# Patient Record
Sex: MALE | Race: WHITE | NOT HISPANIC OR LATINO | Employment: OTHER | URBAN - METROPOLITAN AREA
[De-identification: names, ages, dates, MRNs, and addresses within clinical notes are randomized per-mention and may not be internally consistent; named-entity substitution may affect disease eponyms.]

---

## 2017-01-03 DIAGNOSIS — N40.0 BPH (BENIGN PROSTATIC HYPERPLASIA): Primary | ICD-10-CM

## 2017-01-10 DIAGNOSIS — N40.0 BPH (BENIGN PROSTATIC HYPERPLASIA): ICD-10-CM

## 2017-01-10 LAB — PSA SERPL-MCNC: 2.09 UG/L (ref 0–4)

## 2017-01-12 ENCOUNTER — PRE VISIT (OUTPATIENT)
Dept: UROLOGY | Facility: CLINIC | Age: 72
End: 2017-01-12

## 2017-01-26 ENCOUNTER — OFFICE VISIT (OUTPATIENT)
Dept: DERMATOLOGY | Facility: CLINIC | Age: 72
End: 2017-01-26

## 2017-01-26 DIAGNOSIS — Z79.899 ENCOUNTER FOR LONG-TERM (CURRENT) USE OF HIGH-RISK MEDICATION: ICD-10-CM

## 2017-01-26 DIAGNOSIS — L10.2 PEMPHIGUS FOLIACEOUS (H): ICD-10-CM

## 2017-01-26 DIAGNOSIS — L10.2 PEMPHIGUS FOLIACEUS (H): Primary | ICD-10-CM

## 2017-01-26 DIAGNOSIS — L10.2 PEMPHIGUS FOLIACEUS (H): ICD-10-CM

## 2017-01-26 LAB
ALBUMIN SERPL-MCNC: 3.9 G/DL (ref 3.4–5)
ALP SERPL-CCNC: 40 U/L (ref 40–150)
ALT SERPL W P-5'-P-CCNC: 25 U/L (ref 0–70)
AST SERPL W P-5'-P-CCNC: 19 U/L (ref 0–45)
BASOPHILS # BLD AUTO: 0 10E9/L (ref 0–0.2)
BASOPHILS NFR BLD AUTO: 0.3 %
BILIRUB DIRECT SERPL-MCNC: 0.1 MG/DL (ref 0–0.2)
BILIRUB SERPL-MCNC: 0.3 MG/DL (ref 0.2–1.3)
DIFFERENTIAL METHOD BLD: NORMAL
EOSINOPHIL # BLD AUTO: 0.1 10E9/L (ref 0–0.7)
EOSINOPHIL NFR BLD AUTO: 1.6 %
ERYTHROCYTE [DISTWIDTH] IN BLOOD BY AUTOMATED COUNT: 12.7 % (ref 10–15)
HCT VFR BLD AUTO: 42.5 % (ref 40–53)
HGB BLD-MCNC: 14.6 G/DL (ref 13.3–17.7)
IMM GRANULOCYTES # BLD: 0 10E9/L (ref 0–0.4)
IMM GRANULOCYTES NFR BLD: 0.6 %
LYMPHOCYTES # BLD AUTO: 1.4 10E9/L (ref 0.8–5.3)
LYMPHOCYTES NFR BLD AUTO: 20.4 %
MCH RBC QN AUTO: 30.9 PG (ref 26.5–33)
MCHC RBC AUTO-ENTMCNC: 34.4 G/DL (ref 31.5–36.5)
MCV RBC AUTO: 90 FL (ref 78–100)
MONOCYTES # BLD AUTO: 0.6 10E9/L (ref 0–1.3)
MONOCYTES NFR BLD AUTO: 8.6 %
NEUTROPHILS # BLD AUTO: 4.8 10E9/L (ref 1.6–8.3)
NEUTROPHILS NFR BLD AUTO: 68.5 %
NRBC # BLD AUTO: 0 10*3/UL
NRBC BLD AUTO-RTO: 0 /100
PLATELET # BLD AUTO: 212 10E9/L (ref 150–450)
PROT SERPL-MCNC: 7.2 G/DL (ref 6.8–8.8)
RBC # BLD AUTO: 4.73 10E12/L (ref 4.4–5.9)
WBC # BLD AUTO: 7 10E9/L (ref 4–11)

## 2017-01-26 RX ORDER — MYCOPHENOLATE MOFETIL 500 MG/1
TABLET, FILM COATED ORAL
Qty: 372 TABLET | Refills: 2 | Status: SHIPPED | OUTPATIENT
Start: 2017-01-26 | End: 2017-04-27

## 2017-01-26 ASSESSMENT — PAIN SCALES - GENERAL: PAINLEVEL: NO PAIN (0)

## 2017-01-26 NOTE — MR AVS SNAPSHOT
After Visit Summary   1/26/2017    Luan Mitchell    MRN: [id number]           Patient Information     Date Of Birth          1945        Visit Information        Provider Department      1/26/2017 10:30 AM Dewey Mart MD [facility] Dermatology        Today's Diagnoses     Pemphigus foliaceus    -  1     Pemphigus foliaceous         Encounter for long-term (current) use of high-risk medication            Follow-ups after your visit        Your next 10 appointments already scheduled     Jan 31, 2017  4:00 PM   (Arrive by 3:45 PM)   Return Visit with MONA Case   [facility] and [facility] ([facility] [place]    [address]  4th Floor  [facility] [address] [phone]            Apr 20, 2017 10:30 AM   (Arrive by 10:15 AM)   Return Visit with Dewey Mart MD   [facility]ermatology ([facility] [place]    [address]  3rd Floor  [facility] [address] [phone]              Who to contact     Please call your clinic at [phone] to:    Ask questions about your health    Make or cancel appointments    Discuss your medicines    Learn about your test results    Speak to your doctor   If you have compliments or concerns about an experience at your clinic, or if you wish to file a complaint, please contact [facility] Patient Relations at [phone] or email us at [email or web address]         Additional Information About Your Visit        [employer] Information     [employer] gives you secure access to your electronic health record. If you see a primary care provider, you can also send messages to your care team and make appointments. If you have questions, please call your primary care clinic.  If you do not have a primary care provider, please call [phone] and they will assist you.      [employer] is an electronic gateway that provides easy, online  access to your medical records. With [employer], you can request a clinic appointment, read your test results, renew a prescription or communicate with your care team.     To access your existing account, please contact your [facility] or call [phone] for assistance.        Care EveryWhere ID     This is your Care EveryWhere ID. This could be used by other organizations to access your [place] medical records  [id number]         Blood Pressure from Last 3 Encounters:   08/01/16 132/79   02/11/16 115/62   07/27/15 127/78    Weight from Last 3 Encounters:   08/01/16 79.198 kg (174 lb 9.6 oz)   02/11/16 81.784 kg (180 lb 4.8 oz)   07/27/15 80.151 kg (176 lb 11.2 oz)              Today, you had the following     No orders found for display         Where to get your medicines      These medications were sent to [place], [place] - [address]  [address] [place], [place] [id number]    Hours:  TRANSPLANT PHONE NUMBER [phone] Phone:  [phone]    - mycophenolate tablet       Primary Care Provider Office Phone # Fax #    Loretta Villeda [phone]        [facility]IC[facility]ANS [address] [place] [address]  [facility] [address]        Thank you!     Thank you for choosing [facility] DERMATOLOGY  for your care. Our goal is always to provide you with excellent care. Hearing back from our patients is one way we can continue to improve our services. Please take a few minutes to complete the written survey that you may receive in the mail after your visit with us. Thank you!             Your Updated Medication List - Protect others around you: Learn how to safely use, store and throw away your medicines at www.disposemymeds.org.          This list is accurate as of: 1/26/17 11:00 AM.  Always use your most recent med list.                   Brand Name Dispense Instructions for use    ARTIFICIAL TEAR SOLUTION OP      Apply   to eye.       aspirin 81 MG tablet      Take 1 tablet by mouth daily.       * clobetasol propionate 0.05 % Sham     1 Bottle    Apply sparingly to dry scalp 3 x weekly as needed.  Leave in place for 15 m then add water, lather and rinse       * clobetasol 0.05 % external solution    TEMOVATE    180 mL    Apply  topically 2 times daily as needed to the scalp.       * clobetasol 0.05 % ointment    TEMOVATE    180 g    APPLY TO AFFECTED AREA(S) TWO TIMES A DAY       * desonide 0.05 % lotion    DESOWEN    118 mL    Apply topically twice daily as needed to the beard/face.       * desonide 0.05 % ointment    DESOWEN    60 g    Apply topically twice daily as needed to the groin       * desonide 0.05 % ointment    DESOWEN    180 g    Apply topically to affected areas twice daily as instructed.       diflorasone 0.05 % ointment    PSORCON    60 g    Apply topically to affected areas as instructed.       * finasteride 5 MG tablet    PROSCAR    90 tablet    Take 1 tablet (5 mg) by mouth daily       * finasteride 5 MG tablet    PROSCAR    90 tablet    Take 1 tablet (5 mg) by mouth daily       * finasteride 5 MG tablet    PROSCAR    90 tablet    Take 1 tablet (5 mg) by mouth daily       hydrocortisone butyrate 0.1 % Soln solution    LOCOID    180 mL    Apply sparingly to affected area(s) of beard twice daily       ketoconazole 2 % shampoo    NIZORAL    240 mL    Three times per week in the shower: Lather into scalp, leave in place for 3-5 minutes, then rinse.       lovastatin 20 MG tablet    MEVACOR    90 tablet    Take 1 tablet (20 mg) by mouth At Bedtime       multivitamin per tablet      Take 1 tablet by mouth daily. 1 tab daily       mupirocin 2 % ointment    BACTROBAN    30 g    Use 2 times a day to affected area on the lower back for 2 weeks.       mycophenolate tablet     372 tablet    Take 2 tablet (1000 mg) by mouth 2 times daily.       tacrolimus 0.1 % ointment    PROTOPIC    100 g    Apply topically to affected areas  as instructed.       triamcinolone 0.1 % cream    KENALOG    454 g    Apply topically to affected areas as instructed.       VITAMIN D PO      Take 1 tablet by mouth daily 1000 mg       * Notice:  This list has 9 medication(s) that are the same as other medications prescribed for you. Read the directions carefully, and ask your doctor or other care provider to review them with you.

## 2017-01-26 NOTE — Clinical Note
2017       RE: Luan Mitchell  [address] [facility] [address]     Dear Colleague,    Thank you for referring your patient, Luan Mitchell, to the [facility] DERMATOLOGY at [facility]. Please see a copy of my visit note below.    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 On mycophenolate mofetil. Goals of treatment per Mr. Mitchell: prevent itching and secondary infections. He is not interested in complete clearance at this time.  - Titers (4/23/15)  Cell surface Ig:5,120, monkey esophagus; 1,280 intact human skin  Dsg-1 Ig units (POS > 20, neg <14)  Dsg-3 Ig units (POS > than 20, neg <9)  2. Seborrheic dermatitis  3. 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    He returns today reporting a flare 2 weeks ago with an upper respiratory infection. Otherwise, he has no new concerns, and no desire to alter treatment. His current level of itching continue to be minimal, and primarily isolated to his scalp and back.      ROS:  CONST: Otherwise feeling in his usual state of health, no f/c/ns   SKIN: No new/changing moles  LYMPH: No subcutaneous lumps/bumps  No painful or draining areas, and no mucus membrane involvement No other rapidly-growing, bleeding, tender, non-healing, painful, or tender lesions elsewhere.  No recent illness, fevers, chills, drenching night sweats, loss of appetite, or unexpected weight changes.    Social History: Retired . His wife is currently undergoing surveillance for a soft-tissue sarcoma of her right buttock, and they are due for follow-up in [place] next week.    Physical exam:  Gen: Well-developed, well-nourished  male in no acute distress, pleasant, cooperative  "with exam  Skin: Full body examination of the scalp, face, ears, chest, back, abdomen, bilateral upper and lower extremities, buttocks, groin, mouth and nails significant for skin phototype type II and:  - left lateral canthus: 4mm xerotic hyperkeratotic papule with a pink base, significantly flatter than previous but still verrucous, xerotic.  - Multiple ovoid, brown-pink plaques with overlying yellow, \"corn-flake\" like scale, mostly scattered on the back, and extremities but with a resolving confluence on the lower back. No bullae were noted. Estimated 8-9% TBSA. No giraldo honey crusting  - Scalp with minimal diffuse erythema and 3-4 focal scaled plaques, all <4mm  - remainder unremarkable.    A/P:  1. Pemphigus foliaceus, very stable and pleased with symptom control, without evidence of secondary impetiginization on exam. Still a candidate for systemic anti-CD20 therapy (which may significant reduce his long-term immunosuppressive requirements); still has has room to move up MMF, which he is tolerating well at 1000 mg BID. He nevertheless still prefers to maintain his regimen unchanged, however.    - repeat screening labs today: CBC, LFTs  - continue mycophenolate mofetil 1000 mg BID (Rx renewal provided). Benefits and risks of this were reviewed.  - continue RX: clobetasol 0.05% ointment twice daily PRN to recalcitrant areas on the trunk extremities  - continue clobetasol 0.05% shampoo 2-3x/week to the scalp  - contintue: clobetasol 0.05% solution on clobetasol shampoo days  - refilled: hydrocortisone butyrate solution    - contine : desonide 0.05% lotion BID PRN to the beard 0.1% solution to the beard twice daily PRN  - continue triamcinolone 0.1% cream twice daily to the trunk/extremities  - continue ketoconazole 2% shampoo TIW  - continue tacrolimus 0.1% ointment daily to the face    2. Inflamed seb keratosis, improved, but not resolved.   - After obtaining verbal consent, a single lesion no the L lateral " canthus were treated with liquid nitrogen, total 2 cycles 10 seconds freeze-to-thaw time. She tolerated this well.    RTC 3 months.  Discussed and examined with [facility] staff dermatologist Dr. Serina Odell.    Dewey Mart MD, ISSA  PGY-4, Dermatology  I was present for the entire procedure. KB  .I was present for key portions of the history and exam.  See resident note for pertinent history, exam, and treatment plan.  Serina Odell MD        Again, thank you for allowing me to participate in the care of your patient.      Sincerely,    Dewey Mart MD

## 2017-01-26 NOTE — PROGRESS NOTES
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 On mycophenolate mofetil. Goals of treatment per Mr. Mitchell: prevent itching and secondary infections. He is not interested in complete clearance at this time.  - Titers (4/23/15)  Cell surface Ig:5,120, monkey esophagus; 1,280 intact human skin  Dsg-1 Ig units (POS > 20, neg <14)  Dsg-3 Ig units (POS > than 20, neg <9)  2. Seborrheic dermatitis  3. 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    He returns today reporting a flare 2 weeks ago with an upper respiratory infection. Otherwise, he has no new concerns, and no desire to alter treatment. His current level of itching continue to be minimal, and primarily isolated to his scalp and back.      ROS:  CONST: Otherwise feeling in his usual state of health, no f/c/ns   SKIN: No new/changing moles  LYMPH: No subcutaneous lumps/bumps  No painful or draining areas, and no mucus membrane involvement No other rapidly-growing, bleeding, tender, non-healing, painful, or tender lesions elsewhere.  No recent illness, fevers, chills, drenching night sweats, loss of appetite, or unexpected weight changes.    Social History: Retired . His wife is currently undergoing surveillance for a soft-tissue sarcoma of her right buttock, and they are due for follow-up in [place] next week.    Physical exam:  Gen: Well-developed, well-nourished  male in no acute distress, pleasant, cooperative with exam  Skin: Full body examination of the scalp, face, ears, chest, back, abdomen, bilateral upper and lower extremities, buttocks, groin, mouth and nails significant for skin phototype type II and:  - left lateral canthus: 4mm xerotic hyperkeratotic papule with a pink base,  "significantly flatter than previous but still verrucous, xerotic.  - Multiple ovoid, brown-pink plaques with overlying yellow, \"corn-flake\" like scale, mostly scattered on the back, and extremities but with a resolving confluence on the lower back. No bullae were noted. Estimated 8-9% TBSA. No giraldo honey crusting  - Scalp with minimal diffuse erythema and 3-4 focal scaled plaques, all <4mm  - remainder unremarkable.    A/P:  1. Pemphigus foliaceus, very stable and pleased with symptom control, without evidence of secondary impetiginization on exam. Still a candidate for systemic anti-CD20 therapy (which may significant reduce his long-term immunosuppressive requirements); still has has room to move up MMF, which he is tolerating well at 1000 mg BID. He nevertheless still prefers to maintain his regimen unchanged, however.    - repeat screening labs today: CBC, LFTs  - continue mycophenolate mofetil 1000 mg BID (Rx renewal provided). Benefits and risks of this were reviewed.  - continue RX: clobetasol 0.05% ointment twice daily PRN to recalcitrant areas on the trunk extremities  - continue clobetasol 0.05% shampoo 2-3x/week to the scalp  - contintue: clobetasol 0.05% solution on clobetasol shampoo days  - refilled: hydrocortisone butyrate solution    - contine : desonide 0.05% lotion BID PRN to the beard 0.1% solution to the beard twice daily PRN  - continue triamcinolone 0.1% cream twice daily to the trunk/extremities  - continue ketoconazole 2% shampoo TIW  - continue tacrolimus 0.1% ointment daily to the face    2. Inflamed seb keratosis, improved, but not resolved.   - After obtaining verbal consent, a single lesion no the L lateral canthus were treated with liquid nitrogen, total 2 cycles 10 seconds freeze-to-thaw time. She tolerated this well.    RTC 3 months.  Discussed and examined with [facility] staff dermatologist Dr. Serina Odell.    Dewey Mart MD, ISSA  PGY-4, Dermatology  I was present for the " entire procedure. KB  .I was present for key portions of the history and exam.  See resident note for pertinent history, exam, and treatment plan.  Serina Odell MD

## 2017-01-26 NOTE — NURSING NOTE
"Dermatology Rooming Note    Luan Mitchell's goals for this visit include:   Chief Complaint   Patient presents with     Derm Problem     Patient comes to clinic today for pemphigus. States \"it's the same.\"     Tawny Sandoval, [facility]    "

## 2017-01-30 ENCOUNTER — TELEPHONE (OUTPATIENT)
Dept: DERMATOLOGY | Facility: CLINIC | Age: 72
End: 2017-01-30

## 2017-01-30 NOTE — TELEPHONE ENCOUNTER
Prior Authorization Retail Medication Request  Medication/Dose: CELLCEPT 500 MG PO TABLET  Diagnosis and ICD code: Pemphigus foliaceus [L10.2]   New/Renewal/Insurance Change PA: insurance change  Previously Tried and Failed Therapies: See chart. Luan has been on this medication since 2011    Insurance ID (if provided): [id number]   Insurance Phone (if provided):   [phone]

## 2017-01-31 ENCOUNTER — OFFICE VISIT (OUTPATIENT)
Dept: UROLOGY | Facility: CLINIC | Age: 72
End: 2017-01-31

## 2017-01-31 ENCOUNTER — OFFICE VISIT (OUTPATIENT)
Dept: INTERNAL MEDICINE | Facility: CLINIC | Age: 72
End: 2017-01-31

## 2017-01-31 VITALS
TEMPERATURE: 97.8 F | SYSTOLIC BLOOD PRESSURE: 126 MMHG | RESPIRATION RATE: 16 BRPM | WEIGHT: 174 LBS | OXYGEN SATURATION: 96 % | DIASTOLIC BLOOD PRESSURE: 71 MMHG | BODY MASS INDEX: 25.34 KG/M2 | HEART RATE: 84 BPM

## 2017-01-31 VITALS
DIASTOLIC BLOOD PRESSURE: 66 MMHG | HEIGHT: 70 IN | HEART RATE: 84 BPM | WEIGHT: 172 LBS | SYSTOLIC BLOOD PRESSURE: 117 MMHG | BODY MASS INDEX: 24.62 KG/M2

## 2017-01-31 DIAGNOSIS — R06.09 EXERTIONAL DYSPNEA: ICD-10-CM

## 2017-01-31 DIAGNOSIS — N40.0 BENIGN NON-NODULAR PROSTATIC HYPERPLASIA WITHOUT LOWER URINARY TRACT SYMPTOMS: Primary | ICD-10-CM

## 2017-01-31 DIAGNOSIS — N50.89 EPIDIDYMAL MASS: ICD-10-CM

## 2017-01-31 DIAGNOSIS — R42 DIZZINESS: Primary | ICD-10-CM

## 2017-01-31 DIAGNOSIS — E78.5 HYPERLIPIDEMIA LDL GOAL <100: ICD-10-CM

## 2017-01-31 LAB
ALBUMIN UR-MCNC: NEGATIVE MG/DL
APPEARANCE UR: CLEAR
BILIRUB UR QL STRIP: NEGATIVE
COLOR UR AUTO: YELLOW
GLUCOSE UR STRIP-MCNC: NEGATIVE MG/DL
HGB UR QL STRIP: NEGATIVE
KETONES UR STRIP-MCNC: 5 MG/DL
LEUKOCYTE ESTERASE UR QL STRIP: NEGATIVE
MUCOUS THREADS #/AREA URNS LPF: PRESENT /LPF
NITRATE UR QL: NEGATIVE
PH UR STRIP: 5 PH (ref 5–7)
RBC #/AREA URNS AUTO: 1 /HPF (ref 0–2)
SP GR UR STRIP: 1.03 (ref 1–1.03)
SQUAMOUS #/AREA URNS AUTO: <1 /HPF (ref 0–1)
URN SPEC COLLECT METH UR: ABNORMAL
UROBILINOGEN UR STRIP-MCNC: 0 MG/DL (ref 0–2)
WBC #/AREA URNS AUTO: 1 /HPF (ref 0–2)

## 2017-01-31 RX ORDER — FINASTERIDE 5 MG/1
5 TABLET, FILM COATED ORAL DAILY
Qty: 90 TABLET | Refills: 4 | Status: SHIPPED | OUTPATIENT
Start: 2017-01-31 | End: 2017-02-20

## 2017-01-31 RX ORDER — LOVASTATIN 20 MG
20 TABLET ORAL AT BEDTIME
Qty: 90 TABLET | Refills: 4 | Status: SHIPPED | OUTPATIENT
Start: 2017-01-31 | End: 2018-02-05

## 2017-01-31 RX ORDER — FINASTERIDE 5 MG/1
5 TABLET, FILM COATED ORAL DAILY
Qty: 90 TABLET | Refills: 4 | Status: SHIPPED | OUTPATIENT
Start: 2017-01-31 | End: 2017-01-31

## 2017-01-31 ASSESSMENT — PAIN SCALES - GENERAL
PAINLEVEL: NO PAIN (0)
PAINLEVEL: NO PAIN (0)

## 2017-01-31 NOTE — Clinical Note
uLan Mitchell   [address] [facility] [address]        Dear Mr. Mitchell:     I am writing you concerning your recent test results:    Results for orders placed or performed in visit on 01/31/17   Routine UA with micro reflex to culture   Result Value Ref Range    Color Urine Yellow     Appearance Urine Clear     Glucose Urine Negative NEG mg/dL    Bilirubin Urine Negative NEG    Ketones Urine 5 (A) NEG mg/dL    Specific Gravity Urine 1.030 1.003 - 1.035    Blood Urine Negative NEG    pH Urine 5.0 5.0 - 7.0 pH    Protein Albumin Urine Negative NEG mg/dL    Urobilinogen mg/dL 0.0 0.0 - 2.0 mg/dL    Nitrite Urine Negative NEG    Leukocyte Esterase Urine Negative NEG    Source Midstream Urine     WBC Urine 1 0 - 2 /HPF    RBC Urine 1 0 - 2 /HPF    Squamous Epithelial /HPF Urine <1 0 - 1 /HPF    Mucous Urine Present (A) NEG /LPF     Resulted Orders   Routine UA with micro reflex to culture   Result Value Ref Range    Color Urine Yellow     Appearance Urine Clear     Glucose Urine Negative NEG mg/dL    Bilirubin Urine Negative NEG    Ketones Urine 5 (A) NEG mg/dL    Specific Gravity Urine 1.030 1.003 - 1.035    Blood Urine Negative NEG    pH Urine 5.0 5.0 - 7.0 pH    Protein Albumin Urine Negative NEG mg/dL    Urobilinogen mg/dL 0.0 0.0 - 2.0 mg/dL    Nitrite Urine Negative NEG    Leukocyte Esterase Urine Negative NEG    Source Midstream Urine     WBC Urine 1 0 - 2 /HPF    RBC Urine 1 0 - 2 /HPF    Squamous Epithelial /HPF Urine <1 0 - 1 /HPF    Mucous Urine Present (A) NEG /LPF       Your urine looks fine.  There is no blood or signs of infection.     Please let me know if you have any questions.      Sincerely,      Amparo Carroll PA-C  Physician Assistant Urology        [facility] UROLOGY AND [facility]  [address]  4th Floor  [facility] [address]  Phone: [phone]  Fax: [phone]

## 2017-01-31 NOTE — PROGRESS NOTES
"HPI: Mr. Luan Mitchell is a 71 year old year old male presenting today for evaluation of chief complaint(s): RECHECK  - Annual Exam      Mr. Mitchell is a 71 year old gentleman with PMH significant for HLD, pemphigus (immunocompromised on cellcept) and BPH.  From a Urologic perspective he has history of elevated PSA in the 1990's with an initial biopsy negative then a repeat 24 core biopsy in 1998, also negative.  Since then he has had two more negative biopsies with Dr. Roldan. He has been on finasteride now for several years with stable PSA of ~2.     Today he continues to be very pleased with his voiding symptoms.  No frequency, urgency, incontinence or obstructive symptoms. No hematuria, urinary tract infections, stones, prostatitis.  His IPSS today is 4. Sexual function is also robust with FLORENCE today 25. He tells me, however, that he examined himself in preparation for this appointment and did note a \"pea-sized\" mass in his R scrotal compartment.  He is unsure whether it is new or not.  No pain.     PMH: No changes  PSxHx: No changes  Meds: No changes  Allergies: Penicillin    PSA History:  01/10/17 - PSA 2.09  02/04/16 - Total 2.0 and free/total = 30%  03.18.15 - Total 2.1ng/mL and free/total = 33%  02.06.13 - Total 2.1ng/mL and free/total =29%  03.07.12 - Total 2.6ng/dL and free/total = 23%    Current Outpatient Prescriptions   Medication Sig Dispense Refill     CELLCEPT 500 MG PO TABLET Take 2 tablet (1000 mg) by mouth 2 times daily. 372 tablet 2     finasteride (PROSCAR) 5 MG tablet Take 1 tablet (5 mg) by mouth daily 90 tablet 8     desonide (DESOWEN) 0.05 % ointment Apply topically to affected areas twice daily as instructed. 180 g 3     hydrocortisone butyrate (LOCOID) 0.1 % SOLN Apply sparingly to affected area(s) of beard twice daily 180 mL 3     lovastatin (MEVACOR) 20 MG tablet Take 1 tablet (20 mg) by mouth At Bedtime 90 tablet 4     clobetasol (TEMOVATE) 0.05 % ointment APPLY TO AFFECTED AREA(S) TWO " "TIMES A  g 2     desonide (DESOWEN) 0.05 % lotion Apply topically twice daily as needed to the beard/face. 118 mL 11     clobetasol propionate 0.05 % SHAM Apply sparingly to dry scalp 3 x weekly as needed.  Leave in place for 15 m then add water, lather and rinse 1 Bottle 5     clobetasol (TEMOVATE) 0.05 % external solution Apply  topically 2 times daily as needed to the scalp. 180 mL 11     ketoconazole (NIZORAL) 2 % shampoo Three times per week in the shower: Lather into scalp, leave in place for 3-5 minutes, then rinse. 240 mL 12     triamcinolone (KENALOG) 0.1 % cream Apply topically to affected areas as instructed. 454 g 11     tacrolimus (PROTOPIC) 0.1 % ointment Apply topically to affected areas as instructed. 100 g 11     Cholecalciferol (VITAMIN D PO) Take 1 tablet by mouth daily 1000 mg       ARTIFICIAL TEAR SOLUTION OP Apply  to eye.       aspirin 81 MG tablet Take 1 tablet by mouth daily.       Multiple Vitamin (MULTIVITAMIN) per tablet Take 1 tablet by mouth daily. 1 tab daily       [DISCONTINUED] desonide (DESOWEN) 0.05 % ointment Apply topically twice daily as needed to the groin 60 g 11       ALLERGIES: Penicillins      REVIEW OF SYSTEMS:  As above in HPI    GENERAL PHYSICAL EXAM:   Vitals: /66 mmHg  Pulse 84  Ht 1.765 m (5' 9.5\")  Wt 78.019 kg (172 lb)  BMI 25.04 kg/m2  Body mass index is 25.04 kg/(m^2).    GENERAL: Well groomed, well developed, well nourished male in NAD.  NEURO: Alert and oriented x 3.  PSYCH: Normal mood and affect, pleasant and agreeable during interview and exam.     :      Inguinal: no hernias or palpable lymph nodes      Circumcised penis, no penile plaques or lesions. Orthotopic location of the urethral meatus.       Scrotum normal.      Testicles of normal firmness and consistency, no masses.      Epididymes + right pea-sized mass, likely small cyst.  Left normal.        LEAH:      Normal rectal tone, small soft amount of stool in the rectal vault.      " Medium-large sized prostate, without tenderness, nodules or asymmetry.    LABS: The last test results for Mr. Luan Mitchell were reviewed:  PSA - PSA     2.09   1/10/2017  PSA     1.96   2/14/2011  PSA     2.47   2/25/2010  PSA     2.11   2/27/2009  PSA     1.77   3/28/2008  PSA     2.43   1/23/2007  PSA     1.86   3/2/2006  PSA     2.34   3/11/2005  BMP -   Recent Labs   Lab Test  10/27/16   1120  08/01/16   1151  10/22/15   1134  07/23/15   1133   12/26/08   0527   NA  140   --   146*  140   < >  142   POTASSIUM  4.3   --   4.3  4.0   < >  3.8   CHLORIDE  106   --   113*  109   < >  106   CO2  30   --   29  25   < >  27   BUN  14   --   16  14   < >  19   CR  1.04  1.13  1.05  0.92   < >  0.73   GLC  88   --   84  93   < >  64   JOSHUA  9.2   --   8.7  8.9   < >  9.2   MAG   --    --    --    --    --   2.1   PHOS   --    --    --    --    --   3.9    < > = values in this interval not displayed.       CBC -   Recent Labs   Lab Test  01/26/17   1120  07/28/16   0931  04/21/16   1102   WBC  7.0  7.0  7.1   HGB  14.6  14.2  14.8   PLT  212  127*  174       ASSESSMENT:   1) BPH on chronic finasteride  2) R epididymal mass - likely small cyst -new and worrisome to patient     PLAN:   Scrotal US  Refill finasteride  Return to Clinic 1 year    Carolynn Carroll PA-C  Department of Urologic Surgery

## 2017-01-31 NOTE — NURSING NOTE
"Chief Complaint   Patient presents with     RECHECK     BPH/LUTS follow up with PSA       Initial Ht 1.765 m (5' 9.5\")  Wt 78.019 kg (172 lb)  BMI 25.04 kg/m2 Estimated body mass index is 25.04 kg/(m^2) as calculated from the following:    Height as of this encounter: 1.765 m (5' 9.5\").    Weight as of this encounter: 78.019 kg (172 lb).  BP completed using cuff size: LUTHER Encarnacion    "

## 2017-01-31 NOTE — PROGRESS NOTES
"Chief complaint:  Dizziness  History of present illness:  Luan Mitchell is a 71-year-old man with a past medical history of BPH and pemphigus foliaceous presents with worsening of his chronic dizziness.  He describes the dizziness as feeling lightheaded and as if he had a \"paper bag over his head\".  He has never fainted.  This tends to occur when he's been standing for long period of time and is relieved by lying down.  He also experiences mild exertional dyspnea without chest pain or palpitations.   During January he states that the dizziness has been constant, but that it gets worse at times.  He checks his blood pressure at home and it has been in SBP 120s.  He had a negative dobutamine stress test in 2008.     There is no vertiginous component to this or motion sickness. He does not feel nauseated or have hearing changes. It is not brought on by moving his head quickly.  I had referred him to otolaryngology couple of years ago, Dr. Nav Rust, ho did not think that this was vertigo.    Past Medical History   Diagnosis Date     Pemphigus      pemphigus foliaceus     BPH (benign prostatic hyperplasia)      Retinal detachment 2009     left eye- corrected with laser     Hyperlipidemia LDL goal <130      Vitreous detachment 2009     LE     Past Surgical History   Procedure Laterality Date     Retinopexy laser prophylaxis break(s) os (left eye)       2009 - Left eye     No history of surgery  1/28/14     derm      ROS: 4 point ROS neg other than the symptoms noted above in the HPI.    /72 mmHg  Pulse 81  Temp(Src) 97.8  F (36.6  C) (Oral)  Resp 16  Wt 78.926 kg (174 lb)  SpO2 96%     Orthostatic Vitals   BP Pulse Position Site Cuff Size Time Date   126/71 84 Standing Right Arm Regular 06:23 PM 1/31/2017   124/78 76 Sitting Right Arm Regular 06:21 PM 1/31/2017   131/77 71 supine Right Arm Regular 06:19 PM 1/31/2017     No repeat blood pressure data filed.  No peak flow data filed.  No pain information " filed.  Physical exam  Healthy-appearing 71-year-old man  Chest:  Clear to PNA  Cardiac:  JVP is 7 cm. Regular rate and rhythm, S1 and S2 without murmur click or gallop. There is no pretibial edema    EKG August 1, 2016 reviewed: Other than a left axis deviation and it is entirely normal.  Last Basic Metabolic Panel:  NA      140   10/27/2016   POTASSIUM      4.3   10/27/2016  CHLORIDE      106   10/27/2016  JOSHUA      9.2   10/27/2016  CO2       30   10/27/2016  BUN       14   10/27/2016  CR     1.04   10/27/2016  GLC       88   10/27/2016    CBC RESULTS:   Recent Labs   Lab Test  01/26/17   1120   WBC  7.0   RBC  4.73   HGB  14.6   HCT  42.5   MCV  90   MCH  30.9   MCHC  34.4   RDW  12.7   PLT  212     AST       19   1/26/2017  ALT       25   1/26/2017  BILICONJ      0.0   7/25/2014   BILITOTAL      0.3   1/26/2017  ALBUMIN      3.9   1/26/2017  PROTTOTAL      7.2   1/26/2017   ALKPHOS       40   1/26/2017    ASSESSMENT  Dizziness, progressive present for many years n an otherwise reasonably healthy 71-year-old man.  It is not vertiginous, he has not fainted, and he is not orthostatic.  Differential diagnosis includes decreased cardiac output due to arrhythmia, congestive heart failure for which there is a strong family history, and with the exertional dyspnea, consideration for coronary artery disease.    Luan was seen today for dizziness.    Diagnoses and all orders for this visit:    Dizziness  -     Zio Patch Holter; Future  -     Dobutamine Stress Echocardiogram; Future  -     Echocardiogram Complete; Future    Exertional dyspnea  -     Dobutamine Stress Echocardiogram; Future  -     Echocardiogram Complete; Future    Hyperlipidemia LDL goal <100  -     lovastatin (MEVACOR) 20 MG tablet; Take 1 tablet (20 mg) by mouth At Bedtime

## 2017-01-31 NOTE — NURSING NOTE
"Chief Complaint   Patient presents with     Dizziness     Here for light headedness that comes and goes since January and has become \"intensely\"      Peter Buckner CMA at 5:34 PM on 1/31/2017     "

## 2017-01-31 NOTE — PATIENT INSTRUCTIONS
[facility] Medication Refill Request Information:  * Please contact your pharmacy regarding ANY request for medication refills.  ** [facility] Prescription Fax = [phone]  * Please allow 3 business days for routine medication refills.  * Please allow 5 business days for controlled substance medication refills.     [facility] Test Result notification information:  *You will be notified with in 7-10 days of your appointment day regarding the results of your test.  If you are on MyChart you will be notified as soon as the provider has reviewed the results and signed off on them.    Primary Care Center [phone]   Cardiovascular for Zio Patch Holter [phone]  ECHOcardiogram [phone]  ECHO DEPARTMENT  (EXERCISE)    Name: Luan Mitchell  YOB: 1945  MRN: [id number]  DATE:     TIME:     DOES PATIENT HAVE A DEFIBRILLATOR?    DOES PATIENT HAVE A PACEMAKER?   IS PATIENT TAKING ANY BETA-BLOCKERS?     PREP INFO:        NOTHING TO EAT OR DRINK 3 HOURS PRIOR.    DO NOT TAKE NITRATES ON THE DAY OF YOUR TEST. DO NOT WEAR YOUR NITRO-PATCH.    NO ALCOHOL, SMOKING, OR OTHER TOBACCO FOR 12 HOURS BEFORE THE TEST    WEAR COMFORTABLE CLOTHES AND COMFORTABLE SHOES    STOP BETA-BLOCKERS THE DAY BEFORE AND THE MORNING OF PROCEDURE.    LOCATION:       REPORT TO THE GOLD WAITING ROOM ON THE 2ND FLOOR, (STREET LEVEL) OF THE [facility], AT THE [facility].    QUESTIONS OR NEED TO RESCHEDULE: CALL [phone]

## 2017-01-31 NOTE — MR AVS SNAPSHOT
After Visit Summary   1/31/2017    Luan Mitchell    MRN: [id number]           Patient Information     Date Of Birth          1945        Visit Information        Provider Department      1/31/2017 4:00 PM Amparo Carroll PA [facility] and [facility] [[email or web address]] for Prostate and Urologic Cancers        Today's Diagnoses     Benign non-nodular prostatic hyperplasia without lower urinary tract symptoms    -  1     Epididymal mass            Follow-ups after your visit        Your next 10 appointments already scheduled     Jan 31, 2017  6:00 PM   (Arrive by 5:45 PM)   Return Visit with Loretta Villeda MD   [facility] ([facility])    [address] [place] [address]  [address] [facility] [id number] [phone]            Feb 01, 2017 10:30 AM   US TESTICULAR AND SCROTUM with UCUS2   [facility] ([facility])    [address] [place] [id number] [phone]           Please bring a list of your medicines (including vitamins, minerals and over-the-counter drugs). Also, tell your doctor about any allergies you may have. Wear comfortable clothes and leave your valuables at home.  You do not need to do anything special to prepare for your exam.  Please call the Imaging Department at your exam site with any questions.            Apr 20, 2017 10:30 AM   (Arrive by 10:15 AM)   Return Visit with Dewey Mart MD   [facility] ([facility])    [address] [place] [address]  [facility] [id number] [phone]              Future tests that were ordered for you today     Open Future Orders        Priority Expected Expires Ordered    Routine UA with micro reflex to culture Routine 1/31/2017 1/31/2018 1/31/2017    US Testicular and Scrotum Routine 1/31/2017 5/1/2017 1/31/2017            Who to contact     Please call your clinic at [phone] to:    Ask questions about your  "health    Make or cancel appointments    Discuss your medicines    Learn about your test results    Speak to your doctor   If you have compliments or concerns about an experience at your clinic, or if you wish to file a complaint, please contact [facility] Patient Relations at [phone] or email us at [email or web address]         Additional Information About Your Visit        MyChart Information     [employer] gives you secure access to your electronic health record. If you see a primary care provider, you can also send messages to your care team and make appointments. If you have questions, please call your primary care clinic.  If you do not have a primary care provider, please call [phone] and they will assist you.      kinkon is an electronic gateway that provides easy, online access to your medical records. With kinkon, you can request a clinic appointment, read your test results, renew a prescription or communicate with your care team.     To access your existing account, please contact your [facility] or call [phone] for assistance.        Care EveryWhere ID     This is your Care EveryWhere ID. This could be used by other organizations to access your [place] medical records  [id number]        Your Vitals Were     Pulse Height BMI (Body Mass Index)             84 1.765 m (5' 9.5\") 25.04 kg/m2          Blood Pressure from Last 3 Encounters:   01/31/17 117/66   08/01/16 132/79   02/11/16 115/62    Weight from Last 3 Encounters:   01/31/17 78.019 kg (172 lb)   08/01/16 79.198 kg (174 lb 9.6 oz)   02/11/16 81.784 kg (180 lb 4.8 oz)                 Today's Medication Changes          These changes are accurate as of: 1/31/17  4:36 PM.  If you have any questions, ask your nurse or doctor.               These medicines have changed or have updated prescriptions.        Dose/Directions    * desonide 0.05 % lotion   Commonly known as:  " DESOWEN   This may have changed:  Another medication with the same name was removed. Continue taking this medication, and follow the directions you see here.   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   Quantity:  118 mL   Refills:  11       * desonide 0.05 % ointment   Commonly known as:  DESOWEN   This may have changed:  Another medication with the same name was removed. Continue taking this medication, and follow the directions you see here.   Used for:  Pemphigus foliaceus, Pemphigus foliaceous, Encounter for long-term (current) use of high-risk medication   Changed by:  Dewey Mart MD        Apply topically to affected areas twice daily as instructed.   Quantity:  180 g   Refills:  3       * finasteride 5 MG tablet   Commonly known as:  PROSCAR   This may have changed:  Another medication with the same name was added. Make sure you understand how and when to take each.   Used for:  Benign prostatic hyperplasia with lower urinary tract symptoms, unspecified morphology   Changed by:  Loretta Villeda MD        Dose:  5 mg   Take 1 tablet (5 mg) by mouth daily   Quantity:  90 tablet   Refills:  8       * finasteride 5 MG tablet   Commonly known as:  PROSCAR   This may have changed:  Another medication with the same name was added. Make sure you understand how and when to take each.   Used for:  Benign non-nodular prostatic hyperplasia without lower urinary tract symptoms   Changed by:  Amparo Carroll PA        Dose:  5 mg   Take 1 tablet (5 mg) by mouth daily   Quantity:  90 tablet   Refills:  4       * finasteride 5 MG tablet   Commonly known as:  PROSCAR   This may have changed:  You were already taking a medication with the same name, and this prescription was added. Make sure you understand how and when to take each.   Used for:  Benign  non-nodular prostatic hyperplasia without lower urinary tract symptoms   Changed by:  Amparo Carroll PA        Dose:  5 mg   Take 1 tablet (5 mg) by mouth daily   Quantity:  90 tablet   Refills:  4       * Notice:  This list has 5 medication(s) that are the same as other medications prescribed for you. Read the directions carefully, and ask your doctor or other care provider to review them with you.      Stop taking these medicines if you haven't already. Please contact your care team if you have questions.     diflorasone 0.05 % ointment   Commonly known as:  PSORCON   Stopped by:  Amparo Carroll PA           mupirocin 2 % ointment   Commonly known as:  BACTROBAN   Stopped by:  Amparo Carroll PA                Where to get your medicines      These medications were sent to [facility] - [place], [place] - [address] [place] [address]  [address] [facility] [address]     Phone:  [phone]    - finasteride 5 MG tablet      Some of these will need a paper prescription and others can be bought over the counter.  Ask your nurse if you have questions.     Bring a paper prescription for each of these medications    - finasteride 5 MG tablet             Primary Care Provider Office Phone # Fax #    Loretta Villeda [phone]        [facility] [address] [facility] [address]  [facility] [address]        Thank you!     Thank you for choosing [facility] AND [facility] FOR PROSTATE AND UROLOGIC CANCERS  for your care. Our goal is always to provide you with excellent care. Hearing back from our patients is one way we can continue to improve our services. Please take a few minutes to complete the written survey that you may receive in the mail after your visit with us. Thank you!             Your Updated Medication List - Protect others around you: Learn how to safely use, store and throw away your medicines at www.disposemymeds.org.          This list is accurate as of: 1/31/17   4:36 PM.  Always use your most recent med list.                   Brand Name Dispense Instructions for use    ARTIFICIAL TEAR SOLUTION OP      Apply  to eye.       aspirin 81 MG tablet      Take 1 tablet by mouth daily.       * clobetasol propionate 0.05 % Sham     1 Bottle    Apply sparingly to dry scalp 3 x weekly as needed.  Leave in place for 15 m then add water, lather and rinse       * clobetasol 0.05 % external solution    TEMOVATE    180 mL    Apply  topically 2 times daily as needed to the scalp.       * clobetasol 0.05 % ointment    TEMOVATE    180 g    APPLY TO AFFECTED AREA(S) TWO TIMES A DAY       * desonide 0.05 % lotion    DESOWEN    118 mL    Apply topically twice daily as needed to the beard/face.       * desonide 0.05 % ointment    DESOWEN    180 g    Apply topically to affected areas twice daily as instructed.       * finasteride 5 MG tablet    PROSCAR    90 tablet    Take 1 tablet (5 mg) by mouth daily       * finasteride 5 MG tablet    PROSCAR    90 tablet    Take 1 tablet (5 mg) by mouth daily       * finasteride 5 MG tablet    PROSCAR    90 tablet    Take 1 tablet (5 mg) by mouth daily       hydrocortisone butyrate 0.1 % Soln solution    LOCOID    180 mL    Apply sparingly to affected area(s) of beard twice daily       ketoconazole 2 % shampoo    NIZORAL    240 mL    Three times per week in the shower: Lather into scalp, leave in place for 3-5 minutes, then rinse.       lovastatin 20 MG tablet    MEVACOR    90 tablet    Take 1 tablet (20 mg) by mouth At Bedtime       multivitamin per tablet      Take 1 tablet by mouth daily. 1 tab daily       mycophenolate tablet     372 tablet    Take 2 tablet (1000 mg) by mouth 2 times daily.       tacrolimus 0.1 % ointment    PROTOPIC    100 g    Apply topically to affected areas as instructed.       triamcinolone 0.1 % cream    KENALOG    454 g    Apply topically to affected areas as instructed.       VITAMIN D PO      Take 1 tablet by mouth daily 1000 mg        * Notice:  This list has 8 medication(s) that are the same as other medications prescribed for you. Read the directions carefully, and ask your doctor or other care provider to review them with you.

## 2017-01-31 NOTE — MR AVS SNAPSHOT
After Visit Summary   1/31/2017    Luan Mitchell    MRN: [id number]           Patient Information     Date Of Birth          1945        Visit Information        Provider Department      1/31/2017 6:00 PM Loretta Villeda MD [facility]        Today's Diagnoses     Dizziness    -  1     Exertional dyspnea           Care Instructions    Primary Care Center Medication Refill Request Information:  * Please contact your pharmacy regarding ANY request for medication refills.  ** [facility] Prescription Fax = [phone]  * Please allow 3 business days for routine medication refills.  * Please allow 5 business days for controlled substance medication refills.     Primary Care Center Test Result notification information:  *You will be notified with in 7-10 days of your appointment day regarding the results of your test.  If you are on MyChart you will be notified as soon as the provider has reviewed the results and signed off on them.    Primary Care Center [phone]   Cardiovascular for Zio Patch Holter [phone]  ECHOcardiogram [phone]  ECHO DEPARTMENT  (EXERCISE)    Name: Luan Mitchell  YOB: 1945  MRN: [id number]  DATE:     TIME:     DOES PATIENT HAVE A DEFIBRILLATOR?    DOES PATIENT HAVE A PACEMAKER?   IS PATIENT TAKING ANY BETA-BLOCKERS?     PREP INFO:        NOTHING TO EAT OR DRINK 3 HOURS PRIOR.    DO NOT TAKE NITRATES ON THE DAY OF YOUR TEST. DO NOT WEAR YOUR NITRO-PATCH.    NO ALCOHOL, SMOKING, OR OTHER TOBACCO FOR 12 HOURS BEFORE THE TEST    WEAR COMFORTABLE CLOTHES AND COMFORTABLE SHOES    STOP BETA-BLOCKERS THE DAY BEFORE AND THE MORNING OF PROCEDURE.    LOCATION:       REPORT TO THE [facility] WAITING ROOM ON THE 2ND FLOOR, (STREET LEVEL) OF THE [facility], AT THE [facility].    QUESTIONS OR NEED TO RESCHEDULE: CALL [phone]          Follow-ups after your visit        Your next 10 appointments already scheduled     Feb 01, 2017  10:30 AM   US TESTICULAR AND SCROTUM with UCUS2   [facility] US ([facility])    [address] [address] [phone]           Please bring a list of your medicines (including vitamins, minerals and over-the-counter drugs). Also, tell your doctor about any allergies you may have. Wear comfortable clothes and leave your valuables at home.  You do not need to do anything special to prepare for your exam.  Please call the Imaging Department at your exam site with any questions.            Apr 20, 2017 10:30 AM   (Arrive by 10:15 AM)   Return Visit with Dewey Mart MD   [facility] ([facility])    [address] [facility]  3rd [facility] [address] [phone]              Future tests that were ordered for you today     Open Future Orders        Priority Expected Expires Ordered    Echocardiogram Complete Routine  1/31/2018 1/31/2017    Zio Patch Holter Routine  3/17/2017 1/31/2017    Dobutamine Stress Echocardiogram Routine  1/31/2018 1/31/2017    US Testicular and Scrotum Routine 1/31/2017 5/1/2017 1/31/2017            Who to contact     Please call your clinic at [phone] to:    Ask questions about your health    Make or cancel appointments    Discuss your medicines    Learn about your test results    Speak to your doctor   If you have compliments or concerns about an experience at your clinic, or if you wish to file a complaint, please contact [facility] Patient Relations at [phone] or email us at [email or web address]         Additional Information About Your Visit        MyChart Information     [employer] gives you secure access to your electronic health record. If you see a primary care provider, you can also send messages to your care team and make appointments. If you have questions, please call your primary care clinic.  If you do not have a  primary care provider, please call [phone] and they will assist you.      [employer] is an electronic gateway that provides easy, online access to your medical records. With [employer], you can request a clinic appointment, read your test results, renew a prescription or communicate with your care team.     To access your existing account, please contact your [facility] or call [phone] for assistance.        Care EveryWhere ID     This is your Care EveryWhere ID. This could be used by other organizations to access your [place] medical records  [id number]        Your Vitals Were     Pulse Temperature Respirations Pulse Oximetry          81 97.8  F (36.6  C) (Oral) 16 96%         Blood Pressure from Last 3 Encounters:   01/31/17 115/72   01/31/17 117/66   08/01/16 132/79    Weight from Last 3 Encounters:   01/31/17 78.926 kg (174 lb)   01/31/17 78.019 kg (172 lb)   08/01/16 79.198 kg (174 lb 9.6 oz)                 Today's Medication Changes          These changes are accurate as of: 1/31/17  6:34 PM.  If you have any questions, ask your nurse or doctor.               These medicines have changed or have updated prescriptions.        Dose/Directions    * desonide 0.05 % lotion   Commonly known as:  DESOWEN   This may have changed:  Another medication with the same name was removed. Continue taking this medication, and follow the directions you see here.   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   Quantity:  118 mL   Refills:  11       * desonide 0.05 % ointment   Commonly known as:  DESOWEN   This may have changed:  Another medication with the same name was removed. Continue taking this medication, and follow the directions you see here.   Used for:  Pemphigus foliaceus, Pemphigus foliaceous, Encounter for long-term (current) use  of high-risk medication   Changed by:  Dewey Mart MD        Apply topically to affected areas twice daily as instructed.   Quantity:  180 g   Refills:  3       * finasteride 5 MG tablet   Commonly known as:  PROSCAR   This may have changed:  Another medication with the same name was added. Make sure you understand how and when to take each.   Used for:  Benign prostatic hyperplasia with lower urinary tract symptoms, unspecified morphology   Changed by:  Loretta Villeda MD        Dose:  5 mg   Take 1 tablet (5 mg) by mouth daily   Quantity:  90 tablet   Refills:  8       * finasteride 5 MG tablet   Commonly known as:  PROSCAR   This may have changed:  Another medication with the same name was added. Make sure you understand how and when to take each.   Used for:  Benign non-nodular prostatic hyperplasia without lower urinary tract symptoms   Changed by:  Amparo Carroll PA        Dose:  5 mg   Take 1 tablet (5 mg) by mouth daily   Quantity:  90 tablet   Refills:  4       * finasteride 5 MG tablet   Commonly known as:  PROSCAR   This may have changed:  You were already taking a medication with the same name, and this prescription was added. Make sure you understand how and when to take each.   Used for:  Benign non-nodular prostatic hyperplasia without lower urinary tract symptoms   Changed by:  Amparo Carroll PA        Dose:  5 mg   Take 1 tablet (5 mg) by mouth daily   Quantity:  90 tablet   Refills:  4       * Notice:  This list has 5 medication(s) that are the same as other medications prescribed for you. Read the directions carefully, and ask your doctor or other care provider to review them with you.      Stop taking these medicines if you haven't already. Please contact your care team if you have questions.     diflorasone 0.05 % ointment   Commonly known as:  PSORCON   Stopped by:  Amparo Carroll PA           mupirocin 2 % ointment   Commonly known as:  BACTROBAN   Stopped by:  Glen  MONA Taylor                Where to get your medicines      These medications were sent to [facility] - [place], [place] - [address]  [address] [address]     Phone:  [phone]    - finasteride 5 MG tablet      Some of these will need a paper prescription and others can be bought over the counter.  Ask your nurse if you have questions.     Bring a paper prescription for each of these medications    - finasteride 5 MG tablet             Primary Care Provider Office Phone # Fax #    Loretta Villeda [phone]        PH[facility]IC[facility] [address] [facility] [address]  [facility] [address]        Thank you!     Thank you for choosing [facility]  for your care. Our goal is always to provide you with excellent care. Hearing back from our patients is one way we can continue to improve our services. Please take a few minutes to complete the written survey that you may receive in the mail after your visit with us. Thank you!             Your Updated Medication List - Protect others around you: Learn how to safely use, store and throw away your medicines at www.disposemymeds.org.          This list is accurate as of: 1/31/17  6:34 PM.  Always use your most recent med list.                   Brand Name Dispense Instructions for use    ARTIFICIAL TEAR SOLUTION OP      Apply  to eye.       aspirin 81 MG tablet      Take 1 tablet by mouth daily.       * clobetasol propionate 0.05 % Sham     1 Bottle    Apply sparingly to dry scalp 3 x weekly as needed.  Leave in place for 15 m then add water, lather and rinse       * clobetasol 0.05 % external solution    TEMOVATE    180 mL    Apply  topically 2 times daily as needed to the scalp.       * clobetasol 0.05 % ointment    TEMOVATE    180 g    APPLY TO AFFECTED AREA(S) TWO TIMES A DAY       * desonide 0.05 % lotion    DESOWEN    118 mL    Apply topically twice daily as needed to the beard/face.       *  desonide 0.05 % ointment    DESOWEN    180 g    Apply topically to affected areas twice daily as instructed.       * finasteride 5 MG tablet    PROSCAR    90 tablet    Take 1 tablet (5 mg) by mouth daily       * finasteride 5 MG tablet    PROSCAR    90 tablet    Take 1 tablet (5 mg) by mouth daily       * finasteride 5 MG tablet    PROSCAR    90 tablet    Take 1 tablet (5 mg) by mouth daily       hydrocortisone butyrate 0.1 % Soln solution    LOCOID    180 mL    Apply sparingly to affected area(s) of beard twice daily       ketoconazole 2 % shampoo    NIZORAL    240 mL    Three times per week in the shower: Lather into scalp, leave in place for 3-5 minutes, then rinse.       lovastatin 20 MG tablet    MEVACOR    90 tablet    Take 1 tablet (20 mg) by mouth At Bedtime       multivitamin per tablet      Take 1 tablet by mouth daily. 1 tab daily       mycophenolate tablet     372 tablet    Take 2 tablet (1000 mg) by mouth 2 times daily.       tacrolimus 0.1 % ointment    PROTOPIC    100 g    Apply topically to affected areas as instructed.       triamcinolone 0.1 % cream    KENALOG    454 g    Apply topically to affected areas as instructed.       VITAMIN D PO      Take 1 tablet by mouth daily 1000 mg       * Notice:  This list has 8 medication(s) that are the same as other medications prescribed for you. Read the directions carefully, and ask your doctor or other care provider to review them with you.

## 2017-02-03 ENCOUNTER — HOSPITAL ENCOUNTER (OUTPATIENT)
Dept: CARDIOLOGY | Facility: CLINIC | Age: 72
Discharge: HOME | End: 2017-02-03
Attending: INTERNAL MEDICINE | Admitting: INTERNAL MEDICINE
Payer: COMMERCIAL

## 2017-02-03 ENCOUNTER — ALLIED HEALTH/NURSE VISIT (OUTPATIENT)
Dept: CARDIOLOGY | Facility: CLINIC | Age: 72
End: 2017-02-03
Payer: COMMERCIAL

## 2017-02-03 DIAGNOSIS — R42 DIZZINESS: ICD-10-CM

## 2017-02-03 DIAGNOSIS — R06.09 EXERTIONAL DYSPNEA: ICD-10-CM

## 2017-02-03 PROCEDURE — 25000125 ZZHC RX 250: Performed by: INTERNAL MEDICINE

## 2017-02-03 PROCEDURE — 0298T ZZC EXT ECG > 48HR TO 21 DAY REVIEW AND INTERPRETATN: CPT | Performed by: INTERNAL MEDICINE

## 2017-02-03 PROCEDURE — 0296T ZIO PATCH HOLTER: CPT | Mod: ZF

## 2017-02-03 PROCEDURE — 93321 DOPPLER ECHO F-UP/LMTD STD: CPT | Mod: 26 | Performed by: INTERNAL MEDICINE

## 2017-02-03 PROCEDURE — 93018 CV STRESS TEST I&R ONLY: CPT | Performed by: INTERNAL MEDICINE

## 2017-02-03 PROCEDURE — 93350 STRESS TTE ONLY: CPT | Mod: 26 | Performed by: INTERNAL MEDICINE

## 2017-02-03 PROCEDURE — 93325 DOPPLER ECHO COLOR FLOW MAPG: CPT | Mod: 26 | Performed by: INTERNAL MEDICINE

## 2017-02-03 PROCEDURE — 93016 CV STRESS TEST SUPVJ ONLY: CPT | Performed by: INTERNAL MEDICINE

## 2017-02-03 PROCEDURE — 40000264 ECHO DOBUTAMINE STRESS TEST WITH DEFINITY

## 2017-02-03 PROCEDURE — 25000128 H RX IP 250 OP 636: Performed by: INTERNAL MEDICINE

## 2017-02-03 PROCEDURE — 25500064 ZZH RX 255 OP 636: Performed by: INTERNAL MEDICINE

## 2017-02-03 RX ORDER — DOBUTAMINE HYDROCHLORIDE 200 MG/100ML
5-40 INJECTION INTRAVENOUS CONTINUOUS
Status: DISCONTINUED | OUTPATIENT
Start: 2017-02-03 | End: 2017-02-04 | Stop reason: HOSPADM

## 2017-02-03 RX ADMIN — METOPROLOL TARTRATE 1 MG: 5 INJECTION INTRAVENOUS at 11:35

## 2017-02-03 RX ADMIN — PERFLUTREN 9 ML: 6.52 INJECTION, SUSPENSION INTRAVENOUS at 11:36

## 2017-02-03 RX ADMIN — DOBUTAMINE HYDROCHLORIDE 30 MCG/KG/MIN: 200 INJECTION INTRAVENOUS at 11:26

## 2017-02-03 NOTE — NURSING NOTE
Per Dr. Loretta Villeda, patient to have 3 day ziopatch monitor placed.  Diagnosis: dizziness  Monitor placed: Yes  Patient Instructed: Yes  Patient verbalized understanding: Yes  Holter # [id number]      Placed by Katie Leigh CMA

## 2017-02-03 NOTE — PROGRESS NOTES
Pt here for dobutamine stress test. Test, meds and side effects reviewed with patient. Achieved target HR at 40 mcg/kg/min Dobutamine. Gave a total of 1mg IV Metoprolol to bring HR back to baseline. Post monitoring complete and VSS. Pt escorted out to the gold waiting room.

## 2017-02-08 NOTE — TELEPHONE ENCOUNTER
Patient called to check the status of PA. Per 2/2/17 note the PA is in processing. Will document when response is received.

## 2017-02-08 NOTE — TELEPHONE ENCOUNTER
Prior Authorization Approval    Authorization Effective Date: 1/9/2017  Authorization Expiration Date: 2/8/2020  Medication: CELLCEPT 500 MG PO TABLET  Approved Dose/Quantity:    Reference #:     Insurance Company: [employer] - Phone [phone] Fax [phone]  Expected CoPay:       CoPay Card Available:      Foundation Assistance Needed:    Which Pharmacy is filling the prescription (Not needed for infusion/clinic administered): [facility] [address] 9-749  Pharmacy Notified: Yes  Patient Notified: Yes

## 2017-02-08 NOTE — TELEPHONE ENCOUNTER
[facility] Prior Authorization Team   Phone: [phone]  Fax: [phone]      PA Initiation    Medication: CELLCEPT 500 MG PO TABLET  Insurance Company: [employer] - Phone [phone] Fax [phone]  Pharmacy Filling the Rx: [facility] [place], [place] - [address] [place] [address] [phone]  Filling Pharmacy Phone: [phone]  Filling Pharmacy Fax: [phone]  Start Date: 2/2/2017      INITIATED VIA PHONE

## 2017-02-14 DIAGNOSIS — R00.2 PALPITATION: Primary | ICD-10-CM

## 2017-02-15 DIAGNOSIS — Z00.00 HEALTHCARE MAINTENANCE: Primary | ICD-10-CM

## 2017-02-16 DIAGNOSIS — R00.2 PALPITATION: ICD-10-CM

## 2017-02-16 DIAGNOSIS — Z00.00 HEALTHCARE MAINTENANCE: ICD-10-CM

## 2017-02-16 LAB
HBA1C MFR BLD: 5.4 % (ref 4.3–6)
POTASSIUM SERPL-SCNC: 4.6 MMOL/L (ref 3.4–5.3)
TSH SERPL DL<=0.05 MIU/L-ACNC: 4.83 MU/L (ref 0.4–4)

## 2017-02-20 ENCOUNTER — OFFICE VISIT (OUTPATIENT)
Dept: INTERNAL MEDICINE | Facility: CLINIC | Age: 72
End: 2017-02-20

## 2017-02-20 VITALS
DIASTOLIC BLOOD PRESSURE: 75 MMHG | SYSTOLIC BLOOD PRESSURE: 125 MMHG | WEIGHT: 176.3 LBS | RESPIRATION RATE: 18 BRPM | BODY MASS INDEX: 25.66 KG/M2 | HEART RATE: 80 BPM | OXYGEN SATURATION: 96 %

## 2017-02-20 DIAGNOSIS — R42 LIGHT HEADEDNESS: Primary | ICD-10-CM

## 2017-02-20 ASSESSMENT — PAIN SCALES - GENERAL: PAINLEVEL: NO PAIN (0)

## 2017-02-20 NOTE — NURSING NOTE
Chief Complaint   Patient presents with     Dizziness     Patient is here to follow up on dizziness.     Ramona Castaneda LPN at 4:22 PM on 2/20/2017.

## 2017-02-20 NOTE — MR AVS SNAPSHOT
After Visit Summary   2/20/2017    Luan Mitchell    MRN: [id number]           Patient Information     Date Of Birth          1945        Visit Information        Provider Department      2/20/2017 4:10 PM Loretta Villeda MD [facility]        Today's Diagnoses     Light headedness    -  1      Care Instructions    Primary Care Center Medication Refill Request Information:  * Please contact your pharmacy regarding ANY request for medication refills.  ** [facility] Prescription Fax = [phone]  * Please allow 3 business days for routine medication refills.  * Please allow 5 business days for controlled substance medication refills.     Primary Care Center Test Result notification information:  *You will be notified with in 7-10 days of your appointment day regarding the results of your test.  If you are on MyChart you will be notified as soon as the provider has reviewed the results and signed off on them.    Primary Care Center Phone Number [phone]          Follow-ups after your visit        Additional Services     CARDIOLOGY EVAL ADULT REFERRAL       Your provider has referred you to:  [facility]: [facility] [phone]   [email or web address]    Please be aware that coverage of these services is subject to the terms and limitations of your health insurance plan.  Call member services at your health plan with any benefit or coverage questions.      Type of Referral:  New Cardiology Consult    Timeframe requested:  Within 1 month    Please bring the following to your appointment:  >>   Any x-rays, CTs or MRIs which have been performed.  Contact the facility where they were done to arrange for  prior to your scheduled appointment.    >>   List of current medications  >>   This referral request   >>   Any documents/labs given to you for this referral                  Your  next 10 appointments already scheduled     Apr 20, 2017 10:30 AM CDT   (Arrive by 10:15 AM)   Return Visit with Dewey Mart MD   [facility] ([facility])    [address] [address] [phone]              Who to contact     Please call your clinic at [phone] to:    Ask questions about your health    Make or cancel appointments    Discuss your medicines    Learn about your test results    Speak to your doctor   If you have compliments or concerns about an experience at your clinic, or if you wish to file a complaint, please contact [facility] Patient Relations at [phone] or email us at [email or web address]         Additional Information About Your Visit        [employer] Information     [employer] gives you secure access to your electronic health record. If you see a primary care provider, you can also send messages to your care team and make appointments. If you have questions, please call your primary care clinic.  If you do not have a primary care provider, please call [phone] and they will assist you.      [employer] is an electronic gateway that provides easy, online access to your medical records. With [employer], you can request a clinic appointment, read your test results, renew a prescription or communicate with your care team.     To access your existing account, please contact your [facility] or call [phone] for assistance.        Care EveryWhere ID     This is your Care EveryWhere ID. This could be used by other organizations to access your [place] medical records  [id number]        Your Vitals Were     Pulse Respirations Pulse Oximetry BMI (Body Mass Index)          80 18 96% 25.66 kg/m2         Blood Pressure from Last 3 Encounters:   02/20/17 125/75   01/31/17 115/72   01/31/17 117/66    Weight from Last 3 Encounters:   02/20/17 80 kg (176 lb 4.8  oz)   01/31/17 78.9 kg (174 lb)   01/31/17 78 kg (172 lb)              We Performed the Following     CARDIOLOGY EVAL ADULT REFERRAL          Today's Medication Changes          These changes are accurate as of: 2/20/17  4:50 PM.  If you have any questions, ask your nurse or doctor.               These medicines have changed or have updated prescriptions.        Dose/Directions    finasteride 5 MG tablet   Commonly known as:  PROSCAR   This may have changed:  Another medication with the same name was removed. Continue taking this medication, and follow the directions you see here.   Used for:  Benign prostatic hyperplasia with lower urinary tract symptoms, unspecified morphology   Changed by:  Loretta Villeda MD        Dose:  5 mg   Take 1 tablet (5 mg) by mouth daily   Quantity:  90 tablet   Refills:  8                Primary Care Provider Office Phone # Fax #    Loretta Villeda [phone]        PHYSIC[facility] [address] [address]  [facility] [address]        Thank you!     Thank you for choosing [facility]  for your care. Our goal is always to provide you with excellent care. Hearing back from our patients is one way we can continue to improve our services. Please take a few minutes to complete the written survey that you may receive in the mail after your visit with us. Thank you!             Your Updated Medication List - Protect others around you: Learn how to safely use, store and throw away your medicines at www.disposemymeds.org.          This list is accurate as of: 2/20/17  4:50 PM.  Always use your most recent med list.                   Brand Name Dispense Instructions for use    ARTIFICIAL TEAR SOLUTION OP      Apply  to eye.       aspirin 81 MG tablet      Take 1 tablet by mouth daily.       * clobetasol propionate 0.05 % Sham     1 Bottle    Apply sparingly to dry scalp 3 x weekly as needed.  Leave in place for 15 m then add water, lather and rinse       *  clobetasol 0.05 % external solution    TEMOVATE    180 mL    Apply  topically 2 times daily as needed to the scalp.       * clobetasol 0.05 % ointment    TEMOVATE    180 g    APPLY TO AFFECTED AREA(S) TWO TIMES A DAY       * desonide 0.05 % lotion    DESOWEN    118 mL    Apply topically twice daily as needed to the beard/face.       * desonide 0.05 % ointment    DESOWEN    180 g    Apply topically to affected areas twice daily as instructed.       finasteride 5 MG tablet    PROSCAR    90 tablet    Take 1 tablet (5 mg) by mouth daily       hydrocortisone butyrate 0.1 % Soln solution    LOCOID    180 mL    Apply sparingly to affected area(s) of beard twice daily       ketoconazole 2 % shampoo    NIZORAL    240 mL    Three times per week in the shower: Lather into scalp, leave in place for 3-5 minutes, then rinse.       lovastatin 20 MG tablet    MEVACOR    90 tablet    Take 1 tablet (20 mg) by mouth At Bedtime       multivitamin per tablet      Take 1 tablet by mouth daily. 1 tab daily       mycophenolate tablet     372 tablet    Take 2 tablet (1000 mg) by mouth 2 times daily.       tacrolimus 0.1 % ointment    PROTOPIC    100 g    Apply topically to affected areas as instructed.       triamcinolone 0.1 % cream    KENALOG    454 g    Apply topically to affected areas as instructed.       VITAMIN D PO      Take 1 tablet by mouth daily 1000 mg       * Notice:  This list has 5 medication(s) that are the same as other medications prescribed for you. Read the directions carefully, and ask your doctor or other care provider to review them with you.

## 2017-02-20 NOTE — PATIENT INSTRUCTIONS
Primary Care Center Medication Refill Request Information:  * Please contact your pharmacy regarding ANY request for medication refills.  ** [facility] Prescription Fax = [phone]  * Please allow 3 business days for routine medication refills.  * Please allow 5 business days for controlled substance medication refills.     [facility] Test Result notification information:  *You will be notified with in 7-10 days of your appointment day regarding the results of your test.  If you are on MyChart you will be notified as soon as the provider has reviewed the results and signed off on them.    Primary Care Center Phone Number [phone]

## 2017-02-20 NOTE — PROGRESS NOTES
Cc:  Follow-up  History of present illness:  Luan Mitchell presents for follow-up of his holter monitoring and dobutamine stress test.  We reviewed these results together and they're both entirely negative. There were a few premature atrial beats and a short run of SVT, 6 beats, not corresponding to his symptoms of lightheadedness.    We reviewed the laboratory results which included a normal A1c, hemoglobin but a slightly increased TSH:  TSH   Date Value Ref Range Status   02/16/2017 4.83 (H) 0.40 - 4.00 mU/L Final   ]        Past Medical History   Diagnosis Date     BPH (benign prostatic hyperplasia)      Dizziness      Hyperlipidemia LDL goal <130      Pemphigus      pemphigus foliaceus     Retinal detachment 2009     left eye- corrected with laser     Vitreous detachment 2009     LE     Past Surgical History   Procedure Laterality Date     Retinopexy laser prophylaxis break(s) os (left eye)       2009 - Left eye     No history of surgery  1/28/14     derm      ROS: 4 point ROS neg other than the symptoms noted above in the HPI.    /75  Pulse 80  Resp 18  Wt 80 kg (176 lb 4.8 oz)  SpO2 96%  BMI 25.66 kg/m2     ASSESSMENT   1.  Lightheadedness and occasional palpitations in a 71-year-old man with a normal physical exam, no evidence of orthostasis, no vertigo, normal dobutamine stress test, and a basically normal Holter monitor.   The plan is to refer him to cardiology to consider tilt table test.     2.  Subclinical hypothyroidism.   Plan check TSH T3 and T4 in 2 months.     I spent a total of 15 minutes face-to-face with Luan Mitchell during today's office visit.  Over 50% of this time was spent counseling the patient and/or coordinating care regarding test results.  See note for details.

## 2017-02-22 ENCOUNTER — DOCUMENTATION ONLY (OUTPATIENT)
Dept: VASCULAR SURGERY | Facility: CLINIC | Age: 72
End: 2017-02-22

## 2017-03-12 ASSESSMENT — ENCOUNTER SYMPTOMS
HYPOTENSION: 0
SNORES LOUDLY: 0
SPUTUM PRODUCTION: 0
DYSPNEA ON EXERTION: 0
EXERCISE INTOLERANCE: 0
ORTHOPNEA: 0
PALPITATIONS: 1
POSTURAL DYSPNEA: 0
SLEEP DISTURBANCES DUE TO BREATHING: 0
TACHYCARDIA: 1
COUGH: 0
LEG SWELLING: 0
WHEEZING: 0
HEMOPTYSIS: 0
SHORTNESS OF BREATH: 0
LIGHT-HEADEDNESS: 1
LEG PAIN: 0
HYPERTENSION: 0
COUGH DISTURBING SLEEP: 0
RESPIRATORY PAIN: 0
SYNCOPE: 0
CLAUDICATION: 0

## 2017-03-13 ENCOUNTER — OFFICE VISIT (OUTPATIENT)
Dept: CARDIOLOGY | Facility: CLINIC | Age: 72
End: 2017-03-13
Attending: INTERNAL MEDICINE
Payer: COMMERCIAL

## 2017-03-13 ENCOUNTER — PRE VISIT (OUTPATIENT)
Dept: CARDIOLOGY | Facility: CLINIC | Age: 72
End: 2017-03-13

## 2017-03-13 VITALS
DIASTOLIC BLOOD PRESSURE: 74 MMHG | BODY MASS INDEX: 25.27 KG/M2 | HEIGHT: 70 IN | WEIGHT: 176.5 LBS | HEART RATE: 81 BPM | OXYGEN SATURATION: 97 % | SYSTOLIC BLOOD PRESSURE: 132 MMHG

## 2017-03-13 DIAGNOSIS — R42 LIGHTHEADED: ICD-10-CM

## 2017-03-13 DIAGNOSIS — R42 LIGHTHEADEDNESS: Primary | ICD-10-CM

## 2017-03-13 PROCEDURE — 99203 OFFICE O/P NEW LOW 30 MIN: CPT | Mod: GC | Performed by: INTERNAL MEDICINE

## 2017-03-13 ASSESSMENT — PAIN SCALES - GENERAL: PAINLEVEL: NO PAIN (0)

## 2017-03-13 NOTE — MR AVS SNAPSHOT
After Visit Summary   3/13/2017    Luan Mitchell    MRN: [id number]           Patient Information     Date Of Birth          1945        Visit Information        Provider Department      3/13/2017 6:00 PM Mukesh Lewis MD [facility]         Follow-ups after your visit        Your next 10 appointments already scheduled     Mar 13, 2017  6:00 PM CDT   (Arrive by 5:45 PM)   NEW ARRHYTHMIA with Mukesh Lewis MD   [facility] ([facility])    [address] [place] [id number] [phone]            Apr 20, 2017 10:30 AM CDT   (Arrive by 10:15 AM)   Return Visit with Dewey Mart MD   [facility] ([facility])    [address] [place] [id number] [phone]              Who to contact     If you have questions or need follow up information about today's clinic visit or your schedule please contact [facility] directly at [phone].  Normal or non-critical lab and imaging results will be communicated to you by Blizuuhart, letter or phone within 4 business days after the clinic has received the results. If you do not hear from us within 7 days, please contact the clinic through [employer] or phone. If you have a critical or abnormal lab result, we will notify you by phone as soon as possible.  Submit refill requests through 25eight or call your pharmacy and they will forward the refill request to us. Please allow 3 business days for your refill to be completed.          Additional Information About Your Visit        Blizuuhart Information     25eight gives you secure access to your electronic health record. If you see a primary care provider, you can also send messages to your care team and make appointments. If you have questions, please call your primary care clinic.  If you do not have a primary care provider, please call [phone] and they will assist  "you.        Care EveryWhere ID     This is your Care EveryWhere ID. This could be used by other organizations to access your [facility] medical records  [id number]        Your Vitals Were     Pulse Height Pulse Oximetry BMI (Body Mass Index)          81 1.778 m (5' 10\") 97% 25.33 kg/m2         Blood Pressure from Last 3 Encounters:   03/13/17 132/74   02/20/17 125/75   01/31/17 115/72    Weight from Last 3 Encounters:   03/13/17 80.1 kg (176 lb 8 oz)   02/20/17 80 kg (176 lb 4.8 oz)   01/31/17 78.9 kg (174 lb)              Today, you had the following     No orders found for display       Primary Care Provider Office Phone # Fax #    Loretta Villeda [phone]        [facility] [address] [facility] [address]  [facility] [address]        Thank you!     Thank you for choosing [facility]  for your care. Our goal is always to provide you with excellent care. Hearing back from our patients is one way we can continue to improve our services. Please take a few minutes to complete the written survey that you may receive in the mail after your visit with us. Thank you!             Your Updated Medication List - Protect others around you: Learn how to safely use, store and throw away your medicines at www.disposemymeds.org.          This list is accurate as of: 3/13/17  5:54 PM.  Always use your most recent med list.                   Brand Name Dispense Instructions for use    ARTIFICIAL TEAR SOLUTION OP      Apply  to eye.       aspirin 81 MG tablet      Take 1 tablet by mouth daily.       * clobetasol propionate 0.05 % Sham     1 Bottle    Apply sparingly to dry scalp 3 x weekly as needed.  Leave in place for 15 m then add water, lather and rinse       * clobetasol 0.05 % external solution    TEMOVATE    180 mL    Apply  topically 2 times daily as needed to the scalp.       * clobetasol 0.05 % ointment    TEMOVATE    180 g    APPLY TO AFFECTED AREA(S) TWO TIMES A DAY       * desonide 0.05 % lotion "    DESOWEN    118 mL    Apply topically twice daily as needed to the beard/face.       * desonide 0.05 % ointment    DESOWEN    180 g    Apply topically to affected areas twice daily as instructed.       finasteride 5 MG tablet    PROSCAR    90 tablet    Take 1 tablet (5 mg) by mouth daily       hydrocortisone butyrate 0.1 % Soln solution    LOCOID    180 mL    Apply sparingly to affected area(s) of beard twice daily       ketoconazole 2 % shampoo    NIZORAL    240 mL    Three times per week in the shower: Lather into scalp, leave in place for 3-5 minutes, then rinse.       lovastatin 20 MG tablet    MEVACOR    90 tablet    Take 1 tablet (20 mg) by mouth At Bedtime       multivitamin per tablet      Take 1 tablet by mouth daily. 1 tab daily       mycophenolate tablet     372 tablet    Take 2 tablet (1000 mg) by mouth 2 times daily.       tacrolimus 0.1 % ointment    PROTOPIC    100 g    Apply topically to affected areas as instructed.       triamcinolone 0.1 % cream    KENALOG    454 g    Apply topically to affected areas as instructed.       VITAMIN D PO      Take 1 tablet by mouth daily 1000 mg       * Notice:  This list has 5 medication(s) that are the same as other medications prescribed for you. Read the directions carefully, and ask your doctor or other care provider to review them with you.

## 2017-03-13 NOTE — NURSING NOTE
Chief Complaint   Patient presents with     New Patient     Initial consult for lightheadedness; recent Holter showed PACs and PVCs

## 2017-03-13 NOTE — PROGRESS NOTES
"     Clinical Cardiac Electrophysiology           HPI:   Reason for Consultation: lightheadedness and dizziness    History is obtained from the patient and per chart review.    Mr. Mitchell is a 70 y/o M with no prior cardiac history who presents for evaluation of persistent lightheadedness. He reports that in December 2016 he was in his usual state of health, and is very active at baseline. In early January 2017 he began to experience constant lightheadedness; constant throughout the day; some days are better than others but never resolved. He denies any vertigo. He feels like he is \"operating in a fog\" and feels like he may pass out (but has not ever had any true syncopal event). The lightheadedness gets worse when he stands for a few minutes, but settles back to baseline. It does not interfere with his ability to read or perform cognitive activities, nor his ability to take his dog for a daily 45 minute walk. He denies any exertional chest pain/pressure nor any exertional dyspnea. Weight is stable, no PND or orthopnea. He denies any palpitations or irregular HR. He feels he is generally in good cardiovascular health, and has not required any prior cardiac evaluation. No new medications or OTC/supplements. No lifestyle change or recent illness corresponding to the onset of lightheadedness in early January.            Past Medical History:     Past Medical History   Diagnosis Date     BPH (benign prostatic hyperplasia)      Dizziness      Hyperlipidemia LDL goal <130      Pemphigus      pemphigus foliaceus     Retinal detachment 2009     left eye- corrected with laser     Vitreous detachment 2009     LE             Past Surgical History:      Past Surgical History   Procedure Laterality Date     Retinopexy laser prophylaxis break(s) os (left eye)       2009 - Left eye     No history of surgery  1/28/14     derm             Social History:     Social History   Substance Use Topics     Smoking status: Never Smoker     " Smokeless tobacco: Never Used     Alcohol use Not on file    Retired college  (calculus).          Family History:     Family History   Problem Relation Age of Onset     HEART DISEASE Mother      CHF d 80     DIABETES Mother      DM2,  age 80     Hypertension Mother      HEART DISEASE Father      CHF d 78     HEART DISEASE Paternal Uncle      CHF d 50s     CANCER No family hx of      no skin cancer     Skin Cancer No family hx of      no skin cancer             Allergies:     Allergies   Allergen Reactions     Penicillins Swelling             Medications:     Current Outpatient Prescriptions   Medication     lovastatin (MEVACOR) 20 MG tablet     CELLCEPT 500 MG PO TABLET     finasteride (PROSCAR) 5 MG tablet     desonide (DESOWEN) 0.05 % ointment     hydrocortisone butyrate (LOCOID) 0.1 % SOLN     clobetasol (TEMOVATE) 0.05 % ointment     desonide (DESOWEN) 0.05 % lotion     clobetasol propionate 0.05 % SHAM     clobetasol (TEMOVATE) 0.05 % external solution     ketoconazole (NIZORAL) 2 % shampoo     triamcinolone (KENALOG) 0.1 % cream     tacrolimus (PROTOPIC) 0.1 % ointment     Cholecalciferol (VITAMIN D PO)     ARTIFICIAL TEAR SOLUTION OP     aspirin 81 MG tablet     Multiple Vitamin (MULTIVITAMIN) per tablet            Review of Systems:   GEN: Denies fevers, shaking chills, night sweats, decreased appetite, weight loss/gain  EYES/EARS/NOSE/THROAT: Denies sore throat, dysphagia  RESP: Denies exertional shortness of breath, productive cough   CARDIO: See HPI  ABD: Denies nausea, vomiting, heart burn, abdominal pain   : Denies dysuria or hematuria  MSK: Denies significant fatigue or muscle aches  NEURO: Denies headache, numbness/tingling, weakness  HEME: Denies bruising, bleeding petechiae   ENDO: Denies heat/cold intolerance, polyuria, polydipsia  **Remainder of the ROS is negative except that commented on in HPI.         Exam:   /74 (BP Location: Right arm, Patient Position: Chair, Cuff  "Size: Adult Regular)  Pulse 81  Ht 1.778 m (5' 10\")  Wt 80.1 kg (176 lb 8 oz)  SpO2 97%  BMI 25.33 kg/m2  GEN: A & O, healthy appearing male, sitting comfortably in exam room, NAD, cooperative and conversational   HEENT: PERRL, no scleral icterus, mucus membranes moist, no nystagmus  NECK: Supple, JVP not elevated, no carotid bruit  RESP: CTA bilaterally, no wheezing, no crackles, no increased work of breathing   CV: Regular, normal rate, S1 and S2 without m/r/g   ABD: Soft, nontender to palpation   EXT: No peripheral edema, warm/well perfused   NEURO: CN II-XII intact, normal 5/5 strength in all extremities, negative Rhomberg  SKIN: Normal skin turgor, no rash on limited exam        Data / Imaging:   Ambulatory ECG (Ziopatch) 2/3/17:  Sinus rhythm, 3 brief episodes of SVT (long R-P tachycardia likely Atrial Tach), rate 143 bpm, longest episode 6 beats.     Echocardiogram 2/3/17:    Normal dobutamine stress echocardiohram at target heartrate.  No symptoms during stress.  Normal stress EKG.  Normal BP response to stress.            Assessment and Recomendations:   Assessment:  70 y/o M with no prior cardiac history who presents with 2 consecutive months of constant lightheadedness and clouded \"foggy\" sensation of unknown etiology. Stress echocardiogram was negative for ischemia, although his reported symptoms are not consistent with an ischemic mechanism. Furthermore, while his Ziopatch does reveal short episodes of paroxysmal SVT (likely ATach), these short-lived episodes are inconsistent with his description of constant lightheadness without palpitations. His resting BP and HR are normal today in clinic while he is reporting the feelings of lightheadedness and dizziness. There is no clear physiologic explanation for his symptoms. We provided reassurance with regard to his low cardiovascular risks given recent risk stratification with negative DSE. We discussed the option of pursuing a tilt table test for further " evalution, however given that his symptoms are present constantly, it is very unlikely a tilt table would shed light on the true nature of his symptoms nor would it illustrate a condition for which a treatment exists. With that in mind, the patient was reassured and does not feel inclined to pursue tilt table testing at this time. He will continue to exercise regularly as able and will focus on staying hydrated with >1.5 L fluid/day. He is encouraged to contact us if there is significant worsening or change in the nature of his symptoms.      Recs:    ---No further evaluation at this time  ---RTC - prn    Patient seen and discussed with Dr. Lewis in clinic.    Shelley Lay MD  Cardiology Fellow  [phone]    Attending: Patient seen and examined with Dr. Lay. The H&P is accurate as recorded. Additional findings, if any, have been incorporated into the body of the note. All labs and imaging studies reviewed personally. The assessment and plans reflect our joint decisions, arrived at after careful discussion and review.     Mukesh Lewis MD

## 2017-03-13 NOTE — LETTER
"3/13/2017      RE: Luan Mitchell  [address] [address]       Dear Colleague,    Thank you for the opportunity to participate in the care of your patient, Luan Mitchell, at the [facility] Please see a copy of my visit note below.         Clinical Cardiac Electrophysiology           HPI:   Reason for Consultation: lightheadedness and dizziness    History is obtained from the patient and per chart review.    Mr. Mitchell is a 72 y/o M with no prior cardiac history who presents for evaluation of persistent lightheadedness. He reports that in December 2016 he was in his usual state of health, and is very active at baseline. In early January 2017 he began to experience constant lightheadedness; constant throughout the day; some days are better than others but never resolved. He denies any vertigo. He feels like he is \"operating in a fog\" and feels like he may pass out (but has not ever had any true syncopal event). The lightheadedness gets worse when he stands for a few minutes, but settles back to baseline. It does not interfere with his ability to read or perform cognitive activities, nor his ability to take his dog for a daily 45 minute walk. He denies any exertional chest pain/pressure nor any exertional dyspnea. Weight is stable, no PND or orthopnea. He denies any palpitations or irregular HR. He feels he is generally in good cardiovascular health, and has not required any prior cardiac evaluation. No new medications or OTC/supplements. No lifestyle change or recent illness corresponding to the onset of lightheadedness in early January.            Past Medical History:     Past Medical History   Diagnosis Date     BPH (benign prostatic hyperplasia)      Dizziness      Hyperlipidemia LDL goal <130      Pemphigus      pemphigus foliaceus     Retinal detachment 2009     left eye- corrected with laser     Vitreous detachment 2009     LE    "          Past Surgical History:      Past Surgical History   Procedure Laterality Date     Retinopexy laser prophylaxis break(s) os (left eye)       2009 - Left eye     No history of surgery  12     derm             Social History:     Social History   Substance Use Topics     Smoking status: Never Smoker     Smokeless tobacco: Never Used     Alcohol use Not on file    Retired college  (calculus).          Family History:     Family History   Problem Relation Age of Onset     HEART DISEASE Mother      CHF d 80     DIABETES Mother      DM2,  age 80     Hypertension Mother      HEART DISEASE Father      CHF d 78     HEART DISEASE Paternal Uncle      CHF d 50s     CANCER No family hx of      no skin cancer     Skin Cancer No family hx of      no skin cancer             Allergies:     Allergies   Allergen Reactions     Penicillins Swelling             Medications:     Current Outpatient Prescriptions   Medication     lovastatin (MEVACOR) 20 MG tablet     CELLCEPT 500 MG PO TABLET     finasteride (PROSCAR) 5 MG tablet     desonide (DESOWEN) 0.05 % ointment     hydrocortisone butyrate (LOCOID) 0.1 % SOLN     clobetasol (TEMOVATE) 0.05 % ointment     desonide (DESOWEN) 0.05 % lotion     clobetasol propionate 0.05 % SHAM     clobetasol (TEMOVATE) 0.05 % external solution     ketoconazole (NIZORAL) 2 % shampoo     triamcinolone (KENALOG) 0.1 % cream     tacrolimus (PROTOPIC) 0.1 % ointment     Cholecalciferol (VITAMIN D PO)     ARTIFICIAL TEAR SOLUTION OP     aspirin 81 MG tablet     Multiple Vitamin (MULTIVITAMIN) per tablet            Review of Systems:   GEN: Denies fevers, shaking chills, night sweats, decreased appetite, weight loss/gain  EYES/EARS/NOSE/THROAT: Denies sore throat, dysphagia  RESP: Denies exertional shortness of breath, productive cough   CARDIO: See HPI  ABD: Denies nausea, vomiting, heart burn, abdominal pain   : Denies dysuria or hematuria  MSK: Denies significant fatigue or  "muscle aches  NEURO: Denies headache, numbness/tingling, weakness  HEME: Denies bruising, bleeding petechiae   ENDO: Denies heat/cold intolerance, polyuria, polydipsia  **Remainder of the ROS is negative except that commented on in HPI.         Exam:   /74 (BP Location: Right arm, Patient Position: Chair, Cuff Size: Adult Regular)  Pulse 81  Ht 1.778 m (5' 10\")  Wt 80.1 kg (176 lb 8 oz)  SpO2 97%  BMI 25.33 kg/m2  GEN: A & O, healthy appearing male, sitting comfortably in exam room, NAD, cooperative and conversational   HEENT: PERRL, no scleral icterus, mucus membranes moist, no nystagmus  NECK: Supple, JVP not elevated, no carotid bruit  RESP: CTA bilaterally, no wheezing, no crackles, no increased work of breathing   CV: Regular, normal rate, S1 and S2 without m/r/g   ABD: Soft, nontender to palpation   EXT: No peripheral edema, warm/well perfused   NEURO: CN II-XII intact, normal 5/5 strength in all extremities, negative Rhomberg  SKIN: Normal skin turgor, no rash on limited exam        Data / Imaging:   Ambulatory ECG (Ziopatch) 2/3/17:  Sinus rhythm, 3 brief episodes of SVT (long R-P tachycardia likely Atrial Tach), rate 143 bpm, longest episode 6 beats.     Echocardiogram 2/3/17:    Normal dobutamine stress echocardiohram at target heartrate.  No symptoms during stress.  Normal stress EKG.  Normal BP response to stress.            Assessment and Recomendations:   Assessment:  72 y/o M with no prior cardiac history who presents with 2 consecutive months of constant lightheadedness and clouded \"foggy\" sensation of unknown etiology. Stress echocardiogram was negative for ischemia, although his reported symptoms are not consistent with an ischemic mechanism. Furthermore, while his Ziopatch does reveal short episodes of paroxysmal SVT (likely ATach), these short-lived episodes are inconsistent with his description of constant lightheadness without palpitations. His resting BP and HR are normal today in clinic " while he is reporting the feelings of lightheadedness and dizziness. There is no clear physiologic explanation for his symptoms. We provided reassurance with regard to his low cardiovascular risks given recent risk stratification with negative DSE. We discussed the option of pursuing a tilt table test for further evalution, however given that his symptoms are present constantly, it is very unlikely a tilt table would shed light on the true nature of his symptoms nor would it illustrate a condition for which a treatment exists. With that in mind, the patient was reassured and does not feel inclined to pursue tilt table testing at this time. He will continue to exercise regularly as able and will focus on staying hydrated with >1.5 L fluid/day. He is encouraged to contact us if there is significant worsening or change in the nature of his symptoms.      Recs:    ---No further evaluation at this time  ---RTC - prn    Patient seen and discussed with Dr. Lewis in clinic.    Shelley Lay MD  Cardiology Fellow  [phone]    Attending: Patient seen and examined with Dr. Lay. The H&P is accurate as recorded. Additional findings, if any, have been incorporated into the body of the note. All labs and imaging studies reviewed personally. The assessment and plans reflect our joint decisions, arrived at after careful discussion and review.     Mukesh Lewis MD

## 2017-04-20 ENCOUNTER — OFFICE VISIT (OUTPATIENT)
Dept: DERMATOLOGY | Facility: CLINIC | Age: 72
End: 2017-04-20

## 2017-04-20 DIAGNOSIS — Z00.00 HEALTHCARE MAINTENANCE: ICD-10-CM

## 2017-04-20 DIAGNOSIS — L10.2 PEMPHIGUS FOLIACEOUS (H): ICD-10-CM

## 2017-04-20 DIAGNOSIS — L82.0 INFLAMED SEBORRHEIC KERATOSIS: ICD-10-CM

## 2017-04-20 DIAGNOSIS — L10.2 PEMPHIGUS FOLIACEUS (H): Primary | ICD-10-CM

## 2017-04-20 DIAGNOSIS — Z79.899 ENCOUNTER FOR LONG-TERM (CURRENT) USE OF HIGH-RISK MEDICATION: ICD-10-CM

## 2017-04-20 DIAGNOSIS — L10.2 PEMPHIGUS FOLIACEUS (H): ICD-10-CM

## 2017-04-20 LAB
ALBUMIN SERPL-MCNC: 3.8 G/DL (ref 3.4–5)
ALP SERPL-CCNC: 46 U/L (ref 40–150)
ALT SERPL W P-5'-P-CCNC: 34 U/L (ref 0–70)
AST SERPL W P-5'-P-CCNC: 23 U/L (ref 0–45)
BASOPHILS # BLD AUTO: 0 10E9/L (ref 0–0.2)
BASOPHILS NFR BLD AUTO: 0.2 %
BILIRUB DIRECT SERPL-MCNC: 0.1 MG/DL (ref 0–0.2)
BILIRUB SERPL-MCNC: 0.6 MG/DL (ref 0.2–1.3)
DIFFERENTIAL METHOD BLD: NORMAL
EOSINOPHIL # BLD AUTO: 0.2 10E9/L (ref 0–0.7)
EOSINOPHIL NFR BLD AUTO: 1.8 %
ERYTHROCYTE [DISTWIDTH] IN BLOOD BY AUTOMATED COUNT: 13.2 % (ref 10–15)
HCT VFR BLD AUTO: 44.9 % (ref 40–53)
HGB BLD-MCNC: 15.6 G/DL (ref 13.3–17.7)
IMM GRANULOCYTES # BLD: 0 10E9/L (ref 0–0.4)
IMM GRANULOCYTES NFR BLD: 0.4 %
LYMPHOCYTES # BLD AUTO: 1.3 10E9/L (ref 0.8–5.3)
LYMPHOCYTES NFR BLD AUTO: 15.3 %
MCH RBC QN AUTO: 31.1 PG (ref 26.5–33)
MCHC RBC AUTO-ENTMCNC: 34.7 G/DL (ref 31.5–36.5)
MCV RBC AUTO: 90 FL (ref 78–100)
MONOCYTES # BLD AUTO: 0.7 10E9/L (ref 0–1.3)
MONOCYTES NFR BLD AUTO: 7.9 %
NEUTROPHILS # BLD AUTO: 6.2 10E9/L (ref 1.6–8.3)
NEUTROPHILS NFR BLD AUTO: 74.4 %
NRBC # BLD AUTO: 0 10*3/UL
NRBC BLD AUTO-RTO: 0 /100
PLATELET # BLD AUTO: 156 10E9/L (ref 150–450)
PROT SERPL-MCNC: 7.2 G/DL (ref 6.8–8.8)
RBC # BLD AUTO: 5.01 10E12/L (ref 4.4–5.9)
T3 SERPL-MCNC: 97 NG/DL (ref 60–181)
T4 FREE SERPL-MCNC: 0.8 NG/DL (ref 0.76–1.46)
TSH SERPL DL<=0.05 MIU/L-ACNC: 4.7 MU/L (ref 0.4–4)
WBC # BLD AUTO: 8.3 10E9/L (ref 4–11)

## 2017-04-20 ASSESSMENT — PAIN SCALES - GENERAL: PAINLEVEL: NO PAIN (0)

## 2017-04-20 NOTE — NURSING NOTE
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 He states \" my skin is doing good. I am concerned about a few spots on my face but my torso is great\"           Halley Huntley, LUTHER    "

## 2017-04-20 NOTE — MR AVS SNAPSHOT
After Visit Summary   4/20/2017    Luan Mitchell    MRN: [id number]           Patient Information     Date Of Birth          1945        Visit Information        Provider Department      4/20/2017 10:30 AM Dewey Mart MD [facility]        Today's Diagnoses     Pemphigus foliaceus    -  1       Follow-ups after your visit        Your next 10 appointments already scheduled     Apr 20, 2017 11:15 AM CDT   LAB with  LAB   [facility] ([facility])    [address] [place] [id number] [phone]           Patient must bring picture ID.  Patient should be prepared to give a urine specimen  Please do not eat 10-12 hours before your appointment if you are coming in fasting for labs on lipids, cholesterol, or glucose (sugar).  Pregnant women should follow their Care Team instructions. Water with medications is okay. Do not drink coffee or other fluids.   If you have concerns about taking  your medications, please ask at office or if scheduling via [employer], send a message by clicking on Secure Messaging, Message Your Care Team.            Jul 20, 2017 10:30 AM CDT   (Arrive by 10:15 AM)   Return Visit with Adal Marin MD   [facility] ([facility])    [address] [place] [address] [phone]              Who to contact     Please call your clinic at [phone] to:    Ask questions about your health    Make or cancel appointments    Discuss your medicines    Learn about your test results    Speak to your doctor   If you have compliments or concerns about an experience at your clinic, or if you wish to file a complaint, please contact [facility] Patient Relations at [phone] or email us at [email or web address]         Additional Information About Your Visit        [employer] Information     [employer] gives you secure access to  your electronic health record. If you see a primary care provider, you can also send messages to your care team and make appointments. If you have questions, please call your primary care clinic.  If you do not have a primary care provider, please call [phone] and they will assist you.      [employer] is an electronic gateway that provides easy, online access to your medical records. With [employer], you can request a clinic appointment, read your test results, renew a prescription or communicate with your care team.     To access your existing account, please contact your [facility] or call [phone] for assistance.        Care EveryWhere ID     This is your Care EveryWhere ID. This could be used by other organizations to access your [place] medical records  [id number]         Blood Pressure from Last 3 Encounters:   03/13/17 132/74   02/20/17 125/75   01/31/17 115/72    Weight from Last 3 Encounters:   03/13/17 80.1 kg (176 lb 8 oz)   02/20/17 80 kg (176 lb 4.8 oz)   01/31/17 78.9 kg (174 lb)              Today, you had the following     No orders found for display         Today's Medication Changes          These changes are accurate as of: 4/20/17 11:05 AM.  If you have any questions, ask your nurse or doctor.               These medicines have changed or have updated prescriptions.        Dose/Directions    desonide 0.05 % ointment   Commonly known as:  DESOWEN   This may have changed:  Another medication with the same name was removed. Continue taking this medication, and follow the directions you see here.   Used for:  Pemphigus foliaceus, Pemphigus foliaceous, Encounter for long-term (current) use of high-risk medication   Changed by:  Dewey Mart MD        Apply topically to affected areas twice daily as instructed.   Quantity:  180 g   Refills:  3                Primary Care Provider Office Phone # Fax #    Loretta Villeda [phone]         [facility] [address] [address]  [facility] [address]        Thank you!     Thank you for choosing [facility]  for your care. Our goal is always to provide you with excellent care. Hearing back from our patients is one way we can continue to improve our services. Please take a few minutes to complete the written survey that you may receive in the mail after your visit with us. Thank you!             Your Updated Medication List - Protect others around you: Learn how to safely use, store and throw away your medicines at www.disposemymeds.org.          This list is accurate as of: 4/20/17 11:05 AM.  Always use your most recent med list.                   Brand Name Dispense Instructions for use    ARTIFICIAL TEAR SOLUTION OP      Apply  to eye.       aspirin 81 MG tablet      Take 1 tablet by mouth daily.       * clobetasol propionate 0.05 % Sham     1 Bottle    Apply sparingly to dry scalp 3 x weekly as needed.  Leave in place for 15 m then add water, lather and rinse       * clobetasol 0.05 % external solution    TEMOVATE    180 mL    Apply  topically 2 times daily as needed to the scalp.       * clobetasol 0.05 % ointment    TEMOVATE    180 g    APPLY TO AFFECTED AREA(S) TWO TIMES A DAY       desonide 0.05 % ointment    DESOWEN    180 g    Apply topically to affected areas twice daily as instructed.       finasteride 5 MG tablet    PROSCAR    90 tablet    Take 1 tablet (5 mg) by mouth daily       hydrocortisone butyrate 0.1 % Soln solution    LOCOID    180 mL    Apply sparingly to affected area(s) of beard twice daily       ketoconazole 2 % shampoo    NIZORAL    240 mL    Three times per week in the shower: Lather into scalp, leave in place for 3-5 minutes, then rinse.       lovastatin 20 MG tablet    MEVACOR    90 tablet    Take 1 tablet (20 mg) by mouth At Bedtime       multivitamin per tablet      Take 1 tablet by mouth daily. 1 tab daily       mycophenolate tablet     372 tablet    Take 2 tablet  (1000 mg) by mouth 2 times daily.       tacrolimus 0.1 % ointment    PROTOPIC    100 g    Apply topically to affected areas as instructed.       triamcinolone 0.1 % cream    KENALOG    454 g    Apply topically to affected areas as instructed.       VITAMIN D PO      Take 1 tablet by mouth daily 1000 mg       * Notice:  This list has 3 medication(s) that are the same as other medications prescribed for you. Read the directions carefully, and ask your doctor or other care provider to review them with you.

## 2017-04-20 NOTE — LETTER
2017       RE: Luan Mitchell  [address] [facility] [address]     Dear Colleague,    Thank you for referring your patient, Luan Mitchell, to the [facility]ERMAT[facility] at [facility]. Please see a copy of my visit note below.    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 On mycophenolate mofetil. Goals of treatment per Mr. Mitchell: prevent itching and secondary infections. He is not interested in complete clearance at this time.  - Titers (4/23/15)  Cell surface Ig:5,120, monkey esophagus; 1,280 intact human skin  Dsg-1 Ig units (POS > 20, neg <14)  Dsg-3 Ig units (POS > than 20, neg <9)  2. Seborrheic dermatitis  3. Seborrheic keratosis, left lateral canthus    CC: Follow-up, pemphigus foliaceus    HPI: Mary Mitchell returns for 3 month follow-up. He reports an intact blister on his R shin, asymptomatic, present for less than 24h, and no itching except on his vertex scalp.  Otherwise, he is pleased with his disease control and current management, and has no new concerns.    ROS:    CONST: Otherwise feeling in his usual state of health, no f/c/ns   SKIN: No new/changing moles, pain/drainage or mouth sores    LYMPH: No subcutaneous lumps/bumps      Social History: Retired . His wife is currently undergoing surveillance for a soft-tissue sarcoma of her right buttock, and they are due for follow-up in [place] next week.    Physical exam:  Gen: Well-developed, well-nourished  male in no acute distress, pleasant, cooperative with exam  Skin: Full body examination of the scalp, face, ears, chest, back, abdomen, bilateral upper and lower extremities, buttocks, groin, mouth and nails significant for skin phototype type II and:  - left lateral canthus: 4mm xerotic hyperkeratotic papule  "with a pink base, significantly flatter than previous but still verrucous, xerotic.  - Multiple ovoid, brown-pink plaques with overlying yellow, \"corn-flake\" like scale, thinner than previous, mostly scattered on the back, and extremities but with a resolving confluence on the lower back. No bullae were noted. Estimated 8-9% TBSA. No giraldo honey crusting  - intact red-based blister on the R distal anterior LE  - Scalp without erythema, focal 4mm thin crust on the vertex plaques  - remainder unremarkable.    A/P:  1. Pemphigus foliaceus, very stable and pleased with symptom control, without evidence of secondary impetiginization on exam. Again, he is a candidate for systemic anti-CD20 therapy (which may significant reduce his long-term immunosuppressive requirements). Also still has has room to move up MMF, but he prefers to maintain his regimen unchanged.    - repeat screening labs today: CBC, LFTs  - continue mycophenolate mofetil 1000 mg BID (Rx renewal provided). Benefits and risks of this were reviewed briefly.  - continue  clobetasol 0.05% ointment twice daily PRN to recalcitrant areas on the trunk extremities  - continue clobetasol 0.05% shampoo 2-3x/week to the scalp  - continue clobetasol 0.05% solution on clobetasol shampoo days  - continue hydrocortisone butyrate solution    - continue desonide 0.05% lotion BID PRN to the beard 0.1% solution to the beard twice daily PRN  - continue triamcinolone 0.1% cream twice daily to the trunk/extremities  - continue ketoconazole 2% shampoo TIW  - continue tacrolimus 0.1% ointment daily to the face  - continue 3x weekly dilute bleach baths    2. Inflamed seb keratosis, R forehead, but not resolved.   - After obtaining verbal consent, a single lesion no the R forehead lateral canthus were treated with liquid nitrogen, total 2 cycles 10 seconds freeze-to-thaw time. She tolerated this well.    RTC 3 months ([facility]).  Discussed and examined with [facility] staff dermatologist " Dr. Serina Odell.    Dewey Mart MD, ISSA  PGY-4, Dermatology    .I, Serina Odell MD, saw this patient with the resident and agree with the resident s findings and plan of care as documented in the resident s note.      Again, thank you for allowing me to participate in the care of your patient.      Sincerely,    Dewey Mart MD

## 2017-04-20 NOTE — PROGRESS NOTES
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 On mycophenolate mofetil. Goals of treatment per Mr. Mitchell: prevent itching and secondary infections. He is not interested in complete clearance at this time.  - Titers (4/23/15)  Cell surface Ig:5,120, monkey esophagus; 1,280 intact human skin  Dsg-1 Ig units (POS > 20, neg <14)  Dsg-3 Ig units (POS > than 20, neg <9)  2. Seborrheic dermatitis  3. 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 He reports an intact blister on his R shin, asymptomatic, present for less than 24h, and no itching except on his vertex scalp.  Otherwise, he is pleased with his disease control and current management, and has no new concerns.    ROS:    CONST: Otherwise feeling in his usual state of health, no f/c/ns   SKIN: No new/changing moles, pain/drainage or mouth sores    LYMPH: No subcutaneous lumps/bumps      Social History: Retired . His wife is currently undergoing surveillance for a soft-tissue sarcoma of her right buttock, and they are due for follow-up in [place] next week.    Physical exam:  Gen: Well-developed, well-nourished  male in no acute distress, pleasant, cooperative with exam  Skin: Full body examination of the scalp, face, ears, chest, back, abdomen, bilateral upper and lower extremities, buttocks, groin, mouth and nails significant for skin phototype type II and:  - left lateral canthus: 4mm xerotic hyperkeratotic papule with a pink base, significantly flatter than previous but still verrucous, xerotic.  - Multiple ovoid, brown-pink plaques with overlying yellow, \"corn-flake\" like scale, thinner than previous, mostly scattered on the back, and extremities but with a resolving confluence on the lower " back. No bullae were noted. Estimated 8-9% TBSA. No giraldo honey crusting  - intact red-based blister on the R distal anterior LE  - Scalp without erythema, focal 4mm thin crust on the vertex plaques  - remainder unremarkable.    A/P:  1. Pemphigus foliaceus, very stable and pleased with symptom control, without evidence of secondary impetiginization on exam. Again, he is a candidate for systemic anti-CD20 therapy (which may significant reduce his long-term immunosuppressive requirements). Also still has has room to move up MMF, but he prefers to maintain his regimen unchanged.    - repeat screening labs today: CBC, LFTs  - continue mycophenolate mofetil 1000 mg BID (Rx renewal provided). Benefits and risks of this were reviewed briefly.  - continue  clobetasol 0.05% ointment twice daily PRN to recalcitrant areas on the trunk extremities  - continue clobetasol 0.05% shampoo 2-3x/week to the scalp  - continue clobetasol 0.05% solution on clobetasol shampoo days  - continue hydrocortisone butyrate solution    - continue desonide 0.05% lotion BID PRN to the beard 0.1% solution to the beard twice daily PRN  - continue triamcinolone 0.1% cream twice daily to the trunk/extremities  - continue ketoconazole 2% shampoo TIW  - continue tacrolimus 0.1% ointment daily to the face  - continue 3x weekly dilute bleach baths    2. Inflamed seb keratosis, R forehead, but not resolved.   - After obtaining verbal consent, a single lesion no the R forehead lateral canthus were treated with liquid nitrogen, total 2 cycles 10 seconds freeze-to-thaw time. She tolerated this well.    RTC 3 months ([facility]).  Discussed and examined with [facility] staff dermatologist Dr. Serina Odell.    Dewey Mart MD, ISSA  PGY-4, Dermatology    .I, Serina Odell MD, saw this patient with the resident and agree with the resident s findings and plan of care as documented in the resident s note.  I was present for the entire procedure. KB

## 2017-04-26 ENCOUNTER — MYC MEDICAL ADVICE (OUTPATIENT)
Dept: DERMATOLOGY | Facility: CLINIC | Age: 72
End: 2017-04-26

## 2017-04-26 DIAGNOSIS — Z79.899 ENCOUNTER FOR LONG-TERM (CURRENT) USE OF HIGH-RISK MEDICATION: ICD-10-CM

## 2017-04-26 DIAGNOSIS — L10.2 PEMPHIGUS FOLIACEOUS (H): ICD-10-CM

## 2017-04-26 DIAGNOSIS — L10.2 PEMPHIGUS FOLIACEUS (H): ICD-10-CM

## 2017-04-28 RX ORDER — MYCOPHENOLATE MOFETIL 500 MG/1
TABLET, FILM COATED ORAL
Qty: 372 TABLET | Refills: 2 | Status: SHIPPED | OUTPATIENT
Start: 2017-04-28 | End: 2017-09-21

## 2017-04-28 NOTE — TELEPHONE ENCOUNTER
Re-sent mycophenolate Rx (electronically) to [facility] mail order pharmacy at Mr. Kate' request.    Dewey Mart MD, ISSA  PGY-4, Dermatology

## 2017-05-19 ENCOUNTER — OFFICE VISIT (OUTPATIENT)
Dept: DERMATOLOGY | Facility: CLINIC | Age: 72
End: 2017-05-19

## 2017-05-19 DIAGNOSIS — L10.2 PEMPHIGUS FOLIACEUS (H): Primary | ICD-10-CM

## 2017-05-19 DIAGNOSIS — D69.2 SOLAR PURPURA (H): ICD-10-CM

## 2017-05-19 ASSESSMENT — PAIN SCALES - GENERAL: PAINLEVEL: NO PAIN (0)

## 2017-05-19 NOTE — MR AVS SNAPSHOT
After Visit Summary   5/19/2017    Luan Mitchell    MRN: [id number]           Patient Information     Date Of Birth          1945        Visit Information        Provider Department      5/19/2017 2:00 PM Manohar Franklin MD [facility] D[facility]at[facility]        Today's Diagnoses     Pemphigus foliaceus    -  1    Solar purpura (H)           Follow-ups after your visit        Who to contact     Please call your clinic at [phone] to:    Ask questions about your health    Make or cancel appointments    Discuss your medicines    Learn about your test results    Speak to your doctor   If you have compliments or concerns about an experience at your clinic, or if you wish to file a complaint, please contact [facility] Patient Relations at [phone] or email us at [email or web address]         Additional Information About Your Visit        MyChart Information     [employer] gives you secure access to your electronic health record. If you see a primary care provider, you can also send messages to your care team and make appointments. If you have questions, please call your primary care clinic.  If you do not have a primary care provider, please call [phone] and they will assist you.      [employer] is an electronic gateway that provides easy, online access to your medical records. With [employer], you can request a clinic appointment, read your test results, renew a prescription or communicate with your care team.     To access your existing account, please contact your [facility] or call [phone] for assistance.        Care EveryWhere ID     This is your Care EveryWhere ID. This could be used by other organizations to access your [place] medical records  [id number]         Blood Pressure from Last 3 Encounters:   03/13/17 132/74   02/20/17 125/75   01/31/17 115/72    Weight from Last 3 Encounters:   03/13/17 80.1 kg (176 lb 8 oz)    02/20/17 80 kg (176 lb 4.8 oz)   01/31/17 78.9 kg (174 lb)              Today, you had the following     No orders found for display       Primary Care Provider Office Phone # Fax #    Loretta Villeda [phone]       [facility]IMARY [facility]ARE CL[facility]IC [address] [facility] [address]  [facility] [address]        Thank you!     Thank you for choosing [facility] DERMATOLOG[facility]  for your care. Our goal is always to provide you with excellent care. Hearing back from our patients is one way we can continue to improve our services. Please take a few minutes to complete the written survey that you may receive in the mail after your visit with us. Thank you!             Your Updated Medication List - Protect others around you: Learn how to safely use, store and throw away your medicines at www.disposemymeds.org.          This list is accurate as of: 5/19/17 11:59 PM.  Always use your most recent med list.                   Brand Name Dispense Instructions for use    ARTIFICIAL TEAR SOLUTION OP      Apply  to eye.       aspirin 81 MG tablet      Take 1 tablet by mouth daily.       * clobetasol propionate 0.05 % Sham     1 Bottle    Apply sparingly to dry scalp 3 x weekly as needed.  Leave in place for 15 m then add water, lather and rinse       * clobetasol 0.05 % external solution    TEMOVATE    180 mL    Apply  topically 2 times daily as needed to the scalp.       * clobetasol 0.05 % ointment    TEMOVATE    180 g    APPLY TO AFFECTED AREA(S) TWO TIMES A DAY       desonide 0.05 % ointment    DESOWEN    180 g    Apply topically to affected areas twice daily as instructed.       finasteride 5 MG tablet    PROSCAR    90 tablet    Take 1 tablet (5 mg) by mouth daily       hydrocortisone butyrate 0.1 % Soln solution    LOCOID    180 mL    Apply sparingly to affected area(s) of beard twice daily       ketoconazole 2 % shampoo    NIZORAL    240 mL    Three times per week in the shower: Lather into scalp, leave in place for 3-5 minutes,  then rinse.       lovastatin 20 MG tablet    MEVACOR    90 tablet    Take 1 tablet (20 mg) by mouth At Bedtime       multivitamin per tablet      Take 1 tablet by mouth daily. 1 tab daily       mycophenolate tablet     372 tablet    Take 2 tablet (1000 mg) by mouth 2 times daily.       tacrolimus 0.1 % ointment    PROTOPIC    100 g    Apply topically to affected areas as instructed.       triamcinolone 0.1 % cream    KENALOG    454 g    Apply topically to affected areas as instructed.       VITAMIN D PO      Take 1 tablet by mouth daily 1000 mg       * Notice:  This list has 3 medication(s) that are the same as other medications prescribed for you. Read the directions carefully, and ask your doctor or other care provider to review them with you.

## 2017-05-19 NOTE — PROGRESS NOTES
[facility] Dermatology Note    Dermatology Problem List:  1. Pemphigus foliaceous  - Goals of treatment per Mr. Mitchell: prevent itching and secondary infections.   - He is not interested in complete clearance at this time.   - Titers (4/23/15)  Cell surface Ig:5,120, monkey esophagus; 1,280 intact human skin  Dsg-1 Ig units (POS > 20, neg <14)  Dsg-3 Ig units (POS > than 20, neg <9)  2. Seborrheic dermatitis  3. Seborrheic keratosis, left lateral canthus    CC:   Chief Complaint   Patient presents with     Derm Problem     Bereket is here today for a concerning area on his right hand.  States he noticed it in March.        Date of Service: May 19, 2017    History of Present Illness:  Mr. Luan Mitchell is a 72 year old male who presents for an evaluation of a spot on his right hand. Today the patient reports that he noticed it in March. He states that it had irregularities and was very nervous due to have the ABCDE's of melanoma. He notes that he bruises easily so he was unsure if this spot is a bruise or a concern lesion. He also reports there is a new active spot on his back since his last visit. He states that he is tolerating the mycophenolate well. He is using clobetasol ointment, but it does not always get the back due to not being troublesome. The patient reports no other lesions of concern at this time.    Otherwise, the patient reports no painful, bleeding, nonhealing, or pruritic lesions, and denies new or changing moles.    Past Medical History:   Patient Active Problem List   Diagnosis     Pemphigus foliaceus     Hyperlipidemia LDL goal <130     BPH (benign prostatic hyperplasia)     Encounter for long-term (current) use of high-risk medication     Dermatitis, seborrheic     Lightheadedness     Past Medical History:   Diagnosis Date     BPH (benign prostatic hyperplasia)      Dizziness      Hyperlipidemia LDL goal <130      Pemphigus     pemphigus foliaceus     Retinal  detachment 2009    left eye- corrected with laser     Vitreous detachment 2009    LE     Past Surgical History:   Procedure Laterality Date     NO HISTORY OF SURGERY  1/28/14    derm     RETINOPEXY LASER PROPHYLAXIS BREAK(S) OS (LEFT EYE)      2009 - Left eye     Social History:  He is a retired . His wife is currently undergoing surveillance for a soft-tissue sarcome of her right buttock, and they are due for follow-up in [place] next week.     Family History:  Not addressed at this visit.     Medications:  Current Outpatient Prescriptions   Medication Sig Dispense Refill     CELLCEPT 500 MG PO TABLET Take 2 tablet (1000 mg) by mouth 2 times daily. 372 tablet 2     lovastatin (MEVACOR) 20 MG tablet Take 1 tablet (20 mg) by mouth At Bedtime 90 tablet 4     finasteride (PROSCAR) 5 MG tablet Take 1 tablet (5 mg) by mouth daily 90 tablet 8     desonide (DESOWEN) 0.05 % ointment Apply topically to affected areas twice daily as instructed. 180 g 3     hydrocortisone butyrate (LOCOID) 0.1 % SOLN Apply sparingly to affected area(s) of beard twice daily 180 mL 3     clobetasol (TEMOVATE) 0.05 % ointment APPLY TO AFFECTED AREA(S) TWO TIMES A  g 2     clobetasol propionate 0.05 % SHAM Apply sparingly to dry scalp 3 x weekly as needed.  Leave in place for 15 m then add water, lather and rinse 1 Bottle 5     clobetasol (TEMOVATE) 0.05 % external solution Apply  topically 2 times daily as needed to the scalp. 180 mL 11     ketoconazole (NIZORAL) 2 % shampoo Three times per week in the shower: Lather into scalp, leave in place for 3-5 minutes, then rinse. 240 mL 12     triamcinolone (KENALOG) 0.1 % cream Apply topically to affected areas as instructed. 454 g 11     tacrolimus (PROTOPIC) 0.1 % ointment Apply topically to affected areas as instructed. 100 g 11     Cholecalciferol (VITAMIN D PO) Take 1 tablet by mouth daily 1000 mg       ARTIFICIAL TEAR SOLUTION OP Apply  to eye.       aspirin 81 MG  tablet Take 1 tablet by mouth daily.       Multiple Vitamin (MULTIVITAMIN) per tablet Take 1 tablet by mouth daily. 1 tab daily       Allergies:  Allergies   Allergen Reactions     Penicillins Swelling     Review of Systems:  - Skin: As above in HPI. No additional skin concerns.    Physical exam:  Vitals: There were no vitals taken for this visit.  GEN: This is a well developed, well-nourished male in no acute distress, in a pleasant mood.   SKIN: Focused examination of the right hand and back was performed.  - Lumbar back with bright pink thin plaque intermittently stippled with vesicles which are non tense  - Macular purpura on the right dorsal hand  - No other lesions of concern on areas examined.     Impression/Plan:  1. Pemphigus foliaceus  - Continue mycophenolate mofetil 1000 mg BID (Rx renewal provided). Benefits and risks of this were reviewed briefly. Discussion that this dose can increase by 50% if needed in the future.   - Continue clobetasol 0.05% ointment - apply BID PRN to recalcitrant areas on the trunk extremities  - Continue clobetasol 0.05% shampoo 2-3x/week to the scalp  - Continue clobetasol 0.05% solution on clobetasol shampoo days  - Continue hydrocortisone butyrate solution    - Continue desonide 0.05% lotion BID PRN to the beard 0.1% solution to the beard twice daily PRN  - Continue triamcinolone 0.1% cream twice daily to the trunk/extremities  - Continue ketoconazole 2% shampoo TIW  - Continue tacrolimus 0.1% ointment daily to the face  - Continue 3x weekly dilute bleach baths    2. Macular purpura - right dorsal hand  - Discussion why this lesion is a bruise and not a melanocytic lesion.   - No further intervention required. Patient to report changes.   - ABCDEs of melanoma were discussed.     Follow-up in summer, earlier for new or changing lesions.    Staff Involved:  Staff Only    Scribe Disclosure:   Hannah MURRAY, am serving as a scribe to document services personally performed by  Dr. Manohar Franklin, based on data collection and the provider's statements to me.     Staff attestation:  The documentation recorded by the scribe accurately reflects the services I personally performed and the decisions I personally made.    Manohar Franklin MD  Staff Dermatologist    Department of Dermatology

## 2017-05-19 NOTE — LETTER
2017       RE: Luan Mitchell  [address] [facility] [address]     Dear Colleague,    Thank you for referring your patient, Luan Mitchell, to the [facility] DERMATOLOGY at [facility]. Please see a copy of my visit note below.    [facility] Dermatology Note    Dermatology Problem List:  1. Pemphigus foliaceous  - Goals of treatment per Mr. Mitchell: prevent itching and secondary infections.   - He is not interested in complete clearance at this time.   - Titers (4/23/15)  Cell surface Ig:5,120, monkey esophagus; 1,280 intact human skin  Dsg-1 Ig units (POS > 20, neg <14)  Dsg-3 Ig units (POS > than 20, neg <9)  2. Seborrheic dermatitis  3. Seborrheic keratosis, left lateral canthus    CC:   Chief Complaint   Patient presents with     Derm Problem     Bereket is here today for a concerning area on his right hand.  States he noticed it in March.        Date of Service: May 19, 2017    History of Present Illness:  Mr. Luan Mitchell is a 72 year old male who presents for an evaluation of a spot on his right hand. Today the patient reports that he noticed it in March. He states that it had irregularities and was very nervous due to have the ABCDE's of melanoma. He notes that he bruises easily so he was unsure if this spot is a bruise or a concern lesion. He also reports there is a new active spot on his back since his last visit. He states that he is tolerating the mycophenolate well. He is using clobetasol ointment, but it does not always get the back due to not being troublesome. The patient reports no other lesions of concern at this time.    Otherwise, the patient reports no painful, bleeding, nonhealing, or pruritic lesions, and denies new or changing moles.    Past Medical History:   Patient Active Problem List   Diagnosis     Pemphigus foliaceus     Hyperlipidemia LDL goal <130     BPH (benign prostatic hyperplasia)     Encounter for  long-term (current) use of high-risk medication     Dermatitis, seborrheic     Lightheadedness     Past Medical History:   Diagnosis Date     BPH (benign prostatic hyperplasia)      Dizziness      Hyperlipidemia LDL goal <130      Pemphigus     pemphigus foliaceus     Retinal detachment 2009    left eye- corrected with laser     Vitreous detachment 2009    LE     Past Surgical History:   Procedure Laterality Date     NO HISTORY OF SURGERY  1/28/14    derm     RETINOPEXY LASER PROPHYLAXIS BREAK(S) OS (LEFT EYE)      2009 - Left eye     Social History:  He is a retired . His wife is currently undergoing surveillance for a soft-tissue sarcome of her right buttock, and they are due for follow-up in [place] next week.     Family History:  Not addressed at this visit.     Medications:  Current Outpatient Prescriptions   Medication Sig Dispense Refill     CELLCEPT 500 MG PO TABLET Take 2 tablet (1000 mg) by mouth 2 times daily. 372 tablet 2     lovastatin (MEVACOR) 20 MG tablet Take 1 tablet (20 mg) by mouth At Bedtime 90 tablet 4     finasteride (PROSCAR) 5 MG tablet Take 1 tablet (5 mg) by mouth daily 90 tablet 8     desonide (DESOWEN) 0.05 % ointment Apply topically to affected areas twice daily as instructed. 180 g 3     hydrocortisone butyrate (LOCOID) 0.1 % SOLN Apply sparingly to affected area(s) of beard twice daily 180 mL 3     clobetasol (TEMOVATE) 0.05 % ointment APPLY TO AFFECTED AREA(S) TWO TIMES A  g 2     clobetasol propionate 0.05 % SHAM Apply sparingly to dry scalp 3 x weekly as needed.  Leave in place for 15 m then add water, lather and rinse 1 Bottle 5     clobetasol (TEMOVATE) 0.05 % external solution Apply  topically 2 times daily as needed to the scalp. 180 mL 11     ketoconazole (NIZORAL) 2 % shampoo Three times per week in the shower: Lather into scalp, leave in place for 3-5 minutes, then rinse. 240 mL 12     triamcinolone (KENALOG) 0.1 % cream Apply topically to  affected areas as instructed. 454 g 11     tacrolimus (PROTOPIC) 0.1 % ointment Apply topically to affected areas as instructed. 100 g 11     Cholecalciferol (VITAMIN D PO) Take 1 tablet by mouth daily 1000 mg       ARTIFICIAL TEAR SOLUTION OP Apply  to eye.       aspirin 81 MG tablet Take 1 tablet by mouth daily.       Multiple Vitamin (MULTIVITAMIN) per tablet Take 1 tablet by mouth daily. 1 tab daily       Allergies:  Allergies   Allergen Reactions     Penicillins Swelling     Review of Systems:  - Skin: As above in HPI. No additional skin concerns.    Physical exam:  Vitals: There were no vitals taken for this visit.  GEN: This is a well developed, well-nourished male in no acute distress, in a pleasant mood.   SKIN: Focused examination of the right hand and back was performed.  - Lumbar back with bright pink thin plaque intermittently stippled with vesicles which are non tense  - Macular purpura on the right dorsal hand  - No other lesions of concern on areas examined.     Impression/Plan:  1. Pemphigus foliaceus  - Continue mycophenolate mofetil 1000 mg BID (Rx renewal provided). Benefits and risks of this were reviewed briefly. Discussion that this dose can increase by 50% if needed in the future.   - Continue clobetasol 0.05% ointment - apply BID PRN to recalcitrant areas on the trunk extremities  - Continue clobetasol 0.05% shampoo 2-3x/week to the scalp  - Continue clobetasol 0.05% solution on clobetasol shampoo days  - Continue hydrocortisone butyrate solution    - Continue desonide 0.05% lotion BID PRN to the beard 0.1% solution to the beard twice daily PRN  - Continue triamcinolone 0.1% cream twice daily to the trunk/extremities  - Continue ketoconazole 2% shampoo TIW  - Continue tacrolimus 0.1% ointment daily to the face  - Continue 3x weekly dilute bleach baths    2. Macular purpura - right dorsal hand  - Discussion why this lesion is a bruise and not a melanocytic lesion.   - No further intervention  required. Patient to report changes.   - ABCDEs of melanoma were discussed.     Follow-up in summer, earlier for new or changing lesions.    Staff Involved:  Staff Only    Scribe Disclosure:   I, Hannah Pastor, am serving as a scribe to document services personally performed by Dr. Manohar Franklin, based on data collection and the provider's statements to me.     Staff attestation:  The documentation recorded by the scribe accurately reflects the services I personally performed and the decisions I personally made.    Manohar Franklin MD  Staff Dermatologist    Department of Dermatology

## 2017-08-14 DIAGNOSIS — Z79.899 ENCOUNTER FOR LONG-TERM (CURRENT) USE OF HIGH-RISK MEDICATION: ICD-10-CM

## 2017-08-14 DIAGNOSIS — L10.2 PEMPHIGUS FOLIACEUS (H): ICD-10-CM

## 2017-08-14 DIAGNOSIS — L10.2 PEMPHIGUS FOLIACEOUS (H): ICD-10-CM

## 2017-08-14 LAB
ALBUMIN SERPL-MCNC: 3.8 G/DL (ref 3.4–5)
ALP SERPL-CCNC: 44 U/L (ref 40–150)
ALT SERPL W P-5'-P-CCNC: 37 U/L (ref 0–70)
AST SERPL W P-5'-P-CCNC: 29 U/L (ref 0–45)
BASOPHILS # BLD AUTO: 0 10E9/L (ref 0–0.2)
BASOPHILS NFR BLD AUTO: 0.3 %
BILIRUB DIRECT SERPL-MCNC: 0.1 MG/DL (ref 0–0.2)
BILIRUB SERPL-MCNC: 0.4 MG/DL (ref 0.2–1.3)
DIFFERENTIAL METHOD BLD: NORMAL
EOSINOPHIL # BLD AUTO: 0.2 10E9/L (ref 0–0.7)
EOSINOPHIL NFR BLD AUTO: 2.1 %
ERYTHROCYTE [DISTWIDTH] IN BLOOD BY AUTOMATED COUNT: 12.8 % (ref 10–15)
HCT VFR BLD AUTO: 41.6 % (ref 40–53)
HGB BLD-MCNC: 14.2 G/DL (ref 13.3–17.7)
IMM GRANULOCYTES # BLD: 0 10E9/L (ref 0–0.4)
IMM GRANULOCYTES NFR BLD: 0.3 %
LYMPHOCYTES # BLD AUTO: 1.8 10E9/L (ref 0.8–5.3)
LYMPHOCYTES NFR BLD AUTO: 24.9 %
MCH RBC QN AUTO: 31.1 PG (ref 26.5–33)
MCHC RBC AUTO-ENTMCNC: 34.1 G/DL (ref 31.5–36.5)
MCV RBC AUTO: 91 FL (ref 78–100)
MONOCYTES # BLD AUTO: 0.6 10E9/L (ref 0–1.3)
MONOCYTES NFR BLD AUTO: 8.3 %
NEUTROPHILS # BLD AUTO: 4.6 10E9/L (ref 1.6–8.3)
NEUTROPHILS NFR BLD AUTO: 64.1 %
NRBC # BLD AUTO: 0 10*3/UL
NRBC BLD AUTO-RTO: 0 /100
PLATELET # BLD AUTO: 159 10E9/L (ref 150–450)
PROT SERPL-MCNC: 6.9 G/DL (ref 6.8–8.8)
RBC # BLD AUTO: 4.57 10E12/L (ref 4.4–5.9)
WBC # BLD AUTO: 7.2 10E9/L (ref 4–11)

## 2017-08-21 ENCOUNTER — OFFICE VISIT (OUTPATIENT)
Dept: OPHTHALMOLOGY | Facility: CLINIC | Age: 72
End: 2017-08-21
Attending: OPHTHALMOLOGY
Payer: COMMERCIAL

## 2017-08-21 DIAGNOSIS — H25.13 AGE-RELATED NUCLEAR CATARACT OF BOTH EYES: Primary | ICD-10-CM

## 2017-08-21 PROCEDURE — 92015 DETERMINE REFRACTIVE STATE: CPT | Mod: ZF

## 2017-08-21 PROCEDURE — 99213 OFFICE O/P EST LOW 20 MIN: CPT | Mod: ZF

## 2017-08-21 ASSESSMENT — SLIT LAMP EXAM - LIDS
COMMENTS: 1+ BLEPHARITIS
COMMENTS: 1+ BLEPHARITIS

## 2017-08-21 ASSESSMENT — VISUAL ACUITY
OS_CC: 20/25
OD_PH_CC: 20/20-2
METHOD: SNELLEN - LINEAR
OD_CC: 20/30
OS_PH_CC: 20/20-2

## 2017-08-21 ASSESSMENT — REFRACTION_MANIFEST
OD_ADD: +2.75
OS_AXIS: 020
OD_SPHERE: -1.75
OS_ADD: +2.75
OD_AXIS: 164
OS_CYLINDER: +0.25
OS_SPHERE: -0.75
OD_CYLINDER: +0.50

## 2017-08-21 ASSESSMENT — TONOMETRY
OS_IOP_MMHG: 10
OD_IOP_MMHG: 12
IOP_METHOD: TONOPEN

## 2017-08-21 ASSESSMENT — CONF VISUAL FIELD
OS_NORMAL: 1
OD_NORMAL: 1
METHOD: COUNTING FINGERS

## 2017-08-21 ASSESSMENT — REFRACTION_WEARINGRX
OS_ADD: +2.50
OD_ADD: +2.50
SPECS_TYPE: BIFOCAL
OD_SPHERE: -1.00
OS_AXIS: 021
OS_CYLINDER: +0.75
OS_SPHERE: -1.75
OD_CYLINDER: +0.50
OD_AXIS: 163

## 2017-08-21 ASSESSMENT — CUP TO DISC RATIO
OS_RATIO: 0.3
OD_RATIO: 0.3

## 2017-08-21 NOTE — NURSING NOTE
Chief Complaints and History of Present Illnesses   Patient presents with     Blurred Vision Right Eye     HPI    Affected eye(s):  Both   Symptoms:     No floaters   No flashes   No redness   No Dryness         Do you have eye pain now?:  No      Comments:  Blurred vision in RE over the past month. Slight increase in floaters in RE over the past year.    Celena REID August 21, 2017 2:17 PM

## 2017-08-21 NOTE — MR AVS SNAPSHOT
After Visit Summary   8/21/2017    Luan Mitchell    MRN: [id number]           Patient Information     Date Of Birth          1945        Visit Information        Provider Department      8/21/2017 2:15 PM Mona Strickland MD [facility]        Today's Diagnoses     Age-related nuclear cataract of both eyes    -  1       Follow-ups after your visit        Follow-up notes from your care team     Return in about 1 year (around 8/21/2018) for Refraction, DFE, cataracts.      Your next 10 appointments already scheduled     Sep 21, 2017 10:30 AM CDT   (Arrive by 10:15 AM)   Return Visit with Adal Marin MD   [facility] Dermatology ([facility])    [address] [place] [id number] [phone]              Who to contact     Please call your clinic at [phone] to:    Ask questions about your health    Make or cancel appointments    Discuss your medicines    Learn about your test results    Speak to your doctor   If you have compliments or concerns about an experience at your clinic, or if you wish to file a complaint, please contact [facility] Patient Relations at [phone] or email us at [email or web address]         Additional Information About Your Visit        MyChart Information     [email or web address] gives you secure access to your electronic health record. If you see a primary care provider, you can also send messages to your care team and make appointments. If you have questions, please call your primary care clinic.  If you do not have a primary care provider, please call [phone] and they will assist you.      [email or web address] is an electronic gateway that provides easy, online access to your medical records. With [email or web address], you can request a clinic appointment, read your test results, renew a prescription or communicate with your care team.     To access your existing account, please contact  your [facility] or call [phone] for assistance.        Care EveryWhere ID     This is your Care EveryWhere ID. This could be used by other organizations to access your [facility] medical records  [id number]         Blood Pressure from Last 3 Encounters:   03/13/17 132/74   02/20/17 125/75   01/31/17 115/72    Weight from Last 3 Encounters:   03/13/17 80.1 kg (176 lb 8 oz)   02/20/17 80 kg (176 lb 4.8 oz)   01/31/17 78.9 kg (174 lb)              Today, you had the following     No orders found for display       Primary Care Provider Office Phone # Fax #    Loretta Villeda [phone]       [address] [address]        Equal Access to Services     JORDON OSBORNE : Hadii cristina whittaker hadasho Soomaali, waaxda luqadaha, qaybta kaalmada adeegyada, kyaw jasmine . So [facility] [phone].    ATENCIÓN: Si habla español, tiene a toney disposición servicios gratuitos de asistencia lingüística. Pancho al [phone].    We comply with applicable federal civil rights laws and [place] laws. We do not discriminate on the basis of race, color, national origin, age, disability sex, sexual orientation or gender identity.            Thank you!     Thank you for choosing EYE CLINIC  for your care. Our goal is always to provide you with excellent care. Hearing back from our patients is one way we can continue to improve our services. Please take a few minutes to complete the written survey that you may receive in the mail after your visit with us. Thank you!             Your Updated Medication List - Protect others around you: Learn how to safely use, store and throw away your medicines at www.disposemymeds.org.          This list is accurate as of: 8/21/17  3:36 PM.  Always use your most recent med list.                   Brand Name Dispense Instructions for use Diagnosis    ARTIFICIAL TEAR SOLUTION OP      Apply  to eye.        aspirin 81 MG  tablet      Take 1 tablet by mouth daily.        * clobetasol propionate 0.05 % Sham     1 Bottle    Apply sparingly to dry scalp 3 x weekly as needed.  Leave in place for 15 m then add water, lather and rinse    Pemphigus foliaceous, Encounter for long-term (current) use of high-risk medication       * clobetasol 0.05 % external solution    TEMOVATE    180 mL    Apply  topically 2 times daily as needed to the scalp.    Pemphigus foliaceous, Encounter for long-term (current) use of high-risk medication       * clobetasol 0.05 % ointment    TEMOVATE    180 g    APPLY TO AFFECTED AREA(S) TWO TIMES A DAY    Pemphigus foliaceous, Pemphigus foliaceus, Encounter for long-term (current) use of high-risk medication       desonide 0.05 % ointment    DESOWEN    180 g    Apply topically to affected areas twice daily as instructed.    Pemphigus foliaceus, Pemphigus foliaceous, Encounter for long-term (current) use of high-risk medication       finasteride 5 MG tablet    PROSCAR    90 tablet    Take 1 tablet (5 mg) by mouth daily    Benign prostatic hyperplasia with lower urinary tract symptoms, unspecified morphology       hydrocortisone butyrate 0.1 % Soln solution    LOCOID    180 mL    Apply sparingly to affected area(s) of beard twice daily    Pemphigus foliaceous, Pemphigus foliaceus, Encounter for long-term (current) use of high-risk medication       ketoconazole 2 % shampoo    NIZORAL    240 mL    Three times per week in the shower: Lather into scalp, leave in place for 3-5 minutes, then rinse.    Pemphigus foliaceous       lovastatin 20 MG tablet    MEVACOR    90 tablet    Take 1 tablet (20 mg) by mouth At Bedtime    Hyperlipidemia LDL goal <100       multivitamin per tablet      Take 1 tablet by mouth daily. 1 tab daily        mycophenolate tablet     372 tablet    Take 2 tablet (1000 mg) by mouth 2 times daily.    Pemphigus foliaceous, Encounter for long-term (current) use of high-risk medication, Pemphigus foliaceus        tacrolimus 0.1 % ointment    PROTOPIC    100 g    Apply topically to affected areas as instructed.    Pemphigus foliaceous, Seborrhoeic dermatitis       triamcinolone 0.1 % cream    KENALOG    454 g    Apply topically to affected areas as instructed.    Pemphigus foliaceus, Pemphigus foliaceous, Encounter for long-term (current) use of high-risk medication       VITAMIN D PO      Take 1 tablet by mouth daily 1000 mg        * Notice:  This list has 3 medication(s) that are the same as other medications prescribed for you. Read the directions carefully, and ask your doctor or other care provider to review them with you.

## 2017-08-21 NOTE — PROGRESS NOTES
I have confirmed the patient's and reviewed Past Medical History, Past Surgical History, Social History, Family History, Problem List, Medication List and agree with Tech note.     CC: gradual worsening of vision Right Eye > Left Eye       HPI: history of myopia and cataract, last seen in 2017, complains of worsening of vision both eyes more on right side     Assessment/plan:   1.  Nuclear sclerotic cataract  Both eyes                         - Not visually significant                        - refract as needed, new prescription glasses issued today                         - monitor yearly       2.  Blepharitis both eyes:                         -wet compress twice a day                         - artificial tears over the counter                         - monitor      3.  Operculated hole Left Eye s/p retinopexy                         - Retinal Detachment precautions reviewed       return to clinic 1 year with refraction, Dilated Fundus Exam    Aditya Shirley MD, PhD  Vitreoretinal Surgery Fellow     Attestation:  I have seen and examined the patient with Dr. hSirley and agree with the findings in this note, as well as the interpretations of the diagnostic tests.      Mona Maria MD PhD.  Professor & Chair

## 2017-09-21 ENCOUNTER — OFFICE VISIT (OUTPATIENT)
Dept: DERMATOLOGY | Facility: CLINIC | Age: 72
End: 2017-09-21

## 2017-09-21 DIAGNOSIS — L82.1 SK (SEBORRHEIC KERATOSIS): Primary | ICD-10-CM

## 2017-09-21 DIAGNOSIS — L10.2 PEMPHIGUS FOLIACEOUS (H): ICD-10-CM

## 2017-09-21 DIAGNOSIS — Z79.899 ENCOUNTER FOR LONG-TERM (CURRENT) USE OF HIGH-RISK MEDICATION: ICD-10-CM

## 2017-09-21 DIAGNOSIS — L10.2 PEMPHIGUS FOLIACEUS (H): ICD-10-CM

## 2017-09-21 RX ORDER — MYCOPHENOLATE MOFETIL 500 MG/1
TABLET, FILM COATED ORAL
Qty: 372 TABLET | Refills: 2 | Status: SHIPPED | OUTPATIENT
Start: 2017-09-21 | End: 2018-09-25

## 2017-09-21 ASSESSMENT — PAIN SCALES - GENERAL: PAINLEVEL: NO PAIN (0)

## 2017-09-21 NOTE — PROGRESS NOTES
"[facility] Dermatology Note    Dermatology Problem List:  1. Pemphigus foliaceous  - Goals of treatment per Mr. Mitchell: prevent itching and secondary infections.   - He is not interested in complete clearance at this time.   - Titers (4/23/15)  Cell surface Ig:5,120, monkey esophagus; 1,280 intact human skin  Dsg-1 Ig units (POS > 20, neg <14)  Dsg-3 Ig units (POS > than 20, neg <9)  2. Seborrheic dermatitis  3. Seborrheic keratosis, left lateral canthus    CC:   Chief Complaint   Patient presents with     Derm Problem     Pemphigus follow up, Luan states \" I ma very well, everything is under control.\"     Date of Service: Sep 21, 2017    History of Present Illness:  Mr. Luan Mitchlel is a 72 year old male who presents in follow-up for P. Foliaceous. He was last seen in [facility] f/u  with Dr. Franklin. Since that time, he has done quite well with Cellcept 1000mg BID and topical regimen noted below. Using all topicals as instructed, with the exception of clobetasol ointment which he is spot treating with daily, and minimal use of TAC ointment on the worst spots.     Lesion on the dorsal hand resolved without sequellae.     Overall, things are doing well. He has lesions, but overall fairly asymptomatic. Would not like to rock the boat today on the current treatment. Has questions about Rituximab approval, etc.       No fevers, chills, sweats, weight loss, fatigue, cardio, pulmonary or GI symptoms. No medication SE.     Otherwise, the patient reports no painful, bleeding, nonhealing, or pruritic lesions, and denies new or changing moles.    Past Medical History:   Patient Active Problem List   Diagnosis     Pemphigus foliaceus     Hyperlipidemia LDL goal <130     BPH (benign prostatic hyperplasia)     Encounter for long-term (current) use of high-risk medication     Dermatitis, seborrheic     Lightheadedness     Age-related nuclear cataract of both eyes     Past Medical History: "   Diagnosis Date     BPH (benign prostatic hyperplasia)      Dizziness      Hyperlipidemia LDL goal <130      Pemphigus     pemphigus foliaceus     Retinal detachment 2009    left eye- corrected with laser     Vitreous detachment 2009    LE     Past Surgical History:   Procedure Laterality Date     NO HISTORY OF SURGERY  1/28/14    derm     RETINOPEXY LASER PROPHYLAXIS BREAK(S) OS (LEFT EYE)      2009 - Left eye     Social History:  He is a retired . His wife is currently undergoing surveillance for a soft-tissue sarcome of her right buttock, and they are due for follow-up in [place] next week.     Family History:  Not addressed at this visit.     Medications:  Current Outpatient Prescriptions   Medication Sig Dispense Refill     CELLCEPT (BRAND) 500 MG TABLET Take 2 tablet (1000 mg) by mouth 2 times daily. 372 tablet 2     lovastatin (MEVACOR) 20 MG tablet Take 1 tablet (20 mg) by mouth At Bedtime 90 tablet 4     finasteride (PROSCAR) 5 MG tablet Take 1 tablet (5 mg) by mouth daily 90 tablet 8     desonide (DESOWEN) 0.05 % ointment Apply topically to affected areas twice daily as instructed. 180 g 3     hydrocortisone butyrate (LOCOID) 0.1 % SOLN Apply sparingly to affected area(s) of beard twice daily 180 mL 3     clobetasol (TEMOVATE) 0.05 % ointment APPLY TO AFFECTED AREA(S) TWO TIMES A  g 2     clobetasol propionate 0.05 % SHAM Apply sparingly to dry scalp 3 x weekly as needed.  Leave in place for 15 m then add water, lather and rinse 1 Bottle 5     clobetasol (TEMOVATE) 0.05 % external solution Apply  topically 2 times daily as needed to the scalp. 180 mL 11     ketoconazole (NIZORAL) 2 % shampoo Three times per week in the shower: Lather into scalp, leave in place for 3-5 minutes, then rinse. 240 mL 12     triamcinolone (KENALOG) 0.1 % cream Apply topically to affected areas as instructed. 454 g 11     tacrolimus (PROTOPIC) 0.1 % ointment Apply topically to affected areas as  instructed. 100 g 11     Cholecalciferol (VITAMIN D PO) Take 1 tablet by mouth daily 1000 mg       ARTIFICIAL TEAR SOLUTION OP Apply  to eye.       aspirin 81 MG tablet Take 1 tablet by mouth daily.       Multiple Vitamin (MULTIVITAMIN) per tablet Take 1 tablet by mouth daily. 1 tab daily       [DISCONTINUED] CELLCEPT 500 MG PO TABLET Take 2 tablet (1000 mg) by mouth 2 times daily. 372 tablet 2     Allergies:  Allergies   Allergen Reactions     Penicillins Swelling     Review of Systems:  - Skin: As above in HPI. No additional skin concerns.    Physical exam:  Vitals: There were no vitals taken for this visit.  GEN: This is a well developed, well-nourished male in no acute distress, in a pleasant mood.   SKIN:Full body skin check was done today of the head, neck, chest, abdomen, back, arms and legs.   -Scattered crusted erosions and thin papules in the beard and on the vertex scalp.   -Pink, minimally bullous round plaques on the trunk. Less than noted previously. Most areas with hypopigmented brown patches at previous sites of inflammation. Improved from previous overall.   -Mild erosion in the periumbilical skin.   - Prominent PIH on the buttock and lumbar back.   - No other lesions of concern on areas examined.   -Waxy stuck on papules on the face, including forehead, and scattered on the trunk.    Impression/Plan:   1. Pemphigus foliaceus  Moderate cutaneous control, with excellent symptomatic resolution on previous topical reigmen and Cellcept. Discussed increased MMF vs. Addition of rituximab. Patient declined as he is pleased with control at this point. He has questions about treatment options, and would like to read paper when rituximab trial is published. Will increase use of TAC ointment and decrease clobetasol today; otherwise will leave the current regimen in place. Safety labs from 8/2017 reviewed and wnl. Will repeat at f/u in 1 month.   - Continue mycophenolate mofetil 1000 mg BID (Rx renewal  provided). Benefits and risks of this were reviewed briefly. Discussion that this dose can increase by 50% if needed in the future.   - Continue clobetasol 0.05% ointment - apply BID PRN to recalcitrant areas on the trunk extremities  - Continue clobetasol 0.05% shampoo 2-3x/week to the scalp  - Continue clobetasol 0.05% solution on clobetasol shampoo days  - Continue hydrocortisone butyrate solution    - Continue desonide 0.05% lotion BID PRN to the beard 0.1% solution to the beard twice daily PRN  - Continue triamcinolone 0.1% cream twice daily to the trunk/extremities  - Continue ketoconazole 2% shampoo TIW  - Continue tacrolimus 0.1% ointment daily to the face  - Continue 3x weekly dilute bleach baths    2. SK's benign, reassurance.     3. Macular purpura, hand: resolved.     Follow-up in  3 months with safety labs at that time.     Staff Involved: Dr. Odell  Resident: Adal Marin   .I, Serina Odell MD, saw this patient with the resident and agree with the resident s findings and plan of care as documented in the resident s note.

## 2017-09-21 NOTE — MR AVS SNAPSHOT
After Visit Summary   9/21/2017    Luan Mitchell    MRN: [id number]           Patient Information     Date Of Birth          1945        Visit Information        Provider Department      9/21/2017 10:30 AM Adal Marin MD [facility]        Today's Diagnoses     SK (seborrheic keratosis)    -  1    Pemphigus foliaceous        Encounter for long-term (current) use of high-risk medication        Pemphigus foliaceus          Care Instructions    Continue your current regimen.     Think about using the triamcinolone ointment more often on the body, and use the clobetasol ointment for the bad/symptomatic spots on the trunk and extremities.     Please let us know if things are flaring.     Will repeat labs and visit in 3 months.           Follow-ups after your visit        Follow-up notes from your care team     Return in about 3 months (around 12/21/2017).      Your next 10 appointments already scheduled     Dec 21, 2017  9:15 AM CST   (Arrive by 9:00 AM)   Return Visit with Adal Marin MD   [facility] ([facility])    [address] [place] [id number] [phone]              Who to contact     Please call your clinic at [phone] to:    Ask questions about your health    Make or cancel appointments    Discuss your medicines    Learn about your test results    Speak to your doctor   If you have compliments or concerns about an experience at your clinic, or if you wish to file a complaint, please contact [facility] Patient Relations at [phone] or email us at [email or web address]         Additional Information About Your Visit        MyChart Information     [employer] gives you secure access to your electronic health record. If you see a primary care provider, you can also send messages to your care team and make appointments. If you have questions, please call your  primary care clinic.  If you do not have a primary care provider, please call [phone] and they will assist you.      [employer] is an electronic gateway that provides easy, online access to your medical records. With [employer], you can request a clinic appointment, read your test results, renew a prescription or communicate with your care team.     To access your existing account, please contact your [facility] or call [phone] for assistance.        Care EveryWhere ID     This is your Care EveryWhere ID. This could be used by other organizations to access your [facility] medical records  [id number]         Blood Pressure from Last 3 Encounters:   03/13/17 132/74   02/20/17 125/75   01/31/17 115/72    Weight from Last 3 Encounters:   03/13/17 80.1 kg (176 lb 8 oz)   02/20/17 80 kg (176 lb 4.8 oz)   01/31/17 78.9 kg (174 lb)              Today, you had the following     No orders found for display         Where to get your medicines      These medications were sent to [facility] [address] [address]     Phone:  [phone]     mycophenolate tablet          Primary Care Provider Office Phone # Fax #    Loretta Villeda [phone]        [address] [address]        Equal Access to Services     JORDON OSBORNE : Hadii cristina trammello [employer], waaxda luqadaha, qaybta kaalmada nancy, kyaw jasmine . So [facility] [phone].    ATENCIÓN: Si habla español, tiene a toney disposición servicios gratuitos de asistencia lingüística. Pancho al [phone].    We comply with applicable federal civil rights laws and [place] laws. We do not discriminate on the basis of race, color, national origin, age, disability sex, sexual orientation or gender identity.            Thank you!     Thank you for choosing [facility]  for your care. Our goal is  always to provide you with excellent care. Hearing back from our patients is one way we can continue to improve our services. Please take a few minutes to complete the written survey that you may receive in the mail after your visit with us. Thank you!             Your Updated Medication List - Protect others around you: Learn how to safely use, store and throw away your medicines at www.disposemymeds.org.          This list is accurate as of: 9/21/17 10:37 AM.  Always use your most recent med list.                   Brand Name Dispense Instructions for use Diagnosis    ARTIFICIAL TEAR SOLUTION OP      Apply  to eye.        aspirin 81 MG tablet      Take 1 tablet by mouth daily.        * clobetasol propionate 0.05 % Sham     1 Bottle    Apply sparingly to dry scalp 3 x weekly as needed.  Leave in place for 15 m then add water, lather and rinse    Pemphigus foliaceous, Encounter for long-term (current) use of high-risk medication       * clobetasol 0.05 % external solution    TEMOVATE    180 mL    Apply  topically 2 times daily as needed to the scalp.    Pemphigus foliaceous, Encounter for long-term (current) use of high-risk medication       * clobetasol 0.05 % ointment    TEMOVATE    180 g    APPLY TO AFFECTED AREA(S) TWO TIMES A DAY    Pemphigus foliaceous, Pemphigus foliaceus, Encounter for long-term (current) use of high-risk medication       desonide 0.05 % ointment    DESOWEN    180 g    Apply topically to affected areas twice daily as instructed.    Pemphigus foliaceus, Pemphigus foliaceous, Encounter for long-term (current) use of high-risk medication       finasteride 5 MG tablet    PROSCAR    90 tablet    Take 1 tablet (5 mg) by mouth daily    Benign prostatic hyperplasia with lower urinary tract symptoms, unspecified morphology       hydrocortisone butyrate 0.1 % Soln solution    LOCOID    180 mL    Apply sparingly to affected area(s) of beard twice daily    Pemphigus foliaceous, Pemphigus foliaceus,  Encounter for long-term (current) use of high-risk medication       ketoconazole 2 % shampoo    NIZORAL    240 mL    Three times per week in the shower: Lather into scalp, leave in place for 3-5 minutes, then rinse.    Pemphigus foliaceous       lovastatin 20 MG tablet    MEVACOR    90 tablet    Take 1 tablet (20 mg) by mouth At Bedtime    Hyperlipidemia LDL goal <100       multivitamin per tablet      Take 1 tablet by mouth daily. 1 tab daily        mycophenolate tablet     372 tablet    Take 2 tablet (1000 mg) by mouth 2 times daily.    Pemphigus foliaceous, Encounter for long-term (current) use of high-risk medication, Pemphigus foliaceus       tacrolimus 0.1 % ointment    PROTOPIC    100 g    Apply topically to affected areas as instructed.    Pemphigus foliaceous, Seborrhoeic dermatitis       triamcinolone 0.1 % cream    KENALOG    454 g    Apply topically to affected areas as instructed.    Pemphigus foliaceus, Pemphigus foliaceous, Encounter for long-term (current) use of high-risk medication       VITAMIN D PO      Take 1 tablet by mouth daily 1000 mg        * Notice:  This list has 3 medication(s) that are the same as other medications prescribed for you. Read the directions carefully, and ask your doctor or other care provider to review them with you.

## 2017-09-21 NOTE — LETTER
"2017       RE: Luan Mitchell  [address]  [facility] [address]     Dear Colleague,    Thank you for referring your patient, Luan Mitchell, to the [facility] DERMATOLOGY at [facility]. Please see a copy of my visit note below.    [facility] Dermatology Note    Dermatology Problem List:  1. Pemphigus foliaceous  - Goals of treatment per Mr. Mitchell: prevent itching and secondary infections.   - He is not interested in complete clearance at this time.   - Titers (4/23/15)  Cell surface Ig:5,120, monkey esophagus; 1,280 intact human skin  Dsg-1 Ig units (POS > 20, neg <14)  Dsg-3 Ig units (POS > than 20, neg <9)  2. Seborrheic dermatitis  3. Seborrheic keratosis, left lateral canthus    CC:   Chief Complaint   Patient presents with     Derm Problem     Pemphigus follow up, Luan states \" I ma very well, everything is under control.\"     Date of Service: Sep 21, 2017    History of Present Illness:  Mr. Luan Mitchell is a 72 year old male who presents in follow-up for P. Foliaceous. He was last seen in [facility] f/u  with Dr. Franklin. Since that time, he has done quite well with Cellcept 1000mg BID and topical regimen noted below. Using all topicals as instructed, with the exception of clobetasol ointment which he is spot treating with daily, and minimal use of TAC ointment on the worst spots.     Lesion on the dorsal hand resolved without sequellae.     Overall, things are doing well. He has lesions, but overall fairly asymptomatic. Would not like to rock the boat today on the current treatment. Has questions about Rituximab approval, etc.       No fevers, chills, sweats, weight loss, fatigue, cardio, pulmonary or GI symptoms. No medication SE.     Otherwise, the patient reports no painful, bleeding, nonhealing, or pruritic lesions, and denies new or changing moles.    Past Medical History:   Patient Active Problem List   Diagnosis     " Pemphigus foliaceus     Hyperlipidemia LDL goal <130     BPH (benign prostatic hyperplasia)     Encounter for long-term (current) use of high-risk medication     Dermatitis, seborrheic     Lightheadedness     Age-related nuclear cataract of both eyes     Past Medical History:   Diagnosis Date     BPH (benign prostatic hyperplasia)      Dizziness      Hyperlipidemia LDL goal <130      Pemphigus     pemphigus foliaceus     Retinal detachment 2009    left eye- corrected with laser     Vitreous detachment 2009    LE     Past Surgical History:   Procedure Laterality Date     NO HISTORY OF SURGERY  1/28/14    derm     RETINOPEXY LASER PROPHYLAXIS BREAK(S) OS (LEFT EYE)      2009 - Left eye     Social History:  He is a retired . His wife is currently undergoing surveillance for a soft-tissue sarcome of her right buttock, and they are due for follow-up in [place] next week.     Family History:  Not addressed at this visit.     Medications:  Current Outpatient Prescriptions   Medication Sig Dispense Refill     CELLCEPT (BRAND) 500 MG TABLET Take 2 tablet (1000 mg) by mouth 2 times daily. 372 tablet 2     lovastatin (MEVACOR) 20 MG tablet Take 1 tablet (20 mg) by mouth At Bedtime 90 tablet 4     finasteride (PROSCAR) 5 MG tablet Take 1 tablet (5 mg) by mouth daily 90 tablet 8     desonide (DESOWEN) 0.05 % ointment Apply topically to affected areas twice daily as instructed. 180 g 3     hydrocortisone butyrate (LOCOID) 0.1 % SOLN Apply sparingly to affected area(s) of beard twice daily 180 mL 3     clobetasol (TEMOVATE) 0.05 % ointment APPLY TO AFFECTED AREA(S) TWO TIMES A  g 2     clobetasol propionate 0.05 % SHAM Apply sparingly to dry scalp 3 x weekly as needed.  Leave in place for 15 m then add water, lather and rinse 1 Bottle 5     clobetasol (TEMOVATE) 0.05 % external solution Apply  topically 2 times daily as needed to the scalp. 180 mL 11     ketoconazole (NIZORAL) 2 % shampoo Three  times per week in the shower: Lather into scalp, leave in place for 3-5 minutes, then rinse. 240 mL 12     triamcinolone (KENALOG) 0.1 % cream Apply topically to affected areas as instructed. 454 g 11     tacrolimus (PROTOPIC) 0.1 % ointment Apply topically to affected areas as instructed. 100 g 11     Cholecalciferol (VITAMIN D PO) Take 1 tablet by mouth daily 1000 mg       ARTIFICIAL TEAR SOLUTION OP Apply  to eye.       aspirin 81 MG tablet Take 1 tablet by mouth daily.       Multiple Vitamin (MULTIVITAMIN) per tablet Take 1 tablet by mouth daily. 1 tab daily       [DISCONTINUED] CELLCEPT 500 MG PO TABLET Take 2 tablet (1000 mg) by mouth 2 times daily. 372 tablet 2     Allergies:  Allergies   Allergen Reactions     Penicillins Swelling     Review of Systems:  - Skin: As above in HPI. No additional skin concerns.    Physical exam:  Vitals: There were no vitals taken for this visit.  GEN: This is a well developed, well-nourished male in no acute distress, in a pleasant mood.   SKIN:Full body skin check was done today of the head, neck, chest, abdomen, back, arms and legs.   -Scattered crusted erosions and thin papules in the beard and on the vertex scalp.   -Pink, minimally bullous round plaques on the trunk. Less than noted previously. Most areas with hypopigmented brown patches at previous sites of inflammation. Improved from previous overall.   -Mild erosion in the periumbilical skin.   - Prominent PIH on the buttock and lumbar back.   - No other lesions of concern on areas examined.   -Waxy stuck on papules on the face, including forehead, and scattered on the trunk.    Impression/Plan:   1. Pemphigus foliaceus  Moderate cutaneous control, with excellent symptomatic resolution on previous topical reigmen and Cellcept. Discussed increased MMF vs. Addition of rituximab. Patient declined as he is pleased with control at this point. He has questions about treatment options, and would like to read paper when  rituximab trial is published. Will increase use of TAC ointment and decrease clobetasol today; otherwise will leave the current regimen in place. Safety labs from 8/2017 reviewed and wnl. Will repeat at f/u in 1 month.   - Continue mycophenolate mofetil 1000 mg BID (Rx renewal provided). Benefits and risks of this were reviewed briefly. Discussion that this dose can increase by 50% if needed in the future.   - Continue clobetasol 0.05% ointment - apply BID PRN to recalcitrant areas on the trunk extremities  - Continue clobetasol 0.05% shampoo 2-3x/week to the scalp  - Continue clobetasol 0.05% solution on clobetasol shampoo days  - Continue hydrocortisone butyrate solution    - Continue desonide 0.05% lotion BID PRN to the beard 0.1% solution to the beard twice daily PRN  - Continue triamcinolone 0.1% cream twice daily to the trunk/extremities  - Continue ketoconazole 2% shampoo TIW  - Continue tacrolimus 0.1% ointment daily to the face  - Continue 3x weekly dilute bleach baths    2. SK's benign, reassurance.     3. Macular purpura, hand: resolved.     Follow-up in  3 months with safety labs at that time.     Staff Involved: Dr. Odell  Resident: Adal Marin   .I, Serina Odell MD, saw this patient with the resident and agree with the resident s findings and plan of care as documented in the resident s note.    Again, thank you for allowing me to participate in the care of your patient.      Sincerely,    Adal Marin MD

## 2017-09-21 NOTE — PATIENT INSTRUCTIONS
Continue your current regimen.     Think about using the triamcinolone ointment more often on the body, and use the clobetasol ointment for the bad/symptomatic spots on the trunk and extremities.     Please let us know if things are flaring.     Will repeat labs and visit in 3 months.

## 2017-09-21 NOTE — NURSING NOTE
"Dermatology Rooming Note    Luan Mitchell's goals for this visit include:   Chief Complaint   Patient presents with     Derm Problem     Pemphigus follow up, Luan states \" I ma very well, everything is under control.\"     Osiris Estevez LPN  "

## 2017-12-21 ENCOUNTER — OFFICE VISIT (OUTPATIENT)
Dept: DERMATOLOGY | Facility: CLINIC | Age: 72
End: 2017-12-21
Payer: COMMERCIAL

## 2017-12-21 DIAGNOSIS — Z79.899 ENCOUNTER FOR LONG-TERM (CURRENT) USE OF HIGH-RISK MEDICATION: ICD-10-CM

## 2017-12-21 DIAGNOSIS — Z51.81 MEDICATION MONITORING ENCOUNTER: ICD-10-CM

## 2017-12-21 DIAGNOSIS — L10.2 PEMPHIGUS FOLIACEOUS (H): Primary | ICD-10-CM

## 2017-12-21 DIAGNOSIS — L10.2 PEMPHIGUS FOLIACEUS (H): ICD-10-CM

## 2017-12-21 DIAGNOSIS — L21.9 DERMATITIS, SEBORRHEIC: ICD-10-CM

## 2017-12-21 LAB
ALBUMIN SERPL-MCNC: 3.8 G/DL (ref 3.4–5)
ALP SERPL-CCNC: 37 U/L (ref 40–150)
ALT SERPL W P-5'-P-CCNC: 36 U/L (ref 0–70)
ANION GAP SERPL CALCULATED.3IONS-SCNC: 6 MMOL/L (ref 3–14)
AST SERPL W P-5'-P-CCNC: 27 U/L (ref 0–45)
BASOPHILS # BLD AUTO: 0 10E9/L (ref 0–0.2)
BASOPHILS NFR BLD AUTO: 0.3 %
BILIRUB SERPL-MCNC: 0.7 MG/DL (ref 0.2–1.3)
BUN SERPL-MCNC: 16 MG/DL (ref 7–30)
CALCIUM SERPL-MCNC: 8.8 MG/DL (ref 8.5–10.1)
CHLORIDE SERPL-SCNC: 106 MMOL/L (ref 94–109)
CO2 SERPL-SCNC: 29 MMOL/L (ref 20–32)
CREAT SERPL-MCNC: 1.02 MG/DL (ref 0.66–1.25)
DIFFERENTIAL METHOD BLD: ABNORMAL
EOSINOPHIL # BLD AUTO: 0.1 10E9/L (ref 0–0.7)
EOSINOPHIL NFR BLD AUTO: 1.6 %
ERYTHROCYTE [DISTWIDTH] IN BLOOD BY AUTOMATED COUNT: 12.9 % (ref 10–15)
GFR SERPL CREATININE-BSD FRML MDRD: 71 ML/MIN/1.7M2
GLUCOSE SERPL-MCNC: 93 MG/DL (ref 70–99)
HCT VFR BLD AUTO: 42.5 % (ref 40–53)
HGB BLD-MCNC: 14.6 G/DL (ref 13.3–17.7)
IMM GRANULOCYTES # BLD: 0 10E9/L (ref 0–0.4)
IMM GRANULOCYTES NFR BLD: 0.3 %
LYMPHOCYTES # BLD AUTO: 1.2 10E9/L (ref 0.8–5.3)
LYMPHOCYTES NFR BLD AUTO: 18.6 %
MCH RBC QN AUTO: 31.2 PG (ref 26.5–33)
MCHC RBC AUTO-ENTMCNC: 34.4 G/DL (ref 31.5–36.5)
MCV RBC AUTO: 91 FL (ref 78–100)
MONOCYTES # BLD AUTO: 0.6 10E9/L (ref 0–1.3)
MONOCYTES NFR BLD AUTO: 10 %
NEUTROPHILS # BLD AUTO: 4.4 10E9/L (ref 1.6–8.3)
NEUTROPHILS NFR BLD AUTO: 69.2 %
NRBC # BLD AUTO: 0 10*3/UL
NRBC BLD AUTO-RTO: 0 /100
PLATELET # BLD AUTO: 133 10E9/L (ref 150–450)
POTASSIUM SERPL-SCNC: 4.1 MMOL/L (ref 3.4–5.3)
PROT SERPL-MCNC: 6.6 G/DL (ref 6.8–8.8)
RBC # BLD AUTO: 4.68 10E12/L (ref 4.4–5.9)
SODIUM SERPL-SCNC: 142 MMOL/L (ref 133–144)
WBC # BLD AUTO: 6.4 10E9/L (ref 4–11)

## 2017-12-21 RX ORDER — CLOBETASOL PROPIONATE 0.5 MG/ML
SOLUTION TOPICAL
Qty: 180 ML | Refills: 11 | Status: SHIPPED | OUTPATIENT
Start: 2017-12-21 | End: 2018-03-22

## 2017-12-21 RX ORDER — TRIAMCINOLONE ACETONIDE 1 MG/G
CREAM TOPICAL
Qty: 454 G | Refills: 11 | Status: SHIPPED | OUTPATIENT
Start: 2017-12-21 | End: 2018-12-13

## 2017-12-21 RX ORDER — KETOCONAZOLE 20 MG/ML
SHAMPOO TOPICAL
Qty: 240 ML | Refills: 12 | Status: SHIPPED | OUTPATIENT
Start: 2017-12-21 | End: 2018-03-22

## 2017-12-21 RX ORDER — KETOCONAZOLE 20 MG/G
CREAM TOPICAL
Qty: 60 G | Refills: 2 | Status: SHIPPED | OUTPATIENT
Start: 2017-12-21 | End: 2024-07-22

## 2017-12-21 ASSESSMENT — PAIN SCALES - GENERAL: PAINLEVEL: NO PAIN (0)

## 2017-12-21 NOTE — MR AVS SNAPSHOT
After Visit Summary   12/21/2017    Luan Mitchell    MRN: [id number]           Patient Information     Date Of Birth          1945        Visit Information        Provider Department      12/21/2017 9:15 AM Adal Marin MD [facility]        Today's Diagnoses     Pemphigus foliaceous    -  1    Dermatitis, seborrheic        Medication monitoring encounter        Pemphigus foliaceus        Encounter for long-term (current) use of high-risk medication          Care Instructions        For the nose: Apply ketoconazole cream twice daily with protopic as needed.     Continue on other previous treatments.     Labs today.          Follow-ups after your visit        Follow-up notes from your care team     Return in about 3 months (around 3/21/2018).      Your next 10 appointments already scheduled     Dec 21, 2017 10:15 AM CST   LAB with  LAB   [facility]    [address] [place] [id number] [phone]           Please do not eat 10-12 hours before your appointment if you are coming in fasting for labs on lipids, cholesterol, or glucose (sugar). This does not apply to pregnant women. Water, hot tea and black coffee (with nothing added) are okay. Do not drink other fluids, diet soda or chew gum.            Mar 22, 2018  9:30 AM CDT   (Arrive by 9:15 AM)   Return Visit with Adal Marin MD   [facility]    [address] [place] [id number] [phone]              Future tests that were ordered for you today     Open Future Orders        Priority Expected Expires Ordered    CBC with platelets differential Routine  12/21/2018 12/21/2017    Comprehensive metabolic panel Routine  12/21/2018 12/21/2017            Who to contact     Please call your clinic at [phone] to:    Ask questions about your health    Make or cancel  appointments    Discuss your medicines    Learn about your test results    Speak to your doctor   If you have compliments or concerns about an experience at your clinic, or if you wish to file a complaint, please contact [facility] Patient Relations at [phone] or email us at [email or web address]         Additional Information About Your Visit        [employer]®hart Information     [employer] gives you secure access to your electronic health record. If you see a primary care provider, you can also send messages to your care team and make appointments. If you have questions, please call your primary care clinic.  If you do not have a primary care provider, please call [phone] and they will assist you.      [employer] is an electronic gateway that provides easy, online access to your medical records. With [employer], you can request a clinic appointment, read your test results, renew a prescription or communicate with your care team.     To access your existing account, please contact your [facility] Clinic or call [phone] for assistance.        Care EveryWhere ID     This is your Care EveryWhere ID. This could be used by other organizations to access your [facility] medical records  [id number]         Blood Pressure from Last 3 Encounters:   03/13/17 132/74   02/20/17 125/75   01/31/17 115/72    Weight from Last 3 Encounters:   03/13/17 80.1 kg (176 lb 8 oz)   02/20/17 80 kg (176 lb 4.8 oz)   01/31/17 78.9 kg (174 lb)                 Today's Medication Changes          These changes are accurate as of: 12/21/17 10:00 AM.  If you have any questions, ask your nurse or doctor.               These medicines have changed or have updated prescriptions.        Dose/Directions    * ketoconazole 2 % cream   Commonly known as:  NIZORAL   This may have changed:  You were already taking a medication with the same name, and this prescription was added. Make sure you  understand how and when to take each.   Used for:  Dermatitis, seborrheic   Changed by:  Adal Marin MD        Apply twice daily to the nose as needed for white/scaling.   Quantity:  60 g   Refills:  2       * ketoconazole 2 % shampoo   Commonly known as:  NIZORAL   This may have changed:  Another medication with the same name was added. Make sure you understand how and when to take each.   Used for:  Pemphigus foliaceous   Changed by:  Adal Marin MD        Three times per week in the shower: Lather into scalp, leave in place for 3-5 minutes, then rinse.   Quantity:  240 mL   Refills:  12       * Notice:  This list has 2 medication(s) that are the same as other medications prescribed for you. Read the directions carefully, and ask your doctor or other care provider to review them with you.         Where to get your medicines      These medications were sent to [facility] [address] [address]     Phone:  [phone]     clobetasol 0.05 % external solution    ketoconazole 2 % cream    ketoconazole 2 % shampoo    triamcinolone 0.1 % cream                Primary Care Provider Office Phone # Fax #    Loretta Villeda [phone]       [address] [address]        Equal Access to Services     FABIO OSBORNE AH: Hadii cristina ku hadasho Soomaali, waaxda luqadaha, qaybta kaalmada adeegyada, kyaw reyes. So [facility] [phone].    ATENCIÓN: Si habla español, tiene a toney disposición servicios gratuitos de asistencia lingüística. Llame al [phone].    We comply with applicable federal civil rights laws and [place] laws. We do not discriminate on the basis of race, color, national origin, age, disability, sex, sexual orientation, or gender identity.            Thank you!     Thank you for choosing [facility] DERMATOLOG[facility]  for your care. Our goal is always to  provide you with excellent care. Hearing back from our patients is one way we can continue to improve our services. Please take a few minutes to complete the written survey that you may receive in the mail after your visit with us. Thank you!             Your Updated Medication List - Protect others around you: Learn how to safely use, store and throw away your medicines at www.disposemymeds.org.          This list is accurate as of: 12/21/17 10:00 AM.  Always use your most recent med list.                   Brand Name Dispense Instructions for use Diagnosis    ARTIFICIAL TEAR SOLUTION OP      Apply  to eye.        aspirin 81 MG tablet      Take 1 tablet by mouth daily.        * clobetasol propionate 0.05 % Sham     1 Bottle    Apply sparingly to dry scalp 3 x weekly as needed.  Leave in place for 15 m then add water, lather and rinse    Pemphigus foliaceous, Encounter for long-term (current) use of high-risk medication       * clobetasol 0.05 % ointment    TEMOVATE    180 g    APPLY TO AFFECTED AREA(S) TWO TIMES A DAY    Pemphigus foliaceous, Pemphigus foliaceus, Encounter for long-term (current) use of high-risk medication       * clobetasol 0.05 % external solution    TEMOVATE    180 mL    Apply  topically 2 times daily as needed to the scalp.    Pemphigus foliaceous, Encounter for long-term (current) use of high-risk medication       desonide 0.05 % ointment    DESOWEN    180 g    Apply topically to affected areas twice daily as instructed.    Pemphigus foliaceus, Pemphigus foliaceous, Encounter for long-term (current) use of high-risk medication       finasteride 5 MG tablet    PROSCAR    90 tablet    Take 1 tablet (5 mg) by mouth daily    Benign prostatic hyperplasia with lower urinary tract symptoms, unspecified morphology       hydrocortisone butyrate 0.1 % Soln solution    LOCOID    180 mL    Apply sparingly to affected area(s) of beard twice daily    Pemphigus foliaceous, Pemphigus foliaceus, Encounter for  long-term (current) use of high-risk medication       * ketoconazole 2 % cream    NIZORAL    60 g    Apply twice daily to the nose as needed for white/scaling.    Dermatitis, seborrheic       * ketoconazole 2 % shampoo    NIZORAL    240 mL    Three times per week in the shower: Lather into scalp, leave in place for 3-5 minutes, then rinse.    Pemphigus foliaceous       lovastatin 20 MG tablet    MEVACOR    90 tablet    Take 1 tablet (20 mg) by mouth At Bedtime    Hyperlipidemia LDL goal <100       multivitamin per tablet      Take 1 tablet by mouth daily. 1 tab daily        mycophenolate 500 MG tablet     372 tablet    Take 2 tablet (1000 mg) by mouth 2 times daily.    Pemphigus foliaceous, Encounter for long-term (current) use of high-risk medication, Pemphigus foliaceus       tacrolimus 0.1 % ointment    PROTOPIC    100 g    Apply topically to affected areas as instructed.    Pemphigus foliaceous, Seborrhoeic dermatitis       triamcinolone 0.1 % cream    KENALOG    454 g    Apply topically to affected areas as instructed.    Pemphigus foliaceus, Pemphigus foliaceous, Encounter for long-term (current) use of high-risk medication       VITAMIN D PO      Take 1 tablet by mouth daily 1000 mg        * Notice:  This list has 5 medication(s) that are the same as other medications prescribed for you. Read the directions carefully, and ask your doctor or other care provider to review them with you.

## 2017-12-21 NOTE — NURSING NOTE
"Dermatology Rooming Note    Luan Mitchell's goals for this visit include:   Chief Complaint   Patient presents with     Derm Problem     Pemphigus follow up, Luan states \" It is very stable.\"     Osiris Estevez LPN  "

## 2017-12-21 NOTE — LETTER
2017       RE: Luan Mitchell  [address] [facility] [address]     Dear Colleague,    Thank you for referring your patient, Luan Mitchell, to the [facility] DERMAT[facility] at [facility]. Please see a copy of my visit note below.    [facility] Dermatology Note      Dermatology Problem List:   1.  Pemphigus foliaceus.     - Goals of treatment per Mr. Mitchell:  Prevent itching and secondary infections.   - He is not interested in complete clearance at this time.     - Titers 2015.   - Cell surface IgG 1-5, 120, monkey esophagus; 1280 intact human skin.   - DSG-1 Ig units (positive greater than 20, negative less than 14).   - DSG-3 Ig units (positive greater than 20, negative less than 9).   - Current treatment 1000 mg b.i.d. CellCept, topical steroids as below.   2.  History of seborrheic keratoses.   3.  Seborrheic dermatitis, on ketaconazole shampoo and cream.      Encounter Date:  2017      CC: F/u pem phigus     History of Present Illness:   Mr. Luan Mitchell is a pleasant, 72-year-old man who is well-known to Dermatology who presents today in routine followup for pemphigus foliaceus.  He was last seen in followup 2017.  Since that time, he notes his skin has been under great control.  He denies any ongoing cutaneous symptomology, particularly the itch that was quite debilitating in the past.  He is continuing with use of his topical steroid regimen without any notable side effects.  He is also doing well on CellCept 1000 mg b.i.d., which he is tolerating well without any side effects noted.  He denies any infectious symptoms.  No fevers, chills, sweats, GI side effects, cough, shortness of breath, muscle or joint pains or other issues lately.  He knows he is due for labs today.  His last safety labs were reviewed from 3 months ago and were stable and within normal limits.  He has had a mild erosion over the right  lateral nasal ala and wonders how he can best treat this.  He requests multiple refills today.  He is using his topicals as noted below.      Past Medical History:   Diagnosis Date     BPH (benign prostatic hyperplasia)      Dizziness      Hyperlipidemia LDL goal <130      Pemphigus     pemphigus foliaceus     Retinal detachment 2009    left eye- corrected with laser     Vitreous detachment 2009    LE     Social History:     The patient is a retired .  His wife is currently undergoing surveillance for soft tissue sarcoma of her right buttock, and they are following up in [place].        Family History:   Not addressed.        Current Outpatient Prescriptions   Medication     ketoconazole (NIZORAL) 2 % cream     triamcinolone (KENALOG) 0.1 % cream     ketoconazole (NIZORAL) 2 % shampoo     clobetasol (TEMOVATE) 0.05 % external solution     CELLCEPT (BRAND) 500 MG TABLET     lovastatin (MEVACOR) 20 MG tablet     finasteride (PROSCAR) 5 MG tablet     desonide (DESOWEN) 0.05 % ointment     hydrocortisone butyrate (LOCOID) 0.1 % SOLN     clobetasol (TEMOVATE) 0.05 % ointment     clobetasol propionate 0.05 % SHAM     tacrolimus (PROTOPIC) 0.1 % ointment     Cholecalciferol (VITAMIN D PO)     ARTIFICIAL TEAR SOLUTION OP     aspirin 81 MG tablet     Multiple Vitamin (MULTIVITAMIN) per tablet     No current facility-administered medications for this visit.       Allergies   Allergen Reactions     Penicillins Swelling      Review of Systems:   A 10 point review of systems was negative beyond that stated above in the HPI.       Physical exam:   GENERAL:  A well-appearing  male appearing his stated age, affect normal, cooperative with the exam, alert and oriented x3.     SKIN:  A focused skin examination is performed today, including the scalp, face, neck, chest, abdomen, back, upper extremities and distal lower extremities.  The patient has scattered, crusted, mildly scaly, pink papules and  thin plaques scattered over the central trunk, chest, abdomen and back.  There are no lesions with overlying bullae or significant ulcerations or erosions.  There are less areas involved today and many areas of post inflammatory hyper- and hypopigmentation at previous sites of inflammation.  He has a similar scaly, pink plaque beneath the right ear.  Mild erosion in the right nasal ala with some erythema and scaling bilaterally.  The patient has a 3 mm, subcutaneous, superficial, cystic papule in the left groin fold without any lymphadenopathy appreciated today.  Overall, things are improved from the previous examination.  Scattered, waxy, [id number]-ei-ftypzsuhf papules over the areas evaluated.         Impression/Plan:   1.  Pemphigus foliaceus.  Moderate cutaneous control with excellent symptomatic resolution on previous topical regimen and CellCept 1000 mg b.i.d.  We again discussed additional systemic treatments, including increased CellCept dose versus addition of medications like rituximab.  The patient is currently pleased with his symptomatic control and is not interested in cutaneous clearance.  Again, we opt to continue his current treatment.  He has indications for refills today.   - We will make small adjustments of his topical regimen as outlined below.  We will plan to see the patient back in approximately 3 months.  He is due for safety labs today.  We will repeat CBC and CMP at this time.  (These were reviewed and within normal limits today with the exception of mildly low platelets.  Safe to continue on the CellCept.)     - Continue mycophenolate mofetil 1000 mg b.i.d.  Risks and benefits of this were reviewed briefly and discussed.  A potential dose increase was carried out.   - Continue clobetasol ointment 0.05% twice daily as needed to the worst, most symptomatic, recalcitrant areas on the trunk and extremities.   - Continue clobetasol 0.05% solution to the scalp up to once nightly as needed.   -  Stop clobetasol shampoo.   - Continue tacrolimus 0.01% ointment daily to the face.   - Discontinue topical steroids, including desonide lotion, to the affected face given symptomatic control with Protopic.   - Continue triamcinolone 0.1% cream twice daily as a primary treatment for the trunk and extremities.   - Continue ketoconazole 2% shampoo 3 times weekly.    - Start ketoconazole 2% cream to be applied to the bilateral alar grooves and lateral nasolabial folds in addition to the Protopic as needed.   - Continue with 3 times weekly dilute bleach baths as tolerated.      The patient was seen and discussed with Dr. Serina Odell, who was staff for this encounter.      Follow up in 3 months with plan for labs at that time.       Staff Involved:   Dictated by Resident(Adal Marin MD)/Staff(as above)     This note was dictated and edited by MD Adal Paul MD  PGY3 Dermatology  [phone]   .I, Serina Odell MD, saw this patient with the resident and agree with the resident s findings and plan of care as documented in the resident s note.

## 2017-12-21 NOTE — PROGRESS NOTES
[facility] Dermatology Note      Dermatology Problem List:   1.  Pemphigus foliaceus.     - Goals of treatment per Mr. Mitchell:  Prevent itching and secondary infections.   - He is not interested in complete clearance at this time.     - Titers 2015.   - Cell surface IgG 1-5, 120, monkey esophagus; 1280 intact human skin.   - DSG-1 Ig units (positive greater than 20, negative less than 14).   - DSG-3 Ig units (positive greater than 20, negative less than 9).   - Current treatment 1000 mg b.i.d. CellCept, topical steroids as below.   2.  History of seborrheic keratoses.   3.  Seborrheic dermatitis, on ketaconazole shampoo and cream.      Encounter Date:  2017      CC: F/u pem phigus     History of Present Illness:   Mr. Luan Mitchell is a pleasant, 72-year-old man who is well-known to Dermatology who presents today in routine followup for pemphigus foliaceus.  He was last seen in followup 2017.  Since that time, he notes his skin has been under great control.  He denies any ongoing cutaneous symptomology, particularly the itch that was quite debilitating in the past.  He is continuing with use of his topical steroid regimen without any notable side effects.  He is also doing well on CellCept 1000 mg b.i.d., which he is tolerating well without any side effects noted.  He denies any infectious symptoms.  No fevers, chills, sweats, GI side effects, cough, shortness of breath, muscle or joint pains or other issues lately.  He knows he is due for labs today.  His last safety labs were reviewed from 3 months ago and were stable and within normal limits.  He has had a mild erosion over the right lateral nasal ala and wonders how he can best treat this.  He requests multiple refills today.  He is using his topicals as noted below.      Past Medical History:   Diagnosis Date     BPH (benign prostatic hyperplasia)      Dizziness      Hyperlipidemia LDL goal <130      Pemphigus      pemphigus foliaceus     Retinal detachment 2009    left eye- corrected with laser     Vitreous detachment 2009    LE          Social History:     The patient is a retired .  His wife is currently undergoing surveillance for soft tissue sarcoma of her right buttock, and they are following up in [place].        Family History:   Not addressed.        Current Outpatient Prescriptions   Medication     ketoconazole (NIZORAL) 2 % cream     triamcinolone (KENALOG) 0.1 % cream     ketoconazole (NIZORAL) 2 % shampoo     clobetasol (TEMOVATE) 0.05 % external solution     CELLCEPT (BRAND) 500 MG TABLET     lovastatin (MEVACOR) 20 MG tablet     finasteride (PROSCAR) 5 MG tablet     desonide (DESOWEN) 0.05 % ointment     hydrocortisone butyrate (LOCOID) 0.1 % SOLN     clobetasol (TEMOVATE) 0.05 % ointment     clobetasol propionate 0.05 % SHAM     tacrolimus (PROTOPIC) 0.1 % ointment     Cholecalciferol (VITAMIN D PO)     ARTIFICIAL TEAR SOLUTION OP     aspirin 81 MG tablet     Multiple Vitamin (MULTIVITAMIN) per tablet     No current facility-administered medications for this visit.       Allergies   Allergen Reactions     Penicillins Swelling         Review of Systems:   A 10 point review of systems was negative beyond that stated above in the HPI.       Physical exam:   GENERAL:  A well-appearing  male appearing his stated age, affect normal, cooperative with the exam, alert and oriented x3.     SKIN:  A focused skin examination is performed today, including the scalp, face, neck, chest, abdomen, back, upper extremities and distal lower extremities.  The patient has scattered, crusted, mildly scaly, pink papules and thin plaques scattered over the central trunk, chest, abdomen and back.  There are no lesions with overlying bullae or significant ulcerations or erosions.  There are less areas involved today and many areas of post inflammatory hyper- and hypopigmentation at previous sites of  inflammation.  He has a similar scaly, pink plaque beneath the right ear.  Mild erosion in the right nasal ala with some erythema and scaling bilaterally.  The patient has a 3 mm, subcutaneous, superficial, cystic papule in the left groin fold without any lymphadenopathy appreciated today.  Overall, things are improved from the previous examination.  Scattered, waxy, jdbyq-tq-auvrvxhzz papules over the areas evaluated.         Impression/Plan:   1.  Pemphigus foliaceus.  Moderate cutaneous control with excellent symptomatic resolution on previous topical regimen and CellCept 1000 mg b.i.d.  We again discussed additional systemic treatments, including increased CellCept dose versus addition of medications like rituximab.  The patient is currently pleased with his symptomatic control and is not interested in cutaneous clearance.  Again, we opt to continue his current treatment.  He has indications for refills today.   - We will make small adjustments of his topical regimen as outlined below.  We will plan to see the patient back in approximately 3 months.  He is due for safety labs today.  We will repeat CBC and CMP at this time.  (These were reviewed and within normal limits today with the exception of mildly low platelets.  Safe to continue on the CellCept.)     - Continue mycophenolate mofetil 1000 mg b.i.d.  Risks and benefits of this were reviewed briefly and discussed.  A potential dose increase was carried out.   - Continue clobetasol ointment 0.05% twice daily as needed to the worst, most symptomatic, recalcitrant areas on the trunk and extremities.   - Continue clobetasol 0.05% solution to the scalp up to once nightly as needed.   - Stop clobetasol shampoo.   - Continue tacrolimus 0.01% ointment daily to the face.   - Discontinue topical steroids, including desonide lotion, to the affected face given symptomatic control with Protopic.   - Continue triamcinolone 0.1% cream twice daily as a primary treatment  for the trunk and extremities.   - Continue ketoconazole 2% shampoo 3 times weekly.    - Start ketoconazole 2% cream to be applied to the bilateral alar grooves and lateral nasolabial folds in addition to the Protopic as needed.   - Continue with 3 times weekly dilute bleach baths as tolerated.      The patient was seen and discussed with Dr. Serina Odell, who was staff for this encounter.      Follow up in 3 months with plan for labs at that time.          Staff Involved:   Dictated by Resident(Adal Marin MD)/Staff(as above)     This note was dictated and edited by MD Adal Paul MD  PGY3 Dermatology  [phone]   .I, Serina Odell MD, saw this patient with the resident and agree with the resident s findings and plan of care as documented in the resident s note.

## 2017-12-21 NOTE — PATIENT INSTRUCTIONS
For the nose: Apply ketoconazole cream twice daily with protopic as needed.     Continue on other previous treatments.     Labs today.

## 2017-12-28 DIAGNOSIS — L10.2 PEMPHIGUS FOLIACEOUS (H): ICD-10-CM

## 2017-12-28 DIAGNOSIS — L10.2 PEMPHIGUS FOLIACEUS (H): ICD-10-CM

## 2017-12-28 DIAGNOSIS — Z79.899 ENCOUNTER FOR LONG-TERM (CURRENT) USE OF HIGH-RISK MEDICATION: ICD-10-CM

## 2017-12-28 RX ORDER — TRIAMCINOLONE ACETONIDE 1 MG/G
OINTMENT TOPICAL 2 TIMES DAILY
Qty: 454 G | Refills: 11 | Status: SHIPPED | OUTPATIENT
Start: 2017-12-28 | End: 2021-08-19

## 2018-01-20 DIAGNOSIS — E78.5 HYPERLIPIDEMIA LDL GOAL <100: ICD-10-CM

## 2018-01-20 DIAGNOSIS — N40.1 BENIGN PROSTATIC HYPERPLASIA WITH LOWER URINARY TRACT SYMPTOMS: ICD-10-CM

## 2018-01-22 RX ORDER — LOVASTATIN 20 MG
TABLET ORAL
Qty: 90 TABLET | OUTPATIENT
Start: 2018-01-22

## 2018-01-22 NOTE — TELEPHONE ENCOUNTER
lovastatin    Last Written Prescription Date:  1/31/17  Last Fill Quantity: 90   # refills: 4  Last Office Visit : 2/20/17  Future Office visit: no future appt    Routing refill request to clinic nurse for review/approval because:  Failed protocol, lab overdue  LDL Cholesterol Calculated   Date Value Ref Range Status   07/28/2016 100 (H) <100 mg/dL Final     Comment:     Above desirable:  100-129 mg/dl   Borderline High:  130-159 mg/dL   High:             160-189 mg/dL   Very high:       >189 mg/dl     ]  Pt called and he wants to have his physical(FEB 5th) before renewing medication.  Delphine Blanton RN 2:10 PM on 1/22/2018.

## 2018-01-24 RX ORDER — FINASTERIDE 5 MG/1
TABLET, FILM COATED ORAL
Qty: 90 TABLET | Refills: 0 | Status: SHIPPED | OUTPATIENT
Start: 2018-01-24 | End: 2018-04-10

## 2018-02-04 ASSESSMENT — ACTIVITIES OF DAILY LIVING (ADL)
IN_THE_PAST_7_DAYS,_DID_YOU_NEED_HELP_FROM_OTHERS_TO_TAKE_CARE_OF_THINGS_SUCH_AS_LAUNDRY_AND_HOUSEKEEPING,_BANKING,_SHOPPING,_USING_THE_TELEPHONE,_FOOD_PREPARATION,_TRANSPORTATION,_OR_TAKING_YOUR_OWN_MEDICATIONS?: N
IN_THE_PAST_7_DAYS,_DID_YOU_NEED_HELP_FROM_OTHERS_TO_PERFORM_EVERYDAY_ACTIVITIES_SUCH_AS_EATING,_GETTING_DRESSED,_GROOMING,_BATHING,_WALKING,_OR_USING_THE_TOILET: N

## 2018-02-05 ENCOUNTER — OFFICE VISIT (OUTPATIENT)
Dept: INTERNAL MEDICINE | Facility: CLINIC | Age: 73
End: 2018-02-05
Payer: COMMERCIAL

## 2018-02-05 VITALS
SYSTOLIC BLOOD PRESSURE: 148 MMHG | WEIGHT: 177.9 LBS | OXYGEN SATURATION: 98 % | HEART RATE: 84 BPM | BODY MASS INDEX: 26.35 KG/M2 | HEIGHT: 69 IN | TEMPERATURE: 97.5 F | RESPIRATION RATE: 18 BRPM | DIASTOLIC BLOOD PRESSURE: 83 MMHG

## 2018-02-05 DIAGNOSIS — E78.5 HYPERLIPIDEMIA LDL GOAL <100: ICD-10-CM

## 2018-02-05 DIAGNOSIS — I10 BENIGN ESSENTIAL HYPERTENSION: ICD-10-CM

## 2018-02-05 DIAGNOSIS — Z00.00 HEALTH CARE MAINTENANCE: ICD-10-CM

## 2018-02-05 DIAGNOSIS — Z12.5 SCREENING FOR PROSTATE CANCER: ICD-10-CM

## 2018-02-05 DIAGNOSIS — Z00.00 HEALTH CARE MAINTENANCE: Primary | ICD-10-CM

## 2018-02-05 LAB
CHOLEST SERPL-MCNC: 190 MG/DL
GLUCOSE SERPL-MCNC: 97 MG/DL (ref 70–99)
HDLC SERPL-MCNC: 51 MG/DL
LDLC SERPL CALC-MCNC: 110 MG/DL
NONHDLC SERPL-MCNC: 139 MG/DL
PSA SERPL-ACNC: 2.42 UG/L (ref 0–4)
T4 FREE SERPL-MCNC: 0.8 NG/DL (ref 0.76–1.46)
TRIGL SERPL-MCNC: 143 MG/DL
TSH SERPL DL<=0.005 MIU/L-ACNC: 4.66 MU/L (ref 0.4–4)

## 2018-02-05 RX ORDER — HYDROCHLOROTHIAZIDE 25 MG/1
12.5 TABLET ORAL DAILY
Qty: 45 TABLET | Refills: 3 | Status: SHIPPED | OUTPATIENT
Start: 2018-02-05 | End: 2019-03-25

## 2018-02-05 RX ORDER — LOVASTATIN 20 MG
20 TABLET ORAL AT BEDTIME
Qty: 90 TABLET | Refills: 4 | Status: SHIPPED | OUTPATIENT
Start: 2018-02-05 | End: 2019-05-30

## 2018-02-05 ASSESSMENT — PAIN SCALES - GENERAL: PAINLEVEL: NO PAIN (0)

## 2018-02-05 NOTE — PROGRESS NOTES
"[facility] [place]   Loretta Villeda MD  2/5/2018     Chief Complaint: Patient is here for annual physical.     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    Sensation of \"fluid in his lungs\"-  The patient states that he feels that there is fluid in his lungs. This sensation is not persistent or overwhelming, but is causing mild hoarseness in his speech. This sensation is relieved by coughing or clearing throat. The patient denies productive cough or shortness of breath when climbing the stairs. He denies PND and orthopnea.    Pemphigus foliaceous-  Patient reports that his skin is \"great\" on his current regimen.       Lump in groin-  The patient can feel a non-painful lump under his skin in his left groin region. This was previously evaluated by Dermatology and was noted to be benign.     Lightheadedness-  The lightheadedness which the patient was experiencing is gone. He was evaluated for the lightheadedness by Dr. Lewis in Cardiology on 3/13/2017, at which time there was \"no clear physiologic explanation for his symptoms.\" His cardiovascular risk was noted to be low. The patient reports that Dr. Lewis recommended he drink six glasses of water per day. The patient followed the provider's instructions an soon after his lightheadedness resolved.     The patient otherwise denies falling, dizziness, nausea, vomiting, abdominal pain, blood in stool, epistaxis, or easy bruising on skin. He also denies rash, redness, or itching skin.    Routine health maintenance  His last TSH was performed on 4/20/2017 and was 4.70.  He states that he has an appointment in Urology coming up and would like to have the PSA test done today.   Colonoscopy is next due in 2024.   Reviewed laboratory studies from 2017. The patient had a comprehensive metabolic panel from 12/21/17 and results were unremarkable.  The patient is up " to date on tetanus, flu, and pneumococcal shots. He is not a candidate for shingles vaccination due to immunosuppression.    Other concerns discussed:  Patient is requesting that his cholesterol level be checked. He is fasting today.     Review of Systems:   Answers for HPI/ROS submitted by the patient on 2/2/2018   General Symptoms: No  Skin Symptoms: No  HENT Symptoms: No  EYE SYMPTOMS: No  HEART SYMPTOMS: No  LUNG SYMPTOMS: No  INTESTINAL SYMPTOMS: No  URINARY SYMPTOMS: No  REPRODUCTIVE SYMPTOMS: No  SKELETAL SYMPTOMS: No  BLOOD SYMPTOMS: No  NERVOUS SYSTEM SYMPTOMS: No  MENTAL HEALTH SYMPTOMS: No      Active Medications:   Current Outpatient Prescriptions:      finasteride (PROSCAR) 5 MG tablet, TAKE 1 TABLET DAILY, Disp: 90 tablet, Rfl: 0     triamcinolone (KENALOG) 0.1 % ointment, Apply topically 2 times daily, Disp: 454 g, Rfl: 11     ketoconazole (NIZORAL) 2 % cream, Apply twice daily to the nose as needed for white/scaling., Disp: 60 g, Rfl: 2     triamcinolone (KENALOG) 0.1 % cream, Apply topically to affected areas as instructed., Disp: 454 g, Rfl: 11     ketoconazole (NIZORAL) 2 % shampoo, Three times per week in the shower: Lather into scalp, leave in place for 3-5 minutes, then rinse., Disp: 240 mL, Rfl: 12     clobetasol (TEMOVATE) 0.05 % external solution, Apply  topically 2 times daily as needed to the scalp., Disp: 180 mL, Rfl: 11     CELLCEPT (BRAND) 500 MG TABLET, Take 2 tablet (1000 mg) by mouth 2 times daily., Disp: 372 tablet, Rfl: 2     lovastatin (MEVACOR) 20 MG tablet, Take 1 tablet (20 mg) by mouth At Bedtime, Disp: 90 tablet, Rfl: 4     desonide (DESOWEN) 0.05 % ointment, Apply topically to affected areas twice daily as instructed., Disp: 180 g, Rfl: 3     hydrocortisone butyrate (LOCOID) 0.1 % SOLN, Apply sparingly to affected area(s) of beard twice daily, Disp: 180 mL, Rfl: 3     clobetasol (TEMOVATE) 0.05 % ointment, APPLY TO AFFECTED AREA(S) TWO TIMES A DAY, Disp: 180 g, Rfl: 2      "clobetasol propionate 0.05 % SHAM, Apply sparingly to dry scalp 3 x weekly as needed.  Leave in place for 15 m then add water, lather and rinse, Disp: 1 Bottle, Rfl: 5     tacrolimus (PROTOPIC) 0.1 % ointment, Apply topically to affected areas as instructed., Disp: 100 g, Rfl: 11     Cholecalciferol (VITAMIN D PO), Take 1 tablet by mouth daily 1000 mg, Disp: , Rfl:      ARTIFICIAL TEAR SOLUTION OP, Apply  to eye., Disp: , Rfl:      aspirin 81 MG tablet, Take 1 tablet by mouth daily., Disp: , Rfl:      Multiple Vitamin (MULTIVITAMIN) per tablet, Take 1 tablet by mouth daily. 1 tab daily, Disp: , Rfl:       Allergies:   Penicillins      Past Medical History:  BPH (benign prostatic hyperplasia)   Dizziness   Hyperlipidemia LDL goal <130   Pemphigus, pemphigus foliaceus  Retinal detachment, left eye- corrected with laser  Vitreous detachment   Encounter for long-term (current) use of high-risk medication  Dermatitis, seborrheic  Lightheadedness  Age-related nuclear cataract of both eyes     Past Surgical History:  Retinopexy laser prophylaxis break(s) OS, left eye    Family History:   Heart disease (CHF d 80), DM2 ( age 80), hypertension: Mother  Heart disease (CHF d 78): Father  Heart disease *CHF d 50s): Paternal Uncle  No family history of skin cancer, glaucoma, macular degeneration, or melanoma.      Social History:   Non smoker  Alcohol not on file  . His wife was diagnosed with sarcoma a few years ago and is currently MADISON status.      Physical Exam:   Patient Vitals for the past 24 hrs:   BP Temp Temp src Pulse Resp SpO2 Height Weight   18 1303 148/83 - - - - - - -   18 1228 136/82 97.5  F (36.4  C) Oral 84 18 98 % 1.748 m (5' 8.82\") 80.7 kg (177 lb 14.4 oz)        Constitutional: Healthy, alert, no distress and cooperative  Eyes: No conjunctival injection  HENT: Bilateral TM and external auditory canal normal without fluid or infection, posterior pharynx normal without erythema, " exudate, or ulcer.  Neck: thyroid normal. There is no submandibular or glandular enlargement  Cardiovascular: RRR, no murmurs, clicks, rubs, or gallops. JVP 6 cm. No pretibial edema bilaterally. Radial and brachial pulses are normal. PT and DP pulses are normal bilaterally.  Respiratory: Clear to auscultation bilaterally. Upper and lower lobes are clear. Lung apices are clear.  No sternal or chest wall tenderness.  Musculoskeletal: No paraspinal tenderness on right and left sides. Bilateral  strength is normal.  Gastrointestinal:  Soft, nontender, bowel sounds are normal, liver span is 8 cm, no hepatosplenomegaly.  Hematologic/Lymphatic/Immunologic: Normal cervical, anterior, posterior lymph nodes.   Skin: Subcutaneous, mobile, round cyst, 2 mm, located in the left inguinal region. Shoddy inguinal lymphadenopathy. No palpable hernia or bulge in the inguinal region.    Assessment and Plan:  Benign essential hypertension  Repeat blood pressure today is 148/83 mmHg. He has three recorded elevated blood pressure readings over the last two years. Will start HCTZ 12.5 mg daily. Patient denies any history of gout.   - hydrochlorothiazide (HYDRODIURIL) 25 MG tablet; Take 0.5 tablets (12.5 mg) by mouth daily  Dispense: 45 tablet; Refill: 3     Screening for prostate cancer  - PSA screen  The patient reportedly has an upcoming appointment with Urology, so will order PSA.     Health care maintenance  - Lipid Profile FASTING  - Glucose  - TSH with free T4 reflex  The patients comprehensive metabolic panel from 12/21/2017 was unremarkable. Will hold off on refilling Lovastatin based on pending Lipid Panel.     Follow-up:  1 Year and p.r.n.        Scribe Disclosure:   We, Maria Luisa Condon and Shadi Bernstein, are serving as scribes to document services personally performed by Loretta Villeda MD at this visit, based upon the provider's statements to me. All documentation has been reviewed by the aforementioned provider prior  to being entered into the official medical record.       Portions of this medical record were completed by a scribe. UPON MY REVIEW AND AUTHENTICATION BY ELECTRONIC SIGNATURE, this confirms (a) I performed the applicable clinical services, and (b) the record is accurate.     Loretta Villeda MD

## 2018-02-05 NOTE — MR AVS SNAPSHOT
After Visit Summary   2/5/2018    Luan Mitchell    MRN: [id number]           Patient Information     Date Of Birth          1945        Visit Information        Provider Department      2/5/2018 12:40 PM Loretta Villeda MD [facility]        Today's Diagnoses     Health care maintenance    -  1    Screening for prostate cancer          Care Instructions    [facility]: [phone]     Primary Care Center Medication Refill Request Information:  * Please contact your pharmacy regarding ANY request for medication refills.  ** [facility] Prescription Fax = [phone]  * Please allow 3 business days for routine medication refills.  * Please allow 5 business days for controlled substance medication refills.     Primary Care Center Test Result notification information:  *You will be notified with in 7-10 days of your appointment day regarding the results of your test.  If you are on MyChart you will be notified as soon as the provider has reviewed the results and signed off on them.          Follow-ups after your visit        Your next 10 appointments already scheduled     Feb 05, 2018  1:15 PM CST   LAB with  LAB   [facility] ([facility])    [address] [place] [id number] [phone]           Please do not eat 10-12 hours before your appointment if you are coming in fasting for labs on lipids, cholesterol, or glucose (sugar). This does not apply to pregnant women. Water, hot tea and black coffee (with nothing added) are okay. Do not drink other fluids, diet soda or chew gum.            Mar 22, 2018  9:30 AM CDT   (Arrive by 9:15 AM)   Return Visit with Adal Marin MD   [facility] ([facility])    [address] [place] [id number] [phone]              Future tests that were ordered for you today     Open Future Orders        Priority Expected  "Expires Ordered    Lipid Profile FASTING Routine 2/5/2018 2/5/2019 2/5/2018    Glucose Routine 2/5/2018 2/5/2019 2/5/2018    PSA screen Routine 2/5/2018 2/5/2019 2/5/2018    TSH with free T4 reflex Routine 2/5/2018 2/19/2018 2/5/2018            Who to contact     Please call your clinic at [phone] to:    Ask questions about your health    Make or cancel appointments    Discuss your medicines    Learn about your test results    Speak to your doctor   If you have compliments or concerns about an experience at your clinic, or if you wish to file a complaint, please contact [facility] Patient Relations at [phone] or email us at [email or web address]         Additional Information About Your Visit        [employer] Information     [employer] gives you secure access to your electronic health record. If you see a primary care provider, you can also send messages to your care team and make appointments. If you have questions, please call your primary care clinic.  If you do not have a primary care provider, please call [phone] and they will assist you.      [employer] is an electronic gateway that provides easy, online access to your medical records. With [employer], you can request a clinic appointment, read your test results, renew a prescription or communicate with your care team.     To access your existing account, please contact your [facility] Clinic or call [phone] for assistance.        Care EveryWhere ID     This is your Care EveryWhere ID. This could be used by other organizations to access your [facility] medical records  [id number]        Your Vitals Were     Pulse Temperature Respirations Height Pulse Oximetry BMI (Body Mass Index)    84 97.5  F (36.4  C) (Oral) 18 1.748 m (5' 8.82\") 98% 26.41 kg/m2       Blood Pressure from Last 3 Encounters:   02/05/18 148/83   03/13/17 132/74   02/20/17 125/75    Weight from Last 3 Encounters:   02/05/18 " 80.7 kg (177 lb 14.4 oz)   03/13/17 80.1 kg (176 lb 8 oz)   02/20/17 80 kg (176 lb 4.8 oz)               Primary Care Provider Office Phone # Fax #    Loretta Villeda [phone] [address] [address]        Equal Access to Services     [facility]ura[facility] : Hadii aad ku hadasho Soomaali, waaxda luqadaha, qaybta kaalmada adeegyada, waxay idiin hayaan adeeg nikitash la'aan ah. So [facility] [phone].    ATENCIÓN: Si habla espdb, tiene a toney disposición servicios gratuitos de asistencia lingüística. Pancho al [phone].    We comply with applicable federal civil rights laws and [place] laws. We do not discriminate on the basis of race, color, national origin, age, disability, sex, sexual orientation, or gender identity.            Thank you!     Thank you for choosing [facility]  for your care. Our goal is always to provide you with excellent care. Hearing back from our patients is one way we can continue to improve our services. Please take a few minutes to complete the written survey that you may receive in the mail after your visit with us. Thank you!             Your Updated Medication List - Protect others around you: Learn how to safely use, store and throw away your medicines at www.disposemymeds.org.          This list is accurate as of 2/5/18  1:03 PM.  Always use your most recent med list.                   Brand Name Dispense Instructions for use Diagnosis    ARTIFICIAL TEAR SOLUTION OP      Apply  to eye.        aspirin 81 MG tablet      Take 1 tablet by mouth daily.        * clobetasol propionate 0.05 % Sham     1 Bottle    Apply sparingly to dry scalp 3 x weekly as needed.  Leave in place for 15 m then add water, lather and rinse    Pemphigus foliaceous, Encounter for long-term (current) use of high-risk medication       * clobetasol 0.05 % ointment    TEMOVATE    180 g    APPLY TO AFFECTED AREA(S) TWO TIMES A DAY    Pemphigus foliaceous,  Pemphigus foliaceus, Encounter for long-term (current) use of high-risk medication       * clobetasol 0.05 % external solution    TEMOVATE    180 mL    Apply  topically 2 times daily as needed to the scalp.    Pemphigus foliaceous, Encounter for long-term (current) use of high-risk medication       desonide 0.05 % ointment    DESOWEN    180 g    Apply topically to affected areas twice daily as instructed.    Pemphigus foliaceus, Pemphigus foliaceous, Encounter for long-term (current) use of high-risk medication       finasteride 5 MG tablet    PROSCAR    90 tablet    TAKE 1 TABLET DAILY    Benign prostatic hyperplasia with lower urinary tract symptoms       hydrocortisone butyrate 0.1 % Soln solution    LOCOID    180 mL    Apply sparingly to affected area(s) of beard twice daily    Pemphigus foliaceous, Pemphigus foliaceus, Encounter for long-term (current) use of high-risk medication       * ketoconazole 2 % cream    NIZORAL    60 g    Apply twice daily to the nose as needed for white/scaling.    Dermatitis, seborrheic       * ketoconazole 2 % shampoo    NIZORAL    240 mL    Three times per week in the shower: Lather into scalp, leave in place for 3-5 minutes, then rinse.    Pemphigus foliaceous       lovastatin 20 MG tablet    MEVACOR    90 tablet    Take 1 tablet (20 mg) by mouth At Bedtime    Hyperlipidemia LDL goal <100       multivitamin per tablet      Take 1 tablet by mouth daily. 1 tab daily        mycophenolate 500 MG tablet     372 tablet    Take 2 tablet (1000 mg) by mouth 2 times daily.    Pemphigus foliaceous, Encounter for long-term (current) use of high-risk medication, Pemphigus foliaceus       tacrolimus 0.1 % ointment    PROTOPIC    100 g    Apply topically to affected areas as instructed.    Pemphigus foliaceous, Seborrhoeic dermatitis       * triamcinolone 0.1 % cream    KENALOG    454 g    Apply topically to affected areas as instructed.    Pemphigus foliaceus, Pemphigus foliaceous, Encounter  for long-term (current) use of high-risk medication       * triamcinolone 0.1 % ointment    KENALOG    454 g    Apply topically 2 times daily    Pemphigus foliaceus       VITAMIN D PO      Take 1 tablet by mouth daily 1000 mg        * Notice:  This list has 7 medication(s) that are the same as other medications prescribed for you. Read the directions carefully, and ask your doctor or other care provider to review them with you.

## 2018-02-05 NOTE — PATIENT INSTRUCTIONS
[facility]: [phone]     [facility] Medication Refill Request Information:  * Please contact your pharmacy regarding ANY request for medication refills.  ** [facility] Prescription Fax = [phone]  * Please allow 3 business days for routine medication refills.  * Please allow 5 business days for controlled substance medication refills.     [facility] Test Result notification information:  *You will be notified with in 7-10 days of your appointment day regarding the results of your test.  If you are on MyChart you will be notified as soon as the provider has reviewed the results and signed off on them.

## 2018-02-05 NOTE — NURSING NOTE
Chief Complaint   Patient presents with     Physical     Patient is here for annual physical.      Ramona Castaneda LPN at 12:28 PM on 2/5/2018.

## 2018-03-22 ENCOUNTER — OFFICE VISIT (OUTPATIENT)
Dept: DERMATOLOGY | Facility: CLINIC | Age: 73
End: 2018-03-22
Payer: COMMERCIAL

## 2018-03-22 DIAGNOSIS — D48.5 NEOPLASM OF UNCERTAIN BEHAVIOR OF SKIN: ICD-10-CM

## 2018-03-22 DIAGNOSIS — L10.2 PEMPHIGUS FOLIACEOUS (H): ICD-10-CM

## 2018-03-22 DIAGNOSIS — Z79.899 ENCOUNTER FOR LONG-TERM (CURRENT) USE OF HIGH-RISK MEDICATION: ICD-10-CM

## 2018-03-22 DIAGNOSIS — L10.2 PEMPHIGUS FOLIACEOUS (H): Primary | ICD-10-CM

## 2018-03-22 LAB
ALBUMIN SERPL-MCNC: 4 G/DL (ref 3.4–5)
ALP SERPL-CCNC: 37 U/L (ref 40–150)
ALT SERPL W P-5'-P-CCNC: 29 U/L (ref 0–70)
ANION GAP SERPL CALCULATED.3IONS-SCNC: 8 MMOL/L (ref 3–14)
AST SERPL W P-5'-P-CCNC: 26 U/L (ref 0–45)
BASOPHILS # BLD AUTO: 0 10E9/L (ref 0–0.2)
BASOPHILS NFR BLD AUTO: 0.2 %
BILIRUB SERPL-MCNC: 0.6 MG/DL (ref 0.2–1.3)
BUN SERPL-MCNC: 19 MG/DL (ref 7–30)
CALCIUM SERPL-MCNC: 9.3 MG/DL (ref 8.5–10.1)
CHLORIDE SERPL-SCNC: 107 MMOL/L (ref 94–109)
CO2 SERPL-SCNC: 25 MMOL/L (ref 20–32)
CREAT SERPL-MCNC: 0.93 MG/DL (ref 0.66–1.25)
DIFFERENTIAL METHOD BLD: NORMAL
EOSINOPHIL # BLD AUTO: 0.1 10E9/L (ref 0–0.7)
EOSINOPHIL NFR BLD AUTO: 2 %
ERYTHROCYTE [DISTWIDTH] IN BLOOD BY AUTOMATED COUNT: 12.7 % (ref 10–15)
GFR SERPL CREATININE-BSD FRML MDRD: 79 ML/MIN/1.7M2
GLUCOSE SERPL-MCNC: 91 MG/DL (ref 70–99)
HCT VFR BLD AUTO: 42 % (ref 40–53)
HGB BLD-MCNC: 14.9 G/DL (ref 13.3–17.7)
IMM GRANULOCYTES # BLD: 0 10E9/L (ref 0–0.4)
IMM GRANULOCYTES NFR BLD: 0.3 %
LYMPHOCYTES # BLD AUTO: 1.6 10E9/L (ref 0.8–5.3)
LYMPHOCYTES NFR BLD AUTO: 23.6 %
MCH RBC QN AUTO: 31.4 PG (ref 26.5–33)
MCHC RBC AUTO-ENTMCNC: 35.5 G/DL (ref 31.5–36.5)
MCV RBC AUTO: 89 FL (ref 78–100)
MONOCYTES # BLD AUTO: 0.6 10E9/L (ref 0–1.3)
MONOCYTES NFR BLD AUTO: 9.1 %
NEUTROPHILS # BLD AUTO: 4.3 10E9/L (ref 1.6–8.3)
NEUTROPHILS NFR BLD AUTO: 64.8 %
NRBC # BLD AUTO: 0 10*3/UL
NRBC BLD AUTO-RTO: 0 /100
PLATELET # BLD AUTO: 160 10E9/L (ref 150–450)
POTASSIUM SERPL-SCNC: 4.1 MMOL/L (ref 3.4–5.3)
PROT SERPL-MCNC: 7.2 G/DL (ref 6.8–8.8)
RBC # BLD AUTO: 4.74 10E12/L (ref 4.4–5.9)
SODIUM SERPL-SCNC: 139 MMOL/L (ref 133–144)
WBC # BLD AUTO: 6.6 10E9/L (ref 4–11)

## 2018-03-22 RX ORDER — KETOCONAZOLE 20 MG/ML
SHAMPOO TOPICAL
Qty: 360 ML | Refills: 12 | Status: SHIPPED | OUTPATIENT
Start: 2018-03-22 | End: 2022-12-22

## 2018-03-22 RX ORDER — CLOBETASOL PROPIONATE 0.5 MG/ML
SOLUTION TOPICAL
Qty: 200 ML | Refills: 11 | Status: SHIPPED | OUTPATIENT
Start: 2018-03-22 | End: 2022-09-22

## 2018-03-22 ASSESSMENT — PAIN SCALES - GENERAL
PAINLEVEL: NO PAIN (0)
PAINLEVEL: NO PAIN (0)

## 2018-03-22 NOTE — LETTER
3/22/2018       RE: Luan Mitchell  [address] [facility] [address]     Dear Colleague,    Thank you for referring your patient, Luan Mitchell, to the [facility] DERMATOLOGY at [facility]. Please see a copy of my visit note below.    [facility] Dermatology Note      Dermatology Problem List:   1.  Pemphigus foliaceus.     - Goals of treatment per Mr. Mitcehll:  Prevent itching and secondary infections.   - He is not interested in complete clearance at this time.     - Titers 2015.   - Cell surface IgG 1-5, 120, monkey esophagus; 1280 intact human skin.   - DSG-1 Ig units (positive greater than 20, negative less than 14).   - DSG-3 Ig units (positive greater than 20, negative less than 9).   - Current treatment 1000 mg b.i.d. CellCept (tapering to 1500mg daily 3/22/2018), topical steroids as below.   2.  History of seborrheic keratoses.   3.  Seborrheic dermatitis, on ketaconazole shampoo and cream.      Encounter Date:  3/22/2018      CC: F/u pem phigus     History of Present Illness:   Mr. Luan Mitchell is a pleasant, 72-year-old man who is well-known to Dermatology who presents today in routine followup for pemphigus foliaceus.  He was last seen in followup 2017 .  Since that time, he notes his skin has been under great control. He denies any ongoing cutaneous symptomology, particularly the itch that was quite debilitating in the past.  He is continuing with use of his topical steroid regimen without any notable side effects.  He is also doing well on CellCept 1000 mg b.i.d., which he is tolerating well without any side effects noted. Would like to taper. Still getting lesions but no open ulcerations.  He denies any infectious symptoms.  No fevers, chills, sweats, GI side effects, cough, shortness of breath, muscle or joint pains or other issues lately.  He knows he is due for labs today.  His last safety labs were reviewed from  3 months ago and were stable and within normal limits. Would like us to evaluate spot in left groin. No other major concerns at this point. Recently friend dx with melanoma.        Past Medical History:   Diagnosis Date     BPH (benign prostatic hyperplasia)      Essential hypertension 02/2018     Hyperlipidemia LDL goal <130      Pemphigus     pemphigus foliaceus     Retinal detachment 2009    left eye- corrected with laser     Vitreous detachment 2009    LE       Social History:     The patient is a retired .  His wife is currently undergoing surveillance for soft tissue sarcoma of her right buttock, and they are following up in [place]; doing well with this.      Family History:   Not addressed.        Current Outpatient Prescriptions   Medication     ketoconazole (NIZORAL) 2 % shampoo     clobetasol (TEMOVATE) 0.05 % external solution     hydrochlorothiazide (HYDRODIURIL) 25 MG tablet     lovastatin (MEVACOR) 20 MG tablet     finasteride (PROSCAR) 5 MG tablet     triamcinolone (KENALOG) 0.1 % ointment     ketoconazole (NIZORAL) 2 % cream     triamcinolone (KENALOG) 0.1 % cream     CELLCEPT (BRAND) 500 MG TABLET     desonide (DESOWEN) 0.05 % ointment     hydrocortisone butyrate (LOCOID) 0.1 % SOLN     clobetasol (TEMOVATE) 0.05 % ointment     clobetasol propionate 0.05 % SHAM     tacrolimus (PROTOPIC) 0.1 % ointment     Cholecalciferol (VITAMIN D PO)     ARTIFICIAL TEAR SOLUTION OP     aspirin 81 MG tablet     Multiple Vitamin (MULTIVITAMIN) per tablet     No current facility-administered medications for this visit.       Allergies   Allergen Reactions     Penicillins Swelling         Review of Systems:   A 10 point review of systems was negative beyond that stated above in the HPI.       Physical exam:   There were no vitals taken for this visit.   GENERAL:  A well-appearing  male appearing his stated age, affect normal, cooperative with the exam, alert and oriented x3.      SKIN:  A focused skin examination is performed today, including the scalp, face, neck, chest, abdomen, back, upper extremities and distal lower extremities.  The patient has scattered, crusted, mildly scaly, pink papules and thin plaques scattered over the central trunk, chest, abdomen and back.  There are no lesions with overlying bullae or significant ulcerations or erosions.  There are less areas involved today and many areas of post inflammatory hyper- and hypopigmentation at previous sites of inflammation.  He has a similar scaly, pink plaque beneath the right ear.   The patient has a 3 mm, subcutaneous, superficial, cystic papule in the left groin fold without any lymphadenopathy appreciated today.  Overall, things are improved from the previous examination.  Scattered brown macules over sun exposed sites. One measures 3 mm on the right infrauricular neck with atypical rhomboidal network. Triangular shaped. Scattered, waxy, [id number] papules over the areas evaluated.         Impression/Plan:   1.  Pemphigus foliaceus.  Moderate cutaneous control with excellent symptomatic resolution on previous topical regimen and CellCept 1000 mg b.i.d.  We again discussed additional systemic treatments, including increased CellCept dose versus addition of medications like rituximab.  The patient is currently pleased with his symptomatic control and is not interested in cutaneous clearance. He, infact, is interested in tapering the Cellcept. Will consider decreasing to 1500mg daily with plans to return to 1000mg BID with flares. Will continue topical treatment in the meantime.   -He is due for safety labs today.  We will repeat CBC and CMP at this time.   - Decrease mycophenolate mofetil to 500mg qam and 100mg qhs.Restart 1000mg BID with flare.  Risks and benefits of this were reviewed briefly and discussed.   - Continue clobetasol ointment 0.05% twice daily as needed to the worst, most symptomatic, recalcitrant  areas on the trunk and extremities.   - Continue clobetasol 0.05% solution to the scalp up to once nightly as needed. Refilled.   - Continue tacrolimus 0.01% ointment daily to the face.   - Continue triamcinolone 0.1% cream twice daily as a primary treatment for the trunk and extremities.   - Continue ketoconazole 2% shampoo 3 times weekly.  Refilled.   - Start ketoconazole 2% cream to be applied to the bilateral alar grooves and lateral nasolabial folds in addition to the Protopic as needed.   - Continue with 3 times weekly dilute bleach baths as tolerated.     2. Neoplasm of uncertain behavior, Right infraauricular neck. Ddx includes LM vs. Nevus vs. DN vs. Lentigo vs others. Bx indicated.   -Shave biopsy:  Site(s): Right infrauricular neck    After discussion of benefits and risks including but not limited to bleeding/bruising, pain/swelling, infection, scar, incomplete removal, nerve damage/numbness, recurrence, and non-diagnostic biopsy, written consent, verbal consent and photographs were obtained. Time-out was performed. The area was cleaned with isopropyl alcohol. 1% lidocaine with epinephrine was infiltrated to obtain adequate anesthesia of the lesion.  Shave biopsy was performed. Hemostasis was achieved with aluminium chloride. Vaseline and a sterile dressing were applied. The patient tolerated the procedure and no complications were noted. The patient was provided with verbal and written post care instructions.        The patient was seen and discussed with Dr. Serina Odell, who was staff for this encounter.     Follow up in 3 months with plan for labs at that time.       Adal Marin MD  PGY3 Dermatology  [phone]      I was present for key portions of the procedure. TYREE  .I, Serina Odell MD, saw this patient with the resident and agree with the resident s findings and plan of care as documented in the resident s note.

## 2018-03-22 NOTE — MR AVS SNAPSHOT
After Visit Summary   3/22/2018    Luan Mitchell    MRN: [id number]           Patient Information     Date Of Birth          1945        Visit Information        Provider Department      3/22/2018 9:30 AM Adal Marin MD [facility] Dermatology        Today's Diagnoses     Pemphigus foliaceous    -  1    Neoplasm of uncertain behavior of skin        Encounter for long-term (current) use of high-risk medication          Care Instructions    Start decreased dose of MMF (500 am/ 1000mg PM). IF flaring, restart previous dosage.   Continue previous topicals. Refills today.   Labs today.       Wound Care After a Biopsy    What is a skin biopsy?  A skin biopsy allows the doctor to examine a very small piece of tissue under the microscope to determine the diagnosis and the best treatment for the skin condition. A local anesthetic (numbing medicine)  is injected with a very small needle into the skin area to be tested. A small piece of skin is taken from the area. Sometimes a suture (stitch) is used.     What are the risks of a skin biopsy?  I will experience scar, bleeding, swelling, pain, crusting and redness. I may experience incomplete removal or recurrence. Risks of this procedure are excessive bleeding, bruising, infection, nerve damage, numbness, thick (hypertrophic or keloidal) scar and non-diagnostic biopsy.    How should I care for my wound for the first 24 hours?    Keep the wound dry and covered for 24 hours    If it bleeds, hold direct pressure on the area for 15 minutes. If bleeding does not stop then go to the emergency room    Avoid strenuous exercise the first 1-2 days or as your doctor instructs you    How should I care for the wound after 24 hours?    After 24 hours, remove the bandage    You may bathe or shower as normal    If you had a scalp biopsy, you can shampoo as usual and can use shower water to clean the biopsy site daily    Clean the wound twice a day with gentle soap  and water    Do not scrub, be gentle    Apply white petroleum/Vaseline after cleaning the wound with a cotton swab or a clean finger, and keep the site covered with a Bandaid /bandage. Bandages are not necessary with a scalp biopsy    If you are unable to cover the site with a Bandaid /bandage, re-apply ointment 2-3 times a day to keep the site moist. Moisture will help with healing    Avoid strenuous activity for first 1-2 days    Avoid lakes, rivers, pools, and oceans until the stitches are removed or the site is healed    How do I clean my wound?    Wash hands thoroughly with soap or use hand  before all wound care    Clean the wound with gentle soap and water    Apply white petroleum/Vaseline  to wound after it is clean    Replace the Bandaid /bandage to keep the wound covered for the first few days or as instructed by your doctor    If you had a scalp biopsy, warm shower water to the area on a daily basis should suffice    What should I use to clean my wound?     Cotton-tipped applicators (Qtips )    White petroleum jelly (Vaseline ). Use a clean new container and use Q-tips to apply.    Bandaids   as needed    Gentle soap     How should I care for my wound long term?    Do not get your wound dirty    Keep up with wound care for one week or until the area is healed.    A small scab will form and fall off by itself when the area is completely healed. The area will be red and will become pink in color as it heals. Sun protection is very important for how your scar will turn out. Sunscreen with an SPF 30 or greater is recommended once the area is healed.    If you have stitches, stitches need to be removed in  days. You may return to our clinic for this or you may have it done locally at your doctor s office.    You should have some soreness but it should be mild and slowly go away over several days. Talk to your doctor about using tylenol for pain,    When should I call my doctor?  If you have increased:      Pain or swelling    Pus or drainage (clear or slightly yellow drainage is ok)    Temperature over 100F    Spreading redness or warmth around wound    When will I hear about my results?  The biopsy results can take 2-3 weeks to come back. The clinic will call you with the results, send you a mychart message, or have you schedule a follow-up clinic or phone time to discuss the results. Contact our clinics if you do not hear from us in 3 weeks.     Who should I call with questions?    [facility]: [phone]     [facility]: [phone]    For urgent needs outside of business hours call the [facility] at [phone] and ask for the dermatology resident on call              Follow-ups after your visit        Follow-up notes from your care team     Return in about 3 months (around 6/22/2018).      Your next 10 appointments already scheduled     Mar 22, 2018 10:00 AM CDT   LAB with  LAB   [facility] Lab ([facility])    [address] [place] [address]  [address] [facility] [address] [phone]           Please do not eat 10-12 hours before your appointment if you are coming in fasting for labs on lipids, cholesterol, or glucose (sugar). This does not apply to pregnant women. Water, hot tea and black coffee (with nothing added) are okay. Do not drink other fluids, diet soda or chew gum.            Apr 10, 2018  5:30 PM CDT   (Arrive by 5:15 PM)   Return Visit with MONA Case   [facility] for Prostate and Urologic Cancers ([facility])    [address] [place] [address]  [address]th [facility] [id number] [phone]            Jun 14, 2018  9:45 AM CDT   (Arrive by 9:30 AM)   Return Visit with Adal Marin MD   [facility] Dermatology ([facility])    [address] [place] [address]  [address]rd [facility] [id number] [phone]              Future tests  that were ordered for you today     Open Standing Orders        Priority Remaining Interval Expires Ordered    CBC with platelets differential Routine 6/6 3 months 3/22/2019 3/22/2018    Comprehensive metabolic panel Routine 6/6 3 months 3/22/2019 3/22/2018            Who to contact     Please call your clinic at [phone] to:    Ask questions about your health    Make or cancel appointments    Discuss your medicines    Learn about your test results    Speak to your doctor            Additional Information About Your Visit        [email or web address] Information     [employer] gives you secure access to your electronic health record. If you see a primary care provider, you can also send messages to your care team and make appointments. If you have questions, please call your primary care clinic.  If you do not have a primary care provider, please call [phone] and they will assist you.      [employer] is an electronic gateway that provides easy, online access to your medical records. With [employer], you can request a clinic appointment, read your test results, renew a prescription or communicate with your care team.     To access your existing account, please contact your [facility] or call [phone] for assistance.        Care EveryWhere ID     This is your Care EveryWhere ID. This could be used by other organizations to access your [facility] medical records  [id number]         Blood Pressure from Last 3 Encounters:   02/05/18 148/83   03/13/17 132/74   02/20/17 125/75    Weight from Last 3 Encounters:   02/05/18 80.7 kg (177 lb 14.4 oz)   03/13/17 80.1 kg (176 lb 8 oz)   02/20/17 80 kg (176 lb 4.8 oz)              We Performed the Following     BIOPSY SKIN/SUBQ/MUC MEM, SINGLE LESION     Dermatological path order and indications          Where to get your medicines      These medications were sent to [facility] - [facility], [place] - [address]  [address],  [facility] [address]     Phone:  [phone]     clobetasol 0.05 % external solution    ketoconazole 2 % shampoo          Primary Care Provider Office Phone # Fax #    Loretta Villeda [phone]       [address]  [facility] [address]        Equal Access to Services     FABIO [facility] : Hadii aad ku hadasho Soomaali, waaxda luqadaha, qaybta kaalmada adeegyada, waxay idiin hayaan adepatrizia quiros ladel . So [facility] [phone].    ATENCIÓN: Si habla español, tiene a toney disposición servicios gratuitos de asistencia lingüística. [facility] [phone].    We comply with applicable federal civil rights laws and [place] laws. We do not discriminate on the basis of race, color, national origin, age, disability, sex, sexual orientation, or gender identity.            Thank you!     Thank you for choosing [facility] DERMATOLOGY  for your care. Our goal is always to provide you with excellent care. Hearing back from our patients is one way we can continue to improve our services. Please take a few minutes to complete the written survey that you may receive in the mail after your visit with us. Thank you!             Your Updated Medication List - Protect others around you: Learn how to safely use, store and throw away your medicines at www.disposemymeds.org.          This list is accurate as of 3/22/18  9:47 AM.  Always use your most recent med list.                   Brand Name Dispense Instructions for use Diagnosis    ARTIFICIAL TEAR SOLUTION OP      Apply  to eye.        aspirin 81 MG tablet      Take 1 tablet by mouth daily.        * clobetasol propionate 0.05 % Sham     1 Bottle    Apply sparingly to dry scalp 3 x weekly as needed.  Leave in place for 15 m then add water, lather and rinse    Pemphigus foliaceous, Encounter for long-term (current) use of high-risk medication       * clobetasol 0.05 % ointment    TEMOVATE    180 g    APPLY TO AFFECTED AREA(S) TWO TIMES A DAY    Pemphigus  foliaceous, Pemphigus foliaceus, Encounter for long-term (current) use of high-risk medication       * clobetasol 0.05 % external solution    TEMOVATE    200 mL    Apply  topically 2 times daily as needed to the scalp.    Pemphigus foliaceous, Encounter for long-term (current) use of high-risk medication       desonide 0.05 % ointment    DESOWEN    180 g    Apply topically to affected areas twice daily as instructed.    Pemphigus foliaceus, Pemphigus foliaceous, Encounter for long-term (current) use of high-risk medication       finasteride 5 MG tablet    PROSCAR    90 tablet    TAKE 1 TABLET DAILY    Benign prostatic hyperplasia with lower urinary tract symptoms       hydrochlorothiazide 25 MG tablet    HYDRODIURIL    45 tablet    Take 0.5 tablets (12.5 mg) by mouth daily    Benign essential hypertension       hydrocortisone butyrate 0.1 % Soln solution    LOCOID    180 mL    Apply sparingly to affected area(s) of beard twice daily    Pemphigus foliaceous, Pemphigus foliaceus, Encounter for long-term (current) use of high-risk medication       * ketoconazole 2 % cream    NIZORAL    60 g    Apply twice daily to the nose as needed for white/scaling.    Dermatitis, seborrheic       * ketoconazole 2 % shampoo    NIZORAL    360 mL    Three times per week in the shower: Lather into scalp, leave in place for 3-5 minutes, then rinse.    Pemphigus foliaceous       lovastatin 20 MG tablet    MEVACOR    90 tablet    Take 1 tablet (20 mg) by mouth At Bedtime    Hyperlipidemia LDL goal <100       multivitamin per tablet      Take 1 tablet by mouth daily. 1 tab daily        mycophenolate 500 MG tablet     372 tablet    Take 2 tablet (1000 mg) by mouth 2 times daily.    Pemphigus foliaceous, Encounter for long-term (current) use of high-risk medication, Pemphigus foliaceus       tacrolimus 0.1 % ointment    PROTOPIC    100 g    Apply topically to affected areas as instructed.    Pemphigus foliaceous, Seborrhoeic dermatitis        * triamcinolone 0.1 % cream    KENALOG    454 g    Apply topically to affected areas as instructed.    Pemphigus foliaceus, Pemphigus foliaceous, Encounter for long-term (current) use of high-risk medication       * triamcinolone 0.1 % ointment    KENALOG    454 g    Apply topically 2 times daily    Pemphigus foliaceus       VITAMIN D PO      Take 1 tablet by mouth daily 1000 mg        * Notice:  This list has 7 medication(s) that are the same as other medications prescribed for you. Read the directions carefully, and ask your doctor or other care provider to review them with you.

## 2018-03-22 NOTE — NURSING NOTE
"Dermatology Rooming Note    Luan Mitchell's goals for this visit include:   Chief Complaint   Patient presents with     Derm Problem       Pemphigus foliaceus , Luan states \" I am doing great!\"     Osiris Estevez LPN  "

## 2018-03-22 NOTE — PATIENT INSTRUCTIONS
Start decreased dose of MMF (500 am/ 1000mg PM). IF flaring, restart previous dosage.   Continue previous topicals. Refills today.   Labs today.       Wound Care After a Biopsy    What is a skin biopsy?  A skin biopsy allows the doctor to examine a very small piece of tissue under the microscope to determine the diagnosis and the best treatment for the skin condition. A local anesthetic (numbing medicine)  is injected with a very small needle into the skin area to be tested. A small piece of skin is taken from the area. Sometimes a suture (stitch) is used.     What are the risks of a skin biopsy?  I will experience scar, bleeding, swelling, pain, crusting and redness. I may experience incomplete removal or recurrence. Risks of this procedure are excessive bleeding, bruising, infection, nerve damage, numbness, thick (hypertrophic or keloidal) scar and non-diagnostic biopsy.    How should I care for my wound for the first 24 hours?    Keep the wound dry and covered for 24 hours    If it bleeds, hold direct pressure on the area for 15 minutes. If bleeding does not stop then go to the emergency room    Avoid strenuous exercise the first 1-2 days or as your doctor instructs you    How should I care for the wound after 24 hours?    After 24 hours, remove the bandage    You may bathe or shower as normal    If you had a scalp biopsy, you can shampoo as usual and can use shower water to clean the biopsy site daily    Clean the wound twice a day with gentle soap and water    Do not scrub, be gentle    Apply white petroleum/Vaseline after cleaning the wound with a cotton swab or a clean finger, and keep the site covered with a Bandaid /bandage. Bandages are not necessary with a scalp biopsy    If you are unable to cover the site with a Bandaid /bandage, re-apply ointment 2-3 times a day to keep the site moist. Moisture will help with healing    Avoid strenuous activity for first 1-2 days    Avoid lakes, rivers, pools, and  oceans until the stitches are removed or the site is healed    How do I clean my wound?    Wash hands thoroughly with soap or use hand  before all wound care    Clean the wound with gentle soap and water    Apply white petroleum/Vaseline  to wound after it is clean    Replace the Bandaid /bandage to keep the wound covered for the first few days or as instructed by your doctor    If you had a scalp biopsy, warm shower water to the area on a daily basis should suffice    What should I use to clean my wound?     Cotton-tipped applicators (Qtips )    White petroleum jelly (Vaseline ). Use a clean new container and use Q-tips to apply.    Bandaids   as needed    Gentle soap     How should I care for my wound long term?    Do not get your wound dirty    Keep up with wound care for one week or until the area is healed.    A small scab will form and fall off by itself when the area is completely healed. The area will be red and will become pink in color as it heals. Sun protection is very important for how your scar will turn out. Sunscreen with an SPF 30 or greater is recommended once the area is healed.    If you have stitches, stitches need to be removed in  days. You may return to our clinic for this or you may have it done locally at your doctor s office.    You should have some soreness but it should be mild and slowly go away over several days. Talk to your doctor about using tylenol for pain,    When should I call my doctor?  If you have increased:     Pain or swelling    Pus or drainage (clear or slightly yellow drainage is ok)    Temperature over 100F    Spreading redness or warmth around wound    When will I hear about my results?  The biopsy results can take 2-3 weeks to come back. The clinic will call you with the results, send you a [employer] message, or have you schedule a follow-up clinic or phone time to discuss the results. Contact our clinics if you do not hear from us in 3 weeks.     Who should I  call with questions?    [facility]: [phone]     [facility]: [phone]    For urgent needs outside of business hours call the [facility] at [phone] and ask for the dermatology resident on call

## 2018-03-22 NOTE — PROGRESS NOTES
[facility] Dermatology Note      Dermatology Problem List:   1.  Pemphigus foliaceus.     - Goals of treatment per Mr. Mitchell:  Prevent itching and secondary infections.   - He is not interested in complete clearance at this time.     - Titers 2015.   - Cell surface IgG 1-5, 120, monkey esophagus; 1280 intact human skin.   - DSG-1 Ig units (positive greater than 20, negative less than 14).   - DSG-3 Ig units (positive greater than 20, negative less than 9).   - Current treatment 1000 mg b.i.d. CellCept (tapering to 1500mg daily 3/22/2018), topical steroids as below.   2.  History of seborrheic keratoses.   3.  Seborrheic dermatitis, on ketaconazole shampoo and cream.      Encounter Date:  3/22/2018      CC: F/u pem phigus     History of Present Illness:   Mr. Luan Mitchell is a pleasant, 72-year-old man who is well-known to Dermatology who presents today in routine followup for pemphigus foliaceus.  He was last seen in followup 2017 .  Since that time, he notes his skin has been under great control. He denies any ongoing cutaneous symptomology, particularly the itch that was quite debilitating in the past.  He is continuing with use of his topical steroid regimen without any notable side effects.  He is also doing well on CellCept 1000 mg b.i.d., which he is tolerating well without any side effects noted. Would like to taper. Still getting lesions but no open ulcerations.  He denies any infectious symptoms.  No fevers, chills, sweats, GI side effects, cough, shortness of breath, muscle or joint pains or other issues lately.  He knows he is due for labs today.  His last safety labs were reviewed from 3 months ago and were stable and within normal limits. Would like us to evaluate spot in left groin. No other major concerns at this point. Recently friend dx with melanoma.        Past Medical History:   Diagnosis Date     BPH (benign prostatic hyperplasia)      Essential  hypertension 02/2018     Hyperlipidemia LDL goal <130      Pemphigus     pemphigus foliaceus     Retinal detachment 2009    left eye- corrected with laser     Vitreous detachment 2009    LE       Social History:     The patient is a retired .  His wife is currently undergoing surveillance for soft tissue sarcoma of her right buttock, and they are following up in [place]; doing well with this.      Family History:   Not addressed.        Current Outpatient Prescriptions   Medication     ketoconazole (NIZORAL) 2 % shampoo     clobetasol (TEMOVATE) 0.05 % external solution     hydrochlorothiazide (HYDRODIURIL) 25 MG tablet     lovastatin (MEVACOR) 20 MG tablet     finasteride (PROSCAR) 5 MG tablet     triamcinolone (KENALOG) 0.1 % ointment     ketoconazole (NIZORAL) 2 % cream     triamcinolone (KENALOG) 0.1 % cream     CELLCEPT (BRAND) 500 MG TABLET     desonide (DESOWEN) 0.05 % ointment     hydrocortisone butyrate (LOCOID) 0.1 % SOLN     clobetasol (TEMOVATE) 0.05 % ointment     clobetasol propionate 0.05 % SHAM     tacrolimus (PROTOPIC) 0.1 % ointment     Cholecalciferol (VITAMIN D PO)     ARTIFICIAL TEAR SOLUTION OP     aspirin 81 MG tablet     Multiple Vitamin (MULTIVITAMIN) per tablet     No current facility-administered medications for this visit.       Allergies   Allergen Reactions     Penicillins Swelling         Review of Systems:   A 10 point review of systems was negative beyond that stated above in the HPI.       Physical exam:   There were no vitals taken for this visit.   GENERAL:  A well-appearing  male appearing his stated age, affect normal, cooperative with the exam, alert and oriented x3.     SKIN:  A focused skin examination is performed today, including the scalp, face, neck, chest, abdomen, back, upper extremities and distal lower extremities.  The patient has scattered, crusted, mildly scaly, pink papules and thin plaques scattered over the central trunk,  chest, abdomen and back.  There are no lesions with overlying bullae or significant ulcerations or erosions.  There are less areas involved today and many areas of post inflammatory hyper- and hypopigmentation at previous sites of inflammation.  He has a similar scaly, pink plaque beneath the right ear.   The patient has a 3 mm, subcutaneous, superficial, cystic papule in the left groin fold without any lymphadenopathy appreciated today.  Overall, things are improved from the previous examination.  Scattered brown macules over sun exposed sites. One measures 3 mm on the right infrauricular neck with atypical rhomboidal network. Triangular shaped. Scattered, waxy, [id number] papules over the areas evaluated.         Impression/Plan:   1.  Pemphigus foliaceus.  Moderate cutaneous control with excellent symptomatic resolution on previous topical regimen and CellCept 1000 mg b.i.d.  We again discussed additional systemic treatments, including increased CellCept dose versus addition of medications like rituximab.  The patient is currently pleased with his symptomatic control and is not interested in cutaneous clearance. He, infact, is interested in tapering the Cellcept. Will consider decreasing to 1500mg daily with plans to return to 1000mg BID with flares. Will continue topical treatment in the meantime.   -He is due for safety labs today.  We will repeat CBC and CMP at this time.   - Decrease mycophenolate mofetil to 500mg qam and 100mg qhs.Restart 1000mg BID with flare.  Risks and benefits of this were reviewed briefly and discussed.   - Continue clobetasol ointment 0.05% twice daily as needed to the worst, most symptomatic, recalcitrant areas on the trunk and extremities.   - Continue clobetasol 0.05% solution to the scalp up to once nightly as needed. Refilled.   - Continue tacrolimus 0.01% ointment daily to the face.   - Continue triamcinolone 0.1% cream twice daily as a primary treatment for the trunk and  extremities.   - Continue ketoconazole 2% shampoo 3 times weekly.  Refilled.   - Start ketoconazole 2% cream to be applied to the bilateral alar grooves and lateral nasolabial folds in addition to the Protopic as needed.   - Continue with 3 times weekly dilute bleach baths as tolerated.     2. Neoplasm of uncertain behavior, Right infraauricular neck. Ddx includes LM vs. Nevus vs. DN vs. Lentigo vs others. Bx indicated.   -Shave biopsy:  Site(s): Right infrauricular neck    After discussion of benefits and risks including but not limited to bleeding/bruising, pain/swelling, infection, scar, incomplete removal, nerve damage/numbness, recurrence, and non-diagnostic biopsy, written consent, verbal consent and photographs were obtained. Time-out was performed. The area was cleaned with isopropyl alcohol. 1% lidocaine with epinephrine was infiltrated to obtain adequate anesthesia of the lesion.  Shave biopsy was performed. Hemostasis was achieved with aluminium chloride. Vaseline and a sterile dressing were applied. The patient tolerated the procedure and no complications were noted. The patient was provided with verbal and written post care instructions.        The patient was seen and discussed with Dr. Serina Odell, who was staff for this encounter.     Follow up in 3 months with plan for labs at that time.       Adal aMrin MD  PGY3 Dermatology  [phone]      I was present for key portions of the procedure. TYREE MAYNARD, Serina Odell MD, saw this patient with the resident and agree with the resident s findings and plan of care as documented in the resident s note.

## 2018-03-23 LAB — COPATH REPORT: NORMAL

## 2018-04-09 ENCOUNTER — PRE VISIT (OUTPATIENT)
Dept: UROLOGY | Facility: CLINIC | Age: 73
End: 2018-04-09

## 2018-04-10 ENCOUNTER — OFFICE VISIT (OUTPATIENT)
Dept: UROLOGY | Facility: CLINIC | Age: 73
End: 2018-04-10
Payer: COMMERCIAL

## 2018-04-10 VITALS
SYSTOLIC BLOOD PRESSURE: 124 MMHG | HEART RATE: 84 BPM | WEIGHT: 182 LBS | BODY MASS INDEX: 26.96 KG/M2 | HEIGHT: 69 IN | DIASTOLIC BLOOD PRESSURE: 67 MMHG

## 2018-04-10 DIAGNOSIS — N40.1 BENIGN PROSTATIC HYPERPLASIA WITH LOWER URINARY TRACT SYMPTOMS, SYMPTOM DETAILS UNSPECIFIED: ICD-10-CM

## 2018-04-10 DIAGNOSIS — R39.89 ABNORMAL PROSTATE EXAM: Primary | ICD-10-CM

## 2018-04-10 RX ORDER — MYCOPHENOLATE MOFETIL 500 MG/1
TABLET ORAL
COMMUNITY
Start: 2018-01-31 | End: 2018-06-21

## 2018-04-10 RX ORDER — FINASTERIDE 5 MG/1
1 TABLET, FILM COATED ORAL DAILY
Qty: 90 TABLET | Refills: 4 | Status: SHIPPED | OUTPATIENT
Start: 2018-04-10 | End: 2020-02-27

## 2018-04-10 ASSESSMENT — PAIN SCALES - GENERAL: PAINLEVEL: NO PAIN (0)

## 2018-04-10 NOTE — PATIENT INSTRUCTIONS
Return in June with a PSA and prostate recheck  The Urology schedulers should be calling you to coordinate    Carolynn Carroll

## 2018-04-10 NOTE — MR AVS SNAPSHOT
After Visit Summary   4/10/2018    Luan Mitchell    MRN: [id number]           Patient Information     Date Of Birth          1945        Visit Information        Provider Department      4/10/2018 5:30 PM Amparo Carroll PA [facility] Urology and [facility]        Today's Diagnoses     Abnormal prostate exam    -  1      Care Instructions    Return in June with a PSA and prostate recheck  The Urology schedulers should be calling you to coordinate    Carolynn Carroll          Follow-ups after your visit        Your next 10 appointments already scheduled     Jun 21, 2018  7:15 AM CDT   (Arrive by 7:00 AM)   Return Visit with Adal Marin MD   [facility] Dermatology ([facility])    [address] [place] [address]  [address] [address] [phone]              Future tests that were ordered for you today     Open Future Orders        Priority Expected Expires Ordered    PSA tumor marker Routine 5/10/2018 9/7/2018 4/10/2018            Who to contact     Please call your clinic at [phone] to:    Ask questions about your health    Make or cancel appointments    Discuss your medicines    Learn about your test results    Speak to your doctor            Additional Information About Your Visit        [employer] Information     [employer] gives you secure access to your electronic health record. If you see a primary care provider, you can also send messages to your care team and make appointments. If you have questions, please call your primary care clinic.  If you do not have a primary care provider, please call [phone] and they will assist you.      [employer] is an electronic gateway that provides easy, online access to your medical records. With [employer], you can request a clinic appointment, read your test results, renew a prescription or communicate with your care team.     To access your existing account, please contact your [employer]  "[facility] or call [phone] for assistance.        Care EveryWhere ID     This is your Care EveryWhere ID. This could be used by other organizations to access your [place] medical records  [id number]        Your Vitals Were     Pulse Height BMI (Body Mass Index)             84 1.74 m (5' 8.5\") 27.27 kg/m2          Blood Pressure from Last 3 Encounters:   04/10/18 124/67   02/05/18 148/83   03/13/17 132/74    Weight from Last 3 Encounters:   04/10/18 82.6 kg (182 lb)   02/05/18 80.7 kg (177 lb 14.4 oz)   03/13/17 80.1 kg (176 lb 8 oz)              We Performed the Following     POST-VOID RESIDUAL BLADDER SCAN        Primary Care Provider Office Phone # Fax #    Loretta Villeda [phone]       [address] [address]        Equal Access to Services     JORDON OSBORNE : Hadii cristina ku hadasho Soomaali, waaxda luqadaha, qaybta kaalmada adeegyada, kyaw jasmnie . So [facility] [phone].    ATENCIÓN: Si margiela bianka, tiene a toney disposición servicios gratuitos de asistencia lingüística. Llame al [phone].    We comply with applicable federal civil rights laws and [place] laws. We do not discriminate on the basis of race, color, national origin, age, disability, sex, sexual orientation, or gender identity.            Thank you!     Thank you for choosing [facility] UROLOGY AND [facility]  for your care. Our goal is always to provide you with excellent care. Hearing back from our patients is one way we can continue to improve our services. Please take a few minutes to complete the written survey that you may receive in the mail after your visit with us. Thank you!             Your Updated Medication List - Protect others around you: Learn how to safely use, store and throw away your medicines at www.disposemymeds.org.          This list is accurate as of 4/10/18  5:47 PM.  Always use your most recent med " list.                   Brand Name Dispense Instructions for use Diagnosis    ARTIFICIAL TEAR SOLUTION OP      Apply  to eye.        aspirin 81 MG tablet      Take 1 tablet by mouth daily.        * clobetasol propionate 0.05 % Sham     1 Bottle    Apply sparingly to dry scalp 3 x weekly as needed.  Leave in place for 15 m then add water, lather and rinse    Pemphigus foliaceous, Encounter for long-term (current) use of high-risk medication       * clobetasol 0.05 % ointment    TEMOVATE    180 g    APPLY TO AFFECTED AREA(S) TWO TIMES A DAY    Pemphigus foliaceous, Pemphigus foliaceus, Encounter for long-term (current) use of high-risk medication       * clobetasol 0.05 % external solution    TEMOVATE    200 mL    Apply  topically 2 times daily as needed to the scalp.    Pemphigus foliaceous, Encounter for long-term (current) use of high-risk medication       desonide 0.05 % ointment    DESOWEN    180 g    Apply topically to affected areas twice daily as instructed.    Pemphigus foliaceus, Pemphigus foliaceous, Encounter for long-term (current) use of high-risk medication       finasteride 5 MG tablet    PROSCAR    90 tablet    TAKE 1 TABLET DAILY    Benign prostatic hyperplasia with lower urinary tract symptoms       hydrochlorothiazide 25 MG tablet    HYDRODIURIL    45 tablet    Take 0.5 tablets (12.5 mg) by mouth daily    Benign essential hypertension       hydrocortisone butyrate 0.1 % Soln solution    LOCOID    180 mL    Apply sparingly to affected area(s) of beard twice daily    Pemphigus foliaceous, Pemphigus foliaceus, Encounter for long-term (current) use of high-risk medication       * ketoconazole 2 % cream    NIZORAL    60 g    Apply twice daily to the nose as needed for white/scaling.    Dermatitis, seborrheic       * ketoconazole 2 % shampoo    NIZORAL    360 mL    Three times per week in the shower: Lather into scalp, leave in place for 3-5 minutes, then rinse.    Pemphigus foliaceous       lovastatin  20 MG tablet    MEVACOR    90 tablet    Take 1 tablet (20 mg) by mouth At Bedtime    Hyperlipidemia LDL goal <100       multivitamin per tablet      Take 1 tablet by mouth daily. 1 tab daily        * mycophenolate 500 MG tablet     372 tablet    Take 2 tablet (1000 mg) by mouth 2 times daily.    Pemphigus foliaceous, Encounter for long-term (current) use of high-risk medication, Pemphigus foliaceus       * mycophenolate 500 MG tablet    GENERIC EQUIVALENT          tacrolimus 0.1 % ointment    PROTOPIC    100 g    Apply topically to affected areas as instructed.    Pemphigus foliaceous, Seborrhoeic dermatitis       * triamcinolone 0.1 % cream    KENALOG    454 g    Apply topically to affected areas as instructed.    Pemphigus foliaceus, Pemphigus foliaceous, Encounter for long-term (current) use of high-risk medication       * triamcinolone 0.1 % ointment    KENALOG    454 g    Apply topically 2 times daily    Pemphigus foliaceus       VITAMIN D PO      Take 1 tablet by mouth daily 1000 mg        * Notice:  This list has 9 medication(s) that are the same as other medications prescribed for you. Read the directions carefully, and ask your doctor or other care provider to review them with you.

## 2018-04-10 NOTE — NURSING NOTE
"Chief Complaint   Patient presents with     RECHECK     BPH/LUTS follow up       Initial Ht 1.74 m (5' 8.5\")  Wt 82.6 kg (182 lb)  BMI 27.27 kg/m2 Estimated body mass index is 27.27 kg/(m^2) as calculated from the following:    Height as of this encounter: 1.74 m (5' 8.5\").    Weight as of this encounter: 82.6 kg (182 lb).  Medication Reconciliation: complete     LUTHER Thomas    "

## 2018-04-10 NOTE — PROGRESS NOTES
"HPI: Mr. Luan Mitchell is a 73 year old year old male presenting today for evaluation of chief complaint(s): RECHECK (BPH/LUTS follow up)    AUA SS 5 \"Delighted\"  Last UA last year WNL  Dad lived to 97  Maternal grandfather - prostate cancer in 1950's  No new diagnoses, new medications, no surgeries  Happy and feeling healthy and feeling grateful.     Mr. Mitchell is a 73 year old gentleman with PMH significant for HLD, pemphigus (immunocompromised on cellcept) and BPH.  From a Urologic perspective he has history of elevated PSA in the 1990's with an initial biopsy negative then a repeat 24 core biopsy in 1998, also negative.  Since then he has had two more negative biopsies with Dr. Roldan. He has been on finasteride now since ~1996 with stable PSA of ~2.      Today he continues to be very pleased with his voiding symptoms.  No frequency, urgency, incontinence or obstructive symptoms. No hematuria, urinary tract infections, stones, prostatitis.  His IPSS today is 5 \"Delighted\". Sexual function is also robust with FLORENCE today 25.      PSA History:  02/05/18 - PSA 2.42ng/dL (on finasteride)   01/10/17 - PSA 2.09  02/04/16 - Total 2.0 and free/total = 30%  03.18.15 - Total 2.1ng/mL and free/total = 33%  02.06.13 - Total 2.1ng/mL and free/total =29%  03.07.12 - Total 2.6ng/dL and free/total = 23%    Current Outpatient Prescriptions   Medication Sig Dispense Refill     mycophenolate (GENERIC EQUIVALENT) 500 MG tablet        ketoconazole (NIZORAL) 2 % shampoo Three times per week in the shower: Lather into scalp, leave in place for 3-5 minutes, then rinse. 360 mL 12     clobetasol (TEMOVATE) 0.05 % external solution Apply  topically 2 times daily as needed to the scalp. 200 mL 11     hydrochlorothiazide (HYDRODIURIL) 25 MG tablet Take 0.5 tablets (12.5 mg) by mouth daily 45 tablet 3     lovastatin (MEVACOR) 20 MG tablet Take 1 tablet (20 mg) by mouth At Bedtime 90 tablet 4     finasteride (PROSCAR) 5 MG tablet TAKE 1 TABLET " "DAILY 90 tablet 0     triamcinolone (KENALOG) 0.1 % ointment Apply topically 2 times daily 454 g 11     ketoconazole (NIZORAL) 2 % cream Apply twice daily to the nose as needed for white/scaling. 60 g 2     triamcinolone (KENALOG) 0.1 % cream Apply topically to affected areas as instructed. 454 g 11     CELLCEPT (BRAND) 500 MG TABLET Take 2 tablet (1000 mg) by mouth 2 times daily. 372 tablet 2     desonide (DESOWEN) 0.05 % ointment Apply topically to affected areas twice daily as instructed. 180 g 3     hydrocortisone butyrate (LOCOID) 0.1 % SOLN Apply sparingly to affected area(s) of beard twice daily 180 mL 3     clobetasol (TEMOVATE) 0.05 % ointment APPLY TO AFFECTED AREA(S) TWO TIMES A  g 2     clobetasol propionate 0.05 % SHAM Apply sparingly to dry scalp 3 x weekly as needed.  Leave in place for 15 m then add water, lather and rinse 1 Bottle 5     tacrolimus (PROTOPIC) 0.1 % ointment Apply topically to affected areas as instructed. 100 g 11     Cholecalciferol (VITAMIN D PO) Take 1 tablet by mouth daily 1000 mg       ARTIFICIAL TEAR SOLUTION OP Apply  to eye.       aspirin 81 MG tablet Take 1 tablet by mouth daily.       Multiple Vitamin (MULTIVITAMIN) per tablet Take 1 tablet by mouth daily. 1 tab daily         ALLERGIES: Penicillins      REVIEW OF SYSTEMS:  As above in HPI    GENERAL PHYSICAL EXAM:   Vitals: /67  Pulse 84  Ht 1.74 m (5' 8.5\")  Wt 82.6 kg (182 lb)  BMI 27.27 kg/m2  Body mass index is 27.27 kg/(m^2).    GENERAL: Well groomed, well developed, well nourished male in NAD.  NEURO: Alert and oriented x 3.  PSYCH: Normal mood and affect, pleasant and agreeable during interview and exam.    LEAH:     normal rectal tone, small soft amount of stool in the rectal vault.     Prostate nontender.      Left apex - +nodular at the inferior edge    PVR: Residual urine by ultrasound was 26 ml.      LABS: The last test results for Mr. Luan Mitchell were reviewed:  PSA -   Lab Results   Component " Value Date    PSA 2.42 02/05/2018    PSA 2.09 01/10/2017    PSA 1.96 02/14/2011    PSA 2.47 02/25/2010    PSA 2.11 02/27/2009    PSA 1.77 03/28/2008    PSA 2.43 01/23/2007    PSA 1.86 03/02/2006    PSA 2.34 03/11/2005     BMP -   Recent Labs   Lab Test  03/22/18   1018  02/05/18   1314  12/21/17   1024  02/16/17   1631  10/27/16   1120   NA  139   --   142   --   140   POTASSIUM  4.1   --   4.1  4.6  4.3   CHLORIDE  107   --   106   --   106   CO2  25   --   29   --   30   BUN  19   --   16   --   14   CR  0.93   --   1.02   --   1.04   GLC  91  97  93   --   88   JOSHUA  9.3   --   8.8   --   9.2       CBC -   Recent Labs   Lab Test  03/22/18   1018  12/21/17   1024  08/14/17   1622   WBC  6.6  6.4  7.2   HGB  14.9  14.6  14.2   PLT  160  133*  159       ASSESSMENT:   1) Irregularity on LEAH  2) Immunocompromised    PLAN:   - Has had 4 biopsies, last in 2006  - Recheck PSA and LEAH in June   - Finasteride refilled    Carolynn Carroll PA-C  Department of Urologic Surgery

## 2018-06-19 ENCOUNTER — PRE VISIT (OUTPATIENT)
Dept: UROLOGY | Facility: CLINIC | Age: 73
End: 2018-06-19

## 2018-06-21 ENCOUNTER — TELEPHONE (OUTPATIENT)
Dept: DERMATOLOGY | Facility: CLINIC | Age: 73
End: 2018-06-21

## 2018-06-21 ENCOUNTER — OFFICE VISIT (OUTPATIENT)
Dept: DERMATOLOGY | Facility: CLINIC | Age: 73
End: 2018-06-21
Payer: COMMERCIAL

## 2018-06-21 VITALS — DIASTOLIC BLOOD PRESSURE: 78 MMHG | SYSTOLIC BLOOD PRESSURE: 122 MMHG | HEART RATE: 83 BPM

## 2018-06-21 DIAGNOSIS — Z79.899 ENCOUNTER FOR LONG-TERM (CURRENT) USE OF HIGH-RISK MEDICATION: ICD-10-CM

## 2018-06-21 DIAGNOSIS — L10.2 PEMPHIGUS FOLIACEOUS (H): ICD-10-CM

## 2018-06-21 DIAGNOSIS — L10.2 PEMPHIGUS FOLIACEUS (H): ICD-10-CM

## 2018-06-21 DIAGNOSIS — L10.2 PEMPHIGUS FOLIACEOUS (H): Primary | ICD-10-CM

## 2018-06-21 DIAGNOSIS — R39.89 ABNORMAL PROSTATE EXAM: ICD-10-CM

## 2018-06-21 LAB
ALBUMIN SERPL-MCNC: 3.6 G/DL (ref 3.4–5)
ALP SERPL-CCNC: 42 U/L (ref 40–150)
ALT SERPL W P-5'-P-CCNC: 28 U/L (ref 0–70)
ANION GAP SERPL CALCULATED.3IONS-SCNC: 8 MMOL/L (ref 3–14)
AST SERPL W P-5'-P-CCNC: 17 U/L (ref 0–45)
BASOPHILS # BLD AUTO: 0 10E9/L (ref 0–0.2)
BASOPHILS NFR BLD AUTO: 0.2 %
BILIRUB SERPL-MCNC: 0.6 MG/DL (ref 0.2–1.3)
BUN SERPL-MCNC: 16 MG/DL (ref 7–30)
CALCIUM SERPL-MCNC: 8.8 MG/DL (ref 8.5–10.1)
CHLORIDE SERPL-SCNC: 109 MMOL/L (ref 94–109)
CO2 SERPL-SCNC: 25 MMOL/L (ref 20–32)
CREAT SERPL-MCNC: 1.06 MG/DL (ref 0.66–1.25)
DIFFERENTIAL METHOD BLD: NORMAL
EOSINOPHIL # BLD AUTO: 0.2 10E9/L (ref 0–0.7)
EOSINOPHIL NFR BLD AUTO: 2.6 %
ERYTHROCYTE [DISTWIDTH] IN BLOOD BY AUTOMATED COUNT: 12.8 % (ref 10–15)
GFR SERPL CREATININE-BSD FRML MDRD: 68 ML/MIN/1.7M2
GLUCOSE SERPL-MCNC: 150 MG/DL (ref 70–99)
HCT VFR BLD AUTO: 42.2 % (ref 40–53)
HGB BLD-MCNC: 15 G/DL (ref 13.3–17.7)
IMM GRANULOCYTES # BLD: 0 10E9/L (ref 0–0.4)
IMM GRANULOCYTES NFR BLD: 0.3 %
LYMPHOCYTES # BLD AUTO: 1.4 10E9/L (ref 0.8–5.3)
LYMPHOCYTES NFR BLD AUTO: 22.7 %
MCH RBC QN AUTO: 31.7 PG (ref 26.5–33)
MCHC RBC AUTO-ENTMCNC: 35.5 G/DL (ref 31.5–36.5)
MCV RBC AUTO: 89 FL (ref 78–100)
MONOCYTES # BLD AUTO: 0.5 10E9/L (ref 0–1.3)
MONOCYTES NFR BLD AUTO: 7.9 %
NEUTROPHILS # BLD AUTO: 4.1 10E9/L (ref 1.6–8.3)
NEUTROPHILS NFR BLD AUTO: 66.3 %
NRBC # BLD AUTO: 0 10*3/UL
NRBC BLD AUTO-RTO: 0 /100
PLATELET # BLD AUTO: 158 10E9/L (ref 150–450)
POTASSIUM SERPL-SCNC: 3.8 MMOL/L (ref 3.4–5.3)
PROT SERPL-MCNC: 6.9 G/DL (ref 6.8–8.8)
PSA SERPL-MCNC: 2.1 UG/L (ref 0–4)
RBC # BLD AUTO: 4.73 10E12/L (ref 4.4–5.9)
SODIUM SERPL-SCNC: 142 MMOL/L (ref 133–144)
WBC # BLD AUTO: 6.2 10E9/L (ref 4–11)

## 2018-06-21 RX ORDER — MYCOPHENOLATE MOFETIL 500 MG/1
500 TABLET ORAL 2 TIMES DAILY
Qty: 60 TABLET | Refills: 2 | Status: SHIPPED | OUTPATIENT
Start: 2018-06-21 | End: 2018-06-25

## 2018-06-21 ASSESSMENT — PAIN SCALES - GENERAL: PAINLEVEL: NO PAIN (0)

## 2018-06-21 NOTE — PROGRESS NOTES
[facility] Dermatology Note      Dermatology Problem List:   1.  Pemphigus foliaceus.     - Goals of treatment per Mr. Mitchell:  Prevent itching and secondary infections.   - He is not interested in complete clearance at this time.     - Titers 2015.   - Cell surface IgG 1-5, 120, monkey esophagus; 1280 intact human skin.   - DSG-1 Ig units (positive greater than 20, negative less than 14).   - DSG-3 Ig units (positive greater than 20, negative less than 9).   - Current treatment 500 mg b.i.d. CellCept (tapered f/ 2g daily), topical steroids as below.   2.  History of seborrheic keratoses.   3.  Seborrheic dermatitis, on ketaconazole shampoo and cream.   4. DN, Mild. Right neck. Bx 3/22/18.      Encounter Date: 2018      CC: F/u pemphigus     History of Present Illness:   Mr. Luan Mitchell is a pleasant, 73-year-old man who is well-known to Dermatology who presents today in routine followup for pemphigus foliaceus.  He was last seen in followup  3/22/2018 . Since that time, he notes his skin has been under great control. Mild flare with itching over the last 4-5 days since developing a mild cold. Denies persistent sx. Resolving with topicals. No fevers, chills, purulent discharge, cough etc.   He is continuing with use of his topical steroid regimen without any notable side effects.  He is also doing well on CellCept 500 mg b.i.d., which he is tolerating well without any side effects noted. This was tapered from 2g daily at the last visit. Still getting lesions but no open ulcerations.  He denies any infectious symptoms.  No fevers, chills, sweats, GI side effects, cough, shortness of breath, muscle or joint pains or other issues lately.  He knows he is due for labs today.  His last safety labs were reviewed from 3 months ago and were stable and within normal limits.     Healed well on the Right neck (DN site). NERD per the patient.         Past Medical History:   Diagnosis  Date     BPH (benign prostatic hyperplasia)      Essential hypertension 02/2018     Hyperlipidemia LDL goal <130      Pemphigus     pemphigus foliaceus     Retinal detachment 2009    left eye- corrected with laser     Vitreous detachment 2009    LE       Social History:     The patient is a retired .  His wife is currently undergoing surveillance for soft tissue sarcoma of her right buttock, and they are following up in [place]; doing well with this.      Family History:   Not addressed.        Current Outpatient Prescriptions   Medication     ARTIFICIAL TEAR SOLUTION OP     aspirin 81 MG tablet     CELLCEPT (BRAND) 500 MG TABLET     Cholecalciferol (VITAMIN D PO)     clobetasol (TEMOVATE) 0.05 % external solution     clobetasol (TEMOVATE) 0.05 % ointment     clobetasol propionate 0.05 % SHAM     desonide (DESOWEN) 0.05 % ointment     finasteride (PROSCAR) 5 MG tablet     hydrochlorothiazide (HYDRODIURIL) 25 MG tablet     hydrocortisone butyrate (LOCOID) 0.1 % SOLN     ketoconazole (NIZORAL) 2 % cream     ketoconazole (NIZORAL) 2 % shampoo     lovastatin (MEVACOR) 20 MG tablet     Multiple Vitamin (MULTIVITAMIN) per tablet     tacrolimus (PROTOPIC) 0.1 % ointment     triamcinolone (KENALOG) 0.1 % cream     triamcinolone (KENALOG) 0.1 % ointment     [DISCONTINUED] mycophenolate (GENERIC EQUIVALENT) 500 MG tablet     No current facility-administered medications for this visit.       Allergies   Allergen Reactions     Penicillins Swelling         Review of Systems:   A 10 point review of systems was negative beyond that stated above in the HPI.       Physical exam:   /78  Pulse 83   GENERAL:  A well-appearing  male appearing his stated age, affect normal, cooperative with the exam, alert and oriented x3.     SKIN:  A focused skin examination is performed today, including the scalp, face, neck, chest, abdomen, back, upper extremities and distal lower extremities.  The patient  has scattered, crusted, mildly scaly, pink papules and thin plaques scattered over the central trunk, chest, abdomen and back.  There are no lesions with overlying bullae or significant ulcerations or erosions.  There are less areas involved today and many areas of post inflammatory hyper- and hypopigmentation at previous sites of inflammation. Disease stable today. NERD at the well healed scar on the right neck today. Scattered, waxy, [id number] papules over the areas evaluated.         Impression/Plan:   1.  Pemphigus foliaceus.  Moderate cutaneous control with excellent symptomatic resolution on previous topical regimen and CellCept 500 mg b.i.d (previously 2000 mg daily). Disease is stable form previous 2g daily dosing; reassuring. Previously, and on numerous occasions, we have discussed additional systemic treatments, including increased CellCept dose versus addition of medications like rituximab.  The patient is currently pleased with his symptomatic control and is not interested in cutaneous clearance. Will continue current treatment plan:   -He is due for safety labs today.  We will repeat CBC and CMP at this time.   - Continue on decreased mycophenolate mofetil dosage (500mg BID); If he requires a dosage increase with flaring, he will call us. Well tolerating without SE.   - Continue clobetasol ointment 0.05% twice daily as needed to the worst, most symptomatic, recalcitrant areas on the trunk and extremities.   - Continue clobetasol 0.05% solution to the scalp up to once nightly as needed. Refilled.   - Continue tacrolimus 0.01% ointment daily to the face.   - Continue triamcinolone 0.1% cream twice daily as a primary treatment for the trunk and extremities.   - Continue ketoconazole 2% shampoo 3 times weekly.  Refilled.   - Continue ketoconazole 2% cream to be applied to the bilateral alar grooves and lateral nasolabial folds in addition to the Protopic as needed.   - Continue with 3 times weekly  dilute bleach baths as tolerated.     2. Mild DN, left neck. NERD. Reassurance. Will continue with annual FBSE screening examinations.       The patient was seen and discussed with Dr. Collier, who was staff for this encounter.     Follow up in 3 months with plan for labs at that time.       Adal Marin MD  PGY3 Dermatology  [phone]      I have personally examined this patient and agree with Dr. Marin's documentation and plan of care. I have reviewed and amended the resident's note above. The documentation accurately reflects my clinical observations, diagnoses, treatment and follow-up plans.     Isabelle Collier MD  Dermatology Staff

## 2018-06-21 NOTE — MR AVS SNAPSHOT
After Visit Summary   6/21/2018    Luan Mitchell    MRN: [id number]           Patient Information     Date Of Birth          1945        Visit Information        Provider Department      6/21/2018 7:15 AM Adal Marin MD [facility] Dermatology        Today's Diagnoses     Pemphigus foliaceous    -  1       Follow-ups after your visit        Follow-up notes from your care team     Return in about 3 months (around 9/21/2018).      Your next 10 appointments already scheduled     Jun 21, 2018  7:45 AM CDT   LAB with  LAB   [facility] ([facility])    [address] [place] [address]  [address] [facility] [id number] [phone]           Please do not eat 10-12 hours before your appointment if you are coming in fasting for labs on lipids, cholesterol, or glucose (sugar). This does not apply to pregnant women. Water, hot tea and black coffee (with nothing added) are okay. Do not drink other fluids, diet soda or chew gum.            Jun 26, 2018 11:30 AM CDT   (Arrive by 11:15 AM)   Return Visit with MONA Case   [facility]rolog[facility] ([facility])    [address] [place] [address]  [address] [facility] [id number] [phone]            Sep 27, 2018  9:00 AM CDT   (Arrive by 8:45 AM)   Return Visit with Adal Marin MD   [facility] Dermatology ([facility])    [address] [place] [address]  3[address] [facility] [id number] [phone]              Future tests that were ordered for you today     Open Future Orders        Priority Expected Expires Ordered    CBC with platelets differential Routine  6/21/2019 6/21/2018    Comprehensive metabolic panel Routine  6/21/2019 6/21/2018            Who to contact     Please call your clinic at [phone] to:    Ask questions about your health    Make or cancel appointments    Discuss your medicines    Learn about your test  results    Speak to your doctor            Additional Information About Your Visit        [employer] Information     [employer] gives you secure access to your electronic health record. If you see a primary care provider, you can also send messages to your care team and make appointments. If you have questions, please call your primary care clinic.  If you do not have a primary care provider, please call [phone] and they will assist you.      [employer] is an electronic gateway that provides easy, online access to your medical records. With [employer], you can request a clinic appointment, read your test results, renew a prescription or communicate with your care team.     To access your existing account, please contact your [facility] or call [phone] for assistance.        Care EveryWhere ID     This is your Care EveryWhere ID. This could be used by other organizations to access your [facility] medical records  [id number]        Your Vitals Were     Pulse                   83            Blood Pressure from Last 3 Encounters:   06/21/18 122/78   04/10/18 124/67   02/05/18 148/83    Weight from Last 3 Encounters:   04/10/18 82.6 kg (182 lb)   02/05/18 80.7 kg (177 lb 14.4 oz)   03/13/17 80.1 kg (176 lb 8 oz)                 Today's Medication Changes          These changes are accurate as of 6/21/18  7:23 AM.  If you have any questions, ask your nurse or doctor.               These medicines have changed or have updated prescriptions.        Dose/Directions    mycophenolate 500 MG tablet   This may have changed:  Another medication with the same name was removed. Continue taking this medication, and follow the directions you see here.   Used for:  Pemphigus foliaceous, Encounter for long-term (current) use of high-risk medication, Pemphigus foliaceus   Changed by:  Adal Marin MD        Take 2 tablet (1000 mg) by mouth 2 times daily.   Quantity:  372 tablet   Refills:  2                 Primary Care Provider Office Phone # Fax #    Loretta Villeda [phone] [address] [address]        Equal Access to Services     JORDON OSBORNE : Manish cristina whittaker valerio Soserafinali, waaxda luqadaha, qaybta kaalmada adecindy, kyaw leonrobert reyes. So [facility] [phone].    ATENCIÓN: Si habla español, tiene a toney disposición servicios gratuitos de asistencia lingüística. Llame al [phone].    We comply with applicable federal civil rights laws and [place] laws. We do not discriminate on the basis of race, color, national origin, age, disability, sex, sexual orientation, or gender identity.            Thank you!     Thank you for choosing [facility] DERMATOLOGY  for your care. Our goal is always to provide you with excellent care. Hearing back from our patients is one way we can continue to improve our services. Please take a few minutes to complete the written survey that you may receive in the mail after your visit with us. Thank you!             Your Updated Medication List - Protect others around you: Learn how to safely use, store and throw away your medicines at www.disposemymeds.org.          This list is accurate as of 6/21/18  7:23 AM.  Always use your most recent med list.                   Brand Name Dispense Instructions for use Diagnosis    ARTIFICIAL TEAR SOLUTION OP      Apply  to eye.        aspirin 81 MG tablet      Take 1 tablet by mouth daily.        * clobetasol propionate 0.05 % Sham     1 Bottle    Apply sparingly to dry scalp 3 x weekly as needed.  Leave in place for 15 m then add water, lather and rinse    Pemphigus foliaceous, Encounter for long-term (current) use of high-risk medication       * clobetasol 0.05 % ointment    TEMOVATE    180 g    APPLY TO AFFECTED AREA(S) TWO TIMES A DAY    Pemphigus foliaceous, Pemphigus foliaceus, Encounter for long-term (current) use of high-risk medication       * clobetasol 0.05 %  external solution    TEMOVATE    200 mL    Apply  topically 2 times daily as needed to the scalp.    Pemphigus foliaceous, Encounter for long-term (current) use of high-risk medication       desonide 0.05 % ointment    DESOWEN    180 g    Apply topically to affected areas twice daily as instructed.    Pemphigus foliaceus, Pemphigus foliaceous, Encounter for long-term (current) use of high-risk medication       finasteride 5 MG tablet    PROSCAR    90 tablet    Take 1 tablet (5 mg) by mouth daily    Benign prostatic hyperplasia with lower urinary tract symptoms, symptom details unspecified       hydrochlorothiazide 25 MG tablet    HYDRODIURIL    45 tablet    Take 0.5 tablets (12.5 mg) by mouth daily    Benign essential hypertension       hydrocortisone butyrate 0.1 % Soln solution    LOCOID    180 mL    Apply sparingly to affected area(s) of beard twice daily    Pemphigus foliaceous, Pemphigus foliaceus, Encounter for long-term (current) use of high-risk medication       * ketoconazole 2 % cream    NIZORAL    60 g    Apply twice daily to the nose as needed for white/scaling.    Dermatitis, seborrheic       * ketoconazole 2 % shampoo    NIZORAL    360 mL    Three times per week in the shower: Lather into scalp, leave in place for 3-5 minutes, then rinse.    Pemphigus foliaceous       lovastatin 20 MG tablet    MEVACOR    90 tablet    Take 1 tablet (20 mg) by mouth At Bedtime    Hyperlipidemia LDL goal <100       multivitamin per tablet      Take 1 tablet by mouth daily. 1 tab daily        mycophenolate 500 MG tablet     372 tablet    Take 2 tablet (1000 mg) by mouth 2 times daily.    Pemphigus foliaceous, Encounter for long-term (current) use of high-risk medication, Pemphigus foliaceus       tacrolimus 0.1 % ointment    PROTOPIC    100 g    Apply topically to affected areas as instructed.    Pemphigus foliaceous, Seborrhoeic dermatitis       * triamcinolone 0.1 % cream    KENALOG    454 g    Apply topically to  affected areas as instructed.    Pemphigus foliaceus, Pemphigus foliaceous, Encounter for long-term (current) use of high-risk medication       * triamcinolone 0.1 % ointment    KENALOG    454 g    Apply topically 2 times daily    Pemphigus foliaceus       VITAMIN D PO      Take 1 tablet by mouth daily 1000 mg        * Notice:  This list has 7 medication(s) that are the same as other medications prescribed for you. Read the directions carefully, and ask your doctor or other care provider to review them with you.

## 2018-06-21 NOTE — NURSING NOTE
Dermatology Rooming Note    Luan Mitchell's goals for this visit include:   Chief Complaint   Patient presents with     Derm Problem     Pemphigus foliaceus. Luan notes that his skin is doing well since his last visit.     Nivia Phoenix, CMA

## 2018-06-21 NOTE — LETTER
2018     RE: Luan Mitchell  [address] [facility] [address]     Dear Colleague,    Thank you for referring your patient, Luan Mitchell, to the [facility] DERMATOLOGY at [facility]. Please see a copy of my visit note below.    [facility] Dermatology Note      Dermatology Problem List:   1.  Pemphigus foliaceus.     - Goals of treatment per Mr. Mitchell:  Prevent itching and secondary infections.   - He is not interested in complete clearance at this time.     - Titers 2015.   - Cell surface IgG 1-5, 120, monkey esophagus; 1280 intact human skin.   - DSG-1 Ig units (positive greater than 20, negative less than 14).   - DSG-3 Ig units (positive greater than 20, negative less than 9).   - Current treatment 500 mg b.i.d. CellCept (tapered f/ 2g daily), topical steroids as below.   2.  History of seborrheic keratoses.   3.  Seborrheic dermatitis, on ketaconazole shampoo and cream.   4. DN, Mild. Right neck. Bx 3/22/18.      Encounter Date: 2018      CC: F/u pemphigus     History of Present Illness:   Mr. Luan Mitchell is a pleasant, 73-year-old man who is well-known to Dermatology who presents today in routine followup for pemphigus foliaceus.  He was last seen in followup  3/22/2018 . Since that time, he notes his skin has been under great control. Mild flare with itching over the last 4-5 days since developing a mild cold. Denies persistent sx. Resolving with topicals. No fevers, chills, purulent discharge, cough etc.   He is continuing with use of his topical steroid regimen without any notable side effects.  He is also doing well on CellCept 500 mg b.i.d., which he is tolerating well without any side effects noted. This was tapered from 2g daily at the last visit. Still getting lesions but no open ulcerations.  He denies any infectious symptoms.  No fevers, chills, sweats, GI side effects, cough, shortness of breath, muscle or  joint pains or other issues lately.  He knows he is due for labs today.  His last safety labs were reviewed from 3 months ago and were stable and within normal limits.     Healed well on the Right neck (DN site). NERD per the patient.         Past Medical History:   Diagnosis Date     BPH (benign prostatic hyperplasia)      Essential hypertension 02/2018     Hyperlipidemia LDL goal <130      Pemphigus     pemphigus foliaceus     Retinal detachment 2009    left eye- corrected with laser     Vitreous detachment 2009    LE       Social History:     The patient is a retired .  His wife is currently undergoing surveillance for soft tissue sarcoma of her right buttock, and they are following up in [place]; doing well with this.      Family History:   Not addressed.        Current Outpatient Prescriptions   Medication     ARTIFICIAL TEAR SOLUTION OP     aspirin 81 MG tablet     CELLCEPT (BRAND) 500 MG TABLET     Cholecalciferol (VITAMIN D PO)     clobetasol (TEMOVATE) 0.05 % external solution     clobetasol (TEMOVATE) 0.05 % ointment     clobetasol propionate 0.05 % SHAM     desonide (DESOWEN) 0.05 % ointment     finasteride (PROSCAR) 5 MG tablet     hydrochlorothiazide (HYDRODIURIL) 25 MG tablet     hydrocortisone butyrate (LOCOID) 0.1 % SOLN     ketoconazole (NIZORAL) 2 % cream     ketoconazole (NIZORAL) 2 % shampoo     lovastatin (MEVACOR) 20 MG tablet     Multiple Vitamin (MULTIVITAMIN) per tablet     tacrolimus (PROTOPIC) 0.1 % ointment     triamcinolone (KENALOG) 0.1 % cream     triamcinolone (KENALOG) 0.1 % ointment     [DISCONTINUED] mycophenolate (GENERIC EQUIVALENT) 500 MG tablet     No current facility-administered medications for this visit.       Allergies   Allergen Reactions     Penicillins Swelling         Review of Systems:   A 10 point review of systems was negative beyond that stated above in the HPI.       Physical exam:   /78  Pulse 83   GENERAL:  A well-appearing   male appearing his stated age, affect normal, cooperative with the exam, alert and oriented x3.     SKIN:  A focused skin examination is performed today, including the scalp, face, neck, chest, abdomen, back, upper extremities and distal lower extremities.  The patient has scattered, crusted, mildly scaly, pink papules and thin plaques scattered over the central trunk, chest, abdomen and back.  There are no lesions with overlying bullae or significant ulcerations or erosions.  There are less areas involved today and many areas of post inflammatory hyper- and hypopigmentation at previous sites of inflammation. Disease stable today. NERD at the well healed scar on the right neck today. Scattered, waxy, [id number] papules over the areas evaluated.         Impression/Plan:   1.  Pemphigus foliaceus.  Moderate cutaneous control with excellent symptomatic resolution on previous topical regimen and CellCept 500 mg b.i.d (previously 2000 mg daily). Disease is stable form previous 2g daily dosing; reassuring. Previously, and on numerous occasions, we have discussed additional systemic treatments, including increased CellCept dose versus addition of medications like rituximab.  The patient is currently pleased with his symptomatic control and is not interested in cutaneous clearance. Will continue current treatment plan:   -He is due for safety labs today.  We will repeat CBC and CMP at this time.   - Continue on decreased mycophenolate mofetil dosage (500mg BID); If he requires a dosage increase with flaring, he will call us. Well tolerating without SE.   - Continue clobetasol ointment 0.05% twice daily as needed to the worst, most symptomatic, recalcitrant areas on the trunk and extremities.   - Continue clobetasol 0.05% solution to the scalp up to once nightly as needed. Refilled.   - Continue tacrolimus 0.01% ointment daily to the face.   - Continue triamcinolone 0.1% cream twice daily as a primary  treatment for the trunk and extremities.   - Continue ketoconazole 2% shampoo 3 times weekly.  Refilled.   - Continue ketoconazole 2% cream to be applied to the bilateral alar grooves and lateral nasolabial folds in addition to the Protopic as needed.   - Continue with 3 times weekly dilute bleach baths as tolerated.     2. Mild DN, left neck. NERD. Reassurance. Will continue with annual FBSE screening examinations.       The patient was seen and discussed with Dr. Collier, who was staff for this encounter.     Follow up in 3 months with plan for labs at that time.       Adal Marin MD  PGY3 Dermatology  [phone]    I have personally examined this patient and agree with Dr. Marin's documentation and plan of care. I have reviewed and amended the resident's note above. The documentation accurately reflects my clinical observations, diagnoses, treatment and follow-up plans.     Isabelle Collier MD  Dermatology Staff

## 2018-06-22 ENCOUNTER — MYC MEDICAL ADVICE (OUTPATIENT)
Dept: DERMATOLOGY | Facility: CLINIC | Age: 73
End: 2018-06-22

## 2018-06-22 ENCOUNTER — MYC MEDICAL ADVICE (OUTPATIENT)
Dept: UROLOGY | Facility: CLINIC | Age: 73
End: 2018-06-22

## 2018-06-22 DIAGNOSIS — L10.2 PEMPHIGUS FOLIACEUS (H): ICD-10-CM

## 2018-06-25 RX ORDER — MYCOPHENOLATE MOFETIL 500 MG/1
500 TABLET ORAL 2 TIMES DAILY
Qty: 180 TABLET | Refills: 0 | Status: SHIPPED | OUTPATIENT
Start: 2018-06-25 | End: 2018-09-27

## 2018-06-25 NOTE — TELEPHONE ENCOUNTER
Received message of patient requesting 90 day and not 30 day supply of mycophenolate mofetil. Reviewed chart and agree with this request. New prescription sent to pharmacy.    Latoya Gaona MD  Internal Medicine - Dermatology, PGY-3

## 2018-06-26 ENCOUNTER — OFFICE VISIT (OUTPATIENT)
Dept: UROLOGY | Facility: CLINIC | Age: 73
End: 2018-06-26
Payer: COMMERCIAL

## 2018-06-26 VITALS
SYSTOLIC BLOOD PRESSURE: 127 MMHG | HEART RATE: 73 BPM | DIASTOLIC BLOOD PRESSURE: 79 MMHG | BODY MASS INDEX: 26.93 KG/M2 | WEIGHT: 181.8 LBS | HEIGHT: 69 IN

## 2018-06-26 DIAGNOSIS — N40.2 PROSTATE NODULE: Primary | ICD-10-CM

## 2018-06-26 ASSESSMENT — PAIN SCALES - GENERAL: PAINLEVEL: NO PAIN (0)

## 2018-06-26 NOTE — PATIENT INSTRUCTIONS
Schedule prostate MRI.  Carolynn will contact you with results.      It was a pleasure meeting with you today.  Thank you for allowing me and my team the privilege of caring for you today.  YOU are the reason we are here, and I truly hope we provided you with the excellent service you deserve.  Please let us know if there is anything else we can do for you so that we can be sure you are leaving completely satisfied with your care experience.

## 2018-06-26 NOTE — MR AVS SNAPSHOT
After Visit Summary   6/26/2018    Luan Mitchell    MRN: [id number]           Patient Information     Date Of Birth          1945        Visit Information        Provider Department      6/26/2018 11:30 AM Amparo Carroll PA [facility] Urology and [facility]        Today's Diagnoses     Prostate nodule    -  1      Care Instructions    Schedule prostate MRI.  Carolynn will contact you with results.      It was a pleasure meeting with you today.  Thank you for allowing me and my team the privilege of caring for you today.  YOU are the reason we are here, and I truly hope we provided you with the excellent service you deserve.  Please let us know if there is anything else we can do for you so that we can be sure you are leaving completely satisfied with your care experience.                  Follow-ups after your visit        Your next 10 appointments already scheduled     Jul 06, 2018  1:00 PM CDT   MR PROSTATE with [id number]   [facility] MRI ([facility])    [address] [place] [id number] [phone]           Take your medicines as usual, unless your doctor tells you not to. Bring a list of your current medicines to your exam (including vitamins, minerals and over-the-counter drugs).  You may or may not receive intravenous (IV) contrast for this exam pending the discretion of the Radiologist.  You do not need to do anything special to prepare.  The MRI machine uses a strong magnet. Please wear clothes without metal (snaps, zippers). A sweatsuit works well, or we may give you a hospital gown.  Please remove any body piercings and hair extensions before you arrive. You will also remove watches, jewelry, hairpins, wallets, dentures, partial dental plates and hearing aids. You may wear contact lenses, and you may be able to wear your rings. We have a safe place to keep your personal items, but it is safer to  leave them at home.  **IMPORTANT** THE INSTRUCTIONS BELOW ARE ONLY FOR THOSE PATIENTS WHO HAVE BEEN PRESCRIBED SEDATION OR GENERAL ANESTHESIA DURING THEIR MRI PROCEDURE:  IF YOUR DOCTOR PRESCRIBED ORAL SEDATION (take medicine to help you relax during your exam):   You must get the medicine from your doctor (oral medication) before you arrive. Bring the medicine to the exam. Do not take it at home. You ll be told when to take it upon arriving for your exam.   Arrive one hour early. Bring someone who can take you home after the test. Your medicine will make you sleepy. After the exam, you may not drive, take a bus or take a taxi by yourself.  IF YOUR DOCTOR PRESCRIBED IV SEDATION:   Arrive one hour early. Bring someone who can take you home after the test. Your medicine will make you sleepy. After the exam, you may not drive, take a bus or take a taxi by yourself.   No eating 6 hours before your exam. You may have clear liquids up until 4 hours before your exam. (Clear liquids include water, clear tea, black coffee and fruit juice without pulp.)  IF YOUR DOCTOR PRESCRIBED ANESTHESIA (be asleep for your exam):   Arrive 1 1/2 hours early. Bring someone who can take you home after the test. You may not drive, take a bus or take a taxi by yourself.   No eating 8 hours before your exam. You may have clear liquids up until 4 hours before your exam. (Clear liquids include water, clear tea, black coffee and fruit juice without pulp.)   You will spend four to five hours in the recovery room.  Please call the Imaging Department at your exam site with any questions.            Sep 27, 2018  9:00 AM CDT   (Arrive by 8:45 AM)   Return Visit with Adal Marin MD   [facility] Dermatology ([facility])    [address] [facility]  [address] [facility] [address] [phone]              Future tests that were ordered for you today     Open Future Orders        Priority Expected Expires Ordered     "MRI Prostate Routine 6/26/2018 11/23/2018 6/26/2018            Who to contact     Please call your clinic at [phone] to:    Ask questions about your health    Make or cancel appointments    Discuss your medicines    Learn about your test results    Speak to your doctor            Additional Information About Your Visit        MyChart Information     [employer] gives you secure access to your electronic health record. If you see a primary care provider, you can also send messages to your care team and make appointments. If you have questions, please call your primary care clinic.  If you do not have a primary care provider, please call [phone] and they will assist you.      [employer] is an electronic gateway that provides easy, online access to your medical records. With [employer], you can request a clinic appointment, read your test results, renew a prescription or communicate with your care team.     To access your existing account, please contact your [facility] or call [phone] for assistance.        Care EveryWhere ID     This is your Care EveryWhere ID. This could be used by other organizations to access your [place] medical records  [id number]        Your Vitals Were     Pulse Height BMI (Body Mass Index)             73 1.74 m (5' 8.5\") 27.24 kg/m2          Blood Pressure from Last 3 Encounters:   06/26/18 127/79   06/21/18 122/78   04/10/18 124/67    Weight from Last 3 Encounters:   06/26/18 82.5 kg (181 lb 12.8 oz)   04/10/18 82.6 kg (182 lb)   02/05/18 80.7 kg (177 lb 14.4 oz)               Primary Care Provider Office Phone # Fax #    Loretta Villeda [phone]       [address] [address]        Equal Access to Services     JORDON OSBORNE : Manish Jovel, adriano morfin, kyaw kern. So [facility] [phone].    ATENCIÓN: Si margiela español, tiene a toney " disposición servicios gratuitos de asistencia lingüística. Pancho andres [phone].    We comply with applicable federal civil rights laws and [place] laws. We do not discriminate on the basis of race, color, national origin, age, disability, sex, sexual orientation, or gender identity.            Thank you!     Thank you for choosing [facility] UROLOGY AND [facility] FOR PROSTATE AND UROLOGIC CANCERS  for your care. Our goal is always to provide you with excellent care. Hearing back from our patients is one way we can continue to improve our services. Please take a few minutes to complete the written survey that you may receive in the mail after your visit with us. Thank you!             Your Updated Medication List - Protect others around you: Learn how to safely use, store and throw away your medicines at www.disposemymeds.org.          This list is accurate as of 6/26/18 12:03 PM.  Always use your most recent med list.                   Brand Name Dispense Instructions for use Diagnosis    ARTIFICIAL TEAR SOLUTION OP      Apply  to eye.        aspirin 81 MG tablet      Take 1 tablet by mouth daily.        * clobetasol propionate 0.05 % Sham     1 Bottle    Apply sparingly to dry scalp 3 x weekly as needed.  Leave in place for 15 m then add water, lather and rinse    Pemphigus foliaceous, Encounter for long-term (current) use of high-risk medication       * clobetasol 0.05 % ointment    TEMOVATE    180 g    APPLY TO AFFECTED AREA(S) TWO TIMES A DAY    Pemphigus foliaceous, Pemphigus foliaceus, Encounter for long-term (current) use of high-risk medication       * clobetasol 0.05 % external solution    TEMOVATE    200 mL    Apply  topically 2 times daily as needed to the scalp.    Pemphigus foliaceous, Encounter for long-term (current) use of high-risk medication       desonide 0.05 % ointment    DESOWEN    180 g    Apply topically to affected areas twice daily as instructed.    Pemphigus foliaceus, Pemphigus foliaceous,  Encounter for long-term (current) use of high-risk medication       finasteride 5 MG tablet    PROSCAR    90 tablet    Take 1 tablet (5 mg) by mouth daily    Benign prostatic hyperplasia with lower urinary tract symptoms, symptom details unspecified       hydrochlorothiazide 25 MG tablet    HYDRODIURIL    45 tablet    Take 0.5 tablets (12.5 mg) by mouth daily    Benign essential hypertension       hydrocortisone butyrate 0.1 % Soln solution    LOCOID    180 mL    Apply sparingly to affected area(s) of beard twice daily    Pemphigus foliaceous, Pemphigus foliaceus, Encounter for long-term (current) use of high-risk medication       * ketoconazole 2 % cream    NIZORAL    60 g    Apply twice daily to the nose as needed for white/scaling.    Dermatitis, seborrheic       * ketoconazole 2 % shampoo    NIZORAL    360 mL    Three times per week in the shower: Lather into scalp, leave in place for 3-5 minutes, then rinse.    Pemphigus foliaceous       lovastatin 20 MG tablet    MEVACOR    90 tablet    Take 1 tablet (20 mg) by mouth At Bedtime    Hyperlipidemia LDL goal <100       multivitamin per tablet      Take 1 tablet by mouth daily. 1 tab daily        * mycophenolate 500 MG tablet     372 tablet    Take 2 tablet (1000 mg) by mouth 2 times daily.    Pemphigus foliaceous, Encounter for long-term (current) use of high-risk medication, Pemphigus foliaceus       * mycophenolate 500 MG tablet    GENERIC EQUIVALENT    180 tablet    Take 1 tablet (500 mg) by mouth 2 times daily    Pemphigus foliaceus       tacrolimus 0.1 % ointment    PROTOPIC    100 g    Apply topically to affected areas as instructed.    Pemphigus foliaceous, Seborrhoeic dermatitis       * triamcinolone 0.1 % cream    KENALOG    454 g    Apply topically to affected areas as instructed.    Pemphigus foliaceus, Pemphigus foliaceous, Encounter for long-term (current) use of high-risk medication       * triamcinolone 0.1 % ointment    KENALOG    454 g    Apply  topically 2 times daily    Pemphigus foliaceus       VITAMIN D PO      Take 1 tablet by mouth daily 1000 mg        * Notice:  This list has 9 medication(s) that are the same as other medications prescribed for you. Read the directions carefully, and ask your doctor or other care provider to review them with you.

## 2018-06-26 NOTE — PROGRESS NOTES
HPI: Mr. Luan Mitchell is a 73 year old year old male presenting today for evaluation of chief complaint(s): RECHECK (BPH follow up with PSA)      He was last seen 4/10/18 with a slightly elevated PSA (see below) and a LEAH suggestive of a left apex prostate nodule. He is on finasteride. He is delighted with voiding and sexual symptoms.   Maternal grandfather - prostate cancer in 1950's.  No other family history    PSA History:  06/21/18 - PSA 2.10ng/dL  02/05/18 - PSA 2.42ng/dL (on finasteride)   01/10/17 - PSA 2.09  02/04/16 - Total 2.0 and free/total = 30%  03.18.15 - Total 2.1ng/mL and free/total = 33%  02.06.13 - Total 2.1ng/mL and free/total =29%  03.07.12 - Total 2.6ng/dL and free/total = 23%    Current Outpatient Prescriptions   Medication Sig Dispense Refill     ARTIFICIAL TEAR SOLUTION OP Apply  to eye.       aspirin 81 MG tablet Take 1 tablet by mouth daily.       CELLCEPT (BRAND) 500 MG TABLET Take 2 tablet (1000 mg) by mouth 2 times daily. 372 tablet 2     Cholecalciferol (VITAMIN D PO) Take 1 tablet by mouth daily 1000 mg       clobetasol (TEMOVATE) 0.05 % external solution Apply  topically 2 times daily as needed to the scalp. 200 mL 11     clobetasol (TEMOVATE) 0.05 % ointment APPLY TO AFFECTED AREA(S) TWO TIMES A  g 2     clobetasol propionate 0.05 % SHAM Apply sparingly to dry scalp 3 x weekly as needed.  Leave in place for 15 m then add water, lather and rinse 1 Bottle 5     desonide (DESOWEN) 0.05 % ointment Apply topically to affected areas twice daily as instructed. 180 g 3     finasteride (PROSCAR) 5 MG tablet Take 1 tablet (5 mg) by mouth daily 90 tablet 4     hydrochlorothiazide (HYDRODIURIL) 25 MG tablet Take 0.5 tablets (12.5 mg) by mouth daily 45 tablet 3     hydrocortisone butyrate (LOCOID) 0.1 % SOLN Apply sparingly to affected area(s) of beard twice daily 180 mL 3     ketoconazole (NIZORAL) 2 % cream Apply twice daily to the nose as needed for white/scaling. 60 g 2      "ketoconazole (NIZORAL) 2 % shampoo Three times per week in the shower: Lather into scalp, leave in place for 3-5 minutes, then rinse. 360 mL 12     lovastatin (MEVACOR) 20 MG tablet Take 1 tablet (20 mg) by mouth At Bedtime 90 tablet 4     Multiple Vitamin (MULTIVITAMIN) per tablet Take 1 tablet by mouth daily. 1 tab daily       mycophenolate (GENERIC EQUIVALENT) 500 MG tablet Take 1 tablet (500 mg) by mouth 2 times daily 180 tablet 0     tacrolimus (PROTOPIC) 0.1 % ointment Apply topically to affected areas as instructed. 100 g 11     triamcinolone (KENALOG) 0.1 % cream Apply topically to affected areas as instructed. 454 g 11     triamcinolone (KENALOG) 0.1 % ointment Apply topically 2 times daily 454 g 11       ALLERGIES: Penicillins      REVIEW OF SYSTEMS:  As above in HPI    GENERAL PHYSICAL EXAM:   Vitals: /79  Pulse 73  Ht 1.74 m (5' 8.5\")  Wt 82.5 kg (181 lb 12.8 oz)  BMI 27.24 kg/m2  Body mass index is 27.24 kg/(m^2).    GENERAL: Well groomed, well developed, well nourished male in NAD.  NEURO: Alert and oriented x 3.  LEAH: nodularity at the left apical rim.  Medium size  PSYCH: Normal mood and affect, pleasant and agreeable during interview and exam.    LABS: The last test results for Mr. Luan Mitchell were reviewed:  PSA -   Lab Results   Component Value Date    PSA 2.10 06/21/2018    PSA 2.42 02/05/2018    PSA 2.09 01/10/2017    PSA 1.96 02/14/2011    PSA 2.47 02/25/2010    PSA 2.11 02/27/2009    PSA 1.77 03/28/2008    PSA 2.43 01/23/2007    PSA 1.86 03/02/2006    PSA 2.34 03/11/2005     BMP -   Recent Labs   Lab Test  06/21/18   0802  03/22/18   1018  02/05/18   1314  12/21/17   1024   NA  142  139   --   142   POTASSIUM  3.8  4.1   --   4.1   CHLORIDE  109  107   --   106   CO2  25  25   --   29   BUN  16  19   --   16   CR  1.06  0.93   --   1.02   GLC  150*  91  97  93   JOSHUA  8.8  9.3   --   8.8       CBC -   Recent Labs   Lab Test  06/21/18   0802  03/22/18   1018  12/21/17   1024   WBC  " 6.2  6.6  6.4   HGB  15.0  14.9  14.6   PLT  158  160  133*       ASSESSMENT:   1) prostate nodule left apex    PLAN:   Stable PSA  History of multiple biopsies, last in 2006  Will plan prostate MRI    Carolynn Carroll PA-C  Department of Urologic Surgery

## 2018-07-06 ENCOUNTER — RADIANT APPOINTMENT (OUTPATIENT)
Dept: MRI IMAGING | Facility: CLINIC | Age: 73
End: 2018-07-06
Attending: PHYSICIAN ASSISTANT
Payer: COMMERCIAL

## 2018-07-06 DIAGNOSIS — N40.2 PROSTATE NODULE: ICD-10-CM

## 2018-07-06 RX ORDER — GADOBUTROL 604.72 MG/ML
7.5 INJECTION INTRAVENOUS ONCE
Status: COMPLETED | OUTPATIENT
Start: 2018-07-06 | End: 2018-07-06

## 2018-07-06 RX ADMIN — GADOBUTROL 7.5 ML: 604.72 INJECTION INTRAVENOUS at 13:16

## 2018-07-09 ENCOUNTER — TELEPHONE (OUTPATIENT)
Dept: UROLOGY | Facility: CLINIC | Age: 73
End: 2018-07-09

## 2018-07-09 NOTE — TELEPHONE ENCOUNTER
[facility]    Phone Message    May a detailed message be left on voicemail: yes    Reason for Call: Other: Pt stated Carolynn wanted him to see an MD for his MRI follow up due to his PI-RAD. It does not say who she would like him to see. Please give him a call back.      Action Taken: Message routed to:  [facility] Urology

## 2018-07-10 NOTE — TELEPHONE ENCOUNTER
[facility] Call Center    Phone Message    May a detailed message be left on voicemail: yes    Reason for Call: Other: Pt is calling back to see who he should get scheduled with. Please give him a call back.      Action Taken: Message routed to:  [facility] Urology

## 2018-07-10 NOTE — TELEPHONE ENCOUNTER
Message sent to  coordinators to schedule consult with any provider that can perform prostate biopsies per Tiarra Lou MA

## 2018-07-16 ENCOUNTER — PRE VISIT (OUTPATIENT)
Dept: UROLOGY | Facility: CLINIC | Age: 73
End: 2018-07-16

## 2018-07-28 ENCOUNTER — OFFICE VISIT (OUTPATIENT)
Dept: UROLOGY | Facility: CLINIC | Age: 73
End: 2018-07-28
Payer: COMMERCIAL

## 2018-07-28 VITALS
SYSTOLIC BLOOD PRESSURE: 130 MMHG | BODY MASS INDEX: 27.58 KG/M2 | HEIGHT: 68 IN | DIASTOLIC BLOOD PRESSURE: 73 MMHG | HEART RATE: 83 BPM | WEIGHT: 182 LBS

## 2018-07-28 DIAGNOSIS — R97.20 ELEVATED PROSTATE SPECIFIC ANTIGEN (PSA): Primary | ICD-10-CM

## 2018-07-28 ASSESSMENT — PAIN SCALES - GENERAL: PAINLEVEL: NO PAIN (0)

## 2018-07-28 NOTE — PATIENT INSTRUCTIONS
Please follow up in one year with a PSA before.    It was a pleasure meeting with you today.  Thank you for allowing me and my team the privilege of caring for you today.  YOU are the reason we are here, and I truly hope we provided you with the excellent service you deserve.  Please let us know if there is anything else we can do for you so that we can be sure you are leaving completely satisfied with your care experience.

## 2018-07-28 NOTE — LETTER
7/28/2018       RE: Luan Mitchell  [address] [facility] [address]     Dear Colleague,    Thank you for referring your patient, Luan Mitchell, to the [facility] UROLOGY AND INST FOR PROSTATE AND UROLOGIC CANCERS at [facility]. Please see a copy of my visit note below.    Service Date: 07/28/2018      REASON FOR VISIT TODAY:  Abnormal digital rectal exam and elevated PSA.      HISTORY OF PRESENT ILLNESS:  Mr. Mitchell is a 73-year-old gentleman who was seen in our clinic previously by several other providers, including Dr. Corral and Dr. Jet Roldan in the past and more recently Amparo Carroll, our PA.  The patient has a known abnormal digital rectal exam and PSAs that have been mildly elevated for several years.  The patient recently underwent an MRI of the prostate that came back with a PI-RADS 3 lesion.  The patient comes in to see me today for further discussions of these issues.  The patient, of note, has also been on finasteride since 1996.  The patient has had 4 previous prostate biopsies, including what sounds like a saturation biopsy.  However, the patient's last biopsy was in 2006.      PHYSICAL EXAMINATION:   VITAL SIGNS:  His blood pressure is 130/73.  Pulse is 83.   GENERAL:  He is in no acute distress.   RECTAL:  The patient's digital rectal exam is significant for a slightly firm, nodular area on the left side of the prostate, again about the mid gland.        DATA:  The patient's PSA from 06/21/2018 is 2.10, which when adjusted for finasteride is 4.2.  The patient's MRI from 07/06/2018 shows a prostate volume of 64 g and a PI-RADS 3 lesion in the transition zone in the left anterior mid gland.      ASSESSMENT AND PLAN:  Over half of today's 45 minute visit was spent counseling the patient regarding his abnormal digital rectal exam and elevated PSA.  I suggested to Mr. Mitchell that in terms of his MRI, he does have a PI-RADS 3 lesion.  We discussed that we  generally biopsy 4-5, generally do not biopsy PI-RADS 1-2 lesions, and PI-RADS 3 lesions will be biopsied depending on the other clinical information at hand.  I suggested to Mr. Pace that at this point in time, I would not be in favor of another prostate biopsy given the fact that the patient's PSAs have been stable in the 3-5 range since 2005, and the patient was noted to have an abnormal digital rectal exam at around that time on the left side as well.  Therefore, it appears his digital rectal exam remained stable for many, many years as well.  Further, the patient indeed has had previous biopsies, again including a saturation biopsy, and again there have been no significant changes in his PSA since his last biopsy.  Further, I suggested to Mr. Pace that given his age of 73 years that we would also be inclined to want to push a PSA threshold closer to 10 before triggering a biopsy to minimize the discovery of indolent disease while still capturing clinically significant disease at a time when the patient would likely still be curable.  Further, the patient notes he does take CellCept, and we discussed that we would also, therefore, have an increased concern for infection should he wish to proceed with a biopsy.  Taken all these things together, at this point in time, it seems that the risk of doing the biopsy does not outweigh the likely benefit the patient may derive from undergoing the biopsy given the relatively low likelihood of finding cancer on the biopsy and further the even lower likelihood that it would be clinically significant disease.  Mr. Pace after much discussion notes that he is comfortable with this decision.  We will plan to see the patient back in 1 year's time with a PSA and plan on repeating the digital rectal exam at that time as well.         TACHO BARILLAS MD             D: 07/28/2018   T: 07/30/2018   MT: [email or web address]      Name:     AJ PACE   MRN:      [id number]        Account:       [id number]   :      1945           Service Date: 2018      Document: [id number]

## 2018-07-28 NOTE — MR AVS SNAPSHOT
After Visit Summary   7/28/2018    Luan Mitchell    MRN: [id number]           Patient Information     Date Of Birth          1945        Visit Information        Provider Department      7/28/2018 11:00 AM Asa Gentile MD [facility] Urology and [facility] for Prostate and Urologic Cancers        Today's Diagnoses     Elevated prostate specific antigen (PSA)    -  1      Care Instructions    Please follow up in one year with a PSA before.    It was a pleasure meeting with you today.  Thank you for allowing me and my team the privilege of caring for you today.  YOU are the reason we are here, and I truly hope we provided you with the excellent service you deserve.  Please let us know if there is anything else we can do for you so that we can be sure you are leaving completely satisfied with your care experience.                  Follow-ups after your visit        Your next 10 appointments already scheduled     Sep 27, 2018  9:00 AM CDT   (Arrive by 8:45 AM)   Return Visit with Adal Marin MD   [facility] Dermatology ([facility])    [address] [place] [address] [phone]              Who to contact     Please call your clinic at [phone] to:    Ask questions about your health    Make or cancel appointments    Discuss your medicines    Learn about your test results    Speak to your doctor            Additional Information About Your Visit        [employer] Information     ufindads gives you secure access to your electronic health record. If you see a primary care provider, you can also send messages to your care team and make appointments. If you have questions, please call your primary care clinic.  If you do not have a primary care provider, please call [phone] and they will assist you.      ufindads is an electronic gateway that provides easy, online access to your medical records. With ufindads, you can request a clinic  "appointment, read your test results, renew a prescription or communicate with your care team.     To access your existing account, please contact your [facility] or call [phone] for assistance.        Care EveryWhere ID     This is your Care EveryWhere ID. This could be used by other organizations to access your [facility] medical records  [id number]        Your Vitals Were     Pulse Height BMI (Body Mass Index)             83 1.727 m (5' 8\") 27.67 kg/m2          Blood Pressure from Last 3 Encounters:   No data found for BP    Weight from Last 3 Encounters:   No data found for Wt              Today, you had the following     No orders found for display       Primary Care Provider Office Phone # Fax #    Loretta Villeda [phone]       [address] [address]        Equal Access to Services     JORDON OSBORNE : Hadii cristina whittaker hadasho Soomaali, waaxda luqadaha, qaybta kaalmada adeegyada, kyaw jasmine . So [facility] [phone].    ATENCIÓN: Si habla español, tiene a toney disposición servicios gratuitos de asistencia lingüística. Pancho al [phone].    We comply with applicable federal civil rights laws and [place] laws. We do not discriminate on the basis of race, color, national origin, age, disability, sex, sexual orientation, or gender identity.            Thank you!     Thank you for choosing [facility] UROLOGY AND [facility] FOR PROSTATE AND UROLOGIC CANCERS  for your care. Our goal is always to provide you with excellent care. Hearing back from our patients is one way we can continue to improve our services. Please take a few minutes to complete the written survey that you may receive in the mail after your visit with us. Thank you!             Your Updated Medication List - Protect others around you: Learn how to safely use, store and throw away your medicines at www.disposemymeds.org.          This list is accurate as " of 7/28/18 11:59 PM.  Always use your most recent med list.                   Brand Name Dispense Instructions for use Diagnosis    ARTIFICIAL TEAR SOLUTION OP      Apply  to eye.        aspirin 81 MG tablet      Take 1 tablet by mouth daily.        * clobetasol propionate 0.05 % Sham     1 Bottle    Apply sparingly to dry scalp 3 x weekly as needed.  Leave in place for 15 m then add water, lather and rinse    Pemphigus foliaceous, Encounter for long-term (current) use of high-risk medication       * clobetasol 0.05 % ointment    TEMOVATE    180 g    APPLY TO AFFECTED AREA(S) TWO TIMES A DAY    Pemphigus foliaceous, Pemphigus foliaceus, Encounter for long-term (current) use of high-risk medication       * clobetasol 0.05 % external solution    TEMOVATE    200 mL    Apply  topically 2 times daily as needed to the scalp.    Pemphigus foliaceous, Encounter for long-term (current) use of high-risk medication       desonide 0.05 % ointment    DESOWEN    180 g    Apply topically to affected areas twice daily as instructed.    Pemphigus foliaceus, Pemphigus foliaceous, Encounter for long-term (current) use of high-risk medication       finasteride 5 MG tablet    PROSCAR    90 tablet    Take 1 tablet (5 mg) by mouth daily    Benign prostatic hyperplasia with lower urinary tract symptoms, symptom details unspecified       hydrochlorothiazide 25 MG tablet    HYDRODIURIL    45 tablet    Take 0.5 tablets (12.5 mg) by mouth daily    Benign essential hypertension       hydrocortisone butyrate 0.1 % Soln solution    LOCOID    180 mL    Apply sparingly to affected area(s) of beard twice daily    Pemphigus foliaceous, Pemphigus foliaceus, Encounter for long-term (current) use of high-risk medication       * ketoconazole 2 % cream    NIZORAL    60 g    Apply twice daily to the nose as needed for white/scaling.    Dermatitis, seborrheic       * ketoconazole 2 % shampoo    NIZORAL    360 mL    Three times per week in the shower: Lather  into scalp, leave in place for 3-5 minutes, then rinse.    Pemphigus foliaceous       lovastatin 20 MG tablet    MEVACOR    90 tablet    Take 1 tablet (20 mg) by mouth At Bedtime    Hyperlipidemia LDL goal <100       multivitamin per tablet      Take 1 tablet by mouth daily. 1 tab daily        * mycophenolate 500 MG tablet     372 tablet    Take 2 tablet (1000 mg) by mouth 2 times daily.    Pemphigus foliaceous, Encounter for long-term (current) use of high-risk medication, Pemphigus foliaceus       * mycophenolate 500 MG tablet    GENERIC EQUIVALENT    180 tablet    Take 1 tablet (500 mg) by mouth 2 times daily    Pemphigus foliaceus       tacrolimus 0.1 % ointment    PROTOPIC    100 g    Apply topically to affected areas as instructed.    Pemphigus foliaceous, Seborrhoeic dermatitis       * triamcinolone 0.1 % cream    KENALOG    454 g    Apply topically to affected areas as instructed.    Pemphigus foliaceus, Pemphigus foliaceous, Encounter for long-term (current) use of high-risk medication       * triamcinolone 0.1 % ointment    KENALOG    454 g    Apply topically 2 times daily    Pemphigus foliaceus       VITAMIN D PO      Take 1 tablet by mouth daily 1000 mg        * Notice:  This list has 9 medication(s) that are the same as other medications prescribed for you. Read the directions carefully, and ask your doctor or other care provider to review them with you.

## 2018-07-30 NOTE — PROGRESS NOTES
Service Date: 07/28/2018      REASON FOR VISIT TODAY:  Abnormal digital rectal exam and elevated PSA.      HISTORY OF PRESENT ILLNESS:  Mr. Mitchell is a 73-year-old gentleman who was seen in our clinic previously by several other providers, including Dr. Corral and Dr. Jet Roldan in the past and more recently Amparo Carroll, our PA.  The patient has a known abnormal digital rectal exam and PSAs that have been mildly elevated for several years.  The patient recently underwent an MRI of the prostate that came back with a PI-RADS 3 lesion.  The patient comes in to see me today for further discussions of these issues.  The patient, of note, has also been on finasteride since 1996.  The patient has had 4 previous prostate biopsies, including what sounds like a saturation biopsy.  However, the patient's last biopsy was in 2006.      PHYSICAL EXAMINATION:   VITAL SIGNS:  His blood pressure is 130/73.  Pulse is 83.   GENERAL:  He is in no acute distress.   RECTAL:  The patient's digital rectal exam is significant for a slightly firm, nodular area on the left side of the prostate, again about the mid gland.        DATA:  The patient's PSA from 06/21/2018 is 2.10, which when adjusted for finasteride is 4.2.  The patient's MRI from 07/06/2018 shows a prostate volume of 64 g and a PI-RADS 3 lesion in the transition zone in the left anterior mid gland.      ASSESSMENT AND PLAN:  Over half of today's 45 minute visit was spent counseling the patient regarding his abnormal digital rectal exam and elevated PSA.  I suggested to Mr. Mitchell that in terms of his MRI, he does have a PI-RADS 3 lesion.  We discussed that we generally biopsy 4-5, generally do not biopsy PI-RADS 1-2 lesions, and PI-RADS 3 lesions will be biopsied depending on the other clinical information at hand.  I suggested to Mr. Mitchell that at this point in time, I would not be in favor of another prostate biopsy given the fact that the patient's PSAs have been stable in the  3-5 range since , and the patient was noted to have an abnormal digital rectal exam at around that time on the left side as well.  Therefore, it appears his digital rectal exam remained stable for many, many years as well.  Further, the patient indeed has had previous biopsies, again including a saturation biopsy, and again there have been no significant changes in his PSA since his last biopsy.  Further, I suggested to Mr. Pace that given his age of 73 years that we would also be inclined to want to push a PSA threshold closer to 10 before triggering a biopsy to minimize the discovery of indolent disease while still capturing clinically significant disease at a time when the patient would likely still be curable.  Further, the patient notes he does take CellCept, and we discussed that we would also, therefore, have an increased concern for infection should he wish to proceed with a biopsy.  Taken all these things together, at this point in time, it seems that the risk of doing the biopsy does not outweigh the likely benefit the patient may derive from undergoing the biopsy given the relatively low likelihood of finding cancer on the biopsy and further the even lower likelihood that it would be clinically significant disease.  Mr. Pace after much discussion notes that he is comfortable with this decision.  We will plan to see the patient back in 1 year's time with a PSA and plan on repeating the digital rectal exam at that time as well.         TACHO BARILLAS MD             D: 2018   T: 2018   MT: [email or web address]      Name:     AJ PACE   MRN:      [id number]        Account:      [id number]   :      1945           Service Date: 2018      Document: [id number]

## 2018-09-25 ENCOUNTER — RADIANT APPOINTMENT (OUTPATIENT)
Dept: GENERAL RADIOLOGY | Facility: CLINIC | Age: 73
End: 2018-09-25
Payer: COMMERCIAL

## 2018-09-25 ENCOUNTER — OFFICE VISIT (OUTPATIENT)
Dept: INTERNAL MEDICINE | Facility: CLINIC | Age: 73
End: 2018-09-25
Payer: COMMERCIAL

## 2018-09-25 VITALS
DIASTOLIC BLOOD PRESSURE: 77 MMHG | WEIGHT: 178.3 LBS | SYSTOLIC BLOOD PRESSURE: 127 MMHG | HEART RATE: 80 BPM | BODY MASS INDEX: 27.11 KG/M2

## 2018-09-25 DIAGNOSIS — R53.83 OTHER FATIGUE: ICD-10-CM

## 2018-09-25 DIAGNOSIS — R06.02 SOB (SHORTNESS OF BREATH): ICD-10-CM

## 2018-09-25 DIAGNOSIS — L10.2 PEMPHIGUS FOLIACEOUS (H): ICD-10-CM

## 2018-09-25 DIAGNOSIS — R06.02 SOB (SHORTNESS OF BREATH): Primary | ICD-10-CM

## 2018-09-25 DIAGNOSIS — R97.20 ELEVATED PROSTATE SPECIFIC ANTIGEN (PSA): ICD-10-CM

## 2018-09-25 LAB
ALBUMIN SERPL-MCNC: 3.7 G/DL (ref 3.4–5)
ALP SERPL-CCNC: 36 U/L (ref 40–150)
ALT SERPL W P-5'-P-CCNC: 35 U/L (ref 0–70)
ANION GAP SERPL CALCULATED.3IONS-SCNC: 5 MMOL/L (ref 3–14)
AST SERPL W P-5'-P-CCNC: 26 U/L (ref 0–45)
BASOPHILS # BLD AUTO: 0 10E9/L (ref 0–0.2)
BASOPHILS NFR BLD AUTO: 0.2 %
BILIRUB SERPL-MCNC: 0.6 MG/DL (ref 0.2–1.3)
BUN SERPL-MCNC: 13 MG/DL (ref 7–30)
CALCIUM SERPL-MCNC: 8.8 MG/DL (ref 8.5–10.1)
CHLORIDE SERPL-SCNC: 109 MMOL/L (ref 94–109)
CK SERPL-CCNC: 197 U/L (ref 30–300)
CO2 SERPL-SCNC: 25 MMOL/L (ref 20–32)
CREAT SERPL-MCNC: 0.97 MG/DL (ref 0.66–1.25)
CRP SERPL-MCNC: <2.9 MG/L (ref 0–8)
DIFFERENTIAL METHOD BLD: ABNORMAL
EOSINOPHIL # BLD AUTO: 0.1 10E9/L (ref 0–0.7)
EOSINOPHIL NFR BLD AUTO: 1.6 %
ERYTHROCYTE [DISTWIDTH] IN BLOOD BY AUTOMATED COUNT: 12.9 % (ref 10–15)
ERYTHROCYTE [SEDIMENTATION RATE] IN BLOOD BY WESTERGREN METHOD: 7 MM/H (ref 0–20)
GFR SERPL CREATININE-BSD FRML MDRD: 75 ML/MIN/1.7M2
GLUCOSE SERPL-MCNC: 100 MG/DL (ref 70–99)
HCT VFR BLD AUTO: 42 % (ref 40–53)
HGB BLD-MCNC: 14.6 G/DL (ref 13.3–17.7)
IMM GRANULOCYTES # BLD: 0 10E9/L (ref 0–0.4)
IMM GRANULOCYTES NFR BLD: 0.3 %
LYMPHOCYTES # BLD AUTO: 1.2 10E9/L (ref 0.8–5.3)
LYMPHOCYTES NFR BLD AUTO: 20.1 %
MCH RBC QN AUTO: 32 PG (ref 26.5–33)
MCHC RBC AUTO-ENTMCNC: 34.8 G/DL (ref 31.5–36.5)
MCV RBC AUTO: 92 FL (ref 78–100)
MONOCYTES # BLD AUTO: 0.5 10E9/L (ref 0–1.3)
MONOCYTES NFR BLD AUTO: 8.2 %
NEUTROPHILS # BLD AUTO: 4 10E9/L (ref 1.6–8.3)
NEUTROPHILS NFR BLD AUTO: 69.6 %
NRBC # BLD AUTO: 0 10*3/UL
NRBC BLD AUTO-RTO: 0 /100
PLATELET # BLD AUTO: 133 10E9/L (ref 150–450)
POTASSIUM SERPL-SCNC: 4.3 MMOL/L (ref 3.4–5.3)
PROT SERPL-MCNC: 6.7 G/DL (ref 6.8–8.8)
PSA SERPL-MCNC: 2.05 UG/L (ref 0–4)
RBC # BLD AUTO: 4.56 10E12/L (ref 4.4–5.9)
SODIUM SERPL-SCNC: 139 MMOL/L (ref 133–144)
TSH SERPL DL<=0.005 MIU/L-ACNC: 3.55 MU/L (ref 0.4–4)
WBC # BLD AUTO: 5.8 10E9/L (ref 4–11)

## 2018-09-25 ASSESSMENT — PAIN SCALES - GENERAL: PAINLEVEL: NO PAIN (0)

## 2018-09-25 NOTE — PATIENT INSTRUCTIONS
[facility] Medication Refill Request Information:  * Please contact your pharmacy regarding ANY request for medication refills.  ** [facility] Prescription Fax = [phone]  * Please allow 3 business days for routine medication refills.  * Please allow 5 business days for controlled substance medication refills.     [facility] Test Result notification information:  *You will be notified with in 7-10 days of your appointment day regarding the results of your test.  If you are on MyChart you will be notified as soon as the provider has reviewed the results and signed off on them.    [facility]: [phone]                [facility]         Internal Medicine Resident                   Continuity Clinic    Who We Are    Resident Continuity Clinic is a part of the [facility] Primary Care Clinic.  Resident physicians see patients independently and establish a relationship with them over the course of their three-year residency program.  As with the Primary Care Clinic, our Resident Continuity Clinic models a group practice.  If your doctor is not available, you will be able to see another resident physician.  At the end of a resident s training, patients will be transitioned to a new resident physician for ongoing care.     We treat patients with a wide array of medical needs from routine physicals, to acute illnesses, to diabetes and blood pressure management, to complex medical illness.  What is a Resident Physician?    Resident physicians hold medical degrees and are doctors. They are training to become specialists in Internal Medicine. They work under the supervision of board-certified faculty physicians.  Expectations for Your Care    We strive to provide accessible, quality care at all times.    In order to provide this care, it is best to see your primary care resident doctor consistently rather switching between providers.  In the event you do see another physician, you should  schedule a follow-up visit with your usual primary care doctor.    If you are transitioning your care from another clinic, it is helpful to have your records available for your doctor to review.    We do not prescribe controlled substances, such as ADD medications or narcotic pain medications, on your first visit.  We will review your health records and concerns prior to devising a treatment plan with you in order to provide the best care.      Clinic Services     Extended clinic hours; patient  to help navigate your visit;  parking; laboratory and imaging services with evening and weekend hours    Multiple medical and surgical specialties in one building    Complementary services, including Nutrition, Integrative Medicine, Pharmacy consultations, Mental and Behavioral Health, Sports Medicine and Physical Therapy    Thank You    We would like to thank you for choosing the [facility] Internal Medicine Resident Continuity Clinic for your primary care. You are making a priceless contribution to the training of the next generation of health care practitioners.     Contact us at [phone] for appointments or questions.    Resident Clinic Hours are Tuesdays and Thursdays, 7:30am-5:00pm    Residents  Stephanie Maynard MD   (Female )   Macarena Gordon MD   (Female)   Brian Pollack MD  (Male)   Pb Smith MD  (Male)   Fany Pedraza MD   (Female)   Noé Trotter MD  (Male)    Keven Veloz MD  (Male)   Ángel Hartley MD  (Male)   Pb Calabrese MD (Male)   Singh Bay MD  (Male)   Elza Pickens MD (Female)    Daylin Gifford MD (Female)   Julisa Peraza MD  (Male)   Alexandra Mallory MD(Female)   Rona Oneill MD  (Female)    Supervising Physicians   MD Osiris Messina MD Briar Duffy, MD James Langland, MD Mary Logeais, MD Tanya Melnik, MD Charles Moldow, MD Heather Thompson Buum, MD Kathleen Watson, MD

## 2018-09-25 NOTE — MR AVS SNAPSHOT
After Visit Summary   9/25/2018    Luan Mitchell    MRN: [id number]           Patient Information     Date Of Birth          1945        Visit Information        Provider Department      9/25/2018 12:45 PM Ángel Hartley, DO [facility]        Today's Diagnoses     SOB (shortness of breath)    -  1    Other fatigue          Care Instructions    Primary Care Center Medication Refill Request Information:  * Please contact your pharmacy regarding ANY request for medication refills.  ** [facility] Prescription Fax = [phone]  * Please allow 3 business days for routine medication refills.  * Please allow 5 business days for controlled substance medication refills.     Primary Care Center Test Result notification information:  *You will be notified with in 7-10 days of your appointment day regarding the results of your test.  If you are on MyChart you will be notified as soon as the provider has reviewed the results and signed off on them.    Primary Care Center: [phone]                [facility]         Internal Medicine Resident                   Continuity Clinic    Who We Are    Resident Continuity Clinic is a part of the [facility].  Resident physicians see patients independently and establish a relationship with them over the course of their three-year residency program.  As with the Primary Care Clinic, our Resident Continuity Clinic models a group practice.  If your doctor is not available, you will be able to see another resident physician.  At the end of a resident s training, patients will be transitioned to a new resident physician for ongoing care.     We treat patients with a wide array of medical needs from routine physicals, to acute illnesses, to diabetes and blood pressure management, to complex medical illness.  What is a Resident Physician?    Resident physicians hold medical degrees and are doctors. They are training to become  specialists in Internal Medicine. They work under the supervision of board-certified faculty physicians.  Expectations for Your Care    We strive to provide accessible, quality care at all times.    In order to provide this care, it is best to see your primary care resident doctor consistently rather switching between providers.  In the event you do see another physician, you should schedule a follow-up visit with your usual primary care doctor.    If you are transitioning your care from another clinic, it is helpful to have your records available for your doctor to review.    We do not prescribe controlled substances, such as ADD medications or narcotic pain medications, on your first visit.  We will review your health records and concerns prior to devising a treatment plan with you in order to provide the best care.      Clinic Services     Extended clinic hours; patient  to help navigate your visit;  parking; laboratory and imaging services with evening and weekend hours    Multiple medical and surgical specialties in one building    Complementary services, including Nutrition, Integrative Medicine, Pharmacy consultations, Mental and Behavioral Health, Sports Medicine and Physical Therapy    Thank You    We would like to thank you for choosing the [facility] Internal Medicine Resident Continuity Clinic for your primary care. You are making a priceless contribution to the training of the next generation of health care practitioners.     Contact us at [phone] for appointments or questions.    Resident Clinic Hours are Tuesdays and Thursdays, 7:30am-5:00pm    Residents  Stephanie Maynard MD   (Female )   Macarena Gordon MD   (Female)   Brian Pollack MD  (Male)   Pb Smith MD  (Male)   Fany Pedraza MD   (Female)   Noé Trotter MD  (Male)    Keven Veloz MD  (Male)   Ángel Hartley MD  (Male)   Pb Calabrese MD (Male)   Singh Bay MD  (Male)   Elza Pickens,  MD (Female)    Daylin Gifford MD (Female)   Julisa Peraza MD  (Male)   Alexandra Mallory MD(Female)   Rona Oneill MD  (Female)    Supervising Physicians   MD Osiris Messina, MD Dragan Claros, MD Devendra Telles, MD Barb Garrett, MD Alanna Garcia, MD Lázaro Matias, MD Eileen Blackwell, MD Loretta Villeda MD                    Follow-ups after your visit        Follow-up notes from your care team     Return in about 4 weeks (around 10/23/2018) for Follow up in 4-6 weeks for reassessment of symptoms.      Your next 10 appointments already scheduled     Sep 27, 2018  9:00 AM CDT   (Arrive by 8:45 AM)   Return Visit with Adal Marin MD   [facility] ([facility])    [address] [place] [id number] [phone]              Future tests that were ordered for you today     Open Future Orders        Priority Expected Expires Ordered    XR Chest 2 Views Routine 9/25/2018 9/25/2019 9/25/2018    TSH with free T4 reflex Routine 9/25/2018 10/9/2018 9/25/2018            Who to contact     Please call your clinic at [phone] to:    Ask questions about your health    Make or cancel appointments    Discuss your medicines    Learn about your test results    Speak to your doctor            Additional Information About Your Visit        Holvi Information     Holvi gives you secure access to your electronic health record. If you see a primary care provider, you can also send messages to your care team and make appointments. If you have questions, please call your primary care clinic.  If you do not have a primary care provider, please call [phone] and they will assist you.      Holvi is an electronic gateway that provides easy, online access to your medical records. With Holvi, you can request a clinic appointment, read your test results, renew a prescription or communicate with your care  team.     To access your existing account, please contact your [facility] or call [phone] for assistance.        Care EveryWhere ID     This is your Care EveryWhere ID. This could be used by other organizations to access your [place] medical records  [id number]        Your Vitals Were     Pulse BMI (Body Mass Index)                80 27.11 kg/m2           Blood Pressure from Last 3 Encounters:   09/25/18 127/77   07/28/18 130/73   06/26/18 127/79    Weight from Last 3 Encounters:   09/25/18 80.9 kg (178 lb 4.8 oz)   07/28/18 82.6 kg (182 lb)   06/26/18 82.5 kg (181 lb 12.8 oz)                 Today's Medication Changes          These changes are accurate as of 9/25/18  1:29 PM.  If you have any questions, ask your nurse or doctor.               These medicines have changed or have updated prescriptions.        Dose/Directions    mycophenolate 500 MG tablet   Commonly known as:  GENERIC EQUIVALENT   This may have changed:  Another medication with the same name was removed. Continue taking this medication, and follow the directions you see here.   Used for:  Pemphigus foliaceus   Changed by:  Ángel Hartley DO        Dose:  500 mg   Take 1 tablet (500 mg) by mouth 2 times daily   Quantity:  180 tablet   Refills:  0                Primary Care Provider Office Phone # Fax #    Loretta ORONA [phone] [address] [address]        Equal Access to Services     FABIO OSBORNE AH: Manish trammello [employer], waaxda luqadaha, qaybta kaalmada kyaw cruz [facility] reyes. So [facility] [phone].    ATENCIÓN: Si habla español, tiene a toney disposición servicios gratuitos de asistencia lingüística. Llame al [phone].    We comply with applicable federal civil rights laws and [place] laws. We do not discriminate on the basis of race, color, national origin, age, disability, sex, sexual orientation, or gender  identity.            Thank you!     Thank you for choosing [facility]  for your care. Our goal is always to provide you with excellent care. Hearing back from our patients is one way we can continue to improve our services. Please take a few minutes to complete the written survey that you may receive in the mail after your visit with us. Thank you!             Your Updated Medication List - Protect others around you: Learn how to safely use, store and throw away your medicines at www.disposemymeds.org.          This list is accurate as of 9/25/18  1:29 PM.  Always use your most recent med list.                   Brand Name Dispense Instructions for use Diagnosis    ARTIFICIAL TEAR SOLUTION OP      Apply  to eye.        aspirin 81 MG tablet      Take 1 tablet by mouth daily.        * clobetasol propionate 0.05 % Sham     1 Bottle    Apply sparingly to dry scalp 3 x weekly as needed.  Leave in place for 15 m then add water, lather and rinse    Pemphigus foliaceous, Encounter for long-term (current) use of high-risk medication       * clobetasol 0.05 % ointment    TEMOVATE    180 g    APPLY TO AFFECTED AREA(S) TWO TIMES A DAY    Pemphigus foliaceous, Pemphigus foliaceus, Encounter for long-term (current) use of high-risk medication       * clobetasol 0.05 % external solution    TEMOVATE    200 mL    Apply  topically 2 times daily as needed to the scalp.    Pemphigus foliaceous, Encounter for long-term (current) use of high-risk medication       desonide 0.05 % ointment    DESOWEN    180 g    Apply topically to affected areas twice daily as instructed.    Pemphigus foliaceus, Pemphigus foliaceous, Encounter for long-term (current) use of high-risk medication       finasteride 5 MG tablet    PROSCAR    90 tablet    Take 1 tablet (5 mg) by mouth daily    Benign prostatic hyperplasia with lower urinary tract symptoms, symptom details unspecified       hydrochlorothiazide 25 MG tablet    HYDRODIURIL    45  tablet    Take 0.5 tablets (12.5 mg) by mouth daily    Benign essential hypertension       hydrocortisone butyrate 0.1 % Soln solution    LOCOID    180 mL    Apply sparingly to affected area(s) of beard twice daily    Pemphigus foliaceous, Pemphigus foliaceus, Encounter for long-term (current) use of high-risk medication       * ketoconazole 2 % cream    NIZORAL    60 g    Apply twice daily to the nose as needed for white/scaling.    Dermatitis, seborrheic       * ketoconazole 2 % shampoo    NIZORAL    360 mL    Three times per week in the shower: Lather into scalp, leave in place for 3-5 minutes, then rinse.    Pemphigus foliaceous       lovastatin 20 MG tablet    MEVACOR    90 tablet    Take 1 tablet (20 mg) by mouth At Bedtime    Hyperlipidemia LDL goal <100       multivitamin per tablet      Take 1 tablet by mouth daily. 1 tab daily        mycophenolate 500 MG tablet    GENERIC EQUIVALENT    180 tablet    Take 1 tablet (500 mg) by mouth 2 times daily    Pemphigus foliaceus       tacrolimus 0.1 % ointment    PROTOPIC    100 g    Apply topically to affected areas as instructed.    Pemphigus foliaceous, Seborrhoeic dermatitis       * triamcinolone 0.1 % cream    KENALOG    454 g    Apply topically to affected areas as instructed.    Pemphigus foliaceus, Pemphigus foliaceous, Encounter for long-term (current) use of high-risk medication       * triamcinolone 0.1 % ointment    KENALOG    454 g    Apply topically 2 times daily    Pemphigus foliaceus       VITAMIN D PO      Take 1 tablet by mouth daily 1000 mg        * Notice:  This list has 7 medication(s) that are the same as other medications prescribed for you. Read the directions carefully, and ask your doctor or other care provider to review them with you.

## 2018-09-25 NOTE — PROGRESS NOTES
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 Patient states that symptoms began several months ago. He reports that his fatigue does not occur every day and is often diminished if he has something to do that day. He does not regularly take daytime naps but does feel refreshed afterwards if he does nap. He does endorse rare snoring per wife, but states that he does consistently get adequate sleep. Shortness of breath also began several months ago, but he does not feel that this associated with his fatigue. SOB is only with exertion and does not occur consistently every day. He states that he is still able to walk several miles several times per day without needing to stop. Dyspnea is improved when lying flat. He denies any changes in appetite, changes in weight, fever, chills, muscle aches, cough, nausea, vomiting, changes in bowel habits, lower extremity swelling, or syncope.     Medications and allergies reviewed by me today.     ROS:   Constitutional, neuro, ENT, endocrine, pulmonary, cardiac, gastrointestinal, genitourinary, musculoskeletal, integument and psychiatric systems are negative, except as otherwise noted.    OBJECTIVE:    /77 (BP Location: Right arm, Patient Position: Sitting, Cuff Size: Adult Regular)  Pulse 80  Wt 80.9 kg (178 lb 4.8 oz)  BMI 27.11 kg/m2   Wt Readings from Last 1 Encounters:   09/25/18 80.9 kg (178 lb 4.8 oz)       GENERAL APPEARANCE: healthy, alert and no distress     EYES: EOMI,  PERRL     HENT:mouth without ulcers or lesions     NECK: no adenopathy, no asymmetry, masses, or scars, JVD normal     RESP: lungs clear to auscultation - no rales, rhonchi or wheezes     CV: regular rates and rhythm, normal S1 S2, no S3 or S4 and no murmur, click or rub     ABDOMEN:  soft, nontender, no HSM or masses and bowel sounds normal      MS: extremities normal- no gross deformities noted, no evidence of inflammation in joints, FROM in all extremities.     SKIN: multiple plaques scattered over trunk and back     NEURO: Normal strength and tone, sensory exam grossly normal, mentation intact and speech normal     PSYCH: mentation appears normal. and affect normal/bright     LYMPHATICS: No cervical adenopathy     ASSESSMENT/PLAN:  Luan was seen today for fatigue and shortness of breath.    Diagnoses and all orders for this visit:    SOB (shortness of breath)  Patients shortness of breath does not appear to be impacting his ADLs. Of note, his symptoms are intermittent, may occur every 1-3 days, and are improved when lying flat. Less likely to represent heart failure at this time stable fluid status, absence of pulmonary findings on exam in context of recent negative stress test. Although patient is currently on MMF, intermittency of his symptoms and lack of infectious symptoms would not be consistent with bacterial pneumonia  -     XR Chest 2 Views; Future    Fatigue  Found to have TSH of 4.66 and free T4 of 0.80 on 02/2018, with TSH in age appropriate range. His symptoms are not likely related to subacute hypothyroidism as they appear to be intermittent and are improved with daily activity. Unlikely to represent MACK in setting of rare snoring, waking well rested and size.   -     TSH with free T4 reflex; Future  -     Erythrocyte sedimentation rate; Future  -     CRP inflammation; Future  -     CK total; Future       Pt should return to clinic for f/u with me in 4-6 weeks for reassess symptoms.     Ángel Hartley,   Sep 25, 2018    Pt was seen and plan of care discussed with Dr. Telles  While the patient was in clinic, I reviewed the pertinent medical history and results.  I discussed the current findings on physical examination, as well as the patient s diagnosis and treatment plan with the resident and agree with the information as documented  with the following exceptions: none.  Devendra Telles

## 2018-09-25 NOTE — NURSING NOTE
Chief Complaint   Patient presents with     Fatigue     for several months     Shortness of Breath     worse for past two months       Helder Kaur, EMT 12:37 PM on 9/25/2018.

## 2018-09-27 ENCOUNTER — OFFICE VISIT (OUTPATIENT)
Dept: DERMATOLOGY | Facility: CLINIC | Age: 73
End: 2018-09-27
Payer: COMMERCIAL

## 2018-09-27 DIAGNOSIS — L10.2 PEMPHIGUS FOLIACEUS (H): ICD-10-CM

## 2018-09-27 DIAGNOSIS — L21.9 DERMATITIS, SEBORRHEIC: ICD-10-CM

## 2018-09-27 DIAGNOSIS — L10.2 PEMPHIGUS FOLIACEOUS (H): Primary | ICD-10-CM

## 2018-09-27 RX ORDER — MYCOPHENOLATE MOFETIL 500 MG/1
500 TABLET ORAL 2 TIMES DAILY
Qty: 180 TABLET | Refills: 1 | Status: SHIPPED | OUTPATIENT
Start: 2018-09-27 | End: 2019-03-04

## 2018-09-27 ASSESSMENT — PAIN SCALES - GENERAL: PAINLEVEL: NO PAIN (0)

## 2018-09-27 NOTE — PATIENT INSTRUCTIONS
For the beard area: start ketoconazole shampoo 3x weekly  For worst areas on the face/jaw: use protopic ointment twice daily.   Could use clobetasol solution for short periods of time (<2 weeks) on the beard on occasion.   Consider clobetasol ointment for a couple weeks on the low back.   Continue on the MMF 500mg twice daily   Continue remainder or regimen.

## 2018-09-27 NOTE — MR AVS SNAPSHOT
After Visit Summary   9/27/2018    Luan Mitchell    MRN: [id number]           Patient Information     Date Of Birth          1945        Visit Information        Provider Department      9/27/2018 9:00 AM Adal Marin MD [facility]        Today's Diagnoses     Pemphigus foliaceous    -  1    Dermatitis, seborrheic        Pemphigus foliaceus          Care Instructions    For the beard area: start ketoconazole shampoo 3x weekly  For worst areas on the face/jaw: use protopic ointment twice daily.   Could use clobetasol solution for short periods of time (<2 weeks) on the beard on occasion.   Consider clobetasol ointment for a couple weeks on the low back.   Continue on the MMF 500mg twice daily   Continue remainder or regimen.           Follow-ups after your visit        Follow-up notes from your care team     Return in about 3 months (around 12/27/2018).      Your next 10 appointments already scheduled     Oct 29, 2018  1:25 PM CDT   (Arrive by 1:10 PM)   Return Visit with Loretta ORONA MD   [facility] ([facility])    [address] [place] [id number] [phone]            Dec 13, 2018 10:00 AM CST   (Arrive by 9:45 AM)   Return Visit with Adal Marin MD   [facility] ([facility])    [address] [place] [id number] [phone]              Who to contact     Please call your clinic at [phone] to:    Ask questions about your health    Make or cancel appointments    Discuss your medicines    Learn about your test results    Speak to your doctor            Additional Information About Your Visit        MyChart Information     iRidget gives you secure access to your electronic health record. If you see a primary care provider, you can also send messages to your care team and make appointments. If you have questions, please call  your primary care clinic.  If you do not have a primary care provider, please call [phone] and they will assist you.      Legend[employer]D is an electronic gateway that provides easy, online access to your medical records. With [employer], you can request a clinic appointment, read your test results, renew a prescription or communicate with your care team.     To access your existing account, please contact your [facility] or call [phone] for assistance.        Care EveryWhere ID     This is your Care EveryWhere ID. This could be used by other organizations to access your [place] medical records  [id number]         Blood Pressure from Last 3 Encounters:   09/25/18 127/77   07/28/18 130/73   06/26/18 127/79    Weight from Last 3 Encounters:   09/25/18 80.9 kg (178 lb 4.8 oz)   07/28/18 82.6 kg (182 lb)   06/26/18 82.5 kg (181 lb 12.8 oz)              Today, you had the following     No orders found for display         Where to get your medicines      These medications were sent to [facility] [address] [address]     Phone:  [phone]     mycophenolate 500 MG tablet          Primary Care Provider Office Phone # Fax #    Loretta ORONA [phone]       [address] [address]        Equal Access to Services     [facility]IO [facility] : Hadii aad ku hadasho Soomaali, waaxda luqadaha, qaybta kaalmada adeegyada, kyaw jasmine . So [facility] [phone].    ATENCIÓN: Si habla español, tiene a toney disposición servicios gratuitos de asistencia lingüística. Llame al [phone].    We comply with applicable federal civil rights laws and [place] laws. We do not discriminate on the basis of race, color, national origin, age, disability, sex, sexual orientation, or gender identity.            Thank you!     Thank you for choosing [facility]  for  your care. Our goal is always to provide you with excellent care. Hearing back from our patients is one way we can continue to improve our services. Please take a few minutes to complete the written survey that you may receive in the mail after your visit with us. Thank you!             Your Updated Medication List - Protect others around you: Learn how to safely use, store and throw away your medicines at www.disposemymeds.org.          This list is accurate as of 9/27/18 11:20 AM.  Always use your most recent med list.                   Brand Name Dispense Instructions for use Diagnosis    ARTIFICIAL TEAR SOLUTION OP      Apply  to eye.        aspirin 81 MG tablet      Take 1 tablet by mouth daily.        * clobetasol propionate 0.05 % Sham     1 Bottle    Apply sparingly to dry scalp 3 x weekly as needed.  Leave in place for 15 m then add water, lather and rinse    Pemphigus foliaceous, Encounter for long-term (current) use of high-risk medication       * clobetasol 0.05 % ointment    TEMOVATE    180 g    APPLY TO AFFECTED AREA(S) TWO TIMES A DAY    Pemphigus foliaceous, Pemphigus foliaceus, Encounter for long-term (current) use of high-risk medication       * clobetasol 0.05 % external solution    TEMOVATE    200 mL    Apply  topically 2 times daily as needed to the scalp.    Pemphigus foliaceous, Encounter for long-term (current) use of high-risk medication       desonide 0.05 % ointment    DESOWEN    180 g    Apply topically to affected areas twice daily as instructed.    Pemphigus foliaceus, Pemphigus foliaceous, Encounter for long-term (current) use of high-risk medication       finasteride 5 MG tablet    PROSCAR    90 tablet    Take 1 tablet (5 mg) by mouth daily    Benign prostatic hyperplasia with lower urinary tract symptoms, symptom details unspecified       hydrochlorothiazide 25 MG tablet    HYDRODIURIL    45 tablet    Take 0.5 tablets (12.5 mg) by mouth daily    Benign essential hypertension        hydrocortisone butyrate 0.1 % Soln solution    LOCOID    180 mL    Apply sparingly to affected area(s) of beard twice daily    Pemphigus foliaceous, Pemphigus foliaceus, Encounter for long-term (current) use of high-risk medication       * ketoconazole 2 % cream    NIZORAL    60 g    Apply twice daily to the nose as needed for white/scaling.    Dermatitis, seborrheic       * ketoconazole 2 % shampoo    NIZORAL    360 mL    Three times per week in the shower: Lather into scalp, leave in place for 3-5 minutes, then rinse.    Pemphigus foliaceous       lovastatin 20 MG tablet    MEVACOR    90 tablet    Take 1 tablet (20 mg) by mouth At Bedtime    Hyperlipidemia LDL goal <100       multivitamin per tablet      Take 1 tablet by mouth daily. 1 tab daily        mycophenolate 500 MG tablet    GENERIC EQUIVALENT    180 tablet    Take 1 tablet (500 mg) by mouth 2 times daily    Pemphigus foliaceus       tacrolimus 0.1 % ointment    PROTOPIC    100 g    Apply topically to affected areas as instructed.    Pemphigus foliaceous, Seborrhoeic dermatitis       * triamcinolone 0.1 % cream    KENALOG    454 g    Apply topically to affected areas as instructed.    Pemphigus foliaceus, Pemphigus foliaceous, Encounter for long-term (current) use of high-risk medication       * triamcinolone 0.1 % ointment    KENALOG    454 g    Apply topically 2 times daily    Pemphigus foliaceus       VITAMIN D PO      Take 1 tablet by mouth daily 1000 mg        * Notice:  This list has 7 medication(s) that are the same as other medications prescribed for you. Read the directions carefully, and ask your doctor or other care provider to review them with you.

## 2018-09-27 NOTE — NURSING NOTE
Dermatology Rooming Note    Luan Mitchell's goals for this visit include:   Chief Complaint   Patient presents with     Derm Problem     3 m pemphigus follow up, Luan states he has been doing well but does have some questions regarding his medications.      Osiris Estevez LPN

## 2018-09-27 NOTE — LETTER
2018       RE: Luan Mitchell  [address] [facility] [address]     Dear Colleague,    Thank you for referring your patient, Luan Mitchell, to the [facility] DERMATOLOGY at [facility] acute hospital. Please see a copy of my visit note below.    [facility] Dermatology Note      Dermatology Problem List:   1.  Pemphigus foliaceus.     - Goals of treatment per Mr. Mitchell:  Prevent itching and secondary infections.   - He is not interested in complete clearance at this time.     - Titers 2015.   - Cell surface IgG 1-5, 120, monkey esophagus; 1280 intact human skin.   - DSG-1 Ig units (positive greater than 20, negative less than 14).   - DSG-3 Ig units (positive greater than 20, negative less than 9).   - Current treatment 500 mg b.i.d. CellCept (tapered f/ 2g daily), topical steroids as below.   2.  History of seborrheic keratoses.   3.  Seborrheic dermatitis, on ketaconazole shampoo and cream.   4. DN, Mild. Right neck. Bx 3/22/18.      Encounter Date:  2018      CC: F/u pemphigus     History of Present Illness:   Mr. Luan Mitchell is a pleasant, 73-year-old man who is well-known to Dermatology who presents today in routine followup for pemphigus foliaceus.  He was last seen in followup 18. Since that time, he notes his skin has been under good control. Mild flare of itchy and scaly spots on the jawline/beard areas. Would like us to look closely there. Low back with a larger plaque; notes that this is a recurrent issue for him.  He is continuing with use of his topical steroid regimen without any notable side effects.  He is also doing well on CellCept 500 mg b.i.d., which he is tolerating well without any side effects noted. Still getting lesions but no open ulcerations.  He denies any infectious symptoms.  No fevers, chills, sweats, GI side effects, cough, shortness of breath, muscle or joint pains or other issues lately.  He knows he  is due for labs today.  His last safety labs were reviewed from 2 days ago and were stable and reassuring.        Past Medical History:   Diagnosis Date     BPH (benign prostatic hyperplasia)      Essential hypertension 02/2018     Hyperlipidemia LDL goal <130      Pemphigus     pemphigus foliaceus     Retinal detachment 2009    left eye- corrected with laser     Vitreous detachment 2009    LE       Social History:     The patient is a retired .  His wife is currently undergoing surveillance for soft tissue sarcoma of her right buttock, and they are following up in [place]; doing well with this.      Family History:   Not addressed.        Current Outpatient Prescriptions   Medication     ARTIFICIAL TEAR SOLUTION OP     aspirin 81 MG tablet     Cholecalciferol (VITAMIN D PO)     clobetasol (TEMOVATE) 0.05 % external solution     clobetasol (TEMOVATE) 0.05 % ointment     clobetasol propionate 0.05 % SHAM     desonide (DESOWEN) 0.05 % ointment     finasteride (PROSCAR) 5 MG tablet     hydrochlorothiazide (HYDRODIURIL) 25 MG tablet     hydrocortisone butyrate (LOCOID) 0.1 % SOLN     ketoconazole (NIZORAL) 2 % cream     ketoconazole (NIZORAL) 2 % shampoo     lovastatin (MEVACOR) 20 MG tablet     Multiple Vitamin (MULTIVITAMIN) per tablet     mycophenolate (GENERIC EQUIVALENT) 500 MG tablet     tacrolimus (PROTOPIC) 0.1 % ointment     triamcinolone (KENALOG) 0.1 % cream     triamcinolone (KENALOG) 0.1 % ointment     [DISCONTINUED] mycophenolate (GENERIC EQUIVALENT) 500 MG tablet     No current facility-administered medications for this visit.       Allergies   Allergen Reactions     Penicillins Swelling         Review of Systems:   A 10 point review of systems was negative beyond that stated above in the HPI.       Physical exam:   There were no vitals taken for this visit.   GENERAL:  A well-appearing  male appearing his stated age, affect normal, cooperative with the exam, alert and  oriented x3.     SKIN:  A focused skin examination is performed today, including the scalp, face, neck, chest, abdomen, back, upper extremities and distal lower extremities.  The patient has scattered, crusted, mildly scaly, pink papules and thin plaques scattered over the central trunk, chest, abdomen and back.  There are no lesions with overlying bullae or significant ulcerations or erosions.  There are less areas involved today and many areas of post inflammatory hyper- and hypopigmentation at previous sites of inflammation. Large urticarial plaque with overlying ointment on the central low back. Small erosion over the right lateral ala. Scaly thin round plaques on the right jawline/beard. NERD at the well healed scar on the right neck today. Scattered, waxy, [id number] papules over the areas evaluated.         Impression/Plan:    1.  Pemphigus foliaceus.  Moderate cutaneous control with excellent symptomatic resolution on previous topical regimen and CellCept 500 mg b.i.d (previously 2000 mg daily). Disease is stable at current low MMF dosage. Previously, and on numerous occasions, we have discussed additional systemic treatments, including increased CellCept dose versus addition of medications like rituximab.  The patient is currently pleased with his symptomatic control and is not interested in cutaneous clearance. Will continue current treatment plan:   -Safety labs reviewed and reassuring.   - Continue on decreased mycophenolate mofetil dosage (500mg BID); If he requires a dosage increase with flaring, he will call us. Well tolerating without SE.   - Continue clobetasol ointment 0.05% twice daily as needed to the worst, most symptomatic, recalcitrant areas on the trunk and extremities. Recommended using for 1-2 weeks on the central low back today.   - Continue clobetasol 0.05% solution to the scalp up to once nightly as needed. Could use for 1-2 weeks in the beard for bad days.   - Continue  tacrolimus 0.01% ointment daily to the face. Recommended increasing to BID use over the symptomatic jawline.   - Continue triamcinolone 0.1% cream twice daily as a primary treatment for the trunk and extremities.   - Continue ketoconazole 2% shampoo 3 times weekly.  Okay to use as a face wash.   - Continue ketoconazole 2% cream to be applied to the bilateral alar grooves and lateral nasolabial folds in addition to the Protopic as needed.   - Continue with 3 times weekly dilute bleach baths as tolerated.     Patient instructions today:   For the beard area: start ketoconazole shampoo 3x weekly  For worst areas on the face/jaw: use protopic ointment twice daily.   Could use clobetasol solution for short periods of time (<2 weeks) on the beard on occasion.   Consider clobetasol ointment for a couple weeks on the low back.   Continue on the MMF 500mg twice daily   Continue remainder or regimen.     2. Mild DN, left neck. NERD. Reassurance. Will continue with annual FBSE screening examinations.        The patient was seen and discussed with Dr. Odell, who was staff for this encounter.     Follow up in 3 months with plan for labs at that time.         .I, Serina Odell MD, saw this patient with the resident and agree with the resident s findings and plan of care as documented in the resident s note.      Adal Marin MD  PGY3 Dermatology  [phone]

## 2018-09-27 NOTE — PROGRESS NOTES
[facility] Dermatology Note      Dermatology Problem List:   1.  Pemphigus foliaceus.     - Goals of treatment per Mr. Mitchell:  Prevent itching and secondary infections.   - He is not interested in complete clearance at this time.     - Titers 2015.   - Cell surface IgG 1-5, 120, monkey esophagus; 1280 intact human skin.   - DSG-1 Ig units (positive greater than 20, negative less than 14).   - DSG-3 Ig units (positive greater than 20, negative less than 9).   - Current treatment 500 mg b.i.d. CellCept (tapered f/ 2g daily), topical steroids as below.   2.  History of seborrheic keratoses.   3.  Seborrheic dermatitis, on ketaconazole shampoo and cream.   4. DN, Mild. Right neck. Bx 3/22/18.      Encounter Date:  2018      CC: F/u pemphigus     History of Present Illness:   Mr. Luan Mitchell is a pleasant, 73-year-old man who is well-known to Dermatology who presents today in routine followup for pemphigus foliaceus.  He was last seen in followup 18. Since that time, he notes his skin has been under good control. Mild flare of itchy and scaly spots on the jawline/beard areas. Would like us to look closely there. Low back with a larger plaque; notes that this is a recurrent issue for him.  He is continuing with use of his topical steroid regimen without any notable side effects.  He is also doing well on CellCept 500 mg b.i.d., which he is tolerating well without any side effects noted. Still getting lesions but no open ulcerations.  He denies any infectious symptoms.  No fevers, chills, sweats, GI side effects, cough, shortness of breath, muscle or joint pains or other issues lately.  He knows he is due for labs today.  His last safety labs were reviewed from 2 days ago and were stable and reassuring.        Past Medical History:   Diagnosis Date     BPH (benign prostatic hyperplasia)      Essential hypertension 2018     Hyperlipidemia LDL goal <130      Pemphigus      pemphigus foliaceus     Retinal detachment 2009    left eye- corrected with laser     Vitreous detachment 2009    LE       Social History:     The patient is a retired .  His wife is currently undergoing surveillance for soft tissue sarcoma of her right buttock, and they are following up in [place]; doing well with this.      Family History:   Not addressed.        Current Outpatient Prescriptions   Medication     ARTIFICIAL TEAR SOLUTION OP     aspirin 81 MG tablet     Cholecalciferol (VITAMIN D PO)     clobetasol (TEMOVATE) 0.05 % external solution     clobetasol (TEMOVATE) 0.05 % ointment     clobetasol propionate 0.05 % SHAM     desonide (DESOWEN) 0.05 % ointment     finasteride (PROSCAR) 5 MG tablet     hydrochlorothiazide (HYDRODIURIL) 25 MG tablet     hydrocortisone butyrate (LOCOID) 0.1 % SOLN     ketoconazole (NIZORAL) 2 % cream     ketoconazole (NIZORAL) 2 % shampoo     lovastatin (MEVACOR) 20 MG tablet     Multiple Vitamin (MULTIVITAMIN) per tablet     mycophenolate (GENERIC EQUIVALENT) 500 MG tablet     tacrolimus (PROTOPIC) 0.1 % ointment     triamcinolone (KENALOG) 0.1 % cream     triamcinolone (KENALOG) 0.1 % ointment     [DISCONTINUED] mycophenolate (GENERIC EQUIVALENT) 500 MG tablet     No current facility-administered medications for this visit.       Allergies   Allergen Reactions     Penicillins Swelling         Review of Systems:   A 10 point review of systems was negative beyond that stated above in the HPI.       Physical exam:   There were no vitals taken for this visit.   GENERAL:  A well-appearing  male appearing his stated age, affect normal, cooperative with the exam, alert and oriented x3.     SKIN:  A focused skin examination is performed today, including the scalp, face, neck, chest, abdomen, back, upper extremities and distal lower extremities.  The patient has scattered, crusted, mildly scaly, pink papules and thin plaques scattered over the central  trunk, chest, abdomen and back.  There are no lesions with overlying bullae or significant ulcerations or erosions.  There are less areas involved today and many areas of post inflammatory hyper- and hypopigmentation at previous sites of inflammation. Large urticarial plaque with overlying ointment on the central low back. Small erosion over the right lateral ala. Scaly thin round plaques on the right jawline/beard. NERD at the well healed scar on the right neck today. Scattered, waxy, [id number]-jg-pkknqjhun papules over the areas evaluated.         Impression/Plan:    1.  Pemphigus foliaceus.  Moderate cutaneous control with excellent symptomatic resolution on previous topical regimen and CellCept 500 mg b.i.d (previously 2000 mg daily). Disease is stable at current low MMF dosage. Previously, and on numerous occasions, we have discussed additional systemic treatments, including increased CellCept dose versus addition of medications like rituximab.  The patient is currently pleased with his symptomatic control and is not interested in cutaneous clearance. Will continue current treatment plan:   -Safety labs reviewed and reassuring.   - Continue on decreased mycophenolate mofetil dosage (500mg BID); If he requires a dosage increase with flaring, he will call us. Well tolerating without SE.   - Continue clobetasol ointment 0.05% twice daily as needed to the worst, most symptomatic, recalcitrant areas on the trunk and extremities. Recommended using for 1-2 weeks on the central low back today.   - Continue clobetasol 0.05% solution to the scalp up to once nightly as needed. Could use for 1-2 weeks in the beard for bad days.   - Continue tacrolimus 0.01% ointment daily to the face. Recommended increasing to BID use over the symptomatic jawline.   - Continue triamcinolone 0.1% cream twice daily as a primary treatment for the trunk and extremities.   - Continue ketoconazole 2% shampoo 3 times weekly.  Okay to use as a face  wash.   - Continue ketoconazole 2% cream to be applied to the bilateral alar grooves and lateral nasolabial folds in addition to the Protopic as needed.   - Continue with 3 times weekly dilute bleach baths as tolerated.     Patient instructions today:   For the beard area: start ketoconazole shampoo 3x weekly  For worst areas on the face/jaw: use protopic ointment twice daily.   Could use clobetasol solution for short periods of time (<2 weeks) on the beard on occasion.   Consider clobetasol ointment for a couple weeks on the low back.   Continue on the MMF 500mg twice daily   Continue remainder or regimen.     2. Mild DN, left neck. NERD. Reassurance. Will continue with annual FBSE screening examinations.        The patient was seen and discussed with Dr. Odell, who was staff for this encounter.     Follow up in 3 months with plan for labs at that time.       Adal Marin MD  PGY3 Dermatology  [phone]      .I, Serina Odell MD, saw this patient with the resident and agree with the resident s findings and plan of care as documented in the resident s note.

## 2018-10-01 ENCOUNTER — OFFICE VISIT (OUTPATIENT)
Dept: OPHTHALMOLOGY | Facility: CLINIC | Age: 73
End: 2018-10-01
Attending: OPHTHALMOLOGY
Payer: COMMERCIAL

## 2018-10-01 DIAGNOSIS — H25.13 AGE-RELATED NUCLEAR CATARACT OF BOTH EYES: Primary | ICD-10-CM

## 2018-10-01 PROCEDURE — 92015 DETERMINE REFRACTIVE STATE: CPT | Mod: ZF

## 2018-10-01 PROCEDURE — G0463 HOSPITAL OUTPT CLINIC VISIT: HCPCS | Mod: ZF

## 2018-10-01 ASSESSMENT — VISUAL ACUITY
OD_CC+: -1
METHOD: SNELLEN - LINEAR
OD_CC: 20/20
CORRECTION_TYPE: GLASSES
OS_CC: 20/20

## 2018-10-01 ASSESSMENT — REFRACTION_MANIFEST
OS_AXIS: 020
OS_SPHERE: -0.75
OD_SPHERE: -1.75
OD_ADD: +2.75
OD_AXIS: 175
OD_CYLINDER: +0.50
OS_CYLINDER: +0.25
OS_ADD: +2.75

## 2018-10-01 ASSESSMENT — TONOMETRY
OS_IOP_MMHG: 12
OD_IOP_MMHG: 13
IOP_METHOD: TONOPEN

## 2018-10-01 ASSESSMENT — CONF VISUAL FIELD
OS_NORMAL: 1
OD_NORMAL: 1

## 2018-10-01 ASSESSMENT — SLIT LAMP EXAM - LIDS
COMMENTS: 1+ BLEPHARITIS
COMMENTS: 1+ BLEPHARITIS

## 2018-10-01 ASSESSMENT — CUP TO DISC RATIO
OD_RATIO: 0.3
OS_RATIO: 0.3

## 2018-10-01 NOTE — PROGRESS NOTES
I have confirmed the patient's and reviewed Past Medical History, Past Surgical History, Social History, Family History, Problem List, Medication List and agree with Tech note.     CC: gradual worsening of vision Right Eye > Left Eye       HPI: history of myopia and cataract, last seen in 2017, complains of worsening of vision both eyes more on right side     Assessment/plan:   1.  Nuclear sclerotic cataract  Both eyes                         - Not visually significant                        - refract as needed, new prescription glasses issued today                         - monitor yearly       2.  Blepharitis both eyes:                         -wet compress twice a day                         - artificial tears over the counter                         - monitor      3.  Operculated hole Left Eye s/p retinopexy                         - Retinal Detachment precautions reviewed       return to clinic 1 year with refraction, Dilated Fundus Exam      Mona Maria MD PhD.  Professor & Chair

## 2018-10-01 NOTE — MR AVS SNAPSHOT
After Visit Summary   10/1/2018    Luan Mitchell    MRN: [id number]           Patient Information     Date Of Birth          1945        Visit Information        Provider Department      10/1/2018 2:15 PM Mona Strickland MD [facility]        Today's Diagnoses     Age-related nuclear cataract of both eyes    -  1       Follow-ups after your visit        Follow-up notes from your care team     Return in about 1 year (around 10/1/2019) for Cataract, Refraction.      Your next 10 appointments already scheduled     Oct 29, 2018  1:25 PM CDT   (Arrive by 1:10 PM)   Return Visit with Loretta ORONA MD   [facility] ([facility])    [address]  [address]th [address]  [facility] [address] [phone]            Dec 13, 2018 10:00 AM CST   (Arrive by 9:45 AM)   Return Visit with Adal Marin MD   [facility] D[facility] ([facility])    [address]  [address]  [facility] [address] [phone]              Who to contact     Please call your clinic at [phone] to:    Ask questions about your health    Make or cancel appointments    Discuss your medicines    Learn about your test results    Speak to your doctor            Additional Information About Your Visit        [employer] Information     [employer] gives you secure access to your electronic health record. If you see a primary care provider, you can also send messages to your care team and make appointments. If you have questions, please call your primary care clinic.  If you do not have a primary care provider, please call [phone] and they will assist you.      [employer] is an electronic gateway that provides easy, online access to your medical records. With [employer], you can request a clinic appointment, read your test results, renew a prescription or communicate with your care team.     To access your existing account, please contact your  [facility] or call [phone] for assistance.        Care EveryWhere ID     This is your Care EveryWhere ID. This could be used by other organizations to access your [facility] medical records  [id number]         Blood Pressure from Last 3 Encounters:   09/25/18 127/77   07/28/18 130/73   06/26/18 127/79    Weight from Last 3 Encounters:   09/25/18 80.9 kg (178 lb 4.8 oz)   07/28/18 82.6 kg (182 lb)   06/26/18 82.5 kg (181 lb 12.8 oz)              Today, you had the following     No orders found for display       Primary Care Provider Office Phone # Fax #    Loretta ORONA [phone]        [address] [address]        Equal Access to Services     JORDON OSBORNE : Hadii cristina whittaker hadasho Soci[employer], waaxda luqadaha, qaybta kaalmada adepatriziayada, kyaw jasmine . So [facility] [phone].    ATENCIÓN: Si habla español, tiene a toney disposición servicios gratuitos de asistencia lingüística. Pancho al [phone].    We comply with applicable federal civil rights laws and [place] laws. We do not discriminate on the basis of race, color, national origin, age, disability, sex, sexual orientation, or gender identity.            Thank you!     Thank you for choosing EYE CLINIC  for your care. Our goal is always to provide you with excellent care. Hearing back from our patients is one way we can continue to improve our services. Please take a few minutes to complete the written survey that you may receive in the mail after your visit with us. Thank you!             Your Updated Medication List - Protect others around you: Learn how to safely use, store and throw away your medicines at www.disposemymeds.org.          This list is accurate as of 10/1/18  3:16 PM.  Always use your most recent med list.                   Brand Name Dispense Instructions for use Diagnosis    ARTIFICIAL TEAR SOLUTION OP      Apply  to eye.        aspirin 81 MG  tablet      Take 1 tablet by mouth daily.        * clobetasol propionate 0.05 % Sham     1 Bottle    Apply sparingly to dry scalp 3 x weekly as needed.  Leave in place for 15 m then add water, lather and rinse    Pemphigus foliaceous, Encounter for long-term (current) use of high-risk medication       * clobetasol 0.05 % ointment    TEMOVATE    180 g    APPLY TO AFFECTED AREA(S) TWO TIMES A DAY    Pemphigus foliaceous, Pemphigus foliaceus, Encounter for long-term (current) use of high-risk medication       * clobetasol 0.05 % external solution    TEMOVATE    200 mL    Apply  topically 2 times daily as needed to the scalp.    Pemphigus foliaceous, Encounter for long-term (current) use of high-risk medication       desonide 0.05 % ointment    DESOWEN    180 g    Apply topically to affected areas twice daily as instructed.    Pemphigus foliaceus, Pemphigus foliaceous, Encounter for long-term (current) use of high-risk medication       finasteride 5 MG tablet    PROSCAR    90 tablet    Take 1 tablet (5 mg) by mouth daily    Benign prostatic hyperplasia with lower urinary tract symptoms, symptom details unspecified       hydrochlorothiazide 25 MG tablet    HYDRODIURIL    45 tablet    Take 0.5 tablets (12.5 mg) by mouth daily    Benign essential hypertension       hydrocortisone butyrate 0.1 % Soln solution    LOCOID    180 mL    Apply sparingly to affected area(s) of beard twice daily    Pemphigus foliaceous, Pemphigus foliaceus, Encounter for long-term (current) use of high-risk medication       * ketoconazole 2 % cream    NIZORAL    60 g    Apply twice daily to the nose as needed for white/scaling.    Dermatitis, seborrheic       * ketoconazole 2 % shampoo    NIZORAL    360 mL    Three times per week in the shower: Lather into scalp, leave in place for 3-5 minutes, then rinse.    Pemphigus foliaceous       lovastatin 20 MG tablet    MEVACOR    90 tablet    Take 1 tablet (20 mg) by mouth At Bedtime    Hyperlipidemia LDL  goal <100       multivitamin per tablet      Take 1 tablet by mouth daily. 1 tab daily        mycophenolate 500 MG tablet    GENERIC EQUIVALENT    180 tablet    Take 1 tablet (500 mg) by mouth 2 times daily    Pemphigus foliaceus       tacrolimus 0.1 % ointment    PROTOPIC    100 g    Apply topically to affected areas as instructed.    Pemphigus foliaceous, Seborrhoeic dermatitis       * triamcinolone 0.1 % cream    KENALOG    454 g    Apply topically to affected areas as instructed.    Pemphigus foliaceus, Pemphigus foliaceous, Encounter for long-term (current) use of high-risk medication       * triamcinolone 0.1 % ointment    KENALOG    454 g    Apply topically 2 times daily    Pemphigus foliaceus       VITAMIN D PO      Take 1 tablet by mouth daily 1000 mg        * Notice:  This list has 7 medication(s) that are the same as other medications prescribed for you. Read the directions carefully, and ask your doctor or other care provider to review them with you.

## 2018-10-01 NOTE — NURSING NOTE
Chief Complaints and History of Present Illnesses   Patient presents with     Follow Up For     annual f/u operculated hole left eye s/p retinopexy     HPI    Affected eye(s):  Both   Symptoms:     No decreased vision   Floaters (Comment: no more than usual)   No flashes   Glare      Duration:  1 year   Frequency:  Constant       Do you have eye pain now?:  No      Comments:  Pt here for annual eye exam  Pt reports no changes in vision noted  Pt states he frequently doesn't wear his glasses (doesn't legally need them for driving) - didn't bring today  Does note some glare symptoms around headlights/street lights but not worse than previous    Saritha Peter COA 2:28 PM October 1, 2018

## 2018-10-29 ENCOUNTER — RADIANT APPOINTMENT (OUTPATIENT)
Dept: CARDIOLOGY | Facility: CLINIC | Age: 73
End: 2018-10-29
Payer: COMMERCIAL

## 2018-10-29 ENCOUNTER — OFFICE VISIT (OUTPATIENT)
Dept: INTERNAL MEDICINE | Facility: CLINIC | Age: 73
End: 2018-10-29
Payer: COMMERCIAL

## 2018-10-29 VITALS
HEART RATE: 78 BPM | BODY MASS INDEX: 26.97 KG/M2 | WEIGHT: 177.4 LBS | DIASTOLIC BLOOD PRESSURE: 74 MMHG | RESPIRATION RATE: 20 BRPM | SYSTOLIC BLOOD PRESSURE: 127 MMHG | OXYGEN SATURATION: 96 %

## 2018-10-29 DIAGNOSIS — R06.02 SOB (SHORTNESS OF BREATH): Primary | ICD-10-CM

## 2018-10-29 DIAGNOSIS — L10.2 PEMPHIGUS FOLIACEOUS (H): ICD-10-CM

## 2018-10-29 DIAGNOSIS — R06.02 SOB (SHORTNESS OF BREATH): ICD-10-CM

## 2018-10-29 LAB
ALBUMIN SERPL-MCNC: 4 G/DL (ref 3.4–5)
ALP SERPL-CCNC: 41 U/L (ref 40–150)
ALT SERPL W P-5'-P-CCNC: 68 U/L (ref 0–70)
ANION GAP SERPL CALCULATED.3IONS-SCNC: 7 MMOL/L (ref 3–14)
AST SERPL W P-5'-P-CCNC: 42 U/L (ref 0–45)
BASOPHILS # BLD AUTO: 0 10E9/L (ref 0–0.2)
BASOPHILS NFR BLD AUTO: 0.3 %
BILIRUB SERPL-MCNC: 0.7 MG/DL (ref 0.2–1.3)
BUN SERPL-MCNC: 18 MG/DL (ref 7–30)
CALCIUM SERPL-MCNC: 9 MG/DL (ref 8.5–10.1)
CHLORIDE SERPL-SCNC: 107 MMOL/L (ref 94–109)
CO2 SERPL-SCNC: 26 MMOL/L (ref 20–32)
CREAT SERPL-MCNC: 1 MG/DL (ref 0.66–1.25)
DIFFERENTIAL METHOD BLD: NORMAL
EOSINOPHIL # BLD AUTO: 0.1 10E9/L (ref 0–0.7)
EOSINOPHIL NFR BLD AUTO: 2 %
ERYTHROCYTE [DISTWIDTH] IN BLOOD BY AUTOMATED COUNT: 12.7 % (ref 10–15)
GFR SERPL CREATININE-BSD FRML MDRD: 73 ML/MIN/1.7M2
GLUCOSE SERPL-MCNC: 89 MG/DL (ref 70–99)
HCT VFR BLD AUTO: 42.9 % (ref 40–53)
HGB BLD-MCNC: 15 G/DL (ref 13.3–17.7)
IMM GRANULOCYTES # BLD: 0 10E9/L (ref 0–0.4)
IMM GRANULOCYTES NFR BLD: 0.3 %
LYMPHOCYTES # BLD AUTO: 1.4 10E9/L (ref 0.8–5.3)
LYMPHOCYTES NFR BLD AUTO: 22.7 %
MCH RBC QN AUTO: 31.8 PG (ref 26.5–33)
MCHC RBC AUTO-ENTMCNC: 35 G/DL (ref 31.5–36.5)
MCV RBC AUTO: 91 FL (ref 78–100)
MONOCYTES # BLD AUTO: 0.5 10E9/L (ref 0–1.3)
MONOCYTES NFR BLD AUTO: 8.9 %
NEUTROPHILS # BLD AUTO: 4 10E9/L (ref 1.6–8.3)
NEUTROPHILS NFR BLD AUTO: 65.8 %
NRBC # BLD AUTO: 0 10*3/UL
NRBC BLD AUTO-RTO: 0 /100
NT-PROBNP SERPL-MCNC: 79 PG/ML (ref 0–450)
PLATELET # BLD AUTO: 157 10E9/L (ref 150–450)
POTASSIUM SERPL-SCNC: 4.2 MMOL/L (ref 3.4–5.3)
PROT SERPL-MCNC: 7.1 G/DL (ref 6.8–8.8)
RBC # BLD AUTO: 4.72 10E12/L (ref 4.4–5.9)
SODIUM SERPL-SCNC: 140 MMOL/L (ref 133–144)
WBC # BLD AUTO: 6.1 10E9/L (ref 4–11)

## 2018-10-29 ASSESSMENT — PAIN SCALES - GENERAL: PAINLEVEL: NO PAIN (0)

## 2018-10-29 NOTE — NURSING NOTE
Chief Complaint   Patient presents with     Shortness of Breath     Having periods of SOB for last 4 months-   Elva Campbell LPN 1:29 PM on 10/29/2018    Will bring copy of Health Directive to clinic to have scanned into chart.Elva Campbell LPN 1:29 PM on 10/29/2018

## 2018-10-29 NOTE — MR AVS SNAPSHOT
After Visit Summary   10/29/2018    Luan Mitchell    MRN: [id number]           Patient Information     Date Of Birth          1945        Visit Information        Provider Department      10/29/2018 1:25 PM Loretta Villeda MD [facility]        Today's Diagnoses     SOB (shortness of breath)    -  1       Follow-ups after your visit        Your next 10 appointments already scheduled     Oct 29, 2018  2:30 PM CDT   LAB with  LAB   [facility] Lab ([facility])    [address] [place] [address] [id number] [phone]           Please do not eat 10-12 hours before your appointment if you are coming in fasting for labs on lipids, cholesterol, or glucose (sugar). This does not apply to pregnant women. Water, hot tea and black coffee (with nothing added) are okay. Do not drink other fluids, diet soda or chew gum.            Oct 29, 2018  3:00 PM CDT   Ech Complete with [id number]   [facility] Echo ([facility])    [address] [place] [address] [id number] [phone]           1.  Please bring or wear a comfortable two-piece outfit. 2.  You may eat, drink and take your normal medicines. 3.  For any questions that cannot be answered, please contact the ordering physician 4.  Please do not wear perfumes or scented lotions on the day of your exam.            Oct 30, 2018  1:00 PM CDT   FULL PULMONARY FUNCTION with  PFL D   [facility] Pulmonary Function Testing ([facility])    [address] [place] [address] [id number] [phone]            Dec 13, 2018 10:00 AM CST   (Arrive by 9:45 AM)   Return Visit with Adal Marin MD   [facility] Dermatology ([facility])    [address] [place] [address] [id number] [phone]            Oct 02, 2019 10:15 AM CDT   RETURN RETINA with Mona Strickland MD    Eye Clinic ([facility])    [place] [address]  9[address] Clin [address]a  [facility] [id number] [phone]              Future tests that were ordered for you today     Open Future Orders        Priority Expected Expires Ordered    N terminal pro BNP Routine 10/29/2018 10/29/2019 10/29/2018    Echocardiogram Routine  10/29/2019 10/29/2018    General PFT Lab (Please always keep checked) Routine  10/29/2019 10/29/2018    Pulmonary Function Test Routine  10/29/2019 10/29/2018            Who to contact     Please call your clinic at [phone] to:    Ask questions about your health    Make or cancel appointments    Discuss your medicines    Learn about your test results    Speak to your doctor            Additional Information About Your Visit        COADE Information     COADE gives you secure access to your electronic health record. If you see a primary care provider, you can also send messages to your care team and make appointments. If you have questions, please call your primary care clinic.  If you do not have a primary care provider, please call [phone] and they will assist you.      COADE is an electronic gateway that provides easy, online access to your medical records. With COADE, you can request a clinic appointment, read your test results, renew a prescription or communicate with your care team.     To access your existing account, please contact your [facility] or call [phone] for assistance.        Care EveryWhere ID     This is your Care EveryWhere ID. This could be used by other organizations to access your [facility] medical records  [id number]        Your Vitals Were     Pulse Respirations Pulse Oximetry BMI (Body Mass Index)          78 20 96% 26.97 kg/m2         Blood Pressure from Last 3 Encounters:   10/29/18 127/74   09/25/18 127/77   07/28/18 130/73    Weight from Last 3 Encounters:   10/29/18 80.5 kg (177 lb  6.4 oz)   09/25/18 80.9 kg (178 lb 4.8 oz)   07/28/18 82.6 kg (182 lb)               Primary Care Provider Office Phone # Fax #    Loretta ORONA [phone]       [address] [address]        Equal Access to Services     [facility]: Hadii aad ku hadasho Soomaali, waaxda luqadaha, qaybta kaalmada adeegyada, waxay idiin hayaan adeeg ishaan labriannan . So [facility] [phone].    ATENCIÓN: Si habla español, tiene a toney disposición servicios gratuitos de asistencia lingüística. Leliaame al [phone].    We comply with applicable federal civil rights laws and [place] laws. We do not discriminate on the basis of race, color, national origin, age, disability, sex, sexual orientation, or gender identity.            Thank you!     Thank you for choosing [facility]  for your care. Our goal is always to provide you with excellent care. Hearing back from our patients is one way we can continue to improve our services. Please take a few minutes to complete the written survey that you may receive in the mail after your visit with us. Thank you!             Your Updated Medication List - Protect others around you: Learn how to safely use, store and throw away your medicines at www.disposemymeds.org.          This list is accurate as of 10/29/18  2:06 PM.  Always use your most recent med list.                   Brand Name Dispense Instructions for use Diagnosis    ARTIFICIAL TEAR SOLUTION OP      Apply  to eye.        aspirin 81 MG tablet      Take 1 tablet by mouth daily.        * clobetasol propionate 0.05 % Sham     1 Bottle    Apply sparingly to dry scalp 3 x weekly as needed.  Leave in place for 15 m then add water, lather and rinse    Pemphigus foliaceous, Encounter for long-term (current) use of high-risk medication       * clobetasol 0.05 % ointment    TEMOVATE    180 g    APPLY TO AFFECTED AREA(S) TWO TIMES A DAY    Pemphigus foliaceous, Pemphigus foliaceus,  Encounter for long-term (current) use of high-risk medication       * clobetasol 0.05 % external solution    TEMOVATE    200 mL    Apply  topically 2 times daily as needed to the scalp.    Pemphigus foliaceous, Encounter for long-term (current) use of high-risk medication       desonide 0.05 % ointment    DESOWEN    180 g    Apply topically to affected areas twice daily as instructed.    Pemphigus foliaceus, Pemphigus foliaceous, Encounter for long-term (current) use of high-risk medication       finasteride 5 MG tablet    PROSCAR    90 tablet    Take 1 tablet (5 mg) by mouth daily    Benign prostatic hyperplasia with lower urinary tract symptoms, symptom details unspecified       hydrochlorothiazide 25 MG tablet    HYDRODIURIL    45 tablet    Take 0.5 tablets (12.5 mg) by mouth daily    Benign essential hypertension       hydrocortisone butyrate 0.1 % Soln solution    LOCOID    180 mL    Apply sparingly to affected area(s) of beard twice daily    Pemphigus foliaceous, Pemphigus foliaceus, Encounter for long-term (current) use of high-risk medication       * ketoconazole 2 % cream    NIZORAL    60 g    Apply twice daily to the nose as needed for white/scaling.    Dermatitis, seborrheic       * ketoconazole 2 % shampoo    NIZORAL    360 mL    Three times per week in the shower: Lather into scalp, leave in place for 3-5 minutes, then rinse.    Pemphigus foliaceous       lovastatin 20 MG tablet    MEVACOR    90 tablet    Take 1 tablet (20 mg) by mouth At Bedtime    Hyperlipidemia LDL goal <100       multivitamin per tablet      Take 1 tablet by mouth daily. 1 tab daily        mycophenolate 500 MG tablet    GENERIC EQUIVALENT    180 tablet    Take 1 tablet (500 mg) by mouth 2 times daily    Pemphigus foliaceus       tacrolimus 0.1 % ointment    PROTOPIC    100 g    Apply topically to affected areas as instructed.    Pemphigus foliaceous, Seborrhoeic dermatitis       * triamcinolone 0.1 % cream    KENALOG    454 g     Apply topically to affected areas as instructed.    Pemphigus foliaceus, Pemphigus foliaceous, Encounter for long-term (current) use of high-risk medication       * triamcinolone 0.1 % ointment    KENALOG    454 g    Apply topically 2 times daily    Pemphigus foliaceus       VITAMIN D PO      Take 1 tablet by mouth daily 1000 mg        * Notice:  This list has 7 medication(s) that are the same as other medications prescribed for you. Read the directions carefully, and ask your doctor or other care provider to review them with you.

## 2018-10-29 NOTE — PROGRESS NOTES
[facility]  Primary Care Center   Loretta Villeda MD  10/29/2018      Chief Complaint:   Shortness of Breath       History of Present Illness:   Luan Mitchell is a 73 year old male with a past medical history of bullous pemphigus who presents for evaluation of shortness of breath. He has increased shortness of breath which she describes physically as a panting sensation.  He states that this began in 2016 and over the last 2 months has become progressively worse.. This has not effected his daily life because he maintains his energy, he walked his dog and raked leaves yesterday but felt short of breath. This did not worsen with extended exertion.  He was also able to walk 10,000 steps at least 5 out of 7 days last week.      This was first noticed in 2016 and was an intermittent issue that bothered him a few times a month. Over the last few months this has increased in frequency to a constant issue . He does not feel short of breath while laying down. He has a light dry cough for the last month. He did see cardiology and was monitored for congestive heart failure, without any findings. He does not feel light headed or dizzy anymore. He does feel a little dizzy if he hyperventilates. He has been trying to lose weight and endorsed a good diet. He saw Dr. Hartley 9/25 and had lab studies and chest XR which were all normal. His 2017 stress echo and zio patch were normal. He is a non smoker, but his parents exposed him to second hand smoke. He doesn't feel anxious but is aware that people with anxiety have episodes of shortness of breath.     Review of Systems:   Pertinent items are noted in HPI, remainder of complete ROS is negative.      Active Medications:     Current Outpatient Prescriptions:      ARTIFICIAL TEAR SOLUTION OP, Apply  to eye., Disp: , Rfl:      aspirin 81 MG tablet, Take 1 tablet by mouth daily., Disp: , Rfl:      Cholecalciferol (VITAMIN D PO), Take 1 tablet by mouth daily 1000 mg, Disp: , Rfl:       clobetasol (TEMOVATE) 0.05 % external solution, Apply  topically 2 times daily as needed to the scalp., Disp: 200 mL, Rfl: 11     clobetasol (TEMOVATE) 0.05 % ointment, APPLY TO AFFECTED AREA(S) TWO TIMES A DAY, Disp: 180 g, Rfl: 2     clobetasol propionate 0.05 % SHAM, Apply sparingly to dry scalp 3 x weekly as needed.  Leave in place for 15 m then add water, lather and rinse, Disp: 1 Bottle, Rfl: 5     desonide (DESOWEN) 0.05 % ointment, Apply topically to affected areas twice daily as instructed., Disp: 180 g, Rfl: 3     finasteride (PROSCAR) 5 MG tablet, Take 1 tablet (5 mg) by mouth daily, Disp: 90 tablet, Rfl: 4     hydrochlorothiazide (HYDRODIURIL) 25 MG tablet, Take 0.5 tablets (12.5 mg) by mouth daily, Disp: 45 tablet, Rfl: 3     hydrocortisone butyrate (LOCOID) 0.1 % SOLN, Apply sparingly to affected area(s) of beard twice daily, Disp: 180 mL, Rfl: 3     ketoconazole (NIZORAL) 2 % cream, Apply twice daily to the nose as needed for white/scaling., Disp: 60 g, Rfl: 2     ketoconazole (NIZORAL) 2 % shampoo, Three times per week in the shower: Lather into scalp, leave in place for 3-5 minutes, then rinse., Disp: 360 mL, Rfl: 12     lovastatin (MEVACOR) 20 MG tablet, Take 1 tablet (20 mg) by mouth At Bedtime, Disp: 90 tablet, Rfl: 4     Multiple Vitamin (MULTIVITAMIN) per tablet, Take 1 tablet by mouth daily. 1 tab daily, Disp: , Rfl:      mycophenolate (GENERIC EQUIVALENT) 500 MG tablet, Take 1 tablet (500 mg) by mouth 2 times daily, Disp: 180 tablet, Rfl: 1     tacrolimus (PROTOPIC) 0.1 % ointment, Apply topically to affected areas as instructed., Disp: 100 g, Rfl: 11     triamcinolone (KENALOG) 0.1 % cream, Apply topically to affected areas as instructed., Disp: 454 g, Rfl: 11     triamcinolone (KENALOG) 0.1 % ointment, Apply topically 2 times daily, Disp: 454 g, Rfl: 11      Allergies:   Penicillins      Past Medical History:  BPH (benign prostatic hyperplasia)  Hypertension  Hyperlipidemia LDL goal <  130  Pemphigus foliaceus  Retinal detachment, left eye  Nuclear cataract     Past Surgical History:  Derm surgery  Retinopexy, left eye    Family History:   Mother: congestive heart failure, type 2 diabetes mellitus, hypertension  Father: congestive heart failure  Paternal uncle: congestive heart failure      Social History:      Non smoker     Physical Exam:   /74 (BP Location: Right arm, Patient Position: Sitting, Cuff Size: Adult Regular)  Pulse 78  Resp 20  Wt 80.5 kg (177 lb 6.4 oz)  SpO2 96%  BMI 26.97 kg/m2   Constitutional: Healthy, alert, no distress and cooperative  ENT: Bilateral TM normal without fluid or infection, throat normal without erythema or exudate, no cervical lymphadenopathy.  Anicteric sclerae  Cardiovascular: JVP 8cm. RRR, S1,S2 no murmurs, clicks, rubs, or gallops. No pretibial edema. No carotid bruits.  Respiratory: Clear to auscultation bilaterally.   Skin: slight yellow tinge on his hands   Hematologic/Lymphatic/Immunologic: Normal cervical, anterior, posterior, submandibular, submental, and supraclavicular lymph nodes    Assessment and Plan:  SOB (shortness of breath)  Since his symptoms have worsened since his stress echo and holter results in 2017 we will get a new echocardiogram. Due to his family history of congestive heart failure we will check a BNP. We will also have him to PFTs. He agrees to follow up with pulmonology if the planned testing does not yield any answers.   - N terminal pro BNP  - Echocardiogram  - General PFT Lab (Please always keep checked)  - Pulmonary Function Test     Follow-up: Data Unavailable         Scribe Disclosure:   I, Daniel Fish, am serving as a scribe to document services personally performed by Loretta Villeda MD at this visit, based upon the provider's statements to me. All documentation has been reviewed by the aforementioned provider prior to being entered into the official medical record.     Portions of this medical  record were completed by a scribe. UPON MY REVIEW AND AUTHENTICATION BY ELECTRONIC SIGNATURE, this confirms (a) I performed the applicable clinical services, and (b) the record is accurate.     Loretta Villeda

## 2018-10-30 DIAGNOSIS — R06.02 SOB (SHORTNESS OF BREATH): ICD-10-CM

## 2018-10-31 LAB
DLCOCOR-%PRED-PRE: 121 %
DLCOCOR-PRE: 29.61 ML/MIN/MMHG
DLCOUNC-%PRED-PRE: 122 %
DLCOUNC-PRE: 29.94 ML/MIN/MMHG
DLCOUNC-PRED: 24.45 ML/MIN/MMHG
ERV-%PRED-PRE: 103 %
ERV-PRE: 1.02 L
ERV-PRED: 0.98 L
EXPTIME-PRE: 8.14 SEC
FEF2575-%PRED-PRE: 99 %
FEF2575-PRE: 2.14 L/SEC
FEF2575-PRED: 2.15 L/SEC
FEFMAX-%PRED-PRE: 126 %
FEFMAX-PRE: 9.51 L/SEC
FEFMAX-PRED: 7.54 L/SEC
FEV1-%PRED-PRE: 106 %
FEV1-PRE: 3.12 L
FEV1FEV6-PRE: 75 %
FEV1FEV6-PRED: 77 %
FEV1FVC-PRE: 73 %
FEV1FVC-PRED: 72 %
FEV1SVC-PRE: 70 %
FEV1SVC-PRED: 66 %
FIFMAX-PRE: 6.22 L/SEC
FRCPLETH-%PRED-PRE: 110 %
FRCPLETH-PRE: 4.09 L
FRCPLETH-PRED: 3.69 L
FVC-%PRED-PRE: 109 %
FVC-PRE: 4.29 L
FVC-PRED: 3.91 L
IC-%PRED-PRE: 98 %
IC-PRE: 3.42 L
IC-PRED: 3.48 L
RVPLETH-%PRED-PRE: 112 %
RVPLETH-PRE: 3.07 L
RVPLETH-PRED: 2.73 L
TLCPLETH-%PRED-PRE: 108 %
TLCPLETH-PRE: 7.51 L
TLCPLETH-PRED: 6.92 L
VA-%PRED-PRE: 92 %
VA-PRE: 6.14 L
VC-%PRED-PRE: 99 %
VC-PRE: 4.44 L
VC-PRED: 4.46 L

## 2018-11-14 ENCOUNTER — TRANSFERRED RECORDS (OUTPATIENT)
Dept: HEALTH INFORMATION MANAGEMENT | Facility: CLINIC | Age: 73
End: 2018-11-14

## 2018-12-09 ASSESSMENT — ENCOUNTER SYMPTOMS
POSTURAL DYSPNEA: 0
DYSPNEA ON EXERTION: 1
HEMOPTYSIS: 0
COUGH: 0
COUGH DISTURBING SLEEP: 0
SHORTNESS OF BREATH: 1
SNORES LOUDLY: 0
WHEEZING: 0
SPUTUM PRODUCTION: 0

## 2018-12-10 ENCOUNTER — OFFICE VISIT (OUTPATIENT)
Dept: PULMONOLOGY | Facility: CLINIC | Age: 73
End: 2018-12-10
Payer: COMMERCIAL

## 2018-12-10 VITALS
WEIGHT: 182 LBS | HEIGHT: 69 IN | SYSTOLIC BLOOD PRESSURE: 120 MMHG | RESPIRATION RATE: 18 BRPM | BODY MASS INDEX: 26.96 KG/M2 | OXYGEN SATURATION: 97 % | HEART RATE: 78 BPM | DIASTOLIC BLOOD PRESSURE: 77 MMHG

## 2018-12-10 DIAGNOSIS — R06.00 DYSPNEA, UNSPECIFIED TYPE: Primary | ICD-10-CM

## 2018-12-10 PROCEDURE — G0463 HOSPITAL OUTPT CLINIC VISIT: HCPCS | Mod: ZF

## 2018-12-10 ASSESSMENT — MIFFLIN-ST. JEOR: SCORE: 1550.93

## 2018-12-10 ASSESSMENT — PAIN SCALES - GENERAL: PAINLEVEL: NO PAIN (0)

## 2018-12-10 NOTE — LETTER
12/10/2018       RE: Luan Mitchell  [address] [facility] [address] [facility] [address]     Dear Colleague,    Thank you for referring your patient, Luan Mitchell, to the [facility] at [facility]. Please see a copy of my visit note below.    [facility]  Pulmonary Medicine  Visit Clinic Note  December 10, 2018         ASSESSMENT & PLAN       Shortness of Breath: Mr Mitchell has symptoms of shortness of breath for a couple years now that are waxing and waning in nature.  He is immune suppressed, on cellcept, but not having any constitutional symptoms. Testing thus far shows normal pulmonary function testing, ECHO, CXR, hemoglobin, renal function and BNP.  His respiratory muscles, airways, parenchyma and pulmonary vasculature appear to be working well.     This round of testing does not point to a clear cardiopulmonary cause for his shortness of breath.  Other testing that could be pursued would be a chest CT scan or a cardiopulmonary exercise test. However, based on our current testing results I do not suspect that this further testing will reveal any new findings.     I let him know that I do recommend any further testing at this time.  He should try to increase his exercise routine, with the assumption that this could be related to deconditioning. If he finds that his symptoms are progressing and getting more frequent, he knows to reach me by phone and we can discuss the possibility of repeating some testing to see if there is any dynamic change.      I am not aware of cellcept causing some side effect of shortness of breath in the absence of any organic lung pathology.  If his dermatologist and he is willing, they could try taking a break from this medication to see if there is any improvement.      Francisco Gates MD          Today's visit note:     Chief Complaint: Luan Mitchell is a 73 year old year old male who is being seen for  Consult (New consult on Luan and his SOB)      HISTORY OF PRESENT ILLNESS:    Is a 73-year-old male with a past medical history of pemphigus foliaceous who is presenting to the pulmonary clinic today for evaluation of shortness of breath.    He states that he noticed mild shortness of breath several years ago.  Is occasional, while he was walking the dog.  This last summer he noticed that he was breathing a bit harder and overall felt worse.  That said the shortness of breath has not substantially interfered with his daily activities.  He still able to do work around the house, walk the dog, and exercise regularly on an exercise bike.  Additionally he is able to walk up 3 flights of stairs without any significant problem.  In fact, he had actually forgotten about the shortness of breath issue for a while until a couple months ago.  When he is laying flat on his back, he feels normal.  Additionally, while he is exercising he actually feels good as well.  The shortness of breath comes up in the other times during his activity.      He reported to his primary care physician regarding his symptoms, and had a chest x-ray, echocardiogram, pulmonary function tests, and blood testing performed.  All of these were normal.    He takes cellcept for a history of pemphigus foliaceous.      Has rare sinus symptoms of congestions.  No cough, but feels like he needs to clear his throat a lot.  No chest pain. No heartburn symptoms.             Past Medical and Surgical History:     Past Medical History:   Diagnosis Date     BPH (benign prostatic hyperplasia)      Essential hypertension 02/2018     Hyperlipidemia LDL goal <130      Pemphigus     pemphigus foliaceus     Retinal detachment 2009    left eye- corrected with laser     Vitreous detachment 2009    LE     Past Surgical History:   Procedure Laterality Date     NO HISTORY OF SURGERY  1/28/14    derm     RETINOPEXY LASER PROPHYLAXIS BREAK(S) OS (LEFT EYE)      2009 - Left eye            Family History:     Family History   Problem Relation Age of Onset     Heart Disease Mother         CHF d 80     Diabetes Mother         DM2,  age 80     Hypertension Mother      Heart Disease Father         CHF d 78     Heart Disease Paternal Uncle         CHF d 50s     Cancer No family hx of         no skin cancer     Skin Cancer No family hx of         no skin cancer     Glaucoma No family hx of      Macular Degeneration No family hx of      Melanoma No family hx of               Social History:     Social History     Socioeconomic History     Marital status:      Spouse name: Not on file     Number of children: Not on file     Years of education: Not on file     Highest education level: Not on file   Social Needs     Financial resource strain: Not on file     Food insecurity - worry: Not on file     Food insecurity - inability: Not on file     Transportation needs - medical: Not on file     Transportation needs - non-medical: Not on file   Occupational History     Not on file   Tobacco Use     Smoking status: Never Smoker     Smokeless tobacco: Never Used   Substance and Sexual Activity     Alcohol use: Not on file     Drug use: Not on file     Sexual activity: Not on file   Other Topics Concern     Parent/sibling w/ CABG, MI or angioplasty before 65F 55M? Not Asked   Social History Narrative     Not on file     , retired.          Medications:     Current Outpatient Medications   Medication     ARTIFICIAL TEAR SOLUTION OP     Cholecalciferol (VITAMIN D PO)     clobetasol (TEMOVATE) 0.05 % external solution     clobetasol (TEMOVATE) 0.05 % ointment     clobetasol propionate 0.05 % SHAM     desonide (DESOWEN) 0.05 % ointment     finasteride (PROSCAR) 5 MG tablet     hydrochlorothiazide (HYDRODIURIL) 25 MG tablet     hydrocortisone butyrate (LOCOID) 0.1 % SOLN     ketoconazole (NIZORAL) 2 % cream     ketoconazole (NIZORAL) 2 % shampoo     lovastatin (MEVACOR) 20 MG tablet      "Multiple Vitamin (MULTIVITAMIN) per tablet     mycophenolate (GENERIC EQUIVALENT) 500 MG tablet     tacrolimus (PROTOPIC) 0.1 % ointment     triamcinolone (KENALOG) 0.1 % cream     triamcinolone (KENALOG) 0.1 % ointment     aspirin 81 MG tablet     No current facility-administered medications for this visit.             Review of Systems:       Answers for HPI/ROS submitted by the patient on 12/9/2018   General Symptoms: No  Skin Symptoms: No  HENT Symptoms: No  EYE SYMPTOMS: No  HEART SYMPTOMS: No  LUNG SYMPTOMS: Yes  INTESTINAL SYMPTOMS: No  URINARY SYMPTOMS: No  REPRODUCTIVE SYMPTOMS: No  SKELETAL SYMPTOMS: No  BLOOD SYMPTOMS: No  NERVOUS SYSTEM SYMPTOMS: No  MENTAL HEALTH SYMPTOMS: No  Cough: No  Sputum or phlegm: No  Coughing up blood: No  Difficulty breating or shortness of breath: Yes  Snoring: No  Wheezing: No  Difficulty breathing on exertion: Yes  Nighttime Cough: No  Difficulty breathing when lying flat: No        PHYSICAL EXAM:  /77   Pulse 78   Resp 18   Ht 1.753 m (5' 9\")   Wt 82.6 kg (182 lb)   SpO2 97%   BMI 26.88 kg/m        General: Well developed, well nourished, No apparent distress  Eyes: Anicteric  Nose: Nasal mucosa with no edema or hyperemia.  No polyps  Ears: Hearing grossly normal  Mouth: Oral mucosa is moist, without any lesions. No oropharyngeal exudate.  Neck: supple, no thyromegaly  Lymphatics: No cervical or supraclavicular nodes  Respiratory: Good air movement. No crackles. No rhonchi. No wheezes  Cardiac: RRR, normal S1, S2. No murmurs. No JVD  Musculoskeletal: Extremities normal. No clubbing. No cyanosis. No edema.  Skin: No rash on limited exam  Neuro: Normal mentation. Normal speech.  Psych:Normal affect           Data:   All laboratory and imaging data reviewed.      PFT:       PFT Interpretation:  No airflow obstruction. Normal lung volumes. Normal diffusion capacity.   Valid Maneuver    CXR: I have personally reviewed the CXR results and agree with the radiologist " interpretation below:    The lungs and pleural spaces are clear. No focal airspace opacity. No  pleural effusion. The central airway is clear. No pneumothorax.      The heart and pulmonary vascularity appear unremarkable.     No acute bony abnormality. Diffuse anterior flowing osteophytes of the  thoracic spine.    ECHO:  Global and regional left ventricular function is normal with an EF of 60-65%.  Right ventricular function, chamber size, wall motion, and thickness are  normal.  No significant valvular abnormalities.  The inferior vena cava was normal in size with preserved respiratory  variability.  No pericardial effusion is present.  This study was compared to a prior TTE from 2/3/2017. There has been no  significant change.                Again, thank you for allowing me to participate in the care of your patient.      Sincerely,    Gaurang Gates MD

## 2018-12-10 NOTE — NURSING NOTE
Chief Complaint   Patient presents with     Consult     New consult on Luan and his SOB     Armando Simental CMA at 6:52 AM on 12/10/2018

## 2018-12-10 NOTE — PROGRESS NOTES
[facility]  Pulmonary Medicine  Visit Clinic Note  December 10, 2018         ASSESSMENT & PLAN       Shortness of Breath: Mr Mitchell has symptoms of shortness of breath for a couple years now that are waxing and waning in nature.  He is immune suppressed, on cellcept, but not having any constitutional symptoms. Testing thus far shows normal pulmonary function testing, ECHO, CXR, hemoglobin, renal function and BNP.  His respiratory muscles, airways, parenchyma and pulmonary vasculature appear to be working well.     This round of testing does not point to a clear cardiopulmonary cause for his shortness of breath.  Other testing that could be pursued would be a chest CT scan or a cardiopulmonary exercise test. However, based on our current testing results I do not suspect that this further testing will reveal any new findings.     I let him know that I do recommend any further testing at this time.  He should try to increase his exercise routine, with the assumption that this could be related to deconditioning. If he finds that his symptoms are progressing and getting more frequent, he knows to reach me by phone and we can discuss the possibility of repeating some testing to see if there is any dynamic change.      I am not aware of cellcept causing some side effect of shortness of breath in the absence of any organic lung pathology.  If his dermatologist and he is willing, they could try taking a break from this medication to see if there is any improvement.      Francisco Gates MD          Today's visit note:     Chief Complaint: Luan Mitchell is a 73 year old year old male who is being seen for Consult (New consult on Luan and his SOB)      HISTORY OF PRESENT ILLNESS:    Is a 73-year-old male with a past medical history of pemphigus foliaceous who is presenting to the pulmonary clinic today for evaluation of shortness of breath.    He states that he noticed mild shortness of breath several years  ago.  Is occasional, while he was walking the dog.  This last summer he noticed that he was breathing a bit harder and overall felt worse.  That said the shortness of breath has not substantially interfered with his daily activities.  He still able to do work around the house, walk the dog, and exercise regularly on an exercise bike.  Additionally he is able to walk up 3 flights of stairs without any significant problem.  In fact, he had actually forgotten about the shortness of breath issue for a while until a couple months ago.  When he is laying flat on his back, he feels normal.  Additionally, while he is exercising he actually feels good as well.  The shortness of breath comes up in the other times during his activity.      He reported to his primary care physician regarding his symptoms, and had a chest x-ray, echocardiogram, pulmonary function tests, and blood testing performed.  All of these were normal.    He takes cellcept for a history of pemphigus foliaceous.      Has rare sinus symptoms of congestions.  No cough, but feels like he needs to clear his throat a lot.  No chest pain. No heartburn symptoms.             Past Medical and Surgical History:     Past Medical History:   Diagnosis Date     BPH (benign prostatic hyperplasia)      Essential hypertension 2018     Hyperlipidemia LDL goal <130      Pemphigus     pemphigus foliaceus     Retinal detachment     left eye- corrected with laser     Vitreous detachment     LE     Past Surgical History:   Procedure Laterality Date     NO HISTORY OF SURGERY  14    derm     RETINOPEXY LASER PROPHYLAXIS BREAK(S) OS (LEFT EYE)      2009 - Left eye           Family History:     Family History   Problem Relation Age of Onset     Heart Disease Mother         CHF d 80     Diabetes Mother         DM2,  age 80     Hypertension Mother      Heart Disease Father         CHF d 78     Heart Disease Paternal Uncle         CHF d 50s     Cancer No family hx of          no skin cancer     Skin Cancer No family hx of         no skin cancer     Glaucoma No family hx of      Macular Degeneration No family hx of      Melanoma No family hx of               Social History:     Social History     Socioeconomic History     Marital status:      Spouse name: Not on file     Number of children: Not on file     Years of education: Not on file     Highest education level: Not on file   Social Needs     Financial resource strain: Not on file     Food insecurity - worry: Not on file     Food insecurity - inability: Not on file     Transportation needs - medical: Not on file     Transportation needs - non-medical: Not on file   Occupational History     Not on file   Tobacco Use     Smoking status: Never Smoker     Smokeless tobacco: Never Used   Substance and Sexual Activity     Alcohol use: Not on file     Drug use: Not on file     Sexual activity: Not on file   Other Topics Concern     Parent/sibling w/ CABG, MI or angioplasty before 65F 55M? Not Asked   Social History Narrative     Not on file     , retired.          Medications:     Current Outpatient Medications   Medication     ARTIFICIAL TEAR SOLUTION OP     Cholecalciferol (VITAMIN D PO)     clobetasol (TEMOVATE) 0.05 % external solution     clobetasol (TEMOVATE) 0.05 % ointment     clobetasol propionate 0.05 % SHAM     desonide (DESOWEN) 0.05 % ointment     finasteride (PROSCAR) 5 MG tablet     hydrochlorothiazide (HYDRODIURIL) 25 MG tablet     hydrocortisone butyrate (LOCOID) 0.1 % SOLN     ketoconazole (NIZORAL) 2 % cream     ketoconazole (NIZORAL) 2 % shampoo     lovastatin (MEVACOR) 20 MG tablet     Multiple Vitamin (MULTIVITAMIN) per tablet     mycophenolate (GENERIC EQUIVALENT) 500 MG tablet     tacrolimus (PROTOPIC) 0.1 % ointment     triamcinolone (KENALOG) 0.1 % cream     triamcinolone (KENALOG) 0.1 % ointment     aspirin 81 MG tablet     No current facility-administered medications for this visit.   "           Review of Systems:       Answers for HPI/ROS submitted by the patient on 12/9/2018   General Symptoms: No  Skin Symptoms: No  HENT Symptoms: No  EYE SYMPTOMS: No  HEART SYMPTOMS: No  LUNG SYMPTOMS: Yes  INTESTINAL SYMPTOMS: No  URINARY SYMPTOMS: No  REPRODUCTIVE SYMPTOMS: No  SKELETAL SYMPTOMS: No  BLOOD SYMPTOMS: No  NERVOUS SYSTEM SYMPTOMS: No  MENTAL HEALTH SYMPTOMS: No  Cough: No  Sputum or phlegm: No  Coughing up blood: No  Difficulty breating or shortness of breath: Yes  Snoring: No  Wheezing: No  Difficulty breathing on exertion: Yes  Nighttime Cough: No  Difficulty breathing when lying flat: No        PHYSICAL EXAM:  /77   Pulse 78   Resp 18   Ht 1.753 m (5' 9\")   Wt 82.6 kg (182 lb)   SpO2 97%   BMI 26.88 kg/m       General: Well developed, well nourished, No apparent distress  Eyes: Anicteric  Nose: Nasal mucosa with no edema or hyperemia.  No polyps  Ears: Hearing grossly normal  Mouth: Oral mucosa is moist, without any lesions. No oropharyngeal exudate.  Neck: supple, no thyromegaly  Lymphatics: No cervical or supraclavicular nodes  Respiratory: Good air movement. No crackles. No rhonchi. No wheezes  Cardiac: RRR, normal S1, S2. No murmurs. No JVD  Musculoskeletal: Extremities normal. No clubbing. No cyanosis. No edema.  Skin: No rash on limited exam  Neuro: Normal mentation. Normal speech.  Psych:Normal affect           Data:   All laboratory and imaging data reviewed.      PFT:       PFT Interpretation:  No airflow obstruction. Normal lung volumes. Normal diffusion capacity.   Valid Maneuver    CXR: I have personally reviewed the CXR results and agree with the radiologist interpretation below:    The lungs and pleural spaces are clear. No focal airspace opacity. No  pleural effusion. The central airway is clear. No pneumothorax.      The heart and pulmonary vascularity appear unremarkable.     No acute bony abnormality. Diffuse anterior flowing osteophytes of the  thoracic " spine.    ECHO:  Global and regional left ventricular function is normal with an EF of 60-65%.  Right ventricular function, chamber size, wall motion, and thickness are  normal.  No significant valvular abnormalities.  The inferior vena cava was normal in size with preserved respiratory  variability.  No pericardial effusion is present.  This study was compared to a prior TTE from 2/3/2017. There has been no  significant change.

## 2018-12-13 ENCOUNTER — OFFICE VISIT (OUTPATIENT)
Dept: DERMATOLOGY | Facility: CLINIC | Age: 73
End: 2018-12-13
Payer: COMMERCIAL

## 2018-12-13 DIAGNOSIS — Z79.899 ENCOUNTER FOR LONG-TERM (CURRENT) USE OF HIGH-RISK MEDICATION: Primary | ICD-10-CM

## 2018-12-13 DIAGNOSIS — Z79.899 ENCOUNTER FOR LONG-TERM (CURRENT) USE OF HIGH-RISK MEDICATION: ICD-10-CM

## 2018-12-13 DIAGNOSIS — L10.2 PEMPHIGUS FOLIACEUS (H): ICD-10-CM

## 2018-12-13 DIAGNOSIS — L10.2 PEMPHIGUS FOLIACEOUS (H): ICD-10-CM

## 2018-12-13 DIAGNOSIS — R06.02 SHORTNESS OF BREATH: ICD-10-CM

## 2018-12-13 LAB
ALBUMIN SERPL-MCNC: 3.9 G/DL (ref 3.4–5)
ALP SERPL-CCNC: 44 U/L (ref 40–150)
ALT SERPL W P-5'-P-CCNC: 38 U/L (ref 0–70)
ANION GAP SERPL CALCULATED.3IONS-SCNC: 7 MMOL/L (ref 3–14)
AST SERPL W P-5'-P-CCNC: 23 U/L (ref 0–45)
BASOPHILS # BLD AUTO: 0 10E9/L (ref 0–0.2)
BASOPHILS NFR BLD AUTO: 0.3 %
BILIRUB SERPL-MCNC: 0.5 MG/DL (ref 0.2–1.3)
BUN SERPL-MCNC: 26 MG/DL (ref 7–30)
CALCIUM SERPL-MCNC: 9.3 MG/DL (ref 8.5–10.1)
CHLORIDE SERPL-SCNC: 107 MMOL/L (ref 94–109)
CO2 SERPL-SCNC: 26 MMOL/L (ref 20–32)
CREAT SERPL-MCNC: 0.99 MG/DL (ref 0.66–1.25)
DIFFERENTIAL METHOD BLD: NORMAL
EOSINOPHIL # BLD AUTO: 0.2 10E9/L (ref 0–0.7)
EOSINOPHIL NFR BLD AUTO: 2.7 %
ERYTHROCYTE [DISTWIDTH] IN BLOOD BY AUTOMATED COUNT: 12.7 % (ref 10–15)
GFR SERPL CREATININE-BSD FRML MDRD: 74 ML/MIN/1.7M2
GLUCOSE SERPL-MCNC: 95 MG/DL (ref 70–99)
HCT VFR BLD AUTO: 44.3 % (ref 40–53)
HGB BLD-MCNC: 15.7 G/DL (ref 13.3–17.7)
IMM GRANULOCYTES # BLD: 0 10E9/L (ref 0–0.4)
IMM GRANULOCYTES NFR BLD: 0.3 %
LYMPHOCYTES # BLD AUTO: 1.8 10E9/L (ref 0.8–5.3)
LYMPHOCYTES NFR BLD AUTO: 31 %
MCH RBC QN AUTO: 32.3 PG (ref 26.5–33)
MCHC RBC AUTO-ENTMCNC: 35.4 G/DL (ref 31.5–36.5)
MCV RBC AUTO: 91 FL (ref 78–100)
MONOCYTES # BLD AUTO: 0.6 10E9/L (ref 0–1.3)
MONOCYTES NFR BLD AUTO: 10.2 %
NEUTROPHILS # BLD AUTO: 3.3 10E9/L (ref 1.6–8.3)
NEUTROPHILS NFR BLD AUTO: 55.5 %
NRBC # BLD AUTO: 0 10*3/UL
NRBC BLD AUTO-RTO: 0 /100
PLATELET # BLD AUTO: 154 10E9/L (ref 150–450)
POTASSIUM SERPL-SCNC: 4.5 MMOL/L (ref 3.4–5.3)
PROT SERPL-MCNC: 7.3 G/DL (ref 6.8–8.8)
RBC # BLD AUTO: 4.86 10E12/L (ref 4.4–5.9)
SODIUM SERPL-SCNC: 140 MMOL/L (ref 133–144)
WBC # BLD AUTO: 5.9 10E9/L (ref 4–11)

## 2018-12-13 RX ORDER — TRIAMCINOLONE ACETONIDE 1 MG/G
CREAM TOPICAL
Qty: 454 G | Refills: 11 | Status: SHIPPED | OUTPATIENT
Start: 2018-12-13 | End: 2022-09-22

## 2018-12-13 ASSESSMENT — PAIN SCALES - GENERAL: PAINLEVEL: NO PAIN (0)

## 2018-12-13 NOTE — LETTER
2018       RE: Luan Mitchell  [address] [facility] [address]     Dear Colleague,    Thank you for referring your patient, Luan Mitchell, to the [facility] DERMATOLOGY at [facility]. Please see a copy of my visit note below.    [facility] Dermatology Note      Dermatology Problem List:   1.  Pemphigus foliaceus.     - Goals of treatment per Mr. Mitchell:  Prevent itching and secondary infections.   - He is not interested in complete clearance at this time.     - Titers 2015.   - Cell surface IgG 1-5, 120, monkey esophagus; 1280 intact human skin.   - DSG-1 Ig units (positive greater than 20, negative less than 14).   - DSG-3 Ig units (positive greater than 20, negative less than 9).   - Current treatment 500 mg b.i.d. CellCept (tapered f/ 2g daily), topical steroids as below. Will trial 500mg once daily 2018   2.  History of seborrheic keratoses.   3.  Seborrheic dermatitis, on ketaconazole shampoo and cream.   4. DN, Mild. Right neck. Bx 3/22/18.      Encounter Date:  2018      CC: F/u pemphigus     History of Present Illness:   Mr. Luan Mitchell is a pleasant, 73-year-old man who is well-known to Dermatology who presents today in routine followup for pemphigus foliaceus.  He was last seen in followup 2018. Since that time, he notes his skin has been under good control. Better than it has been in quite some time. No recent flares. Using topicals quite minimally given the excellent control. He is also doing well on CellCept 500 mg b.i.d., which he is tolerating well without any notable/new side effects besides noted below. He denies any infectious symptoms.  No fevers, chills, sweats, GI side effects, cough, muscle or joint pains or other issues lately.  He knows he is due for labs today.      Mr. Mitchell has been dealing with SOB sx for the last 4 months or so. Present at rest and with activity. No orthopnea/PND.  "No chest pain, focal pulm sx among others. He has seen pulm and PCP with normal Echo, PFTs and CXR. Per Dr. Gates (pulm): \"This round of testing does not point to a clear cardiopulmonary cause for his shortness of breath.\" \"I am not aware of cellcept causing some side effect of shortness of breath in the absence of any organic lung pathology.  If his dermatologist and he is willing, they could try taking a break from this medication to see if there is any improvement.\"      Past Medical History:   Diagnosis Date     BPH (benign prostatic hyperplasia)      Essential hypertension 02/2018     Hyperlipidemia LDL goal <130      Pemphigus     pemphigus foliaceus     Retinal detachment 2009    left eye- corrected with laser     Vitreous detachment 2009    LE       Social History:     The patient is a retired .  His wife is currently undergoing surveillance for soft tissue sarcoma of her right buttock, and they are following up in [place]; doing well with this.      Family History:   Not addressed.        Current Outpatient Medications   Medication     triamcinolone (KENALOG) 0.1 % external cream     ARTIFICIAL TEAR SOLUTION OP     aspirin 81 MG tablet     Cholecalciferol (VITAMIN D PO)     clobetasol (TEMOVATE) 0.05 % external solution     clobetasol (TEMOVATE) 0.05 % ointment     clobetasol propionate 0.05 % SHAM     desonide (DESOWEN) 0.05 % ointment     finasteride (PROSCAR) 5 MG tablet     hydrochlorothiazide (HYDRODIURIL) 25 MG tablet     hydrocortisone butyrate (LOCOID) 0.1 % SOLN     ketoconazole (NIZORAL) 2 % cream     ketoconazole (NIZORAL) 2 % shampoo     lovastatin (MEVACOR) 20 MG tablet     Multiple Vitamin (MULTIVITAMIN) per tablet     mycophenolate (GENERIC EQUIVALENT) 500 MG tablet     tacrolimus (PROTOPIC) 0.1 % ointment     triamcinolone (KENALOG) 0.1 % ointment     No current facility-administered medications for this visit.       Allergies   Allergen Reactions     Penicillins " Swelling         Review of Systems:   A 10 point review of systems was negative beyond that stated above in the HPI.       Physical exam:   There were no vitals taken for this visit.   GENERAL:  A well-appearing  male appearing his stated age, affect normal, cooperative with the exam, alert and oriented x3.     SKIN:  A focused skin examination is performed today, including the scalp, face, neck, chest, abdomen, back, upper extremities and distal lower extremities.  The patient has scattered, crusted, mildly scaly, pink papules and thin plaques scattered over the central trunk, chest, abdomen and back.  There are no lesions with overlying bullae or significant ulcerations or erosions.  There are less areas involved today and many areas of post inflammatory hyper- and hypopigmentation at previous sites of inflammation. Large urticarial plaque with overlying ointment on the central low back. NERD at the well healed scar on the right neck today. Scattered, waxy, [id number] papules over the areas evaluated.         Impression/Plan:    1.  Pemphigus foliaceus.  Moderate cutaneous control with excellent symptomatic resolution on previous topical regimen and CellCept 500 mg b.i.d (previously 2000 mg daily). Disease is stable at current low MMF dosage. Previously, and on numerous occasions, we have discussed additional systemic treatments, including increased CellCept dose versus addition of medications like rituximab.  The patient is currently pleased with his symptomatic control and is not interested in cutaneous clearance. In fact, he is interested in potentially weaning the medication today (as below). Plan today:   - Safety labs pending today (CBC,CMP)  - If normal, will plan to decrease mycophenolate mofetil dosage to 500mg daily; If he requires a dosage increase with flaring, he will call us. Well tolerating without SE.   - Continue clobetasol ointment 0.05% twice daily as needed to the worst, most  symptomatic, recalcitrant areas on the trunk and extremities. Recommended using for 1-2 weeks on the central low back today.   - Continue clobetasol 0.05% solution to the scalp up to once nightly as needed. Could use for 1-2 weeks in the beard for bad days.   - Continue tacrolimus 0.01% ointment daily to the face. Recommended increasing to BID use over the symptomatic jawline.   - Continue triamcinolone 0.1% cream twice daily as a primary treatment for the trunk and extremities. *refilled today.  - Continue ketoconazole 2% shampoo 3 times weekly.  Okay to use as a face wash.   - Continue ketoconazole 2% cream to be applied to the bilateral alar grooves and lateral nasolabial folds in addition to the Protopic as needed.   - Continue with 3 times weekly dilute bleach baths as tolerated.     2. Shortness of breath. It is possibly related to ongoing MMF treatment, though less likely given long-term duration of treatment without issues, low dosage he is currently on, and minimal evidence in the literature (and our anecdotal experience) of SOB SE in the absence of organic lung disease.   -Will attempt MMF taper as above, however, to see if this is palliative.   -We are quite reassured by the patient's reassuring thorough workup.     3. Mild DN, left neck. NERD. Reassurance. Will continue with annual FBSE screening examinations.        The patient was seen and discussed with Dr. Odell, who was staff for this encounter.     Follow up in 3 months with plan for labs at that time.         .I, Serina Odell MD, saw this patient with the resident and agree with the resident s findings and plan of care as documented in the resident s note.        Adal Marin MD  PGY3 Dermatology  [phone]

## 2018-12-13 NOTE — PATIENT INSTRUCTIONS
Labs today  If normal will continue on the MMF. Will trial 500mg once daily Rx  Restart topical use  Refilled TAC cream today. Use daily (or triamcinolone ointment) for maintenance on the trunk. Spot treat worst spots with clobetasol ointment.

## 2018-12-13 NOTE — PROGRESS NOTES
"[facility] Dermatology Note      Dermatology Problem List:   1.  Pemphigus foliaceus.     - Goals of treatment per Mr. Mitchell:  Prevent itching and secondary infections.   - He is not interested in complete clearance at this time.     - Titers 2015.   - Cell surface IgG 1-5, 120, monkey esophagus; 1280 intact human skin.   - DSG-1 Ig units (positive greater than 20, negative less than 14).   - DSG-3 Ig units (positive greater than 20, negative less than 9).   - Current treatment 500 mg b.i.d. CellCept (tapered f/ 2g daily), topical steroids as below. Will trial 500mg once daily 2018   2.  History of seborrheic keratoses.   3.  Seborrheic dermatitis, on ketaconazole shampoo and cream.   4. DN, Mild. Right neck. Bx 3/22/18.      Encounter Date:  2018      CC: F/u pemphigus     History of Present Illness:   Mr. Luan Mitchell is a pleasant, 73-year-old man who is well-known to Dermatology who presents today in routine followup for pemphigus foliaceus.  He was last seen in followup 2018. Since that time, he notes his skin has been under good control. Better than it has been in quite some time. No recent flares. Using topicals quite minimally given the excellent control. He is also doing well on CellCept 500 mg b.i.d., which he is tolerating well without any notable/new side effects besides noted below. He denies any infectious symptoms.  No fevers, chills, sweats, GI side effects, cough, muscle or joint pains or other issues lately.  He knows he is due for labs today.      Mr. Mitchell has been dealing with SOB sx for the last 4 months or so. Present at rest and with activity. No orthopnea/PND. No chest pain, focal pulm sx among others. He has seen pulm and PCP with normal Echo, PFTs and CXR. Per Dr. Gates (pulm): \"This round of testing does not point to a clear cardiopulmonary cause for his shortness of breath.\" \"I am not aware of cellcept causing some side effect of " "shortness of breath in the absence of any organic lung pathology.  If his dermatologist and he is willing, they could try taking a break from this medication to see if there is any improvement.\"      Past Medical History:   Diagnosis Date     BPH (benign prostatic hyperplasia)      Essential hypertension 02/2018     Hyperlipidemia LDL goal <130      Pemphigus     pemphigus foliaceus     Retinal detachment 2009    left eye- corrected with laser     Vitreous detachment 2009    LE       Social History:     The patient is a retired .  His wife is currently undergoing surveillance for soft tissue sarcoma of her right buttock, and they are following up in [place]; doing well with this.      Family History:   Not addressed.        Current Outpatient Medications   Medication     triamcinolone (KENALOG) 0.1 % external cream     ARTIFICIAL TEAR SOLUTION OP     aspirin 81 MG tablet     Cholecalciferol (VITAMIN D PO)     clobetasol (TEMOVATE) 0.05 % external solution     clobetasol (TEMOVATE) 0.05 % ointment     clobetasol propionate 0.05 % SHAM     desonide (DESOWEN) 0.05 % ointment     finasteride (PROSCAR) 5 MG tablet     hydrochlorothiazide (HYDRODIURIL) 25 MG tablet     hydrocortisone butyrate (LOCOID) 0.1 % SOLN     ketoconazole (NIZORAL) 2 % cream     ketoconazole (NIZORAL) 2 % shampoo     lovastatin (MEVACOR) 20 MG tablet     Multiple Vitamin (MULTIVITAMIN) per tablet     mycophenolate (GENERIC EQUIVALENT) 500 MG tablet     tacrolimus (PROTOPIC) 0.1 % ointment     triamcinolone (KENALOG) 0.1 % ointment     No current facility-administered medications for this visit.       Allergies   Allergen Reactions     Penicillins Swelling         Review of Systems:   A 10 point review of systems was negative beyond that stated above in the HPI.       Physical exam:   There were no vitals taken for this visit.   GENERAL:  A well-appearing  male appearing his stated age, affect normal, cooperative " with the exam, alert and oriented x3.     SKIN:  A focused skin examination is performed today, including the scalp, face, neck, chest, abdomen, back, upper extremities and distal lower extremities.  The patient has scattered, crusted, mildly scaly, pink papules and thin plaques scattered over the central trunk, chest, abdomen and back.  There are no lesions with overlying bullae or significant ulcerations or erosions.  There are less areas involved today and many areas of post inflammatory hyper- and hypopigmentation at previous sites of inflammation. Large urticarial plaque with overlying ointment on the central low back. NERD at the well healed scar on the right neck today. Scattered, waxy, [id number] papules over the areas evaluated.         Impression/Plan:    1.  Pemphigus foliaceus.  Moderate cutaneous control with excellent symptomatic resolution on previous topical regimen and CellCept 500 mg b.i.d (previously 2000 mg daily). Disease is stable at current low MMF dosage. Previously, and on numerous occasions, we have discussed additional systemic treatments, including increased CellCept dose versus addition of medications like rituximab.  The patient is currently pleased with his symptomatic control and is not interested in cutaneous clearance. In fact, he is interested in potentially weaning the medication today (as below). Plan today:   - Safety labs pending today (CBC,CMP)  - If normal, will plan to decrease mycophenolate mofetil dosage to 500mg daily; If he requires a dosage increase with flaring, he will call us. Well tolerating without SE.   - Continue clobetasol ointment 0.05% twice daily as needed to the worst, most symptomatic, recalcitrant areas on the trunk and extremities. Recommended using for 1-2 weeks on the central low back today.   - Continue clobetasol 0.05% solution to the scalp up to once nightly as needed. Could use for 1-2 weeks in the beard for bad days.   - Continue tacrolimus  0.01% ointment daily to the face. Recommended increasing to BID use over the symptomatic jawline.   - Continue triamcinolone 0.1% cream twice daily as a primary treatment for the trunk and extremities. *refilled today.  - Continue ketoconazole 2% shampoo 3 times weekly.  Okay to use as a face wash.   - Continue ketoconazole 2% cream to be applied to the bilateral alar grooves and lateral nasolabial folds in addition to the Protopic as needed.   - Continue with 3 times weekly dilute bleach baths as tolerated.     2. Shortness of breath. It is possibly related to ongoing MMF treatment, though less likely given long-term duration of treatment without issues, low dosage he is currently on, and minimal evidence in the literature (and our anecdotal experience) of SOB SE in the absence of organic lung disease.   -Will attempt MMF taper as above, however, to see if this is palliative.   -We are quite reassured by the patient's reassuring thorough workup.     3. Mild DN, left neck. NERD. Reassurance. Will continue with annual FBSE screening examinations.        The patient was seen and discussed with Dr. Odell, who was staff for this encounter.     Follow up in 3 months with plan for labs at that time.       Adal Marin MD  PGY3 Dermatology  [phone]  .I, Serina Odell MD, saw this patient with the resident and agree with the resident s findings and plan of care as documented in the resident s note.

## 2018-12-13 NOTE — NURSING NOTE
"Pemphigus follow up, Luan states \" My skin is great, it is not clear but there is no itching.\"     Osiris Estevez LPN   "

## 2018-12-16 NOTE — RESULT ENCOUNTER NOTE
Normal labs reviewed; reassuring. Continue MMF. Will decrease the dosage as planned.     Adal Marin MD  PGY4 Dermatology  [phone]

## 2019-02-02 ASSESSMENT — ENCOUNTER SYMPTOMS
MUSCLE WEAKNESS: 0
SPUTUM PRODUCTION: 0
COUGH DISTURBING SLEEP: 0
COUGH: 0
POSTURAL DYSPNEA: 0
MUSCLE CRAMPS: 0
WHEEZING: 0
SNORES LOUDLY: 0
BACK PAIN: 0
SHORTNESS OF BREATH: 1
HEMOPTYSIS: 0
JOINT SWELLING: 0
DYSPNEA ON EXERTION: 0
STIFFNESS: 0
ARTHRALGIAS: 1
MYALGIAS: 1
NECK PAIN: 0

## 2019-02-04 ENCOUNTER — OFFICE VISIT (OUTPATIENT)
Dept: INTERNAL MEDICINE | Facility: CLINIC | Age: 74
End: 2019-02-04
Payer: COMMERCIAL

## 2019-02-04 VITALS
RESPIRATION RATE: 20 BRPM | OXYGEN SATURATION: 99 % | HEART RATE: 72 BPM | DIASTOLIC BLOOD PRESSURE: 82 MMHG | SYSTOLIC BLOOD PRESSURE: 131 MMHG

## 2019-02-04 DIAGNOSIS — E78.5 HYPERLIPIDEMIA LDL GOAL <100: ICD-10-CM

## 2019-02-04 DIAGNOSIS — N40.0 BENIGN PROSTATIC HYPERPLASIA WITHOUT LOWER URINARY TRACT SYMPTOMS: Primary | ICD-10-CM

## 2019-02-04 DIAGNOSIS — Z12.5 SCREENING FOR PROSTATE CANCER: ICD-10-CM

## 2019-02-04 ASSESSMENT — PATIENT HEALTH QUESTIONNAIRE - PHQ9: 5. POOR APPETITE OR OVEREATING: NOT AT ALL

## 2019-02-04 ASSESSMENT — ANXIETY QUESTIONNAIRES
6. BECOMING EASILY ANNOYED OR IRRITABLE: NOT AT ALL
7. FEELING AFRAID AS IF SOMETHING AWFUL MIGHT HAPPEN: NOT AT ALL
3. WORRYING TOO MUCH ABOUT DIFFERENT THINGS: NOT AT ALL
1. FEELING NERVOUS, ANXIOUS, OR ON EDGE: SEVERAL DAYS
GAD7 TOTAL SCORE: 1
2. NOT BEING ABLE TO STOP OR CONTROL WORRYING: NOT AT ALL
5. BEING SO RESTLESS THAT IT IS HARD TO SIT STILL: NOT AT ALL

## 2019-02-04 ASSESSMENT — PAIN SCALES - GENERAL: PAINLEVEL: NO PAIN (1)

## 2019-02-04 NOTE — PROGRESS NOTES
"[facility]  Primary Care Center   Established Patient Encounter   Loretta Villeda MD  02/04/2019     Medicare Annual Wellness Visit     History of Present Illness:   Luan Mitchell is a 73 year old male who presents for evaluation of shortness of breath and a physical exam.  He is generally feeling very well.  His skin is been under excellent control.  He does continue to have mild non-exertional dyspnea.  A complete workup per pulmonology and cardiology has been negative.  We reviewed those results today.    Review of Systems:   Constitutional:   fevers, night sweats or unintentional weight change ?  NO      Eyes:   vision change, diplopia or red eyes?  NO      Ears, Nose, Mouth, Throat:   tinnitus or hearing change,  epistaxis or nasal discharge,  oral lesions, throat pain ?  NO      Neck:   stiffness?  NO           Cardiovascular:   chest pain, palpitations, or pain with walking, orthopnea or PND?  NO   Breasts:  Any bumps or unusual discharge?     NO         Respiratory:   dyspnea, cough, shortness of breath or wheezing?  YES: Shortness of breath slightly worse in dry winter air, but does not affect his exercise routine         GI:   nausea, vomiting, diarrhea or constipation,  abdominal pain ?  NO         :   change in urine,  dysuria or hematuria,  sexual dysfunction ?  NO        Musculoskeletal:   joint or muscle pain or swelling?  YES: Wrist and ankle pain \"1/2 out of 10\"            Skin:   concerning lesions or moles?  NO           Nervous System:   loss of strength or sensation,  numbness or tingling,  tremor,  dizziness,  headache?  NO   Endocrine/Hormone:   polyuria or polydipsia,  temperature intolerance?  NO            Blood and Lymphnodes:   concerning bumps,  bleeding problems?  NO            Allergy:   environmental allergies?  NO            Mental Health:   depression or anxiety,  sleep problems?  NO              Medical Care      Have you been to an ER or a hospital in the last year? No  What " other specialists or organizations are involved in your medical care?  dermatology  Current providers sharing in care for this patient include:  Patient Care Team:  Shailesh Francois MD as PCP - General (Family Practice)    Active Medications:      ARTIFICIAL TEAR SOLUTION OP, Apply  to eye., Disp: , Rfl:      aspirin 81 MG tablet, Take 1 tablet by mouth daily., Disp: , Rfl:      Cholecalciferol (VITAMIN D PO), Take 1 tablet by mouth daily 1000 mg, Disp: , Rfl:      clobetasol (TEMOVATE) 0.05 % external solution, Apply  topically 2 times daily as needed to the scalp., Disp: 200 mL, Rfl: 11     clobetasol (TEMOVATE) 0.05 % ointment, APPLY TO AFFECTED AREA(S) TWO TIMES A DAY, Disp: 180 g, Rfl: 2     clobetasol propionate 0.05 % SHAM, Apply sparingly to dry scalp 3 x weekly as needed.  Leave in place for 15 m then add water, lather and rinse, Disp: 1 Bottle, Rfl: 5     desonide (DESOWEN) 0.05 % ointment, Apply topically to affected areas twice daily as instructed., Disp: 180 g, Rfl: 3     finasteride (PROSCAR) 5 MG tablet, Take 1 tablet (5 mg) by mouth daily, Disp: 90 tablet, Rfl: 4     hydrochlorothiazide (HYDRODIURIL) 25 MG tablet, Take 0.5 tablets (12.5 mg) by mouth daily, Disp: 45 tablet, Rfl: 3     hydrocortisone butyrate (LOCOID) 0.1 % SOLN, Apply sparingly to affected area(s) of beard twice daily, Disp: 180 mL, Rfl: 3     ketoconazole (NIZORAL) 2 % cream, Apply twice daily to the nose as needed for white/scaling., Disp: 60 g, Rfl: 2     ketoconazole (NIZORAL) 2 % shampoo, Three times per week in the shower: Lather into scalp, leave in place for 3-5 minutes, then rinse., Disp: 360 mL, Rfl: 12     lovastatin (MEVACOR) 20 MG tablet, Take 1 tablet (20 mg) by mouth At Bedtime, Disp: 90 tablet, Rfl: 4     Multiple Vitamin (MULTIVITAMIN) per tablet, Take 1 tablet by mouth daily. 1 tab daily, Disp: , Rfl:      mycophenolate (GENERIC EQUIVALENT) 500 MG tablet, Take 1 tablet (500 mg) by mouth 2 times daily, Disp: 180  tablet, Rfl: 1     tacrolimus (PROTOPIC) 0.1 % ointment, Apply topically to affected areas as instructed., Disp: 100 g, Rfl: 11     triamcinolone (KENALOG) 0.1 % external cream, Apply topically to affected areas as instructed., Disp: 454 g, Rfl: 11     triamcinolone (KENALOG) 0.1 % ointment, Apply topically 2 times daily, Disp: 454 g, Rfl: 11      Allergies:   Penicillins      Past Medical History:  Benign prostatic hyperplasia  Essential hypertension  Hyperlipidemia  Pemphigus foliaceus  Vitreous detachment, left eye  Dermatitis, seborrheic  Lightheadedness  Age-related nuclear cataract of both eyes     Past Surgical History:  Retinopexy laser prophylaxis breaks, left eye    Family History:    Mother: congestive heart failure  80, diabetes, hypertension  Father: congestive heart failure  78  Paternal uncle: congestive heart failure  50s      Social History:   The patient was alone.  Smoking Status: Never   Smokeless Tobacco: Never   Alcohol Use: No      Marital Status:  Who lives in your household? wife  Does your home have any of the following safety concerns? Loose rugs in the hallway, no grab bars in the bathroom, no handrails on the stairs or have poorly lit areas?  No  have you noticed any hearing difficulties? No        Risk Behaviors and Healthy Habits      Eats a healthy diet  Do you use tobacco?  No  Do you use any other drugs? No                                                                                                             Number of drinks per day not asked      Today's PHQ-2 Score:    PHQ-2 Score: 0/6     Sexual Health      Are you sexually active?  not asked   If yes, with men, women, or both? Female      FUNCTIONAL ABILITY/SAFETY SCREENING      Was the patient's timed Up & Go test (Get up from chair walk, 10 feet turn, return to chair and sit down) unsteady or longer than 30 seconds? No       EVALUATION OF COGNITIVE FUNCTION      Mood/affect:Normal  Appearance:Normal  "  Mini Cog Scoring   3 points  Clock Draw Test result:  Normal,  Diagram normal     SCREENING FOR PREVENTION and EARLY DETECTION        Corrected Visual acuity: L NOT DONE    The 10-year ASCVD risk score (Renita LEONARD Jr., et al., 2013) is: 25.1%    Values used to calculate the score:      Age: 73 years      Sex: Male      Is Non- : No      Diabetic: No      Tobacco smoker: No      Systolic Blood Pressure: 131 mmHg      Is BP treated: No      HDL Cholesterol: 51 mg/dL      Total Cholesterol: 190 mg/dL       Immunization status: up to date and documented.  Reviewed Immunization Record Today  Pneumoccocal Vaccine: Yes  TDaP:Yes    Physical Exam:   /82 (BP Location: Right arm, Patient Position: Sitting, Cuff Size: Adult Regular)   Pulse 72   Resp 20   SpO2 99%    Constitutional: Healthy, alert, no distress and cooperative  Head: Normocephalic. No masses, lesions, tenderness or abnormalities  Eyes: PERRL, no conjunctival injection  ENT: Bilateral TM normal without fluid or infection, throat normal without erythema or exudate, no cervical lymphadenopathy  Cardiovascular: JVP 7 cm. RRR, S1,S2 no murmurs, clicks, rubs, or gallops. No pretibial edema.  No carotid bruits.  Respiratory: Clear to auscultation and percussion bilaterally.   Gastrointestinal: BS NA. Soft, nontender, no hepatosplenomegaly or mass. Liver is 8 centimeters at the midclavicular line.  There is no CVA or flank tenderness bilaterally. \"tiny pain\"  Extremities: Joints are normal. No gross deformities noted, gait normal and normal muscle tone.  Wrists and ankles have full range of motion without effusion. Some metetarsal tailor join tenderness right ankle  Skin: No suspicious lesions or rashes.  Neurologic: Gait normal. Reflexes normal and symmetric. Strength is intact bilaterally in upper and lower extremities. Sensation grossly WNL.  Cranial nerves II through XII are intact.  Romberg test is negative.    Psychiatric: " Mentation appears normal and affect normal  Hematologic/Lymphatic/Immunologic: Normal cervical, anterior, posterior, submandibular, submental, and supraclavicular  lymph nodes       Assessment and Plan:    ICD-10-CM    1. Benign prostatic hyperplasia without lower urinary tract symptoms N40.0    2. Hyperlipidemia LDL goal <100 E78.5 Lipid Profile FASTING   3. Screening for prostate cancer Z12.5 PSA screen        subsequent   Medicare Wellness Exam  1. Health Care Maintenance: Normal Physical Exam     PLAN:  1. Lipid panel ordered.    2. Bph, due for psa screen     Follow-up: Return to clinic in 12 months.         Scribe Disclosure:   I, Cruz Gutierrez, am serving as a scribe to document services personally performed by Loretta Villeda MD at this visit, based upon the provider's statements to me. All documentation has been reviewed by the aforementioned provider prior to being entered into the official medical record.     Portions of this medical record were completed by a scribe. UPON MY REVIEW AND AUTHENTICATION BY ELECTRONIC SIGNATURE, this confirms (a) I performed the applicable clinical services, and (b) the record is accurate.    Loretta Villeda MD

## 2019-02-04 NOTE — NURSING NOTE
"Chief Complaint   Patient presents with     Physical     wellness check up     Breathing Problem     \" I still have SOB.\"   Elva Campbell LPN 2:09 PM on 2/4/2019    Will bring Health directive to clinic when is finished filling out.Elva Campbell LPN 2:10 PM on 2/4/2019    Rooming Note  Blood Pressure   BP Readings from Last 1 Encounters:   02/04/19 151/75    Single BP recheck started, 2:12 PM (4 minutes)  Elva Campbell LPN 2:12 PM on 2/4/2019  "

## 2019-02-05 ASSESSMENT — ANXIETY QUESTIONNAIRES: GAD7 TOTAL SCORE: 1

## 2019-03-04 DIAGNOSIS — L10.2 PEMPHIGUS FOLIACEUS (H): ICD-10-CM

## 2019-03-05 RX ORDER — MYCOPHENOLATE MOFETIL 500 MG/1
500 TABLET ORAL DAILY
Qty: 30 TABLET | Refills: 0 | Status: SHIPPED | OUTPATIENT
Start: 2019-03-05 | End: 2019-03-25

## 2019-03-05 NOTE — TELEPHONE ENCOUNTER
mycophenolate (GENERIC EQUIVALENT) 500 MG tablet      Last Written Prescription Date:  9-27-18  Last Fill Quantity: 180,   # refills: 1  Last Office Visit: 12-13-18  Future Office visit:  3-14-19    CBC RESULTS:   Recent Labs   Lab Test 12/13/18  1025   WBC 5.9   RBC 4.86   HGB 15.7   HCT 44.3   MCV 91   MCH 32.3   MCHC 35.4   RDW 12.7          Creatinine   Date Value Ref Range Status   12/13/2018 0.99 0.66 - 1.25 mg/dL Final   ]    Liver Function Studies -   Recent Labs   Lab Test 12/13/18  1025   PROTTOTAL 7.3   ALBUMIN 3.9   BILITOTAL 0.5   ALKPHOS 44   AST 23   ALT 38       Routing refill request to provider for review/approval because:  Not on protocol.    Kathleen M Doege RN

## 2019-03-06 ENCOUNTER — DOCUMENTATION ONLY (OUTPATIENT)
Dept: CARE COORDINATION | Facility: CLINIC | Age: 74
End: 2019-03-06

## 2019-03-06 NOTE — TELEPHONE ENCOUNTER
Received refill request for mycophenolate as the resident on call. Reviewed patient's chart and attached communication. Patient last seen 12/13/18 for pemphigus foliaceous. RTC scheduled 3/14/19. After reviewing the medication list and assessment and plan from last visit, the refill request was accepted for 1 month supply until next visit when labs can be performed.       Routed as FYI.    Iris Barajas MD  PGY-2, Department of Dermatology  [phone]

## 2019-03-14 ENCOUNTER — OFFICE VISIT (OUTPATIENT)
Dept: DERMATOLOGY | Facility: CLINIC | Age: 74
End: 2019-03-14
Payer: COMMERCIAL

## 2019-03-14 DIAGNOSIS — L10.2 PEMPHIGUS FOLIACEOUS (H): ICD-10-CM

## 2019-03-14 DIAGNOSIS — Z12.5 SCREENING FOR PROSTATE CANCER: ICD-10-CM

## 2019-03-14 DIAGNOSIS — L10.2 PEMPHIGUS FOLIACEOUS (H): Primary | ICD-10-CM

## 2019-03-14 DIAGNOSIS — E78.5 HYPERLIPIDEMIA LDL GOAL <100: ICD-10-CM

## 2019-03-14 LAB
ALBUMIN SERPL-MCNC: 3.9 G/DL (ref 3.4–5)
ALP SERPL-CCNC: 60 U/L (ref 40–150)
ALT SERPL W P-5'-P-CCNC: 27 U/L (ref 0–70)
ANION GAP SERPL CALCULATED.3IONS-SCNC: 6 MMOL/L (ref 3–14)
AST SERPL W P-5'-P-CCNC: 22 U/L (ref 0–45)
BASOPHILS # BLD AUTO: 0 10E9/L (ref 0–0.2)
BASOPHILS NFR BLD AUTO: 0.4 %
BILIRUB SERPL-MCNC: 0.7 MG/DL (ref 0.2–1.3)
BUN SERPL-MCNC: 18 MG/DL (ref 7–30)
CALCIUM SERPL-MCNC: 9.5 MG/DL (ref 8.5–10.1)
CHLORIDE SERPL-SCNC: 105 MMOL/L (ref 94–109)
CHOLEST SERPL-MCNC: 165 MG/DL
CO2 SERPL-SCNC: 27 MMOL/L (ref 20–32)
CREAT SERPL-MCNC: 0.98 MG/DL (ref 0.66–1.25)
DIFFERENTIAL METHOD BLD: NORMAL
EOSINOPHIL # BLD AUTO: 0.1 10E9/L (ref 0–0.7)
EOSINOPHIL NFR BLD AUTO: 2.4 %
ERYTHROCYTE [DISTWIDTH] IN BLOOD BY AUTOMATED COUNT: 12.6 % (ref 10–15)
GFR SERPL CREATININE-BSD FRML MDRD: 75 ML/MIN/{1.73_M2}
GLUCOSE SERPL-MCNC: 100 MG/DL (ref 70–99)
HCT VFR BLD AUTO: 43 % (ref 40–53)
HDLC SERPL-MCNC: 38 MG/DL
HGB BLD-MCNC: 14.4 G/DL (ref 13.3–17.7)
IMM GRANULOCYTES # BLD: 0 10E9/L (ref 0–0.4)
IMM GRANULOCYTES NFR BLD: 0.4 %
LDLC SERPL CALC-MCNC: 102 MG/DL
LYMPHOCYTES # BLD AUTO: 0.9 10E9/L (ref 0.8–5.3)
LYMPHOCYTES NFR BLD AUTO: 16 %
MCH RBC QN AUTO: 30.7 PG (ref 26.5–33)
MCHC RBC AUTO-ENTMCNC: 33.5 G/DL (ref 31.5–36.5)
MCV RBC AUTO: 92 FL (ref 78–100)
MONOCYTES # BLD AUTO: 0.7 10E9/L (ref 0–1.3)
MONOCYTES NFR BLD AUTO: 12.5 %
NEUTROPHILS # BLD AUTO: 3.8 10E9/L (ref 1.6–8.3)
NEUTROPHILS NFR BLD AUTO: 68.3 %
NONHDLC SERPL-MCNC: 127 MG/DL
NRBC # BLD AUTO: 0 10*3/UL
NRBC BLD AUTO-RTO: 0 /100
PLATELET # BLD AUTO: 162 10E9/L (ref 150–450)
POTASSIUM SERPL-SCNC: 4 MMOL/L (ref 3.4–5.3)
PROT SERPL-MCNC: 7.6 G/DL (ref 6.8–8.8)
PSA SERPL-ACNC: 1.82 UG/L (ref 0–4)
RBC # BLD AUTO: 4.69 10E12/L (ref 4.4–5.9)
SODIUM SERPL-SCNC: 138 MMOL/L (ref 133–144)
TRIGL SERPL-MCNC: 126 MG/DL
WBC # BLD AUTO: 5.5 10E9/L (ref 4–11)

## 2019-03-14 ASSESSMENT — PAIN SCALES - GENERAL: PAINLEVEL: NO PAIN (0)

## 2019-03-14 NOTE — LETTER
RE: Luan Mitchell  [address] [address]     Dear Colleague,    Thank you for referring your patient, Luan Mitchell, to the [facility] DERMATOLOGY at [facility]. Please see a copy of my visit note below.    [facility] Dermatology Note      Dermatology Problem List:   1.  Pemphigus foliaceus.     - Goals of treatment per Mr. Mitchell:  Prevent itching and secondary infections.   - He is not interested in complete clearance at this time.     - Titers 2015.   - Cell surface IgG 1-5, 120, monkey esophagus; 1280 intact human skin.   - DSG-1 Ig units (positive greater than 20, negative less than 14).   - DSG-3 Ig units (positive greater than 20, negative less than 9).   - Current treatment 500 mg b.i.d. CellCept (tapered f/ 2g daily), topical steroids as below. Will trial 500mg once daily 2018-->stopping medication 3/14/2019   - Repeat pemphigus panel pending 3/14/2019    2.  History of seborrheic keratoses.   3.  Seborrheic dermatitis, on ketaconazole shampoo and cream.   4. DN, Mild. Right neck. Bx 3/22/18.      Encounter Date: 3/14/2019      CC: F/u pemphigus     History of Present Illness:   Mr. Luan Mitchell is a pleasant, 73-year-old man who is well-known to Dermatology who presents today in routine followup for pemphigus foliaceus.  He was last seen in followup  2018 at which point the patient was planning to decrease to 500mg daily on the MMF in an attempt to see if SOB sx he had been was related . Since that time, he notes his skin has been under great control. On MMF 500mg once daily; No recent flares. Using topicals sparingly given the excellent control. Tolerating meds well without any notable/new side effects besides noted below. He denies any infectious symptoms.  No fevers, chills, sweats, GI side effects, cough. He knows he is due for labs today.      In regards to his previous SOB, this has continued  "and actually worsened slightly. SOB at rest. Decreasing MMF hasn't helped. Continues to work closely with Dr. Villeda for this without clear etiology. Has also noted recent brusing this Am near the R eye with unclear etiology. Mild \"muscle weakness\" bilaterally and distally; mild joint discomfort. He has seen pulm and PCP for SOB with normal Echo, PFTs and CXR. Per Dr. Gates (pulm): \"This round of testing does not point to a clear cardiopulmonary cause for his shortness of breath.\" \"I am not aware of cellcept causing some side effect of shortness of breath in the absence of any organic lung pathology.  If his dermatologist and he is willing, they could try taking a break from this medication to see if there is any improvement.\"     Past Medical History:   Diagnosis Date     BPH (benign prostatic hyperplasia)      Essential hypertension 02/2018     Hyperlipidemia LDL goal <130      Pemphigus     pemphigus foliaceus     Retinal detachment 2009    left eye- corrected with laser     Vitreous detachment 2009    LE       Social History:     The patient is a retired .  His wife is currently undergoing surveillance for soft tissue sarcoma of her right buttock, and they are following up in [place]; doing well with this.      Family History:   Not addressed.        Current Outpatient Medications   Medication     ARTIFICIAL TEAR SOLUTION OP     Cholecalciferol (VITAMIN D PO)     clobetasol (TEMOVATE) 0.05 % external solution     clobetasol (TEMOVATE) 0.05 % ointment     clobetasol propionate 0.05 % SHAM     desonide (DESOWEN) 0.05 % ointment     finasteride (PROSCAR) 5 MG tablet     hydrochlorothiazide (HYDRODIURIL) 25 MG tablet     hydrocortisone butyrate (LOCOID) 0.1 % SOLN     ketoconazole (NIZORAL) 2 % cream     ketoconazole (NIZORAL) 2 % shampoo     lovastatin (MEVACOR) 20 MG tablet     Multiple Vitamin (MULTIVITAMIN) per tablet     mycophenolate (GENERIC EQUIVALENT) 500 MG tablet     tacrolimus " (PROTOPIC) 0.1 % ointment     triamcinolone (KENALOG) 0.1 % external cream     triamcinolone (KENALOG) 0.1 % ointment     No current facility-administered medications for this visit.       Allergies   Allergen Reactions     Penicillins Swelling      Physical exam:   There were no vitals taken for this visit.   GENERAL:  A well-appearing  male appearing his stated age, affect normal, cooperative with the exam, alert and oriented x3.     SKIN: SKIN:  Full body skin examination today including the head,neck, chest, abdomen, back, upper and lower extremities, buttocks, palms soles, but sparing the genitalia were preformed. Notably findings include:   -The patient has scattered, crusted, mildly scaly, pink papules and thin plaques scattered over the central trunk, chest, abdomen and back.  There are no lesions with overlying bullae or significant ulcerations or erosions.  There are less areas involved today and many areas of post inflammatory hyper- and hypopigmentation at previous sites of inflammation.   -NERD at the well healed scar on the right neck today.  -Scattered, waxy, [id number] papules over the areas evaluated.     -Mild purpura over the R medial canthus.       Impression/Plan:    1.  Pemphigus foliaceus.  Fairly good cutaneous control with excellent symptomatic resolution on previous topical regimen and CellCept 500 mg every day (previously 2000 mg daily). No flares with weaning over the last 3 months. Disease is stable at current low MMF dosage.The patient is currently pleased with his symptomatic control and is not interested in cutaneous clearance. In fact, he would like to discontinue MMF in the event that this may be triggering his SOB. Plan today:   - Safety labs pending today (CBC,CMP)  - Stop MMF; pt to call with flaring.   - Will repeat a pemphigus panel at this point; last 2015.   - Continue clobetasol ointment 0.05% twice daily as needed to the worst, most symptomatic,  recalcitrant areas on the trunk and extremities. Recommended using for 1-2 weeks on the central low back today.   - Continue clobetasol 0.05% solution to the scalp up to once nightly as needed. Could use for 1-2 weeks in the beard for bad days.   - Continue tacrolimus 0.01% ointment daily to the face. Recommended increasing to BID use over the symptomatic jawline.   - Continue triamcinolone 0.1% cream twice daily as a primary treatment for the trunk and extremities. *refilled today.  - Continue ketoconazole 2% shampoo 3 times weekly.  Okay to use as a face wash.   - Continue ketoconazole 2% cream to be applied to the bilateral alar grooves and lateral nasolabial folds in addition to the Protopic as needed.   - Continue with 3 times weekly dilute bleach baths as tolerated.     2. Shortness of breath. Suspect unrelated to MMF, given long-term duration of treatment without issues, low dosage he is currently on, no improvement with taper, and minimal evidence in the literature (and our anecdotal experience) of SOB SE in the absence of organic lung disease. Appreciate thorough w/u to date. Given his current constellation of sx, purpura near the eye without a clear cause, will order SPEP/UPEP today. Nothing to suggest DM on examination.   -SPEP/UPEP pending.  -Will attempt MMF discontinuation as above, however, to see if this is palliative.   -We are quite reassured by the patient's reassuring thorough workup.     3. Mild DN, left neck. NERD. Reassurance. Will continue with annual FBSE screening examinations.        The patient was seen and discussed with Dr. Odell, who was staff for this encounter.     Follow up in 3 months with plan for labs at that time.       Adal Marin MD  PGY3 Dermatology  [phone]    .I, Serina Odell MD, saw this patient with the resident and agree with the resident s findings and plan of care as documented in the resident s note.

## 2019-03-14 NOTE — NURSING NOTE
"Dermatology Rooming Note    Luan Mitchell's goals for this visit include:   Chief Complaint   Patient presents with     Derm Problem     Pemphigus follow up, Luan states he is doing \" really great.\"      Osiris Estevez LPN   "

## 2019-03-14 NOTE — PROGRESS NOTES
"[facility] Dermatology Note      Dermatology Problem List:   1.  Pemphigus foliaceus.     - Goals of treatment per Mr. Mitchell:  Prevent itching and secondary infections.   - He is not interested in complete clearance at this time.     - Titers 2015.   - Cell surface IgG 1-5, 120, monkey esophagus; 1280 intact human skin.   - DSG-1 Ig units (positive greater than 20, negative less than 14).   - DSG-3 Ig units (positive greater than 20, negative less than 9).   - Current treatment 500 mg b.i.d. CellCept (tapered f/ 2g daily), topical steroids as below. Will trial 500mg once daily 2018-->stopping medication 3/14/2019   - Repeat pemphigus panel pending 3/14/2019    2.  History of seborrheic keratoses.   3.  Seborrheic dermatitis, on ketaconazole shampoo and cream.   4. DN, Mild. Right neck. Bx 3/22/18.      Encounter Date: 3/14/2019      CC: F/u pemphigus     History of Present Illness:   Mr. Luan Mitchell is a pleasant, 73-year-old man who is well-known to Dermatology who presents today in routine followup for pemphigus foliaceus.  He was last seen in followup  2018 at which point the patient was planning to decrease to 500mg daily on the MMF in an attempt to see if SOB sx he had been was related . Since that time, he notes his skin has been under great control. On MMF 500mg once daily; No recent flares. Using topicals sparingly given the excellent control. Tolerating meds well without any notable/new side effects besides noted below. He denies any infectious symptoms.  No fevers, chills, sweats, GI side effects, cough. He knows he is due for labs today.      In regards to his previous SOB, this has continued and actually worsened slightly. SOB at rest. Decreasing MMF hasn't helped. Continues to work closely with Dr. Villeda for this without clear etiology. Has also noted recent brusing this Am near the R eye with unclear etiology. Mild \"muscle weakness\" bilaterally and " "distally; mild joint discomfort. He has seen pulm and PCP for SOB with normal Echo, PFTs and CXR. Per Dr. Gates (pulm): \"This round of testing does not point to a clear cardiopulmonary cause for his shortness of breath.\" \"I am not aware of cellcept causing some side effect of shortness of breath in the absence of any organic lung pathology.  If his dermatologist and he is willing, they could try taking a break from this medication to see if there is any improvement.\"         Past Medical History:   Diagnosis Date     BPH (benign prostatic hyperplasia)      Essential hypertension 02/2018     Hyperlipidemia LDL goal <130      Pemphigus     pemphigus foliaceus     Retinal detachment 2009    left eye- corrected with laser     Vitreous detachment 2009    LE       Social History:     The patient is a retired .  His wife is currently undergoing surveillance for soft tissue sarcoma of her right buttock, and they are following up in [place]; doing well with this.      Family History:   Not addressed.        Current Outpatient Medications   Medication     ARTIFICIAL TEAR SOLUTION OP     Cholecalciferol (VITAMIN D PO)     clobetasol (TEMOVATE) 0.05 % external solution     clobetasol (TEMOVATE) 0.05 % ointment     clobetasol propionate 0.05 % SHAM     desonide (DESOWEN) 0.05 % ointment     finasteride (PROSCAR) 5 MG tablet     hydrochlorothiazide (HYDRODIURIL) 25 MG tablet     hydrocortisone butyrate (LOCOID) 0.1 % SOLN     ketoconazole (NIZORAL) 2 % cream     ketoconazole (NIZORAL) 2 % shampoo     lovastatin (MEVACOR) 20 MG tablet     Multiple Vitamin (MULTIVITAMIN) per tablet     mycophenolate (GENERIC EQUIVALENT) 500 MG tablet     tacrolimus (PROTOPIC) 0.1 % ointment     triamcinolone (KENALOG) 0.1 % external cream     triamcinolone (KENALOG) 0.1 % ointment     No current facility-administered medications for this visit.       Allergies   Allergen Reactions     Penicillins Swelling         Review " of Systems:   A 10 point review of systems was negative beyond that stated above in the HPI.       Physical exam:   There were no vitals taken for this visit.   GENERAL:  A well-appearing  male appearing his stated age, affect normal, cooperative with the exam, alert and oriented x3.     SKIN: SKIN:  Full body skin examination today including the head,neck, chest, abdomen, back, upper and lower extremities, buttocks, palms soles, but sparing the genitalia were preformed. Notably findings include:   -The patient has scattered, crusted, mildly scaly, pink papules and thin plaques scattered over the central trunk, chest, abdomen and back.  There are no lesions with overlying bullae or significant ulcerations or erosions.  There are less areas involved today and many areas of post inflammatory hyper- and hypopigmentation at previous sites of inflammation.   -NERD at the well healed scar on the right neck today.  -Scattered, waxy, [id number] papules over the areas evaluated.     -Mild purpura over the R medial canthus.       Impression/Plan:    1.  Pemphigus foliaceus.  Fairly good cutaneous control with excellent symptomatic resolution on previous topical regimen and CellCept 500 mg every day (previously 2000 mg daily). No flares with weaning over the last 3 months. Disease is stable at current low MMF dosage.The patient is currently pleased with his symptomatic control and is not interested in cutaneous clearance. In fact, he would like to discontinue MMF in the event that this may be triggering his SOB. Plan today:   - Safety labs pending today (CBC,CMP)  - Stop MMF; pt to call with flaring.   - Will repeat a pemphigus panel at this point; last 2015.   - Continue clobetasol ointment 0.05% twice daily as needed to the worst, most symptomatic, recalcitrant areas on the trunk and extremities. Recommended using for 1-2 weeks on the central low back today.   - Continue clobetasol 0.05% solution to the  scalp up to once nightly as needed. Could use for 1-2 weeks in the beard for bad days.   - Continue tacrolimus 0.01% ointment daily to the face. Recommended increasing to BID use over the symptomatic jawline.   - Continue triamcinolone 0.1% cream twice daily as a primary treatment for the trunk and extremities. *refilled today.  - Continue ketoconazole 2% shampoo 3 times weekly.  Okay to use as a face wash.   - Continue ketoconazole 2% cream to be applied to the bilateral alar grooves and lateral nasolabial folds in addition to the Protopic as needed.   - Continue with 3 times weekly dilute bleach baths as tolerated.     2. Shortness of breath. Suspect unrelated to MMF, given long-term duration of treatment without issues, low dosage he is currently on, no improvement with taper, and minimal evidence in the literature (and our anecdotal experience) of SOB SE in the absence of organic lung disease. Appreciate thorough w/u to date. Given his current constellation of sx, purpura near the eye without a clear cause, will order SPEP/UPEP today. Nothing to suggest DM on examination.   -SPEP/UPEP pending.  -Will attempt MMF discontinuation as above, however, to see if this is palliative.   -We are quite reassured by the patient's reassuring thorough workup.     3. Mild DN, left neck. NERD. Reassurance. Will continue with annual FBSE screening examinations.        The patient was seen and discussed with Dr. Odell, who was staff for this encounter.     Follow up in 3 months with plan for labs at that time.       Adal Marin MD  PGY3 Dermatology  [phone]    .I, Serina Odell MD, saw this patient with the resident and agree with the resident s findings and plan of care as documented in the resident s note.

## 2019-03-15 LAB
ALBUMIN SERPL ELPH-MCNC: 4.4 G/DL (ref 3.7–5.1)
ALPHA1 GLOB SERPL ELPH-MCNC: 0.4 G/DL (ref 0.2–0.4)
ALPHA2 GLOB SERPL ELPH-MCNC: 0.8 G/DL (ref 0.5–0.9)
B-GLOBULIN SERPL ELPH-MCNC: 0.8 G/DL (ref 0.6–1)
GAMMA GLOB SERPL ELPH-MCNC: 0.8 G/DL (ref 0.7–1.6)
M PROTEIN SERPL ELPH-MCNC: 0 G/DL
PROT PATTERN SERPL ELPH-IMP: NORMAL
PROT PATTERN UR ELPH-IMP: NORMAL

## 2019-03-21 LAB
ICS IGG SER IF-IMP: NORMAL
PATHOLOGY STUDY: NORMAL

## 2019-03-25 ENCOUNTER — OFFICE VISIT (OUTPATIENT)
Dept: INTERNAL MEDICINE | Facility: CLINIC | Age: 74
End: 2019-03-25
Payer: COMMERCIAL

## 2019-03-25 VITALS
HEART RATE: 86 BPM | OXYGEN SATURATION: 98 % | DIASTOLIC BLOOD PRESSURE: 78 MMHG | WEIGHT: 176.8 LBS | BODY MASS INDEX: 26.11 KG/M2 | SYSTOLIC BLOOD PRESSURE: 134 MMHG

## 2019-03-25 DIAGNOSIS — M25.531 PAIN OF BOTH WRIST JOINTS: ICD-10-CM

## 2019-03-25 DIAGNOSIS — M25.532 PAIN OF BOTH WRIST JOINTS: ICD-10-CM

## 2019-03-25 DIAGNOSIS — M62.81 MUSCLE WEAKNESS (GENERALIZED): ICD-10-CM

## 2019-03-25 DIAGNOSIS — M62.81 MUSCLE WEAKNESS (GENERALIZED): Primary | ICD-10-CM

## 2019-03-25 LAB
ALBUMIN SERPL-MCNC: 3.6 G/DL (ref 3.4–5)
ALP SERPL-CCNC: 62 U/L (ref 40–150)
ALT SERPL W P-5'-P-CCNC: 25 U/L (ref 0–70)
ANION GAP SERPL CALCULATED.3IONS-SCNC: 3 MMOL/L (ref 3–14)
AST SERPL W P-5'-P-CCNC: 18 U/L (ref 0–45)
BASOPHILS # BLD AUTO: 0 10E9/L (ref 0–0.2)
BASOPHILS NFR BLD AUTO: 0.4 %
BILIRUB SERPL-MCNC: 0.5 MG/DL (ref 0.2–1.3)
BUN SERPL-MCNC: 20 MG/DL (ref 7–30)
CALCIUM SERPL-MCNC: 9.4 MG/DL (ref 8.5–10.1)
CHLORIDE SERPL-SCNC: 105 MMOL/L (ref 94–109)
CK SERPL-CCNC: 134 U/L (ref 30–300)
CO2 SERPL-SCNC: 29 MMOL/L (ref 20–32)
CREAT SERPL-MCNC: 1.02 MG/DL (ref 0.66–1.25)
CRP SERPL-MCNC: 11.8 MG/L (ref 0–8)
DIFFERENTIAL METHOD BLD: NORMAL
EOSINOPHIL # BLD AUTO: 0.1 10E9/L (ref 0–0.7)
EOSINOPHIL NFR BLD AUTO: 2.3 %
ERYTHROCYTE [DISTWIDTH] IN BLOOD BY AUTOMATED COUNT: 12.3 % (ref 10–15)
ERYTHROCYTE [SEDIMENTATION RATE] IN BLOOD BY WESTERGREN METHOD: 24 MM/H (ref 0–20)
GFR SERPL CREATININE-BSD FRML MDRD: 71 ML/MIN/{1.73_M2}
GLUCOSE SERPL-MCNC: 97 MG/DL (ref 70–99)
HCT VFR BLD AUTO: 43.6 % (ref 40–53)
HGB BLD-MCNC: 14.4 G/DL (ref 13.3–17.7)
IMM GRANULOCYTES # BLD: 0 10E9/L (ref 0–0.4)
IMM GRANULOCYTES NFR BLD: 0.4 %
LYMPHOCYTES # BLD AUTO: 0.9 10E9/L (ref 0.8–5.3)
LYMPHOCYTES NFR BLD AUTO: 15.1 %
MCH RBC QN AUTO: 30.6 PG (ref 26.5–33)
MCHC RBC AUTO-ENTMCNC: 33 G/DL (ref 31.5–36.5)
MCV RBC AUTO: 93 FL (ref 78–100)
MONOCYTES # BLD AUTO: 0.6 10E9/L (ref 0–1.3)
MONOCYTES NFR BLD AUTO: 10.5 %
NEUTROPHILS # BLD AUTO: 4 10E9/L (ref 1.6–8.3)
NEUTROPHILS NFR BLD AUTO: 71.3 %
NRBC # BLD AUTO: 0 10*3/UL
NRBC BLD AUTO-RTO: 0 /100
PLATELET # BLD AUTO: 235 10E9/L (ref 150–450)
POTASSIUM SERPL-SCNC: 4.4 MMOL/L (ref 3.4–5.3)
PROT SERPL-MCNC: 7.4 G/DL (ref 6.8–8.8)
RBC # BLD AUTO: 4.7 10E12/L (ref 4.4–5.9)
SODIUM SERPL-SCNC: 138 MMOL/L (ref 133–144)
WBC # BLD AUTO: 5.6 10E9/L (ref 4–11)

## 2019-03-25 ASSESSMENT — PAIN SCALES - GENERAL: PAINLEVEL: NO PAIN (0)

## 2019-03-25 NOTE — PATIENT INSTRUCTIONS
[facility] Medication Refill Request Information:  * Please contact your pharmacy regarding ANY request for medication refills.  ** [facility] Prescription Fax = [phone]  * Please allow 3 business days for routine medication refills.  * Please allow 5 business days for controlled substance medication refills.     [facility] Test Result notification information:  *You will be notified with in 7-10 days of your appointment day regarding the results of your test.  If you are on MyChart you will be notified as soon as the provider has reviewed the results and signed off on them.    [facility]: [phone]       Please go to the lab on the first floor before you leave today.

## 2019-03-25 NOTE — PROGRESS NOTES
[facility]  Primary Care Center   Loretta Villeda MD  03/25/2019      Chief Complaint:   Weakness      History of Present Illness:   Luan Mitchell is a 73 year old male with a history of pemphigus foliaceus, BPH, hypertension, and hyperlipidemia who presents for evaluation of muscle weakness.    Since February, the patient has had aches in his wrists and ankles. At the beginning of March (3 weeks ago), the patient began to ache throughout his body and develop weakness in his hands. He states that that picking up a milk jug is challenging. He is able to manage the muscle aches though is more concerned about the weakness at this point. He also notes bilateral aches in his upper arms with elevation, right greater than left. The patient describes his legs as insecure and rubbery and he sometimes needs to rest when going up stairs. He has persistent pain in his ankles and mild, intermittent aches in his right hip. The patient notes an increase in appetite though denies gross changes in weight. He denies fever, chills, drenching night sweats, headaches, new medication, trauma to back, nausea or vomiting.  He reduced his mycophenolate in January 2019 per dermatology recommendation and wonders if that is made a difference.    The patient has baseline shortness of breath and has been worked up extensively for this in the past. He reports that his breathing is stable today though he feels that he has to clear his throat more often for excessive mucous. His breathing is worse when he is tense and he is able to control it when he calms down.     Other concerns discussed:  -Of note, the patient reports a decreased mood over the past month. His wife sees a counselor at [facility] and he will be attending the appointment with her tomorrow.   -He sees Dr. Marin, dermatology and has now ceased his CellCept which he has been weaning for some time.      Review of Systems:   Pertinent items are noted in HPI, remainder of complete ROS is  negative.      Active Medications:     Current Outpatient Medications:      ARTIFICIAL TEAR SOLUTION OP, Apply  to eye., Disp: , Rfl:      Cholecalciferol (VITAMIN D PO), Take 1 tablet by mouth daily 1000 mg, Disp: , Rfl:      clobetasol (TEMOVATE) 0.05 % external solution, Apply  topically 2 times daily as needed to the scalp., Disp: 200 mL, Rfl: 11     clobetasol (TEMOVATE) 0.05 % ointment, APPLY TO AFFECTED AREA(S) TWO TIMES A DAY, Disp: 180 g, Rfl: 2     clobetasol propionate 0.05 % SHAM, Apply sparingly to dry scalp 3 x weekly as needed.  Leave in place for 15 m then add water, lather and rinse, Disp: 1 Bottle, Rfl: 5     desonide (DESOWEN) 0.05 % ointment, Apply topically to affected areas twice daily as instructed., Disp: 180 g, Rfl: 3     finasteride (PROSCAR) 5 MG tablet, Take 1 tablet (5 mg) by mouth daily, Disp: 90 tablet, Rfl: 4     hydrocortisone butyrate (LOCOID) 0.1 % SOLN, Apply sparingly to affected area(s) of beard twice daily, Disp: 180 mL, Rfl: 3     ketoconazole (NIZORAL) 2 % cream, Apply twice daily to the nose as needed for white/scaling., Disp: 60 g, Rfl: 2     ketoconazole (NIZORAL) 2 % shampoo, Three times per week in the shower: Lather into scalp, leave in place for 3-5 minutes, then rinse., Disp: 360 mL, Rfl: 12     lovastatin (MEVACOR) 20 MG tablet, Take 1 tablet (20 mg) by mouth At Bedtime, Disp: 90 tablet, Rfl: 4     Multiple Vitamin (MULTIVITAMIN) per tablet, Take 1 tablet by mouth daily. 1 tab daily, Disp: , Rfl:      tacrolimus (PROTOPIC) 0.1 % ointment, Apply topically to affected areas as instructed., Disp: 100 g, Rfl: 11     triamcinolone (KENALOG) 0.1 % external cream, Apply topically to affected areas as instructed., Disp: 454 g, Rfl: 11     triamcinolone (KENALOG) 0.1 % ointment, Apply topically 2 times daily, Disp: 454 g, Rfl: 11      Allergies:   Penicillins      Past Medical History:  Benign prostatic hyperplasia  Essential hypertension  Hyperlipidemia LDL goal  <130  Pemphigus foliaceus  Retinal detachment, left eye  Vitreous detachment, left eye  Encounter for long-term (current) use of high-risk medication  Dermatitis, seborrheic  Lightheadedness  Age-related nuclear cataract of both eyes     Past Surgical History:  Retinopexy laser prophylaxis break(s) left eye (left eye)    Family History:   Heart disease - mother, father, paternal uncle  Diabetes - mother  Hypertension - mother    Social History:   Smoking status: none  Smokeless tobacco: never used  Alcohol use: none  Drug use: none     Physical Exam:   /78   Pulse 86   Wt 80.2 kg (176 lb 12.8 oz)   SpO2 98%   BMI 26.11 kg/m     Constitutional: Healthy, alert, no distress and cooperative  Head: Normocephalic. No masses, lesions, tenderness or abnormalities  Eyes: PERRL, no conjunctival injection  ENT: Bilateral TM normal without fluid or infection, throat normal without erythema or exudate, no cervical lymphadenopathy. Pallet elevates normally.   Cardiovascular: JVP 6. RRR, S1,S2 no murmurs, clicks, rubs, or gallops. No pretibial edema.  No carotid bruits. No orthopnea.   Respiratory: Clear to auscultation and percussion bilaterally.   Gastrointestinal: BS NA. Soft, nontender, no hepatosplenomegaly or mass. Liver span is 7 cm.  There is no CVA or flank tenderness bilaterally.  Back: No paraspinous muscle tenderness, full range of motion.  Extremities: No point tenderness on muscles of the upper or lower extremities.  No fasciculations noted in the muscles.  Joints are normal. No gross deformities noted, no tenderness, effusion, or warmth.  No tophi.  Gait and tandem normal and normal muscle tone. Bilateral wrists full ROM, no fusion, warmth or redness. Normal MCPs and PIPs.  Skin: No suspicious lesions or rashes. Negative for Gottron's papules  Neurologic: Reflexes normal and symmetric. Strength is intact bilaterally in upper and lower extremities except for 4 out of 5 right hip flexion. Normal Neer's test  and rotator cuff tests are negative.  Pronator drift test normal.  Minsky downgoing bilaterally. Normal finger nose finger and heel shin bilaterally. Sensation grossly WNL.  Cranial nerves II through XII are intact. Romberg test is negative.    Psychiatric: Mentation appears normal and affect normal  Hematologic/Lymphatic/Immunologic: Normal cervical, anterior, posterior, submandibular, submental, and supraclavicular  lymph nodes    Assessment and Plan:  Luan Mitchell is a 73 year old male with a history of pemphigus foliaceus, BPH, hypertension, and hyperlipidemia who presents for evaluation of 7-week history of mildly progressive myalgias, arthralgias, and weakness particularly of the right leg.  There is been no trauma, no back pain, no headache, and no fevers chills night sweats or weight loss.  In fact the patient has gained weight.    Physical exam including neurological exam reveals no evidence of inflammatory arthritis, no fasciculations, but slight reduction of strength in the right hip flexion only.  Differential diagnosis includes inflammatory polyarthritis, subtle, myositis, other metabolic changes affecting muscles.  Given his history of pemphigus foliaceous, and the recent reduction in mycophenolate, I wonder if an autoimmune disease is making itself obvious.  Plan will be to start with laboratory studies, and then proceed to imaging as directed by his course.  If there is no obvious explanation, I will refer to neurology, for consideration of an EMG.      Luan was seen today for fatigue.    Diagnoses and all orders for this visit:    Muscle weakness (generalized)  -     CK total; Future  -     Comprehensive metabolic panel; Future  -     CBC with platelets differential; Future  -     TSH with free T4 reflex; Future    Pain of both wrist joints  -     CRP inflammation; Future  -     Erythrocyte sedimentation rate; Future  -     Anti Nuclear Aliya IgG by IFA with Reflex  -     Rheumatoid factor;  Future  -     Cyclic Citrullinated Peptide Antibody IgG; Future      Follow-up: Data Unavailable         Scribe Disclosure:   I, Tahir Ambrosio, am serving as a scribe to document services personally performed by Loretta Villeda MD at this visit, based upon the provider's statements to me. All documentation has been reviewed by the aforementioned provider prior to being entered into the official medical record.     Portions of this medical record were completed by a scribe. UPON MY REVIEW AND AUTHENTICATION BY ELECTRONIC SIGNATURE, this confirms (a) I performed the applicable clinical services, and (b) the record is accurate.     Loretta Villeda

## 2019-03-25 NOTE — NURSING NOTE
Chief Complaint   Patient presents with     Fatigue     muscle weakness and pain throughout body, SOB         Helder Kaur, EMT 4:18 PM on 3/25/2019.

## 2019-03-26 DIAGNOSIS — M62.81 MUSCLE WEAKNESS (GENERALIZED): ICD-10-CM

## 2019-03-26 DIAGNOSIS — M62.81 GENERALIZED MUSCLE WEAKNESS: Primary | ICD-10-CM

## 2019-03-26 LAB
ANA SER QL IF: NEGATIVE
CCP AB SER IA-ACNC: 1 U/ML
RHEUMATOID FACT SER NEPH-ACNC: <20 IU/ML (ref 0–20)
TSH SERPL DL<=0.005 MIU/L-ACNC: 3.18 MU/L (ref 0.4–4)

## 2019-03-28 ENCOUNTER — DOCUMENTATION ONLY (OUTPATIENT)
Dept: NEUROLOGY | Facility: CLINIC | Age: 74
End: 2019-03-28

## 2019-03-28 NOTE — PROGRESS NOTES
Loretta Villeda MD  7 week history of bilateral upper and lower extremity weakness, demonstrable in the right hip flexor.    Physical exam including neurological exam reveals no evidence of inflammatory arthritis, no fasciculations, but slight reduction of strength in the right hip flexion only.  Differential diagnosis includes inflammatory polyarthritis, subtle, myositis, other metabolic changes affecting muscles.  Given his history of pemphigus foliaceous, and the recent reduction in mycophenolate, I wonder if an autoimmune disease is making itself obvious.  Plan will be to start with laboratory studies, and then proceed to imaging as directed by his course.  If there is no obvious explanation, I will refer to neurology, for consideration of an EMG.    ***  Generalized muscle soreness  SOB    Some difficulty with overhead   Pain at R shoulder

## 2019-03-29 ENCOUNTER — OFFICE VISIT (OUTPATIENT)
Dept: PHYSICAL MEDICINE AND REHAB | Facility: CLINIC | Age: 74
End: 2019-03-29
Attending: INTERNAL MEDICINE
Payer: COMMERCIAL

## 2019-03-29 DIAGNOSIS — M79.10 MUSCLE PAIN: Primary | ICD-10-CM

## 2019-03-29 NOTE — PROGRESS NOTES
[facility]  Physical Medicine and Rehabilitation Clinic    Nerve Conduction & EMG Report          Patient: Luan Mitchell  Patient ID: [id number]  Gender: Male  YOB: 1945  Age:  73 Years 11 Months        History & Examination: Mr. Mitchell is a 73-year-old male who was referred by Dr. Loretta Villeda for evaluation of 7 weeks of bilateral upper and lower extremity weakness, demonstrable in the right hip flexor on her exam.    Today he reports that he has generalized muscle soreness with activity, no particular distribution.  He states he is able to go on long walks and climbs stairs.  Overhead activity is somewhat difficult, but this is due to right shoulder pain rather than weakness he reports.  He also reports some intermittent shortness of breath over approximately the same time period.  He states he has had work-ups for this with both cardiology and pulmonology, all of which has been unremarkable. Exam showed mild HF weakness 4+ b/l, 5/5 strength distally in bilateral lower extremities. R shoulder with limited strength and active ROM due to pain with Abd, otherwise strength exam was normal in bilateral upper extremities. Sensation was intact to LT and reflexes were symmetric 2+ at bilateral biceps and brachioradialis, 2+ at knees and 1+ at ankles. Query polyneuropathy vs myopathy.       Techniques: Sensory and motor conduction studies were done with surface recording electrodes. EMG was done with a concentric needle electrode.        Results:  1. Right median, right ulnar, right superficial peroneal and bilateral sural antidromic sensory NCSs were normal.   3. Right median, right ulnar, bilateral tibial and bilateral peroneal motor NCSs were normal.  5. Right tibial, median and ulnar F-wave minimal latencies were normal.   6. EMG of selected muscles at bilateral lower and right upper extremities was normal.       Interpretation:  1.  Normal examination.    2.  There is no  electrodiagnostic evidence of a diffuse sensory or motor peripheral neuropathy.    3. There is no electrodiagnostic evidence of myopathy or any abnormalities of proximal leg muscles to account for patient s reported weakness/muscle pain.    4. There is no electrodiagnostic evidence of a median, or ulnar mononeuropathy in the right upper extremity.    5. There is no electrodiagnostic evidence of a sural, tibial or deep peroneal mononeuropathy in either lower extremity.        EMG Physician:  Emily Torres MD  Physical Medicine & Rehabilitation         Sensory NCS      Nerve / Sites Rec. Site Onset Peak NP Amp Ref. PP Amp Dist Amando Ref. Temp     ms ms  V  V  V cm m/s m/s  C   R MEDIAN - Dig II Anti      Wrist Dig II 2.55 3.49 28.2 10.0 52.9 14 54.9 48.0 33   R ULNAR - Dig V Anti      Wrist Dig V 2.34 3.07 33.1 8.0 60.5 12.5 53.3 48.0 33.1   R SURAL - Lat Mall 60      Calf Ankle 2.76 3.49 7.2 5.0 5.6 14 50.7 38.0 32.4   L SURAL - Lat Mall 60      Calf Ankle 2.50 3.39 8.5 5.0 6.0 14 56.0 38.0 31.5   R SUP PERONEAL      Lat Leg Martinez 2.66 3.44 5.5 3.0 1.6 12.5 47.1 38.0 31.6       Motor NCS      Nerve / Sites Rec. Site Lat Ref. Amp Ref. Rel Amp Dist Amando Ref. Dur. Area Temp.     ms ms mV mV % cm m/s m/s ms %  C   R MEDIAN - APB      Wrist APB 3.44 4.40 11.3 5.0 100 8   6.77 100 33      Elbow APB 7.45  10.7  95 22 54.9 48.0 6.93 92.2 33.1   R ULNAR - ADM      Wrist ADM 3.07 3.50 12.6 5.0 100 8   6.93 100 32.9      B.Elbow ADM 6.61  11.3  89.6 20 56.5 48.0 7.40 90.8 32.9      A.Elbow ADM 8.28  12.2  97.1 10 60.0 48.0 6.98 97.3 32.9   R DEEP PERONEAL - EDB 60      Ankle EDB 4.06 6.00 5.8 2.0 100 8   5.47 100 31.6      FibHead EDB 10.89  4.6  78.9 30 44.0 38.0 6.09 95 31.6      Pop Fos EDB 12.71  5.0  85.7 9 49.4 38.0 6.41  31.5   L DEEP PERONEAL - EDB 60      Ankle EDB 4.43 6.00 8.2 2.0 100 8   5.99 100 31   R TIBIAL - AH      Ankle AH 3.33 6.00 10.0 4.0 100 8   6.82 100 31.6      Pop Fos AH 12.24  9.0  90.6 40 44.9 38.0 7.66  96.8 31.5   L TIBIAL - AH      Ankle AH 3.91 6.00 12.5 4.0 100 8   6.25 100 31.2       F  Wave      Nerve Min F Lat Max F Lat Mean FLat Temp.    ms ms ms  C   R MEDIAN 27.34 29.84 28.45 33.1   R ULNAR 27.92 30.00 28.93 32.7   R TIBIAL 54.22 69.37 56.56 31.6       EMG Summary Table     Spontaneous MUAP Recruitment    IA Fib PSW Fasc H.F. Amp Dur. PPP Pattern   R. TIB ANTERIOR N None None None None N N N N   R. GASTROCN (MED) N None None None None N N N N   R. VAST LATERALIS N None None None None N N N N   L. TIB ANTERIOR N None None None None N N N N   L. GASTROCN (MED) N None None None None N N N N   R. ILIOPSOAS N None None None None N N N N   R. DELTOID N None None None None N N N N   R. BICEPS N None None None None N N N N

## 2019-03-29 NOTE — LETTER
3/29/2019       RE: Luan Mitchell  [address] [facility] [address]     Dear Colleague,    Thank you for referring your patient, Luan Mitchell, to the [facility] PHYSICAL MEDICINE AND REHABILITATION at [facility]. Please see a copy of my visit note below.        [facility]  Physical Medicine and Rehabilitation Clinic    Nerve Conduction & EMG Report    Patient: Luan Mitchell  Patient ID: [id number]  Gender: Male  YOB: 1945  Age:  73 Years 11 Months    History & Examination: Mr. Mitchell is a 73-year-old male who was referred by Dr. Loretta Villeda for evaluation of 7 weeks of bilateral upper and lower extremity weakness, demonstrable in the right hip flexor on her exam.    Today he reports that he has generalized muscle soreness with activity, no particular distribution.  He states he is able to go on long walks and climbs stairs.  Overhead activity is somewhat difficult, but this is due to right shoulder pain rather than weakness he reports.  He also reports some intermittent shortness of breath over approximately the same time period.  He states he has had work-ups for this with both cardiology and pulmonology, all of which has been unremarkable. Exam showed mild HF weakness 4+ b/l, 5/5 strength distally in bilateral lower extremities. R shoulder with limited strength and active ROM due to pain with Abd, otherwise strength exam was normal in bilateral upper extremities. Sensation was intact to LT and reflexes were symmetric 2+ at bilateral biceps and brachioradialis, 2+ at knees and 1+ at ankles. Query polyneuropathy vs myopathy.     Techniques: Sensory and motor conduction studies were done with surface recording electrodes. EMG was done with a concentric needle electrode.     Results:  1. Right median, right ulnar, right superficial peroneal and bilateral sural antidromic sensory NCSs were normal.   3. Right median, right ulnar, bilateral  tibial and bilateral peroneal motor NCSs were normal.  5. Right tibial, median and ulnar F-wave minimal latencies were normal.   6. EMG of selected muscles at bilateral lower and right upper extremities was normal.     Interpretation:  1.  Normal examination.    2.  There is no electrodiagnostic evidence of a diffuse sensory or motor peripheral neuropathy.    3. There is no electrodiagnostic evidence of myopathy or any abnormalities of proximal leg muscles to account for patient s reported weakness/muscle pain.    4. There is no electrodiagnostic evidence of a median, or ulnar mononeuropathy in the right upper extremity.    5. There is no electrodiagnostic evidence of a sural, tibial or deep peroneal mononeuropathy in either lower extremity.    EMG Physician:  Emliy Torres MD  Physical Medicine & Rehabilitation     Sensory NCS      Nerve / Sites Rec. Site Onset Peak NP Amp Ref. PP Amp Dist Amando Ref. Temp     ms ms  V  V  V cm m/s m/s  C   R MEDIAN - Dig II Anti      Wrist Dig II 2.55 3.49 28.2 10.0 52.9 14 54.9 48.0 33   R ULNAR - Dig V Anti      Wrist Dig V 2.34 3.07 33.1 8.0 60.5 12.5 53.3 48.0 33.1   R SURAL - Lat Mall 60      Calf Ankle 2.76 3.49 7.2 5.0 5.6 14 50.7 38.0 32.4   L SURAL - Lat Mall 60      Calf Ankle 2.50 3.39 8.5 5.0 6.0 14 56.0 38.0 31.5   R SUP PERONEAL      Lat Leg Martinez 2.66 3.44 5.5 3.0 1.6 12.5 47.1 38.0 31.6       Motor NCS      Nerve / Sites Rec. Site Lat Ref. Amp Ref. Rel Amp Dist Amando Ref. Dur. Area Temp.     ms ms mV mV % cm m/s m/s ms %  C   R MEDIAN - APB      Wrist APB 3.44 4.40 11.3 5.0 100 8   6.77 100 33      Elbow APB 7.45  10.7  95 22 54.9 48.0 6.93 92.2 33.1   R ULNAR - ADM      Wrist ADM 3.07 3.50 12.6 5.0 100 8   6.93 100 32.9      B.Elbow ADM 6.61  11.3  89.6 20 56.5 48.0 7.40 90.8 32.9      A.Elbow ADM 8.28  12.2  97.1 10 60.0 48.0 6.98 97.3 32.9   R DEEP PERONEAL - EDB 60      Ankle EDB 4.06 6.00 5.8 2.0 100 8   5.47 100 31.6      FibHead EDB 10.89  4.6  78.9 30 44.0 38.0  6.09 95 31.6      Pop Fos EDB 12.71  5.0  85.7 9 49.4 38.0 6.41  31.5   L DEEP PERONEAL - EDB 60      Ankle EDB 4.43 6.00 8.2 2.0 100 8   5.99 100 31   R TIBIAL - AH      Ankle AH 3.33 6.00 10.0 4.0 100 8   6.82 100 31.6      Pop Fos AH 12.24  9.0  90.6 40 44.9 38.0 7.66 96.8 31.5   L TIBIAL - AH      Ankle AH 3.91 6.00 12.5 4.0 100 8   6.25 100 31.2       F  Wave      Nerve Min F Lat Max F Lat Mean FLat Temp.    ms ms ms  C   R MEDIAN 27.34 29.84 28.45 33.1   R ULNAR 27.92 30.00 28.93 32.7   R TIBIAL 54.22 69.37 56.56 31.6       EMG Summary Table     Spontaneous MUAP Recruitment    IA Fib PSW Fasc H.F. Amp Dur. PPP Pattern   R. TIB ANTERIOR N None None None None N N N N   R. GASTROCN (MED) N None None None None N N N N   R. VAST LATERALIS N None None None None N N N N   L. TIB ANTERIOR N None None None None N N N N   L. GASTROCN (MED) N None None None None N N N N   R. ILIOPSOAS N None None None None N N N N   R. DELTOID N None None None None N N N N   R. BICEPS N None None None None N N N N                                    Again, thank you for allowing me to participate in the care of your patient.      Sincerely,    Emily Torres MD

## 2019-03-31 ASSESSMENT — ENCOUNTER SYMPTOMS
MEMORY LOSS: 0
CHILLS: 0
HEADACHES: 0
MYALGIAS: 1
WHEEZING: 0
POLYPHAGIA: 1
PARALYSIS: 0
DECREASED APPETITE: 0
SPEECH CHANGE: 0
SPUTUM PRODUCTION: 1
ALTERED TEMPERATURE REGULATION: 0
JOINT SWELLING: 0
NIGHT SWEATS: 0
BACK PAIN: 0
SEIZURES: 0
FEVER: 0
DYSPNEA ON EXERTION: 0
SNORES LOUDLY: 0
NERVOUS/ANXIOUS: 0
POSTURAL DYSPNEA: 0
HALLUCINATIONS: 0
WEIGHT LOSS: 1
STIFFNESS: 0
DISTURBANCES IN COORDINATION: 0
DECREASED CONCENTRATION: 0
TINGLING: 0
WEIGHT GAIN: 0
COUGH DISTURBING SLEEP: 0
ARTHRALGIAS: 0
MUSCLE WEAKNESS: 1
DEPRESSION: 1
TREMORS: 0
COUGH: 1
HEMOPTYSIS: 0
SHORTNESS OF BREATH: 1
MUSCLE CRAMPS: 0
PANIC: 0
WEAKNESS: 1
DIZZINESS: 1
FATIGUE: 1
NECK PAIN: 0
INSOMNIA: 0
INCREASED ENERGY: 1
LOSS OF CONSCIOUSNESS: 0
NUMBNESS: 0

## 2019-04-01 ENCOUNTER — OFFICE VISIT (OUTPATIENT)
Dept: NEUROLOGY | Facility: CLINIC | Age: 74
End: 2019-04-01
Payer: COMMERCIAL

## 2019-04-01 VITALS
WEIGHT: 176 LBS | DIASTOLIC BLOOD PRESSURE: 66 MMHG | OXYGEN SATURATION: 95 % | HEART RATE: 87 BPM | HEIGHT: 69 IN | BODY MASS INDEX: 26.07 KG/M2 | SYSTOLIC BLOOD PRESSURE: 123 MMHG

## 2019-04-01 DIAGNOSIS — M62.81 SUBJECTIVE MUSCLE WEAKNESS: Primary | ICD-10-CM

## 2019-04-01 ASSESSMENT — PAIN SCALES - GENERAL: PAINLEVEL: NO PAIN (0)

## 2019-04-01 ASSESSMENT — MIFFLIN-ST. JEOR: SCORE: 1523.71

## 2019-04-01 NOTE — LETTER
"4/1/2019       RE: Luan Mitchell  [address] [facility] [address]     Dear Colleague,    Thank you for referring your patient, Luan Mitchell, to the [facility] NEUROLOGY at [facility]. Please see a copy of my visit note below.    Re: Luan Mitchell      MRN# [id number]  YOB: 1945  Date of Visit:4/1/2019    OUTPATIENT NEUROLOGY VISIT NOTE    Chief Complaint:  Weakness and aches in wrist and ankles    History of Present Illness:  Luan Mitchell is a -year-old right(left)-handed presents to the clinic today for weakness and aches in wrist and ankles.   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 No numbness or tingling. Patient reports that putting his coat he feels \"stiffer\" in the shoulder joints. Patient reports that lifting a cup feels like something \"heavy\" in his hand. No dropping things and have not fallen.   No history of head or neck injuries.   Patient reports that on last couple of month having a \"flame\" in the \"throat\" and \"clearing throat\" frequent and \"shortness of breath.\" Patient reports that \"shortness of breath\" for many months and more persistent since last spring but occasionally before that. Patient reports that his breathing possibly 'kind of shallow\" and \"frequent\" when \"in the state.\"  Patient reports that when weather is nicer he takes his dog for a walk and breathing was better but got achy and tired after walking for a an hour.   Patient saw Pulmonology on 12/10/2018 for shortness of breath and note reviewed. Normal PFT, ECHO, CXR, hemoglobin, renal function and BNP and no clear cardiopulmonary cause for the SOB. " "  Patient reports that laying down is better and can breath thru the \"nose normally\" but sitting up can go \"either ways\" and reports that walking he can breath thru the \"nose\" but standing worsen his dyspnea because breathing thru the nose and getting enough \"oxygen.\"    No family history of neurological history  Patient reports that his memory is Ok  Patient denies any changes in his vision-no double vision  No dizziness or vertigo    Reports that he felt lightheaded a year ago and negative cardiology work up, including a stress tests  Patient saw ENT in 2015 for lightheadedness/hearing loss    Patient reports that his wife says that he \"snores sometimes\" but no sleep apnea and already discussed with his PCP    Denies history of head or neck trauma, dizziness, vertigo, loss of consciousness, seizure, double vision, blurred vision, hearing difficulty, speech or swallowing difficulty, numbness in face, arms or legs, urinary or bowel incontinence, coordination problems or gait difficulty, fever or chills.    Neurodiagnostic Testing  Results for AJ PACE (MRN [id number]) as of 4/1/2019 10:13   Ref. Range 10/30/2018 12:47   FVC-Pred Latest Units: L 3.91   FVC-Pre Latest Units: L 4.29   FVC-%Pred-Pre Latest Units: % 109   FEV1-Pre Latest Units: L 3.12   FEV1-%Pred-Pre Latest Units: % 106   FEV1FVC-Pred Latest Units: % 72   FEV1FVC-Pre Latest Units: % 73   FEV1SVC-Pred Latest Units: % 66   FEV1SVC-Pre Latest Units: % 70   FEV1FEV6-Pred Latest Units: % 77   FEV1FEV6-Pre Latest Units: % 75   FEFMax-Pred Latest Units: L/sec 7.54   FEFMax-Pre Latest Units: L/sec 9.51   FEFMax-%Pred-Pre Latest Units: % 126   FIFMax-Pre Latest Units: L/sec 6.22   ExpTime-Pre Latest Units: sec 8.14     Results for SANJEEVAJ MORIAH (MRN [id number]) as of 4/1/2019 10:13   Ref. Range 10/30/2018 12:47   FRCPleth-Pred Latest Units: L 3.69   FRCPleth-Pre Latest Units: L 4.09   FRCPleth-%Pred-Pre Latest Units: % 110   RVPleth-Pred Latest Units: L " 2.73   RVPleth-Pre Latest Units: L 3.07   RVPleth-%Pred-Pre Latest Units: % 112   TLCPleth-Pred Latest Units: L 6.92   TLCPleth-Pre Latest Units: L 7.51   TLCPleth-%Pred-Pre Latest Units: % 108   ERV-Pred Latest Units: L 0.98   ERV-Pre Latest Units: L 1.02   ERV-%Pred-Pre Latest Units: % 103   IC-Pred Latest Units: L 3.48   IC-Pre Latest Units: L 3.42   IC-%Pred-Pre Latest Units: % 98   VC-Pred Latest Units: L 4.46   VC-Pre Latest Units: L 4.44   VC-%Pred-Pre Latest Units: % 99     Results for AJ PACE MORIAH (MRN [id number]) as of 4/1/2019 10:13   Ref. Range 10/30/2018 12:47   DLCOunc-Pred Latest Units: ml/min/mmHg 24.45   DLCOunc-Pre Latest Units: ml/min/mmHg 29.94   DLCOunc-%Pred-Pre Latest Units: % 122   DLCOcor-Pre Latest Units: ml/min/mmHg 29.61   DLCOcor-%Pred-Pre Latest Units: % 121   VA-Pre Latest Units: L 6.14   VA-%Pred-Pre Latest Units: % 92     ECHO Interpretation Summary  Global and regional left ventricular function is normal with an EF of 60-65%.  Right ventricular function, chamber size, wall motion, and thickness are  normal.  No significant valvular abnormalities.  The inferior vena cava was normal in size with preserved respiratory  variability.  No pericardial effusion is present.  This study was compared to a prior TTE from 2/3/2017. There has been no  significant change.      Past Medical History reviewed and verified with the patient  Past Medical History:   Diagnosis Date     BPH (benign prostatic hyperplasia)      Essential hypertension 02/2018     Hyperlipidemia LDL goal <130      Pemphigus     pemphigus foliaceus     Retinal detachment 2009    left eye- corrected with laser     Vitreous detachment 2009    LE       Past Surgical History reviewed and verified with the patient  Past Surgical History:   Procedure Laterality Date     NO HISTORY OF SURGERY  1/28/14    derm     RETINOPEXY LASER PROPHYLAXIS BREAK(S) OS (LEFT EYE)      2009 - Left eye       Family History reviewed and verified with  the patient  Heart disease in the family but were heavy smokers  No neurological illnesses.   Social History:  Retired  at the [employer]  Social History     Tobacco Use     Smoking status: Never Smoker     Smokeless tobacco: Never Used   Substance Use Topics     Alcohol use: No    reviewed and verified with the patient     Allergies   Allergen Reactions     Penicillins Swelling       Current Outpatient Medications   Medication Sig Dispense Refill     ARTIFICIAL TEAR SOLUTION OP Apply  to eye.       Cholecalciferol (VITAMIN D PO) Take 1 tablet by mouth daily 1000 mg       clobetasol (TEMOVATE) 0.05 % external solution Apply  topically 2 times daily as needed to the scalp. 200 mL 11     clobetasol (TEMOVATE) 0.05 % ointment APPLY TO AFFECTED AREA(S) TWO TIMES A  g 2     clobetasol propionate 0.05 % SHAM Apply sparingly to dry scalp 3 x weekly as needed.  Leave in place for 15 m then add water, lather and rinse 1 Bottle 5     desonide (DESOWEN) 0.05 % ointment Apply topically to affected areas twice daily as instructed. 180 g 3     finasteride (PROSCAR) 5 MG tablet Take 1 tablet (5 mg) by mouth daily 90 tablet 4     hydrocortisone butyrate (LOCOID) 0.1 % SOLN Apply sparingly to affected area(s) of beard twice daily 180 mL 3     ketoconazole (NIZORAL) 2 % cream Apply twice daily to the nose as needed for white/scaling. 60 g 2     ketoconazole (NIZORAL) 2 % shampoo Three times per week in the shower: Lather into scalp, leave in place for 3-5 minutes, then rinse. 360 mL 12     lovastatin (MEVACOR) 20 MG tablet Take 1 tablet (20 mg) by mouth At Bedtime 90 tablet 4     Multiple Vitamin (MULTIVITAMIN) per tablet Take 1 tablet by mouth daily. 1 tab daily       tacrolimus (PROTOPIC) 0.1 % ointment Apply topically to affected areas as instructed. 100 g 11     triamcinolone (KENALOG) 0.1 % external cream Apply topically to affected areas as instructed. 454 g 11     triamcinolone (KENALOG) 0.1 % ointment Apply  "topically 2 times daily 454 g 11   reviewed and verified with the patient    Review of Systems:  A 12-point ROS including constitutional, eyes, ENT, respiratory, cardiovascular, gastroenterology, genitourinary, integumentary, musculoskeletal, neurology, hematology and psychiatric were all reviewed with the patient and completed at the Neuroscience Services Question nary and as mentioned in the HPI. Patient reports that appetite but lost a couple pounds in the last couple months    General Exam:   /66 (BP Location: Left arm, Patient Position: Sitting, Cuff Size: Adult Regular)   Pulse 87   Ht 1.753 m (5' 9\")   Wt 79.8 kg (176 lb)   SpO2 95%   BMI 25.99 kg/m     Vital Signs 12/10/2018 12/13/2018 2/4/2019 2/4/2019 2/4/2019   Weight (LB) 182 lb         Vital Signs 3/14/2019 3/25/2019 3/25/2019 4/1/2019 4/1/2019   Weight (LB)   176 lb 12.8 oz  176 lb     O2 sats did not drop and remained above 95 on RA with walking  GEN: Awake, NAD; good eye contact, responses appropriately, healthy appearing but exertional dyspnea and breathing thru the nose and mouth at times, liver smell  HEENT: Head atraumatic/Normocephalic. Scalp normal. Pupils equally round, 4 mm, reactive to light and accommodation, sclera and conjunctiva normal. Fundoscopic examination reveals normal vessels no papilledema.   Neck: Easily moveable without resistance  Heart: S1/S2 appreciated, RRR, no m/r/g, no carotid bruits  Lungs:Lungs are clear to auscultation bilaterally, no wheezes or crackles.   Neurological Examination:  The patient is alert and oriented times four. Has good attention and concentration.  Speech is fluent without dysarthria.   Cranial nerves:  CN I deferred.   CN II: Intact and full visual fields to confrontation bilaterally.   CN III, IV, VI: EOM intact. There is no nystagmus. Has conjugated gaze. Intact direct and consensual pupillary light reflexes.   CN V: Intact and symmetrical to facial sensation in the V1 through V3 " "bilaterally.   CN VII: Intact and symmetrical eyebrow and lid raise and eyelid closure, smiles and frown.   CN VIII: Intact to finger rub bilaterally.   CN IX and X: The palates elevates symmetrical. The uvula is midline.   CN XII: The tongue protrudes midline with no atrophy or fasciculations.   Motor exam: The patient has a normal bulk and tone throughout. There is no atrophy, fasciculations, clonus, or abnormal movements appreciated.   Strength Exam:  5/5 strength at shoulder abduction, elbow flexion or extension, wrist flexion or extension, finger abduction, , hip flexion and extension, knee flexion and extension, and dorsiflexion and plantarflexion bilaterally.   Sensation is intact to light touch and pinprick throughout. Has good vibration and proprioception sensation at great toes bilaterally.   Reflexes are 2+ and symmetrical at biceps, triceps, brachioradialis, patellar, and Achilles.   Negative Babinski with downgoing toes bilaterally.   Coordination reveals finger-nose-finger, rapid alternating movements, finger tapping  with normal speed and accuracy.   Station and gait is normal. There is no ataxia. Can walk on the toes, heels, and tandem walk without difficulty. Has no drift and a negative Romberg. Joana's negative        DATA:  EMG reviewed  Assessment and Plan:  Generalized subjective feeling of weakness and achy  in his ankles and feet since first week in February of 2019. However, exertional dyspnea intermittent for about a year and worse with standing up and still and better with laying down is the \"best\" and feels \"normal\" with laying down or moving.   Cardiology and pulmonology work up negative. Normal PFT.   EMG for subjective weakness and achiness in the joints and no concerning  findings at this time.   Non focal neurological exam-no weakness or sensory deficit, no fasciculations. Patient's reports that laying down helps with breathing which is not consistent with motor neuron " disease. PFTs were normal. No any clear evidence of neurological causes of patient's symptoms at this time.     I discussed all my recommendation with Luan Mitchell. The patient verbalizes understanding and comfortable with the plan. The patient has our clinic phone number to call with any questions or concerns. All of the patient's questions were answered from the best of my current knowledge. Follow up as needed if symptoms were getting worse.     Thank you for letting me be a part of the treatment team for Luan Mitchell    Time spent with pt answering questions, discussing findings, counseling and coordinating care was more than 50% the appointment time, 56  minutes.     LILY Lance, CNP  [facility]

## 2019-04-01 NOTE — PROGRESS NOTES
"Re: Luan Mitchell      MRN# [id number]  YOB: 1945  Date of Visit:4/1/2019    OUTPATIENT NEUROLOGY VISIT NOTE    Chief Complaint:  Weakness and aches in wrist and ankles    History of Present Illness:  Luan Mitchell is a -year-old right(left)-handed presents to the clinic today for weakness and aches in wrist and ankles.   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 No numbness or tingling. Patient reports that putting his coat he feels \"stiffer\" in the shoulder joints. Patient reports that lifting a cup feels like something \"heavy\" in his hand. No dropping things and have not fallen.   No history of head or neck injuries.   Patient reports that on last couple of month having a \"flame\" in the \"throat\" and \"clearing throat\" frequent and \"shortness of breath.\" Patient reports that \"shortness of breath\" for many months and more persistent since last spring but occasionally before that. Patient reports that his breathing possibly 'kind of shallow\" and \"frequent\" when \"in the state.\"  Patient reports that when weather is nicer he takes his dog for a walk and breathing was better but got achy and tired after walking for a an hour.   Patient saw Pulmonology on 12/10/2018 for shortness of breath and note reviewed. Normal PFT, ECHO, CXR, hemoglobin, renal function and BNP and no clear cardiopulmonary cause for the SOB.   Patient reports that laying down is better and can breath thru the \"nose normally\" but sitting up can go \"either ways\" and reports that walking he can breath thru the \"nose\" but standing worsen his dyspnea because breathing thru the nose and getting enough \"oxygen.\"    No family " "history of neurological history  Patient reports that his memory is Ok  Patient denies any changes in his vision-no double vision  No dizziness or vertigo    Reports that he felt lightheaded a year ago and negative cardiology work up, including a stress tests  Patient saw ENT in 2015 for lightheadedness/hearing loss    Patient reports that his wife says that he \"snores sometimes\" but no sleep apnea and already discussed with his PCP    Denies history of head or neck trauma, dizziness, vertigo, loss of consciousness, seizure, double vision, blurred vision, hearing difficulty, speech or swallowing difficulty, numbness in face, arms or legs, urinary or bowel incontinence, coordination problems or gait difficulty, fever or chills.    Neurodiagnostic Testing  Results for AJ PACE (MRN [id number]) as of 4/1/2019 10:13   Ref. Range 10/30/2018 12:47   FVC-Pred Latest Units: L 3.91   FVC-Pre Latest Units: L 4.29   FVC-%Pred-Pre Latest Units: % 109   FEV1-Pre Latest Units: L 3.12   FEV1-%Pred-Pre Latest Units: % 106   FEV1FVC-Pred Latest Units: % 72   FEV1FVC-Pre Latest Units: % 73   FEV1SVC-Pred Latest Units: % 66   FEV1SVC-Pre Latest Units: % 70   FEV1FEV6-Pred Latest Units: % 77   FEV1FEV6-Pre Latest Units: % 75   FEFMax-Pred Latest Units: L/sec 7.54   FEFMax-Pre Latest Units: L/sec 9.51   FEFMax-%Pred-Pre Latest Units: % 126   FIFMax-Pre Latest Units: L/sec 6.22   ExpTime-Pre Latest Units: sec 8.14     Results for AJ PACE (MRN [id number]) as of 4/1/2019 10:13   Ref. Range 10/30/2018 12:47   FRCPleth-Pred Latest Units: L 3.69   FRCPleth-Pre Latest Units: L 4.09   FRCPleth-%Pred-Pre Latest Units: % 110   RVPleth-Pred Latest Units: L 2.73   RVPleth-Pre Latest Units: L 3.07   RVPleth-%Pred-Pre Latest Units: % 112   TLCPleth-Pred Latest Units: L 6.92   TLCPleth-Pre Latest Units: L 7.51   TLCPleth-%Pred-Pre Latest Units: % 108   ERV-Pred Latest Units: L 0.98   ERV-Pre Latest Units: L 1.02   ERV-%Pred-Pre Latest " Units: % 103   IC-Pred Latest Units: L 3.48   IC-Pre Latest Units: L 3.42   IC-%Pred-Pre Latest Units: % 98   VC-Pred Latest Units: L 4.46   VC-Pre Latest Units: L 4.44   VC-%Pred-Pre Latest Units: % 99     Results for AJ PACE (MRN [id number]) as of 4/1/2019 10:13   Ref. Range 10/30/2018 12:47   DLCOunc-Pred Latest Units: ml/min/mmHg 24.45   DLCOunc-Pre Latest Units: ml/min/mmHg 29.94   DLCOunc-%Pred-Pre Latest Units: % 122   DLCOcor-Pre Latest Units: ml/min/mmHg 29.61   DLCOcor-%Pred-Pre Latest Units: % 121   VA-Pre Latest Units: L 6.14   VA-%Pred-Pre Latest Units: % 92     ECHO Interpretation Summary  Global and regional left ventricular function is normal with an EF of 60-65%.  Right ventricular function, chamber size, wall motion, and thickness are  normal.  No significant valvular abnormalities.  The inferior vena cava was normal in size with preserved respiratory  variability.  No pericardial effusion is present.  This study was compared to a prior TTE from 2/3/2017. There has been no  significant change.      Past Medical History reviewed and verified with the patient  Past Medical History:   Diagnosis Date     BPH (benign prostatic hyperplasia)      Essential hypertension 02/2018     Hyperlipidemia LDL goal <130      Pemphigus     pemphigus foliaceus     Retinal detachment 2009    left eye- corrected with laser     Vitreous detachment 2009    LE       Past Surgical History reviewed and verified with the patient  Past Surgical History:   Procedure Laterality Date     NO HISTORY OF SURGERY  1/28/14    derm     RETINOPEXY LASER PROPHYLAXIS BREAK(S) OS (LEFT EYE)      2009 - Left eye       Family History reviewed and verified with the patient  Heart disease in the family but were heavy smokers  No neurological illnesses.   Social History:  Retired  at the [employer]  Social History     Tobacco Use     Smoking status: Never Smoker     Smokeless tobacco: Never Used   Substance Use Topics     Alcohol  use: No    reviewed and verified with the patient     Allergies   Allergen Reactions     Penicillins Swelling       Current Outpatient Medications   Medication Sig Dispense Refill     ARTIFICIAL TEAR SOLUTION OP Apply  to eye.       Cholecalciferol (VITAMIN D PO) Take 1 tablet by mouth daily 1000 mg       clobetasol (TEMOVATE) 0.05 % external solution Apply  topically 2 times daily as needed to the scalp. 200 mL 11     clobetasol (TEMOVATE) 0.05 % ointment APPLY TO AFFECTED AREA(S) TWO TIMES A  g 2     clobetasol propionate 0.05 % SHAM Apply sparingly to dry scalp 3 x weekly as needed.  Leave in place for 15 m then add water, lather and rinse 1 Bottle 5     desonide (DESOWEN) 0.05 % ointment Apply topically to affected areas twice daily as instructed. 180 g 3     finasteride (PROSCAR) 5 MG tablet Take 1 tablet (5 mg) by mouth daily 90 tablet 4     hydrocortisone butyrate (LOCOID) 0.1 % SOLN Apply sparingly to affected area(s) of beard twice daily 180 mL 3     ketoconazole (NIZORAL) 2 % cream Apply twice daily to the nose as needed for white/scaling. 60 g 2     ketoconazole (NIZORAL) 2 % shampoo Three times per week in the shower: Lather into scalp, leave in place for 3-5 minutes, then rinse. 360 mL 12     lovastatin (MEVACOR) 20 MG tablet Take 1 tablet (20 mg) by mouth At Bedtime 90 tablet 4     Multiple Vitamin (MULTIVITAMIN) per tablet Take 1 tablet by mouth daily. 1 tab daily       tacrolimus (PROTOPIC) 0.1 % ointment Apply topically to affected areas as instructed. 100 g 11     triamcinolone (KENALOG) 0.1 % external cream Apply topically to affected areas as instructed. 454 g 11     triamcinolone (KENALOG) 0.1 % ointment Apply topically 2 times daily 454 g 11   reviewed and verified with the patient    Review of Systems:  A 12-point ROS including constitutional, eyes, ENT, respiratory, cardiovascular, gastroenterology, genitourinary, integumentary, musculoskeletal, neurology, hematology and psychiatric  "were all reviewed with the patient and completed at the [facility] Question nary and as mentioned in the HPI. Patient reports that appetite but lost a couple pounds in the last couple months    General Exam:   /66 (BP Location: Left arm, Patient Position: Sitting, Cuff Size: Adult Regular)   Pulse 87   Ht 1.753 m (5' 9\")   Wt 79.8 kg (176 lb)   SpO2 95%   BMI 25.99 kg/m    Vital Signs 12/10/2018 12/13/2018 2/4/2019 2/4/2019 2/4/2019   Weight (LB) 182 lb         Vital Signs 3/14/2019 3/25/2019 3/25/2019 4/1/2019 4/1/2019   Weight (LB)   176 lb 12.8 oz  176 lb     O2 sats did not drop and remained above 95 on RA with walking  GEN: Awake, NAD; good eye contact, responses appropriately, healthy appearing but exertional dyspnea and breathing thru the nose and mouth at times, liver smell  HEENT: Head atraumatic/Normocephalic. Scalp normal. Pupils equally round, 4 mm, reactive to light and accommodation, sclera and conjunctiva normal. Fundoscopic examination reveals normal vessels no papilledema.   Neck: Easily moveable without resistance  Heart: S1/S2 appreciated, RRR, no m/r/g, no carotid bruits  Lungs:Lungs are clear to auscultation bilaterally, no wheezes or crackles.   Neurological Examination:  The patient is alert and oriented times four. Has good attention and concentration.  Speech is fluent without dysarthria.   Cranial nerves:  CN I deferred.   CN II: Intact and full visual fields to confrontation bilaterally.   CN III, IV, VI: EOM intact. There is no nystagmus. Has conjugated gaze. Intact direct and consensual pupillary light reflexes.   CN V: Intact and symmetrical to facial sensation in the V1 through V3 bilaterally.   CN VII: Intact and symmetrical eyebrow and lid raise and eyelid closure, smiles and frown.   CN VIII: Intact to finger rub bilaterally.   CN IX and X: The palates elevates symmetrical. The uvula is midline.   CN XII: The tongue protrudes midline with no atrophy or " "fasciculations.   Motor exam: The patient has a normal bulk and tone throughout. There is no atrophy, fasciculations, clonus, or abnormal movements appreciated.   Strength Exam:  5/5 strength at shoulder abduction, elbow flexion or extension, wrist flexion or extension, finger abduction, , hip flexion and extension, knee flexion and extension, and dorsiflexion and plantarflexion bilaterally.   Sensation is intact to light touch and pinprick throughout. Has good vibration and proprioception sensation at great toes bilaterally.   Reflexes are 2+ and symmetrical at biceps, triceps, brachioradialis, patellar, and Achilles.   Negative Babinski with downgoing toes bilaterally.   Coordination reveals finger-nose-finger, rapid alternating movements, finger tapping  with normal speed and accuracy.   Station and gait is normal. There is no ataxia. Can walk on the toes, heels, and tandem walk without difficulty. Has no drift and a negative Romberg. Joana's negative        DATA:  EMG reviewed  Assessment and Plan:  Generalized subjective feeling of weakness and achy  in his ankles and feet since first week in February of 2019. However, exertional dyspnea intermittent for about a year and worse with standing up and still and better with laying down is the \"best\" and feels \"normal\" with laying down or moving.   Cardiology and pulmonology work up negative. Normal PFT.   EMG for subjective weakness and achiness in the joints and no concerning  findings at this time.   Non focal neurological exam-no weakness or sensory deficit, no fasciculations. Patient's reports that laying down helps with breathing which is not consistent with motor neuron disease. PFTs were normal. No any clear evidence of neurological causes of patient's symptoms at this time.     I discussed all my recommendation with Luan Mitchell. The patient verbalizes understanding and comfortable with the plan. The patient has our clinic phone number to call with " any questions or concerns. All of the patient's questions were answered from the best of my current knowledge. Follow up as needed if symptoms were getting worse.       Thank you for letting me be a part of the treatment team for Luan Mitchell      Time spent with pt answering questions, discussing findings, counseling and coordinating care was more than 50% the appointment time, 56  minutes.         LILY Lance, CNP  [facility]    Answers for HPI/ROS submitted by the patient on 3/31/2019   General Symptoms: Yes  Skin Symptoms: No  HENT Symptoms: No  EYE SYMPTOMS: No  HEART SYMPTOMS: No  LUNG SYMPTOMS: Yes  INTESTINAL SYMPTOMS: No  URINARY SYMPTOMS: No  REPRODUCTIVE SYMPTOMS: No  SKELETAL SYMPTOMS: Yes  BLOOD SYMPTOMS: No  NERVOUS SYSTEM SYMPTOMS: Yes  MENTAL HEALTH SYMPTOMS: Yes  Fever: No  Loss of appetite: No  Weight loss: Yes  Weight gain: No  Fatigue: Yes  Night sweats: No  Chills: No  Increased stress: No  Excessive hunger: Yes  Feeling hot or cold when others believe the temperature is normal: No  Loss of height: Yes  Post-operative complications: No  Surgical site pain: No  Hallucinations: No  Change in or Loss of Energy: Yes  Hyperactivity: No  Confusion: No  Cough: Yes  Sputum or phlegm: Yes  Coughing up blood: No  Difficulty breating or shortness of breath: Yes  Snoring: No  Wheezing: No  Difficulty breathing on exertion: No  Nighttime Cough: No  Difficulty breathing when lying flat: No  Back pain: No  Muscle aches: Yes  Neck pain: No  Swollen joints: No  Joint pain: No  Bone pain: No  Muscle cramps: No  Muscle weakness: Yes  Joint stiffness: No  Bone fracture: No  Trouble with coordination: No  Dizziness or trouble with balance: Yes  Fainting or black-out spells: No  Memory loss: No  Headache: No  Seizures: No  Speech problems: No  Tingling: No  Tremor: No  Weakness: Yes  Difficulty walking: Yes  Paralysis: No  Numbness: No  Nervous or Anxious: No  Depression: Yes  Trouble  sleeping: No  Trouble thinking or concentrating: No  Mood changes: No  Panic attacks: No

## 2019-04-01 NOTE — PATIENT INSTRUCTIONS
Will call you if anything else will be suggested  Follow up as needed if getting worse or any new symptoms

## 2019-04-08 ENCOUNTER — TELEPHONE (OUTPATIENT)
Dept: UROLOGY | Facility: CLINIC | Age: 74
End: 2019-04-08

## 2019-04-08 NOTE — TELEPHONE ENCOUNTER
[facility]    Phone Message    May a detailed message be left on voicemail: yes    Reason for Call: Other: Per call from PT he just had a PSA done in March but doesn't see Dr Gentile until July 1st.  PT wants to know if he needs another PSA before his APPT with Dr Gentile. Please reach out to the PT at the above #     Action Taken: Message routed to:  [facility] ([facility] Urology

## 2019-04-08 NOTE — TELEPHONE ENCOUNTER
Patient notified that he does not need another PSA prior to his appointment with Dr. Asa Gentile on 7/1/2019. Patient agreed with the plan.        Eber Lou MA

## 2019-04-08 NOTE — TELEPHONE ENCOUNTER
Claudio Rosa,      Patient had PSA done on 3/14/19 (PSA 1.82). He is scheduled with Dr. Bray on 7/1/2019. Would he need another PSA check prior to his appointment? Please advise.        Thanks,Eber Lou MA

## 2019-05-07 ASSESSMENT — ANXIETY QUESTIONNAIRES
7. FEELING AFRAID AS IF SOMETHING AWFUL MIGHT HAPPEN: NOT AT ALL
GAD7 TOTAL SCORE: 0
7. FEELING AFRAID AS IF SOMETHING AWFUL MIGHT HAPPEN: NOT AT ALL
3. WORRYING TOO MUCH ABOUT DIFFERENT THINGS: NOT AT ALL
GAD7 TOTAL SCORE: 0
4. TROUBLE RELAXING: NOT AT ALL
1. FEELING NERVOUS, ANXIOUS, OR ON EDGE: NOT AT ALL
6. BECOMING EASILY ANNOYED OR IRRITABLE: NOT AT ALL
GAD7 TOTAL SCORE: 0
2. NOT BEING ABLE TO STOP OR CONTROL WORRYING: NOT AT ALL
5. BEING SO RESTLESS THAT IT IS HARD TO SIT STILL: NOT AT ALL

## 2019-05-07 ASSESSMENT — PATIENT HEALTH QUESTIONNAIRE - PHQ9
SUM OF ALL RESPONSES TO PHQ QUESTIONS 1-9: 2
10. IF YOU CHECKED OFF ANY PROBLEMS, HOW DIFFICULT HAVE THESE PROBLEMS MADE IT FOR YOU TO DO YOUR WORK, TAKE CARE OF THINGS AT HOME, OR GET ALONG WITH OTHER PEOPLE: NOT DIFFICULT AT ALL
SUM OF ALL RESPONSES TO PHQ QUESTIONS 1-9: 2

## 2019-05-08 ASSESSMENT — ANXIETY QUESTIONNAIRES: GAD7 TOTAL SCORE: 0

## 2019-05-08 ASSESSMENT — PATIENT HEALTH QUESTIONNAIRE - PHQ9: SUM OF ALL RESPONSES TO PHQ QUESTIONS 1-9: 2

## 2019-05-09 ENCOUNTER — MYC MEDICAL ADVICE (OUTPATIENT)
Dept: INTERNAL MEDICINE | Facility: CLINIC | Age: 74
End: 2019-05-09

## 2019-05-09 ENCOUNTER — OFFICE VISIT (OUTPATIENT)
Dept: PSYCHOLOGY | Facility: CLINIC | Age: 74
End: 2019-05-09
Payer: COMMERCIAL

## 2019-05-09 DIAGNOSIS — F43.22 ADJUSTMENT DISORDER WITH ANXIETY: Primary | ICD-10-CM

## 2019-05-09 NOTE — PROGRESS NOTES
Health Psychology                  Clinic    Department of Medicine  Luann Calhoun, Ph.D., L.P. [phone]                          [facility]  [facility] Bhanu Cagle, Ph.D.,  L.P. [phone]                 3rd Floor  [place] Mail Code 741   Jaylyn Lang, Ph.D., L.P. [phone]    [address] [place] [address] Adiel Fuentes, Ph.D., A.B.P.P., L.P. [phone]      [place] [address]          Elvia Haro, Ph.D., L.P. [phone]     Confidential Summary of Health Psychology Evaluation*    Referral Source:  Loretta Villeda M.D.    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    History of Presenting Concerns: Dr. Mitchell reports concern for over a year of shortness of breath.  He has undergone pulmonary and cardiac testing which is not revealed of source.  He has wondered whether anxiety might be factor, though has doubt that is all due to anxiety.  He states he generally does not think of himself as anxious and that the symptoms can occur even when he is by himself without experiencing any stress.  He has noticed for example that while doing housework or cooking he can experience it.  While we were meeting, they were couple of instances when he seemed to be having some minor breathing changes.  He states that the symptoms have not worsened since he last saw Dr. Villeda, his primary care physician, in March.  They are not interfering with his activities.  He sometimes notices it when he is walking his dog (usually 45 to 60 minutes).    Dr. Mitchell acknowledges that he tends to have some types of anxiety.  For example, if he feels that he does something that might inconvenience or upset somebody else he becomes anxious.  There was a  time when he and his wife were having difficulties and she would become upset with him about something.  He can be fearful that she might become upset about things.  When experiencing anxiety, it seems to be more in the realm of worry than physical symptoms.  He tends to ruminate.  He does not experience palpitations, sweating, weak knees, tachycardia though does experience a sense of mild fear and at times but has noticed an increase in the rate of breathing.    Medical History: Dr. Mitchell is generally in good health.  He recently was evaluated for some muscle aches including an EMG, which was found to be normal and those symptoms have subsided.  Exercise consists of walking with a goal of 10,000 steps and he often will also try to ride an exercise bicycle for 30 minutes, covering 8 miles.  He has had some elevated blood pressure readings in clinic and wonders about whitecoat hypertension.  He doubts he has hypertension.  His alcohol use is fairly rare.  In the past he drank a small glass of red wine per night for health reasons but stopped because he did not think it actually helped.  He is a non-smoker.  Caffeine use is occasional green tea.  He denies use of street drugs.  He acknowledges mild hearing problems.    Past Medical History:   Diagnosis Date     BPH (benign prostatic hyperplasia)      Essential hypertension 02/2018     Hyperlipidemia LDL goal <130      Pemphigus     pemphigus foliaceus     Retinal detachment 2009    left eye- corrected with laser     Vitreous detachment 2009    LE     Past Surgical History:   Procedure Laterality Date     NO HISTORY OF SURGERY  1/28/14    derm     RETINOPEXY LASER PROPHYLAXIS BREAK(S) OS (LEFT EYE)      2009 - Left eye     Current Outpatient Medications   Medication     ARTIFICIAL TEAR SOLUTION OP     Cholecalciferol (VITAMIN D PO)     clobetasol (TEMOVATE) 0.05 % external solution     clobetasol (TEMOVATE) 0.05 % ointment     clobetasol propionate 0.05 % SHAM      "desonide (DESOWEN) 0.05 % ointment     finasteride (PROSCAR) 5 MG tablet     hydrocortisone butyrate (LOCOID) 0.1 % SOLN     ketoconazole (NIZORAL) 2 % cream     ketoconazole (NIZORAL) 2 % shampoo     lovastatin (MEVACOR) 20 MG tablet     Multiple Vitamin (MULTIVITAMIN) per tablet     tacrolimus (PROTOPIC) 0.1 % ointment     triamcinolone (KENALOG) 0.1 % external cream     triamcinolone (KENALOG) 0.1 % ointment     No current facility-administered medications for this visit.      Wt Readings from Last 4 Encounters:   19 79.8 kg (176 lb)   19 80.2 kg (176 lb 12.8 oz)   12/10/18 82.6 kg (182 lb)   10/29/18 80.5 kg (177 lb 6.4 oz)     Estimated body mass index is 25.99 kg/m  as calculated from the following:    Height as of 19: 1.753 m (5' 9\").    Weight as of 19: 79.8 kg (176 lb).    Social History:  Stephen was raised as an only child in [place], [place].  Both parents were smokers and  of CHF.  His father  at age 78 and his mother  at age 80.  He reports that it was a loving family without abuse or neglect.  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  He then took a position in  at the [employer], retiring in .    Dr. Mitchell has been  twice.  The first marriage in  ended in .  He has 2 children from that marriage, a son in [facility] and a daughter in [place].  In  he started to date his current wife Dunia, marrying in .  He reports that things are currently going well, though there have been some difficult times around the time of her diagnosis with a sarcoma of the buttocks in .  She underwent surgery in .  She continues to experience pain, and has some mobility issues, but there is no evidence of return of disease.  He feels she can be overly critical of him.    He reports that he has some friends but does " not see them very often.  Recreations include walking the dog, watching Parach[employer] or [employer] movies.  He takes on many of the household chores, such as cooking, dishwashing, some laundry.  He likes listening to pod casts.  He goes out once a week with his wife to the [employer] restaurant's buffet.    Psychiatric History: Dr. Mitchell first sought mental health assistance in the late 1960s when he saw a psychiatrist named Dr. Keys in [facility].  He then saw her Tayler Nino, Ph.D. at [facility] around the time of his divorce.  Subsequently he saw Cristian Alves individually and for group.  He has found counseling to be helpful in the past.  There is no history of psychiatric hospitalizations, chemical dependency treatment, or suicide attempts.  He briefly tried a medication, Asendin, in approximately 1980 around the time of his divorce, and when his father was dying.  He participates in a caregivers group at [facility], where he has been hair  regular member since 2015.  He recently noticed that others who have been caring for ill spouses when he started have all left the group due to the death of their spouses.    Psychological Screening: Dr. Mitchell completed the following four screening measures which are all within normal limits.    CAGE-AID Score:  CAGE-AID Total Score 7/29/2013 5/7/2019   Total Score 0 0   Total Score MyChart - 0 (A total score of 2 or greater is considered clinically significant)       CAGE-AID score  > 1 is a positive screen, suggesting further discussion is needed to determine if evaluation for alcohol or substance abuse is appropriate.  A score > 2 is considered clinically significant, suggesting further evaluation of alcohol or substance-related problems is indicated.    BAYRON-7 Score:  BAYRON-7 SCORE 2/4/2019 5/7/2019   Total Score - 0 (minimal anxiety)   Total Score 1 0     PHQ-9 Score:  PHQ-9 SCORE 5/7/2019   PHQ-9 Total Score MyChart 2 (Minimal depression)   PHQ-9  "Total Score 2     WHODAS 2.0 Total Score 5/7/2019   Total Score 14   Total Score MyChart 14       Mental Status/Interview: Dr. Mitchell arrived punctually for today's meeting.  He is alert, oriented, congenial.  He presents as slightly unkempt but clean.  He is articulate.  Speech is normal rate, rhythm, volume and prosody.  Thoughts are linear.  He believes he is generally in a good mood and is able to enjoy activities.  He denies feeling hopeless, helpless or worthless.  He tends to feel guilty relatively easily, taking on a sense of responsibility for things and other people's lives.  He states that he felt nervous about calling me to schedule an appointment, fearing that I might tell him that he like to review the different mechanism to schedule the appointment.  Energy and sleep are described as \"pretty good.\"  He likes to sleep 8 hours and is able to get that most nights, but sometimes 7.  Appetite and weight are unchanged.  He denies problems with concentration, memory or decision-making  He denies suicidal and homicidal ideation.  He denies hallucinations and delusions.  Rapport was positive.  I informed him that it is unlikely that his symptoms were caused by anxiety though that anxiety could certainly exacerbate them and that some of the approaches used for stress reduction might be helpful in managing them.  In discussing stresses, he is aware of the caregiving that he is doing as being very manageable, and he is pleased by the improvements in his wife's function over time.    Impression: Dr. Mitchell is a 1 year history of mild dyspnea of unknown origin.  It seems unlikely that it is a mirna anxiety disorder, anxiety may be playing a role in exacerbating it.  He is interested in meeting for some additional sessions to get some support presumably related to his wife's health and mild social anxiety.    Diagnosis:  Adjustment disorder with anxiety      Recommendations/Plan: Dr. Mitchell will return to clinic 6/12 @ " 3 to initiate problem-solving and supportive psychotherapy.  A treatment plan will be completed at that time.  I encouraged him to contact Dr. Villeda to see what the plan would be about reassessment of his pulmonary function.  He also might return to his pulmonologist with that same question.      Thank you for this opportunity to participate in your care of this patient.    Adiel Fuentes, Ph.D., A.B.P.P., L.P.  Director, Health Psychology    cc:  Loretta Villeda M.D.    *In accordance with the Rules of the [place] Board of [employer], it is noted that psychological descriptions and scientific procedures underlying psychological evaluations have limitations.  Absolute predictions cannot be made based on information in this report. Note is dictated utilizing voice recognition software. Unfortunately this leads to occasional typographical errors. I apologize in advance if this occurs.  If questions arise, please do not hesitate to contact the author.

## 2019-05-30 ENCOUNTER — OFFICE VISIT (OUTPATIENT)
Dept: DERMATOLOGY | Facility: CLINIC | Age: 74
End: 2019-05-30
Payer: COMMERCIAL

## 2019-05-30 DIAGNOSIS — D18.01 CHERRY ANGIOMA: ICD-10-CM

## 2019-05-30 DIAGNOSIS — E78.5 HYPERLIPIDEMIA LDL GOAL <100: ICD-10-CM

## 2019-05-30 DIAGNOSIS — L10.2 PEMPHIGUS FOLIACEOUS (H): Primary | ICD-10-CM

## 2019-05-30 DIAGNOSIS — L82.1 SK (SEBORRHEIC KERATOSIS): ICD-10-CM

## 2019-05-30 DIAGNOSIS — L73.9 FOLLICULITIS: ICD-10-CM

## 2019-05-30 ASSESSMENT — PAIN SCALES - GENERAL: PAINLEVEL: NO PAIN (0)

## 2019-05-30 NOTE — PROGRESS NOTES
[facility] Dermatology Note      Dermatology Problem List:   1.  Pemphigus foliaceus.     - Goals of treatment per Mr. Mitchell:  Prevent itching and secondary infections.   - He is not interested in complete clearance at this time.     - Most recent titers 3/14/19   - Cell surface IgG 1:1280- monkey esophagus; 1:160- intact human skin.    - DSG-1 Ig units (positive greater than 20, negative less than 14).    - DSG-3 Ig units (positive greater than 20, negative less than 9).   - Stopped MMF 2019  - Repeat pemphigus panel pending 3/14/2019      2.  History of seborrheic keratoses.     3.  Seborrheic dermatitis, on ketaconazole shampoo and cream.     4. DN, Mild. Right neck. Bx 3/22/18.      Encounter Date: 2019      CC: F/u pemphigus     History of Present Illness:   Mr. Laun Mitchell is a pleasant, 73-year-old man who is well-known to Dermatology who presents today in routine followup for pemphigus foliaceus.  He was last seen in followup  3/14/19 at which point the patient was instructed to stop his MMF. He notes that since that time, he has been doing well off of the medication. No significant flaring since that visit. Mild ongoing involvement on the scalp, beard, behind the L ear, and trunk. Continuing previous topicals. Notes that joint sx have resolved. Mild ongoing SOB as previously noted; no clear etiology despite significant w/u to date. Didn't resolve with stopping MMF.    Has a couple brown spots on the body that he points out; would like us to evaluate, including the L chest.       Most recent pemphigus panel reveals:    - Cell surface IgG 1:1280- monkey esophagus; 1:160- intact human skin.    - DSG-1 Ig units (positive greater than 20, negative less than 14).    - DSG-3 Ig units (positive greater than 20, negative less than 9).     SPEP essentially normal; UPEP with trace albumin/globins.       Past Medical History:   Diagnosis Date     BPH (benign prostatic  hyperplasia)      Essential hypertension 02/2018     Hyperlipidemia LDL goal <130      Pemphigus     pemphigus foliaceus     Retinal detachment 2009    left eye- corrected with laser     Vitreous detachment 2009    LE       Social History:     The patient is a retired .  His wife is currently undergoing surveillance for soft tissue sarcoma of her right buttock, and they are following up in [place]; doing well with this.      Family History:   Not addressed.        Current Outpatient Medications   Medication     ARTIFICIAL TEAR SOLUTION OP     Cholecalciferol (VITAMIN D PO)     clobetasol (TEMOVATE) 0.05 % external solution     clobetasol (TEMOVATE) 0.05 % ointment     clobetasol propionate 0.05 % SHAM     desonide (DESOWEN) 0.05 % ointment     finasteride (PROSCAR) 5 MG tablet     hydrocortisone butyrate (LOCOID) 0.1 % SOLN     ketoconazole (NIZORAL) 2 % cream     ketoconazole (NIZORAL) 2 % shampoo     lovastatin (MEVACOR) 20 MG tablet     Multiple Vitamin (MULTIVITAMIN) per tablet     tacrolimus (PROTOPIC) 0.1 % ointment     triamcinolone (KENALOG) 0.1 % external cream     triamcinolone (KENALOG) 0.1 % ointment     No current facility-administered medications for this visit.       Allergies   Allergen Reactions     Penicillins Swelling         Review of Systems:   A 10 point review of systems was negative beyond that stated above in the HPI.       Physical exam:   There were no vitals taken for this visit.   GENERAL:  A well-appearing  male appearing his stated age, affect normal, cooperative with the exam, alert and oriented x3.     SKIN: SKIN:  Full body skin examination today including the head,neck, chest, abdomen, back, upper and lower extremities, buttocks, palms soles, but sparing the genitalia were preformed. Notably findings include:   -The patient has scattered, crusted, mildly scaly, pink papules and thin plaques scattered over the central trunk, chest, abdomen and  back.  Few with mild overlying erosions, including the upper back, behind the L ear and on the beardline. Most sites are healing/resolved. Stable, few active lesions overall.  -Post inflammatory hyper- and hypopigmentation at previous sites of inflammation.   -Scattered waxy stuck on papules and cherry red papules throughout.   -NERD at the well healed scar on the right neck today.  -Small pustule on the L low back.   -No ongoing periocular purpura.    Impression/Plan:    1.  Pemphigus foliaceus.  Fairly good cutaneous control despite discontinuation of MMF ~3 months ago. Pt is quite pleased. Sx are well controlled for him. Unfortunately, no improvement in SOB sx as expected. Reviewed in detail the etiology, pathophysiology, associated conditions and treatment options today. Plan:   - Continue off of MMF.   - Continue clobetasol ointment 0.05% twice daily as needed to the worst, most symptomatic, recalcitrant areas on the trunk and extremities. Recommended using for 1-2 weeks on the central low back today.   - Continue clobetasol 0.05% solution to the scalp up to once nightly as needed. Could use for 1-2 weeks in the beard for bad days.   - Continue tacrolimus 0.01% ointment daily to the face. Recommended increasing to BID use over the symptomatic jawline.   - Continue triamcinolone 0.1% cream twice daily as a primary treatment for the trunk and extremities. *refilled today.  - Continue ketoconazole 2% shampoo 3 times weekly.  Okay to use as a face wash.   - Continue ketoconazole 2% cream to be applied to the bilateral alar grooves and lateral nasolabial folds in addition to the Protopic as needed.   - Continue with 3 times weekly dilute bleach baths as tolerated. Encouraged today given folliculitis lesion on the L lower back today and change of seasons.   - Will space out f/u to 6 months at this point.       2. Mild DN, left neck. NERD. Reassurance. Will continue with annual FBSE screening examinations.     3.  Benign skin findings: seborrheic keratoses, cherry angiomata:   -Benign, reassurance provided. Nothing to do at this point.       The patient was seen and discussed with Dr. Sotelo, who was staff for this encounter.     Follow up in 6 months with plan for labs at that time.       Adal Marin MD  PGY3 Dermatology  [phone]    Patient was seen and examined with the dermatology resident. I agree with the history, review of systems, physical examination, assessments and plan.    Halley Sotelo MD  Professor and  Chair  Department of Dermatology  [facility]

## 2019-05-30 NOTE — LETTER
2019       RE: Luan Mitchell  [address] [facility] [address]     Dear Colleague,    Thank you for referring your patient, Luan Mitchell, to the [facility] DERMATOLOGY at [facility]. Please see a copy of my visit note below.    [facility] Dermatology Note      Dermatology Problem List:   1.  Pemphigus foliaceus.     - Goals of treatment per Mr. Mitchell:  Prevent itching and secondary infections.   - He is not interested in complete clearance at this time.     - Most recent titers 3/14/19   - Cell surface IgG 1:1280- monkey esophagus; 1:160- intact human skin.    - DSG-1 Ig units (positive greater than 20, negative less than 14).    - DSG-3 Ig units (positive greater than 20, negative less than 9).   - Stopped MMF 2019  - Repeat pemphigus panel pending 3/14/2019    2.  History of seborrheic keratoses.   3.  Seborrheic dermatitis, on ketaconazole shampoo and cream.   4. DN, Mild. Right neck. Bx 3/22/18.      Encounter Date: 2019      CC: F/u pemphigus     History of Present Illness:   Mr. Luan Mitchell is a pleasant, 73-year-old man who is well-known to Dermatology who presents today in routine followup for pemphigus foliaceus.  He was last seen in followup  3/14/19 at which point the patient was instructed to stop his MMF. He notes that since that time, he has been doing well off of the medication. No significant flaring since that visit. Mild ongoing involvement on the scalp, beard, behind the L ear, and trunk. Continuing previous topicals. Notes that joint sx have resolved. Mild ongoing SOB as previously noted; no clear etiology despite significant w/u to date. Didn't resolve with stopping MMF.    Has a couple brown spots on the body that he points out; would like us to evaluate, including the L chest.       Most recent pemphigus panel reveals:    - Cell surface IgG 1:1280- monkey esophagus; 1:160- intact human skin.    -  DSG-1 Ig units (positive greater than 20, negative less than 14).    - DSG-3 Ig units (positive greater than 20, negative less than 9).     SPEP essentially normal; UPEP with trace albumin/globins.       Past Medical History:   Diagnosis Date     BPH (benign prostatic hyperplasia)      Essential hypertension 2018     Hyperlipidemia LDL goal <130      Pemphigus     pemphigus foliaceus     Retinal detachment     left eye- corrected with laser     Vitreous detachment     LE       Social History:     The patient is a retired .  His wife is currently undergoing surveillance for soft tissue sarcoma of her right buttock, and they are following up in [place]; doing well with this.      Family History:   Not addressed.        Current Outpatient Medications   Medication     ARTIFICIAL TEAR SOLUTION OP     Cholecalciferol (VITAMIN D PO)     clobetasol (TEMOVATE) 0.05 % external solution     clobetasol (TEMOVATE) 0.05 % ointment     clobetasol propionate 0.05 % SHAM     desonide (DESOWEN) 0.05 % ointment     finasteride (PROSCAR) 5 MG tablet     hydrocortisone butyrate (LOCOID) 0.1 % SOLN     ketoconazole (NIZORAL) 2 % cream     ketoconazole (NIZORAL) 2 % shampoo     lovastatin (MEVACOR) 20 MG tablet     Multiple Vitamin (MULTIVITAMIN) per tablet     tacrolimus (PROTOPIC) 0.1 % ointment     triamcinolone (KENALOG) 0.1 % external cream     triamcinolone (KENALOG) 0.1 % ointment     No current facility-administered medications for this visit.       Allergies   Allergen Reactions     Penicillins Swelling         Review of Systems:   A 10 point review of systems was negative beyond that stated above in the HPI.       Physical exam:   There were no vitals taken for this visit.   GENERAL:  A well-appearing  male appearing his stated age, affect normal, cooperative with the exam, alert and oriented x3.     SKIN: SKIN:  Full body skin examination today including the head,neck,  chest, abdomen, back, upper and lower extremities, buttocks, palms soles, but sparing the genitalia were preformed. Notably findings include:   -The patient has scattered, crusted, mildly scaly, pink papules and thin plaques scattered over the central trunk, chest, abdomen and back.  Few with mild overlying erosions, including the upper back, behind the L ear and on the beardline. Most sites are healing/resolved. Stable, few active lesions overall.  -Post inflammatory hyper- and hypopigmentation at previous sites of inflammation.   -Scattered waxy stuck on papules and cherry red papules throughout.   -NERD at the well healed scar on the right neck today.  -Small pustule on the L low back.   -No ongoing periocular purpura.    Impression/Plan:    1.  Pemphigus foliaceus.  Fairly good cutaneous control despite discontinuation of MMF ~3 months ago. Pt is quite pleased. Sx are well controlled for him. Unfortunately, no improvement in SOB sx as expected. Reviewed in detail the etiology, pathophysiology, associated conditions and treatment options today. Plan:   - Continue off of MMF.   - Continue clobetasol ointment 0.05% twice daily as needed to the worst, most symptomatic, recalcitrant areas on the trunk and extremities. Recommended using for 1-2 weeks on the central low back today.   - Continue clobetasol 0.05% solution to the scalp up to once nightly as needed. Could use for 1-2 weeks in the beard for bad days.   - Continue tacrolimus 0.01% ointment daily to the face. Recommended increasing to BID use over the symptomatic jawline.   - Continue triamcinolone 0.1% cream twice daily as a primary treatment for the trunk and extremities. *refilled today.  - Continue ketoconazole 2% shampoo 3 times weekly.  Okay to use as a face wash.   - Continue ketoconazole 2% cream to be applied to the bilateral alar grooves and lateral nasolabial folds in addition to the Protopic as needed.   - Continue with 3 times weekly dilute  bleach baths as tolerated. Encouraged today given folliculitis lesion on the L lower back today and change of seasons.   - Will space out f/u to 6 months at this point.       2. Mild DN, left neck. NERD. Reassurance. Will continue with annual FBSE screening examinations.     3. Benign skin findings: seborrheic keratoses, cherry angiomata:   -Benign, reassurance provided. Nothing to do at this point.       The patient was seen and discussed with Dr. Sotelo, who was staff for this encounter.     Follow up in 6 months with plan for labs at that time.       Adal Marin MD  PGY3 Dermatology  [phone]

## 2019-05-30 NOTE — TELEPHONE ENCOUNTER
RECORDS RECEIVED FROM: Internal   DATE RECEIVED: 6.3.19   NOTES STATUS DETAILS   OFFICE NOTE from referring provider N/A According to appt notes, pt was referred by Dr. Sotelo. No referral available   OFFICE NOTE from other specialist N/A    DISCHARGE SUMMARY from hospital N/A    DISCHARGE REPORT from the ER N/A    MEDICATION LIST Internal    IMAGING  (NEED IMAGES AND REPORTS)     CT SCAN N/A    CHEST XRAY (CXR) N/A    TESTS     PULMONARY FUNCTION TESTING (PFT) N/A

## 2019-05-30 NOTE — NURSING NOTE
"Dermatology Rooming Note    Luan Mitchell's goals for this visit include:   Chief Complaint   Patient presents with     Derm Problem     Luan states \" My skin is very good.\"      Osiris Estevez LPN   "

## 2019-05-31 NOTE — TELEPHONE ENCOUNTER
lovastatin (MEVACOR) 20 MG tablet      Last Written Prescription Date:  2/5/18  Last Fill Quantity: 90,   # refills: 4  Last Office Visit : 3/25/19  Future Office visit: 8/13/19    Pended.     Routing refill request to provider for review/approval because:  VERY HIGH ALERT lovastatin taken with ketoconazole cream/shampoo

## 2019-06-03 ENCOUNTER — OFFICE VISIT (OUTPATIENT)
Dept: PULMONOLOGY | Facility: CLINIC | Age: 74
End: 2019-06-03
Attending: ALLERGY & IMMUNOLOGY
Payer: COMMERCIAL

## 2019-06-03 ENCOUNTER — PRE VISIT (OUTPATIENT)
Dept: PULMONOLOGY | Facility: CLINIC | Age: 74
End: 2019-06-03

## 2019-06-03 ENCOUNTER — TELEPHONE (OUTPATIENT)
Dept: PULMONOLOGY | Facility: CLINIC | Age: 74
End: 2019-06-03

## 2019-06-03 VITALS
BODY MASS INDEX: 25.45 KG/M2 | DIASTOLIC BLOOD PRESSURE: 82 MMHG | HEART RATE: 74 BPM | HEIGHT: 69 IN | RESPIRATION RATE: 16 BRPM | WEIGHT: 171.8 LBS | OXYGEN SATURATION: 100 % | SYSTOLIC BLOOD PRESSURE: 124 MMHG

## 2019-06-03 DIAGNOSIS — J31.0 CHRONIC RHINITIS: ICD-10-CM

## 2019-06-03 DIAGNOSIS — Z88.1 DRUG ALLERGY, ANTIBIOTIC: Primary | ICD-10-CM

## 2019-06-03 PROCEDURE — G0463 HOSPITAL OUTPT CLINIC VISIT: HCPCS | Mod: 25,ZF

## 2019-06-03 PROCEDURE — 95004 PERQ TESTS W/ALRGNC XTRCS: CPT | Mod: ZF | Performed by: ALLERGY & IMMUNOLOGY

## 2019-06-03 RX ORDER — LOVASTATIN 20 MG
TABLET ORAL
Qty: 90 TABLET | Refills: 3 | Status: SHIPPED | OUTPATIENT
Start: 2019-06-03 | End: 2020-02-05

## 2019-06-03 ASSESSMENT — ENCOUNTER SYMPTOMS
VOMITING: 1
TROUBLE SWALLOWING: 0
JOINT SWELLING: 0
ABDOMINAL PAIN: 0
RECTAL PAIN: 0
WHEEZING: 0
SHORTNESS OF BREATH: 1
NAUSEA: 1
HEARTBURN: 0
HOARSE VOICE: 1
CONSTIPATION: 0
JAUNDICE: 0
BACK PAIN: 0
COUGH DISTURBING SLEEP: 0
STIFFNESS: 0
TASTE DISTURBANCE: 0
BOWEL INCONTINENCE: 0
NECK PAIN: 0
BLOATING: 0
COUGH: 0
MUSCLE WEAKNESS: 0
MYALGIAS: 1
POSTURAL DYSPNEA: 0
SPUTUM PRODUCTION: 0
HEMOPTYSIS: 0
MUSCLE CRAMPS: 0
DYSPNEA ON EXERTION: 0
SINUS PAIN: 0
SNORES LOUDLY: 0
ARTHRALGIAS: 0
DIARRHEA: 0
NECK MASS: 0
SORE THROAT: 0
SINUS CONGESTION: 1

## 2019-06-03 ASSESSMENT — PAIN SCALES - GENERAL: PAINLEVEL: NO PAIN (0)

## 2019-06-03 ASSESSMENT — MIFFLIN-ST. JEOR: SCORE: 1491.78

## 2019-06-03 NOTE — TELEPHONE ENCOUNTER
Emailed and faxed Penicillin testing orders to compounding pharmacy for return apt next Mon 6/10/19.

## 2019-06-03 NOTE — PROGRESS NOTES
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    He has been suffering from shortness of breath over the past year.  He does not associate his shortness of breath with activity.  No chest pain, pressure, palpitations, lightheadedness, or pedal edema.  Rhinorrhea clear in nature.  No itchy eyes, redness of eyes, sinus pain/pressure, sore throat.  No association with time of year or time of day with shortness of breath.  No family history of asthma, eczema, or allergies.  He has not tried any over the counter medications.  Of note, he had normal stress ECHO in early 2017 as well as a repeat ECHO in 2018 with normal ventricular function without structural abnormalities.  He also had recent PFT testing within normal limits.  He has had a remote CT of chest in 11/2010 with a calcified nodule identified in right upper lobe.  He was evaluated by pulmonology in 12/2018 who did not recommend any additional testing at this time.  He is interested in penicillin testing.  He was previously on daily aspirin stopping around Novemeber 2018.  Per derm, he recently discontinued his MMF 3 months prior with good cutaneous control.  He is currently on a topical steroid regimen that includes clobetasol ointment and solution, tacrolimus, triamcinolone, and ketoconazole shampoo.    He is currently an emeritus professor in the mathematics department.  No chemical or pesticide exposure.  No primary smoking history however secondhand smoke exposure as child.  No living on a farm.  He has a dog at home.    Allergy HPI      The patient was seen and examined by Asa Augustin MD   Current Outpatient Medications   Medication     ARTIFICIAL TEAR SOLUTION OP     Cholecalciferol (VITAMIN D PO)     clobetasol (TEMOVATE) 0.05 % external solution     clobetasol (TEMOVATE) 0.05 % ointment     clobetasol propionate 0.05 % SHAM     desonide (DESOWEN) 0.05  % ointment     finasteride (PROSCAR) 5 MG tablet     hydrocortisone butyrate (LOCOID) 0.1 % SOLN     ketoconazole (NIZORAL) 2 % cream     ketoconazole (NIZORAL) 2 % shampoo     lovastatin (MEVACOR) 20 MG tablet     Multiple Vitamin (MULTIVITAMIN) per tablet     tacrolimus (PROTOPIC) 0.1 % ointment     triamcinolone (KENALOG) 0.1 % external cream     triamcinolone (KENALOG) 0.1 % ointment     No current facility-administered medications for this visit.      Allergies   Allergen Reactions     Penicillins Swelling     Social History     Socioeconomic History     Marital status:      Spouse name: Not on file     Number of children: Not on file     Years of education: Not on file     Highest education level: Not on file   Occupational History     Not on file   Social Needs     Financial resource strain: Not on file     Food insecurity:     Worry: Not on file     Inability: Not on file     Transportation needs:     Medical: Not on file     Non-medical: Not on file   Tobacco Use     Smoking status: Never Smoker     Smokeless tobacco: Never Used   Substance and Sexual Activity     Alcohol use: Not on file     Drug use: Not on file     Sexual activity: Not on file   Lifestyle     Physical activity:     Days per week: Not on file     Minutes per session: Not on file     Stress: Not on file   Relationships     Social connections:     Talks on phone: Not on file     Gets together: Not on file     Attends Confucianist service: Not on file     Active member of club or organization: Not on file     Attends meetings of clubs or organizations: Not on file     Relationship status: Not on file     Intimate partner violence:     Fear of current or ex partner: Not on file     Emotionally abused: Not on file     Physically abused: Not on file     Forced sexual activity: Not on file   Other Topics Concern     Parent/sibling w/ CABG, MI or angioplasty before 65F 55M? Not Asked   Social History Narrative     Not on file     Past Medical  "History:   Diagnosis Date     BPH (benign prostatic hyperplasia)      Essential hypertension 2018     Hyperlipidemia LDL goal <130      Pemphigus     pemphigus foliaceus     Retinal detachment 2009    left eye- corrected with laser     Vitreous detachment     LE     Past Surgical History:   Procedure Laterality Date     NO HISTORY OF SURGERY  14    derm     RETINOPEXY LASER PROPHYLAXIS BREAK(S) OS (LEFT EYE)      2009 - Left eye     Family History   Problem Relation Age of Onset     Heart Disease Mother         CHF d 80     Diabetes Mother         DM2,  age 80     Hypertension Mother      Heart Disease Father         CHF d 78     Heart Disease Paternal Uncle         CHF d 50s     Cancer No family hx of         no skin cancer     Skin Cancer No family hx of         no skin cancer     Glaucoma No family hx of      Macular Degeneration No family hx of      Melanoma No family hx of          ROS   A complete ROS was otherwise negative except as noted in the HPI.  Ht 1.753 m (5' 9\")   Wt 79.8 kg (176 lb)   BMI 25.99 kg/m    Exam:   GENERAL APPEARANCE: Well developed, well nourished, alert, and in no apparent distress.  EYES: EOMI, conjunctiva clear non-injected  HENT: Nasal mucosa with no edema and no discharge. No nasal polyps.  Deviated nasal septum.    EARS: Canals clear, TMs normal.  MOUTH: Oral mucosa is moist, without any lesions, no tonsillar enlargement, no oropharyngeal exudate.  RESP: Good air flow throughout.  No crackles. No rhonchi. No wheezes.  CV: Normal S1, S2, regular rhythm, normal rate. No murmur.  No rub. No gallop. No LE edema.   MS: Extremities normal. No clubbing. No cyanosis.  SKIN: No rashes noted.  NEURO: Speech normal, normal strength and tone, normal gait and stance  PSYCH: Normal mentation, orientation to person, place, and time.  Results: 11 percutaneous environmental allergen (and 2 controls) extracts placed by MA and read by MD.    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  Based on history, PE, and testing, we do not have a clear allergic etiology causing his year long shortness of breath.  On examination, he has appreciable nasal septal deviation with narrowing which could partially explain his symptoms.  We recommend that if symptoms persist evaluation by ENT.  At this time, we will pursue penicillin allergy testing per the patient's request to be coordinated over the coming weeks.  We will follow up with him as needed in the future for initial presenting problem.    The patient was seen and discussed with Dr. Cristina, the attending, who agrees with the plan as stated above.    Asa Augustin MD  IM/PEDS, PGY4    Physician Attestation   I, Caden Cristina, saw this patient with the resident and agree with the resident/fellow's findings and plan of care as documented in the note.          Caden Cristina MD  Date of Service (when I saw the patient): 06/10/19      Answers for HPI/ROS submitted by the patient on 6/3/2019   General Symptoms: No  Skin Symptoms: No  HENT Symptoms: Yes  EYE SYMPTOMS: No  HEART SYMPTOMS: No  LUNG SYMPTOMS: Yes  INTESTINAL SYMPTOMS: Yes  URINARY SYMPTOMS: No  REPRODUCTIVE SYMPTOMS: No  SKELETAL SYMPTOMS: Yes  BLOOD SYMPTOMS: No  NERVOUS SYSTEM SYMPTOMS: No  MENTAL HEALTH SYMPTOMS: No  Ear pain: No  Ear discharge: No  Hearing loss: No  Tinnitus: No  Nosebleeds: No  Congestion: Yes  Sinus pain: No  Trouble swallowing: No   Voice hoarseness: Yes  Mouth sores: No  Sore throat: No  Tooth pain: No  Gum tenderness: No  Bleeding gums: No  Change in taste: No  Dry mouth: No  Hearing aid used: No  Neck lump: No  Cough: No  Sputum or phlegm: No  Coughing up blood: No  Difficulty breating or shortness of breath: Yes  Snoring: No  Wheezing: No  Difficulty breathing on exertion: No  Nighttime Cough: No  Difficulty  breathing when lying flat: No  Heart burn or indigestion: No  Nausea: Yes  Vomiting: Yes  Abdominal pain: No  Bloating: No  Constipation: No  Diarrhea: No  Black stools: No  Rectal or Anal pain: No  Fecal incontinence: No  Yellowing of skin or eyes: No  Vomit with blood: No  Change in stools: No  Back pain: No  Muscle aches: Yes  Neck pain: No  Swollen joints: No  Joint pain: No  Bone pain: No  Muscle cramps: No  Muscle weakness: No  Joint stiffness: No  Bone fracture: No

## 2019-06-03 NOTE — LETTER
6/3/2019       RE: Luan Mitchell  [address] [address]     Dear Colleague,    Thank you for referring your patient, Luan Mitchell, to the [facility] at [facility]. Please see a copy of my visit note below.    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    He has been suffering from shortness of breath over the past year.  He does not associate his shortness of breath with activity.  No chest pain, pressure, palpitations, lightheadedness, or pedal edema.  Rhinorrhea clear in nature.  No itchy eyes, redness of eyes, sinus pain/pressure, sore throat.  No association with time of year or time of day with shortness of breath.  No family history of asthma, eczema, or allergies.  He has not tried any over the counter medications.  Of note, he had normal stress ECHO in early 2017 as well as a repeat ECHO in 2018 with normal ventricular function without structural abnormalities.  He also had recent PFT testing within normal limits.  He has had a remote CT of chest in 11/2010 with a calcified nodule identified in right upper lobe.  He was evaluated by pulmonology in 12/2018 who did not recommend any additional testing at this time.  He is interested in penicillin testing.  He was previously on daily aspirin stopping around Novemeber 2018.  Per derm, he recently discontinued his MMF 3 months prior with good cutaneous control.  He is currently on a topical steroid regimen that includes clobetasol ointment and solution, tacrolimus, triamcinolone, and ketoconazole shampoo.    He is currently an emeritus professor in the mathematics department.  No chemical or pesticide exposure.  No primary smoking history however secondhand smoke exposure as child.  No living on a farm.  He has a dog at home.    Allergy HPI      The patient was seen and  examined by Asa Augustin MD   Current Outpatient Medications   Medication     ARTIFICIAL TEAR SOLUTION OP     Cholecalciferol (VITAMIN D PO)     clobetasol (TEMOVATE) 0.05 % external solution     clobetasol (TEMOVATE) 0.05 % ointment     clobetasol propionate 0.05 % SHAM     desonide (DESOWEN) 0.05 % ointment     finasteride (PROSCAR) 5 MG tablet     hydrocortisone butyrate (LOCOID) 0.1 % SOLN     ketoconazole (NIZORAL) 2 % cream     ketoconazole (NIZORAL) 2 % shampoo     lovastatin (MEVACOR) 20 MG tablet     Multiple Vitamin (MULTIVITAMIN) per tablet     tacrolimus (PROTOPIC) 0.1 % ointment     triamcinolone (KENALOG) 0.1 % external cream     triamcinolone (KENALOG) 0.1 % ointment     No current facility-administered medications for this visit.      Allergies   Allergen Reactions     Penicillins Swelling     Social History     Socioeconomic History     Marital status:      Spouse name: Not on file     Number of children: Not on file     Years of education: Not on file     Highest education level: Not on file   Occupational History     Not on file   Social Needs     Financial resource strain: Not on file     Food insecurity:     Worry: Not on file     Inability: Not on file     Transportation needs:     Medical: Not on file     Non-medical: Not on file   Tobacco Use     Smoking status: Never Smoker     Smokeless tobacco: Never Used   Substance and Sexual Activity     Alcohol use: Not on file     Drug use: Not on file     Sexual activity: Not on file   Lifestyle     Physical activity:     Days per week: Not on file     Minutes per session: Not on file     Stress: Not on file   Relationships     Social connections:     Talks on phone: Not on file     Gets together: Not on file     Attends Mosque service: Not on file     Active member of club or organization: Not on file     Attends meetings of clubs or organizations: Not on file     Relationship status: Not on file     Intimate partner violence:      "Fear of current or ex partner: Not on file     Emotionally abused: Not on file     Physically abused: Not on file     Forced sexual activity: Not on file   Other Topics Concern     Parent/sibling w/ CABG, MI or angioplasty before 65F 55M? Not Asked   Social History Narrative     Not on file     Past Medical History:   Diagnosis Date     BPH (benign prostatic hyperplasia)      Essential hypertension 2018     Hyperlipidemia LDL goal <130      Pemphigus     pemphigus foliaceus     Retinal detachment     left eye- corrected with laser     Vitreous detachment     LE     Past Surgical History:   Procedure Laterality Date     NO HISTORY OF SURGERY  14    derm     RETINOPEXY LASER PROPHYLAXIS BREAK(S) OS (LEFT EYE)      2009 - Left eye     Family History   Problem Relation Age of Onset     Heart Disease Mother         CHF d 80     Diabetes Mother         DM2,  age 80     Hypertension Mother      Heart Disease Father         CHF d 78     Heart Disease Paternal Uncle         CHF d 50s     Cancer No family hx of         no skin cancer     Skin Cancer No family hx of         no skin cancer     Glaucoma No family hx of      Macular Degeneration No family hx of      Melanoma No family hx of          ROS   A complete ROS was otherwise negative except as noted in the HPI.  Ht 1.753 m (5' 9\")   Wt 79.8 kg (176 lb)   BMI 25.99 kg/m     Exam:   GENERAL APPEARANCE: Well developed, well nourished, alert, and in no apparent distress.  EYES: EOMI, conjunctiva clear non-injected  HENT: Nasal mucosa with no edema and no discharge. No nasal polyps.  Deviated nasal septum.    EARS: Canals clear, TMs normal.  MOUTH: Oral mucosa is moist, without any lesions, no tonsillar enlargement, no oropharyngeal exudate.  RESP: Good air flow throughout.  No crackles. No rhonchi. No wheezes.  CV: Normal S1, S2, regular rhythm, normal rate. No murmur.  No rub. No gallop. No LE edema.   MS: Extremities normal. No clubbing. No " cyanosis.  SKIN: No rashes noted.  NEURO: Speech normal, normal strength and tone, normal gait and stance  PSYCH: Normal mentation, orientation to person, place, and time.  Results: 11 percutaneous environmental allergen (and 2 controls) extracts placed by MA and read by MD.    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  Based on history, PE, and testing, we do not have a clear allergic etiology causing his year long shortness of breath.  On examination, he has appreciable nasal septal deviation with narrowing which could partially explain his symptoms.  We recommend that if symptoms persist evaluation by ENT.  At this time, we will pursue penicillin allergy testing per the patient's request to be coordinated over the coming weeks.  We will follow up with him as needed in the future for initial presenting problem.    The patient was seen and discussed with Dr. Cristina, the attending, who agrees with the plan as stated above.    Asa Augustin MD  IM/PEDS, PGY4    Physician Attestation   I, Caden Cristina, saw this patient with the resident and agree with the resident/fellow's findings and plan of care as documented in the note.          Caden Cristina MD  Date of Service (when I saw the patient): 06/10/19      Answers for HPI/ROS submitted by the patient on 6/3/2019   General Symptoms: No  Skin Symptoms: No  HENT Symptoms: Yes  EYE SYMPTOMS: No  HEART SYMPTOMS: No  LUNG SYMPTOMS: Yes  INTESTINAL SYMPTOMS: Yes  URINARY SYMPTOMS: No  REPRODUCTIVE SYMPTOMS: No  SKELETAL SYMPTOMS: Yes  BLOOD SYMPTOMS: No  NERVOUS SYSTEM SYMPTOMS: No  MENTAL HEALTH SYMPTOMS: No  Ear pain: No  Ear discharge: No  Hearing loss: No  Tinnitus: No  Nosebleeds: No  Congestion: Yes  Sinus pain: No  Trouble swallowing: No   Voice hoarseness: Yes  Mouth sores: No  Sore throat: No  Tooth pain:  No  Gum tenderness: No  Bleeding gums: No  Change in taste: No  Dry mouth: No  Hearing aid used: No  Neck lump: No  Cough: No  Sputum or phlegm: No  Coughing up blood: No  Difficulty breating or shortness of breath: Yes  Snoring: No  Wheezing: No  Difficulty breathing on exertion: No  Nighttime Cough: No  Difficulty breathing when lying flat: No  Heart burn or indigestion: No  Nausea: Yes  Vomiting: Yes  Abdominal pain: No  Bloating: No  Constipation: No  Diarrhea: No  Black stools: No  Rectal or Anal pain: No  Fecal incontinence: No  Yellowing of skin or eyes: No  Vomit with blood: No  Change in stools: No  Back pain: No  Muscle aches: Yes  Neck pain: No  Swollen joints: No  Joint pain: No  Bone pain: No  Muscle cramps: No  Muscle weakness: No  Joint stiffness: No  Bone fracture: No      Again, thank you for allowing me to participate in the care of your patient.      Sincerely,    Caden Crane MD

## 2019-06-10 ENCOUNTER — OFFICE VISIT (OUTPATIENT)
Dept: PULMONOLOGY | Facility: CLINIC | Age: 74
End: 2019-06-10
Attending: ALLERGY & IMMUNOLOGY
Payer: COMMERCIAL

## 2019-06-10 VITALS
HEIGHT: 69 IN | BODY MASS INDEX: 25.33 KG/M2 | RESPIRATION RATE: 17 BRPM | OXYGEN SATURATION: 97 % | WEIGHT: 171 LBS | HEART RATE: 75 BPM | SYSTOLIC BLOOD PRESSURE: 130 MMHG | DIASTOLIC BLOOD PRESSURE: 79 MMHG

## 2019-06-10 DIAGNOSIS — Z88.1 DRUG ALLERGY, ANTIBIOTIC: Primary | ICD-10-CM

## 2019-06-10 PROCEDURE — 95018 ALL TSTG PERQ&IQ DRUGS/BIOL: CPT | Mod: ZF | Performed by: ALLERGY & IMMUNOLOGY

## 2019-06-10 PROCEDURE — G0463 HOSPITAL OUTPT CLINIC VISIT: HCPCS | Mod: 25,ZF

## 2019-06-10 ASSESSMENT — PAIN SCALES - GENERAL: PAINLEVEL: NO PAIN (0)

## 2019-06-10 ASSESSMENT — MIFFLIN-ST. JEOR: SCORE: 1488.09

## 2019-06-10 NOTE — LETTER
6/10/2019       RE: Luan Mitchell  [address] [address]     Dear Colleague,    Thank you for referring your patient, Luan Mitchell, to the [facility]G SCIENCE [facility] HE[facility] at [facility]. Please see a copy of my visit note below.    Reason for Visit  Luan Mitchell is a 74 year old male who is here for follow-up for drug allergies    Allergy HPI  Luan is doing well and presents for penicillin testing.      The patient was seen and examined by Caden Crane MD   Current Outpatient Medications   Medication     ARTIFICIAL TEAR SOLUTION OP     Cholecalciferol (VITAMIN D PO)     clobetasol (TEMOVATE) 0.05 % external solution     clobetasol (TEMOVATE) 0.05 % ointment     clobetasol propionate 0.05 % SHAM     COMPOUNDED NON-CONTROLLED SUBSTANCE (CMPD RX) - PHARMACY TO MIX COMPOUNDED MEDICATION     desonide (DESOWEN) 0.05 % ointment     finasteride (PROSCAR) 5 MG tablet     hydrocortisone butyrate (LOCOID) 0.1 % SOLN     ketoconazole (NIZORAL) 2 % cream     ketoconazole (NIZORAL) 2 % shampoo     lovastatin (MEVACOR) 20 MG tablet     Multiple Vitamin (MULTIVITAMIN) per tablet     tacrolimus (PROTOPIC) 0.1 % ointment     triamcinolone (KENALOG) 0.1 % external cream     triamcinolone (KENALOG) 0.1 % ointment     No current facility-administered medications for this visit.      Allergies   Allergen Reactions     Penicillins Swelling     Social History     Socioeconomic History     Marital status:      Spouse name: Not on file     Number of children: Not on file     Years of education: Not on file     Highest education level: Not on file   Occupational History     Not on file   Social Needs     Financial resource strain: Not on file     Food insecurity:     Worry: Not on file     Inability: Not on file     Transportation needs:     Medical: Not on file     Non-medical: Not on file   Tobacco Use     Smoking status: Never Smoker     Smokeless  tobacco: Never Used   Substance and Sexual Activity     Alcohol use: Not on file     Drug use: Not on file     Sexual activity: Not on file   Lifestyle     Physical activity:     Days per week: Not on file     Minutes per session: Not on file     Stress: Not on file   Relationships     Social connections:     Talks on phone: Not on file     Gets together: Not on file     Attends Synagogue service: Not on file     Active member of club or organization: Not on file     Attends meetings of clubs or organizations: Not on file     Relationship status: Not on file     Intimate partner violence:     Fear of current or ex partner: Not on file     Emotionally abused: Not on file     Physically abused: Not on file     Forced sexual activity: Not on file   Other Topics Concern     Parent/sibling w/ CABG, MI or angioplasty before 65F 55M? Not Asked   Social History Narrative     Not on file     Past Medical History:   Diagnosis Date     BPH (benign prostatic hyperplasia)      Essential hypertension 2018     Hyperlipidemia LDL goal <130      Pemphigus     pemphigus foliaceus     Retinal detachment     left eye- corrected with laser     Vitreous detachment     LE     Past Surgical History:   Procedure Laterality Date     NO HISTORY OF SURGERY  14    derm     RETINOPEXY LASER PROPHYLAXIS BREAK(S) OS (LEFT EYE)      2009 - Left eye     Family History   Problem Relation Age of Onset     Heart Disease Mother         CHF d 80     Diabetes Mother         DM2,  age 80     Hypertension Mother      Heart Disease Father         CHF d 78     Heart Disease Paternal Uncle         CHF d 50s     Cancer No family hx of         no skin cancer     Skin Cancer No family hx of         no skin cancer     Glaucoma No family hx of      Macular Degeneration No family hx of      Melanoma No family hx of          ROS   A complete ROS was otherwise negative except as noted in the HPI.  /79   Pulse 75   Resp 17   Ht 1.74 m (5'  "8.5\")   Wt 77.6 kg (171 lb)   SpO2 97%   BMI 25.62 kg/m     Exam:   GENERAL APPEARANCE: Well developed, well nourished, alert, and in no apparent distress.  EYES: EOMI, conjunctiva clear non-injected  HENT:no discharge.  MOUTH: Oral mucosa is moist, without any lesions, no tonsillar enlargement, no oropharyngeal exudate.  RESP: Good air flow throughout.  No crackles. No rhonchi. No wheezes.  CV: Normal S1, S2, regular rhythm, normal rate. No murmur.  No rub. No gallop. No LE edema.   MS: Extremities normal. No clubbing. No cyanosis.  SKIN: No rashes noted.  NEURO: Speech normal, normal strength and tone, normal gait and stance  PSYCH: Normal mentation, orientation to person, place, and time.  Results: Percutaneous Pre-pen and Penicillin G 50,000 units placed by MA and read by MD as negative.  Then Pre-pen and Penicillin G 5,000 units placed by MA and read by MD as negative.       Assessment and plan: Luan tested negative to Penicillin products via skin testing and therefore was given 500 mg amoxicillin PO at 10:30 am. He was discharged at 11:35 am in stable condition.  He can take Penicillin products in the future if needed without any increased risk of IgE mediated reactions.   With the observation total visit was about 120 minutes with 30 minutes face to face.    Again, thank you for allowing me to participate in the care of your patient.      Sincerely,    Caden Crane MD      "

## 2019-06-10 NOTE — NURSING NOTE
Chief Complaint   Patient presents with     RECHECK     Appt. for penicillin testing    Medications reviewed and vital signs taken.   Nona Cortez CMA

## 2019-06-10 NOTE — PROGRESS NOTES
Reason for Visit  Luan Mitchell is a 74 year old male who is here for follow-up for drug allergies    Allergy HPI  Luan is doing well and presents for penicillin testing.      The patient was seen and examined by Caden Crane MD   Current Outpatient Medications   Medication     ARTIFICIAL TEAR SOLUTION OP     Cholecalciferol (VITAMIN D PO)     clobetasol (TEMOVATE) 0.05 % external solution     clobetasol (TEMOVATE) 0.05 % ointment     clobetasol propionate 0.05 % SHAM     COMPOUNDED NON-CONTROLLED SUBSTANCE (CMPD RX) - PHARMACY TO MIX COMPOUNDED MEDICATION     desonide (DESOWEN) 0.05 % ointment     finasteride (PROSCAR) 5 MG tablet     hydrocortisone butyrate (LOCOID) 0.1 % SOLN     ketoconazole (NIZORAL) 2 % cream     ketoconazole (NIZORAL) 2 % shampoo     lovastatin (MEVACOR) 20 MG tablet     Multiple Vitamin (MULTIVITAMIN) per tablet     tacrolimus (PROTOPIC) 0.1 % ointment     triamcinolone (KENALOG) 0.1 % external cream     triamcinolone (KENALOG) 0.1 % ointment     No current facility-administered medications for this visit.      Allergies   Allergen Reactions     Penicillins Swelling     Social History     Socioeconomic History     Marital status:      Spouse name: Not on file     Number of children: Not on file     Years of education: Not on file     Highest education level: Not on file   Occupational History     Not on file   Social Needs     Financial resource strain: Not on file     Food insecurity:     Worry: Not on file     Inability: Not on file     Transportation needs:     Medical: Not on file     Non-medical: Not on file   Tobacco Use     Smoking status: Never Smoker     Smokeless tobacco: Never Used   Substance and Sexual Activity     Alcohol use: Not on file     Drug use: Not on file     Sexual activity: Not on file   Lifestyle     Physical activity:     Days per week: Not on file     Minutes per session: Not on file     Stress: Not on file   Relationships     Social  "connections:     Talks on phone: Not on file     Gets together: Not on file     Attends Jew service: Not on file     Active member of club or organization: Not on file     Attends meetings of clubs or organizations: Not on file     Relationship status: Not on file     Intimate partner violence:     Fear of current or ex partner: Not on file     Emotionally abused: Not on file     Physically abused: Not on file     Forced sexual activity: Not on file   Other Topics Concern     Parent/sibling w/ CABG, MI or angioplasty before 65F 55M? Not Asked   Social History Narrative     Not on file     Past Medical History:   Diagnosis Date     BPH (benign prostatic hyperplasia)      Essential hypertension 2018     Hyperlipidemia LDL goal <130      Pemphigus     pemphigus foliaceus     Retinal detachment     left eye- corrected with laser     Vitreous detachment     LE     Past Surgical History:   Procedure Laterality Date     NO HISTORY OF SURGERY  14    derm     RETINOPEXY LASER PROPHYLAXIS BREAK(S) OS (LEFT EYE)       - Left eye     Family History   Problem Relation Age of Onset     Heart Disease Mother         CHF d 80     Diabetes Mother         DM2,  age 80     Hypertension Mother      Heart Disease Father         CHF d 78     Heart Disease Paternal Uncle         CHF d 50s     Cancer No family hx of         no skin cancer     Skin Cancer No family hx of         no skin cancer     Glaucoma No family hx of      Macular Degeneration No family hx of      Melanoma No family hx of          ROS   A complete ROS was otherwise negative except as noted in the HPI.  /79   Pulse 75   Resp 17   Ht 1.74 m (5' 8.5\")   Wt 77.6 kg (171 lb)   SpO2 97%   BMI 25.62 kg/m    Exam:   GENERAL APPEARANCE: Well developed, well nourished, alert, and in no apparent distress.  EYES: EOMI, conjunctiva clear non-injected  HENT:no discharge.  MOUTH: Oral mucosa is moist, without any lesions, no tonsillar " enlargement, no oropharyngeal exudate.  RESP: Good air flow throughout.  No crackles. No rhonchi. No wheezes.  CV: Normal S1, S2, regular rhythm, normal rate. No murmur.  No rub. No gallop. No LE edema.   MS: Extremities normal. No clubbing. No cyanosis.  SKIN: No rashes noted.  NEURO: Speech normal, normal strength and tone, normal gait and stance  PSYCH: Normal mentation, orientation to person, place, and time.  Results: Percutaneous Pre-pen and Penicillin G 50,000 units placed by MA and read by MD as negative.  Then Pre-pen and Penicillin G 5,000 units placed by MA and read by MD as negative.       Assessment and plan: Luan tested negative to Penicillin products via skin testing and therefore was given 500 mg amoxicillin PO at 10:30 am. He was discharged at 11:35 am in stable condition.  He can take Penicillin products in the future if needed without any increased risk of IgE mediated reactions.   With the observation total visit was about 120 minutes with 30 minutes face to face.

## 2019-06-12 ENCOUNTER — OFFICE VISIT (OUTPATIENT)
Dept: PSYCHOLOGY | Facility: CLINIC | Age: 74
End: 2019-06-12
Payer: COMMERCIAL

## 2019-06-12 DIAGNOSIS — F43.22 ADJUSTMENT DISORDER WITH ANXIETY: Primary | ICD-10-CM

## 2019-06-12 NOTE — PROGRESS NOTES
Health Psychology                  Clinic    Department of Medicine  Luann Calhoun, Ph.D., L.P. [phone]                          [facility]  [facility] Bhanu Cagle, Ph.D.,  L.P. [phone]                 3rd Floor  [place] Mail Code 741   Jaylyn Lang, Ph.D., L.P. [phone] [address] Adiel Fuentes, Ph.D., A.B.P.P., L.P. [phone]        [address] [id number]  [place] [address]           Elvia Haro, Ph.D., L.P. [phone]     Referral Source:  Loretta Villeda M.D.    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    History of Presenting Concerns: Dr. Mitchell reports concern for over a year of shortness of breath.  He has undergone pulmonary and cardiac testing which he reports has not revealed a source.  He has wondered whether anxiety might be factor, though has doubt that is all due to anxiety.  He states he generally does not think of himself as anxious and that the symptoms can occur even when he is by himself without experiencing any stress.  He has noticed for example that while doing housework or cooking he can experience it.  While we were meeting, they were couple of instances when he seemed to be having some minor breathing changes.  He states that the symptoms have not worsened since he last saw Dr. Villeda, his primary care physician, in March.  They are not interfering with his activities.  He sometimes notices it when he is walking his dog (usually 45 to 60 minutes).    Dr. Mitchell acknowledges that he tends to have some types of anxiety.  For example, if he feels that he does something that might inconvenience or upset somebody else he becomes anxious.  There was a time when he and his wife were having  difficulties and she would become upset with him about something.  He can be fearful that she might become upset about things.  When experiencing anxiety, it seems to be more in the realm of worry than physical symptoms.  He tends to ruminate.  He does not experience palpitations, sweating, weak knees, tachycardia though does experience a sense of mild fear and at times but has noticed an increase in the rate of breathing.    Medical History:  Stephen is generally in good health.  He recently was evaluated for some muscle aches including an EMG, which was found to be normal and those symptoms have subsided.  Exercise consists of walking with a goal of 10,000 steps and he often will also try to ride an exercise bicycle for 30 minutes, covering 8 miles.  He has had some elevated blood pressure readings in clinic and wonders about whitecoat hypertension.  He doubts he has hypertension.  His alcohol use is fairly rare.  In the past he drank a small glass of red wine per night for health reasons but stopped because he did not think it actually helped.  He is a non-smoker.  Caffeine use is occasional green tea.  He denies use of street drugs.  He acknowledges mild hearing problems.    Past Medical History:   Diagnosis Date     BPH (benign prostatic hyperplasia)      Essential hypertension 02/2018     Hyperlipidemia LDL goal <130      Pemphigus     pemphigus foliaceus     Retinal detachment 2009    left eye- corrected with laser     Vitreous detachment 2009    LE     Past Surgical History:   Procedure Laterality Date     NO HISTORY OF SURGERY  1/28/14    derm     RETINOPEXY LASER PROPHYLAXIS BREAK(S) OS (LEFT EYE)      2009 - Left eye     Current Outpatient Medications   Medication     ARTIFICIAL TEAR SOLUTION OP     Cholecalciferol (VITAMIN D PO)     clobetasol (TEMOVATE) 0.05 % external solution     clobetasol (TEMOVATE) 0.05 % ointment     clobetasol propionate 0.05 % SHAM     COMPOUNDED NON-CONTROLLED SUBSTANCE (CMPD  "RX) - PHARMACY TO MIX COMPOUNDED MEDICATION     desonide (DESOWEN) 0.05 % ointment     finasteride (PROSCAR) 5 MG tablet     hydrocortisone butyrate (LOCOID) 0.1 % SOLN     ketoconazole (NIZORAL) 2 % cream     ketoconazole (NIZORAL) 2 % shampoo     lovastatin (MEVACOR) 20 MG tablet     Multiple Vitamin (MULTIVITAMIN) per tablet     tacrolimus (PROTOPIC) 0.1 % ointment     triamcinolone (KENALOG) 0.1 % external cream     triamcinolone (KENALOG) 0.1 % ointment     No current facility-administered medications for this visit.      Wt Readings from Last 4 Encounters:   06/10/19 77.6 kg (171 lb)   19 77.9 kg (171 lb 12.8 oz)   19 79.8 kg (176 lb)   19 80.2 kg (176 lb 12.8 oz)     Estimated body mass index is 25.62 kg/m  as calculated from the following:    Height as of 6/10/19: 1.74 m (5' 8.5\").    Weight as of 6/10/19: 77.6 kg (171 lb).    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  Both parents were smokers and  of CHF.  His father  at age 78 and his mother  at age 80.  He reports that it was a loving family without abuse or neglect.  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  He then took a position in  at the [employer], retiring in .    Dr. Mitchell has been  twice.  The first marriage in  ended in .  He has 2 children from that marriage, a son in [facility] and a daughter in [place].  In  he started to date his current wife Dunia, marrying in .  He reports that things are currently going well, though there have been some difficult times around the time of her diagnosis with a sarcoma of the buttocks in .  She underwent surgery in .  She continues to experience pain, and has some mobility issues, but there is no evidence of return of disease.  He feels she can be overly critical of " him.    He reports that he has some friends but does not see them very often.  Recreations include walking the dog, watching Smappo or [employer] prime movies.  He takes on many of the household chores, such as cooking, dishwashing, some laundry.  He likes listening to pod casts.  He goes out once a week with his wife to the [employer] buffet.    Psychiatric History: Dr. Mitchell first sought mental health assistance in the late 1960s when he saw a psychiatrist named Dr. Keys in [facility].  He then saw her Tayler Nino, Ph.D. at [facility] around the time of his divorce.  Subsequently he saw Cristian Alves individually and for group.  He has found counseling to be helpful in the past.  There is no history of psychiatric hospitalizations, chemical dependency treatment, or suicide attempts.  He briefly tried a medication, Asendin, in approximately 1980 around the time of his divorce, and when his father was dying.  He participates in a caregivers' group at [employer], where he has been hair  regular member since 2015.  He recently noticed that others who have been caring for ill spouses when he started have all left the group due to the death of their spouses.    Psychological Screening: Dr. Mitchell completed the following four screening measures which are all within normal limits.    CAGE-AID Score:  CAGE-AID Total Score 7/29/2013 5/7/2019   Total Score 0 0   Total Score MyChart - 0 (A total score of 2 or greater is considered clinically significant)     CAGE-AID score  > 1 is a positive screen, suggesting further discussion is needed to determine if evaluation for alcohol or substance abuse is appropriate.  A score > 2 is considered clinically significant, suggesting further evaluation of alcohol or substance-related problems is indicated.    BAYRON-7 Score:  BAYRON-7 SCORE 2/4/2019 5/7/2019   Total Score - 0 (minimal anxiety)   Total Score 1 0     PHQ-9 Score:  PHQ-9 SCORE 5/7/2019   PHQ-9  Total Score MyChart 2 (Minimal depression)   PHQ-9 Total Score 2     WHODAS 2.0 Total Score 5/7/2019   Total Score 14   Total Score MyChart 14   We discussed his SOB.  We also discussed the role of anxiety in exacerbating symptoms.  We discussed the biological basis of anxiety.  He is worried about his wife's scan, which is upcoming.  He asked if he could bring her to any of our sessions and I assured him he could.  He seemed to find the discussion helpful.  Rapport was positive.  I informed him that it is unlikely that his symptoms were caused by anxiety though that anxiety could certainly exacerbate them and that some of the approaches used for stress reduction might be helpful in managing them.  In discussing stresses, he is aware of the caregiving that he is doing as being very manageable, and he is pleased by the improvements in his wife's function over time.  He continues to benefit from his group.  He plans to establish care with Dr. Telles.    A treatment plan was completed.  Extended session due to complexity of case and length of interval.    Time in:  3:02        Time out: 4:00         Diagnosis:  Adjustment disorder with anxiety (F43.22)    Recommendations/Plan: Dr. Mitchell will return to clinic 7/9 @ 3 for CBT and supportive psychotherapy consistent with treatment plan.    Last treatment plan signed: 6/12/2019  Treatment plan due:6/12/2020                            Adiel Fuentes, Ph.D., A.B.P.P., L.P.  Director, Health Psychology

## 2019-06-20 ENCOUNTER — PRE VISIT (OUTPATIENT)
Dept: UROLOGY | Facility: CLINIC | Age: 74
End: 2019-06-20

## 2019-06-20 DIAGNOSIS — R97.20 ELEVATED PROSTATE SPECIFIC ANTIGEN (PSA): Primary | ICD-10-CM

## 2019-06-20 NOTE — TELEPHONE ENCOUNTER
Chief Complaint : PSA check     Records/Orders: PSA    Pt Contacted: called patient to please get PSA 1 week before appointment    At Rooming: normal

## 2019-06-24 DIAGNOSIS — L10.2 PEMPHIGUS FOLIACEOUS (H): ICD-10-CM

## 2019-06-24 DIAGNOSIS — R97.20 ELEVATED PROSTATE SPECIFIC ANTIGEN (PSA): ICD-10-CM

## 2019-06-24 LAB
ALBUMIN SERPL-MCNC: 3.5 G/DL (ref 3.4–5)
ALP SERPL-CCNC: 50 U/L (ref 40–150)
ALT SERPL W P-5'-P-CCNC: 40 U/L (ref 0–70)
ANION GAP SERPL CALCULATED.3IONS-SCNC: 6 MMOL/L (ref 3–14)
AST SERPL W P-5'-P-CCNC: 33 U/L (ref 0–45)
BASOPHILS # BLD AUTO: 0 10E9/L (ref 0–0.2)
BASOPHILS NFR BLD AUTO: 0 %
BILIRUB SERPL-MCNC: 0.5 MG/DL (ref 0.2–1.3)
BUN SERPL-MCNC: 15 MG/DL (ref 7–30)
CALCIUM SERPL-MCNC: 8.9 MG/DL (ref 8.5–10.1)
CHLORIDE SERPL-SCNC: 109 MMOL/L (ref 94–109)
CO2 SERPL-SCNC: 25 MMOL/L (ref 20–32)
CREAT SERPL-MCNC: 0.8 MG/DL (ref 0.66–1.25)
DIFFERENTIAL METHOD BLD: ABNORMAL
EOSINOPHIL # BLD AUTO: 0.1 10E9/L (ref 0–0.7)
EOSINOPHIL NFR BLD AUTO: 2.1 %
ERYTHROCYTE [DISTWIDTH] IN BLOOD BY AUTOMATED COUNT: 13.3 % (ref 10–15)
GFR SERPL CREATININE-BSD FRML MDRD: 87 ML/MIN/{1.73_M2}
GLUCOSE SERPL-MCNC: 99 MG/DL (ref 70–99)
HCT VFR BLD AUTO: 40 % (ref 40–53)
HGB BLD-MCNC: 14 G/DL (ref 13.3–17.7)
IMM GRANULOCYTES # BLD: 0 10E9/L (ref 0–0.4)
IMM GRANULOCYTES NFR BLD: 0.2 %
LYMPHOCYTES # BLD AUTO: 1.1 10E9/L (ref 0.8–5.3)
LYMPHOCYTES NFR BLD AUTO: 22.7 %
MCH RBC QN AUTO: 31.3 PG (ref 26.5–33)
MCHC RBC AUTO-ENTMCNC: 35 G/DL (ref 31.5–36.5)
MCV RBC AUTO: 89 FL (ref 78–100)
MONOCYTES # BLD AUTO: 0.6 10E9/L (ref 0–1.3)
MONOCYTES NFR BLD AUTO: 12 %
NEUTROPHILS # BLD AUTO: 3 10E9/L (ref 1.6–8.3)
NEUTROPHILS NFR BLD AUTO: 63 %
NRBC # BLD AUTO: 0 10*3/UL
NRBC BLD AUTO-RTO: 0 /100
PLATELET # BLD AUTO: 124 10E9/L (ref 150–450)
POTASSIUM SERPL-SCNC: 4 MMOL/L (ref 3.4–5.3)
PROT SERPL-MCNC: 6.6 G/DL (ref 6.8–8.8)
PSA SERPL-MCNC: 1.86 UG/L (ref 0–4)
RBC # BLD AUTO: 4.48 10E12/L (ref 4.4–5.9)
SODIUM SERPL-SCNC: 140 MMOL/L (ref 133–144)
WBC # BLD AUTO: 4.8 10E9/L (ref 4–11)

## 2019-07-01 ENCOUNTER — OFFICE VISIT (OUTPATIENT)
Dept: UROLOGY | Facility: CLINIC | Age: 74
End: 2019-07-01
Payer: COMMERCIAL

## 2019-07-01 VITALS
SYSTOLIC BLOOD PRESSURE: 115 MMHG | HEART RATE: 81 BPM | WEIGHT: 174 LBS | BODY MASS INDEX: 25.77 KG/M2 | HEIGHT: 69 IN | DIASTOLIC BLOOD PRESSURE: 71 MMHG

## 2019-07-01 DIAGNOSIS — R39.89 ABNORMAL PROSTATE EXAM: ICD-10-CM

## 2019-07-01 DIAGNOSIS — R97.20 ELEVATED PROSTATE SPECIFIC ANTIGEN (PSA): ICD-10-CM

## 2019-07-01 DIAGNOSIS — N40.1 BENIGN PROSTATIC HYPERPLASIA WITH LOWER URINARY TRACT SYMPTOMS, SYMPTOM DETAILS UNSPECIFIED: Primary | ICD-10-CM

## 2019-07-01 RX ORDER — FINASTERIDE 5 MG/1
5 TABLET, FILM COATED ORAL DAILY
Qty: 90 TABLET | Refills: 4 | Status: SHIPPED | OUTPATIENT
Start: 2019-07-01 | End: 2021-06-01

## 2019-07-01 ASSESSMENT — PAIN SCALES - GENERAL: PAINLEVEL: NO PAIN (0)

## 2019-07-01 ASSESSMENT — MIFFLIN-ST. JEOR: SCORE: 1509.64

## 2019-07-01 NOTE — PROGRESS NOTES
Service Date: 07/01/2019      REASON FOR VISIT TODAY:  Abnormal digital rectal exam.      HISTORY OF PRESENT ILLNESS:  Mr. Mitchell is a 74-year-old gentleman followed in our clinic for history of mildly elevated PSAs and abnormal digital rectal exam.  The patient has been followed for many years, previously seen by Dr. Easton Corral and Dr. Jet Roldan.  He has been on finasteride in management of his lower urinary tract symptoms as 1996.  He has had 4 previous prostate biopsies including what sounds like a saturation biopsy.  The patient's last biopsy; however, was back in 2006.  He has had an MRI of the prostate last year that showed a PI-RADS 3 lesion only.  The patient comes in today noting no changes in his health since we last saw him.      PHYSICAL EXAMINATION:  His blood pressure is 115/71, pulse 81.  He is in no acute distress.  The patient's digital rectal exam again notes a firmness in the left mid of the prostate.      PSA from  06/24/2019 was 1.86, which is 3.72 when adjusted for finasteride.  This compares to a value of 1.82 in 03/2019 and a value of 2.1, which was 4.2 when adjusted for finasteride from 06/21/2018.      ASSESSMENT AND PLAN: Over half of today's 25-minute visit was spent counseling the patient regarding his abnormal digital rectal exam and his PSAs.  I suggested to Mr. Mitchell we are pleased to see his PSA has fallen back into normal range.  We again discussed that at 74 years old, he has really likely outlived the continued utility of PSA testing.  The patient notes, however, that he continues to be concerned about the development of prostate cancer and does wish to continue to check PSAs periodically.  We discussed with the patient that it seems unlikely that we are missing clinically a life-threatening disease given the fact that his MRI did not show anything more than a PI-RADS 3 lesion.  In our hands, patients with a PI-RADS 3 lesion only and a negative biopsy in the past have less than 10%  risk of clinically significant disease being present, although the caveat of course being the patient's last biopsy was many years ago.      In any case, after discussion of risks and benefits, the patient is willing to continue to observe things.  He was given a new prescription for finasteride 5 mg p.o. daily and I will see him back in 1 year with another PSA.         TACHO BARILLAS MD             D: 2019   T: 2019   MT:       Name:     AJ PACE   MRN:      [id number]        Account:      [id number]   :      1945           Service Date: 2019      Document: [id number]

## 2019-07-01 NOTE — LETTER
RE: Luan Mitchell  [address] [facility] [address]     Dear Colleague,    Thank you for referring your patient, Luan Mitchell, to the [facility] UROLOGY AND [facility] FOR PROSTATE AND UROLOGIC CANCERS at [facility]. Please see a copy of my visit note below.    Service Date: 07/01/2019      REASON FOR VISIT TODAY:  Abnormal digital rectal exam.      HISTORY OF PRESENT ILLNESS:  Mr. Mitchell is a 74-year-old gentleman followed in our clinic for history of mildly elevated PSAs and abnormal digital rectal exam.  The patient has been followed for many years, previously seen by Dr. Easton Corral and Dr. Jet Roldan.  He has been on finasteride in management of his lower urinary tract symptoms as 1996.  He has had 4 previous prostate biopsies including what sounds like a saturation biopsy.  The patient's last biopsy; however, was back in 2006.  He has had an MRI of the prostate last year that showed a PI-RADS 3 lesion only.  The patient comes in today noting no changes in his health since we last saw him.      PHYSICAL EXAMINATION:  His blood pressure is 115/71, pulse 81.  He is in no acute distress.  The patient's digital rectal exam again notes a firmness in the left mid of the prostate.      PSA from  06/24/2019 was 1.86, which is 3.72 when adjusted for finasteride.  This compares to a value of 1.82 in 03/2019 and a value of 2.1, which was 4.2 when adjusted for finasteride from 06/21/2018.      ASSESSMENT AND PLAN: Over half of today's 25-minute visit was spent counseling the patient regarding his abnormal digital rectal exam and his PSAs.  I suggested to Mr. Mitchell we are pleased to see his PSA has fallen back into normal range.  We again discussed that at 74 years old, he has really likely outlived the continued utility of PSA testing.  The patient notes, however, that he continues to be concerned about the development of prostate cancer and does wish to continue to check PSAs  periodically.  We discussed with the patient that it seems unlikely that we are missing clinically a life-threatening disease given the fact that his MRI did not show anything more than a PI-RADS 3 lesion.  In our hands, patients with a PI-RADS 3 lesion only and a negative biopsy in the past have less than 10% risk of clinically significant disease being present, although the caveat of course being the patient's last biopsy was many years ago.      In any case, after discussion of risks and benefits, the patient is willing to continue to observe things.  He was given a new prescription for finasteride 5 mg p.o. daily and I will see him back in 1 year with another PSA.         TACHO BARILLAS MD        D: 2019   T: 2019   MT: JENNIFER    Name:     AJ PACE   MRN:      [id number]        Account:      [id number]   :      1945           Service Date: 2019    Document: [id number]

## 2019-07-01 NOTE — PATIENT INSTRUCTIONS
Please make a year follow up with      It was a pleasure meeting with you today.  Thank you for allowing me and my team the privilege of caring for you today.  YOU are the reason we are here, and I truly hope we provided you with the excellent service you deserve.  Please let us know if there is anything else we can do for you so that we can be sure you are leaving completely satisfied with your care experience.

## 2019-07-09 ENCOUNTER — OFFICE VISIT (OUTPATIENT)
Dept: PSYCHOLOGY | Facility: CLINIC | Age: 74
End: 2019-07-09
Payer: COMMERCIAL

## 2019-07-09 DIAGNOSIS — F43.22 ADJUSTMENT DISORDER WITH ANXIETY: Primary | ICD-10-CM

## 2019-07-12 ENCOUNTER — PRE VISIT (OUTPATIENT)
Dept: OTOLARYNGOLOGY | Facility: CLINIC | Age: 74
End: 2019-07-12

## 2019-07-12 NOTE — TELEPHONE ENCOUNTER
FUTURE VISIT INFORMATION      FUTURE VISIT INFORMATION:    Date: 8/16/19    Time: 1:00pm    Location: [facility]  REFERRAL INFORMATION:    Referring provider:  Self    Referring providers clinic:  N/A    Reason for visit/diagnosis  Hoarsness//shortness of breath per patient    RECORDS REQUESTED FROM:       Clinic name Comments Records Status Imaging Status   [facility] OV/notes 10/30/18-6/10/19 EPIC

## 2019-07-12 NOTE — TELEPHONE ENCOUNTER
FUTURE VISIT INFORMATION      FUTURE VISIT INFORMATION:    Date: 8/16/19    Time: 10:50 am ENT  1:00 pm Voice    [place] [facility]  REFERRAL INFORMATION:    Referring provider:  Self    Referring providers clinic:  Self    Reason for visit/diagnosis  Hoarseness, SOB    RECORDS REQUESTED FROM:       Clinic name Comments Records Status Imaging Status   [facility] Office Visit-6/10/19-Dr. Caden Cristina Epic

## 2019-08-12 ASSESSMENT — ENCOUNTER SYMPTOMS
TROUBLE SWALLOWING: 0
SMELL DISTURBANCE: 0
POSTURAL DYSPNEA: 0
SINUS PAIN: 0
MUSCLE WEAKNESS: 0
TASTE DISTURBANCE: 0
HOARSE VOICE: 1
NECK PAIN: 0
MUSCLE CRAMPS: 0
DYSPNEA ON EXERTION: 0
SORE THROAT: 0
WHEEZING: 0
NECK MASS: 0
HEMOPTYSIS: 0
COUGH: 0
STIFFNESS: 0
SNORES LOUDLY: 0
SINUS CONGESTION: 0
ARTHRALGIAS: 1
BACK PAIN: 0
JOINT SWELLING: 0
MYALGIAS: 0
SPUTUM PRODUCTION: 0
SHORTNESS OF BREATH: 1
COUGH DISTURBING SLEEP: 0

## 2019-08-13 ENCOUNTER — OFFICE VISIT (OUTPATIENT)
Dept: INTERNAL MEDICINE | Facility: CLINIC | Age: 74
End: 2019-08-13
Payer: COMMERCIAL

## 2019-08-13 VITALS
HEIGHT: 69 IN | TEMPERATURE: 97.7 F | DIASTOLIC BLOOD PRESSURE: 78 MMHG | RESPIRATION RATE: 17 BRPM | HEART RATE: 77 BPM | OXYGEN SATURATION: 96 % | WEIGHT: 173.6 LBS | SYSTOLIC BLOOD PRESSURE: 131 MMHG | BODY MASS INDEX: 25.71 KG/M2

## 2019-08-13 DIAGNOSIS — R06.00 DYSPNEA, UNSPECIFIED TYPE: ICD-10-CM

## 2019-08-13 DIAGNOSIS — R06.00 DYSPNEA, UNSPECIFIED TYPE: Primary | ICD-10-CM

## 2019-08-13 LAB
CRP SERPL-MCNC: 3 MG/L (ref 0–8)
ERYTHROCYTE [SEDIMENTATION RATE] IN BLOOD BY WESTERGREN METHOD: 7 MM/H (ref 0–20)

## 2019-08-13 ASSESSMENT — MIFFLIN-ST. JEOR: SCORE: 1507.82

## 2019-08-13 ASSESSMENT — PAIN SCALES - GENERAL: PAINLEVEL: MILD PAIN (2)

## 2019-08-13 NOTE — PATIENT INSTRUCTIONS
[facility] Medication Refill Request Information:  * Please contact your pharmacy regarding ANY request for medication refills.  ** [facility] Prescription Fax = [phone]  * Please allow 3 business days for routine medication refills.  * Please allow 5 business days for controlled substance medication refills.     [facility] Test Result notification information:  *You will be notified with in 7-10 days of your appointment day regarding the results of your test.  If you are on MyChart you will be notified as soon as the provider has reviewed the results and signed off on them.    [facility] [phone]

## 2019-08-13 NOTE — PROGRESS NOTES
HPI  74-year-old presents today for evaluation of dyspnea.  He reports onset couple years ago of intermittent dyspnea which has become more constant over the past year.  This is predominantly associated with shortness of breath at rest and relieved by lying down.  It is not associated with exercise intolerance or dyspnea on exertion.  He is been exercising more aggressively recently particularly on an exercise bike and is found that he is been able to improve his duration and intensity of exercise without significant shortness of breath.  He said no associated cough fever chills sweats sputum production chest pain nocturnal symptoms PND orthopnea.  There is been no peripheral edema with this.  He finds that cold and winter seem to aggravate the shortness of breath and laying down relieves it.  As indicated no nocturnal symptoms or other complaints.  Evaluation to date has included normal pulmonary function studies chest x-ray and extensive lab work.  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  Otherwise he has been feeling well exercising regularly walking the dog tolerating this well without additional symptoms or problems.  Past Medical History:   Diagnosis Date     BPH (benign prostatic hyperplasia)      Essential hypertension 2018     Hyperlipidemia LDL goal <130      Pemphigus     pemphigus foliaceus     Retinal detachment     left eye- corrected with laser     Vitreous detachment     LE     Past Surgical History:   Procedure Laterality Date     NO HISTORY OF SURGERY  14    derm     RETINOPEXY LASER PROPHYLAXIS BREAK(S) OS (LEFT EYE)      2009 - Left eye     Family History   Problem Relation Age of Onset     Heart Disease Mother         CHF d 80     Diabetes Mother         DM2,  age 80     Hypertension Mother      Heart Disease Father         CHF d 78     Heart Disease Paternal Uncle          CHF d 50s     Cancer No family hx of         no skin cancer     Skin Cancer No family hx of         no skin cancer     Glaucoma No family hx of      Macular Degeneration No family hx of      Melanoma No family hx of      Social History     Socioeconomic History     Marital status:      Spouse name: None     Number of children: None     Years of education: None     Highest education level: None   Occupational History     None   Social Needs     Financial resource strain: None     Food insecurity:     Worry: None     Inability: None     Transportation needs:     Medical: None     Non-medical: None   Tobacco Use     Smoking status: Never Smoker     Smokeless tobacco: Never Used   Substance and Sexual Activity     Alcohol use: None     Drug use: None     Sexual activity: None   Lifestyle     Physical activity:     Days per week: None     Minutes per session: None     Stress: None   Relationships     Social connections:     Talks on phone: None     Gets together: None     Attends Ca[employer] service: None     Active member of club or organization: None     Attends meetings of clubs or organizations: None     Relationship status: None     Intimate partner violence:     Fear of current or ex partner: None     Emotionally abused: None     Physically abused: None     Forced sexual activity: None   Other Topics Concern     Parent/sibling w/ CABG, MI or angioplasty before 65F 55M? Not Asked   Social History Narrative     None     Answers for HPI/ROS submitted by the patient on 8/12/2019   General Symptoms: No  Skin Symptoms: No  HENT Symptoms: Yes  EYE SYMPTOMS: No  HEART SYMPTOMS: No  LUNG SYMPTOMS: Yes  INTESTINAL SYMPTOMS: No  URINARY SYMPTOMS: No  REPRODUCTIVE SYMPTOMS: No  SKELETAL SYMPTOMS: Yes  BLOOD SYMPTOMS: No  NERVOUS SYSTEM SYMPTOMS: No  MENTAL HEALTH SYMPTOMS: No  Ear pain: No  Ear discharge: No  Hearing loss: No  Tinnitus: No  Nosebleeds: No  Congestion: No  Sinus pain: No  Trouble swallowing: No   Voice  "hoarseness: Yes  Mouth sores: No  Sore throat: No  Tooth pain: No  Gum tenderness: No  Bleeding gums: No  Change in taste: No  Change in sense of smell: No  Dry mouth: No  Hearing aid used: Yes  Neck lump: No  Cough: No  Sputum or phlegm: No  Coughing up blood: No  Difficulty breating or shortness of breath: Yes  Snoring: No  Wheezing: No  Difficulty breathing on exertion: No  Nighttime Cough: No  Difficulty breathing when lying flat: No  Back pain: No  Muscle aches: No  Neck pain: No  Swollen joints: No  Joint pain: Yes  Bone pain: No  Muscle cramps: No  Muscle weakness: No  Joint stiffness: No  Bone fracture: No    Exam:  /78   Pulse 77   Temp 97.7  F (36.5  C) (Oral)   Resp 17   Ht 1.753 m (5' 9\")   Wt 78.7 kg (173 lb 9.6 oz)   SpO2 96%   BMI 25.64 kg/m    173 lbs 9.6 oz  Physical Exam   The patient is alert, oriented with a clear sensorium.   Skin shows numerous circular pigmented scarred lesions throughout the trunk no blistering or other rashes and good turgor.   Head is normocephalic and atraumatic.   Eyes show PERRLA.   Ears show normal TMs bilaterally.   Mouth shows clear oral mucosa.   Neck shows no nodes, thyromegaly or bruits.   Lungs are clear to percussion and auscultation.   Heart shows normal S1 and S2 without murmur or gallop.   Abdomen is soft and nontender without masses or organomegaly.   Extremities show no edema and no evidence of active synovitis.     Labs reviewed with him today:  Results for orders placed or performed in visit on 06/24/19   PSA tumor marker   Result Value Ref Range    PSA 1.86 0 - 4 ug/L   Comprehensive metabolic panel   Result Value Ref Range    Sodium 140 133 - 144 mmol/L    Potassium 4.0 3.4 - 5.3 mmol/L    Chloride 109 94 - 109 mmol/L    Carbon Dioxide 25 20 - 32 mmol/L    Anion Gap 6 3 - 14 mmol/L    Glucose 99 70 - 99 mg/dL    Urea Nitrogen 15 7 - 30 mg/dL    Creatinine 0.80 0.66 - 1.25 mg/dL    GFR Estimate 87 >60 mL/min/[1.73_m2]    GFR Estimate If Black " >90 >60 mL/min/[1.73_m2]    Calcium 8.9 8.5 - 10.1 mg/dL    Bilirubin Total 0.5 0.2 - 1.3 mg/dL    Albumin 3.5 3.4 - 5.0 g/dL    Protein Total 6.6 (L) 6.8 - 8.8 g/dL    Alkaline Phosphatase 50 40 - 150 U/L    ALT 40 0 - 70 U/L    AST 33 0 - 45 U/L   CBC with platelets differential   Result Value Ref Range    WBC 4.8 4.0 - 11.0 10e9/L    RBC Count 4.48 4.4 - 5.9 10e12/L    Hemoglobin 14.0 13.3 - 17.7 g/dL    Hematocrit 40.0 40.0 - 53.0 %    MCV 89 78 - 100 fl    MCH 31.3 26.5 - 33.0 pg    MCHC 35.0 31.5 - 36.5 g/dL    RDW 13.3 10.0 - 15.0 %    Platelet Count 124 (L) 150 - 450 10e9/L    Diff Method Automated Method     % Neutrophils 63.0 %    % Lymphocytes 22.7 %    % Monocytes 12.0 %    % Eosinophils 2.1 %    % Basophils 0.0 %    % Immature Granulocytes 0.2 %    Nucleated RBCs 0 0 /100    Absolute Neutrophil 3.0 1.6 - 8.3 10e9/L    Absolute Lymphocytes 1.1 0.8 - 5.3 10e9/L    Absolute Monocytes 0.6 0.0 - 1.3 10e9/L    Absolute Eosinophils 0.1 0.0 - 0.7 10e9/L    Absolute Basophils 0.0 0.0 - 0.2 10e9/L    Abs Immature Granulocytes 0.0 0 - 0.4 10e9/L    Absolute Nucleated RBC 0.0        ASSESSMENT  1 dyspnea uncertain etiology  2 pemphigus in remission  3 hyperlipidemia  4 BPH    Plan  Recheck his inflammatory markers as these were elevated and do a chest CT with and without contrast with PE protocol looking for evidence of chronic thromboembolic phenomena.  If this is negative we will do a cardiopulmonary exercise test and this was ordered.    This note was completed using Dragon voice recognition software.  Although reviewed after completion, some word and grammatical errors may occur.    Devendra Telles MD  General Internal Medicine  [facility]  [phone]

## 2019-08-13 NOTE — NURSING NOTE
Chief Complaint   Patient presents with     Establish Care     re-establish care for a new PCP; previous PCP retired       Peter Buckner CMA ([facility]) at 12:39 PM on 8/13/2019

## 2019-08-14 ENCOUNTER — ANCILLARY PROCEDURE (OUTPATIENT)
Dept: CT IMAGING | Facility: CLINIC | Age: 74
End: 2019-08-14
Attending: INTERNAL MEDICINE
Payer: COMMERCIAL

## 2019-08-14 DIAGNOSIS — R06.00 DYSPNEA, UNSPECIFIED TYPE: ICD-10-CM

## 2019-08-14 LAB
CREAT BLD-MCNC: 1 MG/DL (ref 0.66–1.25)
GFR SERPL CREATININE-BSD FRML MDRD: 73 ML/MIN/{1.73_M2}

## 2019-08-14 RX ORDER — IOPAMIDOL 755 MG/ML
72 INJECTION, SOLUTION INTRAVASCULAR ONCE
Status: COMPLETED | OUTPATIENT
Start: 2019-08-14 | End: 2019-08-14

## 2019-08-14 RX ADMIN — IOPAMIDOL 72 ML: 755 INJECTION, SOLUTION INTRAVASCULAR at 14:37

## 2019-08-14 NOTE — DISCHARGE INSTRUCTIONS

## 2019-08-16 ENCOUNTER — OFFICE VISIT (OUTPATIENT)
Dept: OTOLARYNGOLOGY | Facility: CLINIC | Age: 74
End: 2019-08-16
Payer: COMMERCIAL

## 2019-08-16 ENCOUNTER — PRE VISIT (OUTPATIENT)
Dept: OTOLARYNGOLOGY | Facility: CLINIC | Age: 74
End: 2019-08-16

## 2019-08-16 VITALS — HEIGHT: 69 IN | BODY MASS INDEX: 25.62 KG/M2 | WEIGHT: 173 LBS

## 2019-08-16 DIAGNOSIS — R06.02 SHORTNESS OF BREATH: Primary | ICD-10-CM

## 2019-08-16 DIAGNOSIS — R49.0 HOARSENESS: ICD-10-CM

## 2019-08-16 DIAGNOSIS — R06.09 DYSPNEA ON EXERTION: ICD-10-CM

## 2019-08-16 DIAGNOSIS — R49.0 DYSPHONIA: Primary | ICD-10-CM

## 2019-08-16 DIAGNOSIS — J38.3 VOCAL CORD BOWING: ICD-10-CM

## 2019-08-16 ASSESSMENT — MIFFLIN-ST. JEOR: SCORE: 1505.1

## 2019-08-16 NOTE — LETTER
8/16/2019       RE: Luan Mitchell  [address] [place] [address]     Dear Colleague,    Thank you for referring your patient, Luan Mitchell, to the [facility] EAR NO[facility]ROAT at [facility]. Please see a copy of my visit note below.    Dear Dr. Cristina:    I had the pleasure of meeting Luan Mitchell in consultation at the [facility] of the [facility] at your request, for evaluation of dysphonia and breathing problems. The note from our visit follows. Speech recognition software may have been used in the documentation below; input is reviewed before signature to the best of my ability. I appreciate the opportunity to participate in the care of this pleasant patient.    Please feel free to contact me with any questions.    Sincerely yours,    Nabila Singletary M.D., M.P.H.  , Laryngology  Director, [facility]  Otolaryngology- Head & Neck Surgery  [phone]          =====    History of Present Illness:   Luan Mitchell is a pleasant 74-year-old male with  pemphigus foliaceus who presents with an approximately one year history of dysphonia and breathing problems. Symptoms include shortness of breath and raspy voice.      Voice  He has noted voice problems over the past few months.  After mouth-breathing, his voice can be hoarse, but this is typically short-term.  He notes he does not have much vocal demand.  He rates this as a moderate problem for him. He has had no prior voice problems.      Swallowing  No concerns.       Cough/Throat-clearing  No concerns.       Breathing  He notes shortness of breath over the past year.  This is not necessarily associated with activity, and has no obvious pattern.  It seems to help to breathe through his mouth. These breathing symptoms are associated with expiratory wheeze.      He had a normal stress Echocardiogram in 2017 and  2018.  Pulmonary function testing was negative.  Pulmonology saw him in December of 2018 and no further testing was recommended.      Throat discomfort  No concerns.       Reflux-type symptoms  He experiences heartburn/indigestion rarely. He is not taking reflux medications.      Prior Epic records were reviewed for this visit.      MEDICATIONS:     Current Outpatient Medications   Medication Sig Dispense Refill     ARTIFICIAL TEAR SOLUTION OP Apply  to eye.       Cholecalciferol (VITAMIN D PO) Take 1 tablet by mouth daily 1000 mg       clobetasol (TEMOVATE) 0.05 % external solution Apply  topically 2 times daily as needed to the scalp. 200 mL 11     clobetasol (TEMOVATE) 0.05 % ointment APPLY TO AFFECTED AREA(S) TWO TIMES A  g 2     clobetasol propionate 0.05 % SHAM Apply sparingly to dry scalp 3 x weekly as needed.  Leave in place for 15 m then add water, lather and rinse 1 Bottle 5     COMPOUNDED NON-CONTROLLED SUBSTANCE (CMPD RX) - PHARMACY TO MIX COMPOUNDED MEDICATION 0.2 cc of 1:10 dilution Penicillin G or K (initial concentration 500,000 units)   0.1 cc  of 1:100 dilution PCN  0.2 cc pre-pen  500 mg Amoxacillin for PO 1 Box 0     desonide (DESOWEN) 0.05 % ointment Apply topically to affected areas twice daily as instructed. 180 g 3     finasteride (PROSCAR) 5 MG tablet Take 1 tablet (5 mg) by mouth daily 90 tablet 4     finasteride (PROSCAR) 5 MG tablet Take 1 tablet (5 mg) by mouth daily 90 tablet 4     hydrocortisone butyrate (LOCOID) 0.1 % SOLN Apply sparingly to affected area(s) of beard twice daily 180 mL 3     ketoconazole (NIZORAL) 2 % cream Apply twice daily to the nose as needed for white/scaling. 60 g 2     ketoconazole (NIZORAL) 2 % shampoo Three times per week in the shower: Lather into scalp, leave in place for 3-5 minutes, then rinse. 360 mL 12     lovastatin (MEVACOR) 20 MG tablet TAKE 1 TABLET AT BEDTIME 90 tablet 3     Multiple Vitamin (MULTIVITAMIN) per tablet Take 1 tablet by mouth  daily. 1 tab daily       tacrolimus (PROTOPIC) 0.1 % ointment Apply topically to affected areas as instructed. 100 g 11     triamcinolone (KENALOG) 0.1 % external cream Apply topically to affected areas as instructed. 454 g 11     triamcinolone (KENALOG) 0.1 % ointment Apply topically 2 times daily 454 g 11       ALLERGIES:  No Known Allergies    PAST MEDICAL HISTORY:   Past Medical History:   Diagnosis Date     BPH (benign prostatic hyperplasia)      Essential hypertension 2018     Hyperlipidemia LDL goal <130      Pemphigus     pemphigus foliaceus     Retinal detachment 2009    left eye- corrected with laser     Vitreous detachment     LE        PAST SURGICAL HISTORY:   Past Surgical History:   Procedure Laterality Date     NO HISTORY OF SURGERY  14    derm     RETINOPEXY LASER PROPHYLAXIS BREAK(S) OS (LEFT EYE)       - Left eye       HABITS/SOCIAL HISTORY:    Social History     Tobacco Use     Smoking status: Never Smoker     Smokeless tobacco: Never Used   Substance Use Topics     Alcohol use: Not on file     FAMILY HISTORY:    Family History   Problem Relation Age of Onset     Heart Disease Mother         CHF d 80     Diabetes Mother         DM2,  age 80     Hypertension Mother      Heart Disease Father         CHF d 78     Heart Disease Paternal Uncle         CHF d 50s     Cancer No family hx of         no skin cancer     Skin Cancer No family hx of         no skin cancer     Glaucoma No family hx of      Macular Degeneration No family hx of      Melanoma No family hx of         REVIEW OF SYSTEMS:  The patient completed a comprehensive 11 point review of systems (below), which was reviewed. Positives are as noted below; pertinent findings are as noted in the HPI.     Patient Supplied Answers to Review of Systems  [facility] ROS 8/15/2019   Musculoskeletal Sore or stiff joints       PHYSICAL EXAMINATION:  General: The patient was alert and conversant, and in no acute distress.    Eyes: PERRL,  conjunctiva and lids normal, sclera nonicteric.  Nose: Anterior rhinoscopy: no gross abnormalities. no  bleeding; no  mucopurulence; septum grossly normal with significant S-shaped deflection, mild mucoid drainage and/or crusting.  Oral cavity/oropharynx: No masses or lesions. Dentition in fair condition. Floor of mouth and oral tongue soft to palpation. Tongue mobility and palate elevation intact and symmetric.  Ears: Normal auricles, external auditory canals bilaterally. Visualized portions of tympanic membranes normal bilaterally.   Neck: No palpable cervical lymphadenopathy. There was no significant tenderness to palpation of the thyrohyoid space, which was very narrow. No obvious thyroid abnormality. Landmarks palpable.  Resp: Breathing comfortably, no stridor or stertor.  Neuro: Symmetric facial function. Other cranial nerves as documented above.  Psych: Normal affect, pleasant and cooperative.  Voice/speech: Mild dysphonia characterized by breathiness, roughness and strain.  Extremities: No cyanosis, clubbing, or edema of the upper extremities.    Intake scores  Total Score for Last Patient-Answered VHI Questionnaire  VHI Total Score 8/15/2019   VHI Total Score 4     Total Score for Last Patient-Answered EAT Questionnaire  EAT Total Score 8/15/2019   EAT Total Score 0     Total Score for Last Patient-Answered CSI Questionnaire  CSI Total Score 8/15/2019   CSI Total Score 0       PROCEDURE:   Flexible fiberoptic laryngoscopy and laryngovideostroboscopy  Indications: This procedure was warranted to evaluate the patient's laryngeal anatomy and function. Risks, benefits, and alternatives were discussed.  Description: After written informed consent was obtained, a time-out was performed to confirm patient identity, procedure, and procedure site. Topical 3% lidocaine with 0.25% phenylephrine was applied to the nasal cavities. I performed the endoscopy and no complications were apparent. Continuous and stroboscopic  light were utilized to assess routine phonation and variable frequency phonation.  Performed by: Nabila Singletary MD MPH  SLP: Maricruz Mehta MM, MA, CCC-SLP   Findings: Normal nasopharynx. Normal base of tongue, valleculae, and epiglottis. Vocal fold mobility: right: normal; left: normal. Medial edges of the vocal folds: smooth and straight; mildly bowed appearance at times. No focal mucosal lesions were observed on the true vocal folds. Glissade produced appropriate elongation. There was mild to moderate supraglottic recruitment with connected speech. Mucosa of false vocal folds, aryepiglottic folds, piriform sinuses, and posterior glottis unremarkable. Airway was patent.   No focal lesions on NBI. With simulation of symptoms, recruitment of left lateral pharyngeal musculature and right supraglottic musculature was observed. No mirna paradoxical motion.    The addition of stroboscopy allowed evaluation of the mucosal wave.   Amplitude: right: normal; left: normal. Symmetry: intermittent symmetry. Closure pattern: complete. Closure plane: at glottic level. Phase distribution: normal.      IMPRESSION AND PLAN:   Luan Mitchell presents with mild muscle tension dysphonia and excess recruitment of supraglottic and pharyngeal musculature when his breathing is symptomatic.     We discussed that his exam is reassuring, as are his prior testing findings. He is open to pursuing speech therapy to help with improving his respiratory coordination, which I think is likely to be helpful. He is not very bothered by his voice issue so the focus of speech therapy will be more on his breathing than his voicing.     He will return as needed. I appreciate the opportunity to participate in the care of this patient.             Again, thank you for allowing me to participate in the care of your patient.      Sincerely,    Nabila Singletary MD

## 2019-08-16 NOTE — LETTER
8/16/2019       RE: Luan Mitchell  [address] [address]     Dear Colleague,    Thank you for referring your patient, Luan Mitchell, to the [facility] at [facility]. Please see a copy of my visit note below.    [facility]  Carlos Klein Jr., M.D., F.A.C.S.  Nabila Singletary M.D., M.P.H.  Socorro Van, Ph.D., CCC/SLP  Maricruz Mehta M.M. (voice), M.A., CCC/SLP  Arnel Loya M.M. (voice), M.A., CCC/SLP    Evaluation report    Clinician: Maricruz Mehta M.M. (voice), M.A., CCC/SLP  Seen in conjunction with: Dr. Singletary  Referring physician:  Self  Patient: Luan Mitchell  Date of Visit: 8/16/2019    HISTORY  Chief complaint: Luan Mitchell is a 74 year old presenting today for evaluation of Vocal Cord Dysfunction (VCD), also known as Paradoxical Vocal Fold Motion (PVFM).    Salient history: He has a history significant for pemphigus foliaceus.    CURRENT SYMPTOMS INCLUDE  RESPIRATION -primary concern    Onset: no inciting event    Course: has remained relatively stable and constant.  Definitely not better; maybe slightly worse.      Current daily activities involving optimal breathing include:    Sports: stationery bike (performance has gotten better), and he feels better than what he used to be able to does.     Music: no wind instruments or singing    Speaking: with extended speaking.  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      Initial symptoms: sensation of not getting enough air IN or OUT    Current symptoms include:  o Sensation of difficulty with inhalation  o Denies Inspiratory stridor; develops an audible breath that is rapid.  o Sometimes he feels  like he is hyperventilating.      Episodes occur daily    Episodes resolve within a minute, if he is lying down.  The one exception is if he is standing and cooking; then he will sit for a bit and then resume cooking.                   COUGH/THROAT CLEARING    Denies issues    VOICE    Occasionally voice quality is rough and strained especially over the past few months.    He notes that he tends to be a mouth breather, and believes this contributes to his hoarse voice quality.    Although he does not have much vocal demand, he considers his voice issues to be a moderate problem.    Does not sing    SWALLOWING    Denies issues    ADDITIONAL    Denies frequent dry mouth due to medications    OTHER PERTINENT HISTORY    Hx of anx or depression - no medications    Denies Hx of GI symptoms     Had normal stress echocardiograms in 2017 and 2018    Pulmonary function testing was negative for asthma, and when he was last seen in December 2018 no additional testing was recommended.    Past Medical History:   Diagnosis Date     BPH (benign prostatic hyperplasia)      Essential hypertension 02/2018     Hyperlipidemia LDL goal <130      Pemphigus     pemphigus foliaceus     Retinal detachment 2009    left eye- corrected with laser     Vitreous detachment 2009    LE     Past Surgical History:   Procedure Laterality Date     NO HISTORY OF SURGERY  1/28/14    derm     RETINOPEXY LASER PROPHYLAXIS BREAK(S) OS (LEFT EYE)      2009 - Left eye       BREATHING EVALUATION    DYSPNEA INDEX: 12/40; a Moderate issue    BREATHING (at rest):     clavicular elevation on inspiration    shoulder and neck involvement    phonation is not coordinated with respiration    During exertion on treadmill, demonstrated:    a lack of abdominal or ribcage movement while running    elevated and tense shoulders    clavicular elevation for inspiration    reported inability to inhale fully within 6 minutes    After stopping the activity of walking at 2.5 mph  for 6 minutes, he found that his breathing was labored, and he was a little unstable.  Standing felt a little better than sitting, but after sitting he reported that he was ok.    PERCEPTUAL EVALUATION (CPT 64133)  COUGH/THROAT CLEARING:    Not observed    VOICE:    Roughness: Mild to moderate    Breathiness: Mild to moderate    Strain: Mild to moderate Intermittent    Loudness    Conversational speech:  Mildly reduced    Projected speech:  WNL  POSTURE / TENSION:     tended to sit slouched, and standing on the treadmill was leading with his upper body.    LARYNGEAL PALPATION:     firm musculature    reduced thyrohyoid space; severe    LARYNGEAL EXAMINATION  Procedure: Flexible endoscopy with chip-tip technology with stroboscopy, right nostril; topical anesthesia with 3% Lidocaine and 0.25% phenylephrine was applied.   Performed by: Dr. Singletary   The laryngeal and pharyngeal structures were evaluated for gross appearance, mobility, function, and focal lesions / abnormalities of the associated mucosa.  Stroboscopy was warranted to evaluate closure, symmetry, and vibratory characteristics of the vocal folds.  All findings were within normal limits with the exception of the following salient features:     The vocal folds are mobile and meet at midline, with a slightly spindle-shaped glottic configuration intermittently observed during adduction.    No true paradoxical movement of the vocal folds during inspiration    good abduction of true vocal folds on inspiration providing adequate airway    no indication of vibratory source for stridor     no indication of upper airway obstruction that would restrict inhalation    narrowing of glottis in anterior-posterior dimension on expiration      Abducted view of healthy vocal folds and patent airway      The vocal folds  demonstrate mildly reduced tone, and a bowed configuration was intermittently observed during adduction    THERAPY PROBES: Improvement was elicited with use  of forward resonant stimuli, coordination of respiration and phonation, use of glottic coup to promote vocal fold closure and use of rescue breathing strategies    The laryngeal exam was reviewed with Mr. Mitchell, and I provided pertinent explanations, as well as written and oral information.    ASSESSMENT / PLAN  IMPRESSIONS: Luan Mitchell is a 74-year-old gentleman, presenting today with R49.0 (Dysphonia), R06.09 (Dyspnea On Exertion) and vocal fold bowing     STIMULABILITY: results of therapy probes during perceptual and laryngeal evaluation demonstrate improvement with R49.0 (Dysphonia), R06.09 (Dyspnea On Exertion) and vocal fold bowing     RECOMMENDATIONS:     A course of speech therapy is recommended to optimize vocal technique, improve voice quality, promote reduced discomfort, effort and fatigue and help reduce mucosal irritation and resolve breathing symptoms, which appear to be an imbalance and respiratory mechanics.    He demonstrates a Good prognosis for improvement given adherence to therapeutic recommendations.     Positive indicators: positive response to therapy probes diagnosis is known to respond to treatment    Negative indicators: none    DURATION / FREQUENCY: 3 one-hour sessions.  A total of 6-8 sessions may be necessary.     GOALS:  Patient goal:   1. To understand the problem and fix it as much as possible  2. To have a normal and acceptable voice quality  3. To breathe normally and comfortably in all situations    Short-term goal(s): Within the first 4 sessions, Mr. Mitchell:  1. will utilize silent inhalation with good low-respiratory engagement 75% of the time during therapy tasks with minimal clinician support  2. will demonstrate semi-occluded vocal tract (SOVT) exercises with at least 80% accuracy with no clinician support  3. will initiate Resonant Voice Therapy (RVT)  4. will initiate modified vocal function exercises (VFE)    Long-term goal(s):  Within two months, Luan will participate in  an entire week of sport activities with no report of any difficulties breathing.     In 6 months, Mr. Mitchell will:  1. Report a week of typical vocal activities, in which dysphonia and throat discomfort do not exceed a level of 3 out of 10, 80% of the time   2. Report resolution of symptoms, and use of optimal voice quality and comfort to meet personal, social, and professional needs, 80% of the time during a typical week of vocal activities  3. Report a month of typical daily activities with no breathing issues.    Certification period:   Certification date from: 8/16/19  Certification date to: 11/14/19    This treatment plan was developed with the patient who agreed with the recommendations.    _______________________________________________________________________  THERAPY NOTE (CPT 20807)  Date of Service: 8/16/2019    SUBJECTIVE / OBJECTIVE:  Please refer to my evaluation report from today's encounter for full details regarding subjective data, patient reported measures, and diagnostic findings.    THERAPEUTIC ACTIVITIES  During the laryngeal exam, Luan learned:    Techniques for oral configurations to use during inspiration, to provide better abduction and arrest inhalatory stridor; these included: inhaling through rounded lips; inhaling through the nose with closed lips; and short, repeated sniffs    Techniques for abdominal relaxation during inhalation, to allow for maximum diaphragmatic descent    Techniques for improved contraction of the external intercostals during inhalation, to allow for improved ribcage expansion     Luan also learned:    Which techniques for oral configuration during inspiration provide the most open airway for him; he was most helped by nasal, and rounded lip breathing    The improvement in glottic configuration by using abdominal breathing techniques    After the laryngeal exam (and evaluation with Dr. Singletary), we worked on the treadmill to work on applying the techniques to  exertion.  During this process, Luan learned:    To use abdominal relaxation and contraction of the external intercostals during inhalation, to allow for maximum diaphragmatic descent; use of Thera-Band around his abdominal area and lower ribcage was used to provide manual feedback for correct inhalation technique; he found this to be very helpful    To maintain a high chest posture without shoulder or clavicular elevation during inhalation; he became aware of his propensity to use clavicular muscles, which increases the propensity for paradoxical vocal fold motion; he was able to reduce this propensity with practice today    [place] strategies to reduce paradoxical abdominal movement while breathing    To use oral configurations to improve the sensation of an open airway    To concentrate on respiratory timing to ensure adequate inhalation and exhalation    To use a mental checklist for self-monitoring his posture, muscle use, and breathing technique    After 20 minutes of exertion on a treadmill and in a long hallway, Luan stated that his breathing felt more comfortable and he was in no distress.       In addition, Luan learned the following:    [place] exercises to improve coordination between breath flow and phonation, which included counting had an increase in length, and semi-occluded vocal tract exercises  Counseling and Education    Asked many questions about the nature of his symptoms, and I answered all of these thoroughly.    Concepts of an optimal regimen for practice were instructed.  o He should use an interval schedule of practice, with brief periods of practice frequently throughout each day  o [place] concepts of volitional practice to facilitate motor learning.    A revised regimen for home practice was instructed.    I provided an audio recording and handouts of today's therapeutic activities to facilitate practice.    ASSESSMENT/PLAN  PROGRESS TOWARD LONG TERM GOALS:   Adequate progress; too  early for objective measures    IMPRESSIONS: R49.0 (Dysphonia), R06.09 (Dyspnea On Exertion) and vocal fold bowing.   Based on today s lengthy evaluation, laryngeal examination, and treatment, it would seem likely that Luan s dyspnea on exertion has been due to poor laryngeal mechanics and poor respiratory mechanics.  With training of techniques for laryngeal respiratory mechanics, he was able to exert himself for 20 minutes without symptoms.  He will continue practicing these techniques at home.  He has also learned techniques to improve his voice quality, and coordination between breath on phonation today.  We will likely work on his dysphonia more in future sessions.    PLAN: I will see Mr. Mitchell in October (due to scheduling demand), at which time we will continue therapy.  He is also on my wait list for a sooner appointment in September    TOTAL SERVICE TIME: 120 minutes  EVALUATION OF VOICE AND RESONANCE: (38260)  TREATMENT (26113)  NO CHARGE FACILITY FEE (36731)    Maricruz Mehta M.M. (voice), M.A., CCC/SLP  Speech-Language Pathologist  Certificate of Vocology  [facility]  [phone]  [email or web address]  Prounouns: she/her                                         Answers for HPI/ROS submitted by the patient on 8/15/2019   VPCMEAN: 2.5                                                                                       Outpatient Speech Language Therapy Evaluation  PLAN OF TREATMENT FOR OUTPATIENT REHABILITATION  (COMPLETE FOR INITIAL CLAIMS ONLY)  Patient's Last Name, First Name, M.I.  YOB: 1945  Luan Mitchell                        Provider's Name  Maricruz Mehta, SLP Medical Record No.  [id number]                               Onset Date:  8/16/19   Start of Care Date: 8/16/19     Type: Speech Language Therapy Medical Diagnosis: No primary diagnosis found.                        Therapy Diagnosis:  No primary diagnosis found.   Visits from SOC:  1    _________________________________________________________________________________  Plan of Treatment:   Speech therapy    DURATION/FREQUENCY OF TREATMENT  Six weekly, one-hour sessions, with two monthly one-hour follow-up sessions    PROGNOSIS  Good prognosis for voice improvement with speech therapy and regular practice of therapeutic activities.    BARRIERS TO LEARNING/TEACHING AND LEARNING NEEDS  None/Unremarkable    In 6 months, Mr. Mitchell will:  4. Report a week of typical vocal activities, in which dysphonia and throat discomfort do not exceed a level of 3 out of 10, 80% of the time   5. Report resolution of symptoms, and use of optimal voice quality and comfort to meet personal, social, and professional needs, 80% of the time during a typical week of vocal activities  Report a month of typical daily activities with no breathing issues.  _________________________________________________________________________________    I CERTIFY THE NEED FOR THESE SERVICES FURNISHED UNDER        THIS PLAN OF TREATMENT AND WHILE UNDER MY CARE     (Physician attestation of this document indicates review and certification of the therapy plan).     Certification date from: 8/16/19  Certification date to: 11/14/19    Referring Provider: Referred Self       Again, thank you for allowing me to participate in the care of your patient.      Sincerely,    Maricruz Mehta, SLP

## 2019-08-16 NOTE — PATIENT INSTRUCTIONS
"After Visit Summary    Patient: Luan Mitchell  Date of Visit: 2019    Breathing:    In the morning and evening (twice daily) for 2-5 minutes:   o Breathe while lying on your back with your face and knees up. Hands on tummy and chest.  Take a breath in with rounded lips and exhale with a  shhh    o Inhale  = Inflate (tummy out); exhale = deflate (tummy in)  o 3x each: try breathin in/8 out  o Throughout the day (2-3x/day for just a couple minutes) check breathing while keeping shoulders relaxed (riding to and from school, etc.)      Breathing Tips:  o Breathe in through rounded lips (for exertion), or nose if completing a low endurance task, and out with a  pursed lip blow , \"shhhh\"  o Tongue behind the lower teeth (during inhalation) - especially when completing an activity with exertion.  o Keep shoulders down and chest relaxed  o Don t overextend your neck (careful when on your bike)  o Keep breathing when you stop an activity, and find a recovery pose (hands behind the back)  o Thera-band use during practices  o Match your breathing rate with the rate of your exertion    Breath and Voice coordination  (2-3x/day unless otherwise noted):    Counting (aloud) of increasing length: 1, 1-2-, 1-2-3, 1-2-3-4.. Up to 20 (breathing a little more each time)    Semi-occluded vocal tract exercise:   o Bubbles (straw in 1 to 1.5  of water) 3-4x/day for 1-2 min:      o 5x: blow 10-15 seconds with no voice and keep bubbles consistent.  o 3x: blow bubbles and add a sustained  who  or an  oo  (comfortable pitch)  o 3x: blow bubbles and vary  who  gliding up and down             Up and down like a sine wave  o 1-2x: Jingle bells bubbles (staccato, or choppy)  These exercises are great for:    *warm up / cool down - Part of the morning routing and before and after extended voice use.    *tissue mobilization exercise - Improving the condition and pliability of the vocal folds.    *Abdominal breathing and applying optimal " breath flow to speech/singing.         Maricruz Mehta M.M. (voice), M.A., CCC/SLP  Speech-Language Pathologist  Certificate of Vocology  [facility]  [phone]  [email or web address]  Prounouns: she/her

## 2019-08-16 NOTE — PROGRESS NOTES
[facility] VOICE CLINIC  Carlos Klein Jr., M.D., F.A.C.S.  Nabila Singletary M.D., M.P.H.  Socorro Van, Ph.D., CCC/SLP  Maricruz Mehta M.M. (voice), M.A., CCC/SLP  Arnel Loya M.M. (voice), M.A., CCC/SLP    Evaluation report    Clinician: Maricruz Mehta M.M. (voice), M.A., CCC/SLP  Seen in conjunction with: Dr. Singletary  Referring physician:  Self  Patient: Luan Mitchell  Date of Visit: 8/16/2019    HISTORY  Chief complaint: Luan Mitchell is a 74 year old presenting today for evaluation of Vocal Cord Dysfunction (VCD), also known as Paradoxical Vocal Fold Motion (PVFM).    Salient history: He has a history significant for pemphigus foliaceus.    CURRENT SYMPTOMS INCLUDE  RESPIRATION -primary concern    Onset: no inciting event    Course: has remained relatively stable and constant.  Definitely not better; maybe slightly worse.      Current daily activities involving optimal breathing include:    Sports: stationery bike (performance has gotten better), and he feels better than what he used to be able to does.     Music: no wind instruments or singing    Speaking: with extended speaking.  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      Initial symptoms: sensation of not getting enough air IN or OUT    Current symptoms include:  o Sensation of difficulty with inhalation  o Denies Inspiratory stridor; develops an audible breath that is rapid.  o Sometimes he feels like he is hyperventilating.      Episodes occur daily    Episodes resolve within a minute, if he is lying down.  The one exception is if he is standing and cooking; then he will sit for a bit and then resume cooking.                   COUGH/THROAT CLEARING    Denies  issues    VOICE    Occasionally voice quality is rough and strained especially over the past few months.    He notes that he tends to be a mouth breather, and believes this contributes to his hoarse voice quality.    Although he does not have much vocal demand, he considers his voice issues to be a moderate problem.    Does not sing    SWALLOWING    Denies issues    ADDITIONAL    Denies frequent dry mouth due to medications    OTHER PERTINENT HISTORY    Hx of anx or depression - no medications    Denies Hx of GI symptoms     Had normal stress echocardiograms in 2017 and 2018    Pulmonary function testing was negative for asthma, and when he was last seen in December 2018 no additional testing was recommended.    Past Medical History:   Diagnosis Date     BPH (benign prostatic hyperplasia)      Essential hypertension 02/2018     Hyperlipidemia LDL goal <130      Pemphigus     pemphigus foliaceus     Retinal detachment 2009    left eye- corrected with laser     Vitreous detachment 2009    LE     Past Surgical History:   Procedure Laterality Date     NO HISTORY OF SURGERY  1/28/14    derm     RETINOPEXY LASER PROPHYLAXIS BREAK(S) OS (LEFT EYE)      2009 - Left eye       BREATHING EVALUATION    DYSPNEA INDEX: 12/40; a Moderate issue    BREATHING (at rest):     clavicular elevation on inspiration    shoulder and neck involvement    phonation is not coordinated with respiration    During exertion on treadmill, demonstrated:    a lack of abdominal or ribcage movement while running    elevated and tense shoulders    clavicular elevation for inspiration    reported inability to inhale fully within 6 minutes    After stopping the activity of walking at 2.5 mph for 6 minutes, he found that his breathing was labored, and he was a little unstable.  Standing felt a little better than sitting, but after sitting he reported that he was ok.    PERCEPTUAL EVALUATION (CPT 00085)  COUGH/THROAT CLEARING:    Not  observed    VOICE:    Roughness: Mild to moderate    Breathiness: Mild to moderate    Strain: Mild to moderate Intermittent    Loudness    Conversational speech:  Mildly reduced    Projected speech:  WNL  POSTURE / TENSION:     tended to sit slouched, and standing on the treadmill was leading with his upper body.    LARYNGEAL PALPATION:     firm musculature    reduced thyrohyoid space; severe    LARYNGEAL EXAMINATION  Procedure: Flexible endoscopy with chip-tip technology with stroboscopy, right nostril; topical anesthesia with 3% Lidocaine and 0.25% phenylephrine was applied.   Performed by: Dr. Singletary   The laryngeal and pharyngeal structures were evaluated for gross appearance, mobility, function, and focal lesions / abnormalities of the associated mucosa.  Stroboscopy was warranted to evaluate closure, symmetry, and vibratory characteristics of the vocal folds.  All findings were within normal limits with the exception of the following salient features:     The vocal folds are mobile and meet at midline, with a slightly spindle-shaped glottic configuration intermittently observed during adduction.    No true paradoxical movement of the vocal folds during inspiration    good abduction of true vocal folds on inspiration providing adequate airway    no indication of vibratory source for stridor     no indication of upper airway obstruction that would restrict inhalation    narrowing of glottis in anterior-posterior dimension on expiration      Abducted view of healthy vocal folds and patent airway      The vocal folds  demonstrate mildly reduced tone, and a bowed configuration was intermittently observed during adduction    THERAPY PROBES: Improvement was elicited with use of forward resonant stimuli, coordination of respiration and phonation, use of glottic coup to promote vocal fold closure and use of rescue breathing strategies    The laryngeal exam was reviewed with Mr. Mitchell, and I provided pertinent  explanations, as well as written and oral information.    ASSESSMENT / PLAN  IMPRESSIONS: Luan Mitchell is a 74-year-old gentleman, presenting today with R49.0 (Dysphonia), R06.09 (Dyspnea On Exertion) and vocal fold bowing     STIMULABILITY: results of therapy probes during perceptual and laryngeal evaluation demonstrate improvement with R49.0 (Dysphonia), R06.09 (Dyspnea On Exertion) and vocal fold bowing     RECOMMENDATIONS:     A course of speech therapy is recommended to optimize vocal technique, improve voice quality, promote reduced discomfort, effort and fatigue and help reduce mucosal irritation and resolve breathing symptoms, which appear to be an imbalance and respiratory mechanics.    He demonstrates a Good prognosis for improvement given adherence to therapeutic recommendations.     Positive indicators: positive response to therapy probes diagnosis is known to respond to treatment    Negative indicators: none    DURATION / FREQUENCY: 3 one-hour sessions.  A total of 6-8 sessions may be necessary.     GOALS:  Patient goal:   1. To understand the problem and fix it as much as possible  2. To have a normal and acceptable voice quality  3. To breathe normally and comfortably in all situations    Short-term goal(s): Within the first 4 sessions, Mr. Mitchell:  1. will utilize silent inhalation with good low-respiratory engagement 75% of the time during therapy tasks with minimal clinician support  2. will demonstrate semi-occluded vocal tract (SOVT) exercises with at least 80% accuracy with no clinician support  3. will initiate Resonant Voice Therapy (RVT)  4. will initiate modified vocal function exercises (VFE)    Long-term goal(s):  Within two months, Luan will participate in an entire week of sport activities with no report of any difficulties breathing.     In 6 months, Mr. Mitchell will:  1. Report a week of typical vocal activities, in which dysphonia and throat discomfort do not exceed a level of 3 out of  10, 80% of the time   2. Report resolution of symptoms, and use of optimal voice quality and comfort to meet personal, social, and professional needs, 80% of the time during a typical week of vocal activities  3. Report a month of typical daily activities with no breathing issues.    Certification period:   Certification date from: 8/16/19  Certification date to: 11/14/19    This treatment plan was developed with the patient who agreed with the recommendations.    _______________________________________________________________________  THERAPY NOTE (CPT 05654)  Date of Service: 8/16/2019    SUBJECTIVE / OBJECTIVE:  Please refer to my evaluation report from today's encounter for full details regarding subjective data, patient reported measures, and diagnostic findings.    THERAPEUTIC ACTIVITIES  During the laryngeal exam, Luan learned:    Techniques for oral configurations to use during inspiration, to provide better abduction and arrest inhalatory stridor; these included: inhaling through rounded lips; inhaling through the nose with closed lips; and short, repeated sniffs    Techniques for abdominal relaxation during inhalation, to allow for maximum diaphragmatic descent    Techniques for improved contraction of the external intercostals during inhalation, to allow for improved ribcage expansion     Luan also learned:    Which techniques for oral configuration during inspiration provide the most open airway for him; he was most helped by nasal, and rounded lip breathing    The improvement in glottic configuration by using abdominal breathing techniques    After the laryngeal exam (and evaluation with Dr. Singletary), we worked on the treadmill to work on applying the techniques to exertion.  During this process, Luan learned:    To use abdominal relaxation and contraction of the external intercostals during inhalation, to allow for maximum diaphragmatic descent; use of Thera-Band around his abdominal area and lower  ribcage was used to provide manual feedback for correct inhalation technique; he found this to be very helpful    To maintain a high chest posture without shoulder or clavicular elevation during inhalation; he became aware of his propensity to use clavicular muscles, which increases the propensity for paradoxical vocal fold motion; he was able to reduce this propensity with practice today    Mojave strategies to reduce paradoxical abdominal movement while breathing    To use oral configurations to improve the sensation of an open airway    To concentrate on respiratory timing to ensure adequate inhalation and exhalation    To use a mental checklist for self-monitoring his posture, muscle use, and breathing technique    After 20 minutes of exertion on a treadmill and in a long hallway, Luan stated that his breathing felt more comfortable and he was in no distress.       In addition, Luan learned the following:    Mojave exercises to improve coordination between breath flow and phonation, which included counting had an increase in length, and semi-occluded vocal tract exercises  Counseling and Education    Asked many questions about the nature of his symptoms, and I answered all of these thoroughly.    Concepts of an optimal regimen for practice were instructed.  o He should use an interval schedule of practice, with brief periods of practice frequently throughout each day  o Mojave concepts of volitional practice to facilitate motor learning.    A revised regimen for home practice was instructed.    I provided an audio recording and handouts of today's therapeutic activities to facilitate practice.    ASSESSMENT/PLAN  PROGRESS TOWARD LONG TERM GOALS:   Adequate progress; too early for objective measures    IMPRESSIONS: R49.0 (Dysphonia), R06.09 (Dyspnea On Exertion) and vocal fold bowing.   Based on today s lengthy evaluation, laryngeal examination, and treatment, it would seem likely that Luan s dyspnea on  exertion has been due to poor laryngeal mechanics and poor respiratory mechanics.  With training of techniques for laryngeal respiratory mechanics, he was able to exert himself for 20 minutes without symptoms.  He will continue practicing these techniques at home.  He has also learned techniques to improve his voice quality, and coordination between breath on phonation today.  We will likely work on his dysphonia more in future sessions.    PLAN: I will see Mr. Mitchell in October (due to scheduling demand), at which time we will continue therapy.  He is also on my wait list for a sooner appointment in September    TOTAL SERVICE TIME: 120 minutes  EVALUATION OF VOICE AND RESONANCE: ([id number])  TREATMENT (29124)  NO CHARGE FACILITY FEE (73778)    Maricruz Mehta M.M. (voice), M.A., CCC/SLP  Speech-Language Pathologist  Certificate of Vocology  [facility]  [phone]  [email or web address]  Prounouns: she/her                                         Answers for HPI/ROS submitted by the patient on 8/15/2019   VPCMEAN: 2.5

## 2019-08-16 NOTE — LETTER
8/16/2019      RE: Luan Mitchell  [address] [place] [address]       Dear Dr. Cristina:    I had the pleasure of meeting Luan Mitchell in consultation at the [facility] Voice [facility] of the [facility] at your request, for evaluation of dysphonia and breathing problems. The note from our visit follows. Speech recognition software may have been used in the documentation below; input is reviewed before signature to the best of my ability. I appreciate the opportunity to participate in the care of this pleasant patient.    Please feel free to contact me with any questions.    Sincerely yours,    Nabila Singletary M.D., M.P.H.  , Laryngology  Director, [facility]  Otolaryngology- Head & Neck Surgery  [phone]          =====    History of Present Illness:   Luan Mitchell is a pleasant 74-year-old male with  pemphigus foliaceus who presents with an approximately one year history of dysphonia and breathing problems. Symptoms include shortness of breath and raspy voice.      Voice  He has noted voice problems over the past few months.  After mouth-breathing, his voice can be hoarse, but this is typically short-term.  He notes he does not have much vocal demand.  He rates this as a moderate problem for him. He has had no prior voice problems.      Swallowing  No concerns.       Cough/Throat-clearing  No concerns.       Breathing  He notes shortness of breath over the past year.  This is not necessarily associated with activity, and has no obvious pattern.  It seems to help to breathe through his mouth. These breathing symptoms are associated with expiratory wheeze.      He had a normal stress Echocardiogram in 2017 and 2018.  Pulmonary function testing was negative.  Pulmonology saw him in December of 2018 and no further testing was recommended.      Throat discomfort  No concerns.       Reflux-type symptoms  He experiences  heartburn/indigestion rarely. He is not taking reflux medications.      Prior Epic records were reviewed for this visit.      MEDICATIONS:     Current Outpatient Medications   Medication Sig Dispense Refill     ARTIFICIAL TEAR SOLUTION OP Apply  to eye.       Cholecalciferol (VITAMIN D PO) Take 1 tablet by mouth daily 1000 mg       clobetasol (TEMOVATE) 0.05 % external solution Apply  topically 2 times daily as needed to the scalp. 200 mL 11     clobetasol (TEMOVATE) 0.05 % ointment APPLY TO AFFECTED AREA(S) TWO TIMES A  g 2     clobetasol propionate 0.05 % SHAM Apply sparingly to dry scalp 3 x weekly as needed.  Leave in place for 15 m then add water, lather and rinse 1 Bottle 5     COMPOUNDED NON-CONTROLLED SUBSTANCE (CMPD RX) - PHARMACY TO MIX COMPOUNDED MEDICATION 0.2 cc of 1:10 dilution Penicillin G or K (initial concentration 500,000 units)   0.1 cc  of 1:100 dilution PCN  0.2 cc pre-pen  500 mg Amoxacillin for PO 1 Box 0     desonide (DESOWEN) 0.05 % ointment Apply topically to affected areas twice daily as instructed. 180 g 3     finasteride (PROSCAR) 5 MG tablet Take 1 tablet (5 mg) by mouth daily 90 tablet 4     finasteride (PROSCAR) 5 MG tablet Take 1 tablet (5 mg) by mouth daily 90 tablet 4     hydrocortisone butyrate (LOCOID) 0.1 % SOLN Apply sparingly to affected area(s) of beard twice daily 180 mL 3     ketoconazole (NIZORAL) 2 % cream Apply twice daily to the nose as needed for white/scaling. 60 g 2     ketoconazole (NIZORAL) 2 % shampoo Three times per week in the shower: Lather into scalp, leave in place for 3-5 minutes, then rinse. 360 mL 12     lovastatin (MEVACOR) 20 MG tablet TAKE 1 TABLET AT BEDTIME 90 tablet 3     Multiple Vitamin (MULTIVITAMIN) per tablet Take 1 tablet by mouth daily. 1 tab daily       tacrolimus (PROTOPIC) 0.1 % ointment Apply topically to affected areas as instructed. 100 g 11     triamcinolone (KENALOG) 0.1 % external cream Apply topically to affected areas as  instructed. 454 g 11     triamcinolone (KENALOG) 0.1 % ointment Apply topically 2 times daily 454 g 11       ALLERGIES:  No Known Allergies    PAST MEDICAL HISTORY:   Past Medical History:   Diagnosis Date     BPH (benign prostatic hyperplasia)      Essential hypertension 2018     Hyperlipidemia LDL goal <130      Pemphigus     pemphigus foliaceus     Retinal detachment 2009    left eye- corrected with laser     Vitreous detachment 2009    LE        PAST SURGICAL HISTORY:   Past Surgical History:   Procedure Laterality Date     NO HISTORY OF SURGERY  14    derm     RETINOPEXY LASER PROPHYLAXIS BREAK(S) OS (LEFT EYE)      2009 - Left eye       HABITS/SOCIAL HISTORY:    Social History     Tobacco Use     Smoking status: Never Smoker     Smokeless tobacco: Never Used   Substance Use Topics     Alcohol use: Not on file     FAMILY HISTORY:    Family History   Problem Relation Age of Onset     Heart Disease Mother         CHF d 80     Diabetes Mother         DM2,  age 80     Hypertension Mother      Heart Disease Father         CHF d 78     Heart Disease Paternal Uncle         CHF d 50s     Cancer No family hx of         no skin cancer     Skin Cancer No family hx of         no skin cancer     Glaucoma No family hx of      Macular Degeneration No family hx of      Melanoma No family hx of         REVIEW OF SYSTEMS:  The patient completed a comprehensive 11 point review of systems (below), which was reviewed. Positives are as noted below; pertinent findings are as noted in the HPI.     Patient Supplied Answers to Review of Systems  [facility] ROS 8/15/2019   Musculoskeletal Sore or stiff joints       PHYSICAL EXAMINATION:  General: The patient was alert and conversant, and in no acute distress.    Eyes: PERRL, conjunctiva and lids normal, sclera nonicteric.  Nose: Anterior rhinoscopy: no gross abnormalities. no  bleeding; no  mucopurulence; septum grossly normal with significant S-shaped deflection, mild mucoid  drainage and/or crusting.  Oral cavity/oropharynx: No masses or lesions. Dentition in fair condition. Floor of mouth and oral tongue soft to palpation. Tongue mobility and palate elevation intact and symmetric.  Ears: Normal auricles, external auditory canals bilaterally. Visualized portions of tympanic membranes normal bilaterally.   Neck: No palpable cervical lymphadenopathy. There was no significant tenderness to palpation of the thyrohyoid space, which was very narrow. No obvious thyroid abnormality. Landmarks palpable.  Resp: Breathing comfortably, no stridor or stertor.  Neuro: Symmetric facial function. Other cranial nerves as documented above.  Psych: Normal affect, pleasant and cooperative.  Voice/speech: Mild dysphonia characterized by breathiness, roughness and strain.  Extremities: No cyanosis, clubbing, or edema of the upper extremities.    Intake scores  Total Score for Last Patient-Answered VHI Questionnaire  VHI Total Score 8/15/2019   VHI Total Score 4     Total Score for Last Patient-Answered EAT Questionnaire  EAT Total Score 8/15/2019   EAT Total Score 0     Total Score for Last Patient-Answered CSI Questionnaire  CSI Total Score 8/15/2019   CSI Total Score 0       PROCEDURE:   Flexible fiberoptic laryngoscopy and laryngovideostroboscopy  Indications: This procedure was warranted to evaluate the patient's laryngeal anatomy and function. Risks, benefits, and alternatives were discussed.  Description: After written informed consent was obtained, a time-out was performed to confirm patient identity, procedure, and procedure site. Topical 3% lidocaine with 0.25% phenylephrine was applied to the nasal cavities. I performed the endoscopy and no complications were apparent. Continuous and stroboscopic light were utilized to assess routine phonation and variable frequency phonation.  Performed by: Nabila Singletary MD MPH  SLP: Maricruz Mehta MM, MA, CCC-SLP   Findings: Normal nasopharynx. Normal base of  tongue, valleculae, and epiglottis. Vocal fold mobility: right: normal; left: normal. Medial edges of the vocal folds: smooth and straight; mildly bowed appearance at times. No focal mucosal lesions were observed on the true vocal folds. Glissade produced appropriate elongation. There was mild to moderate supraglottic recruitment with connected speech. Mucosa of false vocal folds, aryepiglottic folds, piriform sinuses, and posterior glottis unremarkable. Airway was patent.   No focal lesions on NBI. With simulation of symptoms, recruitment of left lateral pharyngeal musculature and right supraglottic musculature was observed. No mirna paradoxical motion.    The addition of stroboscopy allowed evaluation of the mucosal wave.   Amplitude: right: normal; left: normal. Symmetry: intermittent symmetry. Closure pattern: complete. Closure plane: at glottic level. Phase distribution: normal.      IMPRESSION AND PLAN:   Luan Mitchell presents with mild muscle tension dysphonia and excess recruitment of supraglottic and pharyngeal musculature when his breathing is symptomatic.     We discussed that his exam is reassuring, as are his prior testing findings. He is open to pursuing speech therapy to help with improving his respiratory coordination, which I think is likely to be helpful. He is not very bothered by his voice issue so the focus of speech therapy will be more on his breathing than his voicing.     He will return as needed. I appreciate the opportunity to participate in the care of this patient.         Nabila Singletary MD

## 2019-08-16 NOTE — PROGRESS NOTES
Dear Dr. Cristina:    I had the pleasure of meeting Luan Mitchell in consultation at the [facility] of the [facility] [place] [facility] at your request, for evaluation of dysphonia and breathing problems. The note from our visit follows. Speech recognition software may have been used in the documentation below; input is reviewed before signature to the best of my ability. I appreciate the opportunity to participate in the care of this pleasant patient.    Please feel free to contact me with any questions.    Sincerely yours,    Nabila Singletary M.D., M.P.H.  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 Symptoms include shortness of breath and raspy voice.      Voice  He has noted voice problems over the past few months.  After mouth-breathing, his voice can be hoarse, but this is typically short-term.  He notes he does not have much vocal demand.  He rates this as a moderate problem for him. He has had no prior voice problems.      Swallowing  No concerns.       Cough/Throat-clearing  No concerns.       Breathing  He notes shortness of breath over the past year.  This is not necessarily associated with activity, and has no obvious pattern.  It seems to help to breathe through his mouth. These breathing symptoms are associated with expiratory wheeze.      He had a normal stress Echocardiogram in 2017 and 2018.  Pulmonary function testing was negative.  Pulmonology saw him in December of 2018 and no further testing was recommended.      Throat discomfort  No concerns.       Reflux-type symptoms  He experiences heartburn/indigestion rarely. He is not taking reflux medications.      Prior Epic  records were reviewed for this visit.      MEDICATIONS:     Current Outpatient Medications   Medication Sig Dispense Refill     ARTIFICIAL TEAR SOLUTION OP Apply  to eye.       Cholecalciferol (VITAMIN D PO) Take 1 tablet by mouth daily 1000 mg       clobetasol (TEMOVATE) 0.05 % external solution Apply  topically 2 times daily as needed to the scalp. 200 mL 11     clobetasol (TEMOVATE) 0.05 % ointment APPLY TO AFFECTED AREA(S) TWO TIMES A  g 2     clobetasol propionate 0.05 % SHAM Apply sparingly to dry scalp 3 x weekly as needed.  Leave in place for 15 m then add water, lather and rinse 1 Bottle 5     COMPOUNDED NON-CONTROLLED SUBSTANCE (CMPD RX) - PHARMACY TO MIX COMPOUNDED MEDICATION 0.2 cc of 1:10 dilution Penicillin G or K (initial concentration 500,000 units)   0.1 cc  of 1:100 dilution PCN  0.2 cc pre-pen  500 mg Amoxacillin for PO 1 Box 0     desonide (DESOWEN) 0.05 % ointment Apply topically to affected areas twice daily as instructed. 180 g 3     finasteride (PROSCAR) 5 MG tablet Take 1 tablet (5 mg) by mouth daily 90 tablet 4     finasteride (PROSCAR) 5 MG tablet Take 1 tablet (5 mg) by mouth daily 90 tablet 4     hydrocortisone butyrate (LOCOID) 0.1 % SOLN Apply sparingly to affected area(s) of beard twice daily 180 mL 3     ketoconazole (NIZORAL) 2 % cream Apply twice daily to the nose as needed for white/scaling. 60 g 2     ketoconazole (NIZORAL) 2 % shampoo Three times per week in the shower: Lather into scalp, leave in place for 3-5 minutes, then rinse. 360 mL 12     lovastatin (MEVACOR) 20 MG tablet TAKE 1 TABLET AT BEDTIME 90 tablet 3     Multiple Vitamin (MULTIVITAMIN) per tablet Take 1 tablet by mouth daily. 1 tab daily       tacrolimus (PROTOPIC) 0.1 % ointment Apply topically to affected areas as instructed. 100 g 11     triamcinolone (KENALOG) 0.1 % external cream Apply topically to affected areas as instructed. 454 g 11     triamcinolone (KENALOG) 0.1 % ointment Apply topically 2 times  daily 454 g 11       ALLERGIES:  No Known Allergies    PAST MEDICAL HISTORY:   Past Medical History:   Diagnosis Date     BPH (benign prostatic hyperplasia)      Essential hypertension 2018     Hyperlipidemia LDL goal <130      Pemphigus     pemphigus foliaceus     Retinal detachment 2009    left eye- corrected with laser     Vitreous detachment 2009    LE        PAST SURGICAL HISTORY:   Past Surgical History:   Procedure Laterality Date     NO HISTORY OF SURGERY  14    derm     RETINOPEXY LASER PROPHYLAXIS BREAK(S) OS (LEFT EYE)      2009 - Left eye       HABITS/SOCIAL HISTORY:    Social History     Tobacco Use     Smoking status: Never Smoker     Smokeless tobacco: Never Used   Substance Use Topics     Alcohol use: Not on file     FAMILY HISTORY:    Family History   Problem Relation Age of Onset     Heart Disease Mother         CHF d 80     Diabetes Mother         DM2,  age 80     Hypertension Mother      Heart Disease Father         CHF d 78     Heart Disease Paternal Uncle         CHF d 50s     Cancer No family hx of         no skin cancer     Skin Cancer No family hx of         no skin cancer     Glaucoma No family hx of      Macular Degeneration No family hx of      Melanoma No family hx of         REVIEW OF SYSTEMS:  The patient completed a comprehensive 11 point review of systems (below), which was reviewed. Positives are as noted below; pertinent findings are as noted in the HPI.     Patient Supplied Answers to Review of Systems  [facility] ROS 8/15/2019   Musculoskeletal Sore or stiff joints       PHYSICAL EXAMINATION:  General: The patient was alert and conversant, and in no acute distress.    Eyes: PERRL, conjunctiva and lids normal, sclera nonicteric.  Nose: Anterior rhinoscopy: no gross abnormalities. no  bleeding; no  mucopurulence; septum grossly normal with significant S-shaped deflection, mild mucoid drainage and/or crusting.  Oral cavity/oropharynx: No masses or lesions. Dentition in fair  condition. Floor of mouth and oral tongue soft to palpation. Tongue mobility and palate elevation intact and symmetric.  Ears: Normal auricles, external auditory canals bilaterally. Visualized portions of tympanic membranes normal bilaterally.   Neck: No palpable cervical lymphadenopathy. There was no significant tenderness to palpation of the thyrohyoid space, which was very narrow. No obvious thyroid abnormality. Landmarks palpable.  Resp: Breathing comfortably, no stridor or stertor.  Neuro: Symmetric facial function. Other cranial nerves as documented above.  Psych: Normal affect, pleasant and cooperative.  Voice/speech: Mild dysphonia characterized by breathiness, roughness and strain.  Extremities: No cyanosis, clubbing, or edema of the upper extremities.    Intake scores  Total Score for Last Patient-Answered VHI Questionnaire  VHI Total Score 8/15/2019   VHI Total Score 4     Total Score for Last Patient-Answered EAT Questionnaire  EAT Total Score 8/15/2019   EAT Total Score 0     Total Score for Last Patient-Answered CSI Questionnaire  CSI Total Score 8/15/2019   CSI Total Score 0       PROCEDURE:   Flexible fiberoptic laryngoscopy and laryngovideostroboscopy  Indications: This procedure was warranted to evaluate the patient's laryngeal anatomy and function. Risks, benefits, and alternatives were discussed.  Description: After written informed consent was obtained, a time-out was performed to confirm patient identity, procedure, and procedure site. Topical 3% lidocaine with 0.25% phenylephrine was applied to the nasal cavities. I performed the endoscopy and no complications were apparent. Continuous and stroboscopic light were utilized to assess routine phonation and variable frequency phonation.  Performed by: Nabila Singletary MD MPH  SLP: Maricruz Mehta MM, MA, CCC-SLP   Findings: Normal nasopharynx. Normal base of tongue, valleculae, and epiglottis. Vocal fold mobility: right: normal; left: normal.  Medial edges of the vocal folds: smooth and straight; mildly bowed appearance at times. No focal mucosal lesions were observed on the true vocal folds. Glissade produced appropriate elongation. There was mild to moderate supraglottic recruitment with connected speech. Mucosa of false vocal folds, aryepiglottic folds, piriform sinuses, and posterior glottis unremarkable. Airway was patent.   No focal lesions on NBI. With simulation of symptoms, recruitment of left lateral pharyngeal musculature and right supraglottic musculature was observed. No mirna paradoxical motion.    The addition of stroboscopy allowed evaluation of the mucosal wave.   Amplitude: right: normal; left: normal. Symmetry: intermittent symmetry. Closure pattern: complete. Closure plane: at glottic level. Phase distribution: normal.      IMPRESSION AND PLAN:   Luan Mitchell presents with mild muscle tension dysphonia and excess recruitment of supraglottic and pharyngeal musculature when his breathing is symptomatic.     We discussed that his exam is reassuring, as are his prior testing findings. He is open to pursuing speech therapy to help with improving his respiratory coordination, which I think is likely to be helpful. He is not very bothered by his voice issue so the focus of speech therapy will be more on his breathing than his voicing.     He will return as needed. I appreciate the opportunity to participate in the care of this patient.

## 2019-08-21 ENCOUNTER — HOSPITAL ENCOUNTER (OUTPATIENT)
Dept: CARDIOLOGY | Facility: CLINIC | Age: 74
Discharge: HOME | End: 2019-08-21
Attending: INTERNAL MEDICINE | Admitting: INTERNAL MEDICINE
Payer: COMMERCIAL

## 2019-08-21 DIAGNOSIS — R06.00 DYSPNEA, UNSPECIFIED TYPE: ICD-10-CM

## 2019-08-21 PROCEDURE — 94621 CARDIOPULM EXERCISE TESTING: CPT | Mod: 26 | Performed by: INTERNAL MEDICINE

## 2019-08-21 PROCEDURE — 94621 CARDIOPULM EXERCISE TESTING: CPT

## 2019-08-23 ENCOUNTER — MYC MEDICAL ADVICE (OUTPATIENT)
Dept: INTERNAL MEDICINE | Facility: CLINIC | Age: 74
End: 2019-08-23

## 2019-08-24 NOTE — PROGRESS NOTES
Outpatient Speech Language Therapy Evaluation  PLAN OF TREATMENT FOR OUTPATIENT REHABILITATION  (COMPLETE FOR INITIAL CLAIMS ONLY)  Patient's Last Name, First Name, M.I.  YOB: 1945  Luan Mitchell                        Provider's Name  Maricruz Mehta, SLP Medical Record No.  [id number]                               Onset Date:  8/16/19   Start of Care Date: 8/16/19     Type: Speech Language Therapy Medical Diagnosis: No primary diagnosis found.                        Therapy Diagnosis:  No primary diagnosis found.   Visits from SOC:  1   _________________________________________________________________________________  Plan of Treatment:   Speech therapy    DURATION/FREQUENCY OF TREATMENT  Six weekly, one-hour sessions, with two monthly one-hour follow-up sessions    PROGNOSIS  Good prognosis for voice improvement with speech therapy and regular practice of therapeutic activities.    BARRIERS TO LEARNING/TEACHING AND LEARNING NEEDS  None/Unremarkable    In 6 months, Mr. Mitchell will:  1. Report a week of typical vocal activities, in which dysphonia and throat discomfort do not exceed a level of 3 out of 10, 80% of the time   2. Report resolution of symptoms, and use of optimal voice quality and comfort to meet personal, social, and professional needs, 80% of the time during a typical week of vocal activities  Report a month of typical daily activities with no breathing issues.  _________________________________________________________________________________    I CERTIFY THE NEED FOR THESE SERVICES FURNISHED UNDER        THIS PLAN OF TREATMENT AND WHILE UNDER MY CARE     (Physician attestation of this document indicates review and certification of the therapy plan).     Certification date from: 8/16/19  Certification date to: 11/14/19    Referring Provider: Referred Self

## 2019-08-27 ENCOUNTER — MYC MEDICAL ADVICE (OUTPATIENT)
Dept: INTERNAL MEDICINE | Facility: CLINIC | Age: 74
End: 2019-08-27

## 2019-08-27 DIAGNOSIS — R06.00 DYSPNEA, UNSPECIFIED TYPE: Primary | ICD-10-CM

## 2019-08-27 NOTE — PROGRESS NOTES
Medicare Certification  Physician Attestation   I agree with the information in this note.    Nabila Singletary MD

## 2019-09-04 ENCOUNTER — MYC MEDICAL ADVICE (OUTPATIENT)
Dept: INTERNAL MEDICINE | Facility: CLINIC | Age: 74
End: 2019-09-04

## 2019-09-18 ENCOUNTER — MYC MEDICAL ADVICE (OUTPATIENT)
Dept: INTERNAL MEDICINE | Facility: CLINIC | Age: 74
End: 2019-09-18

## 2019-10-02 ENCOUNTER — OFFICE VISIT (OUTPATIENT)
Dept: OPHTHALMOLOGY | Facility: CLINIC | Age: 74
End: 2019-10-02
Attending: OPHTHALMOLOGY
Payer: COMMERCIAL

## 2019-10-02 DIAGNOSIS — H25.13 SENILE NUCLEAR SCLEROSIS, BILATERAL: Primary | ICD-10-CM

## 2019-10-02 PROCEDURE — 92015 DETERMINE REFRACTIVE STATE: CPT | Mod: ZF

## 2019-10-02 PROCEDURE — G0463 HOSPITAL OUTPT CLINIC VISIT: HCPCS | Mod: ZF

## 2019-10-02 ASSESSMENT — REFRACTION_MANIFEST
OD_CYLINDER: +1.00
OS_ADD: +2.50
OD_AXIS: 175
OS_CYLINDER: +1.00
OD_SPHERE: -2.25
OS_SPHERE: -1.00
OD_ADD: +2.50
OS_AXIS: 005

## 2019-10-02 ASSESSMENT — REFRACTION_WEARINGRX
OD_CYLINDER: +0.50
OD_SPHERE: -1.00
OS_AXIS: 021
OS_ADD: +2.50
OS_SPHERE: -1.75
OD_ADD: +2.50
OS_CYLINDER: +0.75
SPECS_TYPE: BIFOCAL
OD_AXIS: 163

## 2019-10-02 ASSESSMENT — VISUAL ACUITY
OD_PH_CC+: -2
OS_PH_CC: 20/20
OS_CC+: +2
OS_CC: 20/25
METHOD: SNELLEN - LINEAR
CORRECTION_TYPE: GLASSES
OS_PH_CC+: -1
OD_PH_CC: 20/25
OD_CC: 20/30

## 2019-10-02 ASSESSMENT — CONF VISUAL FIELD
OD_NORMAL: 1
OS_NORMAL: 1
METHOD: COUNTING FINGERS

## 2019-10-02 ASSESSMENT — SLIT LAMP EXAM - LIDS
COMMENTS: 1+ BLEPHARITIS
COMMENTS: 1+ BLEPHARITIS

## 2019-10-02 ASSESSMENT — TONOMETRY
OD_IOP_MMHG: 12
OS_IOP_MMHG: 10
IOP_METHOD: TONOPEN

## 2019-10-02 ASSESSMENT — CUP TO DISC RATIO
OS_RATIO: 0.3
OD_RATIO: 0.3

## 2019-10-02 NOTE — NURSING NOTE
Chief Complaints and History of Present Illnesses   Patient presents with     Follow Up     1 year follow up Cataracts     Chief Complaint(s) and History of Present Illness(es)     Follow Up     Comments: 1 year follow up Cataracts              Comments     Pt states vision is the same as last visit. No eye pain today. No flashes or floaters.    JEANETTE Trevizo  October 2, 2019 10:10 AM

## 2019-10-04 ENCOUNTER — NURSE TRIAGE (OUTPATIENT)
Dept: NURSING | Facility: CLINIC | Age: 74
End: 2019-10-04

## 2019-10-04 ENCOUNTER — ANCILLARY PROCEDURE (OUTPATIENT)
Dept: GENERAL RADIOLOGY | Facility: CLINIC | Age: 74
End: 2019-10-04
Attending: FAMILY MEDICINE
Payer: COMMERCIAL

## 2019-10-04 ENCOUNTER — HEALTH MAINTENANCE LETTER (OUTPATIENT)
Age: 74
End: 2019-10-04

## 2019-10-04 ENCOUNTER — OFFICE VISIT (OUTPATIENT)
Dept: URGENT CARE | Facility: URGENT CARE | Age: 74
End: 2019-10-04
Payer: COMMERCIAL

## 2019-10-04 VITALS
TEMPERATURE: 97.9 F | DIASTOLIC BLOOD PRESSURE: 60 MMHG | SYSTOLIC BLOOD PRESSURE: 110 MMHG | HEART RATE: 80 BPM | WEIGHT: 173 LBS | BODY MASS INDEX: 25.62 KG/M2 | HEIGHT: 69 IN
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
## 2019-10-04 PROCEDURE — 71101 X-RAY EXAM UNILAT RIBS/CHEST: CPT | Mod: LT

## 2019-10-04 PROCEDURE — 99203 OFFICE O/P NEW LOW 30 MIN: CPT | Performed by: FAMILY MEDICINE

## 2019-10-04 ASSESSMENT — MIFFLIN-ST. JEOR: SCORE: 1505.1

## 2019-10-04 NOTE — PROGRESS NOTES
"SUBJECTIVE:  Luan is a 74 year old male who presents to urgent care with rib pain.  He fell on Wednesday crossing the street.  Landed on his left ribs on the pavement. Also hit his right wrist but that is already feeling better.  Has been having persistent pain since then which he came in today to have evaluated as a precaution - worried if he could have broken a rib.   He does not feel short of breath, is not coughing. Some pain with deep inspiration, but not much. Has no fever or chills.     Past medical history and medications were reviewed and are up to date.       OBJECTIVE:  /60   Pulse 80   Temp 97.9  F (36.6  C) (Oral)   Ht 1.753 m (5' 9\")   Wt 78.5 kg (173 lb)   BMI 25.55 kg/m     GENERAL APPEARANCE: healthy, alert and no distress  MSK: Mild palpable pain in the left ribs from T4-10. No crepitus, no areas of severe pain. No visible contusions.   Resp: clear to auscultation bilaterally, no wheeze or crackles.     DIAGNOSTICS  XR Ribs Left: on my read, there is no fracture, normal lungs. Radiology read pending.    ASSESSMENT/PLAN:  Luan was seen today for urgent care and musculoskeletal problem.    Diagnoses and all orders for this visit:    Fall, initial encounter: fall from standing onto his left wrist and left ribs. On my physical exam and with xray, it does not appear he has broken any ribs. Suspect he has rib contusions which are still healing. He was reassured, has no further concerns. Discussed concerning symptoms for which to return.   -     XR Ribs & Chest Left G/E 3 Views; Future        The plan of care was discussed with the Patient. They understand and agree with the course of treatment prescribed. A printed summary was given including instructions and medications.      Fartun Mark MD  Family Medicine  "

## 2019-10-04 NOTE — TELEPHONE ENCOUNTER
[facility]: Nurse Triage Note  SITUATION/BACKGROUND                                                      Luan Mitchell is a 74 year old male who calls with fall and hit his chest on 10/2/2019, he is concerned that he may have cracked a rib.   He complains of no other injuries or that he hit his head in fall.  He has pain with twisting torso, and discomfort when he takes a very deep breath.He is able to sleep at night.  This is different and separate from his ongoing intermittent dyspnea which has become more constant over the past year, and has been worked up with Dr. Telles with normal PFT, chest CT and cardiopulmonary exercise test( see chart note Dr Telles 8/13/19 and [facility] medical advice/test results on 9/18/19)  He is not c/o of acute SOB, increased dyspnea, fever,URI.          Allergies: No Known Allergies    ASSESSMENT       On 10/2/19, patient sustained fall, landing on side of chest with corresponding musculoskeletal pain, especially when twisting torso and upon drawing in very deep breath and when pressing on injured area. He will go to Urgent Care today for exam. Message to P[facility] also.    RECOMMENDATION/PLAN                                                      RECOMMENDED DISPOSITION:  To   for evaluation,    Will comply with recommendation: Yes    If further questions/concerns or if symptoms do not improve, worsen or new symptoms develop, call your PCP or [phone] to talk with the Resident on call, as soon as possible.    Guideline used: Fall  Telephone Triage Protocols for Nurses, Fifth Edition, Darby Baca, RN, RN    Additional Information    Negative: Breathing stopped and hasn't returned    Negative: Choking on something    Negative: SEVERE difficulty breathing (e.g., struggling for each breath, speaks in single words, pulse > 120)    Negative: Bluish (or gray) lips or face    Negative: Difficult to awaken or acting confused (e.g., disoriented,  "slurred speech)    Negative: Passed out (i.e., fainted, collapsed and was not responding)    Negative: Wheezing started suddenly after medicine, an allergic food, or bee sting    Negative: Stridor    Negative: Slow, shallow and weak breathing    Negative: Sounds like a life-threatening emergency to the triager    Negative: Wheezing can be heard across the room    Negative: Recent illness requiring prolonged bedrest (i.e., immobilization)    Negative: Major surgery in the past month    Negative: Extra heart beats OR irregular heart beating   (i.e., \"palpitations\")    Negative: MODERATE difficulty breathing (e.g., speaks in phrases, SOB even at rest, pulse 100-120) of new onset or worse than normal    MILD difficulty breathing (e.g., minimal/no SOB at rest, SOB with walking, pulse < 100) of new onset or worse than normal    Protocols used: BREATHING DIFFICULTY-A-OH      "

## 2019-10-14 ENCOUNTER — OFFICE VISIT (OUTPATIENT)
Dept: INTERNAL MEDICINE | Facility: CLINIC | Age: 74
End: 2019-10-14
Payer: COMMERCIAL

## 2019-10-14 VITALS
OXYGEN SATURATION: 96 % | BODY MASS INDEX: 26.14 KG/M2 | RESPIRATION RATE: 16 BRPM | WEIGHT: 177 LBS | DIASTOLIC BLOOD PRESSURE: 75 MMHG | SYSTOLIC BLOOD PRESSURE: 130 MMHG | HEART RATE: 76 BPM

## 2019-10-14 DIAGNOSIS — R06.00 DYSPNEA, UNSPECIFIED TYPE: Primary | ICD-10-CM

## 2019-10-14 ASSESSMENT — PAIN SCALES - GENERAL: PAINLEVEL: MILD PAIN (3)

## 2019-10-14 NOTE — NURSING NOTE
Luan Mitchell      1.  Has the patient received the information for the influenza vaccine? YES    2.  Does the patient have any of the following contraindications?     Allergy to eggs? No     Allergic reaction to previous influenza vaccines? No     Any other problems to previous influenza vaccines? No     Paralyzed by Guillain-Hamden syndrome? No     Currently pregnant? NO     Current moderate or severe illness? No     Allergy to contact lens solution? No    3.  The vaccine has been administered in the usual fashion and the patient was instructed to wait 20 minutes before leaving the building in the event of an allergic reaction: YES    Vaccination given by   Ramona Serrato LPN at 3:54 PM on 10/14/2019.    Recorded by Ramona Serrato LPN

## 2019-10-14 NOTE — PROGRESS NOTES
HPI  74-year-old returns today for reevaluation of his dyspnea.  He has been improving.  He is been doing more deep breathing exercises and diaphragmatic breathing and this appears to be helping with his symptoms.  He has had no associated cough fever chills sweats or other complaints.  He has however had a 4 to 5-week history of right shoulder pain.  This was insidious in onset no preceding injury or trauma.'s associated with painful abduction as well as pain when he lays on it at night.  Is not had any numbness tingling weakness or other complaints.  Is also questioning the need for future colonoscopies and we discussed the indication of this versus fit testing in another 5 years.  Past Medical History:   Diagnosis Date     BPH (benign prostatic hyperplasia)      Essential hypertension 2018     Hyperlipidemia LDL goal <130      Pemphigus     pemphigus foliaceus     Retinal detachment     left eye- corrected with laser     Vitreous detachment     LE     Past Surgical History:   Procedure Laterality Date     NO HISTORY OF SURGERY  14    derm     RETINOPEXY LASER PROPHYLAXIS BREAK(S) OS (LEFT EYE)       - Left eye     Family History   Problem Relation Age of Onset     Heart Disease Mother         CHF d 80     Diabetes Mother         DM2,  age 80     Hypertension Mother      Heart Disease Father         CHF d 78     Heart Disease Paternal Uncle         CHF d 50s     Cancer No family hx of         no skin cancer     Skin Cancer No family hx of         no skin cancer     Glaucoma No family hx of      Macular Degeneration No family hx of      Melanoma No family hx of      Social History     Socioeconomic History     Marital status:      Spouse name: None     Number of children: None     Years of education: None     Highest education level: None   Occupational History     None   Social Needs     Financial resource strain: None     Food insecurity:     Worry: None     Inability: None      Transportation needs:     Medical: None     Non-medical: None   Tobacco Use     Smoking status: Never Smoker     Smokeless tobacco: Never Used   Substance and Sexual Activity     Alcohol use: None     Drug use: None     Sexual activity: None   Lifestyle     Physical activity:     Days per week: None     Minutes per session: None     Stress: None   Relationships     Social connections:     Talks on phone: None     Gets together: None     Attends Zoroastrianism service: None     Active member of club or organization: None     Attends meetings of clubs or organizations: None     Relationship status: None     Intimate partner violence:     Fear of current or ex partner: None     Emotionally abused: None     Physically abused: None     Forced sexual activity: None   Other Topics Concern     Parent/sibling w/ CABG, MI or angioplasty before 65F 55M? Not Asked   Social History Narrative     None       Exam:  /75   Pulse 76   Resp 16   Wt 80.3 kg (177 lb)   SpO2 96%   BMI 26.14 kg/m    177 lbs 0 oz  The patient is alert, oriented with a clear sensorium.   Skin shows no lesions or rashes and good turgor.   Head is normocephalic and atraumatic.    Lungs are clear.   Heart shows normal S1 and S2 without murmur or gallop.    Extremities show show weakness in the supraspinatus and with external rotation in right shoulder with positive Jensen and Neer impingement signs    ASSESSMENT  1 dyspnea with negative CT pulmonary functions and cardiopulmonary stress test improved clinically  2 right rotator cuff strain  3 hyperlipidemia  4 pemphigus in remission    Plan  I gave him rotator cuff strengthening program to do have him use some Aleve or ibuprofen as needed number follow-up with pulmonary regarding the dyspnea and follow-up in February regarding his physical exam we will update his immunizations with a flu shot will get his second Shingrix through the pharmacy.    This note was completed using Dragon voice recognition  software.  Although reviewed after completion, some word and grammatical errors may occur.    Devendra Telles MD  General Internal Medicine  [facility]  [phone]

## 2019-10-14 NOTE — NURSING NOTE
Chief Complaint   Patient presents with     Results     Pt is here to follow up on labs and CT.      Pain     Pt is here to discuss pain in the right shoulder. Duration a couple months.      Ramona Castaneda LPN at 3:54 PM on 10/14/2019.

## 2019-10-14 NOTE — PATIENT INSTRUCTIONS
[facility] Medication Refill Request Information:  * Please contact your pharmacy regarding ANY request for medication refills.  ** [facility] Prescription Fax = [phone]  * Please allow 3 business days for routine medication refills.  * Please allow 5 business days for controlled substance medication refills.     [facility] Test Result notification information:  *You will be notified with in 7-10 days of your appointment day regarding the results of your test.  If you are on MyChart you will be notified as soon as the provider has reviewed the results and signed off on them.    [facility]: [phone]

## 2019-10-16 ENCOUNTER — OFFICE VISIT (OUTPATIENT)
Dept: OTOLARYNGOLOGY | Facility: CLINIC | Age: 74
End: 2019-10-16
Payer: COMMERCIAL

## 2019-10-16 DIAGNOSIS — R06.09 DYSPNEA ON EXERTION: ICD-10-CM

## 2019-10-16 DIAGNOSIS — R49.0 DYSPHONIA: Primary | ICD-10-CM

## 2019-10-16 DIAGNOSIS — J38.3 VOCAL CORD BOWING: ICD-10-CM

## 2019-10-16 NOTE — LETTER
"10/16/2019       RE: Luan Mitchell  [address] [place] [id number]     Dear Colleague,    Thank you for referring your patient, Luan Mitchell, to the [facility] VOICE at [facility]. Please see a copy of my visit note below.      [facility] VOICE CLINIC  THERAPY NOTE (CPT 52827)    Patient: Luan Mitchell  Date of Service: 10/16/2019  Referring physician: Dr. Singletary  Impressions from most recent evaluation (8/16/19):  \"IMPRESSIONS: Luan Mitchell is a 74-year-old gentleman, presenting today with R49.0 (Dysphonia), R06.09 (Dyspnea On Exertion) and vocal fold bowing \"    SUBJECTIVE:  Since his last session, Mr. Mitchell reports the following:     Was diagnosed with atelectasis (xj-fg-CBG-tuh-sis). A  complete or partial collapse of the entire lung or area (lobe) of the lung. It occurs when the tiny air sacs (alveoli) within the lung become deflated or possibly filled with alveolar fluid. Atelectasis is one of the most common breathing (respiratory) complications after surgery.    2 weeks ago, he tripped in the street and bruised his rib on the left side.     Today is a quieter day; he has had less talking.     OBJECTIVE:  Mr. Mitchell presents today with the following:  Voice quality:  VOICE:  ? Roughness: Mild to moderate  ? Breathiness: Mild to moderate  ? Strain: Mild to moderate Intermittent  ? Loudness    Conversational speech:  Mildly reduced    Projected speech:  WNL    Overall, his voice sounds more strained today.  He reports that his voice \"comes and goes\".     Pitch is around F3-D3, usually in the higher    PATIENT REPORTED MEASURES:  Patient Supplied Answers To SLP QOL Questionnaire  Therapy Quality of Life 10/15/2019   Since my l ast session, I used the speech therapy exercises and strategies as recommended by my speech pathologist. Agree   I feel that using my therapy techniques has become a habit Agree   I feel confident in my ability to manage my current and future " "symptoms. Neither agree nor disagree   Since my last session I feel my symptoms have --------. Stayed the same   Overall, since starting therapy I feel my symptoms are --------. About the same     THERAPEUTIC ACTIVITIES    Demonstrated previous exercises.  o demonstrated improved technique  o appropriate redirection provided  o instruction provided for increased level of complexity/difficulty    Exercises to promote optimal respiratory mechanics    I provided explanation of the anatomy and physiology of respiration for speech and singing; he found this to be helpful    he demonstrated clavicular/neck/shoulder involvement in inhalation    Demonstrated difficulty allowing abdominal relaxation for inhalation    Practiced in a seated, with tactile cue of a hand on the low rib-cage to facilitate awareness of low respiratory engagement.  Progressed to standing today.    With clinician support, patient was able to demonstrate improved abdominal relaxation and engagement on inhalation    Optimal exhalation using inward engagement of the abdominal wall with no corresponding collapse of the upper chest cavity was trained using the pulsed \"sh\" task    [place] a respiratory pacing exercise; this was helpful    Semi-Occluded Vocal Tract (SOVT) exercises instructed to reduce laryngeal tension, promote vocal fold pliability, and coordinate respiration and phonation    Straw phonation with water resistance was found to be most facilitating     Sustained phonation, and voice vs. voiceless productions used to promote easy voicing and raise awareness of laryngeal tension    Ascending and descending glides utilized to promote vocal fold pliability    \"Messa di voce\", gradual crescendo and decrescendo to vary medial compression was also utilized to promote vocal fold pliability.    Instructed on the benefits of using these exercises for improved coordination of breath flow with phonation and tissue mobilization.    Instructed on the " "importance of using these exercises as a warm-up / cool down,  and to re-calibrate the voice throughout the day.    Exercises in techniques for improved airflow during phonation    Speech material with /ju/ glides was facilitating at the word level.    Progressed to easy onset/ flow, and blending phrases    Instructed with a descending 5th, as well as an arpeggio pattern; this was helpful.    [place] techniques to reduce glottal ochoa and improve breath flow; negative practice improved awareness today.    Resonant Voice Therapy (RVT) exercises to promote forward locus of resonance and optimized pattern of laryngeal adduction    Easy descending glide on /m/ utilized in conjunction with relaxed jaw, tongue, and lightly closed lips to facilitate forward resonant sound    Use of the carrier phrase \"mmhmm\" instructed to promote generalization to everyday speech    Syllable level using /m/ in alternation with cardinal vowels on sustained pitches and speech inflection    Word level exercises featuring nasal continuant loaded stimuli    Able to recognize improvement in quality and comfort    [place] exercises for improved glottic closure.  o Instructed with phrases loaded with /b/; this was helpful  o Instructed with vowel initial and other velar phonemes; this was helpful today.  o Good learning, but will need practice.    Counseling and Education:    Asked many questions about the nature of his symptoms, and I answered all of these thoroughly.    A revised regimen for home practice was instructed.    I provided an after visit summary and handouts of today's therapeutic activities to facilitate practice.    ASSESSMENT/PLAN  PROGRESS TOWARD LONG TERM GOALS:   Adequate progress; too early for objective measures    IMPRESSIONS:  R49.0 (Dysphonia), R06.09 (Dyspnea On Exertion) and vocal fold bowing. Mr. Mitchell demonstrated good learning of the therapeutic activities today.    PLAN: I will see Mr. Mitchell in 2 weeks, at which point " we will continue therapy.   For practice goals see AVS.     TOTAL SERVICE TIME: 60 minutes  TREATMENT (97870): 60 minutes  NO CHARGE FACILITY FEE (24405)    Maricruz Mehta M.M. (voice), M.A., CCC/SLP  Speech-Language Pathologist  Certificate of Vocology  [facility]  [phone]  [email or web address]  Prounouns: she/her        Again, thank you for allowing me to participate in the care of your patient.      Sincerely,    Maricruz Mehta, SLP

## 2019-10-16 NOTE — PROGRESS NOTES
"  [facility] VOICE CLINIC  THERAPY NOTE (CPT 27157)    Patient: Luan Mitchell  Date of Service: 10/16/2019  Referring physician: Dr. Singletary  Impressions from most recent evaluation (8/16/19):  \"IMPRESSIONS: Luan Mitchell is a 74-year-old gentleman, presenting today with R49.0 (Dysphonia), R06.09 (Dyspnea On Exertion) and vocal fold bowing \"    SUBJECTIVE:  Since his last session, Mr. Mitchell reports the following:     Was diagnosed with atelectasis (xi-gt-HVR-tu-sis). A  complete or partial collapse of the entire lung or area (lobe) of the lung. It occurs when the tiny air sacs (alveoli) within the lung become deflated or possibly filled with alveolar fluid. Atelectasis is one of the most common breathing (respiratory) complications after surgery.    2 weeks ago, he tripped in the street and bruised his rib on the left side.     Today is a quieter day; he has had less talking.     OBJECTIVE:  Mr. Mitchell presents today with the following:  Voice quality:  VOICE:  ? Roughness: Mild to moderate  ? Breathiness: Mild to moderate  ? Strain: Mild to moderate Intermittent  ? Loudness    Conversational speech:  Mildly reduced    Projected speech:  WNL    Overall, his voice sounds more strained today.  He reports that his voice \"comes and goes\".     Pitch is around F3-D3, usually in the higher    PATIENT REPORTED MEASURES:  Patient Supplied Answers To SLP QOL Questionnaire  Therapy Quality of Life 10/15/2019   Since my l ast session, I used the speech therapy exercises and strategies as recommended by my speech pathologist. Agree   I feel that using my therapy techniques has become a habit Agree   I feel confident in my ability to manage my current and future symptoms. Neither agree nor disagree   Since my last session I feel my symptoms have --------. Stayed the same   Overall, since starting therapy I feel my symptoms are --------. About the same     THERAPEUTIC ACTIVITIES    Demonstrated previous exercises.  o demonstrated improved " "technique  o appropriate redirection provided  o instruction provided for increased level of complexity/difficulty    Exercises to promote optimal respiratory mechanics    I provided explanation of the anatomy and physiology of respiration for speech and singing; he found this to be helpful    he demonstrated clavicular/neck/shoulder involvement in inhalation    Demonstrated difficulty allowing abdominal relaxation for inhalation    Practiced in a seated, with tactile cue of a hand on the low rib-cage to facilitate awareness of low respiratory engagement.  Progressed to standing today.    With clinician support, patient was able to demonstrate improved abdominal relaxation and engagement on inhalation    Optimal exhalation using inward engagement of the abdominal wall with no corresponding collapse of the upper chest cavity was trained using the pulsed \"sh\" task    [place] a respiratory pacing exercise; this was helpful    Semi-Occluded Vocal Tract (SOVT) exercises instructed to reduce laryngeal tension, promote vocal fold pliability, and coordinate respiration and phonation    Straw phonation with water resistance was found to be most facilitating     Sustained phonation, and voice vs. voiceless productions used to promote easy voicing and raise awareness of laryngeal tension    Ascending and descending glides utilized to promote vocal fold pliability    \"Messa di voce\", gradual crescendo and decrescendo to vary medial compression was also utilized to promote vocal fold pliability.    Instructed on the benefits of using these exercises for improved coordination of breath flow with phonation and tissue mobilization.    Instructed on the importance of using these exercises as a warm-up / cool down,  and to re-calibrate the voice throughout the day.    Exercises in techniques for improved airflow during phonation    Speech material with /ju/ glides was facilitating at the word level.    Progressed to easy onset/ flow, " "and blending phrases    Instructed with a descending 5th, as well as an arpeggio pattern; this was helpful.    [place] techniques to reduce glottal ochoa and improve breath flow; negative practice improved awareness today.    Resonant Voice Therapy (RVT) exercises to promote forward locus of resonance and optimized pattern of laryngeal adduction    Easy descending glide on /m/ utilized in conjunction with relaxed jaw, tongue, and lightly closed lips to facilitate forward resonant sound    Use of the carrier phrase \"mmhmm\" instructed to promote generalization to everyday speech    Syllable level using /m/ in alternation with cardinal vowels on sustained pitches and speech inflection    Word level exercises featuring nasal continuant loaded stimuli    Able to recognize improvement in quality and comfort    [place] exercises for improved glottic closure.  o Instructed with phrases loaded with /b/; this was helpful  o Instructed with vowel initial and other velar phonemes; this was helpful today.  o Good learning, but will need practice.    Counseling and Education:    Asked many questions about the nature of his symptoms, and I answered all of these thoroughly.    A revised regimen for home practice was instructed.    I provided an after visit summary and handouts of today's therapeutic activities to facilitate practice.    ASSESSMENT/PLAN  PROGRESS TOWARD LONG TERM GOALS:   Adequate progress; too early for objective measures    IMPRESSIONS:  R49.0 (Dysphonia), R06.09 (Dyspnea On Exertion) and vocal fold bowing. Mr. Mitchell demonstrated good learning of the therapeutic activities today.    PLAN: I will see Mr. Mitchell in 2 weeks, at which point we will continue therapy.   For practice goals see AVS.     TOTAL SERVICE TIME: 60 minutes  TREATMENT (33799): 60 minutes  NO CHARGE FACILITY FEE (17094)    Maricruz Mehta M.M. (voice), M.A., CCC/SLP  Speech-Language Pathologist  Certificate of Vocology  [facility] " [facility]  [phone]  [email or web address]  Prounouns: she/her

## 2019-10-16 NOTE — PATIENT INSTRUCTIONS
"After Visit Summary    Patient: Luan Mitchell  Date of Visit: 10/16/2019    Breathing:    In the morning and evening (twice daily) for 2 minutes:   o Breathe while lying on your back with your face and knees up. Hands on tummy and chest.  Take a breath in with rounded lips and exhale with a  shhhhh , \"ssss\"   o Inhale  = Inflate; exhale = deflate  o 3x each: try breathing: 3 in / 4 out  o Throughout the day (2-3x/day for just a couple minutes) check breathing while keeping shoulders relaxed (riding to and from school, etc.)      Breathing Tips:  o Keep shoulders down and chest relaxed  o Keep breathing when you stop an activity  o Thera-band use during practices  o Match your breathing rate with the rate of your exertion  o Try hands behind the back (or, elbows on the knees while seated) to help find a low breath     Voice (2-3x/day unless otherwise noted):    Semi-occluded vocal tract exercise:   o Bubbles (straw in 1 to 1.5  of water) 3x/day for 1-2 min:  o 3x: blow 10-15 seconds with no voice and keep bubbles consistent.  o 3x: blow bubbles and add a sustained  who  or an  oo  (comfortabl pitch - Gb3 below middle C )  o 3x: blow bubbles and vary  who  gliding up and down             Up and down like a sine wave  These exercises are great for:    *warm up / cool down - Part of the morning routing and before and after extended voice use.    *tissue mobilization exercise - Improving the condition and pliability of the vocal folds.    *Abdominal breathing and applying optimal breath flow to speech/singing.        u  words (2-3 x per day)  o 5 words   o Breathe first and add a  yawn & sigh  shape to sound     Spacious speech phrases  (2-3x per day)  o Second column - H+ vowel combinations   o First column through Hello-hello-hello  o Alternate all practice with your bubbles.      m+ vowels     m  word  o hold onto the  n  at the beginning of the word  o 5-1    B Sentences  o Keep it slow and breathe after each " phrase    Maricruz Mehta M.M. (voice), M.A., CCC/SLP  Speech-Language Pathologist  Certificate of Vocology  [facility]  [phone]  [email or web address]  Prounouns: she/her

## 2019-10-17 DIAGNOSIS — R06.00 DYSPNEA, UNSPECIFIED TYPE: Primary | ICD-10-CM

## 2019-10-22 LAB
DLCOUNC-%PRED-PRE: 110 %
DLCOUNC-PRE: 26.79 ML/MIN/MMHG
DLCOUNC-PRED: 24.22 ML/MIN/MMHG
ERV-%PRED-PRE: 66 %
ERV-PRE: 0.66 L
ERV-PRED: 0.99 L
EXPTIME-PRE: 11.2 SEC
FEF2575-%PRED-PRE: 84 %
FEF2575-PRE: 1.8 L/SEC
FEF2575-PRED: 2.13 L/SEC
FEFMAX-%PRED-PRE: 129 %
FEFMAX-PRE: 9.69 L/SEC
FEFMAX-PRED: 7.49 L/SEC
FEV1-%PRED-PRE: 103 %
FEV1-PRE: 3.02 L
FEV1FEV6-PRE: 72 %
FEV1FEV6-PRED: 77 %
FEV1FVC-PRE: 70 %
FEV1FVC-PRED: 75 %
FEV1SVC-PRE: 69 %
FEV1SVC-PRED: 65 %
FIFMAX-PRE: 5.25 L/SEC
FVC-%PRED-PRE: 111 %
FVC-PRE: 4.33 L
FVC-PRED: 3.89 L
IC-%PRED-PRE: 107 %
IC-PRE: 3.73 L
IC-PRED: 3.46 L
VA-%PRED-PRE: 102 %
VA-PRE: 6.29 L
VC-%PRED-PRE: 98 %
VC-PRE: 4.38 L
VC-PRED: 4.45 L

## 2019-12-02 ENCOUNTER — OFFICE VISIT (OUTPATIENT)
Dept: FAMILY MEDICINE | Facility: CLINIC | Age: 74
End: 2019-12-02
Payer: COMMERCIAL

## 2019-12-02 VITALS
SYSTOLIC BLOOD PRESSURE: 138 MMHG | DIASTOLIC BLOOD PRESSURE: 77 MMHG | WEIGHT: 177 LBS | BODY MASS INDEX: 26.22 KG/M2 | HEART RATE: 89 BPM | OXYGEN SATURATION: 99 % | HEIGHT: 69 IN

## 2019-12-02 DIAGNOSIS — I10 ESSENTIAL HYPERTENSION: Primary | ICD-10-CM

## 2019-12-02 DIAGNOSIS — F41.1 GENERALIZED ANXIETY DISORDER: ICD-10-CM

## 2019-12-02 ASSESSMENT — ANXIETY QUESTIONNAIRES
5. BEING SO RESTLESS THAT IT IS HARD TO SIT STILL: NOT AT ALL
4. TROUBLE RELAXING: SEVERAL DAYS
7. FEELING AFRAID AS IF SOMETHING AWFUL MIGHT HAPPEN: SEVERAL DAYS
GAD7 TOTAL SCORE: 6
2. NOT BEING ABLE TO STOP OR CONTROL WORRYING: SEVERAL DAYS
GAD7 TOTAL SCORE: 6
1. FEELING NERVOUS, ANXIOUS, OR ON EDGE: SEVERAL DAYS
6. BECOMING EASILY ANNOYED OR IRRITABLE: SEVERAL DAYS
7. FEELING AFRAID AS IF SOMETHING AWFUL MIGHT HAPPEN: SEVERAL DAYS
3. WORRYING TOO MUCH ABOUT DIFFERENT THINGS: SEVERAL DAYS

## 2019-12-02 ASSESSMENT — PAIN SCALES - GENERAL: PAINLEVEL: NO PAIN (0)

## 2019-12-02 ASSESSMENT — MIFFLIN-ST. JEOR: SCORE: 1523.25

## 2019-12-02 NOTE — PROGRESS NOTES
"       HPI       Luan Mitchell is a 74 year old man who presents for hypertension. He has been checking his blood pressure at home and has been seeing elevated readings (150-160/80-90). In 2008, he was taking hydrochlorothiazide briefly. He went off for a while. In Feb 2018, it was re-prescribed. He is not currently taking this. He had an Echo in October 2018 that was normal. This past summer, he had a stress test and pulmonary function test along with chest ct which all looked normal.   Chief Complaint   Patient presents with     Hypertension     Pt is here to discuss hypertension.      Problem, Medication and Allergy Lists were reviewed and updated if needed.    Patient is an established patient of this clinic.         Review of Systems:   Review of Systems     Constitutional:  Negative for fever, chills and fatigue.               Physical Exam:     Vitals:    12/02/19 1632   BP: 138/77   Pulse: 89   SpO2: 99%   Weight: 80.3 kg (177 lb)   Height: 1.753 m (5' 9\")     Body mass index is 26.14 kg/m .  Vitals were reviewed and were normal.     Physical Exam  Constitutional:       Appearance: Normal appearance.   HENT:      Head: Normocephalic.   Cardiovascular:      Rate and Rhythm: Normal rate and regular rhythm.      Pulses: Normal pulses.      Heart sounds: Normal heart sounds.   Pulmonary:      Effort: Pulmonary effort is normal.      Breath sounds: Normal breath sounds.   Musculoskeletal: Normal range of motion.   Skin:     General: Skin is warm and dry.   Neurological:      General: No focal deficit present.      Mental Status: He is alert and oriented to person, place, and time.   Psychiatric:         Mood and Affect: Mood normal.         Behavior: Behavior normal.         Results:     No diagnostics ordered today.     Assessment and Plan   1. Hypertension  2. General anxiety    There are no discontinued medications.  First, drink 50 oz of water daily. Next, please continue to exercise at least 4 times per " week. Next, check blood pressure 3-4 times in the next 2 days at alternating times and then send me a note with the values and I will call you to discuss the results and next steps. Thank you. Options for treatment and follow-up care were reviewed with the patient. Luan Mitchell  engaged in the decision making process and verbalized understanding of the options discussed and agreed with the final plan.  Flory Fish, LILY, CNP

## 2019-12-02 NOTE — NURSING NOTE
"74 year old  Chief Complaint   Patient presents with     Hypertension     Pt is here to discuss hypertension.        Blood pressure 138/77, pulse 89, height 1.753 m (5' 9\"), weight 80.3 kg (177 lb), SpO2 99 %. Body mass index is 26.14 kg/m .  BP completed using cuff size:      LUTHER Moreno  December 2, 2019 4:35 PM  "

## 2019-12-02 NOTE — PATIENT INSTRUCTIONS
First, drink 50 oz of water daily. Next, please continue to exercise at least 4 times per week. Next, check blood pressure 3-4 times in the next 2 days at alternating times and then send me a note with the values and I will call you to discuss the results and next steps. Thank you.   [facility] Medication Refill Request Information:  * Please contact your pharmacy regarding ANY request for medication refills.  ** [facility] Clinic Prescription Fax = [phone]  * Please allow 3 business days for routine medication refills.  * Please allow 5 business days for controlled substance medication refills.     [facility] Test Result notification information:  *You will be notified with in 7-10 days of your appointment day regarding the results of your test.  If you are on MyChart you will be notified as soon as the provider has reviewed the results and signed off on them.    [facility]: [phone]

## 2019-12-03 ASSESSMENT — ANXIETY QUESTIONNAIRES: GAD7 TOTAL SCORE: 6

## 2019-12-04 ENCOUNTER — OFFICE VISIT (OUTPATIENT)
Dept: DERMATOLOGY | Facility: CLINIC | Age: 74
End: 2019-12-04
Payer: COMMERCIAL

## 2019-12-04 DIAGNOSIS — L10.2 PEMPHIGUS FOLIACEOUS (H): Primary | ICD-10-CM

## 2019-12-04 ASSESSMENT — PAIN SCALES - GENERAL: PAINLEVEL: NO PAIN (0)

## 2019-12-04 ASSESSMENT — ENCOUNTER SYMPTOMS
FEVER: 0
CHILLS: 0
FATIGUE: 0

## 2019-12-04 NOTE — LETTER
2019       RE: Luan Mitchell  [address] [facility] [address]     Dear Colleague,    Thank you for referring your patient, Luan Mitchell, to the [facility] DERMATOLOGY at [facility]. Please see a copy of my visit note below.    [facility] Dermatology Note      Dermatology Problem List:  1.  Pemphigus foliaceus.     - Goals of treatment per Mr. Mitchell:  Prevent itching and secondary infections.   - He is not interested in complete clearance at this time.     - Most recent titers 3/14/19              - Cell surface IgG 1:1280- monkey esophagus; 1:160- intact human skin.               - DSG-1 Ig units (positive greater than 20, negative less than 14).               - DSG-3 Ig units (positive greater than 20, negative less than 9).   - Stopped MMF 2019     2.  History of seborrheic keratoses.      3.  Seborrheic dermatitis, on ketaconazole shampoo and cream.      4. DN, Mild. Right neck. Bx 3/22/18.     Encounter Date: Dec 4, 2019    CC:  No chief complaint on file.      History of Present Illness:  Mr. Luan Mitchell is a 74 year old male who presents as a follow-up for pemphigus foliaceus. The patient was last seen 19 for routine follow up with good control of his symptoms and he was maintained on his current regimen.     Today he reports good symptom control with use of clobetasol on scalp daily. He is using his other topicals only as needed, usually only every 2-3 days on new/developing lesions on his trunk. He has no other questions and feels otherwise well with only mild itching of his scalp and beard.    Treatments:  - Clobetasol ointment 0.05% twice daily as needed to the worst, most symptomatic, recalcitrant areas on the trunk and extremities. Using every few days for developing lesions  - Clobetasol 0.05% solution to the scalp up to once nightly as needed. Could use for 1-2 weeks in the beard for bad days.   - Tacrolimus  0.01% ointment daily to the face. Recommended increasing to BID use over the symptomatic jawline.   - Triamcinolone 0.1% cream twice daily as a primary treatment for the trunk and extremities. Only using PRN  - Ketoconazole 2% shampoo 3 times weekly.  Okay to use as a face wash.   - Ketoconazole 2% cream to be applied to the bilateral alar grooves and lateral nasolabial folds in addition to the Protopic as needed.   - 3 times weekly dilute bleach baths as tolerated.     Past Medical History:   Patient Active Problem List   Diagnosis     Pemphigus foliaceus     Hyperlipidemia LDL goal <130     BPH (benign prostatic hyperplasia)     Encounter for long-term (current) use of high-risk medication     Dermatitis, seborrheic     Lightheadedness     Age-related nuclear cataract of both eyes     Past Medical History:   Diagnosis Date     BPH (benign prostatic hyperplasia)      Essential hypertension 2018     Hyperlipidemia LDL goal <130      Pemphigus     pemphigus foliaceus     Retinal detachment     left eye- corrected with laser     Vitreous detachment     LE     Past Surgical History:   Procedure Laterality Date     NO HISTORY OF SURGERY  14    derm     RETINOPEXY LASER PROPHYLAXIS BREAK(S) OS (LEFT EYE)      2009 - Left eye       Social History:  Patient reports that he has never smoked. He has never used smokeless tobacco.    Family History:  Family History   Problem Relation Age of Onset     Heart Disease Mother         CHF d 80     Diabetes Mother         DM2,  age 80     Hypertension Mother      Heart Disease Father         CHF d 78     Heart Disease Paternal Uncle         CHF d 50s     Cancer No family hx of         no skin cancer     Skin Cancer No family hx of         no skin cancer     Glaucoma No family hx of      Macular Degeneration No family hx of      Melanoma No family hx of        Medications:  Current Outpatient Medications   Medication Sig Dispense Refill     ARTIFICIAL TEAR SOLUTION  OP Apply  to eye.       Cholecalciferol (VITAMIN D PO) Take 1 tablet by mouth daily 1000 mg       clobetasol (TEMOVATE) 0.05 % external solution Apply  topically 2 times daily as needed to the scalp. 200 mL 11     clobetasol (TEMOVATE) 0.05 % ointment APPLY TO AFFECTED AREA(S) TWO TIMES A  g 2     clobetasol propionate 0.05 % SHAM Apply sparingly to dry scalp 3 x weekly as needed.  Leave in place for 15 m then add water, lather and rinse 1 Bottle 5     COMPOUNDED NON-CONTROLLED SUBSTANCE (CMPD RX) - PHARMACY TO MIX COMPOUNDED MEDICATION 0.2 cc of 1:10 dilution Penicillin G or K (initial concentration 500,000 units)   0.1 cc  of 1:100 dilution PCN  0.2 cc pre-pen  500 mg Amoxacillin for PO 1 Box 0     desonide (DESOWEN) 0.05 % ointment Apply topically to affected areas twice daily as instructed. 180 g 3     finasteride (PROSCAR) 5 MG tablet Take 1 tablet (5 mg) by mouth daily 90 tablet 4     finasteride (PROSCAR) 5 MG tablet Take 1 tablet (5 mg) by mouth daily 90 tablet 4     hydrocortisone butyrate (LOCOID) 0.1 % SOLN Apply sparingly to affected area(s) of beard twice daily 180 mL 3     ketoconazole (NIZORAL) 2 % cream Apply twice daily to the nose as needed for white/scaling. 60 g 2     ketoconazole (NIZORAL) 2 % shampoo Three times per week in the shower: Lather into scalp, leave in place for 3-5 minutes, then rinse. 360 mL 12     lovastatin (MEVACOR) 20 MG tablet TAKE 1 TABLET AT BEDTIME 90 tablet 3     Multiple Vitamin (MULTIVITAMIN) per tablet Take 1 tablet by mouth daily. 1 tab daily       tacrolimus (PROTOPIC) 0.1 % ointment Apply topically to affected areas as instructed. 100 g 11     triamcinolone (KENALOG) 0.1 % external cream Apply topically to affected areas as instructed. 454 g 11     triamcinolone (KENALOG) 0.1 % ointment Apply topically 2 times daily 454 g 11     No Known Allergies      Review of Systems:  -As per HPI  -Constitutional: Otherwise feeling well today, in usual state of  health.  -HEENT: Patient denies nonhealing oral sores.  -Skin: As above in HPI. No additional skin concerns.    Physical exam:  Vitals: There were no vitals taken for this visit.  GEN: This is a well developed, well-nourished male in no acute distress, in a pleasant mood.    SKIN: Total skin excluding the undergarment areas was performed. The exam included the head/face, neck, both arms, chest, back, abdomen, both legs, digits and/or nails.   -Dacosta skin type: II  -Multiple healing ovoid lesions over the chest, back, and abdomen at various stages of healing, some with associated crust  -Lesion in navel with some maceration and moist skin with surrounding crust  -Multiple scaly pink lesions of scalp and beard  -No other lesions of concern on areas examined.     Impression/Plan:  1. Pemphigus foliaceus. Continued maintenance of minimal symptoms on trunk, scalp, and beard with use of topicals only as needed to new lesions. Notably no further lesions on extremities. He is comfortable with maintenance visits every 6 months but will message via [employer] if symptoms worsen suddenly.  - Continue Clobetasol ointment 0.05% twice daily as needed to the worst areas on the trunk and extremities.  - Continue Clobetasol 0.05% solution to the scalp up to once nightly as needed. Can use on beard as needed for short course.  -  Continue Tacrolimus 0.01% ointment daily to the face as needed.   - Continue Triamcinolone 0.1% cream twice daily as a primary treatment for the trunk and extremities as needed.  - Continue Ketoconazole 2% shampoo 3 times weekly.  Okay to use as a face wash.   - Continue Ketoconazole 2% cream to be applied to the bilateral alar grooves and lateral nasolabial folds in addition to the Protopic as needed.   - Continue 3 times weekly dilute bleach baths as tolerated.     CC Referred Self, MD  No address on file on close of this encounter.  Follow-up in 6 months, earlier for new or changing lesions.     Staff  Involved:  I, Romain Vizcarra, saw and examined the patient in the presence of Dr. Odell.     I was present with the medical student who participated in the service and in the documentation of the note. I have verified the history and personally performed the physical exam and medical decision making. I agree with the assessment and plan of care as documented in the note.    Serina Odell MD

## 2019-12-04 NOTE — NURSING NOTE
Dermatology Rooming Note    Luan Mitchell's goals for this visit include:   Chief Complaint   Patient presents with     Derm Problem     pemphigus f/u - Luan states that it has stayed the same     Comfort Lucia, EMT

## 2019-12-04 NOTE — PROGRESS NOTES
[facility] Dermatology Note      Dermatology Problem List:  1.  Pemphigus foliaceus.     - Goals of treatment per Mr. Mitchell:  Prevent itching and secondary infections.   - He is not interested in complete clearance at this time.     - Most recent titers 3/14/19              - Cell surface IgG 1:1280- monkey esophagus; 1:160- intact human skin.               - DSG-1 Ig units (positive greater than 20, negative less than 14).               - DSG-3 Ig units (positive greater than 20, negative less than 9).   - Stopped MMF 2019     2.  History of seborrheic keratoses.      3.  Seborrheic dermatitis, on ketaconazole shampoo and cream.      4. DN, Mild. Right neck. Bx 3/22/18.     Encounter Date: Dec 4, 2019    CC:  No chief complaint on file.      History of Present Illness:  Mr. Luan Mitchell is a 74 year old male who presents as a follow-up for pemphigus foliaceus. The patient was last seen 19 for routine follow up with good control of his symptoms and he was maintained on his current regimen.     Today he reports good symptom control with use of clobetasol on scalp daily. He is using his other topicals only as needed, usually only every 2-3 days on new/developing lesions on his trunk. He has no other questions and feels otherwise well with only mild itching of his scalp and beard.    Treatments:  - Clobetasol ointment 0.05% twice daily as needed to the worst, most symptomatic, recalcitrant areas on the trunk and extremities. Using every few days for developing lesions  - Clobetasol 0.05% solution to the scalp up to once nightly as needed. Could use for 1-2 weeks in the beard for bad days.   - Tacrolimus 0.01% ointment daily to the face. Recommended increasing to BID use over the symptomatic jawline.   - Triamcinolone 0.1% cream twice daily as a primary treatment for the trunk and extremities. Only using PRN  - Ketoconazole 2% shampoo 3 times weekly.  Okay to use as a face wash.    - Ketoconazole 2% cream to be applied to the bilateral alar grooves and lateral nasolabial folds in addition to the Protopic as needed.   - 3 times weekly dilute bleach baths as tolerated.     Past Medical History:   Patient Active Problem List   Diagnosis     Pemphigus foliaceus     Hyperlipidemia LDL goal <130     BPH (benign prostatic hyperplasia)     Encounter for long-term (current) use of high-risk medication     Dermatitis, seborrheic     Lightheadedness     Age-related nuclear cataract of both eyes     Past Medical History:   Diagnosis Date     BPH (benign prostatic hyperplasia)      Essential hypertension 2018     Hyperlipidemia LDL goal <130      Pemphigus     pemphigus foliaceus     Retinal detachment     left eye- corrected with laser     Vitreous detachment     LE     Past Surgical History:   Procedure Laterality Date     NO HISTORY OF SURGERY  14    derm     RETINOPEXY LASER PROPHYLAXIS BREAK(S) OS (LEFT EYE)       - Left eye       Social History:  Patient reports that he has never smoked. He has never used smokeless tobacco.    Family History:  Family History   Problem Relation Age of Onset     Heart Disease Mother         CHF d 80     Diabetes Mother         DM2,  age 80     Hypertension Mother      Heart Disease Father         CHF d 78     Heart Disease Paternal Uncle         CHF d 50s     Cancer No family hx of         no skin cancer     Skin Cancer No family hx of         no skin cancer     Glaucoma No family hx of      Macular Degeneration No family hx of      Melanoma No family hx of        Medications:  Current Outpatient Medications   Medication Sig Dispense Refill     ARTIFICIAL TEAR SOLUTION OP Apply  to eye.       Cholecalciferol (VITAMIN D PO) Take 1 tablet by mouth daily 1000 mg       clobetasol (TEMOVATE) 0.05 % external solution Apply  topically 2 times daily as needed to the scalp. 200 mL 11     clobetasol (TEMOVATE) 0.05 % ointment APPLY TO AFFECTED AREA(S)  TWO TIMES A  g 2     clobetasol propionate 0.05 % SHAM Apply sparingly to dry scalp 3 x weekly as needed.  Leave in place for 15 m then add water, lather and rinse 1 Bottle 5     COMPOUNDED NON-CONTROLLED SUBSTANCE (CMPD RX) - PHARMACY TO MIX COMPOUNDED MEDICATION 0.2 cc of 1:10 dilution Penicillin G or K (initial concentration 500,000 units)   0.1 cc  of 1:100 dilution PCN  0.2 cc pre-pen  500 mg Amoxacillin for PO 1 Box 0     desonide (DESOWEN) 0.05 % ointment Apply topically to affected areas twice daily as instructed. 180 g 3     finasteride (PROSCAR) 5 MG tablet Take 1 tablet (5 mg) by mouth daily 90 tablet 4     finasteride (PROSCAR) 5 MG tablet Take 1 tablet (5 mg) by mouth daily 90 tablet 4     hydrocortisone butyrate (LOCOID) 0.1 % SOLN Apply sparingly to affected area(s) of beard twice daily 180 mL 3     ketoconazole (NIZORAL) 2 % cream Apply twice daily to the nose as needed for white/scaling. 60 g 2     ketoconazole (NIZORAL) 2 % shampoo Three times per week in the shower: Lather into scalp, leave in place for 3-5 minutes, then rinse. 360 mL 12     lovastatin (MEVACOR) 20 MG tablet TAKE 1 TABLET AT BEDTIME 90 tablet 3     Multiple Vitamin (MULTIVITAMIN) per tablet Take 1 tablet by mouth daily. 1 tab daily       tacrolimus (PROTOPIC) 0.1 % ointment Apply topically to affected areas as instructed. 100 g 11     triamcinolone (KENALOG) 0.1 % external cream Apply topically to affected areas as instructed. 454 g 11     triamcinolone (KENALOG) 0.1 % ointment Apply topically 2 times daily 454 g 11     No Known Allergies      Review of Systems:  -As per HPI  -Constitutional: Otherwise feeling well today, in usual state of health.  -HEENT: Patient denies nonhealing oral sores.  -Skin: As above in HPI. No additional skin concerns.    Physical exam:  Vitals: There were no vitals taken for this visit.  GEN: This is a well developed, well-nourished male in no acute distress, in a pleasant mood.    SKIN: Total  skin excluding the undergarment areas was performed. The exam included the head/face, neck, both arms, chest, back, abdomen, both legs, digits and/or nails.   -Dacosta skin type: II  -Multiple healing ovoid lesions over the chest, back, and abdomen at various stages of healing, some with associated crust  -Lesion in navel with some maceration and moist skin with surrounding crust  -Multiple scaly pink lesions of scalp and beard  -No other lesions of concern on areas examined.     Impression/Plan:  1. Pemphigus foliaceus. Continued maintenance of minimal symptoms on trunk, scalp, and beard with use of topicals only as needed to new lesions. Notably no further lesions on extremities. He is comfortable with maintenance visits every 6 months but will message via [employer] if symptoms worsen suddenly.  - Continue Clobetasol ointment 0.05% twice daily as needed to the worst areas on the trunk and extremities.  - Continue Clobetasol 0.05% solution to the scalp up to once nightly as needed. Can use on beard as needed for short course.  -  Continue Tacrolimus 0.01% ointment daily to the face as needed.   - Continue Triamcinolone 0.1% cream twice daily as a primary treatment for the trunk and extremities as needed.  - Continue Ketoconazole 2% shampoo 3 times weekly.  Okay to use as a face wash.   - Continue Ketoconazole 2% cream to be applied to the bilateral alar grooves and lateral nasolabial folds in addition to the Protopic as needed.   - Continue 3 times weekly dilute bleach baths as tolerated.     CC Referred Self, MD  No address on file on close of this encounter.  Follow-up in 6 months, earlier for new or changing lesions.     Staff Involved:  I, Romain Vizcarra, saw and examined the patient in the presence of Dr. Odell.     I was present with the medical student who participated in the service and in the documentation of the note. I have verified the history and personally performed the physical exam and medical  decision making. I agree with the assessment and plan of care as documented in the note.  Serina Odell MD

## 2019-12-12 ENCOUNTER — OFFICE VISIT (OUTPATIENT)
Dept: FAMILY MEDICINE | Facility: CLINIC | Age: 74
End: 2019-12-12
Payer: COMMERCIAL

## 2019-12-12 VITALS
WEIGHT: 176.2 LBS | DIASTOLIC BLOOD PRESSURE: 67 MMHG | OXYGEN SATURATION: 98 % | BODY MASS INDEX: 26.02 KG/M2 | SYSTOLIC BLOOD PRESSURE: 122 MMHG | HEART RATE: 82 BPM

## 2019-12-12 DIAGNOSIS — I10 ESSENTIAL HYPERTENSION: Primary | ICD-10-CM

## 2019-12-12 RX ORDER — HYDROCHLOROTHIAZIDE 12.5 MG/1
12.5 TABLET ORAL DAILY
Qty: 30 TABLET | Refills: 0 | Status: SHIPPED | OUTPATIENT
Start: 2019-12-12 | End: 2020-01-02

## 2019-12-12 ASSESSMENT — ENCOUNTER SYMPTOMS
NERVOUS/ANXIOUS: 1
HYPERTENSION: 1
LIGHT-HEADEDNESS: 0
DYSURIA: 0
SHORTNESS OF BREATH: 1
FLANK PAIN: 0
COUGH: 0
DOUBLE VISION: 0

## 2019-12-12 ASSESSMENT — PAIN SCALES - GENERAL: PAINLEVEL: NO PAIN (0)

## 2019-12-12 NOTE — PATIENT INSTRUCTIONS
First, please start the Hydrochloothiazide daily. If your top blood pressure number is less than 100 or if you feel dizzy or lightheaded, stop the Hydrochlorothiazide. Next, continue with 5 healthy habits as discussed. Next, please return in 2 weeks for follow up. Please bring your blood pressure cuff with you to your next appt. Thank you.   [facility] Medication Refill Request Information:  * Please contact your pharmacy regarding ANY request for medication refills.  ** [facility] Prescription Fax = [phone]  * Please allow 3 business days for routine medication refills.  * Please allow 5 business days for controlled substance medication refills.     [facility] Test Result notification information:  *You will be notified with in 7-10 days of your appointment day regarding the results of your test.  If you are on MyChart you will be notified as soon as the provider has reviewed the results and signed off on them.    [facility]: [phone]

## 2019-12-12 NOTE — PROGRESS NOTES
HPI       Luan Mitchell is a 74 year old man who presents for follow up on his blood pressure. Luan has been checking his blood pressure at home, they have averaged [id number]. Luan knows that he has some anxiety over his blood pressure, however he also feels that the small dose of hydrochlorothiazide he has been on in the past would help manage his blood pressure.   Chief Complaint   Patient presents with     Hypertension     Pt is here to follow up on hypertension.      Hypertension Follow-up      Outpatient blood pressures are being checked at home.  Results are SBP high 130s - low 160s, average is in the 140-150 range.  DBP range 70-90    Chest Pain? :No     Low Salt Diet: Doesn't add any salt at home and purchases low salt options, however does eat out somewhat regularly     Daily NSAID Use?No     Did patient take their HTN pills today/last night as usual?  N/A patient does not take a hypertensive    Last Basic Metabolic Panel:  Lab Results   Component Value Date     06/24/2019      Lab Results   Component Value Date    POTASSIUM 4.0 06/24/2019     Lab Results   Component Value Date    CHLORIDE 109 06/24/2019     Lab Results   Component Value Date    JOSHUA 8.9 06/24/2019     Lab Results   Component Value Date    CO2 25 06/24/2019     Lab Results   Component Value Date    BUN 15 06/24/2019     Lab Results   Component Value Date    CR 0.80 06/24/2019     Lab Results   Component Value Date    GLC 99 06/24/2019       Adherence and Exercise  Medication side effects: not applicable  How often is a medication missed? N/A  Exercise:biking 4-5 days/week for an average of 30-45 minutes     Problem, Medication and Allergy Lists were   reviewed and updated if needed.     Patient Active Problem List    Diagnosis Date Noted     Age-related nuclear cataract of both eyes 08/21/2017     Priority: Medium     Lightheadedness 07/27/2015     Priority: Medium     Dermatitis, seborrheic 11/05/2013     Priority: Medium      Encounter for long-term (current) use of high-risk medication 07/24/2013     Priority: Medium     Hyperlipidemia LDL goal <130      Priority: Medium     BPH (benign prostatic hyperplasia)      Priority: Medium     Pemphigus foliaceus 08/15/2011     Priority: Medium         Current Outpatient Medications   Medication Sig Dispense Refill     ARTIFICIAL TEAR SOLUTION OP Apply  to eye.       Cholecalciferol (VITAMIN D PO) Take 1 tablet by mouth daily 1000 mg       clobetasol (TEMOVATE) 0.05 % external solution Apply  topically 2 times daily as needed to the scalp. 200 mL 11     clobetasol (TEMOVATE) 0.05 % ointment APPLY TO AFFECTED AREA(S) TWO TIMES A  g 2     clobetasol propionate 0.05 % SHAM Apply sparingly to dry scalp 3 x weekly as needed.  Leave in place for 15 m then add water, lather and rinse 1 Bottle 5     desonide (DESOWEN) 0.05 % ointment Apply topically to affected areas twice daily as instructed. 180 g 3     finasteride (PROSCAR) 5 MG tablet Take 1 tablet (5 mg) by mouth daily 90 tablet 4     finasteride (PROSCAR) 5 MG tablet Take 1 tablet (5 mg) by mouth daily 90 tablet 4     hydrocortisone butyrate (LOCOID) 0.1 % SOLN Apply sparingly to affected area(s) of beard twice daily 180 mL 3     ketoconazole (NIZORAL) 2 % cream Apply twice daily to the nose as needed for white/scaling. 60 g 2     ketoconazole (NIZORAL) 2 % shampoo Three times per week in the shower: Lather into scalp, leave in place for 3-5 minutes, then rinse. 360 mL 12     lovastatin (MEVACOR) 20 MG tablet TAKE 1 TABLET AT BEDTIME 90 tablet 3     Multiple Vitamin (MULTIVITAMIN) per tablet Take 1 tablet by mouth daily. 1 tab daily       tacrolimus (PROTOPIC) 0.1 % ointment Apply topically to affected areas as instructed. 100 g 11     triamcinolone (KENALOG) 0.1 % external cream Apply topically to affected areas as instructed. 454 g 11     triamcinolone (KENALOG) 0.1 % ointment Apply topically 2 times daily 454 g 11       No Known  Allergies.    Patient is an established patient of this clinic.         Review of Systems:   Review of Systems     Eyes:  Negative for double vision.   Respiratory:   Positive for shortness of breath. Negative for cough.    Cardiovascular:  Positive for hypertension. Negative for chest pain, light-headedness and edema.   Genitourinary:  Negative for dysuria and flank pain.   Neurological:  Negative for light-headedness.               Physical Exam:     Vitals:    12/12/19 1410   BP: 122/67   Pulse: 82   SpO2: 98%   Weight: 79.9 kg (176 lb 3.2 oz)     Body mass index is 26.02 kg/m .  Vitals were reviewed and were normal.     Physical Exam  Constitutional:       Appearance: Normal appearance.   Eyes:      Extraocular Movements: Extraocular movements intact.      Pupils: Pupils are equal, round, and reactive to light.   Cardiovascular:      Rate and Rhythm: Normal rate and regular rhythm.      Pulses: Normal pulses.      Heart sounds: Normal heart sounds.   Pulmonary:      Effort: Prolonged expiration present.      Breath sounds: Normal breath sounds.   Musculoskeletal:         General: No edema.   Neurological:      General: No focal deficit present.      Mental Status: He is alert and oriented to person, place, and time.           Results:     No diagnostics ordered today.     Assessment and Plan     Luan was seen today for hypertension.    Diagnoses and all orders for this visit:    Essential hypertension  -     hydrochlorothiazide (HYDRODIURIL) 12.5 MG tablet; Take 1 tablet (12.5 mg) by mouth daily      There are no discontinued medications.  Total time spent 25 minutes.  More than 50% of the time spent with patient on counseling / coordinating his care.First, please start the Hydrochloothiazide daily. If your top blood pressure number is less than 100 or if you feel dizzy or lightheaded, stop the Hydrochlorothiazide. Next, continue with 5 healthy habits as discussed. Next, please return in 2 weeks for follow up.  Please bring your blood pressure cuff with you to your next appt. Thank you. Options for treatment and follow-up care were reviewed with the patient. Luan Mitchell  engaged in the decision making process and verbalized understanding of the options discussed and agreed with the final plan.  Portia Manuel, DNP/FNP Student  I was present with the NP student who participated in the service and in the documentation of the services provided. I have verified the history and personally performed the physical exam and medical decision making, as documented by the student and edited by me.  Flory Fish, LILY, CNP

## 2019-12-13 ASSESSMENT — ENCOUNTER SYMPTOMS
SYNCOPE: 0
LEG PAIN: 0
ORTHOPNEA: 0
COUGH: 0
HEMOPTYSIS: 0
DECREASED CONCENTRATION: 0
SLEEP DISTURBANCES DUE TO BREATHING: 0
PANIC: 0
LIGHT-HEADEDNESS: 0
SHORTNESS OF BREATH: 1
DEPRESSION: 0
PALPITATIONS: 0
POSTURAL DYSPNEA: 0
HYPOTENSION: 0
INSOMNIA: 0
NERVOUS/ANXIOUS: 1
HYPERTENSION: 1
DYSPNEA ON EXERTION: 0
SPUTUM PRODUCTION: 0
EXERCISE INTOLERANCE: 0
COUGH DISTURBING SLEEP: 0
SNORES LOUDLY: 0
WHEEZING: 0

## 2019-12-16 ENCOUNTER — OFFICE VISIT (OUTPATIENT)
Dept: PULMONOLOGY | Facility: CLINIC | Age: 74
End: 2019-12-16
Attending: INTERNAL MEDICINE
Payer: COMMERCIAL

## 2019-12-16 VITALS
HEART RATE: 86 BPM | SYSTOLIC BLOOD PRESSURE: 129 MMHG | BODY MASS INDEX: 26.07 KG/M2 | DIASTOLIC BLOOD PRESSURE: 82 MMHG | WEIGHT: 176 LBS | HEIGHT: 69 IN | OXYGEN SATURATION: 98 % | RESPIRATION RATE: 18 BRPM

## 2019-12-16 DIAGNOSIS — J98.11 ATELECTASIS: Primary | ICD-10-CM

## 2019-12-16 PROCEDURE — G0463 HOSPITAL OUTPT CLINIC VISIT: HCPCS | Mod: ZF

## 2019-12-16 ASSESSMENT — PAIN SCALES - GENERAL: PAINLEVEL: NO PAIN (0)

## 2019-12-16 ASSESSMENT — MIFFLIN-ST. JEOR: SCORE: 1518.71

## 2019-12-16 NOTE — PROGRESS NOTES
[facility]  Pulmonary Medicine  Visit Clinic Note  December 16, 2019         ASSESSMENT & PLAN       Shortness of Breath: His exercise performance has actually improved.  A CPET showed no clear limitation in his exercise.  Chest CT shows bibasilar atelectasis which is not the cause of his shortness of breath. His spirometry is unchanged.      I gave him reassurance that there is no significant abnormality in his lungs that needs further evaluation or treatment.  He should continue his exercise routine.  If he ever notes that he is getting more winded earlier than expected during exercise he can get in touch with me again.  This could be through mychart or by phone.       Francisco Gates MD          Today's visit note:     Chief Complaint: Luan Mitchell is a 74 year old year old male who is being seen for RECHECK (Shortness of breath )      HISTORY OF PRESENT ILLNESS:    This is a 74 year old male who is presenting for follow up on testing regarding shortness of breath. At our last clinic visit, he was endorsing mild shortness of breath with exertion.  This was one year ago. Since then, he has continued to exercise regularly.  His exercise times have actually improved on his exercise bike.  Shortness of breath does not interfere with any of his daily activities.  Once in a awhile he notices that he will be breahting rapidly and shallow.  His wife will notice it as well.      Denies cough or wheeze.  He has a bit of a runny nose at times.     Since our last visit, he has had a chest CT with contrast, a rib series and a CPET.            Past Medical and Surgical History:     Past Medical History:   Diagnosis Date     BPH (benign prostatic hyperplasia)      Essential hypertension 02/2018     Hyperlipidemia LDL goal <130      Pemphigus     pemphigus foliaceus     Retinal detachment 2009    left eye- corrected with laser     Vitreous detachment 2009    LE     Past Surgical History:   Procedure Laterality  Date     NO HISTORY OF SURGERY  14    derm     RETINOPEXY LASER PROPHYLAXIS BREAK(S) OS (LEFT EYE)      2009 - Left eye           Family History:     Family History   Problem Relation Age of Onset     Heart Disease Mother         CHF d 80     Diabetes Mother         DM2,  age 80     Hypertension Mother      Heart Disease Father         CHF d 78     Heart Disease Paternal Uncle         CHF d 50s     Cancer No family hx of         no skin cancer     Skin Cancer No family hx of         no skin cancer     Glaucoma No family hx of      Macular Degeneration No family hx of      Melanoma No family hx of               Social History:     Social History     Socioeconomic History     Marital status:      Spouse name: Not on file     Number of children: Not on file     Years of education: Not on file     Highest education level: Not on file   Occupational History     Not on file   Social Needs     Financial resource strain: Not on file     Food insecurity:     Worry: Not on file     Inability: Not on file     Transportation needs:     Medical: Not on file     Non-medical: Not on file   Tobacco Use     Smoking status: Never Smoker     Smokeless tobacco: Never Used   Substance and Sexual Activity     Alcohol use: Not on file     Drug use: Not on file     Sexual activity: Not on file   Lifestyle     Physical activity:     Days per week: Not on file     Minutes per session: Not on file     Stress: Not on file   Relationships     Social connections:     Talks on phone: Not on file     Gets together: Not on file     Attends Jain service: Not on file     Active member of club or organization: Not on file     Attends meetings of clubs or organizations: Not on file     Relationship status: Not on file     Intimate partner violence:     Fear of current or ex partner: Not on file     Emotionally abused: Not on file     Physically abused: Not on file     Forced sexual activity: Not on file   Other Topics Concern      Parent/sibling w/ CABG, MI or angioplasty before 65F 55M? Not Asked   Social History Narrative     Not on file            Medications:     Current Outpatient Medications   Medication     ARTIFICIAL TEAR SOLUTION OP     Cholecalciferol (VITAMIN D PO)     clobetasol (TEMOVATE) 0.05 % external solution     clobetasol (TEMOVATE) 0.05 % ointment     clobetasol propionate 0.05 % SHAM     desonide (DESOWEN) 0.05 % ointment     finasteride (PROSCAR) 5 MG tablet     hydrochlorothiazide (HYDRODIURIL) 12.5 MG tablet     hydrocortisone butyrate (LOCOID) 0.1 % SOLN     ketoconazole (NIZORAL) 2 % cream     ketoconazole (NIZORAL) 2 % shampoo     lovastatin (MEVACOR) 20 MG tablet     Multiple Vitamin (MULTIVITAMIN) per tablet     tacrolimus (PROTOPIC) 0.1 % ointment     triamcinolone (KENALOG) 0.1 % external cream     triamcinolone (KENALOG) 0.1 % ointment     finasteride (PROSCAR) 5 MG tablet     No current facility-administered medications for this visit.             Review of Systems:     Answers for HPI/ROS submitted by the patient on 12/13/2019   General Symptoms: No  Skin Symptoms: No  HENT Symptoms: No  EYE SYMPTOMS: No  HEART SYMPTOMS: Yes  LUNG SYMPTOMS: Yes  INTESTINAL SYMPTOMS: No  URINARY SYMPTOMS: No  REPRODUCTIVE SYMPTOMS: No  SKELETAL SYMPTOMS: No  BLOOD SYMPTOMS: No  NERVOUS SYSTEM SYMPTOMS: No  MENTAL HEALTH SYMPTOMS: Yes  Cough: No  Sputum or phlegm: No  Coughing up blood: No  Difficulty breating or shortness of breath: Yes  Snoring: No  Wheezing: No  Difficulty breathing on exertion: No  Nighttime Cough: No  Difficulty breathing when lying flat: No  Chest pain or pressure: No  Fast or irregular heartbeat: No  Pain in legs with walking: No  Trouble breathing while lying down: No  Fingers or toes appear blue: No  High blood pressure: Yes  Low blood pressure: No  Fainting: No  Murmurs: No  Pacemaker: No  Varicose veins: No  Edema or swelling: No  Wake up at night with shortness of breath: No  Light-headedness:  "No  Exercise intolerance: No  Nervous or Anxious: Yes  Depression: No  Trouble sleeping: No  Trouble thinking or concentrating: No  Mood changes: No  Panic attacks: No      PHYSICAL EXAM:  /82   Pulse 86   Resp 18   Ht 1.753 m (5' 9\")   Wt 79.8 kg (176 lb)   SpO2 98%   BMI 25.99 kg/m         General: pleasant, NAD  Eyes: Anicteric  Ears: Hearing grossly normal  Mouth: Oral mucosa is moist, without any lesions. No oropharyngeal exudate.  Neck: supple, no thyromegaly  Lymphatics: No cervical or supraclavicular nodes  Respiratory: Good air movement. No crackles. No rhonchi. No wheezes  Cardiac: RRR, normal S1, S2. No murmurs.  Abdomen: Soft, NT/ND  Musculoskeletal: Extremities normal. No clubbing. No cyanosiis  Skin: No rash on limited exam  Neuro: Normal mentation. Normal speech.  Psych:Normal affect           Data:   All laboratory and imaging data reviewed.      PFT:   FVC 4.33 (111%)  FEV1 3.02 (103%)  FEV1/FVC 0.70    PFT Interpretation:  No airflow obstruction.    No change in FVC or FEV1 since last year.   Valid Maneuver      Chest CT:  The contrast bolus is adequate.  There is no pulmonary embolism. No  consolidation. No suspicious pulmonary nodules.  No pleural effusion  or pneumothorax. Dependent reticulation in the lower lobes.     Chest wall: Unremarkable. Prominent bilateral axillary lymph nodes  with preserved fatty jyoti.  Mediastinum: Heart size is normal. No significant pericardial  effusion. Conventional branching pattern to the aortic arch. Prominent  right pretracheal lymph node with preserved fatty hilum.     ABDOMEN: Limited evaluation of the abdomen due to use of arterial  phase contrast. Multiple cystic foci in the liver are not enhancing on  arterial phase, favoring simple cysts.     Bones: No suspicious lytic or sclerotic lesions. Flowing anterior  enthesophytes throughout the thoracic spine consistent with diffuse  idiopathic skeletal hyperostosis (DISH).                            "                                           Impression:  1. No pulmonary embolism identified.  2. Dependent reticulation in the lower lobes favoring atelectasis.

## 2019-12-16 NOTE — PATIENT INSTRUCTIONS
Schedule a follow up visit with me if you develop worsening shortness of breath in the future.      Atelectasis in your lungs is not something I am concerned about.  The way it looks on your chest CT scan is the result of gravity's effects on your lungs and does not represent lung pathology.

## 2019-12-16 NOTE — NURSING NOTE
Chief Complaint   Patient presents with     RECHECK     Shortness of breath     Medications reviewed and vital signs taken.   Nona Cortez, SHAY

## 2019-12-18 ENCOUNTER — OFFICE VISIT (OUTPATIENT)
Dept: OTOLARYNGOLOGY | Facility: CLINIC | Age: 74
End: 2019-12-18
Payer: COMMERCIAL

## 2019-12-18 DIAGNOSIS — R06.02 SHORTNESS OF BREATH: ICD-10-CM

## 2019-12-18 DIAGNOSIS — R49.0 DYSPHONIA: Primary | ICD-10-CM

## 2019-12-18 DIAGNOSIS — J38.3 VOCAL CORD BOWING: ICD-10-CM

## 2019-12-18 DIAGNOSIS — R06.09 DYSPNEA ON EXERTION: ICD-10-CM

## 2019-12-18 NOTE — LETTER
"12/18/2019       RE: Luan Mitchell  [address] [facility] [address]     Dear Colleague,    Thank you for referring your patient, Luan Mitchell, to the [facility]. Please see a copy of my visit note below.      [facility]  THERAPY NOTE (CPT 99881)    Patient: Luan Mitchell  Date of Service: 12/18/2019  Referring physician: Dr. Singletary  Impressions from most recent evaluation (8/16/19):  \"IMPRESSIONS: Luan Mitchell is a 74-year-old gentleman, presenting today with R49.0 (Dysphonia), R06.09 (Dyspnea On Exertion) and vocal fold bowing \"    SUBJECTIVE:  Since his last session, Mr. Mitchell reports the following:     Overall he reports that symptoms are stable    OBJECTIVE:  Mr. Mitchell presents today with the following:  VOICE:  ? Roughness: Mild to moderate  ? Breathiness: Mild to moderate  ? Strain: Mild to moderate Intermittent  ? Loudness    Conversational speech:  Mildly reduced    Projected speech:  WNL    Overall, his voice sounds more strained today.  He reports that his voice \"comes and goes\".     Pitch is around F3-D3, usually in the higher    PATIENT REPORTED MEASURES:  Patient Supplied Answers To SLP QOL Questionnaire  Therapy Quality of Life 12/18/2019   Since my l ast session, I used the speech therapy exercises and strategies as recommended by my speech pathologist. Agree   I feel that using my therapy techniques has become a habit Agree   I feel confident in my ability to manage my current and future symptoms. Agree   Since my last session I feel my symptoms have --------. Stayed the same   Overall, since starting therapy I feel my symptoms are --------. About the same       Speech follow up as discussed with patient:  Dysponia SLP Goals 12/18/2019   How much does your voice problem bother you? A little bit   How much does your breathing problem bother you?         Somewhat     Patient Supplied Answers to Dyspnea Index " Questionnaire:  Dyspnea Index 12/18/2019   1. I have trouble getting air in. 2   2. I feel tightness in my throat when I am having julius breathing problem. 2   3. It takes more effort to breathe than it used to. 4   4. Change in weather affect my breathing problem. 2   5. My breathing gets worse with stress. 4   6. I make sound/noice breathing in 0   7. I have to strain to breathe. 2   8. My shortness of breath gets worse with exercise or physical activity 0   9. My breathing problems make me feel stressed. 3   10. My breathing problem casuses me to restrict my personal and social life. 0   Dyspnea Index Total Score 19     THERAPEUTIC ACTIVITIES    Demonstrated previous exercises.  o demonstrated improved technique  o appropriate redirection provided  o instruction provided for increased level of complexity/difficulty    Exercises to promote optimal respiratory mechanics    he demonstrated clavicular/neck/shoulder involvement in inhalation    With clinician support, patient was able to demonstrate improved abdominal relaxation and engagement on inhalation    [place] respiratory pacing exercises: square breathing and alternate nostril breathing; this was helpful    acceptable improvement in airflow and respiratory mechanics    Semi-Occluded Vocal Tract (SOVT) exercises instructed to reduce laryngeal tension, promote vocal fold pliability, and coordinate respiration and phonation    Straw phonation with water resistance was found to be most facilitating     Sustained phonation, and voice vs. voiceless productions used to promote easy voicing and raise awareness of laryngeal tension    Ascending and descending glides utilized to promote vocal fold pliability    Instructed on the benefits of using these exercises for improved coordination of breath flow with phonation and tissue mobilization.    Instructed on the importance of using these exercises as a warm-up / cool down,  and to re-calibrate the voice throughout the  day.    Counseling and Education:    Asked many questions about the nature of his symptoms, and I answered all of these thoroughly.    A revised regimen for home practice was instructed.    I provided an after visit summary and handouts of today's therapeutic activities to facilitate practice.    ASSESSMENT/PLAN  PROGRESS TOWARD LONG TERM GOALS:   Adequate progress; please see above    IMPRESSIONS:  R49.0 (Dysphonia), R06.09 (Dyspnea On Exertion) and vocal fold bowing. Mr. Mitchell demonstrated good learning of the therapeutic activities today.  He continues to demonstrate a moderate score on the dyspnea index. However, he feels that his breathing symptoms may be associated with stress.  I provided new therapeutic activities to help improve his respiratory mechanics and reduce possible stress induced respiratory occurrences.    PLAN: I will see Mr. Mitchell as needed.  He will continue to work independently at this time.  For practice goals see AVS.     TOTAL SERVICE TIME: 60 minutes  TREATMENT (52068): 60 minutes  NO CHARGE FACILITY FEE (59869)    Maricruz Mehta M.M. (voice) MSYED., CCC/SLP  Speech-Language Pathologist  Certificate of Vocology  [facility]  [phone]  [email or web address]  Prounouns: she/her    Again, thank you for allowing me to participate in the care of your patient.      Sincerely,    Maricruz Mehta, SLP

## 2019-12-18 NOTE — PATIENT INSTRUCTIONS
After Visit Summary    Patient: Luan Mitchell  Date of Visit: 12/18/2019    Breathing: (for the next month)  3-4x/day (1-2 minutes)        ALSO TRY ALTERNATE NOSTRIL BREATHING (SEE ARTICLE BELOW)    2x/day: Counting exercise 1, 1-2, 1-2-3.. up to 20      In the morning and evening (twice daily) for 2-5 minutes:   o Breathe while lying on your back with your face and knees up. Hands on tummy and chest.  Take a breath in with rounded lips and exhale with a  Shhhhh    o Inhale  = Inflate; exhale = deflate  o Throughout the day (2-3x/day for just a couple minutes) check breathing while keeping shoulders relaxed (riding to and from school, etc.)       Breathing Tips:  ? Tongue behind the lower teeth (opens up the back of the throat)  ? Tongue up when exposed to cold air  ? Keep shoulders down and chest relaxed  ? Don t overextend your neck    Lead from your chest    Keeping head upright     (also while practicing the bubbles exercise)  ? Keep breathing when you stop an activity/ or feel stressed at rest.    Be mindful!    Finding recovery pose    Hands behind the back    Hands on the knees standing    Elbows on the knees seated  ? Thera-band use during practices (WEAR YOUR BELT AROUND THE HOUSE TO HELP IMPROVE AWARENESS OF WHERE YOU ARE BREATHING, AND KEEPING THE BREATH LOW)  ? Match your breathing rate with the rate of step   ? Breathe through turns   ? Talk as you begin your exhale     Voice (2-3x/day unless otherwise noted):    Semi-occluded vocal tract exercise:   o Bubbles (straw in 1 to 1.5  of water) 2-3x/day for 1-2 min:  o 3x: blow 15-20 seconds with no voice and keep bubbles consistent.  o 3x: blow bubbles and add a sustained  who  or an  oo  15-20 seconds (comfortabl pitch )  o 3x: blow bubbles and vary  who  gliding up and down             Up and down like a sine wave  o 1-2x: [place] bubbles (keep connected)  These exercises are great for:    *Instructed on the importance of using these exercises as a  warm-up / cool down,  and to re-calibrate the voice throughout the day.    *tissue mobilization exercise - Improving the condition and pliability of the vocal folds.    *Abdominal breathing and applying optimal breath flow to speech/singing.       Maricruz Mehta M.M. (voice), M.A., CCC/SLP  Speech-Language Pathologist  Certificate of Vocology  [facility]  [phone]  [email or web address]  Prounouns: she/her    What Are the Benefits and Risks of Alternate Nostril Breathing?  Benefits  Risks  How to  When to use  Takeaway  Share on Pinterest  Overview  Alternate nostril breathing is a yogic breath control practice. [facility], it s known as moraima garcia pranayama. This translates as  subtle energy clearing breathing technique.     This type of breath work can be done as part of a yoga or meditation practice. Alternate nostril breathing can also be done as its own practice to help you quiet and still your mind.    Read on to learn about the benefits and risks as well as how to do alternate nostril breathing.    What are the benefits of alternate nostril breathing?  Alternate nostril breathing may help to:    relax your body and mind  reduce anxiety  promote overall well-being  These benefits, in turn, may help you to be more focused and aware.    You can use this breathing technique to help manage stresses in your daily life. You may also find that practicing alternate nostril breathing helps you to be more mindful of the present moment.    In the news  Diane Rogel wrote in her book  What Happened  that she used alternate nostril breathing after her loss of the 2018 [place] presidential election to manage stress and anxiety.  1. Lowers stress and improves cardiovascular function  One of the main benefits of alternate nostril breathing is that it may lower stress. A 2013 study found that people who practiced alternate nostril breathing lowered their perceived stress levels.    These  results were also shown in the group that practiced fast breathing techniques such as breath of fire.    In the same study, alternate nostril breathing was the only type of breath work that was found to have a positive effect on cardiovascular function. It was shown to significantly lower factors such as heart rate, respiratory rate, and blood pressure.    After 12 weeks of practice, the participants had improvement in heart rate, respiratory rate, and blood pressure. The participants were taught the practice for 30 minutes three times per week by a certified .    2. Improves lung function and respiratory endurance  Yogic breathing practices may improve lung function and respiratory endurance. A small 2017 study examined the effects of pranayama practice on the lung functions of competitive swimmers and found that it had a positive effect on respiratory endurance.    Improved respiratory endurance may also improve athletic performance.    The swimmers in the study did alternate nostril breathing in addition to two other breathing practices for 30 minutes, five days a week for one month. Larger, more in-depth studies are needed to expand upon these findings.    3. Lowers heart rate  Lowering your heart rate can help to promote cardiovascular health. According to a 2006 study, engaging in a slow yogic breath such as alternative nostril breathing may significantly decrease heart rate and average breathing rhythm.    Alternate nostril breathing may be a useful method to help you lower your heart rate in the moment, too.    Further research is needed to better understand the long-term effects on heart rates and breathing patterns.    4. Promotes well-being  Alternate nostril breathing may enhance overall health and well-being. It has also been shown to have a positive effect on mental health by reducing stress and anxiety.    Research from 2011 found that a six-week alternative nostril breathing program  had a positive impact on physical and physiological fitness-based performance. The breathing technique was found to have a positive influence on blood pressure, heart rate, and vital capacity.    Furthermore, a 2018 review found that different types of yogic breathing have many positive benefits for your health, including improvements to neurocognitive, respiratory, and metabolic functions in healthy people.    Alternate nostril breathing was also found to increase breath awareness and have a beneficial effect on the nervous system.      Is it safe?  Practicing alternate nostril breath is safe for most people. Talk to your doctor before starting the practice if you have a medical condition such as asthma, COPD, or any other lung or heart concern.    If you feel any adverse effects, such as shortness of breath, while doing the breathing technique, you should stop the practice immediately. This includes feeling lightheaded, dizzy, or nauseous.    If you find that the breathing is bringing up feelings of agitation or that it triggers any mental or physical symptoms, you should stop the practice.    ADVERTISING      How to do it  You can practice alternate nostril breathing on your own, but you may want to ask a  to show you the practice in person so you can make sure you re doing it correctly.    Focus on keeping your breath slow, smooth, and continuous. Focusing on your breath will help you to remember where you are in the cycle. You should be able to breathe easily throughout the practice.    To practice alternate nostril breathing:    Sit in a comfortable position with your legs crossed.  Place your left hand on your left knee.  Lift your right hand up toward your nose.  Exhale completely and then use your right thumb to close your right nostril.  Inhale through your left nostril and then close the left nostril with your fingers.  Open the right nostril and exhale through this side.  Inhale through the  right nostril and then close this nostril.  Open the left nostril and exhale through the left side.  This is one cycle.  Continue for up to 5 minutes.  Always complete the practice by finishing with an exhale on the left side.    When to practice alternate nostril breathing  You can do alternate nostril breathing at any time and place that feels most comfortable to you. You may find that you enjoy doing it in the morning or evening. It can also be done during the day when you need to focus or relax.    Alternate nostril breathing is best done on an empty stomach. Don t practice alternate nostril breathing if you re sick or congested.    Alternate nostril breathing can be done before or after your yoga practice. Find the way that suits you best as people have different results and experiences. Or you can do it at the start of your meditation practice. This may help you to deepen your meditation.      The takeaway  Alternate nostril breathing may help you relax or clear your mind. Bringing more awareness to your breathing can help you to increase your awareness in other parts of your life as well.    While the potential benefits are promising, remember that you need to practice alternative nostril breathing regularly in order to see and maintain results.    Breathing techniques aren t a substitute for medical treatment. Always talk to your doctor before beginning any breathing practice, especially if you have any medical concerns or conditions.

## 2019-12-18 NOTE — PROGRESS NOTES
"  [facility] VOICE CLINIC  THERAPY NOTE (CPT 89595)    Patient: Luan Mitchell  Date of Service: 12/18/2019  Referring physician: Dr. Singletary  Impressions from most recent evaluation (8/16/19):  \"IMPRESSIONS: Luan Mitchell is a 74-year-old gentleman, presenting today with R49.0 (Dysphonia), R06.09 (Dyspnea On Exertion) and vocal fold bowing \"    SUBJECTIVE:  Since his last session, Mr. Mitchell reports the following:     Overall he reports that symptoms are stable    OBJECTIVE:  Mr. Mitchell presents today with the following:  VOICE:  ? Roughness: Mild to moderate  ? Breathiness: Mild to moderate  ? Strain: Mild to moderate Intermittent  ? Loudness    Conversational speech:  Mildly reduced    Projected speech:  WNL    Overall, his voice sounds more strained today.  He reports that his voice \"comes and goes\".     Pitch is around F3-D3, usually in the higher    PATIENT REPORTED MEASURES:  Patient Supplied Answers To SLP QOL Questionnaire  Therapy Quality of Life 12/18/2019   Since my l ast session, I used the speech therapy exercises and strategies as recommended by my speech pathologist. Agree   I feel that using my therapy techniques has become a habit Agree   I feel confident in my ability to manage my current and future symptoms. Agree   Since my last session I feel my symptoms have --------. Stayed the same   Overall, since starting therapy I feel my symptoms are --------. About the same       Speech follow up as discussed with patient:  Dysponia SLP Goals 12/18/2019   How much does your voice problem bother you? A little bit   How much does your breathing problem bother you?         Somewhat     Patient Supplied Answers to Dyspnea Index Questionnaire:  Dyspnea Index 12/18/2019   1. I have trouble getting air in. 2   2. I feel tightness in my throat when I am having julius breathing problem. 2   3. It takes more effort to breathe than it used to. 4   4. Change in weather affect my breathing problem. 2   5. My breathing gets worse " with stress. 4   6. I make sound/noice breathing in 0   7. I have to strain to breathe. 2   8. My shortness of breath gets worse with exercise or physical activity 0   9. My breathing problems make me feel stressed. 3   10. My breathing problem casuses me to restrict my personal and social life. 0   Dyspnea Index Total Score 19     THERAPEUTIC ACTIVITIES    Demonstrated previous exercises.  o demonstrated improved technique  o appropriate redirection provided  o instruction provided for increased level of complexity/difficulty    Exercises to promote optimal respiratory mechanics    he demonstrated clavicular/neck/shoulder involvement in inhalation    With clinician support, patient was able to demonstrate improved abdominal relaxation and engagement on inhalation    [place] respiratory pacing exercises: square breathing and alternate nostril breathing; this was helpful    acceptable improvement in airflow and respiratory mechanics    Semi-Occluded Vocal Tract (SOVT) exercises instructed to reduce laryngeal tension, promote vocal fold pliability, and coordinate respiration and phonation    Straw phonation with water resistance was found to be most facilitating     Sustained phonation, and voice vs. voiceless productions used to promote easy voicing and raise awareness of laryngeal tension    Ascending and descending glides utilized to promote vocal fold pliability    Instructed on the benefits of using these exercises for improved coordination of breath flow with phonation and tissue mobilization.    Instructed on the importance of using these exercises as a warm-up / cool down,  and to re-calibrate the voice throughout the day.    Counseling and Education:    Asked many questions about the nature of his symptoms, and I answered all of these thoroughly.    A revised regimen for home practice was instructed.    I provided an after visit summary and handouts of today's therapeutic activities to facilitate  practice.    ASSESSMENT/PLAN  PROGRESS TOWARD LONG TERM GOALS:   Adequate progress; please see above    IMPRESSIONS:  R49.0 (Dysphonia), R06.09 (Dyspnea On Exertion) and vocal fold bowing. Mr. Mitchell demonstrated good learning of the therapeutic activities today.  He continues to demonstrate a moderate score on the dyspnea index. However, he feels that his breathing symptoms may be associated with stress.  I provided new therapeutic activities to help improve his respiratory mechanics and reduce possible stress induced respiratory occurrences.    PLAN: I will see Mr. Mitchell as needed.  He will continue to work independently at this time.  For practice goals see AVS.     TOTAL SERVICE TIME: 60 minutes  TREATMENT (19448): 60 minutes  NO CHARGE FACILITY FEE (80982)    Maricruz Mehta M.M. (voice), M.A., CCC/SLP  Speech-Language Pathologist  Certificate of Vocology  [facility]  [phone]  [email or web address]  Prounouns: she/her

## 2020-01-01 ASSESSMENT — ANXIETY QUESTIONNAIRES
7. FEELING AFRAID AS IF SOMETHING AWFUL MIGHT HAPPEN: SEVERAL DAYS
2. NOT BEING ABLE TO STOP OR CONTROL WORRYING: SEVERAL DAYS
1. FEELING NERVOUS, ANXIOUS, OR ON EDGE: SEVERAL DAYS
GAD7 TOTAL SCORE: 7
7. FEELING AFRAID AS IF SOMETHING AWFUL MIGHT HAPPEN: SEVERAL DAYS
GAD7 TOTAL SCORE: 7
5. BEING SO RESTLESS THAT IT IS HARD TO SIT STILL: SEVERAL DAYS
3. WORRYING TOO MUCH ABOUT DIFFERENT THINGS: SEVERAL DAYS
4. TROUBLE RELAXING: SEVERAL DAYS
6. BECOMING EASILY ANNOYED OR IRRITABLE: SEVERAL DAYS

## 2020-01-02 ENCOUNTER — OFFICE VISIT (OUTPATIENT)
Dept: FAMILY MEDICINE | Facility: CLINIC | Age: 75
End: 2020-01-02
Payer: COMMERCIAL

## 2020-01-02 VITALS
BODY MASS INDEX: 26.79 KG/M2 | OXYGEN SATURATION: 96 % | HEART RATE: 79 BPM | SYSTOLIC BLOOD PRESSURE: 132 MMHG | WEIGHT: 180.9 LBS | HEIGHT: 69 IN | DIASTOLIC BLOOD PRESSURE: 73 MMHG

## 2020-01-02 DIAGNOSIS — Z13.1 SCREENING FOR DIABETES MELLITUS: ICD-10-CM

## 2020-01-02 DIAGNOSIS — L10.2 PEMPHIGUS FOLIACEOUS (H): ICD-10-CM

## 2020-01-02 DIAGNOSIS — Z13.6 SCREENING FOR HEART DISEASE: ICD-10-CM

## 2020-01-02 DIAGNOSIS — I10 ESSENTIAL HYPERTENSION: ICD-10-CM

## 2020-01-02 DIAGNOSIS — I10 ESSENTIAL HYPERTENSION: Primary | ICD-10-CM

## 2020-01-02 DIAGNOSIS — F41.1 GENERALIZED ANXIETY DISORDER: ICD-10-CM

## 2020-01-02 LAB
ALBUMIN SERPL-MCNC: 3.8 G/DL (ref 3.4–5)
ALP SERPL-CCNC: 46 U/L (ref 40–150)
ALT SERPL W P-5'-P-CCNC: 40 U/L (ref 0–70)
ANION GAP SERPL CALCULATED.3IONS-SCNC: 4 MMOL/L (ref 3–14)
AST SERPL W P-5'-P-CCNC: 27 U/L (ref 0–45)
BASOPHILS # BLD AUTO: 0 10E9/L (ref 0–0.2)
BASOPHILS NFR BLD AUTO: 0.1 %
BILIRUB SERPL-MCNC: 0.4 MG/DL (ref 0.2–1.3)
BUN SERPL-MCNC: 25 MG/DL (ref 7–30)
CALCIUM SERPL-MCNC: 9.2 MG/DL (ref 8.5–10.1)
CHLORIDE SERPL-SCNC: 103 MMOL/L (ref 94–109)
CO2 SERPL-SCNC: 28 MMOL/L (ref 20–32)
CREAT SERPL-MCNC: 1.01 MG/DL (ref 0.66–1.25)
DIFFERENTIAL METHOD BLD: NORMAL
EOSINOPHIL # BLD AUTO: 0.1 10E9/L (ref 0–0.7)
EOSINOPHIL NFR BLD AUTO: 1.3 %
ERYTHROCYTE [DISTWIDTH] IN BLOOD BY AUTOMATED COUNT: 12.7 % (ref 10–15)
GFR SERPL CREATININE-BSD FRML MDRD: 72 ML/MIN/{1.73_M2}
GLUCOSE SERPL-MCNC: 119 MG/DL (ref 70–99)
HCT VFR BLD AUTO: 40.8 % (ref 40–53)
HGB BLD-MCNC: 14.1 G/DL (ref 13.3–17.7)
IMM GRANULOCYTES # BLD: 0 10E9/L (ref 0–0.4)
IMM GRANULOCYTES NFR BLD: 0.3 %
LYMPHOCYTES # BLD AUTO: 1.6 10E9/L (ref 0.8–5.3)
LYMPHOCYTES NFR BLD AUTO: 23 %
MCH RBC QN AUTO: 32 PG (ref 26.5–33)
MCHC RBC AUTO-ENTMCNC: 34.6 G/DL (ref 31.5–36.5)
MCV RBC AUTO: 93 FL (ref 78–100)
MONOCYTES # BLD AUTO: 0.6 10E9/L (ref 0–1.3)
MONOCYTES NFR BLD AUTO: 9.1 %
NEUTROPHILS # BLD AUTO: 4.6 10E9/L (ref 1.6–8.3)
NEUTROPHILS NFR BLD AUTO: 66.2 %
NRBC # BLD AUTO: 0 10*3/UL
NRBC BLD AUTO-RTO: 0 /100
PLATELET # BLD AUTO: 153 10E9/L (ref 150–450)
POTASSIUM SERPL-SCNC: 3.9 MMOL/L (ref 3.4–5.3)
PROT SERPL-MCNC: 6.9 G/DL (ref 6.8–8.8)
RBC # BLD AUTO: 4.4 10E12/L (ref 4.4–5.9)
SODIUM SERPL-SCNC: 136 MMOL/L (ref 133–144)
WBC # BLD AUTO: 7 10E9/L (ref 4–11)

## 2020-01-02 RX ORDER — HYDROCHLOROTHIAZIDE 12.5 MG/1
12.5 TABLET ORAL DAILY
Qty: 90 TABLET | Refills: 0 | Status: SHIPPED | OUTPATIENT
Start: 2020-01-02 | End: 2020-02-05

## 2020-01-02 ASSESSMENT — PAIN SCALES - GENERAL: PAINLEVEL: NO PAIN (0)

## 2020-01-02 ASSESSMENT — ENCOUNTER SYMPTOMS
FEVER: 0
CHILLS: 0
FATIGUE: 0

## 2020-01-02 ASSESSMENT — ANXIETY QUESTIONNAIRES
6. BECOMING EASILY ANNOYED OR IRRITABLE: NOT AT ALL
5. BEING SO RESTLESS THAT IT IS HARD TO SIT STILL: NOT AT ALL
1. FEELING NERVOUS, ANXIOUS, OR ON EDGE: SEVERAL DAYS
2. NOT BEING ABLE TO STOP OR CONTROL WORRYING: SEVERAL DAYS
GAD7 TOTAL SCORE: 3
7. FEELING AFRAID AS IF SOMETHING AWFUL MIGHT HAPPEN: NOT AT ALL
3. WORRYING TOO MUCH ABOUT DIFFERENT THINGS: NOT AT ALL

## 2020-01-02 ASSESSMENT — PATIENT HEALTH QUESTIONNAIRE - PHQ9
5. POOR APPETITE OR OVEREATING: SEVERAL DAYS
SUM OF ALL RESPONSES TO PHQ QUESTIONS 1-9: 0

## 2020-01-02 ASSESSMENT — MIFFLIN-ST. JEOR: SCORE: 1540.94

## 2020-01-02 NOTE — PROGRESS NOTES
"       HPI       Luan Mitchell is a 74 year old man who presents for follow up on his blood pressure. Luan has been checking his blood pressure at home, they have averaged 120-130/70-80s. Luan knows that he has some anxiety over his blood pressure, however he also feels that since starting the small dose of hydrochlorothiazide, knowing his blood pressure is more normal, the better he has felt.    Chief Complaint   Patient presents with     Hypertension     Pt is here to follow up on hypertension.      Problem, Medication and Allergy Lists were reviewed and updated if needed.    Patient is an established patient of this clinic.           Review of Systems:   Review of Systems     Constitutional:  Negative for fever, chills and fatigue.               Physical Exam:     Vitals:    01/02/20 1459   BP: 132/73   Pulse: 79   SpO2: 96%   Weight: 82.1 kg (180 lb 14.4 oz)   Height: 1.753 m (5' 9\")     Body mass index is 26.71 kg/m .  Vitals were reviewed and were normal.     Physical Exam  Constitutional:       Appearance: Normal appearance.   HENT:      Head: Normocephalic.   Musculoskeletal: Normal range of motion.   Skin:     General: Skin is warm and dry.   Neurological:      General: No focal deficit present.      Mental Status: He is alert and oriented to person, place, and time.   Psychiatric:         Mood and Affect: Mood normal.         Behavior: Behavior normal.         Results:     Lab pending.  BAYRON-7 SCORE 5/7/2019 12/2/2019 1/1/2020   Total Score 0 (minimal anxiety) 6 (mild anxiety) 7 (mild anxiety)   Total Score - 6 7   Some encounter information is confidential and restricted. Go to Review Flowsheets activity to see all data.     PHQ-9 SCORE 5/7/2019   PHQ-9 Total Score MyChart 2 (Minimal depression)   Some encounter information is confidential and restricted. Go to Review Flowsheets activity to see all data.     Assessment and Plan     1. Essential hypertension    - hydrochlorothiazide (HYDRODIURIL) 12.5 MG " tablet; Take 1 tablet (12.5 mg) by mouth daily  Dispense: 90 tablet; Refill: 0  - Basic metabolic panel; Future    2. Generalized anxiety disorder  There are no discontinued medications.   Feeling better since starting hydrochlorothisazide, not requiring proazac at this time. BP is normal today. Return in 3 months for follow up on BP. Options for treatment and follow-up care were reviewed with the patient. Luan Mitchell  engaged in the decision making process and verbalized understanding of the options discussed and agreed with the final plan.  Flory Fish, LILY, CNP

## 2020-01-02 NOTE — PATIENT INSTRUCTIONS

## 2020-01-02 NOTE — NURSING NOTE
"74 year old  Chief Complaint   Patient presents with     Hypertension     Pt is here to follow up on hypertension.        Blood pressure 132/73, pulse 79, height 1.753 m (5' 9\"), weight 82.1 kg (180 lb 14.4 oz), SpO2 96 %. Body mass index is 26.71 kg/m .  BP completed using cuff size:      Shelley Farrar, A  January 2, 2020 3:02 PM  "

## 2020-01-24 DIAGNOSIS — Z13.1 SCREENING FOR DIABETES MELLITUS: ICD-10-CM

## 2020-01-24 DIAGNOSIS — Z13.6 SCREENING FOR HEART DISEASE: ICD-10-CM

## 2020-01-24 LAB
CHOLEST SERPL-MCNC: 198 MG/DL
GLUCOSE SERPL-MCNC: 106 MG/DL (ref 70–99)
HDLC SERPL-MCNC: 55 MG/DL
LDLC SERPL CALC-MCNC: 113 MG/DL
NONHDLC SERPL-MCNC: 144 MG/DL
TRIGL SERPL-MCNC: 155 MG/DL

## 2020-02-04 ASSESSMENT — ENCOUNTER SYMPTOMS
SKIN CHANGES: 1
SPUTUM PRODUCTION: 0
POSTURAL DYSPNEA: 0
HYPOTENSION: 0
LEG PAIN: 0
COUGH: 0
COUGH DISTURBING SLEEP: 0
SHORTNESS OF BREATH: 1
NAIL CHANGES: 0
DYSPNEA ON EXERTION: 0
EXERCISE INTOLERANCE: 0
HYPERTENSION: 1
ORTHOPNEA: 0
WHEEZING: 0
LIGHT-HEADEDNESS: 0
HEMOPTYSIS: 0
POOR WOUND HEALING: 0
SLEEP DISTURBANCES DUE TO BREATHING: 0
SYNCOPE: 0
SNORES LOUDLY: 0
PALPITATIONS: 0

## 2020-02-04 ASSESSMENT — ACTIVITIES OF DAILY LIVING (ADL)
IN_THE_PAST_7_DAYS,_DID_YOU_NEED_HELP_FROM_OTHERS_TO_PERFORM_EVERYDAY_ACTIVITIES_SUCH_AS_EATING,_GETTING_DRESSED,_GROOMING,_BATHING,_WALKING,_OR_USING_THE_TOILET: N
IN_THE_PAST_7_DAYS,_DID_YOU_NEED_HELP_FROM_OTHERS_TO_TAKE_CARE_OF_THINGS_SUCH_AS_LAUNDRY_AND_HOUSEKEEPING,_BANKING,_SHOPPING,_USING_THE_TELEPHONE,_FOOD_PREPARATION,_TRANSPORTATION,_OR_TAKING_YOUR_OWN_MEDICATIONS?: N

## 2020-02-05 ENCOUNTER — OFFICE VISIT (OUTPATIENT)
Dept: INTERNAL MEDICINE | Facility: CLINIC | Age: 75
End: 2020-02-05
Payer: COMMERCIAL

## 2020-02-05 VITALS
WEIGHT: 176.5 LBS | TEMPERATURE: 97.5 F | RESPIRATION RATE: 18 BRPM | HEART RATE: 83 BPM | HEIGHT: 69 IN | BODY MASS INDEX: 26.14 KG/M2 | OXYGEN SATURATION: 96 % | DIASTOLIC BLOOD PRESSURE: 75 MMHG | SYSTOLIC BLOOD PRESSURE: 126 MMHG

## 2020-02-05 DIAGNOSIS — L82.1 SEBORRHEIC KERATOSES: Primary | ICD-10-CM

## 2020-02-05 DIAGNOSIS — I10 ESSENTIAL HYPERTENSION: ICD-10-CM

## 2020-02-05 DIAGNOSIS — E78.5 HYPERLIPIDEMIA LDL GOAL <130: ICD-10-CM

## 2020-02-05 RX ORDER — HYDROCHLOROTHIAZIDE 12.5 MG/1
12.5 TABLET ORAL DAILY
Qty: 90 TABLET | Refills: 4 | Status: SHIPPED | OUTPATIENT
Start: 2020-02-05 | End: 2021-03-15

## 2020-02-05 RX ORDER — ATORVASTATIN CALCIUM 20 MG/1
20 TABLET, FILM COATED ORAL DAILY
Qty: 90 TABLET | Refills: 4 | Status: SHIPPED | OUTPATIENT
Start: 2020-02-05 | End: 2021-04-02

## 2020-02-05 ASSESSMENT — PAIN SCALES - GENERAL: PAINLEVEL: NO PAIN (0)

## 2020-02-05 ASSESSMENT — MIFFLIN-ST. JEOR: SCORE: 1520.98

## 2020-02-05 NOTE — PATIENT INSTRUCTIONS
[facility] Medication Refill Request Information:  * Please contact your pharmacy regarding ANY request for medication refills.  ** [facility] Prescription Fax = [phone]  * Please allow 3 business days for routine medication refills.  * Please allow 5 business days for controlled substance medication refills.     [facility] Test Result notification information:  *You will be notified with in 7-10 days of your appointment day regarding the results of your test.  If you are on MyChart you will be notified as soon as the provider has reviewed the results and signed off on them.

## 2020-02-05 NOTE — PROGRESS NOTES
HPI  74-year-old presents today for physical examination.  He continues to be short of breath.  He reports that this is not changed over the past several months it has not gotten worse but has not gotten any better either he tends to not feel this at rest or while laying down but he does not feel that his exercise capacity is what it once was.  He did have a cardiopulmonary exercise test which showed average VO2 max.  He has had no chest pain palpitations or cardiac symptoms in association with this.  He did recently have some elevated blood pressures was seen for this and started on low-dose hydrochlorothiazide which is tolerated well follow-up electrolytes and renal function have been normal.  His blood pressures been doing well on this and is been under good control.  He otherwise has been feeling well and has no other complaints or concerns.  Past Medical History:   Diagnosis Date     BPH (benign prostatic hyperplasia)      Essential hypertension 2018     Hyperlipidemia LDL goal <130      Pemphigus     pemphigus foliaceus     Retinal detachment     left eye- corrected with laser     Vitreous detachment     LE     Past Surgical History:   Procedure Laterality Date     NO HISTORY OF SURGERY  14    derm     RETINOPEXY LASER PROPHYLAXIS BREAK(S) OS (LEFT EYE)      2009 - Left eye     Family History   Problem Relation Age of Onset     Heart Disease Mother         CHF d 80     Diabetes Mother         DM2,  age 80     Hypertension Mother      Heart Disease Father         CHF d 78     Heart Disease Paternal Uncle         CHF d 50s     Cancer No family hx of         no skin cancer     Skin Cancer No family hx of         no skin cancer     Glaucoma No family hx of      Macular Degeneration No family hx of      Melanoma No family hx of      Social History     Socioeconomic History     Marital status:      Spouse name: None     Number of children: None     Years of education: None     Highest  education level: None   Occupational History     None   Social Needs     Financial resource strain: None     Food insecurity:     Worry: None     Inability: None     Transportation needs:     Medical: None     Non-medical: None   Tobacco Use     Smoking status: Never Smoker     Smokeless tobacco: Never Used   Substance and Sexual Activity     Alcohol use: None     Drug use: None     Sexual activity: None   Lifestyle     Physical activity:     Days per week: None     Minutes per session: None     Stress: None   Relationships     Social connections:     Talks on phone: None     Gets together: None     Attends Confucianism service: None     Active member of club or organization: None     Attends meetings of clubs or organizations: None     Relationship status: None     Intimate partner violence:     Fear of current or ex partner: None     Emotionally abused: None     Physically abused: None     Forced sexual activity: None   Other Topics Concern     Parent/sibling w/ CABG, MI or angioplasty before 65F 55M? Not Asked   Social History Narrative     None     Answers for HPI/ROS submitted by the patient on 2/4/2020   General Symptoms: No  Skin Symptoms: Yes  HENT Symptoms: No  EYE SYMPTOMS: No  HEART SYMPTOMS: Yes  LUNG SYMPTOMS: Yes  INTESTINAL SYMPTOMS: No  URINARY SYMPTOMS: No  REPRODUCTIVE SYMPTOMS: No  SKELETAL SYMPTOMS: No  BLOOD SYMPTOMS: No  NERVOUS SYSTEM SYMPTOMS: No  MENTAL HEALTH SYMPTOMS: No  Changes in hair: No  Changes in moles/birth marks: Yes  Itching: No  Rashes: No  Changes in nails: No  Acne: No  Change in facial hair: No  Warts: Yes  Non-healing sores: No  Scarring: No  Flaking of skin: No  Color changes of hands/feet in cold : No  Sun sensitivity: No  Skin thickening: No  Cough: No  Sputum or phlegm: No  Coughing up blood: No  Difficulty breating or shortness of breath: Yes  Snoring: No  Wheezing: No  Difficulty breathing on exertion: No  Nighttime Cough: No  Difficulty breathing when lying flat:  "No  Chest pain or pressure: No  Fast or irregular heartbeat: No  Pain in legs with walking: No  Trouble breathing while lying down: No  Fingers or toes appear blue: No  High blood pressure: Yes  Low blood pressure: No  Fainting: No  Murmurs: No  Pacemaker: No  Varicose veins: No  Edema or swelling: No  Wake up at night with shortness of breath: No  Light-headedness: No  Exercise intolerance: No    Exam:  /75 (BP Location: Right arm, Patient Position: Chair, Cuff Size: Adult Regular)   Pulse 83   Temp 97.5  F (36.4  C) (Oral)   Resp 18   Ht 1.753 m (5' 9\")   Wt 80.1 kg (176 lb 8 oz)   SpO2 96%   BMI 26.06 kg/m    176 lbs 8 oz  Physical Exam   The patient is alert, oriented with a clear sensorium.   Skin shows 1 cm raised nonpigmented keratosis on the right forehead which was treated with liquid nitrogen.  His trunk shows numerous raised erythematous lesions with scale from previous pemphigus no other lesions or rashes and good turgor.   Head is normocephalic and atraumatic.   Eyes show PERRLA with benign optic fundi.   Ears show normal TMs bilaterally.   Mouth shows clear oral mucosa.   Neck shows no nodes, thyromegaly or bruits.   Back is non tender.  Lungs are clear to percussion and auscultation.   Heart shows normal S1 and S2 without murmur or gallop.   Abdomen is soft and nontender without masses or organomegaly.   Genitalia show normal testes. No evidence of inguinal hernia.  Extremities show no edema and no evidence of active synovitis.   Neurologic examination shows cranial nerves II-XII intact. Motor shows 5/5 strength. Reflexes are symmetric. Cerebellar testing shows normal tandem gait.  Romberg negative.    Labs reviewed with him:  Results for AJ PACE (MRN [id number]) as of 2/5/2020 15:18   Ref. Range 1/2/2020 15:42 1/24/2020 09:57   Sodium Latest Ref Range: 133 - 144 mmol/L 136    Potassium Latest Ref Range: 3.4 - 5.3 mmol/L 3.9    Chloride Latest Ref Range: 94 - 109 mmol/L 103  "   Carbon Dioxide Latest Ref Range: 20 - 32 mmol/L 28    Urea Nitrogen Latest Ref Range: 7 - 30 mg/dL 25    Creatinine Latest Ref Range: 0.66 - 1.25 mg/dL 1.01    GFR Estimate Latest Ref Range: >60 mL/min/1.73_m2 72    GFR Estimate If Black Latest Ref Range: >60 mL/min/1.73_m2 83    Calcium Latest Ref Range: 8.5 - 10.1 mg/dL 9.2    Anion Gap Latest Ref Range: 3 - 14 mmol/L 4    Albumin Latest Ref Range: 3.4 - 5.0 g/dL 3.8    Protein Total Latest Ref Range: 6.8 - 8.8 g/dL 6.9    Bilirubin Total Latest Ref Range: 0.2 - 1.3 mg/dL 0.4    Alkaline Phosphatase Latest Ref Range: 40 - 150 U/L 46    ALT Latest Ref Range: 0 - 70 U/L 40    AST Latest Ref Range: 0 - 45 U/L 27    Cholesterol Latest Ref Range: <200 mg/dL  198   HDL Cholesterol Latest Ref Range: >39 mg/dL  55   LDL Cholesterol Calculated Latest Ref Range: <100 mg/dL  113 (H)   Non HDL Cholesterol Latest Ref Range: <130 mg/dL  144 (H)   Triglycerides Latest Ref Range: <150 mg/dL  155 (H)   Glucose Latest Ref Range: 70 - 99 mg/dL 119 (H) 106 (H)   WBC Latest Ref Range: 4.0 - 11.0 10e9/L 7.0    Hemoglobin Latest Ref Range: 13.3 - 17.7 g/dL 14.1    Hematocrit Latest Ref Range: 40.0 - 53.0 % 40.8    Platelet Count Latest Ref Range: 150 - 450 10e9/L 153    RBC Count Latest Ref Range: 4.4 - 5.9 10e12/L 4.40    MCV Latest Ref Range: 78 - 100 fl 93    MCH Latest Ref Range: 26.5 - 33.0 pg 32.0    MCHC Latest Ref Range: 31.5 - 36.5 g/dL 34.6    RDW Latest Ref Range: 10.0 - 15.0 % 12.7    Diff Method Unknown Automated Method    % Neutrophils Latest Units: % 66.2    % Lymphocytes Latest Units: % 23.0    % Monocytes Latest Units: % 9.1    % Eosinophils Latest Units: % 1.3    % Basophils Latest Units: % 0.1    % Immature Granulocytes Latest Units: % 0.3    Nucleated RBCs Latest Ref Range: 0 /100 0    Absolute Neutrophil Latest Ref Range: 1.6 - 8.3 10e9/L 4.6      ASSESSMENT  1 keratosis status post liquid nitrogen  2 hypertension well controlled  3 hyperlipidemia marginal  control  4 pemphigus stable    Plan  Given his elevated LDL regular switch to atorvastatin.  We will have him continue to use the clobetasol and hydrochlorothiazide.  We will plan to have him follow-up for reassessment in a year sooner if any increased symptoms or problems before that time  This note was completed using Dragon voice recognition software.  Although reviewed after completion, some word and grammatical errors may occur.    Devendra Telles MD  General Internal Medicine  [facility]  [phone]

## 2020-02-27 ENCOUNTER — OFFICE VISIT (OUTPATIENT)
Dept: INTERNAL MEDICINE | Facility: CLINIC | Age: 75
End: 2020-02-27
Payer: COMMERCIAL

## 2020-02-27 VITALS
WEIGHT: 175.5 LBS | HEART RATE: 84 BPM | SYSTOLIC BLOOD PRESSURE: 132 MMHG | HEIGHT: 69 IN | DIASTOLIC BLOOD PRESSURE: 81 MMHG | RESPIRATION RATE: 16 BRPM | OXYGEN SATURATION: 98 % | TEMPERATURE: 98 F | BODY MASS INDEX: 26 KG/M2
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
## 2020-02-27 ASSESSMENT — MIFFLIN-ST. JEOR: SCORE: 1516.44

## 2020-02-27 ASSESSMENT — PAIN SCALES - GENERAL: PAINLEVEL: NO PAIN (0)

## 2020-02-28 ENCOUNTER — ANCILLARY PROCEDURE (OUTPATIENT)
Dept: CT IMAGING | Facility: CLINIC | Age: 75
End: 2020-02-28
Attending: INTERNAL MEDICINE
Payer: COMMERCIAL
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
## 2020-02-28 NOTE — NURSING NOTE
Chief Complaint   Patient presents with     Dizziness     Pt comes in to discuss recent dizzy spells. Fall on 2/3/20, head injury.         Ruben Montano, EMT on 2/27/2020 at 6:22 PM

## 2020-02-28 NOTE — PATIENT INSTRUCTIONS
[facility] Medication Refill Request Information:  * Please contact your pharmacy regarding ANY request for medication refills.  ** [facility] Prescription Fax = [phone]  * Please allow 3 business days for routine medication refills.  * Please allow 5 business days for controlled substance medication refills.     [facility] Test Result notification information:  *You will be notified with in 7-10 days of your appointment day regarding the results of your test.  If you are on MyChart you will be notified as soon as the provider has reviewed the results and signed off on them.    [facility]: [phone]

## 2020-02-28 NOTE — PROGRESS NOTES
HPI  74-year-old presents today for evaluation of dizziness.  He reports he slipped and fell on the ice on February 3.  He struck the back of his head there is no loss of consciousness no immediate neurologic symptoms or other complaints.  He was seen 2 days later in clinic at the time he was not experience any significant symptoms and his examination was unremarkable.  Subsequently however he reports onset of dizziness.  Dizziness is described as a woozy lightheaded sensation worse with standing relieved somewhat when sitting not associated with any focal neurologic symptoms visual symptoms or other complaints.  Is never had past history of similar symptoms although he did have a head injury with visual scintillations about 10 to 12 years ago.  The dizziness has been largely unchanged over the past 3 weeks.  There is been no improvement and no other new or additional symptoms.  His exercise tolerance is also unchanged.  Past Medical History:   Diagnosis Date     BPH (benign prostatic hyperplasia)      Essential hypertension 2018     Hyperlipidemia LDL goal <130      Pemphigus     pemphigus foliaceus     Retinal detachment     left eye- corrected with laser     Vitreous detachment     LE     Past Surgical History:   Procedure Laterality Date     NO HISTORY OF SURGERY  14    derm     RETINOPEXY LASER PROPHYLAXIS BREAK(S) OS (LEFT EYE)      2009 - Left eye     Family History   Problem Relation Age of Onset     Heart Disease Mother         CHF d 80     Diabetes Mother         DM2,  age 80     Hypertension Mother      Heart Disease Father         CHF d 78     Heart Disease Paternal Uncle         CHF d 50s     Cancer No family hx of         no skin cancer     Skin Cancer No family hx of         no skin cancer     Glaucoma No family hx of      Macular Degeneration No family hx of      Melanoma No family hx of      Social History     Socioeconomic History     Marital status:      Spouse name:  "None     Number of children: None     Years of education: None     Highest education level: None   Occupational History     None   Social Needs     Financial resource strain: None     Food insecurity:     Worry: None     Inability: None     Transportation needs:     Medical: None     Non-medical: None   Tobacco Use     Smoking status: Never Smoker     Smokeless tobacco: Never Used   Substance and Sexual Activity     Alcohol use: None     Drug use: None     Sexual activity: None   Lifestyle     Physical activity:     Days per week: None     Minutes per session: None     Stress: None   Relationships     Social connections:     Talks on phone: None     Gets together: None     Attends Pentecostal service: None     Active member of club or organization: None     Attends meetings of clubs or organizations: None     Relationship status: None     Intimate partner violence:     Fear of current or ex partner: None     Emotionally abused: None     Physically abused: None     Forced sexual activity: None   Other Topics Concern     Parent/sibling w/ CABG, MI or angioplasty before 65F 55M? Not Asked   Social History Narrative     None       Exam:  /81 (BP Location: Right arm, Patient Position: Sitting, Cuff Size: Adult Regular)   Pulse 84   Temp 98  F (36.7  C) (Oral)   Resp 16   Ht 1.753 m (5' 9\")   Wt 79.6 kg (175 lb 8 oz)   SpO2 98%   BMI 25.92 kg/m    175 lbs 8 oz  Physical Exam   The patient is alert, oriented with a clear sensorium.   Skin shows no lesions or rashes and good turgor.   Head is normocephalic and atraumatic.   Eyes show PERRLA with benign optic fundi with normal venous pulsations. EOMF  Ears show normal TMs bilaterally.   Mouth shows clear oral mucosa.   Neck shows no nodes, thyromegaly or bruits.   Neurologic examination shows cranial nerves II-XII intact. Motor shows 5/5 strength. Reflexes are symmetric. Cerebellar testing shows normal tandem gait.  Romberg negative.    ASSESSMENT  1 " postconcussive syndrome    Plan  We will have him get a CT head scan.  We will plan to follow-up and reassess in a month if the scan is normal and the symptoms persist.  Over 25 minutes spent with patient the majority in counseling and coordinating care.    This note was completed using Dragon voice recognition software.  Although reviewed after completion, some word and grammatical errors may occur.    Devendra Telles MD  General Internal Medicine  [facility]  [phone]

## 2020-03-25 ENCOUNTER — VIRTUAL VISIT (OUTPATIENT)
Dept: OTOLARYNGOLOGY | Facility: CLINIC | Age: 75
End: 2020-03-25
Payer: COMMERCIAL

## 2020-03-25 DIAGNOSIS — R49.0 DYSPHONIA: Primary | ICD-10-CM

## 2020-03-25 DIAGNOSIS — J38.3 VOCAL CORD BOWING: ICD-10-CM

## 2020-03-25 DIAGNOSIS — R06.09 DYSPNEA ON EXERTION: ICD-10-CM

## 2020-03-25 NOTE — PATIENT INSTRUCTIONS
After Visit Summary    Patient: Luan Mitchell  Date of Visit: 3/25/2020    Mindfulness:   - Set phone elsie to remind you to breathe.   - Post-its around the house.    - Try to be more aware of when something stresses you and start focusing on your breathing.     - most of the time you are very content    - Be mindful of people and situations that can trigger your stress: Take a mindful minute to write down your stressors. The visual aid can be helpful for remembering. Then, crumple and toss.      Mindfulness class: Join when available.    - [email or web address]      Maricruz Mehta M.M. (voice), M.A., CCC/SLP  Speech-Language Pathologist  Certificate of Vocology  [facility]  [phone]  [email or web address]  Prounouns: she/her

## 2020-03-25 NOTE — PROGRESS NOTES
"  Luan Mitchell is a 74 year old male who is being evaluated via a billable telephone visit.      The patient has been notified and verbally consented to the following:     \"This telephone visit will be conducted via a call between you and your provider.\"     \"Patient has opted to conduct today's visit via telephone vs an in-person appointment, and is not able to attend due to possible exposure to COVID-19\"    If during the course of the call the provider feels a telephone visit is not appropriate, you will not be charged for this service.\"     Call Initiated at: 2:00pm    [facility] VOICE CLINIC  THERAPY NOTE (CPT 47331)  Patient: Luan Mitchell  Date of Service: 3/25/2020  Referring physician: Dr. Singletary  Impressions from most recent evaluation (8/16/19):  \"IMPRESSIONS: Luan Mitchell is a 74-year-old gentleman, presenting today with R49.0 (Dysphonia), R06.09 (Dyspnea On Exertion) and vocal fold bowing \"    SUBJECTIVE:  Since his last session, Mr. Mitchell reports the following:     Overall he reports that symptoms are much improved    Successes: practicing the square breathing, which is helpful.  Focusing on the breathing is german.    Dr. Fuentes has counseled in the past.  Saw him 3 times; helpful.    Mindfulness issue, and recognizing that has been helpful.     Counting is helpful  High 20s and even 30.     Breathing can get stressed when on the phone:     OBJECTIVE:  Mr. Mitchell presents today with the following:  VOICE:  ? Roughness: Mild   ? Breathiness: Mild t  ? Strain: WNL  ? Loudness    Conversational speech:  Mildly reduced    Projected speech:  WNL    Voice is more predictable    Cough has resolved to within normal limits    Breathing is reported to be improving.      PATIENT REPORTED MEASURES:  Patient Supplied Answers To SLP QOL Questionnaire  Therapy Quality of Life 12/18/2019   Since my l ast session, I used the speech therapy exercises and strategies as recommended by my speech pathologist. Agree   I feel that " using my therapy techniques has become a habit Agree   I feel confident in my ability to manage my current and future symptoms. Agree   Since my last session I feel my symptoms have --------. Stayed the same   Overall, since starting therapy I feel my symptoms are --------. About the same     Patient Supplied Answers to Dyspnea Index Questionnaire:  Dyspnea Index 3/25/2020   1. I have trouble getting air in. 2   2. I feel tightness in my throat when I am having julius breathing problem. 1   3. It takes more effort to breathe than it used to. 2   4. Change in weather affect my breathing problem. 0   5. My breathing gets worse with stress. 4   6. I make sound/noise breathing in 2   7. I have to strain to breathe. 1   8. My shortness of breath gets worse with exercise or physical activity 1   9. My breathing problem makes me feel stressed. 2   10. My breathing problem casuses me to restrict my personal and social life. 0   Dyspnea Index Total Score 15     Speech follow up as discussed with patient:  Dysponia SLP Goals 12/18/2019 3/25/2020   How would you rate your speaking voice quality, if 0 is worst voice quality, and 10 is best voice? - 0   How would you rate your breathing, if 0 is the worst and 10 is the best? - 5   How much does your voice problem bother you? A little bit Not at all   How much does your breathing problem bother you?         Somewhat Somewhat         THERAPEUTIC ACTIVITIES    Demonstrated previous exercises.  o demonstrated improved technique  o appropriate redirection provided  o instruction provided for increased level of complexity/difficulty    Counseling and Education:    Asked many questions about the nature of his symptoms, and I answered all of these thoroughly.    Developed mindfulness strategies to help him improve his daily practice    A revised regimen for home practice was instructed.    I provided an after visit summary of today's therapeutic activities to facilitate  practice.    ASSESSMENT/PLAN  PROGRESS TOWARD LONG TERM GOALS:   Adequate but incomplete progress; please see above    IMPRESSIONS: R49.0 (Dysphonia), R06.09 (Dyspnea On Exertion) and vocal fold bowing. Mr. Mitchell demonstrated good learning of the therapeutic activities today.  He continues to demonstrate a moderate score on the dyspnea index. However, he feels that his breathing symptoms are associated with being mindful, and has noted improvement since his last appointment.  I provided new therapeutic activities to help improve his respiratory mechanics and reduce possible stress induced respiratory occurrences.    PLAN: I will see Mr. Mitchell in July to follow up    TOTAL SERVICE TIME: 30 minutes  Call Initiated at: 2:01p  Call Ended at: 2:31p  NO CHARGE FACILITY FEE (67142)    Telephone Visit CPT Code:  25930 21-30 minutes of medical discussion     Maricruz Mehta M.M. (voice) MMaryA., CCC/SLP  Speech-Language Pathologist  [facility] Trained Vocologist  [facility]  [phone]  [email or web address]  Prounouns: she/her      *this report was created in part through the use of computerized dictation software, and though reviewed following completion, some typographic errors may persist.  If there is confusion regarding any of this notes contents, please contact me for clarification

## 2020-06-11 ENCOUNTER — VIRTUAL VISIT (OUTPATIENT)
Dept: DERMATOLOGY | Facility: CLINIC | Age: 75
End: 2020-06-11
Payer: COMMERCIAL

## 2020-06-11 DIAGNOSIS — L10.2 PEMPHIGUS FOLIACEUS (H): Primary | ICD-10-CM

## 2020-06-11 DIAGNOSIS — Z11.59 ENCOUNTER FOR SCREENING FOR OTHER VIRAL DISEASES: ICD-10-CM

## 2020-06-11 DIAGNOSIS — L82.1 SEBORRHEIC KERATOSIS: ICD-10-CM

## 2020-06-11 DIAGNOSIS — Z92.25 HISTORY OF IMMUNOSUPPRESSIVE THERAPY: ICD-10-CM

## 2020-06-11 RX ORDER — PREDNISONE 10 MG/1
TABLET ORAL
Qty: 168 TABLET | Refills: 0 | Status: SHIPPED | OUTPATIENT
Start: 2020-06-11 | End: 2020-07-30

## 2020-06-11 RX ORDER — CLOBETASOL PROPIONATE 0.5 MG/G
OINTMENT TOPICAL 2 TIMES DAILY
Qty: 120 G | Refills: 1 | Status: SHIPPED | OUTPATIENT
Start: 2020-06-11 | End: 2023-09-21

## 2020-06-11 RX ORDER — TRIAMCINOLONE ACETONIDE 1 MG/G
OINTMENT TOPICAL 2 TIMES DAILY
Qty: 453.6 G | Refills: 1 | Status: SHIPPED | OUTPATIENT
Start: 2020-06-11 | End: 2023-09-21

## 2020-06-11 ASSESSMENT — PAIN SCALES - GENERAL: PAINLEVEL: NO PAIN (0)

## 2020-06-11 NOTE — PROGRESS NOTES
[facility]  Store and Forward and Video ( Invitation sent by:  Johann and send to e-mail at: [email or web address] )      Impression and Recommendations (Patient Counseled on the Following):  1. Pemphigus foliaceus   It appears patient is flared from his P foliaceus. Discussed treatment options with patient, including topical treatment only (patient historically was not interested in complete clearance), addition of prednisone, and options such as CellCept and Rituxan. At this time, we decided to treat with increased potency of topical steroids and a prednisone taper for faster clearance. Risks/benefits/side effects of therapy were discussed.  -Prednisone 60 mg daily x 7 days and taper by 10 mg q7 days until patient finishes taper  -Clobetasol 0.05% ointment to lesions  -TAC 0.1% ointment refilled for areas that are more mild  -Recheck pemphigus panel  -Will obtain baseline labs in case we need to restart CellCept or Rituxan in near future (CBC, CMP, quantiferon Gold, hepatitis panel (hasn't had done in a long time))    2. Likely SK right upper forehead  -Continue to monitor, if changes consider biopsy to rule out NMSC. Patient hesistant to come into clinic for evaluation due to COVID19 but understands that if lesion becomes painful or changes he will let us know.    Follow-up:   Follow-up with dermatology in approximately 2-3 weeks. Earlier for new or changing lesions or rash.      Staff and resident:    Pillo Lizama MD  Internal Medicine-Dermatology, PGY-4    Staffed with Dr. Mckeon    Staff Physician Comments:   I evaluated any available patient photographs with the resident and I edited the assessment and plan as documented in the note. I was present on the line and participated in the entire telephone call.    Some areas of flaring do have characteristics similar to a psoriasiform eruption, so will need to keep our assessments open in case his rash behaves more in this  mili.    Luan Mckeon MD   of Dermatology  Department of Dermatology  [facility]    _____________________________________________________________________________    Dermatology Problem List:  1.  Pemphigus foliaceus.     - Goals of treatment per Mr. Mitchell:  Prevent itching and secondary infections.   - He is not interested in complete clearance at this time.     - Most recent titers 3/14/19              - Cell surface IgG 1:1280- monkey esophagus; 1:160- intact human skin.               - DSG-1 Ig units (positive greater than 20, negative less than 14).               - DSG-3 Ig units (positive greater than 20, negative less than 9).   - Stopped MMF 2019  -On 20: PF flare; will repeat panel, prednisone taper and increased TS potency (clobetasol prescribed)     2.  History of seborrheic keratoses.      3.  Seborrheic dermatitis, on ketaconazole shampoo and cream.      4. DN, Mild. Right neck. Bx 3/22/18.     5. Likely SK right upper forehead; will monitor, if changes consider biopsy    Encounter Date: 2020    CC:   Chief Complaint   Patient presents with     Derm Problem     Luan is doing a follow up for his pemphigus        History of Present Illness:  I have reviewed the teledermatology information and the nursing intake corresponding to this issue. Luan Mitchell is a 75 year old male who presents via teledermatology for pemphigus foliaceus flare.  Patient states that has started to flare over the last month. Areas of worst involvement are the lower back, buttocks and lower extremities. He feels well otherwise and denies mucosal involvement. Denies systemic symptoms such as F/C. Denies cough/SOB.  Lesions are itchy and he applies triamcinolone ointment which helps with the itch.      ROS: Patient is generally feeling well today     Physical Examination:  General: Well-appearing male, appropriately-developed individual.  Skin:  Focused examination within the teledermatology photograph(s) including face, chest, abdomen, lower extremities and buttocks was performed.   -There are annular and erythematous plaques with peripheral erosions seen on the thighs and buttocks  -There are also numerous scaly papules on nodules with eroded centers on the abdomen, chest, and several on the back. Numerous hyperpigmented/post-inflammatory patches on the back  -Right upper forehead with a stuck on scaly papule    Labs:  None today    Past Medical History:   Patient Active Problem List   Diagnosis     Pemphigus foliaceus     Hyperlipidemia LDL goal <130     BPH (benign prostatic hyperplasia)     Encounter for long-term (current) use of high-risk medication     Dermatitis, seborrheic     Lightheadedness     Age-related nuclear cataract of both eyes     Past Medical History:   Diagnosis Date     BPH (benign prostatic hyperplasia)      Essential hypertension 2018     Hyperlipidemia LDL goal <130      Pemphigus     pemphigus foliaceus     Retinal detachment     left eye- corrected with laser     Vitreous detachment     LE     Past Surgical History:   Procedure Laterality Date     NO HISTORY OF SURGERY  14    derm     RETINOPEXY LASER PROPHYLAXIS BREAK(S) OS (LEFT EYE)      2009 - Left eye       Social History:  Patient reports that he has never smoked. He has never used smokeless tobacco.    Family History:  Family History   Problem Relation Age of Onset     Heart Disease Mother         CHF d 80     Diabetes Mother         DM2,  age 80     Hypertension Mother      Heart Disease Father         CHF d 78     Heart Disease Paternal Uncle         CHF d 50s     Cancer No family hx of         no skin cancer     Skin Cancer No family hx of         no skin cancer     Glaucoma No family hx of      Macular Degeneration No family hx of      Melanoma No family hx of        Medications:  Current Outpatient Medications   Medication     ARTIFICIAL TEAR  SOLUTION OP     atorvastatin (LIPITOR) 20 MG tablet     Cholecalciferol (VITAMIN D PO)     clobetasol (TEMOVATE) 0.05 % external solution     clobetasol (TEMOVATE) 0.05 % ointment     clobetasol propionate 0.05 % SHAM     desonide (DESOWEN) 0.05 % ointment     finasteride (PROSCAR) 5 MG tablet     hydrochlorothiazide (HYDRODIURIL) 12.5 MG tablet     hydrocortisone butyrate (LOCOID) 0.1 % SOLN     ketoconazole (NIZORAL) 2 % cream     ketoconazole (NIZORAL) 2 % shampoo     Multiple Vitamin (MULTIVITAMIN) per tablet     tacrolimus (PROTOPIC) 0.1 % ointment     triamcinolone (KENALOG) 0.1 % external cream     triamcinolone (KENALOG) 0.1 % ointment     No current facility-administered medications for this visit.           No Known Allergies      _____________________________________________________________________________    Teledermatology information:  - Location of patient in [place]: Home  - Patient presented as: return  - Location of teledermatologist:  ([facility] DERMATOLOGY )  - Reason teledermatology is appropriate:  of National Emergency Regarding Coronavirus disease (COVID 19) Outbreak  - Image quality and interpretability: acceptable  - Physician has received verbal consent for a Video/Photos Visit from the patient? Yes  - In-person dermatology visit recommendation: no  - Date of images: 6/10/20  - Service start time:9:30  - Service end time:9:44  - Date of report: 6/11/2020

## 2020-06-11 NOTE — PATIENT INSTRUCTIONS
Pemphigus foliceus:  60 mg prednisone x 1 week, then 50 mg x 1 week, 40 mg x 1 week, 30 mg x 1 week, 20 mg x 1 week, 10 mg x 1 week, then stop    Clobetasol ointment to lesions twice a day    Triamcinolone ointment for milder lesions 2x/day    Follow-up in 2 weeks

## 2020-06-11 NOTE — LETTER
6/11/2020       RE: Luan Mitchell  [address] [place] [id number]     Dear Colleague,    Thank you for referring your patient, Luan Mitchell, to the [facility] DERMATOLOGY at [facility]. Please see a copy of my visit note below.    [facility]  Store and Forward and Video ( Invitation sent by:  Johann and send to e-mail at: [email or web address] )      Impression and Recommendations (Patient Counseled on the Following):  1. Pemphigus foliaceus   It appears patient is flared from his P foliaceus. Discussed treatment options with patient, including topical treatment only (patient historically was not interested in complete clearance), addition of prednisone, and options such as CellCept and Rituxan. At this time, we decided to treat with increased potency of topical steroids and a prednisone taper for faster clearance. Risks/benefits/side effects of therapy were discussed.  -Prednisone 60 mg daily x 7 days and taper by 10 mg q7 days until patient finishes taper  -Clobetasol 0.05% ointment to lesions  -TAC 0.1% ointment refilled for areas that are more mild  -Recheck pemphigus panel  -Will obtain baseline labs in case we need to restart CellCept or Rituxan in near future (CBC, CMP, quantiferon Gold, hepatitis panel (hasn't had done in a long time))    2. Likely SK right upper forehead  -Continue to monitor, if changes consider biopsy to rule out NMSC. Patient hesistant to come into clinic for evaluation due to COVID19 but understands that if lesion becomes painful or changes he will let us know.    Follow-up:   Follow-up with dermatology in approximately 2-3 weeks. Earlier for new or changing lesions or rash.      Staff and resident:    Pillo Lizama MD  Internal Medicine-Dermatology, PGY-4    Staffed with Dr. Mckeon    Staff Physician Comments:   I evaluated any available patient photographs with the resident and I edited the assessment and plan  as documented in the note. I was present on the line and participated in the entire telephone call.    Some areas of flaring do have characteristics similar to a psoriasiform eruption, so will need to keep our assessments open in case his rash behaves more in this fashion.    Luan Mckeon MD   of Dermatology  Department of Dermatology  [facility]    _____________________________________________________________________________    Dermatology Problem List:  1.  Pemphigus foliaceus.     - Goals of treatment per Mr. Mitchlel:  Prevent itching and secondary infections.   - He is not interested in complete clearance at this time.     - Most recent titers 3/14/19              - Cell surface IgG 1:1280- monkey esophagus; 1:160- intact human skin.               - DSG-1 Ig units (positive greater than 20, negative less than 14).               - DSG-3 Ig units (positive greater than 20, negative less than 9).   - Stopped MMF 2019  -On 20: PF flare; will repeat panel, prednisone taper and increased TS potency (clobetasol prescribed)     2.  History of seborrheic keratoses.      3.  Seborrheic dermatitis, on ketaconazole shampoo and cream.      4. DN, Mild. Right neck. Bx 3/22/18.     5. Likely SK right upper forehead; will monitor, if changes consider biopsy    Encounter Date: 2020    CC:   Chief Complaint   Patient presents with     Derm Problem     Luan is doing a follow up for his pemphigus        History of Present Illness:  I have reviewed the teledermatology information and the nursing intake corresponding to this issue. Luan Mitchell is a 75 year old male who presents via teledermatology for pemphigus foliaceus flare.  Patient states that has started to flare over the last month. Areas of worst involvement are the lower back, buttocks and lower extremities. He feels well otherwise and denies mucosal involvement. Denies systemic symptoms  such as F/C. Denies cough/SOB.  Lesions are itchy and he applies triamcinolone ointment which helps with the itch.      ROS: Patient is generally feeling well today     Physical Examination:  General: Well-appearing male, appropriately-developed individual.  Skin: Focused examination within the teledermatology photograph(s) including face, chest, abdomen, lower extremities and buttocks was performed.   -There are annular and erythematous plaques with peripheral erosions seen on the thighs and buttocks  -There are also numerous scaly papules on nodules with eroded centers on the abdomen, chest, and several on the back. Numerous hyperpigmented/post-inflammatory patches on the back  -Right upper forehead with a stuck on scaly papule    Labs:  None today    Past Medical History:   Patient Active Problem List   Diagnosis     Pemphigus foliaceus     Hyperlipidemia LDL goal <130     BPH (benign prostatic hyperplasia)     Encounter for long-term (current) use of high-risk medication     Dermatitis, seborrheic     Lightheadedness     Age-related nuclear cataract of both eyes     Past Medical History:   Diagnosis Date     BPH (benign prostatic hyperplasia)      Essential hypertension 2018     Hyperlipidemia LDL goal <130      Pemphigus     pemphigus foliaceus     Retinal detachment 2009    left eye- corrected with laser     Vitreous detachment     LE     Past Surgical History:   Procedure Laterality Date     NO HISTORY OF SURGERY  12    derm     RETINOPEXY LASER PROPHYLAXIS BREAK(S) OS (LEFT EYE)      2009 - Left eye       Social History:  Patient reports that he has never smoked. He has never used smokeless tobacco.    Family History:  Family History   Problem Relation Age of Onset     Heart Disease Mother         CHF d 80     Diabetes Mother         DM2,  age 80     Hypertension Mother      Heart Disease Father         CHF d 78     Heart Disease Paternal Uncle         CHF d 50s     Cancer No family hx of          no skin cancer     Skin Cancer No family hx of         no skin cancer     Glaucoma No family hx of      Macular Degeneration No family hx of      Melanoma No family hx of        Medications:  Current Outpatient Medications   Medication     ARTIFICIAL TEAR SOLUTION OP     atorvastatin (LIPITOR) 20 MG tablet     Cholecalciferol (VITAMIN D PO)     clobetasol (TEMOVATE) 0.05 % external solution     clobetasol (TEMOVATE) 0.05 % ointment     clobetasol propionate 0.05 % SHAM     desonide (DESOWEN) 0.05 % ointment     finasteride (PROSCAR) 5 MG tablet     hydrochlorothiazide (HYDRODIURIL) 12.5 MG tablet     hydrocortisone butyrate (LOCOID) 0.1 % SOLN     ketoconazole (NIZORAL) 2 % cream     ketoconazole (NIZORAL) 2 % shampoo     Multiple Vitamin (MULTIVITAMIN) per tablet     tacrolimus (PROTOPIC) 0.1 % ointment     triamcinolone (KENALOG) 0.1 % external cream     triamcinolone (KENALOG) 0.1 % ointment     No current facility-administered medications for this visit.           No Known Allergies      _____________________________________________________________________________    Teledermatology information:  - Location of patient in [place]: Home  - Patient presented as: return  - Location of teledermatologist:  [facility] DERMATOLOGY )  - Reason teledermatology is appropriate:  of National Emergency Regarding Coronavirus disease (COVID 19) Outbreak  - Image quality and interpretability: acceptable  - Physician has received verbal consent for a Video/Photos Visit from the patient? Yes  - In-person dermatology visit recommendation: no  - Date of images: 6/10/20  - Service start time:9:30  - Service end time:9:44  - Date of report: 6/11/2020     Again, thank you for allowing me to participate in the care of your patient.      Sincerely,    Pillo Lizama MD

## 2020-06-11 NOTE — NURSING NOTE
Dermatology Rooming Note    Luan Mitchell's goals for this visit include:   Chief Complaint   Patient presents with     Derm Problem     Luan is doing a follow up for his pemphigus      LUTHER Parmar

## 2020-06-12 ENCOUNTER — VIRTUAL VISIT (OUTPATIENT)
Dept: OTOLARYNGOLOGY | Facility: CLINIC | Age: 75
End: 2020-06-12
Payer: COMMERCIAL

## 2020-06-12 DIAGNOSIS — R06.02 SHORTNESS OF BREATH: ICD-10-CM

## 2020-06-12 DIAGNOSIS — R06.09 DYSPNEA ON EXERTION: ICD-10-CM

## 2020-06-12 DIAGNOSIS — J38.3 VOCAL CORD BOWING: ICD-10-CM

## 2020-06-12 DIAGNOSIS — R49.0 DYSPHONIA: Primary | ICD-10-CM

## 2020-06-12 NOTE — LETTER
"6/12/2020       RE: Luan Mitchell  [address] [place] [id number]     Dear Colleague,    Thank you for referring your patient, Luan Mitchell, to the [facility] VOICE at [facility]. Please see a copy of my visit note below.    Luan Mitchell is a 75 year old male who is being cared for via a billable virtual visit.      The Luan Mitchell has been notified and verbally consented to the following:     \"This billable virtual visit will be conducted between you and your provider.\"     \"Patient has opted to conduct today's billable virtual visit vs an in-person appointment, and is not able to attend due to possible exposure to COVID-19\"    If during the course of the call the provider feels the appointment is not appropriate, you will not be charged for this service.\"     Provider has received verbal consent for billable virtual visit from the patient? Yes    Preferred method for receiving information email/ [email or web address]    Call initiated at: 3:05 PM  Platform used to conduct today's virtual appointment: LoveThatFit video, but switches to Zoom due to connectivity issues  Location of provider: Residence  Location of patient: [facility] VOICE CLINIC  THERAPY NOTE (CPT 75441)  Patient: Luan Mitchell  Date of Service: 6/12/2020  Referring physician: Dr. Singletary  Impressions from most recent evaluation (8/16/19):  \"IMPRESSIONS: Luan Mitchell is a 74-year-old gentleman, presenting today with R49.0 (Dysphonia), R06.09 (Dyspnea On Exertion) and vocal fold bowing \"    SUBJECTIVE:  Since the last appointment, Mr. Mitchell reports the following:   ? Overall he reports that symptoms are much improved  ? Successes: practicing the square breathing, which is helpful.  Focusing on the breathing is german.  ? Dr. Fuentes has counseled in the past.  Saw him 3 times; helpful.  ? Mindfulness issue, and recognizing that has been helpful.   ? Counting is helpful  High 20s and even 30.   ? Breathing can get " stressed when on the phone    OBJECTIVE:  Mr. Mitchell presents today with the following:  VOICE:  ? Roughness: Mild   ? Breathiness: Mild t  ? Strain: WNL  ? Loudness    Conversational speech:  Mildly reduced    Projected speech:  WNL    Voice is more predictable     Cough has resolved to within normal limits     Breathing is reported to be improving.      PATIENT REPORTED MEASURES:  Patient Supplied Answers To SLP QOL Questionnaire  Therapy Quality of Life 12/18/2019   Since my l ast session, I used the speech therapy exercises and strategies as recommended by my speech pathologist. Agree   I feel that using my therapy techniques has become a habit Agree   I feel confident in my ability to manage my current and future symptoms. Agree   Since my last session I feel my symptoms have --------. Stayed the same   Overall, since starting therapy I feel my symptoms are --------. About the same     Speech follow up as discussed with patient:  Dysponia SLP Goals 12/18/2019 3/25/2020 6/12/2020   How would you rate your speaking voice quality, if 0 is worst voice quality, and 10 is best voice? - 0 8   How much effort is it to speak, if 0 is no extra effort and 10 is maximum effort? - - 2   How how severe is your [Throat Discomfort - or use patient specific description], if 0 is no [discomfort] at all and 10 is the worst [discomfort]? - - 2   How would you rate your breathing, if 0 is the worst and 10 is the best? - 5 -   How much does your voice problem bother you? A little bit Not at all A little bit   How much does your cough/throat-clearing problem bother you?            - - Not at all   How much does your breathing problem bother you?         Somewhat Somewhat Somewhat     Patient Supplied Answers to Dyspnea Index Questionnaire:  Dyspnea Index 6/12/2020   1. I have trouble getting air in. 2   2. I feel tightness in my throat when I am having julius breathing problem. 2   3. It takes more effort to breathe than it used to. 0  "  4. Change in weather affect my breathing problem. 2   5. My breathing gets worse with stress. 3   6. I make sound/noise breathing in 1   7. I have to strain to breathe. 1   8. My shortness of breath gets worse with exercise or physical activity 1   9. My breathing problem makes me feel stressed. 1   10. My breathing problem casuses me to restrict my personal and social life. 0   Dyspnea Index Total Score 13         THERAPEUTIC ACTIVITIES    Demonstrated previous exercises.  o demonstrated improved technique  o appropriate redirection provided  o instruction provided for increased level of complexity/difficulty    Exercises to promote optimal respiratory mechanics    I provided explanation of the anatomy and physiology of respiration for speech and singing; he found this to be helpful    Demonstrated difficulty allowing abdominal relaxation for inhalation    Practiced in a seated, with tactile cue of a hand on the low rib-cage to facilitate awareness of low respiratory engagement.     With clinician support, patient was able to demonstrate improved abdominal relaxation and engagement on inhalation    Optimal exhalation using inward engagement of the abdominal wall with no corresponding collapse of the upper chest cavity was trained using the pulsed \"sh\" task    [place] a variety of techniques to make breathing more comfortable, including tongue up with inhalation.    acceptable improvement in airflow and respiratory mechanics    Counseling and Education:    Asked many questions about the nature of his symptoms, and I answered all of these thoroughly.    A revised regimen for home practice was instructed.    ASSESSMENT/PLAN  PROGRESS TOWARD LONG TERM GOALS:   Adequate progress; please see above    IMPRESSIONS: R49.0 (Dysphonia), R06.09 (Dyspnea On Exertion) and vocal fold bowing.    PLAN: I will see Mr. Mitchell in August weeks, at which point we will reassess his progress.   For practice goals see AVS.     TOTAL " SERVICE TIME:   Call Initiated at: 3p  Call Ended at: 4p           CPT Billing Codes:   TREATMENT (76445)  NO CHARGE FACILITY FEE (50044)    Maricruz Mehta M.M. (voice), M.A., CCC/SLP  Speech-Language Pathologist  [place] Trained Vocologist  [facility]  [phone]  [email or web address]  Prounouns: she/her      *this report was created in part through the use of computerized dictation software, and though reviewed following completion, some typographic errors may persist.  If there is confusion regarding any of this notes contents, please contact me for clarification            Again, thank you for allowing me to participate in the care of your patient.      Sincerely,    Maricruz Mehta, SLP

## 2020-06-12 NOTE — PROGRESS NOTES
"Luan Mitchell is a 75 year old male who is being cared for via a billable virtual visit.      The Luan Mitchell has been notified and verbally consented to the following:     \"This billable virtual visit will be conducted between you and your provider.\"     \"Patient has opted to conduct today's billable virtual visit vs an in-person appointment, and is not able to attend due to possible exposure to COVID-19\"    If during the course of the call the provider feels the appointment is not appropriate, you will not be charged for this service.\"     Provider has received verbal consent for billable virtual visit from the patient? Yes    Preferred method for receiving information email/ mychart    Call initiated at: 3:05 PM  Platform used to conduct today's virtual appointment: Spacious video, but switches to Zoom due to connectivity issues  Location of provider: Residence  Location of patient: [facility] VOICE CLINIC  THERAPY NOTE (CPT 75423)  Patient: Luan Mitchell  Date of Service: 6/12/2020  Referring physician: Dr. Singletary  Impressions from most recent evaluation (8/16/19):  \"IMPRESSIONS: Luan Mitchell is a 74-year-old gentleman, presenting today with R49.0 (Dysphonia), R06.09 (Dyspnea On Exertion) and vocal fold bowing \"    SUBJECTIVE:  Since the last appointment, Mr. Mitchell reports the following:   ? Overall he reports that symptoms are much improved  ? Successes: practicing the square breathing, which is helpful.  Focusing on the breathing is german.  ? Dr. Fuentes has counseled in the past.  Saw him 3 times; helpful.  ? Mindfulness issue, and recognizing that has been helpful.   ? Counting is helpful  High 20s and even 30.   ? Breathing can get stressed when on the phone    OBJECTIVE:  Mr. Mitchell presents today with the following:  VOICE:  ? Roughness: Mild   ? Breathiness: Mild t  ? Strain: WNL  ? Loudness    Conversational speech:  Mildly reduced    Projected speech:  WNL    Voice is more predictable     Cough has " resolved to within normal limits     Breathing is reported to be improving.      PATIENT REPORTED MEASURES:  Patient Supplied Answers To SLP QOL Questionnaire  Therapy Quality of Life 12/18/2019   Since my l ast session, I used the speech therapy exercises and strategies as recommended by my speech pathologist. Agree   I feel that using my therapy techniques has become a habit Agree   I feel confident in my ability to manage my current and future symptoms. Agree   Since my last session I feel my symptoms have --------. Stayed the same   Overall, since starting therapy I feel my symptoms are --------. About the same     Speech follow up as discussed with patient:  Dysponia SLP Goals 12/18/2019 3/25/2020 6/12/2020   How would you rate your speaking voice quality, if 0 is worst voice quality, and 10 is best voice? - 0 8   How much effort is it to speak, if 0 is no extra effort and 10 is maximum effort? - - 2   How how severe is your [Throat Discomfort - or use patient specific description], if 0 is no [discomfort] at all and 10 is the worst [discomfort]? - - 2   How would you rate your breathing, if 0 is the worst and 10 is the best? - 5 -   How much does your voice problem bother you? A little bit Not at all A little bit   How much does your cough/throat-clearing problem bother you?            - - Not at all   How much does your breathing problem bother you?         Somewhat Somewhat Somewhat     Patient Supplied Answers to Dyspnea Index Questionnaire:  Dyspnea Index 6/12/2020   1. I have trouble getting air in. 2   2. I feel tightness in my throat when I am having julius breathing problem. 2   3. It takes more effort to breathe than it used to. 0   4. Change in weather affect my breathing problem. 2   5. My breathing gets worse with stress. 3   6. I make sound/noise breathing in 1   7. I have to strain to breathe. 1   8. My shortness of breath gets worse with exercise or physical activity 1   9. My breathing problem  "makes me feel stressed. 1   10. My breathing problem casuses me to restrict my personal and social life. 0   Dyspnea Index Total Score 13         THERAPEUTIC ACTIVITIES    Demonstrated previous exercises.  o demonstrated improved technique  o appropriate redirection provided  o instruction provided for increased level of complexity/difficulty    Exercises to promote optimal respiratory mechanics    I provided explanation of the anatomy and physiology of respiration for speech and singing; he found this to be helpful    Demonstrated difficulty allowing abdominal relaxation for inhalation    Practiced in a seated, with tactile cue of a hand on the low rib-cage to facilitate awareness of low respiratory engagement.     With clinician support, patient was able to demonstrate improved abdominal relaxation and engagement on inhalation    Optimal exhalation using inward engagement of the abdominal wall with no corresponding collapse of the upper chest cavity was trained using the pulsed \"sh\" task    [place] a variety of techniques to make breathing more comfortable, including tongue up with inhalation.    acceptable improvement in airflow and respiratory mechanics    Counseling and Education:    Asked many questions about the nature of his symptoms, and I answered all of these thoroughly.    A revised regimen for home practice was instructed.    ASSESSMENT/PLAN  PROGRESS TOWARD LONG TERM GOALS:   Adequate progress; please see above    IMPRESSIONS: R49.0 (Dysphonia), R06.09 (Dyspnea On Exertion) and vocal fold bowing.    PLAN: I will see Mr. Mitchell in August weeks, at which point we will reassess his progress.   For practice goals see AVS.     TOTAL SERVICE TIME:   Call Initiated at: 3p  Call Ended at: 4p           CPT Billing Codes:   TREATMENT (62133)  NO CHARGE FACILITY FEE (92973)    Maricruz Mehta M.M. (voice) M.A., CCC/SLP  Speech-Language Pathologist  [facility] Trained Vocologist  [facility] " [facility]  [phone]  [email or web address]  Prounouns: she/her      *this report was created in part through the use of computerized dictation software, and though reviewed following completion, some typographic errors may persist.  If there is confusion regarding any of this notes contents, please contact me for clarification

## 2020-06-19 ENCOUNTER — PRE VISIT (OUTPATIENT)
Dept: UROLOGY | Facility: CLINIC | Age: 75
End: 2020-06-19

## 2020-06-19 NOTE — TELEPHONE ENCOUNTER
Chief Complaint : PSA check     Records/Orders: PSA is in epic     Pt Contacted: YES    At Rooming: video

## 2020-06-25 ENCOUNTER — VIRTUAL VISIT (OUTPATIENT)
Dept: DERMATOLOGY | Facility: CLINIC | Age: 75
End: 2020-06-25
Payer: COMMERCIAL

## 2020-06-25 DIAGNOSIS — L10.2 PEMPHIGUS FOLIACEOUS (H): Primary | ICD-10-CM

## 2020-06-25 NOTE — PATIENT INSTRUCTIONS
Continue current prednisone tapered course  Use clobetasol ointment twice a day to areas that are flaring    Follow-up with us in 2 weeks

## 2020-06-25 NOTE — LETTER
6/25/2020       RE: Luan Mitchell  [address] [address]     Dear Colleague,    Thank you for referring your patient, Luan Mitchell, to the [facility] DERMATOLOGY at [facility]. Please see a copy of my visit note below.    [facility]  Store and Forward and Video ( Invitation sent by:  [facility] waiting room )      Impression and Recommendations (Patient Counseled on the Following):  1. Pemphigus foliaceus   Had evidence of clinical and laboratory disease, but now improved on prednisone taper. He is currently about to start 40 mg tomorrow (last day of 50 mg). Given COVID19 pandemic, traditional immunosuppressants (ie, rituximab and mycophenolate mofetil) should be used with caution; particularly rituximab given the potency of immunosuppression. Would not recommend these agents at this time because his skin is nearly cleared on current prednisone taper. For the time, he can maintain remission with potent TS and if needed perhaps can try alternative steroid sparing approach, ie doxycycline and nicotinamide (which is normally given for BP, but can been used with some efficacy in pemphigus subtypes). Of note, his baseline labs were checked and were WNL (other than pemphigus panel).  -Continue prednisone taper (start 40 mg tomorrow and taper by 10 mg every 7 days)  -Clobetasol 0.05% ointment BID if needed for flare and TAC 0.1% ointment BID for areas more mild      Follow-up:   Follow-up with dermatology in approximately 2 weeks. Earlier for new or changing lesions or rash.      Staff and resident:    Pillo Lizama MD  Internal Medicine-Dermatology, PGY-4    Staffed with Serina Dunn,  was with the resident on the (phone/video) visit (for the critical and key portions or for 10 minutes) and agree with the resident's findings and plan of care as documented in the resident's  "note    _____________________________________________________________________________    Dermatology Problem List:  1.  Pemphigus foliaceus.     - Goals of treatment per Mr. Mitchell:  Prevent itching and secondary infections.   - He is not interested in complete clearance at this time.     - Stopped MMF 03/2019  -On 6/11/20: started prednisone taper (from 60 mg and taper off by 10 mg q7 days)  Cell Surface Antibodies   Serum    Date of    IgG CS        IgA CS   Number   Specimen   Titers        Titers     DSG 1   DSG 3   -------  --------   ----------    --------   -----   -----     03/14/19   ME: 1:1280    ME: NA     125*      1                      NS: 1:160     NS: NA     128**     06/12/20   ME: 1:2560    ME: NA     181*      1                      NS: 1:2560    NS: NA     320**     2.  History of seborrheic keratoses.   3.  Seborrheic dermatitis, on ketaconazole shampoo and cream.   4. DN, Mild. Right neck. Bx 3/22/18.   5. Likely SK right upper forehead; will monitor, if changes consider biopsy    Encounter Date: Jun 25, 2020    CC:   Chief Complaint   Patient presents with     Derm Problem     Luan is being seen today via [facility] for pemphigus. He states \" I am doing really well\"       History of Present Illness:  I have reviewed the teledermatology information and the nursing intake corresponding to this issue. Luan Mitchell is a 75 year old male who presents via teledermatology for pemphigus foliaceus follow-up.  Patient states he is doing well. Lesions are clearing with the prednisone taper.  He does not have a need to use to clobetasol ointment because the prednisone is clearing up the lesions.  He is happy with the current therapy.   He is concerned about starting an immunosuppressive as steroid sparing agent, given pandemic.   He has no other concerns today.    Of note, recent lab workup showed a pemphigus panel which showed DSG1 levels at 181 from 125.    ROS: Patient is generally feeling " well today     Physical Examination:  General: Well-appearing male, appropriately-developed individual.  Skin: Focused examination within the teledermatology photograph(s) including b/l lower extremities was performed.   -post-inflammatory hyperpigmented pink patches on the shins and calves; there are also scattered eroded papules on the shins    Labs:  reviewed    Past Medical History:   Patient Active Problem List   Diagnosis     Pemphigus foliaceus     Hyperlipidemia LDL goal <130     BPH (benign prostatic hyperplasia)     Encounter for long-term (current) use of high-risk medication     Dermatitis, seborrheic     Lightheadedness     Age-related nuclear cataract of both eyes     Past Medical History:   Diagnosis Date     BPH (benign prostatic hyperplasia)      Essential hypertension 2018     Hyperlipidemia LDL goal <130      Pemphigus     pemphigus foliaceus     Retinal detachment     left eye- corrected with laser     Vitreous detachment     LE     Past Surgical History:   Procedure Laterality Date     NO HISTORY OF SURGERY  12    derm     RETINOPEXY LASER PROPHYLAXIS BREAK(S) OS (LEFT EYE)       - Left eye       Social History:  Patient reports that he has never smoked. He has never used smokeless tobacco.    Family History:  Family History   Problem Relation Age of Onset     Heart Disease Mother         CHF d 80     Diabetes Mother         DM2,  age 80     Hypertension Mother      Heart Disease Father         CHF d 78     Heart Disease Paternal Uncle         CHF d 50s     Cancer No family hx of         no skin cancer     Skin Cancer No family hx of         no skin cancer     Glaucoma No family hx of      Macular Degeneration No family hx of      Melanoma No family hx of        Medications:  Current Outpatient Medications   Medication     ARTIFICIAL TEAR SOLUTION OP     atorvastatin (LIPITOR) 20 MG tablet     Cholecalciferol (VITAMIN D PO)     clobetasol (TEMOVATE) 0.05 % external  ointment     clobetasol (TEMOVATE) 0.05 % external solution     clobetasol (TEMOVATE) 0.05 % ointment     clobetasol propionate 0.05 % SHAM     finasteride (PROSCAR) 5 MG tablet     hydrochlorothiazide (HYDRODIURIL) 12.5 MG tablet     hydrocortisone butyrate (LOCOID) 0.1 % SOLN     ketoconazole (NIZORAL) 2 % cream     ketoconazole (NIZORAL) 2 % shampoo     Multiple Vitamin (MULTIVITAMIN) per tablet     predniSONE (DELTASONE) 10 MG tablet     triamcinolone (KENALOG) 0.1 % external cream     triamcinolone (KENALOG) 0.1 % ointment     desonide (DESOWEN) 0.05 % ointment     tacrolimus (PROTOPIC) 0.1 % ointment     triamcinolone (KENALOG) 0.1 % external ointment     No current facility-administered medications for this visit.           No Known Allergies      _____________________________________________________________________________    Teledermatology information:  - Location of patient: [place]  - Patient presented as: return  - Location of teledermatologist:  ([facility] DERMATOLOGY )  - Reason teledermatology is appropriate:  of National Emergency Regarding Coronavirus disease (COVID 19) Outbreak  - Image quality and interpretability: acceptable  - Physician has received verbal consent for a Video/Photos Visit from the patient? Yes  - In-person dermatology visit recommendation: no  - Date of images: 6/24/20  - Service start time: 10:39  - Service end time:10:49  - Date of report: 6/25/2020     Again, thank you for allowing me to participate in the care of your patient.       Sincerely,    Pillo Lizama MD

## 2020-06-25 NOTE — NURSING NOTE
"Chief Complaint   Patient presents with     Derm Problem     Luan is being seen today via [facility] for pemphigus. He states \" I am doing really well\"     Halley Rajan, RMA  "

## 2020-06-25 NOTE — PROGRESS NOTES
[facility]  Store and Forward and Video ( Invitation sent by:  [employer] Print System waiting room )      Impression and Recommendations (Patient Counseled on the Following):  1. Pemphigus foliaceus   Had evidence of clinical and laboratory disease, but now improved on prednisone taper. He is currently about to start 40 mg tomorrow (last day of 50 mg). Given COVID19 pandemic, traditional immunosuppressants (ie, rituximab and mycophenolate mofetil) should be used with caution; particularly rituximab given the potency of immunosuppression. Would not recommend these agents at this time because his skin is nearly cleared on current prednisone taper. For the time, he can maintain remission with potent TS and if needed perhaps can try alternative steroid sparing approach, ie doxycycline and nicotinamide (which is normally given for BP, but can been used with some efficacy in pemphigus subtypes). Of note, his baseline labs were checked and were WNL (other than pemphigus panel).  -Continue prednisone taper (start 40 mg tomorrow and taper by 10 mg every 7 days)  -Clobetasol 0.05% ointment BID if needed for flare and TAC 0.1% ointment BID for areas more mild      Follow-up:   Follow-up with dermatology in approximately 2 weeks. Earlier for new or changing lesions or rash.      Staff and resident:    Pillo Lizama MD  Internal Medicine-Dermatology, PGY-4    Staffed with Dr. Jose R MURRAY, Serina Odell,  was with the resident on the (phone/video) visit (for the critical and key portions or for 10 minutes) and agree with the resident's findings and plan of care as documented in the resident's note    _____________________________________________________________________________    Dermatology Problem List:  1.  Pemphigus foliaceus.     - Goals of treatment per Mr. Mitchell:  Prevent itching and secondary infections.   - He is not interested in complete clearance at this time.     - Stopped MMF 03/2019  -On  "6/11/20: started prednisone taper (from 60 mg and taper off by 10 mg q7 days)  Cell Surface Antibodies   Serum    Date of    IgG CS        IgA CS   Number   Specimen   Titers        Titers     DSG 1   DSG 3   -------  --------   ----------    --------   -----   -----     03/14/19   ME: 1:1280    ME: NA     125*      1                      NS: 1:160     NS: NA     128**     06/12/20   ME: 1:2560    ME: NA     181*      1                      NS: 1:2560    NS: NA     320**     2.  History of seborrheic keratoses.   3.  Seborrheic dermatitis, on ketaconazole shampoo and cream.   4. DN, Mild. Right neck. Bx 3/22/18.   5. Likely SK right upper forehead; will monitor, if changes consider biopsy    Encounter Date: Jun 25, 2020    CC:   Chief Complaint   Patient presents with     Derm Problem     Luan is being seen today via [facility] [place] for pemphigus. He states \" I am doing really well\"       History of Present Illness:  I have reviewed the teledermatology information and the nursing intake corresponding to this issue. Luan Mitchell is a 75 year old male who presents via teledermatology for pemphigus foliaceus follow-up.  Patient states he is doing well. Lesions are clearing with the prednisone taper.  He does not have a need to use to clobetasol ointment because the prednisone is clearing up the lesions.  He is happy with the current therapy.   He is concerned about starting an immunosuppressive as steroid sparing agent, given pandemic.   He has no other concerns today.    Of note, recent lab workup showed a pemphigus panel which showed DSG1 levels at 181 from 125.    ROS: Patient is generally feeling well today     Physical Examination:  General: Well-appearing male, appropriately-developed individual.  Skin: Focused examination within the teledermatology photograph(s) including b/l lower extremities was performed.   -post-inflammatory hyperpigmented pink patches on the shins and calves; there are also scattered " eroded papules on the shins    Labs:  reviewed    Past Medical History:   Patient Active Problem List   Diagnosis     Pemphigus foliaceus     Hyperlipidemia LDL goal <130     BPH (benign prostatic hyperplasia)     Encounter for long-term (current) use of high-risk medication     Dermatitis, seborrheic     Lightheadedness     Age-related nuclear cataract of both eyes     Past Medical History:   Diagnosis Date     BPH (benign prostatic hyperplasia)      Essential hypertension 2018     Hyperlipidemia LDL goal <130      Pemphigus     pemphigus foliaceus     Retinal detachment     left eye- corrected with laser     Vitreous detachment     LE     Past Surgical History:   Procedure Laterality Date     NO HISTORY OF SURGERY  12    derm     RETINOPEXY LASER PROPHYLAXIS BREAK(S) OS (LEFT EYE)       - Left eye       Social History:  Patient reports that he has never smoked. He has never used smokeless tobacco.    Family History:  Family History   Problem Relation Age of Onset     Heart Disease Mother         CHF d 80     Diabetes Mother         DM2,  age 80     Hypertension Mother      Heart Disease Father         CHF d 78     Heart Disease Paternal Uncle         CHF d 50s     Cancer No family hx of         no skin cancer     Skin Cancer No family hx of         no skin cancer     Glaucoma No family hx of      Macular Degeneration No family hx of      Melanoma No family hx of        Medications:  Current Outpatient Medications   Medication     ARTIFICIAL TEAR SOLUTION OP     atorvastatin (LIPITOR) 20 MG tablet     Cholecalciferol (VITAMIN D PO)     clobetasol (TEMOVATE) 0.05 % external ointment     clobetasol (TEMOVATE) 0.05 % external solution     clobetasol (TEMOVATE) 0.05 % ointment     clobetasol propionate 0.05 % SHAM     finasteride (PROSCAR) 5 MG tablet     hydrochlorothiazide (HYDRODIURIL) 12.5 MG tablet     hydrocortisone butyrate (LOCOID) 0.1 % SOLN     ketoconazole (NIZORAL) 2 % cream      ketoconazole (NIZORAL) 2 % shampoo     Multiple Vitamin (MULTIVITAMIN) per tablet     predniSONE (DELTASONE) 10 MG tablet     triamcinolone (KENALOG) 0.1 % external cream     triamcinolone (KENALOG) 0.1 % ointment     desonide (DESOWEN) 0.05 % ointment     tacrolimus (PROTOPIC) 0.1 % ointment     triamcinolone (KENALOG) 0.1 % external ointment     No current facility-administered medications for this visit.           No Known Allergies      _____________________________________________________________________________    Teledermatology information:  - Location of patient: [place]  - Patient presented as: return  - Location of teledermatologist:  [facility] DERMATOLOGY )  - Reason teledermatology is appropriate:  of National Emergency Regarding Coronavirus disease (COVID 19) Outbreak  - Image quality and interpretability: acceptable  - Physician has received verbal consent for a Video/Photos Visit from the patient? Yes  - In-person dermatology visit recommendation: no  - Date of images: 6/24/20  - Service start time: 10:39  - Service end time:10:49  - Date of report: 6/25/2020

## 2020-06-29 ENCOUNTER — VIRTUAL VISIT (OUTPATIENT)
Dept: UROLOGY | Facility: CLINIC | Age: 75
End: 2020-06-29
Payer: COMMERCIAL

## 2020-06-29 DIAGNOSIS — N40.1 BENIGN PROSTATIC HYPERPLASIA WITH LOWER URINARY TRACT SYMPTOMS, SYMPTOM DETAILS UNSPECIFIED: Primary | ICD-10-CM

## 2020-06-29 PROBLEM — R06.02 SOB (SHORTNESS OF BREATH): Status: ACTIVE | Noted: 2020-06-29

## 2020-06-29 RX ORDER — FINASTERIDE 5 MG/1
5 TABLET, FILM COATED ORAL DAILY
Qty: 90 TABLET | Refills: 3 | Status: SHIPPED | OUTPATIENT
Start: 2020-06-29 | End: 2020-12-31

## 2020-06-29 NOTE — LETTER
"6/29/2020       RE: Luan Mitchell  [address] [place] [address]     Dear Colleague,    Thank you for referring your patient, Luan Mitchell, to the [facility] UROLOGY AND [facility] FOR PROSTATE AND UROLOGIC CANCERS at [facility]. Please see a copy of my visit note below.    Video Visit Technology for this patient: [employer] Video Visit- Patient was left in waiting room        Luan Mitchell is a 75 year old male who is being evaluated via a billable video visit.      The patient has been notified of following:     \"This video visit will be conducted via a call between you and your physician/provider. We have found that certain health care needs can be provided without the need for an in-person physical exam.  This service lets us provide the care you need with a video conversation.  If a prescription is necessary we can send it directly to your pharmacy.  If lab work is needed we can place an order for that and you can then stop by our lab to have the test done at a later time.    Video visits are billed at different rates depending on your insurance coverage.  Please reach out to your insurance provider with any questions.    If during the course of the call the physician/provider feels a video visit is not appropriate, you will not be charged for this service.\"    Patient has given verbal consent for Video visit? Yes  How would you like to obtain your AVS? MyChart    Will anyone else be joining your video visit? No        Video-Visit Details    Type of service:  Video Visit    Video Start Time: 1:56pm    ADDITIONAL PROVIDER NOTES:  REASON FOR VISIT TODAY:  Abnormal digital rectal exam.      HISTORY OF PRESENT ILLNESS:  Mr. Mitchell is a 75-year-old gentleman followed in our clinic for history of mildly elevated PSAs and abnormal digital rectal exam.  The patient has been followed for many years, previously seen by Dr. Easton Corral and Dr. Jet Roldan.  He has been on finasteride in " management of his lower urinary tract symptoms as of 1996.  He has had 4 previous prostate biopsies including what sounds like a saturation biopsy.  The patient's last biopsy; however, was back in 2006.  He has had an MRI of the prostate in 2018 that showed a PI-RADS 3 lesion only.  The patient comes in today noting no changes in his health since we last saw him.   Urination is stable on finasteride    OBJECTIVE: PSA from 6/12/20 is 1.91 ng/mL (3.82 ng/mL adjusted for finasteride)    ASSESSMENT AND PLAN:   Over half of today's 10-minute visit was spent counseling the patient regarding his PSA.  PSA remains stable.  Recheck in 1-2 years.  Refill for finasteride sent to pharmacy.  F/u in 1-2 years.      Video End Time: 2:06pm     Originating Location (pt. Location): Home    Distant Location (provider location):  [facility] UROLOGY AND [facility] FOR PROSTATE AND UROLOGIC CANCERS     Platform used for Video Visit: Jasbir    Sincerely,    Asa Gentile MD

## 2020-06-29 NOTE — PATIENT INSTRUCTIONS
Follow up with Dr. Gentile or Carolynn Carroll PA-C, in 1-2 years with PSA prior.      It was a pleasure meeting with you today.  Thank you for allowing me and my team the privilege of caring for you today.  YOU are the reason we are here, and I truly hope we provided you with the excellent service you deserve.  Please let us know if there is anything else we can do for you so that we can be sure you are leaving completely satisfied with your care experience.

## 2020-06-29 NOTE — PROGRESS NOTES
"Video Visit Technology for this patient: [facility] Video Visit- Patient was left in waiting room        Luan Mitchell is a 75 year old male who is being evaluated via a billable video visit.      The patient has been notified of following:     \"This video visit will be conducted via a call between you and your physician/provider. We have found that certain health care needs can be provided without the need for an in-person physical exam.  This service lets us provide the care you need with a video conversation.  If a prescription is necessary we can send it directly to your pharmacy.  If lab work is needed we can place an order for that and you can then stop by our lab to have the test done at a later time.    Video visits are billed at different rates depending on your insurance coverage.  Please reach out to your insurance provider with any questions.    If during the course of the call the physician/provider feels a video visit is not appropriate, you will not be charged for this service.\"    Patient has given verbal consent for Video visit? Yes  How would you like to obtain your AVS? MyChart    Will anyone else be joining your video visit? No        Video-Visit Details    Type of service:  Video Visit    Video Start Time: 1:56pm    ADDITIONAL PROVIDER NOTES:  REASON FOR VISIT TODAY:  Abnormal digital rectal exam.      HISTORY OF PRESENT ILLNESS:  Mr. Mitchell is a 75-year-old gentleman followed in our clinic for history of mildly elevated PSAs and abnormal digital rectal exam.  The patient has been followed for many years, previously seen by Dr. Easton Corral and Dr. Jet Roldan.  He has been on finasteride in management of his lower urinary tract symptoms as of 1996.  He has had 4 previous prostate biopsies including what sounds like a saturation biopsy.  The patient's last biopsy; however, was back in 2006.  He has had an MRI of the prostate in 2018 that showed a PI-RADS 3 lesion only.  The patient comes in today noting no " changes in his health since we last saw him.   Urination is stable on finasteride    OBJECTIVE: PSA from 6/12/20 is 1.91 ng/mL (3.82 ng/mL adjusted for finasteride)    ASSESSMENT AND PLAN:   Over half of today's 10-minute visit was spent counseling the patient regarding his PSA.  PSA remains stable.  Recheck in 1-2 years.  Refill for finasteride sent to pharmacy.  F/u in 1-2 years.      Video End Time: 2:06pm     Originating Location (pt. Location): Home    Distant Location (provider location):  [facility] UROLOGY AND [facility] FOR PROSTATE AND UROLOGIC CANCERS     Platform used for Video Visit: Raquel

## 2020-07-09 ENCOUNTER — VIRTUAL VISIT (OUTPATIENT)
Dept: DERMATOLOGY | Facility: CLINIC | Age: 75
End: 2020-07-09
Payer: COMMERCIAL

## 2020-07-09 DIAGNOSIS — L10.2 PEMPHIGUS FOLIACEUS (H): Primary | ICD-10-CM

## 2020-07-09 ASSESSMENT — PAIN SCALES - GENERAL: PAINLEVEL: NO PAIN (0)

## 2020-07-09 NOTE — LETTER
7/9/2020        RE: Luan Mitchell  [address] [place] [address]     Dear Colleague,    Thank you for referring your patient, Luan Mitchell, to the [facility] DERMATOLOGY at [facility]. Please see a copy of my visit note below.    [facility]  Store and Forward and Telephone [phone]      Impression and Recommendations (Patient Counseled on the Following):  1. P foliaceus  Doing well. No new lesions and no active lesions. Continues on prednisone taper. About to start 20 mg for 7 days, then will taper to 10 mg x 7 days then will stop. Patients may tend to flare when at 20 mg or below, so he will let us know if this happens and in that case can see sooner than 1 month.  -Continue prednisone taper as above  -Clobetasol 0.05% ointment BID if needed for flare and TAC 0.1% ointment BID for areas more mild    Follow-up:   Follow-up with dermatology in approximately 1 month via virtual visit. Earlier for new or changing lesions or rash.      Staff and resident:    Pillo Lizama MD  Internal Medicine-Dermatology, PGY-5    Staffed with Dr. Jose R MURRAY, Serina Odell,  was with the resident on the (phone/video) visit (for the critical and key portions or for 4 minutes) and agree with the resident's findings and plan of care as documented in the resident's note  _____________________________________________________________________________    Dermatology Problem List:  1.  Pemphigus foliaceus.     - Goals of treatment per Mr. Mitchell:  Prevent itching and secondary infections.   - He is not interested in complete clearance at this time.     - Stopped MMF 03/2019  -On 6/11/20: started prednisone taper (from 60 mg and taper off by 10 mg q7 days)  Cell Surface Antibodies   Serum    Date of    IgG CS        IgA CS   Number   Specimen   Titers        Titers     DSG 1   DSG 3   -------  --------   ----------    --------   -----   -----      03/14/19   ME: 1:1280    ME: NA     125*      1                      NS: 1:160     NS: NA     128**     06/12/20   ME: 1:2560    ME: NA     181*      1                      NS: 1:2560    NS: NA     320**      2.  History of seborrheic keratoses.   3.  Seborrheic dermatitis, on ketaconazole shampoo and cream.   4. DN, Mild. Right neck. Bx 3/22/18.   5. Likely SK right upper forehead; will monitor, if changes consider biopsy       Encounter Date: Jul 9, 2020    CC:   Chief Complaint   Patient presents with     Derm Problem     Luan is being seen today for a follwo up on Pemphigus foliaceus       History of Present Illness:  I have reviewed the teledermatology information and the nursing intake corresponding to this issue. Luan Mitchell is a 75 year old male who presents via teledermatology for pemphigus foliaceus.  He is doing well. No new lesions/erosions/ulcerations. No active lesions. Not treating with topical steroids currently because his skin is clear.  He is about to start prednisone 20 mg tomorrow. Denies F/C.      ROS: Patient is generally feeling well today     Physical Examination:  General: Well-appearing, appropriately-developed individual.  Skin: Focused examination within the teledermatology photograph(s) including lower extremities was performed.   -No active areas of ulceration or erosions; skin is clear    Labs:  none    Past Medical History:   Patient Active Problem List   Diagnosis     Pemphigus foliaceus     Hyperlipidemia LDL goal <130     BPH (benign prostatic hyperplasia)     Encounter for long-term (current) use of high-risk medication     Dermatitis, seborrheic     Lightheadedness     Age-related nuclear cataract of both eyes     SOB (shortness of breath)     Past Medical History:   Diagnosis Date     BPH (benign prostatic hyperplasia)      Essential hypertension 02/2018     Hyperlipidemia LDL goal <130      Pemphigus     pemphigus foliaceus     Retinal detachment 2009    left eye-  corrected with laser     Vitreous detachment     LE     Past Surgical History:   Procedure Laterality Date     NO HISTORY OF SURGERY  12    derm     RETINOPEXY LASER PROPHYLAXIS BREAK(S) OS (LEFT EYE)      2009 - Left eye       Social History:  Patient reports that he has never smoked. He has never used smokeless tobacco.    Family History:  Family History   Problem Relation Age of Onset     Heart Disease Mother         CHF d 80     Diabetes Mother         DM2,  age 80     Hypertension Mother      Heart Disease Father         CHF d 78     Heart Disease Paternal Uncle         CHF d 50s     Cancer No family hx of         no skin cancer     Skin Cancer No family hx of         no skin cancer     Glaucoma No family hx of      Macular Degeneration No family hx of      Melanoma No family hx of        Medications:  Current Outpatient Medications   Medication     ARTIFICIAL TEAR SOLUTION OP     Aspirin Buf,CaCarb-MgCarb-MgO, 81 MG TABS     atorvastatin (LIPITOR) 20 MG tablet     Cholecalciferol (VITAMIN D PO)     clobetasol (TEMOVATE) 0.05 % external ointment     clobetasol (TEMOVATE) 0.05 % external solution     clobetasol (TEMOVATE) 0.05 % ointment     clobetasol propionate 0.05 % SHAM     desonide (DESOWEN) 0.05 % ointment     finasteride (PROSCAR) 5 MG tablet     finasteride (PROSCAR) 5 MG tablet     hydrochlorothiazide (HYDRODIURIL) 12.5 MG tablet     hydrocortisone butyrate (LOCOID) 0.1 % SOLN     ketoconazole (NIZORAL) 2 % cream     ketoconazole (NIZORAL) 2 % shampoo     Multiple Vitamin (MULTIVITAMIN) per tablet     predniSONE (DELTASONE) 10 MG tablet     tacrolimus (PROTOPIC) 0.1 % ointment     triamcinolone (KENALOG) 0.1 % external cream     triamcinolone (KENALOG) 0.1 % external ointment     triamcinolone (KENALOG) 0.1 % ointment     No current facility-administered medications for this visit.           No Known  "Allergies      _____________________________________________________________________________    Teledermatology information:  - Location of patient: [place]  - Patient presented as: return  - Location of teledermatologist:  [facility] DERMATOLOGY )  - Reason teledermatology is appropriate:  of National Emergency Regarding Coronavirus disease (COVID 19) Outbreak  - Image quality and interpretability: acceptable  - Physician has received verbal consent for a Video/Photos Visit from the patient? Yes  - In-person dermatology visit recommendation: no  - Date of images: 7/8/2020  - Service start time:10:25 am  - Service end time:10:29 am  - Date of report: 7/9/2020     Teledermatology Nurse Call for RETURN patients seen within the last 3 years:    The patient was contacted by phone and we reviewed, \"Due to the coronavirus pandemic, we are calling to review your visit and offer you a teledermatology visit where you send in photos via [employer]. These photos will be seen by an MD or JOSE RAFAEL. This will be billed to you and your insurance.\"  The patient was also told that \"a teledermatology visit is not as thorough as an in-person visit and that the quality of the photograph sent may not be of the same quality as that taken by the dermatology clinic, but the patient would like to proceed with an teledermatology because of National Emergency Regarding Coronavirus disease (COVID 19) Outbreak.\"  \"If a prescription is necessary we can send it directly to your pharmacy.  If lab work is needed we can place an order for that and you can then stop by our lab to have the test done at a later time.\"    The patient understood that they may receive a call from the clinic to review additional history, may still be instructed to come to clinic even after photo review and be billed for both visits with an MD. Visits are billed at different rates depending on your insurance coverage. Please reach out to your insurance provider with any " questions.They were told that a photo assessment does not replace an in person skin exam. The patient understood that teledermatology is not for urgent issues and would require up to 3 business days for review. The patient denied skin pain, fever, mucosal symptoms (lesions, blisters, sores in the mouth, nose, eyes, or genitals)  IF PATIENT ENDORSES ANY OF THESE STOP AND PAGE  ON CALL ATTENDING. IF OTHER POSSIBLY URGENT SYMPTOMS THEN PAGE PHYSICIAN YOU ARE SCHEDULING WITH OR ON CALL IF NO ANSWER.       The patient chose to:                                                                                                                                                                                                                    Consent to a teledermatology visit with mychart photos. The patient understood they must upload a mychart photo for this visit to be completed. They indicated that the photo will be taken at their home address(if other address please document here). Patient told nursing these are already uploaded .  The patient was instructed to take photos of all all areas of concern and all areas of any rash from near and far away.                                                                                                                                                                                                                               The patient is verified to be in the state of [place] at the time of the encounter.     The physician must be notified by nurse if the patient is not in the state at the time of the encounterDELETE THIS PHRASE FROM NOTE AFTER COMPLETING IF NEEDED                                                                                                                                                                                                            Patient concerns for this return visit: Pemphigus foliaceus    Photo instructions reviewed with patients as  below:  -ALL patient needs to send photos unless they have a phone only visit approved by the clinic:  o To send photos to your doctor, respond to the message in Tealet as many times as needed to upload your photos. Each message allows for 3 photos.  o For spots or lesions of concern, please take at least 2 photos of each site you are worried about (at different angles if needed, and at least one close up and one farther away so we can tell where it is on the body. Be sure all photographs are in focus)  o For rashes, take photos of the entire body because it is important for us to see which areas are involved and which areas are not involved.  At a minimum, please include photos of the arms, legs, front of trunk, back of trunk, face, and a few close-ups of the rash.  Leave undergarments in place unless the rash involves the skin in these areas  o For acne, please take photos of the face, upper chest and neck, and upper chest and back  o For hair loss, please send views of the top of the head, sides of the head, back of the head and a picture of your face with your hair pulled back. Also, a photos of both hands with nails.    -For ADULT NEW patients video visits are needed, to receive an invitation and connect with your provider, the [employer] video visit technology must be accessible from your smartphone or personal device. Please click the link below for setup instructions: [email or web address]    Nursing tasks completed  -Pharmacy preference was updated.  -The nurse has dropped in the AVS information *(For adults the phrase is umdermhteleavs and for pediatrics it is their own) for the physician to route in the AVS.                                                                                                                                                                                                                         -The patient was told to contact the clinic if they have not received correspondence  within 72 hours.           Again, thank you for allowing me to participate in the care of your patient.      Sincerely,    Pillo Lizama MD

## 2020-07-09 NOTE — NURSING NOTE
Dermatology Rooming Note    Luan Mitchell's goals for this visit include:   Chief Complaint   Patient presents with     Derm Problem     Luan is being seen today for a follwo up on Pemphigus foliaceus     LUTHER Parmar

## 2020-07-09 NOTE — PROGRESS NOTES
[facility] Record:  Store and Forward and Telephone [phone]      Impression and Recommendations (Patient Counseled on the Following):  1. P foliaceus  Doing well. No new lesions and no active lesions. Continues on prednisone taper. About to start 20 mg for 7 days, then will taper to 10 mg x 7 days then will stop. Patients may tend to flare when at 20 mg or below, so he will let us know if this happens and in that case can see sooner than 1 month.  -Continue prednisone taper as above  -Clobetasol 0.05% ointment BID if needed for flare and TAC 0.1% ointment BID for areas more mild    Follow-up:   Follow-up with dermatology in approximately 1 month via virtual visit. Earlier for new or changing lesions or rash.      Staff and resident:    Pillo Lizama MD  Internal Medicine-Dermatology, PGY-5    Staffed with Dr. Jose R MURRAY, Serina Odell,  was with the resident on the (phone/video) visit (for the critical and key portions or for 4 minutes) and agree with the resident's findings and plan of care as documented in the resident's note  _____________________________________________________________________________    Dermatology Problem List:  1.  Pemphigus foliaceus.     - Goals of treatment per Mr. Mitchell:  Prevent itching and secondary infections.   - He is not interested in complete clearance at this time.     - Stopped MMF 03/2019  -On 6/11/20: started prednisone taper (from 60 mg and taper off by 10 mg q7 days)  Cell Surface Antibodies   Serum    Date of    IgG CS        IgA CS   Number   Specimen   Titers        Titers     DSG 1   DSG 3   -------  --------   ----------    --------   -----   -----     03/14/19   ME: 1:1280    ME: NA     125*      1                      NS: 1:160     NS: NA     128**     06/12/20   ME: 1:2560    ME: NA     181*      1                      NS: 1:2560    NS: NA     320**      2.  History of seborrheic keratoses.   3.  Seborrheic dermatitis,  on ketaconazole shampoo and cream.   4. DN, Mild. Right neck. Bx 3/22/18.   5. Likely SK right upper forehead; will monitor, if changes consider biopsy       Encounter Date: Jul 9, 2020    CC:   Chief Complaint   Patient presents with     Derm Problem     Luan is being seen today for a follwo up on Pemphigus foliaceus       History of Present Illness:  I have reviewed the teledermatology information and the nursing intake corresponding to this issue. Luan Mitchell is a 75 year old male who presents via teledermatology for pemphigus foliaceus.  He is doing well. No new lesions/erosions/ulcerations. No active lesions. Not treating with topical steroids currently because his skin is clear.  He is about to start prednisone 20 mg tomorrow. Denies F/C.      ROS: Patient is generally feeling well today     Physical Examination:  General: Well-appearing, appropriately-developed individual.  Skin: Focused examination within the teledermatology photograph(s) including lower extremities was performed.   -No active areas of ulceration or erosions; skin is clear    Labs:  none    Past Medical History:   Patient Active Problem List   Diagnosis     Pemphigus foliaceus     Hyperlipidemia LDL goal <130     BPH (benign prostatic hyperplasia)     Encounter for long-term (current) use of high-risk medication     Dermatitis, seborrheic     Lightheadedness     Age-related nuclear cataract of both eyes     SOB (shortness of breath)     Past Medical History:   Diagnosis Date     BPH (benign prostatic hyperplasia)      Essential hypertension 02/2018     Hyperlipidemia LDL goal <130      Pemphigus     pemphigus foliaceus     Retinal detachment 2009    left eye- corrected with laser     Vitreous detachment 2009    LE     Past Surgical History:   Procedure Laterality Date     NO HISTORY OF SURGERY  1/28/14    derm     RETINOPEXY LASER PROPHYLAXIS BREAK(S) OS (LEFT EYE)      2009 - Left eye       Social History:  Patient reports that he has  never smoked. He has never used smokeless tobacco.    Family History:  Family History   Problem Relation Age of Onset     Heart Disease Mother         CHF d 80     Diabetes Mother         DM2,  age 80     Hypertension Mother      Heart Disease Father         CHF d 78     Heart Disease Paternal Uncle         CHF d 50s     Cancer No family hx of         no skin cancer     Skin Cancer No family hx of         no skin cancer     Glaucoma No family hx of      Macular Degeneration No family hx of      Melanoma No family hx of        Medications:  Current Outpatient Medications   Medication     ARTIFICIAL TEAR SOLUTION OP     Aspirin Buf,CaCarb-MgCarb-MgO, 81 MG TABS     atorvastatin (LIPITOR) 20 MG tablet     Cholecalciferol (VITAMIN D PO)     clobetasol (TEMOVATE) 0.05 % external ointment     clobetasol (TEMOVATE) 0.05 % external solution     clobetasol (TEMOVATE) 0.05 % ointment     clobetasol propionate 0.05 % SHAM     desonide (DESOWEN) 0.05 % ointment     finasteride (PROSCAR) 5 MG tablet     finasteride (PROSCAR) 5 MG tablet     hydrochlorothiazide (HYDRODIURIL) 12.5 MG tablet     hydrocortisone butyrate (LOCOID) 0.1 % SOLN     ketoconazole (NIZORAL) 2 % cream     ketoconazole (NIZORAL) 2 % shampoo     Multiple Vitamin (MULTIVITAMIN) per tablet     predniSONE (DELTASONE) 10 MG tablet     tacrolimus (PROTOPIC) 0.1 % ointment     triamcinolone (KENALOG) 0.1 % external cream     triamcinolone (KENALOG) 0.1 % external ointment     triamcinolone (KENALOG) 0.1 % ointment     No current facility-administered medications for this visit.           No Known Allergies      _____________________________________________________________________________    Teledermatology information:  - Location of patient: [place]  - Patient presented as: return  - Location of teledermatologist:  ([facility] DERMATOLOGY )  - Reason teledermatology is appropriate:  of National Emergency Regarding Coronavirus disease (COVID 19) Outbreak  -  Image quality and interpretability: acceptable  - Physician has received verbal consent for a Video/Photos Visit from the patient? Yes  - In-person dermatology visit recommendation: no  - Date of images: 7/8/2020  - Service start time:10:25 am  - Service end time:10:29 am  - Date of report: 7/9/2020

## 2020-07-09 NOTE — PATIENT INSTRUCTIONS
[facility] Teledermatology Visit    Thank you for allowing us to participate in your care. Your findings, instructions and follow-up plan are as follows:    Continue prednisone taper as you are  If you start to flare, please call our clinic or send us a message. Otherwise plan to follow-up with a virtual visit in 1 month.    When should I call my doctor?    If you are worsening or not improving, please, contact us or seek urgent care as noted below.     Who should I call with questions (adults)?    [facility] (adult and pediatric): [phone]     [facility] (adult): [phone]    For urgent needs outside of business hours call the [facility] at [phone] and ask for the dermatology resident on call    If this is a medical emergency and you are unable to reach an ER, Call [phone]      Who should I call with questions (pediatric)?  [facility]- Pediatric Dermatology  Dr. Cassidy Alvarado, Dr. Bang Robledo, Dr. Isabelle Collier, Tana Emery, MONA Foster, Dr. Halley Sotelo & Dr. Luan Cloud  Non Urgent  Nurse Triage Line; [phone]- Kandy and Yvonne RN Care Coordinators   Ashley (/Complex ) [phone]    If you need a prescription refill, please contact your pharmacy. Refills are approved or denied by our Physicians during normal business hours, Monday through Fridays  Per office policy, refills will not be granted if you have not been seen within the past year (or sooner depending on your child's condition)    Scheduling Information:  Pediatric Appointment Scheduling and Call Center [phone]  Radiology Scheduling- [phone]  Sedation Unit Scheduling- [phone]  [place] Scheduling- General [phone]; Pediatric Dermatology [phone]  Main  Services: [phone]  Colombian: [phone]  Rwandan: [phone]  Hmong/Nigerian/Omani:  [phone]  Preadmission Nursing Department Fax Number: [phone] (Fax all pre-operative paperwork to this number)    For urgent matters arising during evenings, weekends, or holidays that cannot wait for normal business hours please call [phone] and ask for the Dermatology Resident On-Call to be paged.

## 2020-07-09 NOTE — PROGRESS NOTES
"Teledermatology Nurse Call for RETURN patients seen within the last 3 years:    The patient was contacted by phone and we reviewed, \"Due to the coronavirus pandemic, we are calling to review your visit and offer you a teledermatology visit where you send in photos via GoCard[employer]. These photos will be seen by an MD or JOSE RAFAEL. This will be billed to you and your insurance.\"  The patient was also told that \"a teledermatology visit is not as thorough as an in-person visit and that the quality of the photograph sent may not be of the same quality as that taken by the dermatology clinic, but the patient would like to proceed with an teledermatology because of National Emergency Regarding Coronavirus disease (COVID 19) Outbreak.\"  \"If a prescription is necessary we can send it directly to your pharmacy.  If lab work is needed we can place an order for that and you can then stop by our lab to have the test done at a later time.\"    The patient understood that they may receive a call from the clinic to review additional history, may still be instructed to come to clinic even after photo review and be billed for both visits with an MD. Visits are billed at different rates depending on your insurance coverage. Please reach out to your insurance provider with any questions.They were told that a photo assessment does not replace an in person skin exam. The patient understood that teledermatology is not for urgent issues and would require up to 3 business days for review. The patient denied skin pain, fever, mucosal symptoms (lesions, blisters, sores in the mouth, nose, eyes, or genitals)  IF PATIENT ENDORSES ANY OF THESE STOP AND PAGE  ON CALL ATTENDING. IF OTHER POSSIBLY URGENT SYMPTOMS THEN PAGE PHYSICIAN YOU ARE SCHEDULING WITH OR ON CALL IF NO ANSWER.       The patient chose to:                                                                                                                                                           "                                                          Consent to a teledermatology visit with [employer]ake photos. The patient understood they must upload a HWt photo for this visit to be completed. They indicated that the photo will be taken at their home address(if other address please document here). Patient told nursing these are already uploaded .  The patient was instructed to take photos of all all areas of concern and all areas of any rash from near and far away.                                                                                                                                                                                                                               The patient is verified to be in the [facility] at the time of the encounter.     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 Each message allows for 3 photos.  o For spots or lesions of concern, please take at least 2 photos of each site you are worried about (at different angles if needed, and at least one close up and one farther away so we can tell where it is on the body. Be sure all photographs are in focus)  o For rashes, take photos of the entire body because it is important for us to see which areas are involved and which areas are not involved.  At a minimum, please include photos  of the arms, legs, front of trunk, back of trunk, face, and a few close-ups of the rash.  Leave undergarments in place unless the rash involves the skin in these areas  o For acne, please take photos of the face, upper chest and neck, and upper chest and back  o For hair loss, please send views of the top of the head, sides of the head, back of the head and a picture of your face with your hair pulled back. Also, a photos of both hands with nails.    -For ADULT NEW patients video visits are needed, to receive an invitation and connect with your provider, the [employer] video visit technology must be accessible from your smartphone or personal device. Please click the link below for setup instructions: [email or web address]    Nursing tasks completed  -Pharmacy preference was updated.  -The nurse has dropped in the AVS information *(For adults the phrase is umdermhteleavs and for pediatrics it is their own) for the physician to route in the AVS.                                                                                                                                                                                                                         -The patient was told to contact the clinic if they have not received correspondence within 72 hours.

## 2020-07-30 ENCOUNTER — VIRTUAL VISIT (OUTPATIENT)
Dept: DERMATOLOGY | Facility: CLINIC | Age: 75
End: 2020-07-30
Payer: COMMERCIAL

## 2020-07-30 DIAGNOSIS — L10.2 PEMPHIGUS FOLIACEUS (H): Primary | ICD-10-CM

## 2020-07-30 RX ORDER — PREDNISONE 10 MG/1
TABLET ORAL
Qty: 28 TABLET | Refills: 0 | Status: SHIPPED | OUTPATIENT
Start: 2020-07-30 | End: 2020-08-20

## 2020-07-30 ASSESSMENT — PAIN SCALES - GENERAL: PAINLEVEL: NO PAIN (0)

## 2020-07-30 NOTE — PATIENT INSTRUCTIONS
[facility] Dermatology Visit    Thank you for allowing us to participate in your care. Your findings, instructions and follow-up plan are as follows:    Restart prednisone 20 mg x1 week, then 10 mg until follow up    When should I call my doctor?    If you are worsening or not improving, please, contact us or seek urgent care as noted below.     Who should I call with questions (adults)?    [facility] (adult and pediatric): [phone]     [facility] (adult): [phone]    For urgent needs outside of business hours call the [facility] at [phone] and ask for the dermatology resident on call    If this is a medical emergency and you are unable to reach an ER, Call [phone]      Who should I call with questions (pediatric)?  [facility]- Pediatric Dermatology  Dr. Cassidy Alvarado, Dr. Bang Robledo, Dr. Isabelle Collier, Tana Emery, PA  Dr. Peggy Foster, Dr. Halley Sotelo & Dr. Luan Cloud  Non Urgent  Nurse Triage Line; [phone]- Kandy and Yvonne LI Care Coordinators   Ashley (/Complex ) [phone]    If you need a prescription refill, please contact your pharmacy. Refills are approved or denied by our Physicians during normal business hours, Monday through Fridays  Per office policy, refills will not be granted if you have not been seen within the past year (or sooner depending on your child's condition)    Scheduling Information:  Pediatric Appointment Scheduling and Call Center [phone]  Radiology Scheduling- [phone]  Sedation Unit Scheduling- [phone]  [facility] Scheduling- General [phone]; Pediatric Dermatology [phone]  Main  Services: [phone]  Urdu: [phone]  Mauritian: [phone]  Hmong/Julian/Brian: [phone]  Preadmission Nursing Department Fax Number: [phone] (Fax all pre-operative paperwork to  this number)    For urgent matters arising during evenings, weekends, or holidays that cannot wait for normal business hours please call [phone] and ask for the Dermatology Resident On-Call to be paged.

## 2020-07-30 NOTE — LETTER
7/30/2020        RE: Luan Mitchell  [address] [address]     Dear Colleague,    Thank you for referring your patient, Luan Mitchell, to the [facility] DERMATOLOGY at [facility]. Please see a copy of my visit note below.    [facility] Dermatology Record:  Store and Forward and Telephone [phone]      Dermatology Problem List:  1. 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  History of seborrheic keratoses.   3.  Seborrheic dermatitis, on ketaconazole shampoo and cream.   4. DN, Mild. Right neck. Bx 3/22/18.   5. Likely SK right upper forehead; will monitor, if changes consider biopsy    Pemphigus studies:     Cell Surface IgG ELSA DSG1 DSG3   4/23/15 1:5120 monkey esoph  1:1280 human skin 285 0   3/14/19 1:1280 monkey esoph  1:160 human skin 128 1   6/12/20 1:2560 monkey esoph  1:2560 human skin 320 1                         Encounter Date: Jul 30, 2020    CC:   Chief Complaint   Patient presents with     Derm Problem     Pemphigus foliaceus - Luan is having a flare on his back       History of Present Illness:  Luan Mitchell is a 75 year old male who presents for follow-up of pemphigus foliaceous. Patient completed prednisone taper (20>10>off) three days ago. Noted almost immediate flare of lesions on back and few scattered on shoulder and elbows. Very itchy. No current medications; applying clobetasol which helps somewhat. No additional concerns voiced today. Previously tolerated pred without significant adverse effect.      ROS: Patient is generally feeling well today    Physical Examination:  NA      Past Medical History:   Patient Active Problem List   Diagnosis     Pemphigus foliaceus     Hyperlipidemia LDL goal <130     BPH (benign prostatic hyperplasia)     Encounter for long-term (current) use of high-risk medication     Dermatitis,  seborrheic     Lightheadedness     Age-related nuclear cataract of both eyes     SOB (shortness of breath)     Past Medical History:   Diagnosis Date     BPH (benign prostatic hyperplasia)      Essential hypertension 2018     Hyperlipidemia LDL goal <130      Pemphigus     pemphigus foliaceus     Retinal detachment     left eye- corrected with laser     Vitreous detachment     LE     Past Surgical History:   Procedure Laterality Date     NO HISTORY OF SURGERY  12    derm     RETINOPEXY LASER PROPHYLAXIS BREAK(S) OS (LEFT EYE)       - Left eye       Social History:  Patient reports that he has never smoked. He has never used smokeless tobacco.    Family History:  Family History   Problem Relation Age of Onset     Heart Disease Mother         CHF d 80     Diabetes Mother         DM2,  age 80     Hypertension Mother      Heart Disease Father         CHF d 78     Heart Disease Paternal Uncle         CHF d 50s     Cancer No family hx of         no skin cancer     Skin Cancer No family hx of         no skin cancer     Glaucoma No family hx of      Macular Degeneration No family hx of      Melanoma No family hx of        Medications:  Current Outpatient Medications   Medication     ARTIFICIAL TEAR SOLUTION OP     atorvastatin (LIPITOR) 20 MG tablet     Cholecalciferol (VITAMIN D PO)     clobetasol (TEMOVATE) 0.05 % external ointment     clobetasol (TEMOVATE) 0.05 % external solution     clobetasol (TEMOVATE) 0.05 % ointment     clobetasol propionate 0.05 % SHAM     desonide (DESOWEN) 0.05 % ointment     finasteride (PROSCAR) 5 MG tablet     finasteride (PROSCAR) 5 MG tablet     hydrochlorothiazide (HYDRODIURIL) 12.5 MG tablet     hydrocortisone butyrate (LOCOID) 0.1 % SOLN     ketoconazole (NIZORAL) 2 % cream     ketoconazole (NIZORAL) 2 % shampoo     Multiple Vitamin (MULTIVITAMIN) per tablet     predniSONE (DELTASONE) 10 MG tablet     tacrolimus (PROTOPIC) 0.1 % ointment     triamcinolone  (KENALOG) 0.1 % external cream     triamcinolone (KENALOG) 0.1 % external ointment     triamcinolone (KENALOG) 0.1 % ointment     Aspirin Buf,CaCarb-MgCarb-MgO, 81 MG TABS     No current facility-administered medications for this visit.           No Known Allergies        Impression and Recommendations (Patient Counseled on the Following):  1. Pemphigus foliaceus:   Now flaring off prednisone, suspect related to rapidity of taper. Will plan for resumption of prednisone with discussion of maintenance therapy (consider MMF as previously tolerated) at follow-up in 2 weeks.  -START prednisone at taper as follows  -- 20 mg x 7 days  -- 10 mg x 14 days  -- Continue clobetasol 0.05% ointment      Follow-up:  Follow-up with dermatology in approximately 2 weeks. Earlier for new or changing lesions or rash.   Staff: Dr. Mckeon    Staff and resident(Aroldo)    Tahir Kendrick MD  Internal Medicine - Dermatology PGY 2  [phone]    Staff Physician Comments:   I evaluated any available patient photographs with the resident and I edited the assessment and plan as documented in the note. I was present on the line and participated in the entire telephone call.    Luan Mckeon MD   of Dermatology  Department of Dermatology  [facility]  _____________________________________________________________________________    Teledermatology information:  - Location of patient: [place]  - Patient presented as: return  - Location of teledermatologist:  ([facility] DERMATOLOGY )  - Reason teledermatology is appropriate:  of National Emergency Regarding Coronavirus disease (COVID 19) Outbreak  - Image quality and interpretability: NA  - Physician has received verbal consent for a Video/Photos Visit from the patient? Yes  - In-person dermatology visit recommendation: yes - for physician visit  - Date of images: NA  - Service start time:7:50AM  - Service end time:7:56AM  - Date of  report: 7/30/2020     Again, thank you for allowing me to participate in the care of your patient.      Sincerely,    Tahir Kendrick

## 2020-07-30 NOTE — NURSING NOTE
Dermatology Rooming Note    Luan Mitchell's goals for this visit include:   Chief Complaint   Patient presents with     Derm Problem     Pemphigus foliaceus - Luan is having a flare on his back     Evelyn Luis, SHAY

## 2020-07-30 NOTE — PROGRESS NOTES
[facility] Dermatology Record:  Store and Forward and Telephone [phone]      Dermatology Problem List:  1. 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  History of seborrheic keratoses.   3.  Seborrheic dermatitis, on ketaconazole shampoo and cream.   4. DN, Mild. Right neck. Bx 3/22/18.   5. Likely SK right upper forehead; will monitor, if changes consider biopsy    Pemphigus studies:     Cell Surface IgG ELSA DSG1 DSG3   4/23/15 1:5120 monkey esoph  1:1280 human skin 285 0   3/14/19 1:1280 monkey esoph  1:160 human skin 128 1   6/12/20 1:2560 monkey esoph  1:2560 human skin 320 1                         Encounter Date: Jul 30, 2020    CC:   Chief Complaint   Patient presents with     Derm Problem     Pemphigus foliaceus - Luan is having a flare on his back       History of Present Illness:  Luan Mitchell is a 75 year old male who presents for follow-up of pemphigus foliaceous. Patient completed prednisone taper (20>10>off) three days ago. Noted almost immediate flare of lesions on back and few scattered on shoulder and elbows. Very itchy. No current medications; applying clobetasol which helps somewhat. No additional concerns voiced today. Previously tolerated pred without significant adverse effect.      ROS: Patient is generally feeling well today    Physical Examination:  NA      Past Medical History:   Patient Active Problem List   Diagnosis     Pemphigus foliaceus     Hyperlipidemia LDL goal <130     BPH (benign prostatic hyperplasia)     Encounter for long-term (current) use of high-risk medication     Dermatitis, seborrheic     Lightheadedness     Age-related nuclear cataract of both eyes     SOB (shortness of breath)     Past Medical History:   Diagnosis Date     BPH (benign prostatic hyperplasia)      Essential hypertension 02/2018     Hyperlipidemia LDL goal <130      Pemphigus      pemphigus foliaceus     Retinal detachment 2009    left eye- corrected with laser     Vitreous detachment 2009    LE     Past Surgical History:   Procedure Laterality Date     NO HISTORY OF SURGERY  12    derm     RETINOPEXY LASER PROPHYLAXIS BREAK(S) OS (LEFT EYE)      2009 - Left eye       Social History:  Patient reports that he has never smoked. He has never used smokeless tobacco.    Family History:  Family History   Problem Relation Age of Onset     Heart Disease Mother         CHF d 80     Diabetes Mother         DM2,  age 80     Hypertension Mother      Heart Disease Father         CHF d 78     Heart Disease Paternal Uncle         CHF d 50s     Cancer No family hx of         no skin cancer     Skin Cancer No family hx of         no skin cancer     Glaucoma No family hx of      Macular Degeneration No family hx of      Melanoma No family hx of        Medications:  Current Outpatient Medications   Medication     ARTIFICIAL TEAR SOLUTION OP     atorvastatin (LIPITOR) 20 MG tablet     Cholecalciferol (VITAMIN D PO)     clobetasol (TEMOVATE) 0.05 % external ointment     clobetasol (TEMOVATE) 0.05 % external solution     clobetasol (TEMOVATE) 0.05 % ointment     clobetasol propionate 0.05 % SHAM     desonide (DESOWEN) 0.05 % ointment     finasteride (PROSCAR) 5 MG tablet     finasteride (PROSCAR) 5 MG tablet     hydrochlorothiazide (HYDRODIURIL) 12.5 MG tablet     hydrocortisone butyrate (LOCOID) 0.1 % SOLN     ketoconazole (NIZORAL) 2 % cream     ketoconazole (NIZORAL) 2 % shampoo     Multiple Vitamin (MULTIVITAMIN) per tablet     predniSONE (DELTASONE) 10 MG tablet     tacrolimus (PROTOPIC) 0.1 % ointment     triamcinolone (KENALOG) 0.1 % external cream     triamcinolone (KENALOG) 0.1 % external ointment     triamcinolone (KENALOG) 0.1 % ointment     Aspirin Buf,CaCarb-MgCarb-MgO, 81 MG TABS     No current facility-administered medications for this visit.           No Known Allergies        Impression  and Recommendations (Patient Counseled on the Following):  1. Pemphigus foliaceus:   Now flaring off prednisone, suspect related to rapidity of taper. Will plan for resumption of prednisone with discussion of maintenance therapy (consider MMF as previously tolerated) at follow-up in 2 weeks.  -START prednisone at taper as follows  -- 20 mg x 7 days  -- 10 mg x 14 days  -- Continue clobetasol 0.05% ointment      Follow-up:  Follow-up with dermatology in approximately 2 weeks. Earlier for new or changing lesions or rash.   Staff: Dr. Mckeon    Staff and resident(Aroldo)    Tahir Kendrick MD  Internal Medicine - Dermatology PGY 2  [phone]    Staff Physician Comments:   I evaluated any available patient photographs with the resident and I edited the assessment and plan as documented in the note. I was present on the line and participated in the entire telephone call.    Luan Mckeon MD   of Dermatology  Department of Dermatology  [facility]  _____________________________________________________________________________    Teledermatology information:  - Location of patient: [place]  - Patient presented as: return  - Location of teledermatologist:  ([facility] DERMATOLOGY )  - Reason teledermatology is appropriate:  of National Emergency Regarding Coronavirus disease (COVID 19) Outbreak  - Image quality and interpretability: NA  - Physician has received verbal consent for a Video/Photos Visit from the patient? Yes  - In-person dermatology visit recommendation: yes - for physician visit  - Date of images: NA  - Service start time:7:50AM  - Service end time:7:56AM  - Date of report: 7/30/2020

## 2020-08-13 ENCOUNTER — OFFICE VISIT (OUTPATIENT)
Dept: DERMATOLOGY | Facility: CLINIC | Age: 75
End: 2020-08-13
Payer: COMMERCIAL

## 2020-08-13 ENCOUNTER — TELEPHONE (OUTPATIENT)
Dept: DERMATOLOGY | Facility: CLINIC | Age: 75
End: 2020-08-13

## 2020-08-13 DIAGNOSIS — L82.1 SEBORRHEIC KERATOSIS: ICD-10-CM

## 2020-08-13 DIAGNOSIS — L10.2 PEMPHIGUS FOLIACEUS (H): Primary | ICD-10-CM

## 2020-08-13 RX ORDER — PREDNISONE 5 MG/1
5 TABLET ORAL DAILY
Qty: 7 TABLET | Refills: 0 | Status: SHIPPED | OUTPATIENT
Start: 2020-08-20 | End: 2020-08-27

## 2020-08-13 RX ORDER — MYCOPHENOLATE MOFETIL 500 MG/1
500 TABLET ORAL 2 TIMES DAILY
Qty: 180 TABLET | Refills: 1 | Status: SHIPPED | OUTPATIENT
Start: 2020-08-13 | End: 2021-08-19

## 2020-08-13 RX ORDER — TACROLIMUS 1 MG/G
OINTMENT TOPICAL
Qty: 300 G | Refills: 3 | Status: SHIPPED | OUTPATIENT
Start: 2020-08-13 | End: 2023-09-21

## 2020-08-13 ASSESSMENT — PAIN SCALES - GENERAL: PAINLEVEL: NO PAIN (0)

## 2020-08-13 NOTE — PROGRESS NOTES
[facility] Dermatology Note      Dermatology Problem List:  1.  Pemphigus foliaceus.     - Goals of treatment per Mr. Mitchell:  Prevent itching and secondary infections.   - He is not interested in complete clearance at this time.     - Stopped MMF 03/2019  -On 6/11/20: started prednisone taper (from 60 mg and taper off by 10 mg q7 days)  Cell Surface Antibodies   Serum    Date of    IgG CS        IgA CS   Number   Specimen   Titers        Titers     DSG 1   DSG 3   -------  --------   ----------    --------   -----   -----     03/14/19   ME: 1:1280    ME: NA     125*      1                      NS: 1:160     NS: NA     128**     06/12/20   ME: 1:2560    ME: NA     181*      1                      NS: 1:2560    NS: NA     320**      2.  History of seborrheic keratoses.   3.  Seborrheic dermatitis, on ketaconazole shampoo and cream.   4. DN, Mild. Right neck. Bx 3/22/18.   5. Verruous SKs right upper forehead; will monitor, if changes consider biopsy       Encounter Date: Aug 13, 2020    CC:   Chief Complaint   Patient presents with     Derm Problem     Luan is here for a follow up visit. He states he has had improvement since he recived a dose of prednisone.          History of Present Illness:  Mr. Luan Mitchell is a 75 year old male who presents as a follow-up for pemphigus foliaceus. The patient was last seen 7/9/20 when he was restarted on a prednisone taper due to a flare.  The patient was restarted on 20 mg x 7 days, then 10 mg x 2 weeks. He has 1 week left of the 10 mg prednisone.  The patient states his disease activity is improved but still has a number of crusty pink.gray plaques on the back - itch has improved  No oral/genital lesions.  Has topical steroids and Protopic available.  No other skin related concerns,     Past Medical History:   Patient Active Problem List   Diagnosis     Pemphigus foliaceus     Hyperlipidemia LDL goal <130     BPH (benign prostatic  hyperplasia)     Encounter for long-term (current) use of high-risk medication     Dermatitis, seborrheic     Lightheadedness     Age-related nuclear cataract of both eyes     SOB (shortness of breath)     Past Medical History:   Diagnosis Date     BPH (benign prostatic hyperplasia)      Essential hypertension 2018     Hyperlipidemia LDL goal <130      Pemphigus     pemphigus foliaceus     Retinal detachment     left eye- corrected with laser     Vitreous detachment     LE     Past Surgical History:   Procedure Laterality Date     NO HISTORY OF SURGERY  14    derm     RETINOPEXY LASER PROPHYLAXIS BREAK(S) OS (LEFT EYE)      2009 - Left eye       Social History:  Patient reports that he has never smoked. He has never used smokeless tobacco.    Family History:  Family History   Problem Relation Age of Onset     Heart Disease Mother         CHF d 80     Diabetes Mother         DM2,  age 80     Hypertension Mother      Heart Disease Father         CHF d 78     Heart Disease Paternal Uncle         CHF d 50s     Cancer No family hx of         no skin cancer     Skin Cancer No family hx of         no skin cancer     Glaucoma No family hx of      Macular Degeneration No family hx of      Melanoma No family hx of        Medications:  Current Outpatient Medications   Medication Sig Dispense Refill     ARTIFICIAL TEAR SOLUTION OP Apply  to eye.       atorvastatin (LIPITOR) 20 MG tablet Take 1 tablet (20 mg) by mouth daily 90 tablet 4     Cholecalciferol (VITAMIN D PO) Take 1 tablet by mouth daily 1000 mg       clobetasol (TEMOVATE) 0.05 % external ointment Apply topically 2 times daily For more bothersome lesions 120 g 1     clobetasol (TEMOVATE) 0.05 % external solution Apply  topically 2 times daily as needed to the scalp. 200 mL 11     clobetasol propionate 0.05 % SHAM Apply sparingly to dry scalp 3 x weekly as needed.  Leave in place for 15 m then add water, lather and rinse 1 Bottle 5     desonide  (DESOWEN) 0.05 % ointment Apply topically to affected areas twice daily as instructed. 180 g 3     finasteride (PROSCAR) 5 MG tablet Take 1 tablet (5 mg) by mouth daily 90 tablet 3     finasteride (PROSCAR) 5 MG tablet Take 1 tablet (5 mg) by mouth daily 90 tablet 4     hydrochlorothiazide (HYDRODIURIL) 12.5 MG tablet Take 1 tablet (12.5 mg) by mouth daily 90 tablet 4     hydrocortisone butyrate (LOCOID) 0.1 % SOLN Apply sparingly to affected area(s) of beard twice daily 180 mL 3     ketoconazole (NIZORAL) 2 % cream Apply twice daily to the nose as needed for white/scaling. 60 g 2     ketoconazole (NIZORAL) 2 % shampoo Three times per week in the shower: Lather into scalp, leave in place for 3-5 minutes, then rinse. 360 mL 12     Multiple Vitamin (MULTIVITAMIN) per tablet Take 1 tablet by mouth daily. 1 tab daily       predniSONE (DELTASONE) 10 MG tablet Take 2 tablets (20 mg) by mouth daily for 7 days, THEN 1 tablet (10 mg) daily for 14 days. 28 tablet 0     tacrolimus (PROTOPIC) 0.1 % ointment Apply topically to affected areas as instructed. 100 g 11     triamcinolone (KENALOG) 0.1 % external cream Apply topically to affected areas as instructed. 454 g 11     triamcinolone (KENALOG) 0.1 % external ointment Apply topically 2 times daily For lesions that are more mild 453.6 g 1     triamcinolone (KENALOG) 0.1 % ointment Apply topically 2 times daily 454 g 11     Aspirin Buf,CaCarb-MgCarb-MgO, 81 MG TABS Take 1 tablet by mouth       clobetasol (TEMOVATE) 0.05 % ointment APPLY TO AFFECTED AREA(S) TWO TIMES A DAY (Patient not taking: Reported on 8/13/2020) 180 g 2        No Known Allergies      Review of Systems:  -As per HPI  -Constitutional: Otherwise feeling well today, in usual state of health.  -HEENT: Patient denies nonhealing oral sores.  -Skin: As above in HPI. No additional skin concerns.    Physical exam:  Vitals: There were no vitals taken for this visit.  GEN: This is a well developed, well-nourished male  in no acute distress, in a pleasant mood.    SKIN: Total skin excluding the undergarment areas was performed. The exam included the head/face, neck, both arms, chest, back, abdomen, both legs, digits and/or nails.   -Dacosta skin type: II  -Several scattered eroded papules on trunk  -Multiple hyperkeratotic papules on trunk with mild surrounding rim of erythema  -No evidence of conjunctivitis  -2 verrucous stuck on skin colored papules on the forehead  -No other lesions of concern on areas examined.     Impression/Plan:  1. Pemphigus foliaceus  At this point with improved disease activity, however he flares off prednisone. Recommended steroid sparing agent which he agrees on. He has had resolution in the past with CellCept ([id number]) and has tolerated well. He had recent labs which were WNL. Would repeat labs in 1 month then q3 months while on CellCept.  -Finish prednisone taper (10 mg x 7 more days, then 5 mg x 7 days)  -Start CellCept 500 mg BID  -Labs in 1 month (CBC and CMP) and then q3 months  -Has clobetasol ointment and Protopic available    2. Verrucous keratosis x 2 forehead  -Benign nature of skin lesions described    Follow-up in 2 months, earlier for new or changing lesions.       Dr. Mckeon staffed the patient.    Staff Involved:  Resident(Pillo Lizama)/Staff    Staff Physician Comments:   I saw and evaluated the patient with the resident and I edited the assessment and plan as documented in the note. I was present for the examination.    Luan Mckeon MD   of Dermatology  Department of Dermatology  [facility]

## 2020-08-13 NOTE — NURSING NOTE
Dermatology Rooming Note    Luan Mitchell's goals for this visit include:   Chief Complaint   Patient presents with     Derm Problem     Luan is here for a follow up visit. He states he has had improvement since he recived a dose of prednisone.      Charis Noe LPN

## 2020-08-13 NOTE — TELEPHONE ENCOUNTER
PRIOR AUTHORIZATION DENIED - COMPLETED VIA EPA     Medication: mycophenolate (GENERIC EQUIVALENT) 500 MG tablet - DENIED     Denial Date:  08/13/2020    Denial Rational: This medication is NOT covered for your diagnosis of Pemphigus foliaceus (L10.2).    Under Medicare Part D, this medication is ONLY covered if it is being used for:      The prevention of Organ Transplant rejection    Rheumatoid Arthritis         Appeal Information:           1st attempt 08/14/2020 - called insurance; waiting for them to fax us the denial letter

## 2020-08-13 NOTE — PATIENT INSTRUCTIONS
Start CellCept 500 mg twice a day  Finish prednisone taper (10 mg for 7 more days then 5 mg x 7 days)  Continue topical steroids as needed

## 2020-08-13 NOTE — LETTER
August 14, 2020     To whom it may concern:     I am writing to advocate on behalf of my patient, Luan Mitchell (1945). Mr. Mitchell is followed by the [facility]. He has pemphigus, a rare autoimmune disease that results in extensive painful, itchy blistering and open erosions on the skin. Mr. Mitchell has involvement of his scalp, ears, cheeks, back, chest, and shoulders that has been flaring over the last several months. His antibody titers are extremely elevated, and along with his clinical appearance, indicate significantly active disease.     Mr. Luan Mitchell has been treated with high doses of prednisone during his current flare. Topical steroids have not been effective. Prednisone is a systemic immunosuppressant that is not safe for long term usage, particularly at the doses required for Mr. Mitchell. It results in osteoporosis, weight gain, diabetes, cataracts, glaucoma, emotional lability, impaired wound healing, easy bruising, striae, and an increased susceptibility to many types of infections. While prednisone has led to temporary improvement in Mr. Mitchell's severe symptoms, it cannot be used for ongoing care. Upon medically-necessary taper of prednisone, Mr. Mitchell's symptoms will undoubtedly flare again. Mycophenolate mofetil is a significantly safer alternative medication with many fewer side effects, and has been well-tolerated by Mr. Mitchell in the past; he was previously taking this medication from 1925-1538, and stopped only due to excellent disease control.    The alternative to mycophenolate mofetil is rituximab, a potent, targeted immunosuppressant whose relative safety is not defined during the worldwide COVID-19 pandemic. Given Mr. Mitchell's demographic factors, and in light of the previous success he has had with mycophenolate mofetil in controlling his disease, we feel quite strongly that mycophenolate mofetil is the appropriate therapy that will not unduly increase  his risk of acquiring COVID-19 and a potentially severe outcome.      In order to provide adequate care for this patient and treat his severe pemphigus, I am writing to request coverage of mycophenolate mofetil as medically necessary. I would be happy to further discuss Mr. Mitchell's medical care and therapeutic plan in a [id number] conversation. Please contact our office ([phone]) with questions.     Sincerely,     Luan Mckeon MD   of Dermatology  Department of Dermatology  [facility]

## 2020-08-13 NOTE — LETTER
8/13/2020       RE: Luan Mitchell  [address] [place] [id number]     Dear Colleague,    Thank you for referring your patient, Luan Mitchell, to the [facility] DERMAT[facility] at [facility]. Please see a copy of my visit note below.    [facility] Dermatology Note      Dermatology Problem List:  1.  Pemphigus foliaceus.     - Goals of treatment per Mr. Mitchell:  Prevent itching and secondary infections.   - He is not interested in complete clearance at this time.     - Stopped MMF 03/2019  -On 6/11/20: started prednisone taper (from 60 mg and taper off by 10 mg q7 days)  Cell Surface Antibodies   Serum    Date of    IgG CS        IgA CS   Number   Specimen   Titers        Titers     DSG 1   DSG 3   -------  --------   ----------    --------   -----   -----     03/14/19   ME: 1:1280    ME: NA     125*      1                      NS: 1:160     NS: NA     128**     06/12/20   ME: 1:2560    ME: NA     181*      1                      NS: 1:2560    NS: NA     320**      2.  History of seborrheic keratoses.   3.  Seborrheic dermatitis, on ketaconazole shampoo and cream.   4. DN, Mild. Right neck. Bx 3/22/18.   5. Verruous SKs right upper forehead; will monitor, if changes consider biopsy       Encounter Date: Aug 13, 2020    CC:   Chief Complaint   Patient presents with     Derm Problem     Luan is here for a follow up visit. He states he has had improvement since he recived a dose of prednisone.          History of Present Illness:  Mr. Luan Mitchell is a 75 year old male who presents as a follow-up for pemphigus foliaceus. The patient was last seen 7/9/20 when he was restarted on a prednisone taper due to a flare.  The patient was restarted on 20 mg x 7 days, then 10 mg x 2 weeks. He has 1 week left of the 10 mg prednisone.  The patient states his disease activity is improved but still has a number of crusty pink.gray plaques on the back - itch  has improved  No oral/genital lesions.  Has topical steroids and Protopic available.  No other skin related concerns,     Past Medical History:   Patient Active Problem List   Diagnosis     Pemphigus foliaceus     Hyperlipidemia LDL goal <130     BPH (benign prostatic hyperplasia)     Encounter for long-term (current) use of high-risk medication     Dermatitis, seborrheic     Lightheadedness     Age-related nuclear cataract of both eyes     SOB (shortness of breath)     Past Medical History:   Diagnosis Date     BPH (benign prostatic hyperplasia)      Essential hypertension 2018     Hyperlipidemia LDL goal <130      Pemphigus     pemphigus foliaceus     Retinal detachment     left eye- corrected with laser     Vitreous detachment     LE     Past Surgical History:   Procedure Laterality Date     NO HISTORY OF SURGERY  14    derm     RETINOPEXY LASER PROPHYLAXIS BREAK(S) OS (LEFT EYE)       - Left eye       Social History:  Patient reports that he has never smoked. He has never used smokeless tobacco.    Family History:  Family History   Problem Relation Age of Onset     Heart Disease Mother         CHF d 80     Diabetes Mother         DM2,  age 80     Hypertension Mother      Heart Disease Father         CHF d 78     Heart Disease Paternal Uncle         CHF d 50s     Cancer No family hx of         no skin cancer     Skin Cancer No family hx of         no skin cancer     Glaucoma No family hx of      Macular Degeneration No family hx of      Melanoma No family hx of        Medications:  Current Outpatient Medications   Medication Sig Dispense Refill     ARTIFICIAL TEAR SOLUTION OP Apply  to eye.       atorvastatin (LIPITOR) 20 MG tablet Take 1 tablet (20 mg) by mouth daily 90 tablet 4     Cholecalciferol (VITAMIN D PO) Take 1 tablet by mouth daily 1000 mg       clobetasol (TEMOVATE) 0.05 % external ointment Apply topically 2 times daily For more bothersome lesions 120 g 1     clobetasol  (TEMOVATE) 0.05 % external solution Apply  topically 2 times daily as needed to the scalp. 200 mL 11     clobetasol propionate 0.05 % SHAM Apply sparingly to dry scalp 3 x weekly as needed.  Leave in place for 15 m then add water, lather and rinse 1 Bottle 5     desonide (DESOWEN) 0.05 % ointment Apply topically to affected areas twice daily as instructed. 180 g 3     finasteride (PROSCAR) 5 MG tablet Take 1 tablet (5 mg) by mouth daily 90 tablet 3     finasteride (PROSCAR) 5 MG tablet Take 1 tablet (5 mg) by mouth daily 90 tablet 4     hydrochlorothiazide (HYDRODIURIL) 12.5 MG tablet Take 1 tablet (12.5 mg) by mouth daily 90 tablet 4     hydrocortisone butyrate (LOCOID) 0.1 % SOLN Apply sparingly to affected area(s) of beard twice daily 180 mL 3     ketoconazole (NIZORAL) 2 % cream Apply twice daily to the nose as needed for white/scaling. 60 g 2     ketoconazole (NIZORAL) 2 % shampoo Three times per week in the shower: Lather into scalp, leave in place for 3-5 minutes, then rinse. 360 mL 12     Multiple Vitamin (MULTIVITAMIN) per tablet Take 1 tablet by mouth daily. 1 tab daily       predniSONE (DELTASONE) 10 MG tablet Take 2 tablets (20 mg) by mouth daily for 7 days, THEN 1 tablet (10 mg) daily for 14 days. 28 tablet 0     tacrolimus (PROTOPIC) 0.1 % ointment Apply topically to affected areas as instructed. 100 g 11     triamcinolone (KENALOG) 0.1 % external cream Apply topically to affected areas as instructed. 454 g 11     triamcinolone (KENALOG) 0.1 % external ointment Apply topically 2 times daily For lesions that are more mild 453.6 g 1     triamcinolone (KENALOG) 0.1 % ointment Apply topically 2 times daily 454 g 11     Aspirin Buf,CaCarb-MgCarb-MgO, 81 MG TABS Take 1 tablet by mouth       clobetasol (TEMOVATE) 0.05 % ointment APPLY TO AFFECTED AREA(S) TWO TIMES A DAY (Patient not taking: Reported on 8/13/2020) 180 g 2        No Known Allergies      Review of Systems:  -As per HPI  -Constitutional:  Otherwise feeling well today, in usual state of health.  -HEENT: Patient denies nonhealing oral sores.  -Skin: As above in HPI. No additional skin concerns.    Physical exam:  Vitals: There were no vitals taken for this visit.  GEN: This is a well developed, well-nourished male in no acute distress, in a pleasant mood.    SKIN: Total skin excluding the undergarment areas was performed. The exam included the head/face, neck, both arms, chest, back, abdomen, both legs, digits and/or nails.   -Dacosta skin type: II  -Several scattered eroded papules on trunk  -Multiple hyperkeratotic papules on trunk with mild surrounding rim of erythema  -No evidence of conjunctivitis  -2 verrucous stuck on skin colored papules on the forehead  -No other lesions of concern on areas examined.     Impression/Plan:  1. Pemphigus foliaceus  At this point with improved disease activity, however he flares off prednisone. Recommended steroid sparing agent which he agrees on. He has had resolution in the past with CellCept ([id number]) and has tolerated well. He had recent labs which were WNL. Would repeat labs in 1 month then q3 months while on CellCept.  -Finish prednisone taper (10 mg x 7 more days, then 5 mg x 7 days)  -Start CellCept 500 mg BID  -Labs in 1 month (CBC and CMP) and then q3 months  -Has clobetasol ointment and Protopic available    2. Verrucous keratosis x 2 forehead  -Benign nature of skin lesions described    Follow-up in 2 months, earlier for new or changing lesions.       Dr. Mckeon staffed the patient.    Staff Involved:  Resident(Pillo Lizama)/Staff    Staff Physician Comments:   I saw and evaluated the patient with the resident and I edited the assessment and plan as documented in the note. I was present for the examination.    Luan Mckeon MD   of Dermatology  Department of Dermatology  [facility]        Again, thank you for allowing me to  participate in the care of your patient.      Sincerely,    Pillo Lizama MD

## 2020-08-17 NOTE — TELEPHONE ENCOUNTER
Medication Appeal Initiation    We have initiated an appeal for the requested medication:  Medication: mycophenolate - Appeal initiated   Appeal Start Date:  8/17/2020  Insurance Company: [employer] - Phone [phone] Fax [phone]  Comments:   Faxed letter written by Dr. Mckeon

## 2020-08-31 ENCOUNTER — NURSE TRIAGE (OUTPATIENT)
Dept: NURSING | Facility: CLINIC | Age: 75
End: 2020-08-31

## 2020-08-31 NOTE — TELEPHONE ENCOUNTER
Pt reporting for the last couple of weeks he has had increased weakness.  Pt mentioned when he is laying down he feels fine.   But at soon as he gets up and starts walking around he feel a little dizzy, lightheaded, and weakness.  There has been some bouts of shortness of breath off and on for the last couple of years.   But Pt mentioned they ran test and did not find anything.     No chest pain or pressure.  No edema, wheezing, hot sweats, cold sweats or the chills.  No cough or flu symptoms noted.   No nausea, vomiting, diarrhea or constipation noted.   No headache, sore throat, or ear aches noted.   Good appetite and drinking lots of fluids.    Pt is not sure why he is feeling this way.   But wondering if he needs to come in to be seen for an appointment.     Please call the Pt back @ [phone] for further assistance.    Thank you    Amelia Watters RN  Central Triage Red Flags/Med Refills      Additional Information    Negative: Severe difficulty breathing (e.g., struggling for each breath, speaks in single words)    Negative: Shock suspected (e.g., cold/pale/clammy skin, too weak to stand, low BP, rapid pulse)    Negative: Difficult to awaken or acting confused (e.g., disoriented, slurred speech)    Negative: Fainted > 15 minutes ago and still feels too weak or dizzy to stand    Negative: SEVERE weakness (i.e., unable to walk or barely able to walk, requires support) and new onset or worsening    Negative: Sounds like a life-threatening emergency to the triager    Negative: Weakness of the face, arm or leg on one side of the body    Negative: Has diabetes and weakness from low blood sugar (i.e., < 60 mg/dL or 3.5 mmol/L)    Negative: Recent heat exposure, suspected cause of weakness    Negative: Vomiting is the main symptom    Negative: Diarrhea is the main symptom    Negative: Difficulty breathing    Negative: Heart beating < 50 beats per minute OR > 140 beats per minute    Negative: Extra heartbeats OR  irregular heart beating (i.e., 'palpitations')    Negative: Follows bleeding (e.g., from vomiting, rectum, vagina)    Negative: Bloody, black, or tarry bowel movements    Negative: MODERATE weakness from poor fluid intake with no improvement after 2 hours of rest and fluids    Negative: Drinking very little and dehydration suspected (e.g., no urine > 12 hours, very dry mouth, very lightheaded)    Negative: Patient sounds very sick or weak to the triager    Negative: MODERATE weakness (i.e., interferes with work, school, normal activities) and cause unknown    Negative: Fever > 103 F (39.4 C) and not able to get the fever down using CARE ADVICE    Negative: Fever > 100.0 F (37.8 C) and bedridden (e.g., nursing home patient, stroke, chronic illness, recovering from surgery)    Negative: Fever > 101 F (38.3 C) and over 60 years of age    Negative: Fever > 100.0 F (37.8 C) and diabetes mellitus or weak immune system (e.g., HIV positive, cancer chemo, splenectomy, organ transplant, chronic steroids)    Negative: Pale skin (pallor)    MODERATE weakness (i.e., interferes with work, school, normal activities) and persists > 3 days    Protocols used: WEAKNESS (GENERALIZED) AND FATIGUE-A-[place]

## 2020-09-01 ENCOUNTER — VIRTUAL VISIT (OUTPATIENT)
Dept: INTERNAL MEDICINE | Facility: CLINIC | Age: 75
End: 2020-09-01
Payer: COMMERCIAL

## 2020-09-01 DIAGNOSIS — R73.03 PREDIABETES: ICD-10-CM

## 2020-09-01 DIAGNOSIS — M62.81 GENERALIZED MUSCLE WEAKNESS: Primary | ICD-10-CM

## 2020-09-01 NOTE — NURSING NOTE
Chief Complaint   Patient presents with     Generalized Weakness     Pt reports weakness for the last few weeks       Jaz Jean-Baptiste, EMT at 1:24 PM sign on 9/1/2020

## 2020-09-01 NOTE — PROGRESS NOTES
"Video Visit Technology for this patient: Raquel not working, patient has smart device, please try "[employer] Video with patient [phone]    Luan Mitchell is a 75 year old male who is being evaluated via a billable video visit.      The patient has been notified of following:     \"This video visit will be conducted via a call between you and your physician/provider. We have found that certain health care needs can be provided without the need for an in-person physical exam.  This service lets us provide the care you need with a video conversation.  If a prescription is necessary we can send it directly to your pharmacy.  If lab work is needed we can place an order for that and you can then stop by our lab to have the test done at a later time.    Video visits are billed at different rates depending on your insurance coverage.  Please reach out to your insurance provider with any questions.    If during the course of the call the physician/provider feels a video visit is not appropriate, you will not be charged for this service.\"    Patient has given verbal consent for Video visit? Yes  How would you like to obtain your AVS? MyChart  If you are dropped from the video visit, the video invite should be resent to: Text to cell phone: [phone]  Will anyone else be joining your video visit? No      Subjective     Luan Mitchell is a 75 year old male who presents today via video visit for the following health issues.    He has a  has a past medical history of pemphigus, BPH (benign prostatic hyperplasia), Essential hypertension (02/2018), Hyperlipidemia LDL goal <130, Pemphigus, Retinal detachment (2009), and Vitreous detachment (2009). He also has no past medical history of Diabetes (H) or Diabetes mellitus (H) but was prediabetic based on fasting glucose.       Current Outpatient Medications:      ARTIFICIAL TEAR SOLUTION OP, Apply  to eye., Disp: , Rfl:      atorvastatin (LIPITOR) 20 MG tablet, Take 1 tablet (20 mg) " by mouth daily, Disp: 90 tablet, Rfl: 4     Cholecalciferol (VITAMIN D PO), Take 1 tablet by mouth daily 1000 mg, Disp: , Rfl:      clobetasol (TEMOVATE) 0.05 % external ointment, Apply topically 2 times daily For more bothersome lesions, Disp: 120 g, Rfl: 1     clobetasol (TEMOVATE) 0.05 % external solution, Apply  topically 2 times daily as needed to the scalp., Disp: 200 mL, Rfl: 11     clobetasol (TEMOVATE) 0.05 % ointment, APPLY TO AFFECTED AREA(S) TWO TIMES A DAY, Disp: 180 g, Rfl: 2     clobetasol propionate 0.05 % SHAM, Apply sparingly to dry scalp 3 x weekly as needed.  Leave in place for 15 m then add water, lather and rinse, Disp: 1 Bottle, Rfl: 5     desonide (DESOWEN) 0.05 % ointment, Apply topically to affected areas twice daily as instructed., Disp: 180 g, Rfl: 3     finasteride (PROSCAR) 5 MG tablet, Take 1 tablet (5 mg) by mouth daily, Disp: 90 tablet, Rfl: 3     finasteride (PROSCAR) 5 MG tablet, Take 1 tablet (5 mg) by mouth daily, Disp: 90 tablet, Rfl: 4     hydrochlorothiazide (HYDRODIURIL) 12.5 MG tablet, Take 1 tablet (12.5 mg) by mouth daily, Disp: 90 tablet, Rfl: 4     hydrocortisone butyrate (LOCOID) 0.1 % SOLN, Apply sparingly to affected area(s) of beard twice daily, Disp: 180 mL, Rfl: 3     ketoconazole (NIZORAL) 2 % cream, Apply twice daily to the nose as needed for white/scaling., Disp: 60 g, Rfl: 2     ketoconazole (NIZORAL) 2 % shampoo, Three times per week in the shower: Lather into scalp, leave in place for 3-5 minutes, then rinse., Disp: 360 mL, Rfl: 12     Multiple Vitamin (MULTIVITAMIN) per tablet, Take 1 tablet by mouth daily. 1 tab daily, Disp: , Rfl:      mycophenolate (GENERIC EQUIVALENT) 500 MG tablet, Take 1 tablet (500 mg) by mouth 2 times daily, Disp: 180 tablet, Rfl: 1     tacrolimus (PROTOPIC) 0.1 % external ointment, Apply topically to affected areas as instructed., Disp: 300 g, Rfl: 3     triamcinolone (KENALOG) 0.1 % external cream, Apply topically to affected areas  as instructed., Disp: 454 g, Rfl: 11     triamcinolone (KENALOG) 0.1 % external ointment, Apply topically 2 times daily For lesions that are more mild, Disp: 453.6 g, Rfl: 1     triamcinolone (KENALOG) 0.1 % ointment, Apply topically 2 times daily, Disp: 454 g, Rfl: 11     Aspirin Buf,CaCarb-MgCarb-MgO, 81 MG TABS, Take 1 tablet by mouth, Disp: , Rfl:     HPI    For the past 2-3 weeks, he feels weak anytime he stands, uncoordinated, lightheaded.  He is fine lying flat.  Has checked BP in both positions; it is usually high 120/70s sitting, just slightly lower standing.  No other cardiac symptoms such as chest pain, palpitations, dyspnea.  New medication: started on MMF a few weeks ago for pemphigus.  But has taken this before without difficulty. A few months back was started on hydrochlorothiazide for BP.  Denies diarrhea (a common SE of MMF).  He is eating and drinking normally. Weight is stable.  Feels well otherwise.  Sheltering at home with his wife.  Coping well with the pandemic, doesn't think this is related to depression or anxiety.  No muscle pain per se.      Review of Systems    ROS: 10 point ROS neg other than the symptoms noted above in the HPI.      Objective           Vitals:  No vitals were obtained today due to virtual visit.    Physical Exam     GENERAL: Healthy, alert and no distress  EYES: Eyes grossly normal to inspection.  No discharge or erythema, or obvious scleral/conjunctival abnormalities.  RESP: No audible wheeze, cough, or visible cyanosis.  No visible retractions or increased work of breathing.    SKIN: Visible skin clear. No significant rash, abnormal pigmentation or lesions.  NEURO: Cranial nerves grossly intact.  Mentation and speech appropriate for age.  PSYCH: Mentation appears normal, affect normal/bright, judgement and insight intact, normal speech and appearance well-groomed.    Recent labs reviewed in [facility].      A/P Luan was seen today for generalized weakness.  This is a  difficult symptom to evaluate virtually.  I entertained orthostatic hypotension, adrenal insufficiency, medication side effect, or new onset diabetes.  He has a family history and some elevated fasting glucose readings in the past.  I also considered having him hold hydrochlorothiazide but h was reluctant to do that.  Will check basic labs.  He will also have a CBC and a comp met drawn with the orders below. Advised follow up appointment in person with Dr. Telles.   Also advised that he discuss possible SE of MMF with Derm although he states he would rather feel this way than go back to diffuse itching with pemphigus.    Generalized muscle weakness  -     Cortisol - AM ([facility])  -     TSH with free T4 reflex; Future    Prediabetes  -     Hemoglobin A1c; Future      Video-Visit Details    Type of service:  Video Visit duration 17 minutes 32 seconds    Originating Location (pt. Location): Home     Distant Location (provider location):  [facility]     Platform used for Video Visit: SecureDB      --Eileen Ro MD

## 2020-09-01 NOTE — TELEPHONE ENCOUNTER
Spoke with pt and recommended to have an appt and he agreed to have a virtual visit. I coordinated scheduling.

## 2020-09-09 DIAGNOSIS — L10.2 PEMPHIGUS FOLIACEUS (H): ICD-10-CM

## 2020-09-09 DIAGNOSIS — M62.81 GENERALIZED MUSCLE WEAKNESS: ICD-10-CM

## 2020-09-09 DIAGNOSIS — R73.03 PREDIABETES: ICD-10-CM

## 2020-09-09 LAB
ALBUMIN SERPL-MCNC: 3.7 G/DL (ref 3.4–5)
ALP SERPL-CCNC: 35 U/L (ref 40–150)
ALT SERPL W P-5'-P-CCNC: 36 U/L (ref 0–70)
ANION GAP SERPL CALCULATED.3IONS-SCNC: 6 MMOL/L (ref 3–14)
AST SERPL W P-5'-P-CCNC: 30 U/L (ref 0–45)
BASOPHILS # BLD AUTO: 0 10E9/L (ref 0–0.2)
BASOPHILS NFR BLD AUTO: 0.2 %
BILIRUB SERPL-MCNC: 0.6 MG/DL (ref 0.2–1.3)
BUN SERPL-MCNC: 14 MG/DL (ref 7–30)
CALCIUM SERPL-MCNC: 9.2 MG/DL (ref 8.5–10.1)
CHLORIDE SERPL-SCNC: 107 MMOL/L (ref 94–109)
CO2 SERPL-SCNC: 28 MMOL/L (ref 20–32)
CORTIS SERPL-MCNC: 14.7 UG/DL (ref 4–22)
CREAT SERPL-MCNC: 1 MG/DL (ref 0.66–1.25)
DIFFERENTIAL METHOD BLD: ABNORMAL
EOSINOPHIL # BLD AUTO: 0.1 10E9/L (ref 0–0.7)
EOSINOPHIL NFR BLD AUTO: 2.3 %
ERYTHROCYTE [DISTWIDTH] IN BLOOD BY AUTOMATED COUNT: 13.2 % (ref 10–15)
GFR SERPL CREATININE-BSD FRML MDRD: 72 ML/MIN/{1.73_M2}
GLUCOSE SERPL-MCNC: 103 MG/DL (ref 70–99)
HBA1C MFR BLD: 5.5 % (ref 0–5.6)
HCT VFR BLD AUTO: 42.4 % (ref 40–53)
HGB BLD-MCNC: 14.4 G/DL (ref 13.3–17.7)
IMM GRANULOCYTES # BLD: 0 10E9/L (ref 0–0.4)
IMM GRANULOCYTES NFR BLD: 0.4 %
LYMPHOCYTES # BLD AUTO: 1.4 10E9/L (ref 0.8–5.3)
LYMPHOCYTES NFR BLD AUTO: 28.6 %
MCH RBC QN AUTO: 32.3 PG (ref 26.5–33)
MCHC RBC AUTO-ENTMCNC: 34 G/DL (ref 31.5–36.5)
MCV RBC AUTO: 95 FL (ref 78–100)
MONOCYTES # BLD AUTO: 0.5 10E9/L (ref 0–1.3)
MONOCYTES NFR BLD AUTO: 9.7 %
NEUTROPHILS # BLD AUTO: 2.8 10E9/L (ref 1.6–8.3)
NEUTROPHILS NFR BLD AUTO: 58.8 %
NRBC # BLD AUTO: 0 10*3/UL
NRBC BLD AUTO-RTO: 0 /100
PLATELET # BLD AUTO: 142 10E9/L (ref 150–450)
POTASSIUM SERPL-SCNC: 3.7 MMOL/L (ref 3.4–5.3)
PROT SERPL-MCNC: 6.6 G/DL (ref 6.8–8.8)
RBC # BLD AUTO: 4.46 10E12/L (ref 4.4–5.9)
SODIUM SERPL-SCNC: 141 MMOL/L (ref 133–144)
T4 FREE SERPL-MCNC: 0.82 NG/DL (ref 0.76–1.46)
TSH SERPL DL<=0.005 MIU/L-ACNC: 6.63 MU/L (ref 0.4–4)
WBC # BLD AUTO: 4.8 10E9/L (ref 4–11)

## 2020-09-14 NOTE — TELEPHONE ENCOUNTER
Per encounter on 8/17/2020 - per [place] at insurance when speaking to Nivia DAY, appeal is denied

## 2020-09-18 ENCOUNTER — OFFICE VISIT (OUTPATIENT)
Dept: INTERNAL MEDICINE | Facility: CLINIC | Age: 75
End: 2020-09-18
Payer: COMMERCIAL

## 2020-09-18 VITALS
OXYGEN SATURATION: 93 % | SYSTOLIC BLOOD PRESSURE: 124 MMHG | HEART RATE: 87 BPM | WEIGHT: 173 LBS | BODY MASS INDEX: 25.55 KG/M2 | DIASTOLIC BLOOD PRESSURE: 86 MMHG

## 2020-09-18 DIAGNOSIS — I95.1 ORTHOSTASIS: Primary | ICD-10-CM

## 2020-09-18 ASSESSMENT — PAIN SCALES - GENERAL: PAINLEVEL: NO PAIN (0)

## 2020-09-18 NOTE — NURSING NOTE
Chief Complaint   Patient presents with     Fatigue     pt her eto discuss weakness and disoriented when standing for about a month       Clarissa Del Castillo CMA, EMT at 11:30 AM on 9/18/2020.

## 2020-09-18 NOTE — PROGRESS NOTES
"HPI  75-year-old presents today for evaluation of dizziness.  He reports a one-month history of a sensation of dizziness or \"dissociation\" that tends to occur when he stands up.  This is immediately after standing and can persist for several hours.  He is checked his blood pressure at home and does not appear to be associated with any drop or change in his blood pressure.  He has had no associated focal neurologic symptoms palpitations rapid heart rate change in bowels or rash or other symptoms.  He did start taking mycophenolate at about the same time.  He is also tapered off of prednisone.  His pemphigus is well controlled with this.  He is also on hydrochlorothiazide for blood pressure reports that his blood pressures have been a little borderline running as high as 140 systolic at times.  He is not documented any drop or low blood pressures by his report.  Symptoms may be aggravated by HIT and are relieved by sitting down and laying down.  He is never experienced any symptoms while laying he does not have symptoms rolling over or changing position while supine.  Has not had any past history of similar issues.  Otherwise his only supplements or vitamin D and a multivitamin.  Recent labs were reviewed and other than subclinical hypothyroidism and prediabetes they are essentially normal.  Recently is experienced a red sore on his left shoulder which he is concerned about.  Past Medical History:   Diagnosis Date     BPH (benign prostatic hyperplasia)      Essential hypertension 02/2018     Hyperlipidemia LDL goal <130      Pemphigus     pemphigus foliaceus     Retinal detachment 2009    left eye- corrected with laser     Vitreous detachment 2009    LE     Past Surgical History:   Procedure Laterality Date     NO HISTORY OF SURGERY  1/28/14    derm     RETINOPEXY LASER PROPHYLAXIS BREAK(S) OS (LEFT EYE)      2009 - Left eye     Family History   Problem Relation Age of Onset     Heart Disease Mother         CHF d 80 "     Diabetes Mother         DM2,  age 80     Hypertension Mother      Heart Disease Father         CHF d 78     Heart Disease Paternal Uncle         CHF d 50s     Cancer No family hx of         no skin cancer     Skin Cancer No family hx of         no skin cancer     Glaucoma No family hx of      Macular Degeneration No family hx of      Melanoma No family hx of      Social History     Socioeconomic History     Marital status:      Spouse name: None     Number of children: None     Years of education: None     Highest education level: None   Occupational History     None   Social Needs     Financial resource strain: None     Food insecurity     Worry: None     Inability: None     Transportation needs     Medical: None     Non-medical: None   Tobacco Use     Smoking status: Never Smoker     Smokeless tobacco: Never Used   Substance and Sexual Activity     Alcohol use: None     Drug use: None     Sexual activity: None   Lifestyle     Physical activity     Days per week: None     Minutes per session: None     Stress: None   Relationships     Social connections     Talks on phone: None     Gets together: None     Attends Congregation service: None     Active member of club or organization: None     Attends meetings of clubs or organizations: None     Relationship status: None     Intimate partner violence     Fear of current or ex partner: None     Emotionally abused: None     Physically abused: None     Forced sexual activity: None   Other Topics Concern     Parent/sibling w/ CABG, MI or angioplasty before 65F 55M? Not Asked   Social History Narrative     None     Complete review of symptoms negative except as noted above.    Exam:  /86 (BP Location: Right arm, Patient Position: Sitting, Cuff Size: Adult Regular)   Pulse 87   Wt 78.5 kg (173 lb)   SpO2 93%   BMI 25.55 kg/m    173 lbs 0 oz  Physical Exam   The patient is alert, oriented with a clear sensorium.   Skin shows numerous crusted lesions on  an erythematous base and a superficially inflamed pustule on the left posterior shoulder no other or rashes and good turgor.   Head is normocephalic and atraumatic.   Eyes show PERRLA\  Ears show normal TMs bilaterally.   \Neck shows no nodes, thyromegaly or bruits.   Back is non tender.  Lungs are clear to percussion and auscultation.   Heart shows normal S1 and S2 without murmur or gallop.   Abdomen is soft and nontender without masses or organomegaly.   \Extremities show no edema and no evidence of active synovitis.   Neurologic examination shows cranial nerves II-XII intact. Motor shows 5/5 strength. Reflexes are symmetric. Cerebellar testing shows normal tandem gait.  Romberg negative.      ASSESSMENT  1 postural dizziness with component of orthostatic hypotension possibly aggravated by the combination of hydrochlorothiazide and mycophenolate  2.  Pemphigus in remission  3 superficial left shoulder pustule  4 hypertension well controlled  5 subclinical hypothyroidism    Plan  We will have him hold his hydrochlorothiazide while he closely watch his his blood pressure at home.  He is scheduled to follow-up with dermatology next month and hopefully can have a dose reduction in the mycophenolate.  If symptoms persist we will proceed with a head MRI.    This note was completed using Dragon voice recognition software.      Devendra Telles MD  General Internal Medicine  [facility]  [phone]

## 2020-10-05 ENCOUNTER — OFFICE VISIT (OUTPATIENT)
Dept: OPHTHALMOLOGY | Facility: CLINIC | Age: 75
End: 2020-10-05
Attending: OPHTHALMOLOGY
Payer: COMMERCIAL

## 2020-10-05 DIAGNOSIS — H33.22 OLD RETINAL DETACHMENT OF LEFT EYE: ICD-10-CM

## 2020-10-05 DIAGNOSIS — H25.13 SENILE NUCLEAR SCLEROSIS, BILATERAL: Primary | ICD-10-CM

## 2020-10-05 PROCEDURE — 99213 OFFICE O/P EST LOW 20 MIN: CPT | Performed by: OPHTHALMOLOGY

## 2020-10-05 PROCEDURE — G0463 HOSPITAL OUTPT CLINIC VISIT: HCPCS

## 2020-10-05 PROCEDURE — 92015 DETERMINE REFRACTIVE STATE: CPT

## 2020-10-05 ASSESSMENT — REFRACTION_WEARINGRX
OS_ADD: +2.50
OD_SPHERE: -1.00
OD_ADD: +2.50
SPECS_TYPE: BIFOCAL
OS_SPHERE: -1.75
OD_CYLINDER: +0.50
OS_AXIS: 021
OD_AXIS: 163
OS_CYLINDER: +0.75

## 2020-10-05 ASSESSMENT — REFRACTION_MANIFEST
OD_ADD: +2.50
OD_CYLINDER: +1.25
OS_AXIS: 075
OS_ADD: +2.50
OS_CYLINDER: +0.75
OS_SPHERE: -1.50
OD_AXIS: 180
OD_SPHERE: -2.00

## 2020-10-05 ASSESSMENT — CONF VISUAL FIELD
OD_NORMAL: 1
OS_NORMAL: 1

## 2020-10-05 ASSESSMENT — TONOMETRY
IOP_METHOD: TONOPEN
OS_IOP_MMHG: 13
OD_IOP_MMHG: 14

## 2020-10-05 ASSESSMENT — VISUAL ACUITY
OD_CC: 20/30
OS_CC+: -1
OD_CC+: -2/+2
CORRECTION_TYPE: GLASSES
OS_CC: 20/20
METHOD: SNELLEN - LINEAR

## 2020-10-05 ASSESSMENT — CUP TO DISC RATIO
OD_RATIO: 0.3
OS_RATIO: 0.3

## 2020-10-05 ASSESSMENT — SLIT LAMP EXAM - LIDS
COMMENTS: 1+ BLEPHARITIS
COMMENTS: 1+ BLEPHARITIS

## 2020-10-05 NOTE — PROGRESS NOTES
I have confirmed the patient's and reviewed Past Medical History, Past Surgical History, Social History, Family History, Problem List, Medication List and agree with Tech note.     CC: s/p Retinal detachment  Repair left eye       HPI: history of myopia and cataract, last seen in 2019, retinopexy left eye       Assessment/plan:   1.  Nuclear sclerotic cataract  Both eyes                         - Not visually significant                        - refract as needed, new prescription glasses issued today                         - monitor yearly       2.  Blepharitis both eyes:                         -wet compress twice a day                         - artificial tears over the counter                         - monitor      3.  Operculated hole Left Eye s/p retinopexy                         - Retinal Detachment precautions reviewed       return to clinic 1 year with refraction, Dilated Fundus Exam        Mona Maria MD PhD.  Professor & Chair

## 2020-10-05 NOTE — NURSING NOTE
Chief Complaints and History of Present Illnesses   Patient presents with     Cataract Follow-Up     Annual f/u     Chief Complaint(s) and History of Present Illness(es)     Cataract Follow-Up     Laterality: both eyes    Duration: 1 year    Comments: Annual f/u              Comments     Pt reports waking up over the past 6 weeks or so with gritty sensation in his eyes  Vision seems stable - pt reports some difficulty with driving at night and will wear his glasses with this (doesn't always wear during the daytime for driving)  Does use warm compresses  Denies flashes/floaters  No pain today    GREG Coy 3:28 PM October 5, 2020

## 2020-10-15 ENCOUNTER — VIRTUAL VISIT (OUTPATIENT)
Dept: DERMATOLOGY | Facility: CLINIC | Age: 75
End: 2020-10-15
Payer: COMMERCIAL

## 2020-10-15 DIAGNOSIS — Z79.899 ENCOUNTER FOR LONG-TERM (CURRENT) USE OF HIGH-RISK MEDICATION: Primary | ICD-10-CM

## 2020-10-15 PROCEDURE — 99213 OFFICE O/P EST LOW 20 MIN: CPT | Mod: 95 | Performed by: STUDENT IN AN ORGANIZED HEALTH CARE EDUCATION/TRAINING PROGRAM

## 2020-10-15 ASSESSMENT — PAIN SCALES - GENERAL: PAINLEVEL: NO PAIN (0)

## 2020-10-15 NOTE — PATIENT INSTRUCTIONS
[facility] Dermatology Visit    Thank you for allowing us to participate in your care. Your findings, instructions and follow-up plan are as follows:    - Decrease mycophenolate mofetil dose to 500 mg once daily  - Labs in 2 months      When should I call my doctor?    If you are worsening or not improving, please, contact us or seek urgent care as noted below.     Who should I call with questions (adults)?    [facility] (adult and pediatric): [phone]     [facility] (adult): [phone]    For urgent needs outside of business hours call the [facility] at [phone] and ask for the dermatology resident on call    If this is a medical emergency and you are unable to reach an ER, Call [phone]      Who should I call with questions (pediatric)?  [facility]- Pediatric Dermatology  Dr. Cassidy Alvarado, Dr. Bang Robledo, Dr. Isabelle Collier, Tana Emery, PA  Dr. Peggy Foster, Dr. Halley Sotelo & Dr. Luan Cloud  Non Urgent  Nurse Triage Line; [phone]- Kandy and Yvonne RN Care Coordinators   Ashley (/Complex ) [phone]    If you need a prescription refill, please contact your pharmacy. Refills are approved or denied by our Physicians during normal business hours, Monday through Fridays  Per office policy, refills will not be granted if you have not been seen within the past year (or sooner depending on your child's condition)    Scheduling Information:  Pediatric Appointment Scheduling and Call Center [phone]  Radiology Scheduling- [phone]  Sedation Unit Scheduling- [phone]  [place] Scheduling- General [phone]; Pediatric Dermatology [phone]  Main  Services: [phone]  Bangladeshi: [phone]  Bolivian: [phone]  Hmong/Occitan/Singaporean: [phone]  Preadmission Nursing Department Fax Number: [phone] (Fax all  pre-operative paperwork to this number)    For urgent matters arising during evenings, weekends, or holidays that cannot wait for normal business hours please call [phone] and ask for the Dermatology Resident On-Call to be paged.

## 2020-10-15 NOTE — LETTER
"10/15/2020       RE: Luan Mitchell  [address] [place] [address]     Dear Colleague,    Thank you for referring your patient, Luan Mitchell, to the [facility] DERMATOLOGY [facility] [place] at [facility]. Please see a copy of my visit note below.    [facility] Dermatology Record:  Video: ( Invitation sent by:  [employer] waiting room )      Dermatology Problem List:  1.  Pemphigus foliaceus.     - Goals of treatment per Mr. Mitchell:  Prevent itching and secondary infections.   - He is not interested in complete clearance at this time.     - Stopped MMF 03/2019, re-started  mg PO BID on 8/13/2020, reduced to 500 mg every day on 10/21/2020    Cell Surface Antibodies   Serum    Date of    IgG CS        IgA CS   Number   Specimen   Titers        Titers     DSG 1   DSG 3   -------  --------   ----------    --------   -----   -----     03/14/19   ME: 1:1280    ME: NA     125*      1                      NS: 1:160     NS: NA     128**     06/12/20   ME: 1:2560    ME: NA     181*      1                      NS: 1:2560    NS: NA     320**      2.  History of seborrheic keratoses.     3.  Seborrheic dermatitis, on ketaconazole shampoo and cream.     4. DN, Mild. Right neck. Bx 3/22/18.     5. Verruous SKs right upper forehead; will monitor, if changes consider biopsy      Encounter Date: Oct 15, 2020    CC:   Chief Complaint   Patient presents with     Derm Problem     Luan is following up on brien [place] states \"my skin is doing great\".        History of Present Illness:  Luan Mitchell is a 75 year old male who presents for follow up of pemphigus foliaceous. Last seen 8/21/2020 when he was re-started on  mg PO BID and he says that it worked well, and cleared up all of his sores. He reports that he has developed new symptoms that he thinks are side effects of MMF. He says he feels disoriented and weak. He says it has been getting a little " better over the last 2 weeks. Patient was previously well-controlled on  mg daily.     He has no additional concerns today.       ROS: Patient is generally feeling well today     Physical Examination:  No photos submitted, but examined scalp, face on virtual visit. Patient is well, appearing, no obvious papules, vesicles, ulcerations present.         Past Medical History:   Patient Active Problem List   Diagnosis     Pemphigus foliaceus     Hyperlipidemia LDL goal <130     BPH (benign prostatic hyperplasia)     Encounter for long-term (current) use of high-risk medication     Dermatitis, seborrheic     Lightheadedness     Age-related nuclear cataract of both eyes     SOB (shortness of breath)     Past Medical History:   Diagnosis Date     BPH (benign prostatic hyperplasia)      Essential hypertension 2018     Hyperlipidemia LDL goal <130      Pemphigus     pemphigus foliaceus     Retinal detachment     left eye- corrected with laser     Vitreous detachment     LE     Past Surgical History:   Procedure Laterality Date     NO HISTORY OF SURGERY  12    derm     RETINOPEXY LASER PROPHYLAXIS BREAK(S) OS (LEFT EYE)       - Left eye       Social History:  Patient reports that he has never smoked. He has never used smokeless tobacco.    Family History:  Family History   Problem Relation Age of Onset     Heart Disease Mother         CHF d 80     Diabetes Mother         DM2,  age 80     Hypertension Mother      Heart Disease Father         CHF d 78     Heart Disease Paternal Uncle         CHF d 50s     Cancer No family hx of         no skin cancer     Skin Cancer No family hx of         no skin cancer     Glaucoma No family hx of      Macular Degeneration No family hx of      Melanoma No family hx of        Medications:  Current Outpatient Medications   Medication     ARTIFICIAL TEAR SOLUTION OP     atorvastatin (LIPITOR) 20 MG tablet     Cholecalciferol (VITAMIN D PO)     clobetasol (TEMOVATE)  0.05 % external ointment     clobetasol (TEMOVATE) 0.05 % external solution     clobetasol propionate 0.05 % SHAM     desonide (DESOWEN) 0.05 % ointment     finasteride (PROSCAR) 5 MG tablet     hydrochlorothiazide (HYDRODIURIL) 12.5 MG tablet     hydrocortisone butyrate (LOCOID) 0.1 % SOLN     ketoconazole (NIZORAL) 2 % cream     ketoconazole (NIZORAL) 2 % shampoo     Multiple Vitamin (MULTIVITAMIN) per tablet     mycophenolate (GENERIC EQUIVALENT) 500 MG tablet     tacrolimus (PROTOPIC) 0.1 % external ointment     triamcinolone (KENALOG) 0.1 % external cream     triamcinolone (KENALOG) 0.1 % external ointment     triamcinolone (KENALOG) 0.1 % ointment     clobetasol (TEMOVATE) 0.05 % ointment     finasteride (PROSCAR) 5 MG tablet     No current facility-administered medications for this visit.           No Known Allergies        Impression and Recommendations (Patient Counseled on the Following):  1. Pemphigus foliaceus.   Doing well on  mg PO BID, but having some side effects he thinks may be due to MMF. Will decrease to 500 mg daily.   - Goals of treatment per Mr. Mitchell:  Prevent itching and secondary infections.     - will decrease to  mg every day  - safety labs in 2 months     Follow-up:   2 months     Staff and resident     Discussed with Dr. Odell.    Joey Ramírez MD, PhD  Med-Derm PGY-5  I, Serina Odell,  was with the resident on the (phone/video) visit (for the critical and key portions or for 5 minutes) and agree with the resident's findings and plan of care as documented in the resident's note  _____________________________________________________________________________    Teledermatology information:  - Location of patient: [place]  - Patient presented as: return  - Location of teledermatologist:  ([facility] [place] )  - Reason teledermatology is appropriate:  of National Emergency Regarding Coronavirus disease (COVID 19) Outbreak  - Image  quality and interpretability: limited  - Physician has received verbal consent for a Video/Photos Visit from the patient? YES  - In-person dermatology visit recommendation: no  - Date of images: n/a  - Service start time:10:20  - Service end time:10:32  - Date of report: 10/15/2020

## 2020-10-15 NOTE — PROGRESS NOTES
"[facility] Dermatology Record:  Video: ( Invitation sent by:  [facility] waiting room )      Dermatology Problem List:  1.  Pemphigus foliaceus.     - Goals of treatment per Mr. Mitchell:  Prevent itching and secondary infections.   - He is not interested in complete clearance at this time.     - Stopped MMF 03/2019, re-started  mg PO BID on 8/13/2020, reduced to 500 mg every day on 10/21/2020    Cell Surface Antibodies   Serum    Date of    IgG CS        IgA CS   Number   Specimen   Titers        Titers     DSG 1   DSG 3   -------  --------   ----------    --------   -----   -----     03/14/19   ME: 1:1280    ME: NA     125*      1                      NS: 1:160     NS: NA     128**     06/12/20   ME: 1:2560    ME: NA     181*      1                      NS: 1:2560    NS: NA     320**      2.  History of seborrheic keratoses.     3.  Seborrheic dermatitis, on ketaconazole shampoo and cream.     4. DN, Mild. Right neck. Bx 3/22/18.     5. Verruous SKs right upper forehead; will monitor, if changes consider biopsy      Encounter Date: Oct 15, 2020    CC:   Chief Complaint   Patient presents with     Derm Problem     Luan is following up on brien Cuba states \"my skin is doing great\".        History of Present Illness:  Luan Mitchell is a 75 year old male who presents for follow up of pemphigus foliaceous. Last seen 8/21/2020 when he was re-started on  mg PO BID and he says that it worked well, and cleared up all of his sores. He reports that he has developed new symptoms that he thinks are side effects of MMF. He says he feels disoriented and weak. He says it has been getting a little better over the last 2 weeks. Patient was previously well-controlled on  mg daily.     He has no additional concerns today.       ROS: Patient is generally feeling well today     Physical Examination:  No photos submitted, but examined scalp, face on virtual visit. Patient is well, appearing, no obvious " papules, vesicles, ulcerations present.         Past Medical History:   Patient Active Problem List   Diagnosis     Pemphigus foliaceus     Hyperlipidemia LDL goal <130     BPH (benign prostatic hyperplasia)     Encounter for long-term (current) use of high-risk medication     Dermatitis, seborrheic     Lightheadedness     Age-related nuclear cataract of both eyes     SOB (shortness of breath)     Past Medical History:   Diagnosis Date     BPH (benign prostatic hyperplasia)      Essential hypertension 2018     Hyperlipidemia LDL goal <130      Pemphigus     pemphigus foliaceus     Retinal detachment     left eye- corrected with laser     Vitreous detachment     LE     Past Surgical History:   Procedure Laterality Date     NO HISTORY OF SURGERY  12    derm     RETINOPEXY LASER PROPHYLAXIS BREAK(S) OS (LEFT EYE)       - Left eye       Social History:  Patient reports that he has never smoked. He has never used smokeless tobacco.    Family History:  Family History   Problem Relation Age of Onset     Heart Disease Mother         CHF d 80     Diabetes Mother         DM2,  age 80     Hypertension Mother      Heart Disease Father         CHF d 78     Heart Disease Paternal Uncle         CHF d 50s     Cancer No family hx of         no skin cancer     Skin Cancer No family hx of         no skin cancer     Glaucoma No family hx of      Macular Degeneration No family hx of      Melanoma No family hx of        Medications:  Current Outpatient Medications   Medication     ARTIFICIAL TEAR SOLUTION OP     atorvastatin (LIPITOR) 20 MG tablet     Cholecalciferol (VITAMIN D PO)     clobetasol (TEMOVATE) 0.05 % external ointment     clobetasol (TEMOVATE) 0.05 % external solution     clobetasol propionate 0.05 % SHAM     desonide (DESOWEN) 0.05 % ointment     finasteride (PROSCAR) 5 MG tablet     hydrochlorothiazide (HYDRODIURIL) 12.5 MG tablet     hydrocortisone butyrate (LOCOID) 0.1 % SOLN     ketoconazole  (NIZORAL) 2 % cream     ketoconazole (NIZORAL) 2 % shampoo     Multiple Vitamin (MULTIVITAMIN) per tablet     mycophenolate (GENERIC EQUIVALENT) 500 MG tablet     tacrolimus (PROTOPIC) 0.1 % external ointment     triamcinolone (KENALOG) 0.1 % external cream     triamcinolone (KENALOG) 0.1 % external ointment     triamcinolone (KENALOG) 0.1 % ointment     clobetasol (TEMOVATE) 0.05 % ointment     finasteride (PROSCAR) 5 MG tablet     No current facility-administered medications for this visit.           No Known Allergies        Impression and Recommendations (Patient Counseled on the Following):  1. Pemphigus foliaceus.   Doing well on  mg PO BID, but having some side effects he thinks may be due to MMF. Will decrease to 500 mg daily.   - Goals of treatment per Mr. Mithcell:  Prevent itching and secondary infections.     - will decrease to  mg every day  - safety labs in 2 months     Follow-up:   2 months     Staff and resident     Discussed with Dr. Odell.    Joey Ramírez MD, PhD  Med-Derm PGY-5  I, Serina Odell,  was with the resident on the (phone/video) visit (for the critical and key portions or for 5 minutes) and agree with the resident's findings and plan of care as documented in the resident's note  _____________________________________________________________________________    Teledermatology information:  - Location of patient: [place]  - Patient presented as: return  - Location of teledermatologist:  ([facility] [place] )  - Reason teledermatology is appropriate:  of National Emergency Regarding Coronavirus disease (COVID 19) Outbreak  - Image quality and interpretability: limited  - Physician has received verbal consent for a Video/Photos Visit from the patient? YES  - In-person dermatology visit recommendation: no  - Date of images: n/a  - Service start time:10:20  - Service end time:10:32  - Date of report: 10/15/2020

## 2020-10-15 NOTE — NURSING NOTE
"Dermatology Rooming Note    Luan Mitchell's goals for this visit include:   Chief Complaint   Patient presents with     Derm Problem     Luan is following up on brien Cuba states \"my skin is doing great\".      Jazlyn Crespo, LUTHER     "

## 2020-12-15 DIAGNOSIS — Z79.899 ENCOUNTER FOR LONG-TERM (CURRENT) USE OF HIGH-RISK MEDICATION: ICD-10-CM

## 2020-12-15 LAB
ALBUMIN SERPL-MCNC: 3.7 G/DL (ref 3.4–5)
ALP SERPL-CCNC: 56 U/L (ref 40–150)
ALT SERPL W P-5'-P-CCNC: 74 U/L (ref 0–70)
ANION GAP SERPL CALCULATED.3IONS-SCNC: 4 MMOL/L (ref 3–14)
AST SERPL W P-5'-P-CCNC: 37 U/L (ref 0–45)
BASOPHILS # BLD AUTO: 0 10E9/L (ref 0–0.2)
BASOPHILS NFR BLD AUTO: 0.2 %
BILIRUB SERPL-MCNC: 0.4 MG/DL (ref 0.2–1.3)
BUN SERPL-MCNC: 20 MG/DL (ref 7–30)
CALCIUM SERPL-MCNC: 8.8 MG/DL (ref 8.5–10.1)
CHLORIDE SERPL-SCNC: 106 MMOL/L (ref 94–109)
CO2 SERPL-SCNC: 31 MMOL/L (ref 20–32)
CREAT SERPL-MCNC: 0.97 MG/DL (ref 0.66–1.25)
DIFFERENTIAL METHOD BLD: ABNORMAL
EOSINOPHIL # BLD AUTO: 0.2 10E9/L (ref 0–0.7)
EOSINOPHIL NFR BLD AUTO: 3.6 %
ERYTHROCYTE [DISTWIDTH] IN BLOOD BY AUTOMATED COUNT: 12.2 % (ref 10–15)
GFR SERPL CREATININE-BSD FRML MDRD: 75 ML/MIN/{1.73_M2}
GLUCOSE SERPL-MCNC: 97 MG/DL (ref 70–99)
HCT VFR BLD AUTO: 43.7 % (ref 40–53)
HGB BLD-MCNC: 14.8 G/DL (ref 13.3–17.7)
IMM GRANULOCYTES # BLD: 0 10E9/L (ref 0–0.4)
IMM GRANULOCYTES NFR BLD: 0.2 %
LYMPHOCYTES # BLD AUTO: 1.8 10E9/L (ref 0.8–5.3)
LYMPHOCYTES NFR BLD AUTO: 34.5 %
MCH RBC QN AUTO: 31.6 PG (ref 26.5–33)
MCHC RBC AUTO-ENTMCNC: 33.9 G/DL (ref 31.5–36.5)
MCV RBC AUTO: 93 FL (ref 78–100)
MONOCYTES # BLD AUTO: 0.6 10E9/L (ref 0–1.3)
MONOCYTES NFR BLD AUTO: 10.5 %
NEUTROPHILS # BLD AUTO: 2.7 10E9/L (ref 1.6–8.3)
NEUTROPHILS NFR BLD AUTO: 51 %
NRBC # BLD AUTO: 0 10*3/UL
NRBC BLD AUTO-RTO: 0 /100
PLATELET # BLD AUTO: 142 10E9/L (ref 150–450)
POTASSIUM SERPL-SCNC: 4 MMOL/L (ref 3.4–5.3)
PROT SERPL-MCNC: 7 G/DL (ref 6.8–8.8)
RBC # BLD AUTO: 4.68 10E12/L (ref 4.4–5.9)
SODIUM SERPL-SCNC: 140 MMOL/L (ref 133–144)
WBC # BLD AUTO: 5.3 10E9/L (ref 4–11)

## 2020-12-15 PROCEDURE — 85025 COMPLETE CBC W/AUTO DIFF WBC: CPT | Performed by: PATHOLOGY

## 2020-12-15 PROCEDURE — 80053 COMPREHEN METABOLIC PANEL: CPT | Performed by: PATHOLOGY

## 2020-12-15 PROCEDURE — 36415 COLL VENOUS BLD VENIPUNCTURE: CPT | Performed by: PATHOLOGY

## 2020-12-17 ENCOUNTER — VIRTUAL VISIT (OUTPATIENT)
Dept: DERMATOLOGY | Facility: CLINIC | Age: 75
End: 2020-12-17
Payer: COMMERCIAL

## 2020-12-17 DIAGNOSIS — Z79.899 ENCOUNTER FOR LONG-TERM (CURRENT) USE OF HIGH-RISK MEDICATION: ICD-10-CM

## 2020-12-17 DIAGNOSIS — L10.2 PEMPHIGUS FOLIACEOUS (H): Primary | ICD-10-CM

## 2020-12-17 PROCEDURE — 99213 OFFICE O/P EST LOW 20 MIN: CPT | Mod: 95 | Performed by: STUDENT IN AN ORGANIZED HEALTH CARE EDUCATION/TRAINING PROGRAM

## 2020-12-17 NOTE — PATIENT INSTRUCTIONS
- Keep taking mycophenolate mofetil 500 mg daily  - repeat labs in 3 weeks to monitor elevated ALT

## 2020-12-17 NOTE — NURSING NOTE
"Chief Complaint   Patient presents with     Derm Problem     Luan is having a virtual visit today for pemphigus. He states \" my skin is in very good condition\"     Halley Rajan, LUTHER  "

## 2020-12-17 NOTE — LETTER
"12/17/2020       RE: Luan Mitchell  [address] [place] [id number]     Dear Colleague,    Thank you for referring your patient, Luan Mitchell, to the [facility] [place] at [facility]. Please see a copy of my visit note below.    [facility] Dermatology Record:  Video: ( Invitation sent by:  [facility] waiting room )      Dermatology Problem List:  1.  Pemphigus foliaceus.     - Goals of treatment per Mr. Mitchell:  Prevent itching and secondary infections.   - He is not interested in complete clearance at this time.     - Stopped MMF 03/2019, re-started  mg PO BID on 8/13/2020, reduced to 500 mg every day on 10/21/2020    Cell Surface Antibodies   Serum    Date of    IgG CS        IgA CS   Number   Specimen   Titers        Titers     DSG 1   DSG 3   -------  --------   ----------    --------   -----   -----     03/14/19   ME: 1:1280    ME: NA     125*      1                      NS: 1:160     NS: NA     128**     06/12/20   ME: 1:2560    ME: NA     181*      1                      NS: 1:2560    NS: NA     320**      2.  History of seborrheic keratoses.      3.  Seborrheic dermatitis, on ketaconazole shampoo and cream.      4. DN, Mild. Right neck. Bx 3/22/18.      5. Verruous SKs right upper forehead; will monitor, if changes consider biopsy    Encounter Date: Dec 17, 2020    CC:   Chief Complaint   Patient presents with     Derm Problem     Luan is having a virtual visit today for pemphigus. He states \" my skin is in very good condition\"       History of Present Illness:  Luan Mitchell is a 75 year old male who presents for follow up of his pemphigus foliaceus that is now well controlled on  mg daily. At his last virtual visit on 10/21/2020, he was reduced from  mg BID to every day. Since then he has had no new flares of his pemphigus. He says he is tolerating the medication well. He would like to continue at " the current dosage.   He had labs recently on 12/15 and his ALT was elevated to 74. He denies alcohol use. He is concerned about this elevation as to his knowledge it has never been elevated before.   Aside from this, he says he is feeling well and has no additional concerns at the moment.       ROS: Patient is generally feeling well today     Physical Examination:  General: Well-appearing, appropriately-developed individual.  Skin:  Exam was performed by photo review and is significant for the following: via video call, no concerning findings.       Labs:  Results for AJ PACE (MRN [id number]) as of 12/24/2020 12:03   Ref. Range 12/15/2020 06:41   Sodium Latest Ref Range: 133 - 144 mmol/L 140   Potassium Latest Ref Range: 3.4 - 5.3 mmol/L 4.0   Chloride Latest Ref Range: 94 - 109 mmol/L 106   Carbon Dioxide Latest Ref Range: 20 - 32 mmol/L 31   Urea Nitrogen Latest Ref Range: 7 - 30 mg/dL 20   Creatinine Latest Ref Range: 0.66 - 1.25 mg/dL 0.97   GFR Estimate Latest Ref Range: >60 mL/min/1.73_m2 75   GFR Estimate If Black Latest Ref Range: >60 mL/min/1.73_m2 86   Calcium Latest Ref Range: 8.5 - 10.1 mg/dL 8.8   Anion Gap Latest Ref Range: 3 - 14 mmol/L 4   Albumin Latest Ref Range: 3.4 - 5.0 g/dL 3.7   Protein Total Latest Ref Range: 6.8 - 8.8 g/dL 7.0   Bilirubin Total Latest Ref Range: 0.2 - 1.3 mg/dL 0.4   Alkaline Phosphatase Latest Ref Range: 40 - 150 U/L 56   ALT Latest Ref Range: 0 - 70 U/L 74 (H)   AST Latest Ref Range: 0 - 45 U/L 37   Glucose Latest Ref Range: 70 - 99 mg/dL 97   WBC Latest Ref Range: 4.0 - 11.0 10e9/L 5.3   Hemoglobin Latest Ref Range: 13.3 - 17.7 g/dL 14.8   Hematocrit Latest Ref Range: 40.0 - 53.0 % 43.7   Platelet Count Latest Ref Range: 150 - 450 10e9/L 142 (L)   RBC Count Latest Ref Range: 4.4 - 5.9 10e12/L 4.68   MCV Latest Ref Range: 78 - 100 fl 93   MCH Latest Ref Range: 26.5 - 33.0 pg 31.6   MCHC Latest Ref Range: 31.5 - 36.5 g/dL 33.9   RDW Latest Ref Range: 10.0 - 15.0 %  12.2   Diff Method Unknown Automated Method   % Neutrophils Latest Units: % 51.0   % Lymphocytes Latest Units: % 34.5   % Monocytes Latest Units: % 10.5   % Eosinophils Latest Units: % 3.6   % Basophils Latest Units: % 0.2   % Immature Granulocytes Latest Units: % 0.2   Nucleated RBCs Latest Ref Range: 0 /100 0   Absolute Neutrophil Latest Ref Range: 1.6 - 8.3 10e9/L 2.7   Absolute Lymphocytes Latest Ref Range: 0.8 - 5.3 10e9/L 1.8   Absolute Monocytes Latest Ref Range: 0.0 - 1.3 10e9/L 0.6   Absolute Eosinophils Latest Ref Range: 0.0 - 0.7 10e9/L 0.2   Absolute Basophils Latest Ref Range: 0.0 - 0.2 10e9/L 0.0   Abs Immature Granulocytes Latest Ref Range: 0 - 0.4 10e9/L 0.0   Absolute Nucleated RBC Unknown 0.0       Past Medical History:   Patient Active Problem List   Diagnosis     Pemphigus foliaceus     Hyperlipidemia LDL goal <130     BPH (benign prostatic hyperplasia)     Encounter for long-term (current) use of high-risk medication     Dermatitis, seborrheic     Lightheadedness     Age-related nuclear cataract of both eyes     SOB (shortness of breath)     Past Medical History:   Diagnosis Date     BPH (benign prostatic hyperplasia)      Essential hypertension 2018     Hyperlipidemia LDL goal <130      Pemphigus     pemphigus foliaceus     Retinal detachment     left eye- corrected with laser     Vitreous detachment     LE     Past Surgical History:   Procedure Laterality Date     NO HISTORY OF SURGERY  14    derm     RETINOPEXY LASER PROPHYLAXIS BREAK(S) OS (LEFT EYE)       - Left eye       Social History:  Patient reports that he has never smoked. He has never used smokeless tobacco.    Family History:  Family History   Problem Relation Age of Onset     Heart Disease Mother         CHF d 80     Diabetes Mother         DM2,  age 80     Hypertension Mother      Heart Disease Father         CHF d 78     Heart Disease Paternal Uncle         CHF d 50s     Cancer No family hx of         no  skin cancer     Skin Cancer No family hx of         no skin cancer     Glaucoma No family hx of      Macular Degeneration No family hx of      Melanoma No family hx of        Medications:  Current Outpatient Medications   Medication     ARTIFICIAL TEAR SOLUTION OP     atorvastatin (LIPITOR) 20 MG tablet     Cholecalciferol (VITAMIN D PO)     clobetasol (TEMOVATE) 0.05 % external ointment     clobetasol (TEMOVATE) 0.05 % external solution     clobetasol propionate 0.05 % SHAM     desonide (DESOWEN) 0.05 % ointment     finasteride (PROSCAR) 5 MG tablet     hydrochlorothiazide (HYDRODIURIL) 12.5 MG tablet     hydrocortisone butyrate (LOCOID) 0.1 % SOLN     ketoconazole (NIZORAL) 2 % cream     ketoconazole (NIZORAL) 2 % shampoo     Multiple Vitamin (MULTIVITAMIN) per tablet     mycophenolate (GENERIC EQUIVALENT) 500 MG tablet     tacrolimus (PROTOPIC) 0.1 % external ointment     triamcinolone (KENALOG) 0.1 % external cream     triamcinolone (KENALOG) 0.1 % external ointment     triamcinolone (KENALOG) 0.1 % ointment     clobetasol (TEMOVATE) 0.05 % ointment     finasteride (PROSCAR) 5 MG tablet     No current facility-administered medications for this visit.           No Known Allergies        Impression and Recommendations (Patient Counseled on the Following):  1. Pemphigus foliaceus: well controlled on  mg PO every day. Will continue at this dose for now.     2. Elevated ALT:  Unlikely due to MMF at this time given recent decrease in dose and previously not elevated at higher doses. Will plan on re-evaluating again in 3 weeks. If continues to be elevated, will alert PCP for further investigation.       Follow-up:   Follow-up with dermatology in approximately 3 months. Earlier for new or changing lesions or rash.      Staff and resident    Seen with Dr. Sotelo.    Joey Ramírez MD, PhD  Med-Derm PGY-5    Patient was seen and examined with the medicine/dermatology resident. I agree with the history, review of  systems, physical examination, assessments and plan.    Halley Sotelo MD  Professor and  Chair  Department of Dermatology  [facility]    _____________________________________________________________________________    Teledermatology information:  - Patient presented as: return  - Location of teledermatologist:  ([facility] [place] )  - Image quality and interpretability: N/A  - Physician has received verbal consent for a Video/Photos Visit from the patient? YES  - In-person dermatology visit recommendation: no  - Date of images: N/A  - Service start time:9:25 am  - Service end time:9:36 am  - Date of report: 12/17/2020

## 2020-12-29 ENCOUNTER — MYC MEDICAL ADVICE (OUTPATIENT)
Dept: INTERNAL MEDICINE | Facility: CLINIC | Age: 75
End: 2020-12-29

## 2020-12-30 ENCOUNTER — OFFICE VISIT (OUTPATIENT)
Dept: INTERNAL MEDICINE | Facility: CLINIC | Age: 75
End: 2020-12-30
Payer: COMMERCIAL

## 2020-12-30 VITALS
HEART RATE: 82 BPM | OXYGEN SATURATION: 96 % | DIASTOLIC BLOOD PRESSURE: 84 MMHG | BODY MASS INDEX: 25.84 KG/M2 | SYSTOLIC BLOOD PRESSURE: 146 MMHG | WEIGHT: 175 LBS

## 2020-12-30 DIAGNOSIS — M54.17 LUMBOSACRAL RADICULOPATHY AT L2: Primary | ICD-10-CM

## 2020-12-30 PROCEDURE — 99214 OFFICE O/P EST MOD 30 MIN: CPT | Performed by: INTERNAL MEDICINE

## 2020-12-30 RX ORDER — PREDNISONE 20 MG/1
20 TABLET ORAL DAILY
Qty: 10 TABLET | Refills: 0 | Status: SHIPPED | OUTPATIENT
Start: 2020-12-30 | End: 2021-03-16

## 2020-12-30 ASSESSMENT — PAIN SCALES - GENERAL: PAINLEVEL: NO PAIN (1)

## 2020-12-30 NOTE — NURSING NOTE
Chief Complaint   Patient presents with     other     right lower back pain, extends to right leg       Sheila Gonzales MA, at 11:27 AM on 12/30/2020.

## 2020-12-30 NOTE — PROGRESS NOTES
HPI  For the past several weeks I ve been experiencing an intermittent pain or ache on the right side of my body. It started as a feeling of pressure about where I guess my right kidney is located, which I would notice when my back was pressed against a chair.      Now the pain or ache extends into my right abdomen, right hip, and right leg. Most of the time it is muted or nonexistent, but if I bend at the waist (to pick something off the floor, for example), or if I raise my right knee towards my chest, the pain can be rather acute.   75-year-old presents presents after sending the above [employer] message.  He reports insidious onset of some discomfort in the lumbar paraspinous muscles on the right.  This subsequently was followed by pain radiating into the right groin and subsequently into the right quadricep.  There is no preceding injury or trauma.  The pain is aggravated by forward flexion and somewhat by back extension.  He also reports that hip flexion will aggravate this.  There is no associated numbness or tingling and there is no preceding trauma.  He has no nocturnal pain and no change in his bowel or bladder function.  Has not been aware of any rashes.  Symptoms are alleviated by a relaxing generally with his leg straight.  No prior history of similar symptoms.  He does check his blood pressure at home this is frequently and typically runs less than 130/80.  Past Medical History:   Diagnosis Date     BPH (benign prostatic hyperplasia)      Essential hypertension 02/2018     Hyperlipidemia LDL goal <130      Pemphigus     pemphigus foliaceus     Retinal detachment 2009    left eye- corrected with laser     Vitreous detachment 2009    LE     Past Surgical History:   Procedure Laterality Date     NO HISTORY OF SURGERY  1/28/14    derm     RETINOPEXY LASER PROPHYLAXIS BREAK(S) OS (LEFT EYE)      2009 - Left eye     Family History   Problem Relation Age of Onset     Heart Disease Mother         CHF d 80      Diabetes Mother         DM2,  age 80     Hypertension Mother      Heart Disease Father         CHF d 78     Heart Disease Paternal Uncle         CHF d 50s     Cancer No family hx of         no skin cancer     Skin Cancer No family hx of         no skin cancer     Glaucoma No family hx of      Macular Degeneration No family hx of      Melanoma No family hx of      Social History     Socioeconomic History     Marital status:      Spouse name: None     Number of children: None     Years of education: None     Highest education level: None   Occupational History     None   Social Needs     Financial resource strain: None     Food insecurity     Worry: None     Inability: None     Transportation needs     Medical: None     Non-medical: None   Tobacco Use     Smoking status: Never Smoker     Smokeless tobacco: Never Used   Substance and Sexual Activity     Alcohol use: None     Drug use: None     Sexual activity: None   Lifestyle     Physical activity     Days per week: None     Minutes per session: None     Stress: None   Relationships     Social connections     Talks on phone: None     Gets together: None     Attends Spiritism service: None     Active member of club or organization: None     Attends meetings of clubs or organizations: None     Relationship status: None     Intimate partner violence     Fear of current or ex partner: None     Emotionally abused: None     Physically abused: None     Forced sexual activity: None   Other Topics Concern     Parent/sibling w/ CABG, MI or angioplasty before 65F 55M? Not Asked   Social History Narrative     None       Exam:  BP (!) 146/84 (BP Location: Right arm, Patient Position: Sitting, Cuff Size: Adult Regular)   Pulse 82   Wt 79.4 kg (175 lb)   SpO2 96%   BMI 25.84 kg/m    175 lbs 0 oz  Is alert oriented with a clear sensorium.  The back shows tenderness over the right paraspinous region pain with flexion to about 10 to 15 degrees and extension to 10 to 15  degrees.  Straight leg raising is positive both seated and laying at 90 degrees on the right.  Range of motion of the right hip is normal and does not reproduce the pain.  Lungs clear  Heart negative abdomen soft nontender with no inguinal masses.    ASSESSMENT  1 probable right L2 radiculopathy  2.  Pemphigus in remission  3 superficial left shoulder pustule  4 hypertension aggravated by #1 above  5 subclinical hypothyroidism    Plan  Treated with a 10-day course of prednisone 20 mg daily refer him to physical therapy give him some exercises to do and will recheck his blood pressure today.  He also monitor the blood pressure at home and continue with nonpharmacologic blood pressure control measures.  This note was completed using Dragon voice recognition software.      Devendra Telles MD  General Internal Medicine  [facility]  [phone]

## 2021-01-04 ENCOUNTER — THERAPY VISIT (OUTPATIENT)
Dept: PHYSICAL THERAPY | Facility: CLINIC | Age: 76
End: 2021-01-04
Payer: COMMERCIAL

## 2021-01-04 DIAGNOSIS — M54.50 LOW BACK PAIN: ICD-10-CM

## 2021-01-04 DIAGNOSIS — M54.17 LUMBOSACRAL RADICULOPATHY AT L2: ICD-10-CM

## 2021-01-04 PROCEDURE — 97161 PT EVAL LOW COMPLEX 20 MIN: CPT | Mod: 95 | Performed by: PHYSICAL THERAPIST

## 2021-01-04 PROCEDURE — 97110 THERAPEUTIC EXERCISES: CPT | Mod: 95 | Performed by: PHYSICAL THERAPIST

## 2021-01-04 NOTE — PROGRESS NOTES
[facility] Initial Evaluation  Subjective:    Patient Health History  Luan Mitchell being seen for Pain on right side of abdomen, right hip, right leg.          Pain is reported as 0/10 and 1/10 on pain scale.  General health as reported by patient is good.  Pertinent medical history includes: high blood pressure.     Medical allergies: none.   Surgeries include:  None.    Current medications:  High blood pressure medication and other. Other medications details: Finasteride.    Current occupation is Retired.                   no night pain, fever, loss or change in B and B    Oswestry Score: (P) 11.11 %               Pt reports insidious onset of right groin and low back pain.  No numbness or tingling  Pain is present with bending forward.  Can be present with bring ing leg up with stationary cycling.  5 times a week 30 minutes.  Not present with walking, standing  No reported LE weakness  Hx of on and off low back pain  Wife hx of cancer    Objective:  System  Trunk AROM  Pain with flexion-mid to endrange-ZANDRA lumbar paraspinals 75% full motion  Ext full and ant hip stretching  Rotation: full and painfree  Lat flex: stretching lumbar ZANDRA-opp side  SLR negative  Poor hamstring flexibility  Pain with endrange hip flexion PROM but good motion  Physical Exam    General     ROS    Assessment/Plan:    Patient is a 75 year old male with lumbar complaints.    Patient has the following significant findings with corresponding treatment plan.                Diagnosis 1:  Lumbar radicu DDD  Pain -  hot/cold therapy, manual therapy, splint/taping/bracing/orthotics, self management, education and home program  Decreased ROM/flexibility - manual therapy and therapeutic exercise  Decreased strength - therapeutic exercise and therapeutic activities  Decreased function - therapeutic activities    Therapy Evaluation Codes:   1) History comprised of:   Personal factors that impact the plan of care:      None.     Comorbidity factors that impact the plan of care are:      None.     Medications impacting care: None.  2) Examination of Body Systems comprised of:   Body structures and functions that impact the plan of care:      Lumbar spine.   Activity limitations that impact the plan of care are:      Bending and Lifting.  3) Clinical presentation characteristics are:   Stable/Uncomplicated.  4) Decision-Making    Low complexity using standardized patient assessment instrument and/or measureable assessment of functional outcome.  Cumulative Therapy Evaluation is: Low complexity.    Previous and current functional limitations:  (See Goal Flow Sheet for this information)    Short term and Long term goals: (See Goal Flow Sheet for this information)     Communication ability:  Patient appears to be able to clearly communicate and understand verbal and written communication and follow directions correctly.  Treatment Explanation - The following has been discussed with the patient:   RX ordered/plan of care  Anticipated outcomes  Possible risks and side effects  This patient would benefit from PT intervention to resume normal activities.   Rehab potential is good.    Frequency:  1 X week, once daily  Duration:  for 8 weeks  Discharge Plan:  Achieve all LTG.  Independent in home treatment program.  Reach maximal therapeutic benefit.    Please refer to the daily flowsheet for treatment today, total treatment time and time spent performing 1:1 timed codes.

## 2021-01-07 DIAGNOSIS — L10.2 PEMPHIGUS FOLIACEOUS (H): ICD-10-CM

## 2021-01-07 DIAGNOSIS — Z79.899 ENCOUNTER FOR LONG-TERM (CURRENT) USE OF HIGH-RISK MEDICATION: ICD-10-CM

## 2021-01-07 LAB
ALBUMIN SERPL-MCNC: 3.8 G/DL (ref 3.4–5)
ALP SERPL-CCNC: 55 U/L (ref 40–150)
ALT SERPL W P-5'-P-CCNC: 44 U/L (ref 0–70)
ANION GAP SERPL CALCULATED.3IONS-SCNC: 4 MMOL/L (ref 3–14)
AST SERPL W P-5'-P-CCNC: 22 U/L (ref 0–45)
BILIRUB SERPL-MCNC: 0.6 MG/DL (ref 0.2–1.3)
BUN SERPL-MCNC: 26 MG/DL (ref 7–30)
CALCIUM SERPL-MCNC: 9.2 MG/DL (ref 8.5–10.1)
CHLORIDE SERPL-SCNC: 106 MMOL/L (ref 94–109)
CO2 SERPL-SCNC: 31 MMOL/L (ref 20–32)
CREAT SERPL-MCNC: 0.99 MG/DL (ref 0.66–1.25)
GFR SERPL CREATININE-BSD FRML MDRD: 72 ML/MIN/{1.73_M2}
GLUCOSE SERPL-MCNC: 97 MG/DL (ref 70–99)
POTASSIUM SERPL-SCNC: 3.6 MMOL/L (ref 3.4–5.3)
PROT SERPL-MCNC: 6.9 G/DL (ref 6.8–8.8)
SODIUM SERPL-SCNC: 141 MMOL/L (ref 133–144)

## 2021-01-07 PROCEDURE — 36415 COLL VENOUS BLD VENIPUNCTURE: CPT | Performed by: PATHOLOGY

## 2021-01-07 PROCEDURE — 80053 COMPREHEN METABOLIC PANEL: CPT | Performed by: PATHOLOGY

## 2021-01-13 ENCOUNTER — THERAPY VISIT (OUTPATIENT)
Dept: PHYSICAL THERAPY | Facility: CLINIC | Age: 76
End: 2021-01-13
Payer: COMMERCIAL

## 2021-01-13 DIAGNOSIS — M54.50 LOW BACK PAIN: ICD-10-CM

## 2021-01-13 PROCEDURE — 97110 THERAPEUTIC EXERCISES: CPT | Mod: 95 | Performed by: PHYSICAL THERAPIST

## 2021-01-28 ENCOUNTER — THERAPY VISIT (OUTPATIENT)
Dept: PHYSICAL THERAPY | Facility: CLINIC | Age: 76
End: 2021-01-28
Payer: COMMERCIAL

## 2021-01-28 DIAGNOSIS — M54.50 LOW BACK PAIN: ICD-10-CM

## 2021-01-28 PROCEDURE — 97110 THERAPEUTIC EXERCISES: CPT | Mod: 95 | Performed by: PHYSICAL THERAPIST

## 2021-02-02 ENCOUNTER — IMMUNIZATION (OUTPATIENT)
Dept: NURSING | Facility: CLINIC | Age: 76
End: 2021-02-02
Payer: COMMERCIAL

## 2021-02-02 PROCEDURE — 91300 PR COVID VAC PFIZER DIL RECON 30 MCG/0.3 ML IM: CPT

## 2021-02-02 PROCEDURE — 0001A PR COVID VAC PFIZER DIL RECON 30 MCG/0.3 ML IM: CPT

## 2021-02-23 ENCOUNTER — IMMUNIZATION (OUTPATIENT)
Dept: NURSING | Facility: CLINIC | Age: 76
End: 2021-02-23
Attending: INTERNAL MEDICINE
Payer: COMMERCIAL

## 2021-02-23 PROCEDURE — 91300 PR COVID VAC PFIZER DIL RECON 30 MCG/0.3 ML IM: CPT

## 2021-02-23 PROCEDURE — 0002A PR COVID VAC PFIZER DIL RECON 30 MCG/0.3 ML IM: CPT

## 2021-03-14 ASSESSMENT — ENCOUNTER SYMPTOMS
ARTHRALGIAS: 1
JOINT SWELLING: 0
POSTURAL DYSPNEA: 0
HEMOPTYSIS: 0
HYPERTENSION: 1
MUSCLE WEAKNESS: 0
PALPITATIONS: 0
HYPOTENSION: 0
EXERCISE INTOLERANCE: 0
SKIN CHANGES: 1
SYNCOPE: 0
STIFFNESS: 0
DYSPNEA ON EXERTION: 0
POOR WOUND HEALING: 0
MYALGIAS: 0
SNORES LOUDLY: 0
BACK PAIN: 0
SLEEP DISTURBANCES DUE TO BREATHING: 0
COUGH: 0
NECK PAIN: 0
WHEEZING: 0
NAIL CHANGES: 0
COUGH DISTURBING SLEEP: 0
LEG PAIN: 0
LIGHT-HEADEDNESS: 0
ORTHOPNEA: 0
SPUTUM PRODUCTION: 0
SHORTNESS OF BREATH: 1
MUSCLE CRAMPS: 0

## 2021-03-15 ENCOUNTER — TELEPHONE (OUTPATIENT)
Dept: UROLOGY | Facility: CLINIC | Age: 76
End: 2021-03-15

## 2021-03-15 ENCOUNTER — OFFICE VISIT (OUTPATIENT)
Dept: INTERNAL MEDICINE | Facility: CLINIC | Age: 76
End: 2021-03-15
Payer: COMMERCIAL

## 2021-03-15 VITALS
WEIGHT: 176 LBS | HEART RATE: 89 BPM | BODY MASS INDEX: 25.99 KG/M2 | SYSTOLIC BLOOD PRESSURE: 131 MMHG | OXYGEN SATURATION: 98 % | DIASTOLIC BLOOD PRESSURE: 81 MMHG

## 2021-03-15 DIAGNOSIS — E78.5 HYPERLIPIDEMIA LDL GOAL <130: Primary | ICD-10-CM

## 2021-03-15 DIAGNOSIS — I10 ESSENTIAL HYPERTENSION: ICD-10-CM

## 2021-03-15 DIAGNOSIS — R97.20 ELEVATED PROSTATE SPECIFIC ANTIGEN (PSA): Primary | ICD-10-CM

## 2021-03-15 DIAGNOSIS — R73.02 IGT (IMPAIRED GLUCOSE TOLERANCE): ICD-10-CM

## 2021-03-15 PROCEDURE — G0439 PPPS, SUBSEQ VISIT: HCPCS | Performed by: INTERNAL MEDICINE

## 2021-03-15 RX ORDER — HYDROCHLOROTHIAZIDE 25 MG/1
25 TABLET ORAL DAILY
Qty: 90 TABLET | Refills: 4 | Status: SHIPPED | OUTPATIENT
Start: 2021-03-15 | End: 2022-03-14

## 2021-03-15 ASSESSMENT — PAIN SCALES - GENERAL: PAINLEVEL: MODERATE PAIN (5)

## 2021-03-15 NOTE — NURSING NOTE
Chief Complaint   Patient presents with     Physical     pt here for physical       Clarissa Del Castillo CMA, EMT at 12:49 PM on 3/15/2021.

## 2021-03-15 NOTE — TELEPHONE ENCOUNTER
Patient was notified that we placed his PSA order in his chart for his upcoming lab and follow up appointment with Amparo Carroll PA-C on 5/4/2021.      Thanks, Eber Lou MA

## 2021-03-15 NOTE — PROGRESS NOTES
HPI  75-year-old presents today for annual visit.  He has been doing quite well.  He is trying to eat a healthy diet he is exercising on his stationary bike doing high intensity workouts tolerating this well without shortness of breath chest pain or other symptoms.  He will occasionally notice some shortness of breath while standing has not had any associated symptoms with this.  There is no exertional related symptoms.  He has been monitoring his blood pressure regularly at home and its intermittently running high.  He is having not infrequent systolics over 150.  Rarely he is getting under 130.  He is taking hydrochlorothiazide 12.5 and is interested and motivated to increase the dose for this.  Otherwise his blood sugars have improved recently he feels that some of the past elevations may have been nonfasting his recent A1c was excellent.  No other complaints or problems  Past Medical History:   Diagnosis Date     BPH (benign prostatic hyperplasia)      Essential hypertension 2018     Hyperlipidemia LDL goal <130      Pemphigus     pemphigus foliaceus     Retinal detachment     left eye- corrected with laser     Vitreous detachment     LE     Past Surgical History:   Procedure Laterality Date     NO HISTORY OF SURGERY  14    derm     RETINOPEXY LASER PROPHYLAXIS BREAK(S) OS (LEFT EYE)      2009 - Left eye     Family History   Problem Relation Age of Onset     Heart Disease Mother         CHF d 80     Diabetes Mother         DM2,  age 80     Hypertension Mother      Heart Disease Father         CHF d 78     Heart Disease Paternal Uncle         CHF d 50s     Cancer No family hx of         no skin cancer     Skin Cancer No family hx of         no skin cancer     Glaucoma No family hx of      Macular Degeneration No family hx of      Melanoma No family hx of      Social History     Socioeconomic History     Marital status:      Spouse name: None     Number of children: None     Years of  education: None     Highest education level: None   Occupational History     None   Social Needs     Financial resource strain: None     Food insecurity     Worry: None     Inability: None     Transportation needs     Medical: None     Non-medical: None   Tobacco Use     Smoking status: Never Smoker     Smokeless tobacco: Never Used   Substance and Sexual Activity     Alcohol use: None     Drug use: None     Sexual activity: None   Lifestyle     Physical activity     Days per week: None     Minutes per session: None     Stress: None   Relationships     Social connections     Talks on phone: None     Gets together: None     Attends Worship service: None     Active member of club or organization: None     Attends meetings of clubs or organizations: None     Relationship status: None     Intimate partner violence     Fear of current or ex partner: None     Emotionally abused: None     Physically abused: None     Forced sexual activity: None   Other Topics Concern     Parent/sibling w/ CABG, MI or angioplasty before 65F 55M? Not Asked   Social History Narrative     None     Answers for HPI/ROS submitted by the patient on 3/14/2021   General Symptoms: No  Skin Symptoms: Yes  HENT Symptoms: No  EYE SYMPTOMS: No  HEART SYMPTOMS: Yes  LUNG SYMPTOMS: Yes  INTESTINAL SYMPTOMS: No  URINARY SYMPTOMS: No  REPRODUCTIVE SYMPTOMS: No  SKELETAL SYMPTOMS: Yes  BLOOD SYMPTOMS: No  NERVOUS SYSTEM SYMPTOMS: No  MENTAL HEALTH SYMPTOMS: No  Changes in hair: No  Changes in moles/birth marks: Yes  Itching: No  Rashes: No  Changes in nails: No  Acne: No  Change in facial hair: No  Warts: Yes  Non-healing sores: No  Scarring: No  Flaking of skin: No  Color changes of hands/feet in cold : No  Sun sensitivity: No  Skin thickening: No  Cough: No  Sputum or phlegm: No  Coughing up blood: No  Difficulty breating or shortness of breath: Yes  Snoring: No  Wheezing: No  Difficulty breathing on exertion: No  Nighttime Cough: No  Difficulty breathing  when lying flat: No  Chest pain or pressure: No  Fast or irregular heartbeat: No  Pain in legs with walking: No  Trouble breathing while lying down: No  Fingers or toes appear blue: No  High blood pressure: Yes  Low blood pressure: No  Fainting: No  Murmurs: No  Pacemaker: No  Varicose veins: No  Edema or swelling: No  Wake up at night with shortness of breath: No  Light-headedness: No  Exercise intolerance: No  Back pain: No  Muscle aches: No  Neck pain: No  Swollen joints: No  Joint pain: Yes  Bone pain: No  Muscle cramps: No  Muscle weakness: No  Joint stiffness: No  Bone fracture: No    Exam:  BP (!) 153/86 (BP Location: Right arm, Patient Position: Sitting, Cuff Size: Adult Regular)   Pulse 89   Wt 79.8 kg (176 lb)   SpO2 98%   BMI 25.99 kg/m    176 lbs 0 oz  Physical Exam   The patient is alert, oriented with a clear sensorium.   Skin shows no lesions or rashes and good turgor.   Head is normocephalic and atraumatic.   Eyes show PERRLA with benign optic fundi.   Ears show normal TMs bilaterally.   Mouth shows clear oral mucosa.   Neck shows no nodes, thyromegaly or bruits.   Back is non tender.  Lungs are clear to percussion and auscultation.   Heart shows normal S1 and S2 without murmur or gallop.   Abdomen is soft and nontender without masses or organomegaly.   Genitalia show normal testes. No evidence of inguinal hernia.  Extremities show no edema and no evidence of active synovitis.   Neurologic examination shows cranial nerves II-XII intact. Motor shows 5/5 strength. Reflexes are symmetric. Cerebellar testing shows normal tandem gait.  Romberg negative.      ASSESSMENT  1 probable right L2 radiculopathy  2. Pemphigus in remission  3. Hyperlipidemia on atorvastatin  4 hypertension on hctz  5 subclinical hypothyroidism  6 IGT    Plan  Him follow-up for fasting lipids and blood sugar.  The lipids are good we will continue on the same dose of the atorvastatin we discussed an increase in the dose of  hydrochlorothiazide to 25 mg he agreed we will continue to monitor the blood pressure at home on this.  Otherwise he is current on his immunizations and health maintenance.  This note was completed using Dragon voice recognition software.      Devendra Tleles MD  General Internal Medicine  [facility]  [phone]

## 2021-03-15 NOTE — PROGRESS NOTES
Medicare Annual Wellness Visit Previsit note    This 75 year old year old male presents for a  Medicare Wellness Exam.           Medical Care     Have you been to an ER or a hospital in the last year? No  What other specialists or organizations are involved in your medical care?  Not answered  Current providers sharing in care for this patient include:  Patient Care Team       Relationship Specialty Notifications Start End    Devendra Telles MD PCP - General Internal Medicine  8/7/19     Phone: [phone] Fax: [phone]         [address] [facility] [address]    Loretta Villeda MD MD Internal Medicine  10/30/13     Serina Odell MD MD Dermatology  10/22/15     Phone: [phone] Fax: [phone]         [address] [place] [address] [address] [facility] [address]    Nav Rust MD MD Otolaryngology  11/25/15     referring to audiology RAFI Erazo    Phone: [phone] Fax: [phone]         [facility] [address] [place] [address]    Sydney Erazo AuD Audiologist Audiology  11/25/15     Phone: [phone] Fax: [phone]          [facility] [address]    Chuckie Lechuga MD MD   1/21/16     Fax: [phone]         Mukesh Lewis MD MD Clinical Cardiac Electrophysiology  3/13/17     Phone: [phone] Fax: [phone]         [address] [place] [address] [address] [facility] [address]    Reina Latif MD Resident Student in organized health care education/training program  11/30/18     Phone: [phone] Fax: [phone]         [address] [place] [address] [address] [facility] [address]    Caden Cristina MD MD Allergy & Immunology  5/30/19     Phone: [phone] Fax: [phone]         [facility] [address] [place] [address] [facility] [address]    Flory Fish NP Nurse Practitioner Family Practice  12/2/19     Phone: [phone] Fax: [phone]         [facility] NP CLINIC [facility], FLOOR [address] [facility] [address]    Devendra Telles  MD Pedro Assigned PCP   5/21/20     Phone: [phone] Fax: [phone]         [address] [facility] [address]    Pillo Lizama MD Resident Student in organized health care education/training program  7/29/20     Phone: [phone] Fax: [phone]         [facility] [address] [facility] [address] [facility] [address]    Gaurang Gates MD Assigned Pulmonology Provider   10/23/20     Phone: [phone] Fax: [phone]         [address] [place] [address]    Mona Strickland MD Assigned Surgical Provider   10/23/20     Phone: [phone] Fax: [phone]         [address] [place] [address]                 Social History     Marital Status:  Who lives in your household? Wife and himself  Does your home have any of the following safety concerns? Loose rugs in the hallway, no grab bars in the bathroom, no handrails on the stairs or have poorly lit areas?  No  Do you feel threatened or controlled by a partner, ex-partner or anyone in your life? No  Has anyone hurt you physically, for example by pushing, hitting, slapping or kicking you   or forcing you to have sex? No  Do you need help with the phone, transportation, shopping, preparing meals, housework, laundry, medications or managing money? No   Have you noticed any hearing difficulties? Yes       Risk Behaviors and Healthy Habits     How many servings of fruits and vegetables do you eat a day? 2  How often do you exercise and what do you do? 1 minutes 4 a week  Do you frequently ride without a seatbelt? No  Do you use tobacco?  No  Do you use any other drugs? No       Do you use alcohol?No      Sexual Health     Are you sexually active? Yes    If yes, with men, women, or both? Male  If yes, do you more than one current partner?No  If yes, do you use condoms? No  Have you had any sexually transmitted infections? No  Any sexual concerns? No

## 2021-03-15 NOTE — TELEPHONE ENCOUNTER
[facility] Call Center    Phone Message    May a detailed message be left on voicemail: yes     Reason for Call: Order(s): Other:   Reason for requested: Pt has annual PSA in clinic appt with Amparo on 5/4/2021 and a lab appt prior  Date needed: 4/1/2021  Provider name: Dr. Gentile or Amparo Carroll      Action Taken: Message routed to:  [facility] ([facility]  Urology    Travel Screening: Not Applicable

## 2021-03-16 DIAGNOSIS — R73.02 IGT (IMPAIRED GLUCOSE TOLERANCE): ICD-10-CM

## 2021-03-16 DIAGNOSIS — E78.5 HYPERLIPIDEMIA LDL GOAL <130: ICD-10-CM

## 2021-03-16 DIAGNOSIS — R97.20 ELEVATED PROSTATE SPECIFIC ANTIGEN (PSA): ICD-10-CM

## 2021-03-16 LAB
CHOLEST SERPL-MCNC: 178 MG/DL
GLUCOSE SERPL-MCNC: 101 MG/DL (ref 70–99)
HDLC SERPL-MCNC: 54 MG/DL
LDLC SERPL CALC-MCNC: 101 MG/DL
NONHDLC SERPL-MCNC: 124 MG/DL
PSA SERPL-MCNC: 1.95 UG/L (ref 0–4)
TRIGL SERPL-MCNC: 114 MG/DL

## 2021-03-16 PROCEDURE — 82947 ASSAY GLUCOSE BLOOD QUANT: CPT | Performed by: PATHOLOGY

## 2021-03-16 PROCEDURE — 84153 ASSAY OF PSA TOTAL: CPT | Performed by: PATHOLOGY

## 2021-03-16 PROCEDURE — 80061 LIPID PANEL: CPT | Performed by: PATHOLOGY

## 2021-03-16 PROCEDURE — 36415 COLL VENOUS BLD VENIPUNCTURE: CPT | Performed by: PATHOLOGY

## 2021-03-18 ENCOUNTER — VIRTUAL VISIT (OUTPATIENT)
Dept: DERMATOLOGY | Facility: CLINIC | Age: 76
End: 2021-03-18
Payer: COMMERCIAL

## 2021-03-18 DIAGNOSIS — L10.2 PEMPHIGUS FOLIACEUS (H): Primary | ICD-10-CM

## 2021-03-18 PROCEDURE — 99213 OFFICE O/P EST LOW 20 MIN: CPT | Mod: 95 | Performed by: STUDENT IN AN ORGANIZED HEALTH CARE EDUCATION/TRAINING PROGRAM

## 2021-03-18 NOTE — LETTER
3/18/2021       RE: Luan Mitchell  [address] [place] [address]     Dear Colleague,    Thank you for referring your patient, Luan Mitchell, to the [facility] DERMATOLOGY CLINIC [place] [facility]. Please see a copy of my visit note below.    [facility] Dermatology Note  Encounter Date: Mar 18, 2021  Video (invitation sent by Dr. Lizama). Location of teledermatologist: [facility] DERMATOLOGY [facility]. Start time: 10:45 AM. End time: 11:00 AM.    Dermatology Problem List:  0. NUB R calf  -Consider bx when patient comes for in person visit.  1.  Pemphigus foliaceus.     - Goals of treatment per Mr. Mitchell:  Prevent itching and secondary infections.   - He is not interested in complete clearance at this time.     - Stopped MMF 03/2019, re-started  mg PO BID on 8/13/2020, reduced to 500 mg every day on 10/21/2020     Cell Surface Antibodies   Serum    Date of    IgG CS        IgA CS   Number   Specimen   Titers        Titers     DSG 1   DSG 3   -------  --------   ----------    --------   -----   -----     03/14/19   ME: 1:1280    ME: NA     125*      1                      NS: 1:160     NS: NA     128**     06/12/20   ME: 1:2560    ME: NA     181*      1                      NS: 1:2560    NS: NA     320**      2.  History of seborrheic keratoses.      3.  Seborrheic dermatitis, on ketaconazole shampoo and cream.      4. DN, Mild. Right neck. Bx 3/22/18.      5. Verruous SKs right upper forehead; will monitor, if changes consider biopsy  ____________________________________________    Assessment & Plan:   # NUB R calf  Suspect NMSC (ie SCC) vs SK vs his PF. Would rec bx.   -RTC 1 week for evaluation    # Pemphigus foliceus. Patient with disease activity but not interested in complete clearance. He is more concerned about immunosuppression.   -Continue  mg qDay  -CBC and CMP and  pemphigus panel when he comes to clinic in 1 week    Procedures Performed:    None    Follow-up: 1 week(s) in-person, or earlier for new or changing lesions    Staff and Resident:     Pillo Lizama MD  Internal Medicine-Dermatology, PGY-5    Staffed with Dr. Jose R MURRAY, Serina Odell,  was with the resident on the (phone/video) visit (for the critical and key portions or for 10 minutes) and agree with the resident's findings and plan of care as documented in the resident's note  ____________________________________________    CC: Derm Problem (Luan, is here for a follow up appt)    HPI:  Mr. Luan Mitchell is a(n) 75 year old male who presents today as a return patient for pemphigus foliaceus follow-up.  Happy with level of control on skin. Active spots on chest/back but these are asymptomatic and he would rather not increase immunosuppression. Continues clobetasol ointment periodically.  He has a fairly new spot on the right calf. It is not tender.  No side effects from MMF.  He has completed COVID vaccination.  Patient is otherwise feeling well, without additional skin concerns.    Labs Reviewed:  N/A    Physical Exam:  Vitals: There were no vitals taken for this visit.  SKIN: Teledermatology photos were reviewed; image quality and interpretability: acceptable. Image date: 3/17/21.  -hyperkeratotic papule right calf  -scattered hyperkeratotic plaques and post inflammatory patches on abdomen  - No other lesions of concern on areas examined.     Medications:  Current Outpatient Medications   Medication     ARTIFICIAL TEAR SOLUTION OP     atorvastatin (LIPITOR) 20 MG tablet     Cholecalciferol (VITAMIN D PO)     clobetasol (TEMOVATE) 0.05 % external ointment     clobetasol (TEMOVATE) 0.05 % external solution     clobetasol propionate 0.05 % SHAM     desonide (DESOWEN) 0.05 % ointment     finasteride (PROSCAR) 5 MG tablet     hydrochlorothiazide (HYDRODIURIL) 25 MG tablet     hydrocortisone butyrate  (LOCOID) 0.1 % SOLN     ketoconazole (NIZORAL) 2 % cream     ketoconazole (NIZORAL) 2 % shampoo     Multiple Vitamin (MULTIVITAMIN) per tablet     mycophenolate (GENERIC EQUIVALENT) 500 MG tablet     tacrolimus (PROTOPIC) 0.1 % external ointment     triamcinolone (KENALOG) 0.1 % external cream     triamcinolone (KENALOG) 0.1 % external ointment     triamcinolone (KENALOG) 0.1 % ointment     No current facility-administered medications for this visit.       Past Medical/Surgical History:   Patient Active Problem List   Diagnosis     Pemphigus foliaceus     Hyperlipidemia LDL goal <130     BPH (benign prostatic hyperplasia)     Encounter for long-term (current) use of high-risk medication     Dermatitis, seborrheic     Lightheadedness     Age-related nuclear cataract of both eyes     SOB (shortness of breath)     Low back pain     Past Medical History:   Diagnosis Date     BPH (benign prostatic hyperplasia)      Essential hypertension 02/2018     Hyperlipidemia LDL goal <130      Pemphigus     pemphigus foliaceus     Retinal detachment 2009    left eye- corrected with laser     Vitreous detachment 2009    LE       CC Referred Self, MD  No address on file on close of this encounter.

## 2021-03-18 NOTE — PROGRESS NOTES
[facility] Dermatology Note  Encounter Date: Mar 18, 2021  Video (invitation sent by Dr. Lizama). Location of teledermatologist: [facility] DERMATOLOGY CLINIC [place]. Start time: 10:45 AM. End time: 11:00 AM.    Dermatology Problem List:  0. NUB R calf  -Consider bx when patient comes for in person visit.  1.  Pemphigus foliaceus.     - Goals of treatment per Mr. Mitchell:  Prevent itching and secondary infections.   - He is not interested in complete clearance at this time.     - Stopped MMF 03/2019, re-started  mg PO BID on 8/13/2020, reduced to 500 mg every day on 10/21/2020     Cell Surface Antibodies   Serum    Date of    IgG CS        IgA CS   Number   Specimen   Titers        Titers     DSG 1   DSG 3   -------  --------   ----------    --------   -----   -----     03/14/19   ME: 1:1280    ME: NA     125*      1                      NS: 1:160     NS: NA     128**     06/12/20   ME: 1:2560    ME: NA     181*      1                      NS: 1:2560    NS: NA     320**      2.  History of seborrheic keratoses.      3.  Seborrheic dermatitis, on ketaconazole shampoo and cream.      4. DN, Mild. Right neck. Bx 3/22/18.      5. Verruous SKs right upper forehead; will monitor, if changes consider biopsy  ____________________________________________    Assessment & Plan:   # NUB R calf  Suspect NMSC (ie SCC) vs SK vs his PF. Would rec bx.   -RTC 1 week for evaluation    # Pemphigus foliceus. Patient with disease activity but not interested in complete clearance. He is more concerned about immunosuppression.   -Continue  mg qDay  -CBC and CMP and pemphigus panel when he comes to clinic in 1 week    Procedures Performed:    None    Follow-up: 1 week(s) in-person, or earlier for new or changing lesions    Staff and Resident:     Pillo Lizama MD  Internal Medicine-Dermatology, PGY-5    Staffed with Dr. Jose R MURRAY, Serina Odell,  was with the  resident on the (phone/video) visit (for the critical and key portions or for 10 minutes) and agree with the resident's findings and plan of care as documented in the resident's note  ____________________________________________    CC: Derm Problem (Luan, is here for a follow up appt)    HPI:  Mr. Luan Mitchell is a(n) 75 year old male who presents today as a return patient for pemphigus foliaceus follow-up.  Happy with level of control on skin. Active spots on chest/back but these are asymptomatic and he would rather not increase immunosuppression. Continues clobetasol ointment periodically.  He has a fairly new spot on the right calf. It is not tender.  No side effects from MMF.  He has completed COVID vaccination.  Patient is otherwise feeling well, without additional skin concerns.    Labs Reviewed:  N/A    Physical Exam:  Vitals: There were no vitals taken for this visit.  SKIN: Teledermatology photos were reviewed; image quality and interpretability: acceptable. Image date: 3/17/21.  -hyperkeratotic papule right calf  -scattered hyperkeratotic plaques and post inflammatory patches on abdomen  - No other lesions of concern on areas examined.     Medications:  Current Outpatient Medications   Medication     ARTIFICIAL TEAR SOLUTION OP     atorvastatin (LIPITOR) 20 MG tablet     Cholecalciferol (VITAMIN D PO)     clobetasol (TEMOVATE) 0.05 % external ointment     clobetasol (TEMOVATE) 0.05 % external solution     clobetasol propionate 0.05 % SHAM     desonide (DESOWEN) 0.05 % ointment     finasteride (PROSCAR) 5 MG tablet     hydrochlorothiazide (HYDRODIURIL) 25 MG tablet     hydrocortisone butyrate (LOCOID) 0.1 % SOLN     ketoconazole (NIZORAL) 2 % cream     ketoconazole (NIZORAL) 2 % shampoo     Multiple Vitamin (MULTIVITAMIN) per tablet     mycophenolate (GENERIC EQUIVALENT) 500 MG tablet     tacrolimus (PROTOPIC) 0.1 % external ointment     triamcinolone (KENALOG) 0.1 % external cream     triamcinolone  (KENALOG) 0.1 % external ointment     triamcinolone (KENALOG) 0.1 % ointment     No current facility-administered medications for this visit.       Past Medical/Surgical History:   Patient Active Problem List   Diagnosis     Pemphigus foliaceus     Hyperlipidemia LDL goal <130     BPH (benign prostatic hyperplasia)     Encounter for long-term (current) use of high-risk medication     Dermatitis, seborrheic     Lightheadedness     Age-related nuclear cataract of both eyes     SOB (shortness of breath)     Low back pain     Past Medical History:   Diagnosis Date     BPH (benign prostatic hyperplasia)      Essential hypertension 02/2018     Hyperlipidemia LDL goal <130      Pemphigus     pemphigus foliaceus     Retinal detachment 2009    left eye- corrected with laser     Vitreous detachment 2009    LE       CC Referred Self, MD  No address on file on close of this encounter.

## 2021-03-18 NOTE — NURSING NOTE
Chief Complaint   Patient presents with     Derm Problem     Luan, is here for a follow up appt    Ralf West EMT

## 2021-03-25 ENCOUNTER — OFFICE VISIT (OUTPATIENT)
Dept: DERMATOLOGY | Facility: CLINIC | Age: 76
End: 2021-03-25
Payer: COMMERCIAL

## 2021-03-25 DIAGNOSIS — D48.5 NEOPLASM OF UNCERTAIN BEHAVIOR OF SKIN: Primary | ICD-10-CM

## 2021-03-25 DIAGNOSIS — L10.2 PEMPHIGUS FOLIACEUS (H): ICD-10-CM

## 2021-03-25 LAB
ALBUMIN SERPL-MCNC: 4 G/DL (ref 3.4–5)
ALP SERPL-CCNC: 41 U/L (ref 40–150)
ALT SERPL W P-5'-P-CCNC: 42 U/L (ref 0–70)
ANION GAP SERPL CALCULATED.3IONS-SCNC: 6 MMOL/L (ref 3–14)
AST SERPL W P-5'-P-CCNC: 29 U/L (ref 0–45)
BASOPHILS # BLD AUTO: 0 10E9/L (ref 0–0.2)
BASOPHILS NFR BLD AUTO: 0.4 %
BILIRUB SERPL-MCNC: 0.6 MG/DL (ref 0.2–1.3)
BUN SERPL-MCNC: 20 MG/DL (ref 7–30)
CALCIUM SERPL-MCNC: 9.7 MG/DL (ref 8.5–10.1)
CHLORIDE SERPL-SCNC: 106 MMOL/L (ref 94–109)
CO2 SERPL-SCNC: 29 MMOL/L (ref 20–32)
CREAT SERPL-MCNC: 1.09 MG/DL (ref 0.66–1.25)
DIFFERENTIAL METHOD BLD: NORMAL
EOSINOPHIL # BLD AUTO: 0.2 10E9/L (ref 0–0.7)
EOSINOPHIL NFR BLD AUTO: 2.6 %
ERYTHROCYTE [DISTWIDTH] IN BLOOD BY AUTOMATED COUNT: 12.7 % (ref 10–15)
GFR SERPL CREATININE-BSD FRML MDRD: 65 ML/MIN/{1.73_M2}
GLUCOSE SERPL-MCNC: 116 MG/DL (ref 70–99)
HCT VFR BLD AUTO: 44.6 % (ref 40–53)
HGB BLD-MCNC: 15.6 G/DL (ref 13.3–17.7)
IMM GRANULOCYTES # BLD: 0 10E9/L (ref 0–0.4)
IMM GRANULOCYTES NFR BLD: 0.3 %
LYMPHOCYTES # BLD AUTO: 2.4 10E9/L (ref 0.8–5.3)
LYMPHOCYTES NFR BLD AUTO: 32.7 %
MCH RBC QN AUTO: 31.8 PG (ref 26.5–33)
MCHC RBC AUTO-ENTMCNC: 35 G/DL (ref 31.5–36.5)
MCV RBC AUTO: 91 FL (ref 78–100)
MONOCYTES # BLD AUTO: 0.8 10E9/L (ref 0–1.3)
MONOCYTES NFR BLD AUTO: 11.1 %
NEUTROPHILS # BLD AUTO: 3.9 10E9/L (ref 1.6–8.3)
NEUTROPHILS NFR BLD AUTO: 52.9 %
NRBC # BLD AUTO: 0 10*3/UL
NRBC BLD AUTO-RTO: 0 /100
PLATELET # BLD AUTO: 169 10E9/L (ref 150–450)
POTASSIUM SERPL-SCNC: 3.8 MMOL/L (ref 3.4–5.3)
PROT SERPL-MCNC: 7.2 G/DL (ref 6.8–8.8)
RBC # BLD AUTO: 4.91 10E12/L (ref 4.4–5.9)
SODIUM SERPL-SCNC: 141 MMOL/L (ref 133–144)
WBC # BLD AUTO: 7.4 10E9/L (ref 4–11)

## 2021-03-25 PROCEDURE — 99213 OFFICE O/P EST LOW 20 MIN: CPT | Mod: 25 | Performed by: STUDENT IN AN ORGANIZED HEALTH CARE EDUCATION/TRAINING PROGRAM

## 2021-03-25 PROCEDURE — 88305 TISSUE EXAM BY PATHOLOGIST: CPT | Performed by: DERMATOLOGY

## 2021-03-25 PROCEDURE — 83516 IMMUNOASSAY NONANTIBODY: CPT | Mod: 90 | Performed by: PATHOLOGY

## 2021-03-25 PROCEDURE — 36415 COLL VENOUS BLD VENIPUNCTURE: CPT | Performed by: PATHOLOGY

## 2021-03-25 PROCEDURE — 80053 COMPREHEN METABOLIC PANEL: CPT | Performed by: PATHOLOGY

## 2021-03-25 PROCEDURE — 11102 TANGNTL BX SKIN SINGLE LES: CPT | Mod: GC | Performed by: STUDENT IN AN ORGANIZED HEALTH CARE EDUCATION/TRAINING PROGRAM

## 2021-03-25 PROCEDURE — 85025 COMPLETE CBC W/AUTO DIFF WBC: CPT | Performed by: PATHOLOGY

## 2021-03-25 ASSESSMENT — PAIN SCALES - GENERAL: PAINLEVEL: NO PAIN (0)

## 2021-03-25 NOTE — NURSING NOTE
Chief Complaint   Patient presents with     Derm Problem     Luan is here for a check on a spot on his right leg.      Phyllis Gan LPN

## 2021-03-25 NOTE — LETTER
3/25/2021       RE: Luan Mitchell  [address] [place] [id number]     Dear Colleague,    Thank you for referring your patient, Luan Mitchell, to the [facility] DERMATOLOGY CLINIC [place] at [facility]. Please see a copy of my visit note below.    [facility] Dermatology Note  Encounter Date: Mar 25, 2021  Office Visit     Dermatology Problem List:  0. NUB R calf  -s/p shave bx 3/25/21  1.  Pemphigus foliaceus.     - Goals of treatment per Mr. Mitchell:  Prevent itching and secondary infections.   - He is not interested in complete clearance at this time.     - Stopped MMF 03/2019, re-started  mg PO BID on 8/13/2020, reduced to 500 mg every day on 10/21/2020     Cell Surface Antibodies   Serum    Date of    IgG CS        IgA CS   Number   Specimen   Titers        Titers     DSG 1   DSG 3   -------  --------   ----------    --------   -----   -----     03/14/19   ME: 1:1280    ME: NA     125*      1                      NS: 1:160     NS: NA     128**     06/12/20   ME: 1:2560    ME: NA     181*      1                      NS: 1:2560    NS: NA     320**      2.  History of seborrheic keratoses.      3.  Seborrheic dermatitis, on ketaconazole shampoo and cream.      4. DN, Mild. Right neck. Bx 3/22/18.      5. Verruous SKs right upper forehead; will monitor, if changes consider biopsy  ____________________________________________      ____________________________________________    Assessment & Plan:     # NUB right calf- ddx KA vs SCC vs KA vs pemphigus foliaceus  - Shave biopsy performed today (see procedure note(s) below).    # Pemphigus foliaceus. Disease stable. Patient not interested in better level of control right now because he feels risk of immunosuppression greater than having his asymptomatic skin lesions.    -Continue  mg BID  -CBC, CMP and pemphigus panel today   -Updated CBC and CMP are WNL;  pemphigus panel pending    Procedures Performed:   - Shave biopsy procedure note, location(s): right calf. After discussion of benefits and risks including but not limited to bleeding, infection, scar, incomplete removal, recurrence, and non-diagnostic biopsy, written consent and photographs were obtained. The area was cleaned with isopropyl alcohol. 0.5mL of 1% lidocaine with epinephrine was injected to obtain adequate anesthesia of lesion(s). Shave biopsy at site(s) performed. Hemostasis was achieved with aluminium chloride. Petrolatum ointment and a sterile dressing were applied. The patient tolerated the procedure and no complications were noted. The patient was provided with verbal and written post care instructions.     Follow-up: 3 month(s) in-person, or earlier for new or changing lesions    Staff and Resident:     Pillo Lizama MD  Internal Medicine-Dermatology, PGY-5  ____________________________________________    CC: Derm Problem (Luan is here for a check on a spot on his right leg. )    HPI:  Mr. Luan Mitchell is a(n) 75 year old male who presents today as a return patient for lesion on right calf.  Patient has developed lesion this year. It is not painful but atypical for him to have lesion on leg. Not bleeding. Now not growing further.  See discussion regarding pemphigus from last weeks note.    Patient is otherwise feeling well, without additional skin concerns.    Labs Reviewed:  N/A    Physical Exam:  Vitals: There were no vitals taken for this visit.  SKIN: Full skin, which includes the head/face, both arms, chest, back, abdomen,both legs, genitalia and/or groin buttocks, digits and/or nails, was examined.  - hyperkeratotic dome shaped papule on right calf  -scattered scaly hyperkeratotic plaques on the trunk  - No other lesions of concern on areas examined.     Medications:  Current Outpatient Medications   Medication     ARTIFICIAL TEAR SOLUTION OP     atorvastatin (LIPITOR) 20 MG  tablet     Cholecalciferol (VITAMIN D PO)     clobetasol (TEMOVATE) 0.05 % external ointment     clobetasol (TEMOVATE) 0.05 % external solution     clobetasol propionate 0.05 % SHAM     desonide (DESOWEN) 0.05 % ointment     finasteride (PROSCAR) 5 MG tablet     hydrochlorothiazide (HYDRODIURIL) 25 MG tablet     hydrocortisone butyrate (LOCOID) 0.1 % SOLN     ketoconazole (NIZORAL) 2 % cream     ketoconazole (NIZORAL) 2 % shampoo     Multiple Vitamin (MULTIVITAMIN) per tablet     mycophenolate (GENERIC EQUIVALENT) 500 MG tablet     tacrolimus (PROTOPIC) 0.1 % external ointment     triamcinolone (KENALOG) 0.1 % external cream     triamcinolone (KENALOG) 0.1 % external ointment     triamcinolone (KENALOG) 0.1 % ointment     No current facility-administered medications for this visit.       Past Medical History:   Patient Active Problem List   Diagnosis     Pemphigus foliaceus     Hyperlipidemia LDL goal <130     BPH (benign prostatic hyperplasia)     Encounter for long-term (current) use of high-risk medication     Dermatitis, seborrheic     Lightheadedness     Age-related nuclear cataract of both eyes     SOB (shortness of breath)     Low back pain     Past Medical History:   Diagnosis Date     BPH (benign prostatic hyperplasia)      Essential hypertension 02/2018     Hyperlipidemia LDL goal <130      Pemphigus     pemphigus foliaceus     Retinal detachment 2009    left eye- corrected with laser     Vitreous detachment 2009    STEPHANIE QUIROGA Referred Self, MD  No address on file on close of this encounter.

## 2021-03-25 NOTE — PATIENT INSTRUCTIONS

## 2021-03-25 NOTE — PROGRESS NOTES
[facility] Dermatology Note  Encounter Date: Mar 25, 2021  Office Visit     Dermatology Problem List:  0. NUB R calf  -s/p shave bx 3/25/21  1.  Pemphigus foliaceus.     - Goals of treatment per Mr. Mitchell:  Prevent itching and secondary infections.   - He is not interested in complete clearance at this time.     - Stopped MMF 03/2019, re-started  mg PO BID on 8/13/2020, reduced to 500 mg every day on 10/21/2020     Cell Surface Antibodies   Serum    Date of    IgG CS        IgA CS   Number   Specimen   Titers        Titers     DSG 1   DSG 3   -------  --------   ----------    --------   -----   -----     03/14/19   ME: 1:1280    ME: NA     125*      1                      NS: 1:160     NS: NA     128**     06/12/20   ME: 1:2560    ME: NA     181*      1                      NS: 1:2560    NS: NA     320**      2.  History of seborrheic keratoses.      3.  Seborrheic dermatitis, on ketaconazole shampoo and cream.      4. DN, Mild. Right neck. Bx 3/22/18.      5. Verruous SKs right upper forehead; will monitor, if changes consider biopsy  ____________________________________________      ____________________________________________    Assessment & Plan:     # NUB right calf- ddx KA vs SCC vs KA vs pemphigus foliaceus  - Shave biopsy performed today (see procedure note(s) below).    # Pemphigus foliaceus. Disease stable. Patient not interested in better level of control right now because he feels risk of immunosuppression greater than having his asymptomatic skin lesions.    -Continue  mg BID  -CBC, CMP and pemphigus panel today   -Updated CBC and CMP are WNL; pemphigus panel pending    Procedures Performed:   - Shave biopsy procedure note, location(s): right calf. After discussion of benefits and risks including but not limited to bleeding, infection, scar, incomplete removal, recurrence, and non-diagnostic biopsy, written consent and photographs were obtained. The area  was cleaned with isopropyl alcohol. 0.5mL of 1% lidocaine with epinephrine was injected to obtain adequate anesthesia of lesion(s). Shave biopsy at site(s) performed. Hemostasis was achieved with aluminium chloride. Petrolatum ointment and a sterile dressing were applied. The patient tolerated the procedure and no complications were noted. The patient was provided with verbal and written post care instructions.     Follow-up: 3 month(s) in-person, or earlier for new or changing lesions    Staff and Resident:     Pillo Lizama MD  Internal Medicine-Dermatology, PGY-5  ____________________________________________    CC: Derm Problem (Luan is here for a check on a spot on his right leg. )    HPI:  Mr. Luan Mitchell is a(n) 75 year old male who presents today as a return patient for lesion on right calf.  Patient has developed lesion this year. It is not painful but atypical for him to have lesion on leg. Not bleeding. Now not growing further.  See discussion regarding pemphigus from last weeks note.    Patient is otherwise feeling well, without additional skin concerns.    Labs Reviewed:  N/A    Physical Exam:  Vitals: There were no vitals taken for this visit.  SKIN: Full skin, which includes the head/face, both arms, chest, back, abdomen,both legs, genitalia and/or groin buttocks, digits and/or nails, was examined.  - hyperkeratotic dome shaped papule on right calf  -scattered scaly hyperkeratotic plaques on the trunk  - No other lesions of concern on areas examined.     Medications:  Current Outpatient Medications   Medication     ARTIFICIAL TEAR SOLUTION OP     atorvastatin (LIPITOR) 20 MG tablet     Cholecalciferol (VITAMIN D PO)     clobetasol (TEMOVATE) 0.05 % external ointment     clobetasol (TEMOVATE) 0.05 % external solution     clobetasol propionate 0.05 % SHAM     desonide (DESOWEN) 0.05 % ointment     finasteride (PROSCAR) 5 MG tablet     hydrochlorothiazide (HYDRODIURIL) 25 MG tablet      hydrocortisone butyrate (LOCOID) 0.1 % SOLN     ketoconazole (NIZORAL) 2 % cream     ketoconazole (NIZORAL) 2 % shampoo     Multiple Vitamin (MULTIVITAMIN) per tablet     mycophenolate (GENERIC EQUIVALENT) 500 MG tablet     tacrolimus (PROTOPIC) 0.1 % external ointment     triamcinolone (KENALOG) 0.1 % external cream     triamcinolone (KENALOG) 0.1 % external ointment     triamcinolone (KENALOG) 0.1 % ointment     No current facility-administered medications for this visit.       Past Medical History:   Patient Active Problem List   Diagnosis     Pemphigus foliaceus     Hyperlipidemia LDL goal <130     BPH (benign prostatic hyperplasia)     Encounter for long-term (current) use of high-risk medication     Dermatitis, seborrheic     Lightheadedness     Age-related nuclear cataract of both eyes     SOB (shortness of breath)     Low back pain     Past Medical History:   Diagnosis Date     BPH (benign prostatic hyperplasia)      Essential hypertension 02/2018     Hyperlipidemia LDL goal <130      Pemphigus     pemphigus foliaceus     Retinal detachment 2009    left eye- corrected with laser     Vitreous detachment 2009    LE       CC Referred Self, MD  No address on file on close of this encounter.

## 2021-03-26 LAB — COPATH REPORT: NORMAL

## 2021-03-30 DIAGNOSIS — E78.5 HYPERLIPIDEMIA LDL GOAL <130: Primary | ICD-10-CM

## 2021-04-02 LAB
ICS IGG SER IF-IMP: NORMAL
PATHOLOGY STUDY: NORMAL

## 2021-04-02 RX ORDER — ATORVASTATIN CALCIUM 20 MG/1
20 TABLET, FILM COATED ORAL DAILY
Qty: 90 TABLET | Refills: 4 | Status: SHIPPED | OUTPATIENT
Start: 2021-04-02 | End: 2022-03-14

## 2021-04-06 ENCOUNTER — TELEPHONE (OUTPATIENT)
Dept: DERMATOLOGY | Facility: CLINIC | Age: 76
End: 2021-04-06

## 2021-04-06 NOTE — TELEPHONE ENCOUNTER
I called and spoke with Mr. Mitchell regarding his recent pemphigus panel results.     Briefly, his IIF remains positive with both monkey esophagus substrate (1:1280) and human skin substrate (1:80). These are both lower than they have been in the past.     His Dsg1 ab via ROMINA are also elevated at 136 U. Again, this is lower than it has been in the past.     I don't plan on changing any therapy for him at this time (currently on  mg daily) given that he is not interested in better control of his disease due to his concern of immunosuppression. According to Mr. Mitchell, his skin has been stable and well controlled over the last several months and he reiterated that he is not interested in increasing his MMF at this time.     He had no further questions.     Joey Ramírez MD, PhD  Med-Derm PGY-5

## 2021-04-27 NOTE — PROGRESS NOTES
I talked with and examined Luan Mitchell and I agree with the assessment and the plan. I was present for the entire procedure. REJI Corrales MD.

## 2021-04-30 PROBLEM — M54.50 LOW BACK PAIN: Status: RESOLVED | Noted: 2021-01-04 | Resolved: 2021-04-30

## 2021-05-18 ENCOUNTER — PRE VISIT (OUTPATIENT)
Dept: UROLOGY | Facility: CLINIC | Age: 76
End: 2021-05-18

## 2021-05-18 NOTE — TELEPHONE ENCOUNTER
Reason for visit: Follow up     Relevant information: PSA    Records/imaging/labs/orders: in Epic; PSA from 3/16/21 was 1.95    Pt called: no    At Rooming: normal

## 2021-06-01 ENCOUNTER — OFFICE VISIT (OUTPATIENT)
Dept: UROLOGY | Facility: CLINIC | Age: 76
End: 2021-06-01
Payer: COMMERCIAL

## 2021-06-01 VITALS
SYSTOLIC BLOOD PRESSURE: 136 MMHG | BODY MASS INDEX: 25.62 KG/M2 | HEIGHT: 69 IN | WEIGHT: 173 LBS | HEART RATE: 90 BPM | DIASTOLIC BLOOD PRESSURE: 78 MMHG

## 2021-06-01 DIAGNOSIS — R39.89 ABNORMAL PROSTATE EXAM: Primary | ICD-10-CM

## 2021-06-01 DIAGNOSIS — N40.1 BENIGN PROSTATIC HYPERPLASIA WITH LOWER URINARY TRACT SYMPTOMS, SYMPTOM DETAILS UNSPECIFIED: ICD-10-CM

## 2021-06-01 PROCEDURE — 99213 OFFICE O/P EST LOW 20 MIN: CPT | Performed by: PHYSICIAN ASSISTANT

## 2021-06-01 RX ORDER — FINASTERIDE 5 MG/1
5 TABLET, FILM COATED ORAL DAILY
Qty: 90 TABLET | Refills: 4 | Status: SHIPPED | OUTPATIENT
Start: 2021-06-01 | End: 2022-07-21

## 2021-06-01 ASSESSMENT — PAIN SCALES - GENERAL: PAINLEVEL: NO PAIN (0)

## 2021-06-01 ASSESSMENT — MIFFLIN-ST. JEOR: SCORE: 1495.1

## 2021-06-01 NOTE — PATIENT INSTRUCTIONS
- Finasteride refilled #90 with four refills.    - Return 1 year for an exam    [facility]

## 2021-06-01 NOTE — LETTER
"6/1/2021       RE: Luan Mitchell  [address] [phone]     Dear Colleague,    Thank you for referring your patient, Luan Mitchell, to the [facility] UROLOGY CLINIC [place] at [facility]. Please see a copy of my visit note below.    HPI: Mr. Luan Mitchell is a 76 year old year old male presenting today for evaluation of chief complaint(s): Follow Up (PSA check)    Today, he is unaccompanied    He has PMH significant for HTN, HLD, BPH, pemphigus (on mycophenolate), abnormal LEAH s/p prostate MRI on 7/6/18 showing a PIRADS 3 lesion (intermediate probability).  For this he was subsequently seen by Dr. Gentile, last on 6/29/20 with a PSA of 1.91ug/L (3.82ug/L adjusted for finasteride).  At that time it was recommended that he continue on finasteride with continued observation of PSAs. Has had 4 prostate biopsies in the distant past, all negative.      Today he returns in routine followup with a stable PSA of 1.95 (3.90 adjusted for finasteride).   Urinary concerns:  None.  Feels he is emptying fully.  Nocturia x 2.  No incontinence or nocturnal enuresis.  Is drinking more water to manage hemorrhoids, light-headedness, thus is urinating more frequently than he used to urinate.   - No UTIs, hematuria, dysuria.    - Continues on finasteride (since 1995). Needs a refill   - Has 2 small cysts - one on penis and one on scrotum - would like evaluated  - Notes his penis \"turtles\" with difficulty projecting urine away from body  - No unexplained weight loss.  No new back pain.    - Last year had right hip pain resolved with PT  - Wife has history of sarcoma.     Current Outpatient Medications   Medication Sig Dispense Refill     ARTIFICIAL TEAR SOLUTION OP Apply  to eye.       atorvastatin (LIPITOR) 20 MG tablet Take 1 tablet (20 mg) by mouth daily 90 tablet 4     Cholecalciferol (VITAMIN D PO) Take 1 tablet by mouth daily 1000 mg       clobetasol " "(TEMOVATE) 0.05 % external ointment Apply topically 2 times daily For more bothersome lesions 120 g 1     clobetasol (TEMOVATE) 0.05 % external solution Apply  topically 2 times daily as needed to the scalp. 200 mL 11     clobetasol propionate 0.05 % SHAM Apply sparingly to dry scalp 3 x weekly as needed.  Leave in place for 15 m then add water, lather and rinse 1 Bottle 5     desonide (DESOWEN) 0.05 % ointment Apply topically to affected areas twice daily as instructed. 180 g 3     finasteride (PROSCAR) 5 MG tablet Take 1 tablet (5 mg) by mouth daily 90 tablet 4     hydrochlorothiazide (HYDRODIURIL) 25 MG tablet Take 1 tablet (25 mg) by mouth daily 90 tablet 4     ketoconazole (NIZORAL) 2 % shampoo Three times per week in the shower: Lather into scalp, leave in place for 3-5 minutes, then rinse. 360 mL 12     Multiple Vitamin (MULTIVITAMIN) per tablet Take 1 tablet by mouth daily. 1 tab daily       mycophenolate (GENERIC EQUIVALENT) 500 MG tablet Take 1 tablet (500 mg) by mouth 2 times daily 180 tablet 1     tacrolimus (PROTOPIC) 0.1 % external ointment Apply topically to affected areas as instructed. 300 g 3     triamcinolone (KENALOG) 0.1 % external cream Apply topically to affected areas as instructed. 454 g 11     triamcinolone (KENALOG) 0.1 % external ointment Apply topically 2 times daily For lesions that are more mild 453.6 g 1     triamcinolone (KENALOG) 0.1 % ointment Apply topically 2 times daily 454 g 11     hydrocortisone butyrate (LOCOID) 0.1 % SOLN Apply sparingly to affected area(s) of beard twice daily (Patient not taking: Reported on 6/1/2021) 180 mL 3     ketoconazole (NIZORAL) 2 % cream Apply twice daily to the nose as needed for white/scaling. (Patient not taking: Reported on 6/1/2021) 60 g 2       ALLERGIES: Patient has no known allergies.      REVIEW OF SYSTEMS:  As above in HPI    GENERAL PHYSICAL EXAM:   Vitals: /78   Pulse 90   Ht 1.753 m (5' 9\")   Wt 78.5 kg (173 lb)   BMI 25.55 " kg/m    Body mass index is 25.55 kg/m .    GENERAL: Well groomed, well developed, well nourished male in NAD.  MS: Gait normal, normal muscle tone  SKIN: Warm to touch, dry.  No visible rashes or lesions on examined areas.  HEMATOLOGIC/LYMPHATIC/IMMUNOLOGIC: No LE edema.  NEURO: Alert and oriented x 3.  PSYCH: Normal mood and affect, pleasant and agreeable during interview and exam.     :      circumcised penis with 5mm superficial inclusion cyst on right shaft - nontender, mobile, without erythema or drainage   Orthotopic location of the urethral meatus.       Scrotum normal with small ~2mm skin tag on right hemiscrotum      Testicles of normal firmness and consistency, no masses.    LEAH:      Normal rectal tone, + external hemorrhoids (non-inflamed), small soft amount of stool in the rectal vault.      Small-medium sized prostate, without tenderness.  + firmness left apex and asymmetry R>L./  No nodules.     RADIOLOGY: The following tests were reviewed:   7/6/18 - prostate mri:   IMPRESSION:   1. Based on the most suspicious abnormality, this exam is  characterized as PIRADS 3 - Intermediate probability.  The presence of  clinically significant cancer is equivocal.  The most suspicious  abnormality is a ill-defined 1.3 cm region in the left mid gland  transitional zone from 2-3 o'clock, and there is no evidence of  extracapsular extension.  2. No evidence of extraprostatic malignancy. No suspicious adenopathy  or evidence of pelvic metastases.    LABS: The last test results for Mr. Luan Mitchell were reviewed:  PSA -   Lab Results   Component Value Date    PSA 1.95 03/16/2021    PSA 1.91 06/12/2020    PSA 1.86 06/24/2019    PSA 1.82 03/14/2019    PSA 2.05 09/25/2018    PSA 2.10 06/21/2018    PSA 2.42 02/05/2018    PSA 2.09 01/10/2017    PSA 1.96 02/14/2011    PSA 2.47 02/25/2010     BMP -   Recent Labs   Lab Test 03/25/21  0916 03/16/21  0640 01/07/21  0635 12/15/20  0641     --  141 140   POTASSIUM 3.8   --  3.6 4.0   CHLORIDE 106  --  106 106   CO2 29  --  31 31   BUN 20  --  26 20   CR 1.09  --  0.99 0.97   * 101* 97 97   JOSHUA 9.7  --  9.2 8.8       CBC -   Recent Labs   Lab Test 03/25/21  0916 12/15/20  0641 09/09/20  1027   WBC 7.4 5.3 4.8   HGB 15.6 14.8 14.4    142* 142*       ASSESSMENT:   1) BPH with abnormal LEAH, stable PSA    PLAN:   - Finasteride refilled #90 with four refills.    - Return 1 year for an exam    VISIT DURATION: 26 minutes (4:28 - 4:44 + 10 minutes documentation on DOS)    Carolynn Carroll PA-C  Department of Urologic Surgery

## 2021-06-01 NOTE — NURSING NOTE
"Chief Complaint   Patient presents with     Follow Up     PSA check       Height 1.753 m (5' 9\"), weight 78.5 kg (173 lb). Body mass index is 25.55 kg/m .    Patient Active Problem List   Diagnosis     Pemphigus foliaceus     Hyperlipidemia LDL goal <130     BPH (benign prostatic hyperplasia)     Encounter for long-term (current) use of high-risk medication     Dermatitis, seborrheic     Lightheadedness     Age-related nuclear cataract of both eyes     SOB (shortness of breath)       No Known Allergies    Current Outpatient Medications   Medication Sig Dispense Refill     ARTIFICIAL TEAR SOLUTION OP Apply  to eye.       atorvastatin (LIPITOR) 20 MG tablet Take 1 tablet (20 mg) by mouth daily 90 tablet 4     Cholecalciferol (VITAMIN D PO) Take 1 tablet by mouth daily 1000 mg       clobetasol (TEMOVATE) 0.05 % external ointment Apply topically 2 times daily For more bothersome lesions 120 g 1     clobetasol (TEMOVATE) 0.05 % external solution Apply  topically 2 times daily as needed to the scalp. 200 mL 11     clobetasol propionate 0.05 % SHAM Apply sparingly to dry scalp 3 x weekly as needed.  Leave in place for 15 m then add water, lather and rinse 1 Bottle 5     desonide (DESOWEN) 0.05 % ointment Apply topically to affected areas twice daily as instructed. 180 g 3     finasteride (PROSCAR) 5 MG tablet Take 1 tablet (5 mg) by mouth daily 90 tablet 4     hydrochlorothiazide (HYDRODIURIL) 25 MG tablet Take 1 tablet (25 mg) by mouth daily 90 tablet 4     ketoconazole (NIZORAL) 2 % shampoo Three times per week in the shower: Lather into scalp, leave in place for 3-5 minutes, then rinse. 360 mL 12     Multiple Vitamin (MULTIVITAMIN) per tablet Take 1 tablet by mouth daily. 1 tab daily       mycophenolate (GENERIC EQUIVALENT) 500 MG tablet Take 1 tablet (500 mg) by mouth 2 times daily 180 tablet 1     tacrolimus (PROTOPIC) 0.1 % external ointment Apply topically to affected areas as instructed. 300 g 3     triamcinolone " (KENALOG) 0.1 % external cream Apply topically to affected areas as instructed. 454 g 11     triamcinolone (KENALOG) 0.1 % external ointment Apply topically 2 times daily For lesions that are more mild 453.6 g 1     triamcinolone (KENALOG) 0.1 % ointment Apply topically 2 times daily 454 g 11     hydrocortisone butyrate (LOCOID) 0.1 % SOLN Apply sparingly to affected area(s) of beard twice daily (Patient not taking: Reported on 6/1/2021) 180 mL 3     ketoconazole (NIZORAL) 2 % cream Apply twice daily to the nose as needed for white/scaling. (Patient not taking: Reported on 6/1/2021) 60 g 2       Social History     Tobacco Use     Smoking status: Never Smoker     Smokeless tobacco: Never Used   Substance Use Topics     Alcohol use: None     Drug use: None       Acosta Gu EMT  6/1/2021  4:18 PM

## 2021-06-03 ENCOUNTER — TELEPHONE (OUTPATIENT)
Dept: UROLOGY | Facility: CLINIC | Age: 76
End: 2021-06-03

## 2021-06-08 NOTE — TELEPHONE ENCOUNTER
RECORDS RECEIVED FROM: Hemorrhoids with occasional bleeding   Appt date: 8/19/2021   NOTES STATUS DETAILS   OFFICE NOTE from referring provider  N/A    OFFICE NOTE from other specialist   [facility]:  6/1/21, 4/10/18 - URO OV with MONA Barrett   DISCHARGE SUMMARY from hospital  N/A    DISCHARGE REPORT from the ER N/A    OPERATIVE REPORT  N/A    MEDICATION LIST Internal    LABS     PFC TESTING Internal    ANAL PAP N/A    BIOPSIES/PATHOLOGY RELATED TO DIAGNOSIS N/A    DIAGNOSTIC PROCEDURES     COLONOSCOPY Received [facility]:  5/9/14 - Colonoscopy   UPPER ENDOSCOPY (EGD) N/A    FLEX SIGMOIDOSCOPY  N/A    ERCP N/A    IMAGING (DISC & REPORT)      CT  N/A    MRI [facility]:  7/6/18 - MRI Prostate   XRAY N/A    ULTRASOUND (ENDOANAL/ENDORECTAL) [facility]:  2/1/17 - US Testicular and Scrotum

## 2021-06-17 ENCOUNTER — OFFICE VISIT (OUTPATIENT)
Dept: DERMATOLOGY | Facility: CLINIC | Age: 76
End: 2021-06-17
Payer: COMMERCIAL

## 2021-06-17 DIAGNOSIS — Z79.899 ENCOUNTER FOR LONG-TERM (CURRENT) USE OF HIGH-RISK MEDICATION: ICD-10-CM

## 2021-06-17 DIAGNOSIS — L10.2 PEMPHIGUS FOLIACEOUS (H): Primary | ICD-10-CM

## 2021-06-17 DIAGNOSIS — D48.5 NEOPLASM OF UNCERTAIN BEHAVIOR OF SKIN: ICD-10-CM

## 2021-06-17 DIAGNOSIS — L10.2 PEMPHIGUS FOLIACEOUS (H): ICD-10-CM

## 2021-06-17 LAB
ALBUMIN SERPL-MCNC: 3.9 G/DL (ref 3.4–5)
ALP SERPL-CCNC: 41 U/L (ref 40–150)
ALT SERPL W P-5'-P-CCNC: 37 U/L (ref 0–70)
ANION GAP SERPL CALCULATED.3IONS-SCNC: 9 MMOL/L (ref 3–14)
AST SERPL W P-5'-P-CCNC: 25 U/L (ref 0–45)
BASOPHILS # BLD AUTO: 0 10E9/L (ref 0–0.2)
BASOPHILS NFR BLD AUTO: 0.3 %
BILIRUB SERPL-MCNC: 0.6 MG/DL (ref 0.2–1.3)
BUN SERPL-MCNC: 21 MG/DL (ref 7–30)
CALCIUM SERPL-MCNC: 9.1 MG/DL (ref 8.5–10.1)
CHLORIDE SERPL-SCNC: 107 MMOL/L (ref 94–109)
CO2 SERPL-SCNC: 26 MMOL/L (ref 20–32)
CREAT SERPL-MCNC: 0.88 MG/DL (ref 0.66–1.25)
DIFFERENTIAL METHOD BLD: ABNORMAL
EOSINOPHIL # BLD AUTO: 0.1 10E9/L (ref 0–0.7)
EOSINOPHIL NFR BLD AUTO: 1.9 %
ERYTHROCYTE [DISTWIDTH] IN BLOOD BY AUTOMATED COUNT: 12.4 % (ref 10–15)
GFR SERPL CREATININE-BSD FRML MDRD: 82 ML/MIN/{1.73_M2}
GLUCOSE SERPL-MCNC: 102 MG/DL (ref 70–99)
HCT VFR BLD AUTO: 41.9 % (ref 40–53)
HGB BLD-MCNC: 15.2 G/DL (ref 13.3–17.7)
IMM GRANULOCYTES # BLD: 0 10E9/L (ref 0–0.4)
IMM GRANULOCYTES NFR BLD: 0.1 %
LYMPHOCYTES # BLD AUTO: 1.7 10E9/L (ref 0.8–5.3)
LYMPHOCYTES NFR BLD AUTO: 24.9 %
MCH RBC QN AUTO: 32.3 PG (ref 26.5–33)
MCHC RBC AUTO-ENTMCNC: 36.3 G/DL (ref 31.5–36.5)
MCV RBC AUTO: 89 FL (ref 78–100)
MONOCYTES # BLD AUTO: 0.7 10E9/L (ref 0–1.3)
MONOCYTES NFR BLD AUTO: 10.4 %
NEUTROPHILS # BLD AUTO: 4.2 10E9/L (ref 1.6–8.3)
NEUTROPHILS NFR BLD AUTO: 62.4 %
NRBC # BLD AUTO: 0 10*3/UL
NRBC BLD AUTO-RTO: 0 /100
PLATELET # BLD AUTO: 147 10E9/L (ref 150–450)
POTASSIUM SERPL-SCNC: 3.9 MMOL/L (ref 3.4–5.3)
PROT SERPL-MCNC: 7.2 G/DL (ref 6.8–8.8)
RBC # BLD AUTO: 4.7 10E12/L (ref 4.4–5.9)
SODIUM SERPL-SCNC: 142 MMOL/L (ref 133–144)
WBC # BLD AUTO: 6.7 10E9/L (ref 4–11)

## 2021-06-17 PROCEDURE — 85025 COMPLETE CBC W/AUTO DIFF WBC: CPT | Performed by: PATHOLOGY

## 2021-06-17 PROCEDURE — 88312 SPECIAL STAINS GROUP 1: CPT | Mod: 59 | Performed by: DERMATOLOGY

## 2021-06-17 PROCEDURE — 80053 COMPREHEN METABOLIC PANEL: CPT | Performed by: PATHOLOGY

## 2021-06-17 PROCEDURE — 99214 OFFICE O/P EST MOD 30 MIN: CPT | Mod: 25 | Performed by: STUDENT IN AN ORGANIZED HEALTH CARE EDUCATION/TRAINING PROGRAM

## 2021-06-17 PROCEDURE — 11102 TANGNTL BX SKIN SINGLE LES: CPT | Mod: GC | Performed by: STUDENT IN AN ORGANIZED HEALTH CARE EDUCATION/TRAINING PROGRAM

## 2021-06-17 PROCEDURE — 36415 COLL VENOUS BLD VENIPUNCTURE: CPT | Performed by: PATHOLOGY

## 2021-06-17 PROCEDURE — 88305 TISSUE EXAM BY PATHOLOGIST: CPT | Performed by: DERMATOLOGY

## 2021-06-17 RX ORDER — MYCOPHENOLATE MOFETIL 500 MG/1
500 TABLET ORAL DAILY
Qty: 360 TABLET | Refills: 0 | Status: SHIPPED | OUTPATIENT
Start: 2021-06-17 | End: 2022-06-30

## 2021-06-17 ASSESSMENT — PAIN SCALES - GENERAL: PAINLEVEL: NO PAIN (0)

## 2021-06-17 NOTE — PATIENT INSTRUCTIONS
From today's visit:  - continue your mycophenolate 500 mg daily (new prescription sent to [facility])  - to lab today for safety labs  - start using terbinafine 1% cream twice daily to both feet for 2 weeks, then start using just once daily (can buy this over the counter, ask pharmacist)      Wound Care After a Biopsy    What is a skin biopsy?  A skin biopsy allows the doctor to examine a very small piece of tissue under the microscope to determine the diagnosis and the best treatment for the skin condition. A local anesthetic (numbing medicine)  is injected with a very small needle into the skin area to be tested. A small piece of skin is taken from the area. Sometimes a suture (stitch) is used.     What are the risks of a skin biopsy?  I will experience scar, bleeding, swelling, pain, crusting and redness. I may experience incomplete removal or recurrence. Risks of this procedure are excessive bleeding, bruising, infection, nerve damage, numbness, thick (hypertrophic or keloidal) scar and non-diagnostic biopsy.    How should I care for my wound for the first 24 hours?    Keep the wound dry and covered for 24 hours    If it bleeds, hold direct pressure on the area for 15 minutes. If bleeding does not stop then go to the emergency room    Avoid strenuous exercise the first 1-2 days or as your doctor instructs you    How should I care for the wound after 24 hours?    After 24 hours, remove the bandage    You may bathe or shower as normal    If you had a scalp biopsy, you can shampoo as usual and can use shower water to clean the biopsy site daily    Clean the wound twice a day with gentle soap and water    Do not scrub, be gentle    Apply white petroleum/Vaseline after cleaning the wound with a cotton swab or a clean finger, and keep the site covered with a Bandaid /bandage. Bandages are not necessary with a scalp biopsy    If you are unable to cover the site with a Bandaid /bandage, re-apply ointment 2-3 times a day to  keep the site moist. Moisture will help with healing    Avoid strenuous activity for first 1-2 days    Avoid lakes, rivers, pools, and oceans until the stitches are removed or the site is healed    How do I clean my wound?    Wash hands thoroughly with soap or use hand  before all wound care    Clean the wound with gentle soap and water    Apply white petroleum/Vaseline  to wound after it is clean    Replace the Bandaid /bandage to keep the wound covered for the first few days or as instructed by your doctor    If you had a scalp biopsy, warm shower water to the area on a daily basis should suffice    What should I use to clean my wound?     Cotton-tipped applicators (Qtips )    White petroleum jelly (Vaseline ). Use a clean new container and use Q-tips to apply.    Bandaids   as needed    Gentle soap     How should I care for my wound long term?    Do not get your wound dirty    Keep up with wound care for one week or until the area is healed.    A small scab will form and fall off by itself when the area is completely healed. The area will be red and will become pink in color as it heals. Sun protection is very important for how your scar will turn out. Sunscreen with an SPF 30 or greater is recommended once the area is healed.    You should have some soreness but it should be mild and slowly go away over several days. Talk to your doctor about using tylenol for pain,    When should I call my doctor?  If you have increased:     Pain or swelling    Pus or drainage (clear or slightly yellow drainage is ok)    Temperature over 100F    Spreading redness or warmth around wound    When will I hear about my results?  The biopsy results can take 2-3 weeks to come back. The clinic will call you with the results, send you a [employer] message, or have you schedule a follow-up clinic or phone time to discuss the results. Contact our clinics if you do not hear from us in 3 weeks.     Who should I call with  questions?    [facility]: [phone]     [facility]: [phone]    For urgent needs outside of business hours call the [facility] at [phone] and ask for the dermatology resident on call

## 2021-06-17 NOTE — PROGRESS NOTES
[facility] Dermatology Note  Encounter Date: Jun 17, 2021  Office Visit     Dermatology Problem List:  1.  Pemphigus foliaceus.     - Goals of treatment per Mr. Mitchell:  Prevent itching and secondary infections.   - He is not interested in complete clearance at this time.     - Stopped MMF 03/2019, re-started  mg PO BID on 8/13/2020, reduced to 500 mg every day on 10/21/2020    2. Tinea pedis with likely hx of bullous tinea  - terbinafine 1% cream twice daily x 2 weeks, then may taper down to couple times a week  ____________________________________________    Assessment & Plan:     # NUB x 1 of left superior shoulder c/f SCC vs pemphigus foliaceus plaque.  - will perform shave bx today (see procedure below)    # Pemphigus foliaceus  Patient does appear to still have mild active disease. This is corroborated with recent pemphigus panel showing increased antibody. Patient, however, is pleased with his current level of control as his main goal is to not be itchy and to balance this with a level of immunosuppression that he feels comfortable with.   - continue mycophenolate mofetil 500 mg daily  - safety labs today    # Tinea pedis with likely occurrence of bullous tinea.  - start using OTC terbinafine 1% cream twice daily to feet x 2 weeks, then start using once daily.     Procedures Performed:   - Shave biopsy procedure note, location(s): left superior shoulder. After discussion of benefits and risks including but not limited to bleeding, infection, scar, incomplete removal, recurrence, and non-diagnostic biopsy, written consent and photographs were obtained. The area was cleaned with isopropyl alcohol. 0.5mL of 1% lidocaine with epinephrine was injected to obtain adequate anesthesia of lesion(s). Shave biopsy at site(s) performed. Hemostasis was achieved with aluminium chloride. Petrolatum ointment and a sterile dressing were applied. The patient tolerated the procedure and no complications  were noted. The patient was provided with verbal and written post care instructions.       Follow-up: 3 month(s) in-person, or earlier for new or changing lesions    Staff and Resident:     Staffed with Dr. Collier.     Joey Ramírez MD, PhD  Med-Derm PGY-5    I have personally examined this patient and agree with Dr. Ramírez' documentation and plan of care. I have reviewed and amended the resident's note above. The documentation accurately reflects my clinical observations, diagnoses, treatment and follow-up plans. I was present for key portions of the procedure.       Isabelle Collier MD  Dermatology Staff    ____________________________________________    CC: Derm Problem (Luan is here today for a 3 month Pemphigus follow up )    HPI:  Mr. Luan Mitchell is a(n) 76 year old male who presents today for follow-up  for pemphigus foliaceous. Patient has been taking mycophenolate mofetil 500 mg daily to keep his disease at bay. He says that overall he is very happy with how things have been going for him. His main goal is to not be itchy.   He says that about a month ago, he developed blisters on his feet. They didn't last long. Otherwise tolerating the medication well and has no new concerns at this time.     Patient is otherwise feeling well, without additional skin concerns.    Labs Reviewed:  N/A    Physical Exam:  Vitals: There were no vitals taken for this visit.  SKIN: Total skin excluding the undergarment areas was performed. The exam included the head/face, neck, both arms, chest, back, abdomen, both legs, digits and/or nails.   - There is a scaly red plaque on the left superior shoulder  - there are few red papules and plaques scattered on the trunk  - bilateral feet with white scale in moccasin distribution, also has several yellow, thickened, crumbly appearing toenails  - No other lesions of concern on areas examined.     Medications:  Current Outpatient Medications   Medication     ARTIFICIAL TEAR SOLUTION  OP     atorvastatin (LIPITOR) 20 MG tablet     Cholecalciferol (VITAMIN D PO)     clobetasol (TEMOVATE) 0.05 % external ointment     clobetasol (TEMOVATE) 0.05 % external solution     clobetasol propionate 0.05 % SHAM     desonide (DESOWEN) 0.05 % ointment     finasteride (PROSCAR) 5 MG tablet     hydrochlorothiazide (HYDRODIURIL) 25 MG tablet     ketoconazole (NIZORAL) 2 % shampoo     Multiple Vitamin (MULTIVITAMIN) per tablet     mycophenolate (GENERIC EQUIVALENT) 500 MG tablet     tacrolimus (PROTOPIC) 0.1 % external ointment     triamcinolone (KENALOG) 0.1 % external cream     triamcinolone (KENALOG) 0.1 % external ointment     triamcinolone (KENALOG) 0.1 % ointment     hydrocortisone butyrate (LOCOID) 0.1 % SOLN     ketoconazole (NIZORAL) 2 % cream     No current facility-administered medications for this visit.       Past Medical History:   Patient Active Problem List   Diagnosis     Pemphigus foliaceus     Hyperlipidemia LDL goal <130     BPH (benign prostatic hyperplasia)     Encounter for long-term (current) use of high-risk medication     Dermatitis, seborrheic     Lightheadedness     Age-related nuclear cataract of both eyes     SOB (shortness of breath)     Past Medical History:   Diagnosis Date     BPH (benign prostatic hyperplasia)      Essential hypertension 02/2018     Hyperlipidemia LDL goal <130      Pemphigus     pemphigus foliaceus     Retinal detachment 2009    left eye- corrected with laser     Vitreous detachment 2009    STEPHANIE QUIROGA Referred Self, MD  No address on file on close of this encounter.

## 2021-06-17 NOTE — NURSING NOTE
Dermatology Rooming Note    Luan Mitchell's goals for this visit include:   Chief Complaint   Patient presents with     Derm Problem     Luan is here today for a 3 month Pemphigus follow up      LUTHER Parmar

## 2021-06-17 NOTE — LETTER
6/17/2021       RE: Luan Mitchell  [address] [place] [address]     Dear Colleague,    Thank you for referring your patient, Luan Mitchell, to the [facility] [place] at [facility]. Please see a copy of my visit note below.    [facility] Dermatology Note  Encounter Date: Jun 17, 2021  Office Visit     Dermatology Problem List:  1.  Pemphigus foliaceus.     - Goals of treatment per Mr. Mitchell:  Prevent itching and secondary infections.   - He is not interested in complete clearance at this time.     - Stopped MMF 03/2019, re-started  mg PO BID on 8/13/2020, reduced to 500 mg every day on 10/21/2020    2. Tinea pedis with likely hx of bullous tinea  - terbinafine 1% cream twice daily x 2 weeks, then may taper down to couple times a week  ____________________________________________    Assessment & Plan:     # NUB x 1 of left superior shoulder c/f SCC vs pemphigus foliaceus plaque.  - will perform shave bx today (see procedure below)    # Pemphigus foliaceus  Patient does appear to still have mild active disease. This is corroborated with recent pemphigus panel showing increased antibody. Patient, however, is pleased with his current level of control as his main goal is to not be itchy and to balance this with a level of immunosuppression that he feels comfortable with.   - continue mycophenolate mofetil 500 mg daily  - safety labs today    # Tinea pedis with likely occurrence of bullous tinea.  - start using OTC terbinafine 1% cream twice daily to feet x 2 weeks, then start using once daily.     Procedures Performed:   - Shave biopsy procedure note, location(s): left superior shoulder. After discussion of benefits and risks including but not limited to bleeding, infection, scar, incomplete removal, recurrence, and non-diagnostic biopsy, written consent and photographs were obtained. The area was cleaned  with isopropyl alcohol. 0.5mL of 1% lidocaine with epinephrine was injected to obtain adequate anesthesia of lesion(s). Shave biopsy at site(s) performed. Hemostasis was achieved with aluminium chloride. Petrolatum ointment and a sterile dressing were applied. The patient tolerated the procedure and no complications were noted. The patient was provided with verbal and written post care instructions.     Follow-up: 3 month(s) in-person, or earlier for new or changing lesions    Staff and Resident:     Staffed with Dr. Collier.     Joey Ramírez MD, PhD  Med-Derm PGY-5    I have personally examined this patient and agree with Dr. Ramírez' documentation and plan of care. I have reviewed and amended the resident's note above. The documentation accurately reflects my clinical observations, diagnoses, treatment and follow-up plans. I was present for key portions of the procedure.     Isabelle Collier MD  Dermatology Staff    ____________________________________________    CC: Derm Problem (Luan is here today for a 3 month Pemphigus follow up )    HPI:  Mr. Luan Mitchell is a(n) 76 year old male who presents today for follow-up  for pemphigus foliaceous. Patient has been taking mycophenolate mofetil 500 mg daily to keep his disease at bay. He says that overall he is very happy with how things have been going for him. His main goal is to not be itchy.   He says that about a month ago, he developed blisters on his feet. They didn't last long. Otherwise tolerating the medication well and has no new concerns at this time.     Patient is otherwise feeling well, without additional skin concerns.    Labs Reviewed:  N/A    Physical Exam:  Vitals: There were no vitals taken for this visit.  SKIN: Total skin excluding the undergarment areas was performed. The exam included the head/face, neck, both arms, chest, back, abdomen, both legs, digits and/or nails.   - There is a scaly red plaque on the left superior shoulder  - there are few  red papules and plaques scattered on the trunk  - bilateral feet with white scale in moccasin distribution, also has several yellow, thickened, crumbly appearing toenails  - No other lesions of concern on areas examined.     Medications:  Current Outpatient Medications   Medication     ARTIFICIAL TEAR SOLUTION OP     atorvastatin (LIPITOR) 20 MG tablet     Cholecalciferol (VITAMIN D PO)     clobetasol (TEMOVATE) 0.05 % external ointment     clobetasol (TEMOVATE) 0.05 % external solution     clobetasol propionate 0.05 % SHAM     desonide (DESOWEN) 0.05 % ointment     finasteride (PROSCAR) 5 MG tablet     hydrochlorothiazide (HYDRODIURIL) 25 MG tablet     ketoconazole (NIZORAL) 2 % shampoo     Multiple Vitamin (MULTIVITAMIN) per tablet     mycophenolate (GENERIC EQUIVALENT) 500 MG tablet     tacrolimus (PROTOPIC) 0.1 % external ointment     triamcinolone (KENALOG) 0.1 % external cream     triamcinolone (KENALOG) 0.1 % external ointment     triamcinolone (KENALOG) 0.1 % ointment     hydrocortisone butyrate (LOCOID) 0.1 % SOLN     ketoconazole (NIZORAL) 2 % cream     No current facility-administered medications for this visit.       Past Medical History:   Patient Active Problem List   Diagnosis     Pemphigus foliaceus     Hyperlipidemia LDL goal <130     BPH (benign prostatic hyperplasia)     Encounter for long-term (current) use of high-risk medication     Dermatitis, seborrheic     Lightheadedness     Age-related nuclear cataract of both eyes     SOB (shortness of breath)     Past Medical History:   Diagnosis Date     BPH (benign prostatic hyperplasia)      Essential hypertension 02/2018     Hyperlipidemia LDL goal <130      Pemphigus     pemphigus foliaceus     Retinal detachment 2009    left eye- corrected with laser     Vitreous detachment 2009    LE     CC Referred Self, MD  No address on file on close of this encounter.

## 2021-06-21 LAB — COPATH REPORT: NORMAL

## 2021-08-09 ENCOUNTER — MYC MEDICAL ADVICE (OUTPATIENT)
Dept: INTERNAL MEDICINE | Facility: CLINIC | Age: 76
End: 2021-08-09

## 2021-08-09 DIAGNOSIS — Z11.52 ENCOUNTER FOR SCREENING LABORATORY TESTING FOR COVID-19 VIRUS IN ASYMPTOMATIC PATIENT: Primary | ICD-10-CM

## 2021-08-09 DIAGNOSIS — Z01.812 ENCOUNTER FOR SCREENING LABORATORY TESTING FOR COVID-19 VIRUS IN ASYMPTOMATIC PATIENT: Primary | ICD-10-CM

## 2021-08-13 ENCOUNTER — LAB (OUTPATIENT)
Dept: LAB | Facility: CLINIC | Age: 76
End: 2021-08-13
Payer: COMMERCIAL

## 2021-08-13 DIAGNOSIS — Z01.812 ENCOUNTER FOR SCREENING LABORATORY TESTING FOR COVID-19 VIRUS IN ASYMPTOMATIC PATIENT: ICD-10-CM

## 2021-08-13 DIAGNOSIS — Z11.52 ENCOUNTER FOR SCREENING LABORATORY TESTING FOR COVID-19 VIRUS IN ASYMPTOMATIC PATIENT: ICD-10-CM

## 2021-08-13 PROCEDURE — 36415 COLL VENOUS BLD VENIPUNCTURE: CPT | Performed by: PATHOLOGY

## 2021-08-13 PROCEDURE — 86769 SARS-COV-2 COVID-19 ANTIBODY: CPT | Mod: 90 | Performed by: PATHOLOGY

## 2021-08-14 LAB
SARS-COV-2 AB SERPL IA-ACNC: >250 U/ML
SARS-COV-2 AB SERPL-IMP: POSITIVE

## 2021-08-19 ENCOUNTER — PRE VISIT (OUTPATIENT)
Dept: SURGERY | Facility: CLINIC | Age: 76
End: 2021-08-19

## 2021-08-19 ENCOUNTER — MYC MEDICAL ADVICE (OUTPATIENT)
Dept: SURGERY | Facility: CLINIC | Age: 76
End: 2021-08-19

## 2021-08-19 ENCOUNTER — OFFICE VISIT (OUTPATIENT)
Dept: SURGERY | Facility: CLINIC | Age: 76
End: 2021-08-19
Payer: COMMERCIAL

## 2021-08-19 VITALS
OXYGEN SATURATION: 96 % | HEART RATE: 88 BPM | RESPIRATION RATE: 16 BRPM | BODY MASS INDEX: 26.54 KG/M2 | TEMPERATURE: 98.4 F | SYSTOLIC BLOOD PRESSURE: 133 MMHG | WEIGHT: 179.2 LBS | HEIGHT: 69 IN | DIASTOLIC BLOOD PRESSURE: 81 MMHG

## 2021-08-19 DIAGNOSIS — K64.8 HEMORRHOID PROLAPSE: Primary | ICD-10-CM

## 2021-08-19 PROCEDURE — 46221 LIGATION OF HEMORRHOID(S): CPT | Performed by: NURSE PRACTITIONER

## 2021-08-19 ASSESSMENT — PAIN SCALES - GENERAL: PAINLEVEL: NO PAIN (0)

## 2021-08-19 ASSESSMENT — MIFFLIN-ST. JEOR: SCORE: 1523.23

## 2021-08-19 NOTE — NURSING NOTE
"Chief Complaint   Patient presents with     New Patient     New consult for hemorrhoids and rectal bleeding       Vitals:    08/19/21 0909   BP: 133/81   BP Location: Left arm   Patient Position: Sitting   Cuff Size: Adult Regular   Pulse: 88   Resp: 16   Temp: 98.4  F (36.9  C)   TempSrc: Oral   SpO2: 96%   Weight: 179 lb 3.2 oz   Height: 5' 9\"       Body mass index is 26.46 kg/m .          Bety Cordero CMA    "

## 2021-08-19 NOTE — PROGRESS NOTES
"Colon and Rectal Surgery Consult Clinic Note    Date: 2021     Referring provider:  Referred Self, MD  No address on file     RE: Luan Mitchell  : 1945  DIMITRY: 2021    Luan Mitchell is a very pleasant 76 year old male who presents today for hemorrhoids.    HPI:  Luan has had difficulty with hemorrhoids for many years.  He has tissue that protrudes out but goes back in on its own.  He notices some sporadic bright red blood with bowel movements.  He states that he can go months without any bleeding but then has several episodes in a row.  No associated pain.  He has had two episodes of minor bright red blood on the toilet paper with wiping in the past 2 months.  He is not on any blood thinners.  He has a bowel movement about twice a day and these are rarely hard but when they are hard they can be somewhat painful.  He does find that stool gets stuck around his hemorrhoids and these are hard to keep clean.  He had a colonoscopy in  with moderate diverticulosis and hemorrhoids only with a 10-year recall.  He is otherwise healthy.  He is not on any blood thinners.    Physical Examination: Exam was chaperoned by Bety Quigley MA   /81 (BP Location: Left arm, Patient Position: Sitting, Cuff Size: Adult Regular)   Pulse 88   Temp 98.4  F (36.9  C) (Oral)   Resp 16   Ht 5' 9\"   Wt 179 lb 3.2 oz   SpO2 96%   BMI 26.46 kg/m    General: Alert, oriented, in no acute distress, sitting comfortably  HEENT: Mucous membranes moist  Perianal external examination:  Perianal skin: Some fecal material and mild excoriation present.  Lesions: No evidence of an external lesion, nodularity, or induration in the perianal region.  Eversion of buttocks: There was not evidence of an anal fissure. Details: N/A.  Skin tags or external hemorrhoids: None.  Digital rectal examination: Was performed.   Sphincter tone: Good.  Palpable lesions: No.  Prostate: Abnormal: enlarged but without nodularity.  Other: " None.    Anoscopy: Was performed.   Hemorrhoids: Yes. Grade 2-3 internal hemorrhoids without active bleeding  Lesions: No    Procedures:  After discussing the risks and benefits, the patient agreed to proceed with internal hemorrhoidal banding.    Prior to the start of the procedure and with procedural staff participation, I verbally confirmed the patient s identity using two indicators, relevant allergies, that the procedure was appropriate and matched the consent or emergent situation, and that the correct equipment/implants were available. Immediately prior to starting the procedure I conducted the Time Out with the procedural staff and re-confirmed the patient s name, procedure, and site/side. (The Joint Commission universal protocol was followed.)  Yes    Sedation (Moderate or Deep): None    A suction hemorrhoidal  was used to place a total of 1 band(s) in the left lateral position(s).    There was no significant bleeding. The patient tolerated the procedure well.    This procedure was performed under a collaborative privileging agreement with Dr. Kaur, Chief of Colon and Rectal Surgery.      Assessment/Plan: 76 year old male with hemorrhoid prolapse and intermittent rectal bleeding.  He does have significant hemorrhoids on exam.  Discussed managing with surgical hemorrhoidectomy versus hemorrhoid banding.  Discussed that with hemorrhoid banding that he may need serial banding and that if this does not resolve his symptoms that we may need to consider surgical hemorrhoidectomy.  Discussed the risks of bleeding with band placement and when the band falls off in 1 to 2 weeks.  He stated an understanding of these risks and wished to proceed with banding before considering surgical hemorrhoidectomy.  1 band was placed in left lateral position.  He tolerated this well.  We will have him return to clinic in 1 month for repeat banding for any persistent symptoms.  Recommend starting a daily fiber supplement to  keep stool soft.  If bleeding persist despite treatment of hemorrhoids, would recommend a colonoscopy.  Otherwise due for colonoscopy in 2024. Patient's questions were answered to his stated satisfaction and he is in agreement with this plan.    Medical history:  Past Medical History:   Diagnosis Date     BPH (benign prostatic hyperplasia)      Essential hypertension 02/2018     Hyperlipidemia LDL goal <130      Pemphigus     pemphigus foliaceus     Retinal detachment 2009    left eye- corrected with laser     Vitreous detachment 2009    LE       Surgical history:  Past Surgical History:   Procedure Laterality Date     NO HISTORY OF SURGERY  1/28/14    derm     RETINOPEXY LASER PROPHYLAXIS BREAK(S) OS (LEFT EYE)      2009 - Left eye       Problem list:  Patient Active Problem List    Diagnosis Date Noted     SOB (shortness of breath) 06/29/2020     Priority: Medium     Age-related nuclear cataract of both eyes 08/21/2017     Priority: Medium     Lightheadedness 07/27/2015     Priority: Medium     Dermatitis, seborrheic 11/05/2013     Priority: Medium     Encounter for long-term (current) use of high-risk medication 07/24/2013     Priority: Medium     Hyperlipidemia LDL goal <130      Priority: Medium     BPH (benign prostatic hyperplasia)      Priority: Medium     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       atorvastatin (LIPITOR) 20 MG tablet Take 1 tablet (20 mg) by mouth daily 90 tablet 4     Cholecalciferol (VITAMIN D PO) Take 1 tablet by mouth daily 1000 mg       clobetasol (TEMOVATE) 0.05 % external ointment Apply topically 2 times daily For more bothersome lesions 120 g 1     clobetasol (TEMOVATE) 0.05 % external solution Apply  topically 2 times daily as needed to the scalp. 200 mL 11     clobetasol propionate 0.05 % SHAM Apply sparingly to dry scalp 3 x weekly as  needed.  Leave in place for 15 m then add water, lather and rinse 1 Bottle 5     desonide (DESOWEN) 0.05 % ointment Apply topically to affected areas twice daily as instructed. 180 g 3     finasteride (PROSCAR) 5 MG tablet Take 1 tablet (5 mg) by mouth daily 90 tablet 4     hydrochlorothiazide (HYDRODIURIL) 25 MG tablet Take 1 tablet (25 mg) by mouth daily 90 tablet 4     hydrocortisone butyrate (LOCOID) 0.1 % SOLN Apply sparingly to affected area(s) of beard twice daily 180 mL 3     ketoconazole (NIZORAL) 2 % cream Apply twice daily to the nose as needed for white/scaling. 60 g 2     ketoconazole (NIZORAL) 2 % shampoo Three times per week in the shower: Lather into scalp, leave in place for 3-5 minutes, then rinse. 360 mL 12     Multiple Vitamin (MULTIVITAMIN) per tablet Take 1 tablet by mouth daily. 1 tab daily       mycophenolate (GENERIC EQUIVALENT) 500 MG tablet Take 1 tablet (500 mg) by mouth daily 360 tablet 0     tacrolimus (PROTOPIC) 0.1 % external ointment Apply topically to affected areas as instructed. 300 g 3     triamcinolone (KENALOG) 0.1 % external cream Apply topically to affected areas as instructed. 454 g 11     triamcinolone (KENALOG) 0.1 % external ointment Apply topically 2 times daily For lesions that are more mild 453.6 g 1       Allergies:  No Known Allergies    Family history:  Family History   Problem Relation Age of Onset     Heart Disease Mother         CHF d 80     Diabetes Mother         DM2,  age 80     Hypertension Mother      Heart Disease Father         CHF d 78     Heart Disease Paternal Uncle         CHF d 50s     Cancer No family hx of         no skin cancer     Skin Cancer No family hx of         no skin cancer     Glaucoma No family hx of      Macular Degeneration No family hx of      Melanoma No family hx of        Social history:  Social History     Tobacco Use     Smoking status: Never Smoker     Smokeless tobacco: Never Used   Substance Use Topics     Alcohol use: Not  "on file    Marital status: .  Occupation: retired     Nursing Notes:   Bety Dutta CMA  8/19/2021  9:15 AM  Signed  Chief Complaint   Patient presents with     New Patient     New consult for hemorrhoids and rectal bleeding       Vitals:    08/19/21 0909   BP: 133/81   BP Location: Left arm   Patient Position: Sitting   Cuff Size: Adult Regular   Pulse: 88   Resp: 16   Temp: 98.4  F (36.9  C)   TempSrc: Oral   SpO2: 96%   Weight: 179 lb 3.2 oz   Height: 5' 9\"       Body mass index is 26.46 kg/m .          Bety Cordero CMA         30 minutes spent on the date of the encounter doing chart review, history and exam, documentation and further activities as noted above.     LILY Li, NP-C  Colon and Rectal Surgery   [facility]    This note was created using speech recognition software and may contain unintended word substitutions.    "

## 2021-08-19 NOTE — LETTER
"2021       RE: Luan Mitchell  [address] [place] [id number]     Dear Colleague,    Thank you for referring your patient, Luan Mitchell, to the [facility] COLON AND RECTAL SURGERY [facility]INIC [facility]. Please see a copy of my visit note below.    Colon and Rectal Surgery Consult Clinic Note    Date: 2021     Referring provider:  Referred Self, MD  No address on file     RE: Luan Mitchell  : 1945  DIMITRY: 2021    Luan Mitchell is a very pleasant 76 year old male who presents today for hemorrhoids.    HPI:  Luan has had difficulty with hemorrhoids for many years.  He has tissue that protrudes out but goes back in on its own.  He notices some sporadic bright red blood with bowel movements.  He states that he can go months without any bleeding but then has several episodes in a row.  No associated pain.  He has had two episodes of minor bright red blood on the toilet paper with wiping in the past 2 months.  He is not on any blood thinners.  He has a bowel movement about twice a day and these are rarely hard but when they are hard they can be somewhat painful.  He does find that stool gets stuck around his hemorrhoids and these are hard to keep clean.  He had a colonoscopy in 2014 with moderate diverticulosis and hemorrhoids only with a 10-year recall.  He is otherwise healthy.  He is not on any blood thinners.    Physical Examination: Exam was chaperoned by Bety Quigley MA   /81 (BP Location: Left arm, Patient Position: Sitting, Cuff Size: Adult Regular)   Pulse 88   Temp 98.4  F (36.9  C) (Oral)   Resp 16   Ht 5' 9\"   Wt 179 lb 3.2 oz   SpO2 96%   BMI 26.46 kg/m    General: Alert, oriented, in no acute distress, sitting comfortably  HEENT: Mucous membranes moist  Perianal external examination:  Perianal skin: Some fecal material and mild excoriation present.  Lesions: No evidence of an external lesion, " nodularity, or induration in the perianal region.  Eversion of buttocks: There was not evidence of an anal fissure. Details: N/A.  Skin tags or external hemorrhoids: None.  Digital rectal examination: Was performed.   Sphincter tone: Good.  Palpable lesions: No.  Prostate: Abnormal: enlarged but without nodularity.  Other: None.    Anoscopy: Was performed.   Hemorrhoids: Yes. Grade 2-3 internal hemorrhoids without active bleeding  Lesions: No    Procedures:  After discussing the risks and benefits, the patient agreed to proceed with internal hemorrhoidal banding.    Prior to the start of the procedure and with procedural staff participation, I verbally confirmed the patient s identity using two indicators, relevant allergies, that the procedure was appropriate and matched the consent or emergent situation, and that the correct equipment/implants were available. Immediately prior to starting the procedure I conducted the Time Out with the procedural staff and re-confirmed the patient s name, procedure, and site/side. (The Joint Commission universal protocol was followed.)  Yes    Sedation (Moderate or Deep): None    A suction hemorrhoidal  was used to place a total of 1 band(s) in the left lateral position(s).    There was no significant bleeding. The patient tolerated the procedure well.    This procedure was performed under a collaborative privileging agreement with Dr. Kaur, Chief of Colon and Rectal Surgery.      Assessment/Plan: 76 year old male with hemorrhoid prolapse and intermittent rectal bleeding.  He does have significant hemorrhoids on exam.  Discussed managing with surgical hemorrhoidectomy versus hemorrhoid banding.  Discussed that with hemorrhoid banding that he may need serial banding and that if this does not resolve his symptoms that we may need to consider surgical hemorrhoidectomy.  Discussed the risks of bleeding with band placement and when the band falls off in 1 to 2 weeks.  He stated  an understanding of these risks and wished to proceed with banding before considering surgical hemorrhoidectomy.  1 band was placed in left lateral position.  He tolerated this well.  We will have him return to clinic in 1 month for repeat banding for any persistent symptoms.  Recommend starting a daily fiber supplement to keep stool soft.  If bleeding persist despite treatment of hemorrhoids, would recommend a colonoscopy.  Otherwise due for colonoscopy in 2024. Patient's questions were answered to his stated satisfaction and he is in agreement with this plan.    Medical history:  Past Medical History:   Diagnosis Date     BPH (benign prostatic hyperplasia)      Essential hypertension 02/2018     Hyperlipidemia LDL goal <130      Pemphigus     pemphigus foliaceus     Retinal detachment 2009    left eye- corrected with laser     Vitreous detachment 2009    LE       Surgical history:  Past Surgical History:   Procedure Laterality Date     NO HISTORY OF SURGERY  1/28/14    derm     RETINOPEXY LASER PROPHYLAXIS BREAK(S) OS (LEFT EYE)      2009 - Left eye       Problem list:  Patient Active Problem List    Diagnosis Date Noted     SOB (shortness of breath) 06/29/2020     Priority: Medium     Age-related nuclear cataract of both eyes 08/21/2017     Priority: Medium     Lightheadedness 07/27/2015     Priority: Medium     Dermatitis, seborrheic 11/05/2013     Priority: Medium     Encounter for long-term (current) use of high-risk medication 07/24/2013     Priority: Medium     Hyperlipidemia LDL goal <130      Priority: Medium     BPH (benign prostatic hyperplasia)      Priority: Medium     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       atorvastatin (LIPITOR) 20 MG tablet Take 1 tablet (20 mg) by mouth daily 90 tablet 4     Cholecalciferol (VITAMIN D PO) Take 1 tablet by mouth daily  1000 mg       clobetasol (TEMOVATE) 0.05 % external ointment Apply topically 2 times daily For more bothersome lesions 120 g 1     clobetasol (TEMOVATE) 0.05 % external solution Apply  topically 2 times daily as needed to the scalp. 200 mL 11     clobetasol propionate 0.05 % SHAM Apply sparingly to dry scalp 3 x weekly as needed.  Leave in place for 15 m then add water, lather and rinse 1 Bottle 5     desonide (DESOWEN) 0.05 % ointment Apply topically to affected areas twice daily as instructed. 180 g 3     finasteride (PROSCAR) 5 MG tablet Take 1 tablet (5 mg) by mouth daily 90 tablet 4     hydrochlorothiazide (HYDRODIURIL) 25 MG tablet Take 1 tablet (25 mg) by mouth daily 90 tablet 4     hydrocortisone butyrate (LOCOID) 0.1 % SOLN Apply sparingly to affected area(s) of beard twice daily 180 mL 3     ketoconazole (NIZORAL) 2 % cream Apply twice daily to the nose as needed for white/scaling. 60 g 2     ketoconazole (NIZORAL) 2 % shampoo Three times per week in the shower: Lather into scalp, leave in place for 3-5 minutes, then rinse. 360 mL 12     Multiple Vitamin (MULTIVITAMIN) per tablet Take 1 tablet by mouth daily. 1 tab daily       mycophenolate (GENERIC EQUIVALENT) 500 MG tablet Take 1 tablet (500 mg) by mouth daily 360 tablet 0     tacrolimus (PROTOPIC) 0.1 % external ointment Apply topically to affected areas as instructed. 300 g 3     triamcinolone (KENALOG) 0.1 % external cream Apply topically to affected areas as instructed. 454 g 11     triamcinolone (KENALOG) 0.1 % external ointment Apply topically 2 times daily For lesions that are more mild 453.6 g 1       Allergies:  No Known Allergies    Family history:  Family History   Problem Relation Age of Onset     Heart Disease Mother         CHF d 80     Diabetes Mother         DM2,  age 80     Hypertension Mother      Heart Disease Father         CHF d 78     Heart Disease Paternal Uncle         CHF d 50s     Cancer No family hx of         no skin  "cancer     Skin Cancer No family hx of         no skin cancer     Glaucoma No family hx of      Macular Degeneration No family hx of      Melanoma No family hx of        Social history:  Social History     Tobacco Use     Smoking status: Never Smoker     Smokeless tobacco: Never Used   Substance Use Topics     Alcohol use: Not on file    Marital status: .  Occupation: retired     Nursing Notes:   Bety Dutta CMA  8/19/2021  9:15 AM  Signed  Chief Complaint   Patient presents with     New Patient     New consult for hemorrhoids and rectal bleeding       Vitals:    08/19/21 0909   BP: 133/81   BP Location: Left arm   Patient Position: Sitting   Cuff Size: Adult Regular   Pulse: 88   Resp: 16   Temp: 98.4  F (36.9  C)   TempSrc: Oral   SpO2: 96%   Weight: 179 lb 3.2 oz   Height: 5' 9\"       Body mass index is 26.46 kg/m .          Bety Cordero CMA         30 minutes spent on the date of the encounter doing chart review, history and exam, documentation and further activities as noted above.     LILY Li, NP-C  Colon and Rectal Surgery   [facility]    This note was created using speech recognition software and may contain unintended word substitutions.        Again, thank you for allowing me to participate in the care of your patient.      Sincerely,    LILY Li CNP      "

## 2021-08-30 ENCOUNTER — MYC MEDICAL ADVICE (OUTPATIENT)
Dept: INTERNAL MEDICINE | Facility: CLINIC | Age: 76
End: 2021-08-30

## 2021-09-12 ENCOUNTER — HEALTH MAINTENANCE LETTER (OUTPATIENT)
Age: 76
End: 2021-09-12

## 2021-09-15 NOTE — PROGRESS NOTES
"[facility] Dermatology Note   Encounter Date: Sep 16, 2021  Office visit    Dermatology Problem List:    [facility]    #  Pemphigus foliaceus  - s/p shave bx 6/17/21 (L superior shoulder) and 7/26/10 (L arm)  - Last pemphigus panel (3/25/21): Dsg1 IgG 136 and Dsg2 IgG 2  - Goals of treatment per Mr. Mitchell:  \"Prevent itching and secondary infections. Not interested in complete clearance\"  - Current Tx: MMF 500mg daily   - stopped MMF 3/2019, re-started  mg PO BID on 8/13/20 but reduced to 500 mg every day on 10/21/20   - Last safety labs (9/2021): wnl  - Adjunct Tx: clobetasol and tacrolimus ointment  # Tinea pedis  - Current Tx:  Terbinafine 1% cream  # Benign Bx  - VK, R calf, s/p shave bx 3/25/21  - mild DN, R neck, s/p shave bx 3/22/18    ___________________________________________    Assessment & Plan:    # Pemphigus foliaceus  Condition is well controlled on current regimen. Will obtain safety labs today and continue with MMF 500mg daily. Will use topicals for adjunct relief  - C/w MMF 500mg daily (no refills needed)   - Ordered CBC w.diff and CMP today  - Recommended clobetasol BID x1-2 weeks followed by tacrolimus for persistent pruritic spots on scalp/ears    # Inflamed seborrheic keratosis - forehead (x1)  Discussed the possible etiologies, clinical course, and management options of this condition with the patient. Elected for cryotherapy.  - See procedure note below     Procedures Performed:  Cryotherapy procedure note  - location: forehead  - number of lesions treated: 1  After verbal consent and discussion of risks and benefits including but no limited to dyspigmentation/scar, blister, and pain, lesions treated with 1-2mm freeze border for 2 cycles with liquid nitrogen, post cryotherapy instructions provided    Follow-up: 3m    Staff Involved:  Patient was seen and staffed with attending physician Dr. Jose R Feldman MD  Med/Derm Resident " PGY-4  P:[phone]    Staff Addendum:  I was present for the entire procedure. Serina Odell MD  I, Serina Odell MD, saw this patient with the resident and agree with the resident s findings and plan of care as documented in the resident s note.    ___________________________________________      CC: Derm Problem (luan is coming in today for a follow up on the pemphigus)      HPI:  Mr. Luan Mitchell is a(n) 76 year old male who presents to clinic today for pemphigus foliaceus follow up   - reports that he's doing well  - continues to have a couple areas affecting his scalp and ears. Pruritus is well controlled with current MMF dose  - has a spot on his forehead that's new and pruritic  - otherwise feeling well in usual state of health    Physical exam:  General: in no acute distress, well-developed, well-nourished  Skin:  - skin type: medium fair  - scattered erythematous slightly verrucous papules and plaques on the scalp and ears  - waxy stuck on plaque on the forehead with evidence of excoriations  - No other lesions of concern on areas examined.     Medications:  Current Outpatient Medications   Medication     ARTIFICIAL TEAR SOLUTION OP     atorvastatin (LIPITOR) 20 MG tablet     Cholecalciferol (VITAMIN D PO)     clobetasol (TEMOVATE) 0.05 % external ointment     clobetasol (TEMOVATE) 0.05 % external solution     clobetasol propionate 0.05 % SHAM     desonide (DESOWEN) 0.05 % ointment     finasteride (PROSCAR) 5 MG tablet     hydrochlorothiazide (HYDRODIURIL) 25 MG tablet     hydrocortisone butyrate (LOCOID) 0.1 % SOLN     ketoconazole (NIZORAL) 2 % cream     ketoconazole (NIZORAL) 2 % shampoo     Multiple Vitamin (MULTIVITAMIN) per tablet     mycophenolate (GENERIC EQUIVALENT) 500 MG tablet     tacrolimus (PROTOPIC) 0.1 % external ointment     triamcinolone (KENALOG) 0.1 % external cream     triamcinolone (KENALOG) 0.1 % external ointment     No current facility-administered medications for this  visit.      Past Medical History:   Patient Active Problem List   Diagnosis     Pemphigus foliaceus     Hyperlipidemia LDL goal <130     BPH (benign prostatic hyperplasia)     Encounter for long-term (current) use of high-risk medication     Dermatitis, seborrheic     Lightheadedness     Age-related nuclear cataract of both eyes     SOB (shortness of breath)     Past Medical History:   Diagnosis Date     BPH (benign prostatic hyperplasia)      Essential hypertension 02/2018     Hyperlipidemia LDL goal <130      Pemphigus     pemphigus foliaceus     Retinal detachment 2009    left eye- corrected with laser     Vitreous detachment 2009    LE

## 2021-09-16 ENCOUNTER — LAB (OUTPATIENT)
Dept: LAB | Facility: CLINIC | Age: 76
End: 2021-09-16
Payer: COMMERCIAL

## 2021-09-16 ENCOUNTER — OFFICE VISIT (OUTPATIENT)
Dept: DERMATOLOGY | Facility: CLINIC | Age: 76
End: 2021-09-16
Payer: COMMERCIAL

## 2021-09-16 DIAGNOSIS — L82.0 SEBORRHEIC KERATOSES, INFLAMED: ICD-10-CM

## 2021-09-16 DIAGNOSIS — Z79.899 ENCOUNTER FOR LONG-TERM (CURRENT) USE OF HIGH-RISK MEDICATION: ICD-10-CM

## 2021-09-16 DIAGNOSIS — L10.2 PEMPHIGUS FOLIACEOUS (H): Primary | ICD-10-CM

## 2021-09-16 LAB
ALBUMIN SERPL-MCNC: 4 G/DL (ref 3.4–5)
ALP SERPL-CCNC: 43 U/L (ref 40–150)
ALT SERPL W P-5'-P-CCNC: 37 U/L (ref 0–70)
ANION GAP SERPL CALCULATED.3IONS-SCNC: 7 MMOL/L (ref 3–14)
AST SERPL W P-5'-P-CCNC: 25 U/L (ref 0–45)
BASOPHILS # BLD AUTO: 0 10E3/UL (ref 0–0.2)
BASOPHILS NFR BLD AUTO: 0 %
BILIRUB SERPL-MCNC: 0.9 MG/DL (ref 0.2–1.3)
BUN SERPL-MCNC: 16 MG/DL (ref 7–30)
CALCIUM SERPL-MCNC: 9.8 MG/DL (ref 8.5–10.1)
CHLORIDE BLD-SCNC: 104 MMOL/L (ref 94–109)
CO2 SERPL-SCNC: 31 MMOL/L (ref 20–32)
CREAT SERPL-MCNC: 1.11 MG/DL (ref 0.66–1.25)
EOSINOPHIL # BLD AUTO: 0.2 10E3/UL (ref 0–0.7)
EOSINOPHIL NFR BLD AUTO: 2 %
ERYTHROCYTE [DISTWIDTH] IN BLOOD BY AUTOMATED COUNT: 12.5 % (ref 10–15)
GFR SERPL CREATININE-BSD FRML MDRD: 63 ML/MIN/1.73M2
GLUCOSE BLD-MCNC: 108 MG/DL (ref 70–99)
HCT VFR BLD AUTO: 44 % (ref 40–53)
HGB BLD-MCNC: 15.6 G/DL (ref 13.3–17.7)
IMM GRANULOCYTES # BLD: 0 10E3/UL
IMM GRANULOCYTES NFR BLD: 0 %
LYMPHOCYTES # BLD AUTO: 1.9 10E3/UL (ref 0.8–5.3)
LYMPHOCYTES NFR BLD AUTO: 25 %
MCH RBC QN AUTO: 32.4 PG (ref 26.5–33)
MCHC RBC AUTO-ENTMCNC: 35.5 G/DL (ref 31.5–36.5)
MCV RBC AUTO: 92 FL (ref 78–100)
MONOCYTES # BLD AUTO: 0.9 10E3/UL (ref 0–1.3)
MONOCYTES NFR BLD AUTO: 11 %
NEUTROPHILS # BLD AUTO: 4.7 10E3/UL (ref 1.6–8.3)
NEUTROPHILS NFR BLD AUTO: 62 %
NRBC # BLD AUTO: 0 10E3/UL
NRBC BLD AUTO-RTO: 0 /100
PLATELET # BLD AUTO: 152 10E3/UL (ref 150–450)
POTASSIUM BLD-SCNC: 3.9 MMOL/L (ref 3.4–5.3)
PROT SERPL-MCNC: 7.4 G/DL (ref 6.8–8.8)
RBC # BLD AUTO: 4.81 10E6/UL (ref 4.4–5.9)
SODIUM SERPL-SCNC: 142 MMOL/L (ref 133–144)
WBC # BLD AUTO: 7.6 10E3/UL (ref 4–11)

## 2021-09-16 PROCEDURE — 36415 COLL VENOUS BLD VENIPUNCTURE: CPT | Performed by: PATHOLOGY

## 2021-09-16 PROCEDURE — 17110 DESTRUCTION B9 LES UP TO 14: CPT | Mod: GC | Performed by: DERMATOLOGY

## 2021-09-16 PROCEDURE — 80053 COMPREHEN METABOLIC PANEL: CPT | Performed by: PATHOLOGY

## 2021-09-16 PROCEDURE — 85025 COMPLETE CBC W/AUTO DIFF WBC: CPT | Performed by: PATHOLOGY

## 2021-09-16 PROCEDURE — 99213 OFFICE O/P EST LOW 20 MIN: CPT | Mod: 25 | Performed by: DERMATOLOGY

## 2021-09-16 ASSESSMENT — PAIN SCALES - GENERAL: PAINLEVEL: NO PAIN (0)

## 2021-09-16 NOTE — RESULT ENCOUNTER NOTE
Reviewed results showing stable CMP and normal CBC. [facility] Ok to continue with MMF 500mg daily    CC'ing Dr. Odell as FYI only

## 2021-09-16 NOTE — PATIENT INSTRUCTIONS
1. Pemphigus foliaceus   - for the itchy spots on the scalp, please apply the clobetasol for 1-2 weeks and then switch back to the tacrolimus  - we'll continue with the mycophenolate mofetil 500mg daily    Cryotherapy    What is it?    Use of a very cold liquid, such as liquid nitrogen, to freeze and destroy abnormal skin cells that need to be removed    What should I expect?    Tenderness and redness    A small blister that might grow and fill with dark purple blood. There may be crusting.    More than one treatment may be needed if the lesions do not go away.    How do I care for the treated area?    Gently wash the area with your hands when bathing.    Use a thin layer of Vaseline to help with healing. You may use a Band-Aid.     The area should heal within 7-10 days and may leave behind a pink or lighter color.     Do not use an antibiotic or Neosporin ointment.     You may take acetaminophen (Tylenol) for pain.     Call your doctor if you have:    Severe pain    Signs of infection (warmth, redness, cloudy yellow drainage, and or a bad smell)    Questions or concerns    Who should I call with questions?       [facility]: [phone]       [facility]: [phone]       For urgent needs outside of business hours call the [facility] at [phone] and ask for the dermatology resident on call

## 2021-09-16 NOTE — LETTER
"9/16/2021       RE: Luan Mitchell  [address] [facility] [address]     Dear Colleague,    Thank you for referring your patient, Luan Mitchell, to the [facility] DERMATOLOGY CLINIC [place] at [facility]. Please see a copy of my visit note below.    [facility] Dermatology Note   Encounter Date: Sep 16, 2021  Office visit    Dermatology Problem List:    Aroldo/Gaston [facility]    #  Pemphigus foliaceus  - s/p shave bx 6/17/21 (L superior shoulder) and 7/26/10 (L arm)  - Last pemphigus panel (3/25/21): Dsg1 IgG 136 and Dsg2 IgG 2  - Goals of treatment per Mr. Mitchell:  \"Prevent itching and secondary infections. Not interested in complete clearance\"  - Current Tx: MMF 500mg daily   - stopped MMF 3/2019, re-started  mg PO BID on 8/13/20 but reduced to 500 mg every day on 10/21/20   - Last safety labs (9/2021): wnl  - Adjunct Tx: clobetasol and tacrolimus ointment  # Tinea pedis  - Current Tx:  Terbinafine 1% cream  # Benign Bx  - VK, R calf, s/p shave bx 3/25/21  - mild DN, R neck, s/p shave bx 3/22/18    ___________________________________________    Assessment & Plan:    # Pemphigus foliaceus  Condition is well controlled on current regimen. Will obtain safety labs today and continue with MMF 500mg daily. Will use topicals for adjunct relief  - C/w MMF 500mg daily (no refills needed)   - Ordered CBC w.diff and CMP today  - Recommended clobetasol BID x1-2 weeks followed by tacrolimus for persistent pruritic spots on scalp/ears    # Inflamed seborrheic keratosis - forehead (x1)  Discussed the possible etiologies, clinical course, and management options of this condition with the patient. Elected for cryotherapy.  - See procedure note below     Procedures Performed:  Cryotherapy procedure note  - location: forehead  - number of lesions treated: 1  After verbal consent and discussion of risks and benefits including but no limited to " dyspigmentation/scar, blister, and pain, lesions treated with 1-2mm freeze border for 2 cycles with liquid nitrogen, post cryotherapy instructions provided    Follow-up: 3m    Staff Involved:  Patient was seen and staffed with attending physician Dr. Jose R Feldman MD  Med/Derm Resident PGY-4  P:[phone]    Staff Addendum:  I was present for the entire procedure. Serina Odell MD  I, Seirna Odell MD, saw this patient with the resident and agree with the resident s findings and plan of care as documented in the resident s note.    ___________________________________________      CC: Derm Problem (luan is coming in today for a follow up on the pemphigus)      HPI:  Mr. Luan Mitchell is a(n) 76 year old male who presents to clinic today for pemphigus foliaceus follow up   - reports that he's doing well  - continues to have a couple areas affecting his scalp and ears. Pruritus is well controlled with current MMF dose  - has a spot on his forehead that's new and pruritic  - otherwise feeling well in usual state of health    Physical exam:  General: in no acute distress, well-developed, well-nourished  Skin:  - skin type: medium fair  - scattered erythematous slightly verrucous papules and plaques on the scalp and ears  - waxy stuck on plaque on the forehead with evidence of excoriations  - No other lesions of concern on areas examined.     Medications:  Current Outpatient Medications   Medication     ARTIFICIAL TEAR SOLUTION OP     atorvastatin (LIPITOR) 20 MG tablet     Cholecalciferol (VITAMIN D PO)     clobetasol (TEMOVATE) 0.05 % external ointment     clobetasol (TEMOVATE) 0.05 % external solution     clobetasol propionate 0.05 % SHAM     desonide (DESOWEN) 0.05 % ointment     finasteride (PROSCAR) 5 MG tablet     hydrochlorothiazide (HYDRODIURIL) 25 MG tablet     hydrocortisone butyrate (LOCOID) 0.1 % SOLN     ketoconazole (NIZORAL) 2 % cream     ketoconazole (NIZORAL) 2 % shampoo      Multiple Vitamin (MULTIVITAMIN) per tablet     mycophenolate (GENERIC EQUIVALENT) 500 MG tablet     tacrolimus (PROTOPIC) 0.1 % external ointment     triamcinolone (KENALOG) 0.1 % external cream     triamcinolone (KENALOG) 0.1 % external ointment     No current facility-administered medications for this visit.      Past Medical History:   Patient Active Problem List   Diagnosis     Pemphigus foliaceus     Hyperlipidemia LDL goal <130     BPH (benign prostatic hyperplasia)     Encounter for long-term (current) use of high-risk medication     Dermatitis, seborrheic     Lightheadedness     Age-related nuclear cataract of both eyes     SOB (shortness of breath)     Past Medical History:   Diagnosis Date     BPH (benign prostatic hyperplasia)      Essential hypertension 02/2018     Hyperlipidemia LDL goal <130      Pemphigus     pemphigus foliaceus     Retinal detachment 2009    left eye- corrected with laser     Vitreous detachment 2009    LE

## 2021-09-16 NOTE — NURSING NOTE
Dermatology Rooming Note    Luan Mitchell's goals for this visit include:   Chief Complaint   Patient presents with     Derm Problem     luan is coming in today for a follow up on the pemphigus     Aicha San CMA on 9/16/2021 at 7:00 AM

## 2021-09-23 ENCOUNTER — OFFICE VISIT (OUTPATIENT)
Dept: SURGERY | Facility: CLINIC | Age: 76
End: 2021-09-23
Payer: COMMERCIAL

## 2021-09-23 VITALS
WEIGHT: 177.1 LBS | HEART RATE: 86 BPM | HEIGHT: 69 IN | BODY MASS INDEX: 26.23 KG/M2 | SYSTOLIC BLOOD PRESSURE: 140 MMHG | OXYGEN SATURATION: 95 % | DIASTOLIC BLOOD PRESSURE: 81 MMHG | TEMPERATURE: 98.3 F

## 2021-09-23 DIAGNOSIS — K64.8 HEMORRHOID PROLAPSE: Primary | ICD-10-CM

## 2021-09-23 PROCEDURE — 46221 LIGATION OF HEMORRHOID(S): CPT | Performed by: NURSE PRACTITIONER

## 2021-09-23 ASSESSMENT — PAIN SCALES - GENERAL: PAINLEVEL: MILD PAIN (2)

## 2021-09-23 ASSESSMENT — MIFFLIN-ST. JEOR: SCORE: 1513.7

## 2021-09-23 NOTE — LETTER
"2021       RE: Luan Mitchell  [address] [place] [id number]     Dear Colleague,    Thank you for referring your patient, Luan Mitchell, to the [facility] COLON AND RECTAL SURGERY CLINIC [place] at [facility]. Please see a copy of my visit note below.    Colon and Rectal Surgery Follow-Up Clinic Note    RE: Luan Mitchell  : 1945  DIMITRY: 2021    Luan Mitchell is a very pleasant 76 year old male here for follow-up of hemorrhoid prolapse.    Interval history: I met with Luan about one moth ago with hemorrhoid prolapse with banding at that time. He has some very minor perianal irritation but no pain. No drainage or bleeding. He is not on any blood thinners. He feels that hemorrhoid banding improved his symptoms and would like further banding today, if possible.    Physical Examination: Exam was chaperoned by Bety Quigley MA   BP (!) 140/81 (BP Location: Left arm, Patient Position: Sitting, Cuff Size: Adult Regular)   Pulse 86   Temp 98.3  F (36.8  C) (Oral)   Ht 5' 9\"   Wt 177 lb 1.6 oz   SpO2 95%   BMI 26.15 kg/m    General: alert, oriented, in no acute distress, sitting comfortably  HEENT: mucous membranes moist  Perianal external examination:  Perianal skin: Intact with no excoriation or lichenification.  Lesions: No evidence of an external lesion, nodularity, or induration in the perianal region.  Eversion of buttocks: There was not evidence of an anal fissure. Details: N/A.  Skin tags or external hemorrhoids: None.    Digital rectal examination: Was performed.   Sphincter tone: Good.  Palpable lesions: No.  Prostate: Normal.  Other: None..    Anoscopy: Was performed.   Hemorrhoids: Yes. Moderate sized internal hemorrhoids without active bleeding  Lesions: No.    Procedure: After discussing the risks and benefits, the patient agreed to proceed with internal hemorrhoidal banding.    Prior to the start of the procedure " and with procedural staff participation, I verbally confirmed the patient s identity using two indicators, relevant allergies, that the procedure was appropriate and matched the consent or emergent situation, and that the correct equipment/implants were available. Immediately prior to starting the procedure I conducted the Time Out with the procedural staff and re-confirmed the patient s name, procedure, and site/side. (The Joint Commission universal protocol was followed.)  Yes    Sedation (Moderate or Deep): None    A suction hemorrhoidal  was used to place a total of 2 band(s) in the left posterior and right lateral position(s).    There was no significant bleeding. The patient tolerated the procedure well.    This procedure was performed under a collaborative privileging agreement with Dr. Karu, Chief of Colon and Rectal Surgery.      Assessment/Plan: 76 year old male with hemorrhoid prolapse. No further bleeding. Symptoms have improved with banding and he wanted to have this done again today. Not on any blood thinners. Two bands were placed today. He tolerated this well. Will have him return anytime after one month for repeat banding if he has persistent symptoms. Patient's questions were answered to his stated satisfaction and he is in agreement with this plan. Normal colonoscopy in 2014 with recall 2024.      Medical history:  Past Medical History:   Diagnosis Date     BPH (benign prostatic hyperplasia)      Essential hypertension 02/2018     Hyperlipidemia LDL goal <130      Pemphigus     pemphigus foliaceus     Retinal detachment 2009    left eye- corrected with laser     Vitreous detachment 2009    LE       Surgical history:  Past Surgical History:   Procedure Laterality Date     NO HISTORY OF SURGERY  1/28/14    derm     RETINOPEXY LASER PROPHYLAXIS BREAK(S) OS (LEFT EYE)      2009 - Left eye       Problem list:    Patient Active Problem List    Diagnosis Date Noted     SOB (shortness of breath)  06/29/2020     Priority: Medium     Age-related nuclear cataract of both eyes 08/21/2017     Priority: Medium     Lightheadedness 07/27/2015     Priority: Medium     Dermatitis, seborrheic 11/05/2013     Priority: Medium     Encounter for long-term (current) use of high-risk medication 07/24/2013     Priority: Medium     Hyperlipidemia LDL goal <130      Priority: Medium     BPH (benign prostatic hyperplasia)      Priority: Medium     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       atorvastatin (LIPITOR) 20 MG tablet Take 1 tablet (20 mg) by mouth daily 90 tablet 4     Cholecalciferol (VITAMIN D PO) Take 1 tablet by mouth daily 1000 mg       clobetasol (TEMOVATE) 0.05 % external ointment Apply topically 2 times daily For more bothersome lesions 120 g 1     clobetasol (TEMOVATE) 0.05 % external solution Apply  topically 2 times daily as needed to the scalp. 200 mL 11     clobetasol propionate 0.05 % SHAM Apply sparingly to dry scalp 3 x weekly as needed.  Leave in place for 15 m then add water, lather and rinse 1 Bottle 5     desonide (DESOWEN) 0.05 % ointment Apply topically to affected areas twice daily as instructed. 180 g 3     finasteride (PROSCAR) 5 MG tablet Take 1 tablet (5 mg) by mouth daily 90 tablet 4     hydrochlorothiazide (HYDRODIURIL) 25 MG tablet Take 1 tablet (25 mg) by mouth daily 90 tablet 4     hydrocortisone butyrate (LOCOID) 0.1 % SOLN Apply sparingly to affected area(s) of beard twice daily 180 mL 3     ketoconazole (NIZORAL) 2 % cream Apply twice daily to the nose as needed for white/scaling. 60 g 2     ketoconazole (NIZORAL) 2 % shampoo Three times per week in the shower: Lather into scalp, leave in place for 3-5 minutes, then rinse. 360 mL 12     Multiple Vitamin (MULTIVITAMIN) per tablet Take 1 tablet by mouth daily. 1 tab daily       mycophenolate  (GENERIC EQUIVALENT) 500 MG tablet Take 1 tablet (500 mg) by mouth daily 360 tablet 0     tacrolimus (PROTOPIC) 0.1 % external ointment Apply topically to affected areas as instructed. 300 g 3     triamcinolone (KENALOG) 0.1 % external cream Apply topically to affected areas as instructed. 454 g 11     triamcinolone (KENALOG) 0.1 % external ointment Apply topically 2 times daily For lesions that are more mild 453.6 g 1       Allergies:  No Known Allergies    Family history:  Family History   Problem Relation Age of Onset     Heart Disease Mother         CHF d 80     Diabetes Mother         DM2,  age 80     Hypertension Mother      Heart Disease Father         CHF d 78     Heart Disease Paternal Uncle         CHF d 50s     Cancer No family hx of         no skin cancer     Skin Cancer No family hx of         no skin cancer     Glaucoma No family hx of      Macular Degeneration No family hx of      Melanoma No family hx of        Social history:  Social History     Tobacco Use     Smoking status: Never Smoker     Smokeless tobacco: Never Used   Substance Use Topics     Alcohol use: Not on file     Marital status: .    There are no exam notes on file for this visit.     15 minutes spent on the date of the encounter doing chart review, history and exam, documentation and further activities as noted above.     PORFIRIO LiC  Colon and Rectal Surgery  [facility]        Again, thank you for allowing me to participate in the care of your patient.      Sincerely,    LILY Li CNP

## 2021-09-23 NOTE — PROGRESS NOTES
"Colon and Rectal Surgery Follow-Up Clinic Note    RE: Luan Mitchell  : 1945  DIMITRY: 2021    Luan Mitchell is a very pleasant 76 year old male here for follow-up of hemorrhoid prolapse.    Interval history: I met with Luan about one moth ago with hemorrhoid prolapse with banding at that time. He has some very minor perianal irritation but no pain. No drainage or bleeding. He is not on any blood thinners. He feels that hemorrhoid banding improved his symptoms and would like further banding today, if possible.    Physical Examination: Exam was chaperoned by Bety Quigley MA   BP (!) 140/81 (BP Location: Left arm, Patient Position: Sitting, Cuff Size: Adult Regular)   Pulse 86   Temp 98.3  F (36.8  C) (Oral)   Ht 5' 9\"   Wt 177 lb 1.6 oz   SpO2 95%   BMI 26.15 kg/m    General: alert, oriented, in no acute distress, sitting comfortably  HEENT: mucous membranes moist  Perianal external examination:  Perianal skin: Intact with no excoriation or lichenification.  Lesions: No evidence of an external lesion, nodularity, or induration in the perianal region.  Eversion of buttocks: There was not evidence of an anal fissure. Details: N/A.  Skin tags or external hemorrhoids: None.    Digital rectal examination: Was performed.   Sphincter tone: Good.  Palpable lesions: No.  Prostate: Normal.  Other: None..    Anoscopy: Was performed.   Hemorrhoids: Yes. Moderate sized internal hemorrhoids without active bleeding  Lesions: No.    Procedure: After discussing the risks and benefits, the patient agreed to proceed with internal hemorrhoidal banding.    Prior to the start of the procedure and with procedural staff participation, I verbally confirmed the patient s identity using two indicators, relevant allergies, that the procedure was appropriate and matched the consent or emergent situation, and that the correct equipment/implants were available. Immediately prior to starting the procedure I conducted the Time " Out with the procedural staff and re-confirmed the patient s name, procedure, and site/side. (The Joint Commission universal protocol was followed.)  Yes    Sedation (Moderate or Deep): None    A suction hemorrhoidal  was used to place a total of 2 band(s) in the left posterior and right lateral position(s).    There was no significant bleeding. The patient tolerated the procedure well.    This procedure was performed under a collaborative privileging agreement with Dr. Kaur, Chief of Colon and Rectal Surgery.      Assessment/Plan: 76 year old male with hemorrhoid prolapse. No further bleeding. Symptoms have improved with banding and he wanted to have this done again today. Not on any blood thinners. Two bands were placed today. He tolerated this well. Will have him return anytime after one month for repeat banding if he has persistent symptoms. Patient's questions were answered to his stated satisfaction and he is in agreement with this plan. Normal colonoscopy in 2014 with recall 2024.      Medical history:  Past Medical History:   Diagnosis Date     BPH (benign prostatic hyperplasia)      Essential hypertension 02/2018     Hyperlipidemia LDL goal <130      Pemphigus     pemphigus foliaceus     Retinal detachment 2009    left eye- corrected with laser     Vitreous detachment 2009    LE       Surgical history:  Past Surgical History:   Procedure Laterality Date     NO HISTORY OF SURGERY  1/28/14    derm     RETINOPEXY LASER PROPHYLAXIS BREAK(S) OS (LEFT EYE)      2009 - Left eye       Problem list:    Patient Active Problem List    Diagnosis Date Noted     SOB (shortness of breath) 06/29/2020     Priority: Medium     Age-related nuclear cataract of both eyes 08/21/2017     Priority: Medium     Lightheadedness 07/27/2015     Priority: Medium     Dermatitis, seborrheic 11/05/2013     Priority: Medium     Encounter for long-term (current) use of high-risk medication 07/24/2013     Priority: Medium      Hyperlipidemia LDL goal <130      Priority: Medium     BPH (benign prostatic hyperplasia)      Priority: Medium     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       atorvastatin (LIPITOR) 20 MG tablet Take 1 tablet (20 mg) by mouth daily 90 tablet 4     Cholecalciferol (VITAMIN D PO) Take 1 tablet by mouth daily 1000 mg       clobetasol (TEMOVATE) 0.05 % external ointment Apply topically 2 times daily For more bothersome lesions 120 g 1     clobetasol (TEMOVATE) 0.05 % external solution Apply  topically 2 times daily as needed to the scalp. 200 mL 11     clobetasol propionate 0.05 % SHAM Apply sparingly to dry scalp 3 x weekly as needed.  Leave in place for 15 m then add water, lather and rinse 1 Bottle 5     desonide (DESOWEN) 0.05 % ointment Apply topically to affected areas twice daily as instructed. 180 g 3     finasteride (PROSCAR) 5 MG tablet Take 1 tablet (5 mg) by mouth daily 90 tablet 4     hydrochlorothiazide (HYDRODIURIL) 25 MG tablet Take 1 tablet (25 mg) by mouth daily 90 tablet 4     hydrocortisone butyrate (LOCOID) 0.1 % SOLN Apply sparingly to affected area(s) of beard twice daily 180 mL 3     ketoconazole (NIZORAL) 2 % cream Apply twice daily to the nose as needed for white/scaling. 60 g 2     ketoconazole (NIZORAL) 2 % shampoo Three times per week in the shower: Lather into scalp, leave in place for 3-5 minutes, then rinse. 360 mL 12     Multiple Vitamin (MULTIVITAMIN) per tablet Take 1 tablet by mouth daily. 1 tab daily       mycophenolate (GENERIC EQUIVALENT) 500 MG tablet Take 1 tablet (500 mg) by mouth daily 360 tablet 0     tacrolimus (PROTOPIC) 0.1 % external ointment Apply topically to affected areas as instructed. 300 g 3     triamcinolone (KENALOG) 0.1 % external cream Apply topically to affected areas as instructed. 454 g 11     triamcinolone  (KENALOG) 0.1 % external ointment Apply topically 2 times daily For lesions that are more mild 453.6 g 1       Allergies:  No Known Allergies    Family history:  Family History   Problem Relation Age of Onset     Heart Disease Mother         CHF d 80     Diabetes Mother         DM2,  age 80     Hypertension Mother      Heart Disease Father         CHF d 78     Heart Disease Paternal Uncle         CHF d 50s     Cancer No family hx of         no skin cancer     Skin Cancer No family hx of         no skin cancer     Glaucoma No family hx of      Macular Degeneration No family hx of      Melanoma No family hx of        Social history:  Social History     Tobacco Use     Smoking status: Never Smoker     Smokeless tobacco: Never Used   Substance Use Topics     Alcohol use: Not on file     Marital status: .    There are no exam notes on file for this visit.     15 minutes spent on the date of the encounter doing chart review, history and exam, documentation and further activities as noted above.     Mayela Esquivel, NP-C  Colon and Rectal Surgery  [facility]

## 2021-10-04 ENCOUNTER — OFFICE VISIT (OUTPATIENT)
Dept: OPHTHALMOLOGY | Facility: CLINIC | Age: 76
End: 2021-10-04
Attending: OPHTHALMOLOGY
Payer: COMMERCIAL

## 2021-10-04 DIAGNOSIS — H33.22 OLD RETINAL DETACHMENT OF LEFT EYE: Primary | ICD-10-CM

## 2021-10-04 PROCEDURE — G0463 HOSPITAL OUTPT CLINIC VISIT: HCPCS

## 2021-10-04 PROCEDURE — 92015 DETERMINE REFRACTIVE STATE: CPT

## 2021-10-04 PROCEDURE — 99213 OFFICE O/P EST LOW 20 MIN: CPT | Mod: GC | Performed by: OPHTHALMOLOGY

## 2021-10-04 PROCEDURE — 92250 FUNDUS PHOTOGRAPHY W/I&R: CPT | Performed by: OPHTHALMOLOGY

## 2021-10-04 ASSESSMENT — REFRACTION_WEARINGRX
OD_AXIS: 163
OS_ADD: +2.50
OD_SPHERE: -1.00
OD_CYLINDER: +0.50
OS_CYLINDER: +0.75
OS_SPHERE: -1.75
OD_ADD: +2.50
SPECS_TYPE: BIFOCAL
OS_AXIS: 021

## 2021-10-04 ASSESSMENT — VISUAL ACUITY
OD_CC: 20/25
METHOD: SNELLEN - LINEAR
CORRECTION_TYPE: GLASSES
METHOD_MR: PT REQUESTS MRX TODAY.
OD_CC+: -2
OS_CC: 20/20
OS_CC+: -2

## 2021-10-04 ASSESSMENT — CUP TO DISC RATIO
OD_RATIO: 0.3
OS_RATIO: 0.3

## 2021-10-04 ASSESSMENT — REFRACTION_MANIFEST
OS_ADD: +2.50
OD_AXIS: 170
OD_SPHERE: -1.75
OD_CYLINDER: +1.00
OS_CYLINDER: +0.50
OS_AXIS: 180
OD_ADD: +2.50
OS_SPHERE: -1.00

## 2021-10-04 ASSESSMENT — SLIT LAMP EXAM - LIDS
COMMENTS: 1+ BLEPHARITIS
COMMENTS: 1+ BLEPHARITIS

## 2021-10-04 ASSESSMENT — CONF VISUAL FIELD
OD_NORMAL: 1
OS_NORMAL: 1
METHOD: COUNTING FINGERS

## 2021-10-04 ASSESSMENT — TONOMETRY
OD_IOP_MMHG: 15
IOP_METHOD: TONOPEN
OS_IOP_MMHG: 14

## 2021-10-04 NOTE — NURSING NOTE
Chief Complaints and History of Present Illnesses   Patient presents with     Follow Up     1 year follow up Operculated hole Left Eye s/p retinopexy      Chief Complaint(s) and History of Present Illness(es)     Follow Up     Comments: 1 year follow up Operculated hole Left Eye s/p retinopexy               Comments     Pt states vision is about the same as last visit. No eye pain today.  Occasional dry and itchy eyes, relief with Blink drops.  No flashes or floaters.     JEANETTE Trevizo October 4, 2021 3:34 PM

## 2021-10-04 NOTE — PROGRESS NOTES
I have confirmed the patient's and reviewed Past Medical History, Past Surgical History, Social History, Family History, Problem List, Medication List and agree with Tech note.     CC: s/p Retinal detachment Repair left eye       Interval Hx 10/04/21:     HPI: History of myopia and cataract, last seen in 2019, retinopexy left eye       Assessment/plan:   1.  Nuclear sclerotic cataract  Both eyes                         - Not visually significant                        - refract as needed, new prescription glasses issued today                         - monitor yearly       2.  Blepharitis both eyes:                         -wet compress twice a day                         - artificial tears over the counter                         - monitor      3.  Operculated hole Left Eye s/p retinopexy                         - Retinal Detachment precautions reviewed    4. History of Retinal Detachment Repair, left eye - 2009    -     Return to clinic 1 year with refraction, Dilated Fundus Exam    Erich Dickerson MD - PGY3  Department of Ophthalmology  Pager: [phone]    Attestation:  I have seen and examined the patient with Dr. Dickerson and agree with the findings in this note, as well as the interpretations of the diagnostic tests.       Mona Maria MD PhD.  Professor & Chair

## 2021-10-15 ENCOUNTER — MYC MEDICAL ADVICE (OUTPATIENT)
Dept: SURGERY | Facility: CLINIC | Age: 76
End: 2021-10-15

## 2021-10-20 ENCOUNTER — OFFICE VISIT (OUTPATIENT)
Dept: SURGERY | Facility: CLINIC | Age: 76
End: 2021-10-20
Payer: COMMERCIAL

## 2021-10-20 VITALS
WEIGHT: 176.8 LBS | OXYGEN SATURATION: 95 % | TEMPERATURE: 98.5 F | HEART RATE: 87 BPM | DIASTOLIC BLOOD PRESSURE: 80 MMHG | BODY MASS INDEX: 26.19 KG/M2 | SYSTOLIC BLOOD PRESSURE: 131 MMHG | HEIGHT: 69 IN

## 2021-10-20 DIAGNOSIS — Z23 NEED FOR PROPHYLACTIC VACCINATION AND INOCULATION AGAINST INFLUENZA: ICD-10-CM

## 2021-10-20 DIAGNOSIS — K64.8 HEMORRHOID PROLAPSE: Primary | ICD-10-CM

## 2021-10-20 PROCEDURE — 46221 LIGATION OF HEMORRHOID(S): CPT | Performed by: NURSE PRACTITIONER

## 2021-10-20 ASSESSMENT — MIFFLIN-ST. JEOR: SCORE: 1512.34

## 2021-10-20 ASSESSMENT — PAIN SCALES - GENERAL: PAINLEVEL: NO PAIN (0)

## 2021-10-20 NOTE — NURSING NOTE
"Chief Complaint   Patient presents with     RECHECK     Follow up after hemorrhoid banding       Vitals:    10/20/21 1333   BP: 131/80   BP Location: Left arm   Patient Position: Sitting   Cuff Size: Adult Regular   Pulse: 87   Temp: 98.5  F (36.9  C)   TempSrc: Oral   SpO2: 95%   Weight: 176 lb 12.8 oz   Height: 5' 9\"       Body mass index is 26.11 kg/m .                          Pamela Pavon, EMT    "

## 2021-10-20 NOTE — PROGRESS NOTES
"Colon and Rectal Surgery Follow-Up Clinic Note    RE: Luan Mitchell  : 1945  DIMITRY: 2021    Luan Mitchell is a very pleasant 76 year old male here for follow-up of hemorrhoid prolapse.    Interval history: I have seen Luan in the past for hemorrhoid prolapse with banding last about one month ago. He as been doing well and prolapse has improved but not completely resolved. Minimal bleeding. He is not on any blood thinners. He would like further banding today, if possible.    Physical Examination: Exam was chaperoned by Bety Quigley MA   /80 (BP Location: Left arm, Patient Position: Sitting, Cuff Size: Adult Regular)   Pulse 87   Temp 98.5  F (36.9  C) (Oral)   Ht 5' 9\"   Wt 176 lb 12.8 oz   SpO2 95%   BMI 26.11 kg/m    General: alert, oriented, in no acute distress, sitting comfortably  HEENT: mucous membranes moist  Perianal external examination:  Perianal skin: Intact with no excoriation or lichenification.  Lesions: No evidence of an external lesion, nodularity, or induration in the perianal region.  Eversion of buttocks: There was not evidence of an anal fissure. Details: N/A.  Skin tags or external hemorrhoids: None.    Digital rectal examination: Was performed.   Sphincter tone: Good.  Palpable lesions: No.  Prostate: Normal.  Other: None..    Anoscopy: Was performed.   Hemorrhoids: Yes. Moderate sized internal hemorrhoids without active bleeding  Lesions: No.    Procedure: After discussing the risks and benefits, the patient agreed to proceed with internal hemorrhoidal banding.    Prior to the start of the procedure and with procedural staff participation, I verbally confirmed the patient s identity using two indicators, relevant allergies, that the procedure was appropriate and matched the consent or emergent situation, and that the correct equipment/implants were available. Immediately prior to starting the procedure I conducted the Time Out with the procedural staff and " re-confirmed the patient s name, procedure, and site/side. (The Joint Commission universal protocol was followed.)  Yes    Sedation (Moderate or Deep): None    A suction hemorrhoidal  was used to place a total of 2 band(s) in the left anterior and right lateral position(s).    There was no significant bleeding. The patient tolerated the procedure well.    This procedure was performed under a collaborative privileging agreement with Dr. Kaur, Chief of Colon and Rectal Surgery.      Assessment/Plan: 76 year old male with hemorrhoid prolapse. Minimal bleeding. Symptoms have improved with banding and he wanted to have this done again today. Not on any blood thinners. Two bands were placed today. He tolerated this well. Will have him return anytime after one month for repeat banding if he has persistent symptoms. Patient's questions were answered to his stated satisfaction and he is in agreement with this plan. Normal colonoscopy in 2014 with recall 2024.      Medical history:  Past Medical History:   Diagnosis Date     BPH (benign prostatic hyperplasia)      Essential hypertension 02/2018     Hyperlipidemia LDL goal <130      Pemphigus     pemphigus foliaceus     Retinal detachment 2009    left eye- corrected with laser     Vitreous detachment 2009    LE       Surgical history:  Past Surgical History:   Procedure Laterality Date     NO HISTORY OF SURGERY  1/28/14    derm     RETINOPEXY LASER PROPHYLAXIS BREAK(S) OS (LEFT EYE)      2009 - Left eye       Problem list:    Patient Active Problem List    Diagnosis Date Noted     SOB (shortness of breath) 06/29/2020     Priority: Medium     Age-related nuclear cataract of both eyes 08/21/2017     Priority: Medium     Lightheadedness 07/27/2015     Priority: Medium     Dermatitis, seborrheic 11/05/2013     Priority: Medium     Encounter for long-term (current) use of high-risk medication 07/24/2013     Priority: Medium     Hyperlipidemia LDL goal <130      Priority:  Medium     BPH (benign prostatic hyperplasia)      Priority: Medium     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       atorvastatin (LIPITOR) 20 MG tablet Take 1 tablet (20 mg) by mouth daily 90 tablet 4     Cholecalciferol (VITAMIN D PO) Take 1 tablet by mouth daily 1000 mg       clobetasol (TEMOVATE) 0.05 % external ointment Apply topically 2 times daily For more bothersome lesions 120 g 1     clobetasol (TEMOVATE) 0.05 % external solution Apply  topically 2 times daily as needed to the scalp. 200 mL 11     clobetasol propionate 0.05 % SHAM Apply sparingly to dry scalp 3 x weekly as needed.  Leave in place for 15 m then add water, lather and rinse 1 Bottle 5     desonide (DESOWEN) 0.05 % ointment Apply topically to affected areas twice daily as instructed. 180 g 3     finasteride (PROSCAR) 5 MG tablet Take 1 tablet (5 mg) by mouth daily 90 tablet 4     hydrochlorothiazide (HYDRODIURIL) 25 MG tablet Take 1 tablet (25 mg) by mouth daily 90 tablet 4     hydrocortisone butyrate (LOCOID) 0.1 % SOLN Apply sparingly to affected area(s) of beard twice daily 180 mL 3     ketoconazole (NIZORAL) 2 % cream Apply twice daily to the nose as needed for white/scaling. 60 g 2     ketoconazole (NIZORAL) 2 % shampoo Three times per week in the shower: Lather into scalp, leave in place for 3-5 minutes, then rinse. 360 mL 12     Multiple Vitamin (MULTIVITAMIN) per tablet Take 1 tablet by mouth daily. 1 tab daily       mycophenolate (GENERIC EQUIVALENT) 500 MG tablet Take 1 tablet (500 mg) by mouth daily 360 tablet 0     tacrolimus (PROTOPIC) 0.1 % external ointment Apply topically to affected areas as instructed. 300 g 3     triamcinolone (KENALOG) 0.1 % external cream Apply topically to affected areas as instructed. 454 g 11     triamcinolone (KENALOG) 0.1 % external ointment Apply topically 2  "times daily For lesions that are more mild 453.6 g 1       Allergies:  No Known Allergies    Family history:  Family History   Problem Relation Age of Onset     Heart Disease Mother         CHF d 80     Diabetes Mother         DM2,  age 80     Hypertension Mother      Heart Disease Father         CHF d 78     Heart Disease Paternal Uncle         CHF d 50s     Cancer No family hx of         no skin cancer     Skin Cancer No family hx of         no skin cancer     Glaucoma No family hx of      Macular Degeneration No family hx of      Melanoma No family hx of        Social history:  Social History     Tobacco Use     Smoking status: Never Smoker     Smokeless tobacco: Never Used   Substance Use Topics     Alcohol use: Not on file     Marital status: .    Nursing Notes:   Pamela Pavon, EMT  10/20/2021  1:36 PM  Signed  Chief Complaint   Patient presents with     RECHECK     Follow up after hemorrhoid banding       Vitals:    10/20/21 1333   BP: 131/80   BP Location: Left arm   Patient Position: Sitting   Cuff Size: Adult Regular   Pulse: 87   Temp: 98.5  F (36.9  C)   TempSrc: Oral   SpO2: 95%   Weight: 176 lb 12.8 oz   Height: 5' 9\"       Body mass index is 26.11 kg/m .                          Pamela Pavon, EMT         10 minutes spent on the date of the encounter doing chart review, history and exam, documentation and further activities as noted above.     Mayela Esquivel, NP-C  Colon and Rectal Surgery  [facility]  "

## 2021-10-20 NOTE — LETTER
"10/20/2021       RE: Luan Mitchell  [address] [place] [id number]     Dear Colleague,    Thank you for referring your patient, Luan Mitchell, to the [facility] COLON AND RE[facility] [place] [facility]. Please see a copy of my visit note below.    Colon and Rectal Surgery Follow-Up Clinic Note    RE: Luan Mitchell  : 1945  DIMITRY: 2021    Luan Mitchell is a very pleasant 76 year old male here for follow-up of hemorrhoid prolapse.    Interval history: I have seen Luan in the past for hemorrhoid prolapse with banding last about one month ago. He as been doing well and prolapse has improved but not completely resolved. Minimal bleeding. He is not on any blood thinners. He would like further banding today, if possible.    Physical Examination: Exam was chaperoned by Bety Quigley MA   /80 (BP Location: Left arm, Patient Position: Sitting, Cuff Size: Adult Regular)   Pulse 87   Temp 98.5  F (36.9  C) (Oral)   Ht 5' 9\"   Wt 176 lb 12.8 oz   SpO2 95%   BMI 26.11 kg/m    General: alert, oriented, in no acute distress, sitting comfortably  HEENT: mucous membranes moist  Perianal external examination:  Perianal skin: Intact with no excoriation or lichenification.  Lesions: No evidence of an external lesion, nodularity, or induration in the perianal region.  Eversion of buttocks: There was not evidence of an anal fissure. Details: N/A.  Skin tags or external hemorrhoids: None.    Digital rectal examination: Was performed.   Sphincter tone: Good.  Palpable lesions: No.  Prostate: Normal.  Other: None..    Anoscopy: Was performed.   Hemorrhoids: Yes. Moderate sized internal hemorrhoids without active bleeding  Lesions: No.    Procedure: After discussing the risks and benefits, the patient agreed to proceed with internal hemorrhoidal banding.    Prior to the start of the procedure and with procedural staff participation, " I verbally confirmed the patient s identity using two indicators, relevant allergies, that the procedure was appropriate and matched the consent or emergent situation, and that the correct equipment/implants were available. Immediately prior to starting the procedure I conducted the Time Out with the procedural staff and re-confirmed the patient s name, procedure, and site/side. (The Joint Commission universal protocol was followed.)  Yes    Sedation (Moderate or Deep): None    A suction hemorrhoidal  was used to place a total of 2 band(s) in the left anterior and right lateral position(s).    There was no significant bleeding. The patient tolerated the procedure well.    This procedure was performed under a collaborative privileging agreement with Dr. Kaur, Chief of Colon and Rectal Surgery.      Assessment/Plan: 76 year old male with hemorrhoid prolapse. Minimal bleeding. Symptoms have improved with banding and he wanted to have this done again today. Not on any blood thinners. Two bands were placed today. He tolerated this well. Will have him return anytime after one month for repeat banding if he has persistent symptoms. Patient's questions were answered to his stated satisfaction and he is in agreement with this plan. Normal colonoscopy in 2014 with recall 2024.      Medical history:  Past Medical History:   Diagnosis Date     BPH (benign prostatic hyperplasia)      Essential hypertension 02/2018     Hyperlipidemia LDL goal <130      Pemphigus     pemphigus foliaceus     Retinal detachment 2009    left eye- corrected with laser     Vitreous detachment 2009    LE       Surgical history:  Past Surgical History:   Procedure Laterality Date     NO HISTORY OF SURGERY  1/28/14    derm     RETINOPEXY LASER PROPHYLAXIS BREAK(S) OS (LEFT EYE)      2009 - Left eye       Problem list:    Patient Active Problem List    Diagnosis Date Noted     SOB (shortness of breath) 06/29/2020     Priority: Medium      Age-related nuclear cataract of both eyes 08/21/2017     Priority: Medium     Lightheadedness 07/27/2015     Priority: Medium     Dermatitis, seborrheic 11/05/2013     Priority: Medium     Encounter for long-term (current) use of high-risk medication 07/24/2013     Priority: Medium     Hyperlipidemia LDL goal <130      Priority: Medium     BPH (benign prostatic hyperplasia)      Priority: Medium     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       atorvastatin (LIPITOR) 20 MG tablet Take 1 tablet (20 mg) by mouth daily 90 tablet 4     Cholecalciferol (VITAMIN D PO) Take 1 tablet by mouth daily 1000 mg       clobetasol (TEMOVATE) 0.05 % external ointment Apply topically 2 times daily For more bothersome lesions 120 g 1     clobetasol (TEMOVATE) 0.05 % external solution Apply  topically 2 times daily as needed to the scalp. 200 mL 11     clobetasol propionate 0.05 % SHAM Apply sparingly to dry scalp 3 x weekly as needed.  Leave in place for 15 m then add water, lather and rinse 1 Bottle 5     desonide (DESOWEN) 0.05 % ointment Apply topically to affected areas twice daily as instructed. 180 g 3     finasteride (PROSCAR) 5 MG tablet Take 1 tablet (5 mg) by mouth daily 90 tablet 4     hydrochlorothiazide (HYDRODIURIL) 25 MG tablet Take 1 tablet (25 mg) by mouth daily 90 tablet 4     hydrocortisone butyrate (LOCOID) 0.1 % SOLN Apply sparingly to affected area(s) of beard twice daily 180 mL 3     ketoconazole (NIZORAL) 2 % cream Apply twice daily to the nose as needed for white/scaling. 60 g 2     ketoconazole (NIZORAL) 2 % shampoo Three times per week in the shower: Lather into scalp, leave in place for 3-5 minutes, then rinse. 360 mL 12     Multiple Vitamin (MULTIVITAMIN) per tablet Take 1 tablet by mouth daily. 1 tab daily       mycophenolate (GENERIC EQUIVALENT) 500 MG tablet Take  "1 tablet (500 mg) by mouth daily 360 tablet 0     tacrolimus (PROTOPIC) 0.1 % external ointment Apply topically to affected areas as instructed. 300 g 3     triamcinolone (KENALOG) 0.1 % external cream Apply topically to affected areas as instructed. 454 g 11     triamcinolone (KENALOG) 0.1 % external ointment Apply topically 2 times daily For lesions that are more mild 453.6 g 1       Allergies:  No Known Allergies    Family history:  Family History   Problem Relation Age of Onset     Heart Disease Mother         CHF d 80     Diabetes Mother         DM2,  age 80     Hypertension Mother      Heart Disease Father         CHF d 78     Heart Disease Paternal Uncle         CHF d 50s     Cancer No family hx of         no skin cancer     Skin Cancer No family hx of         no skin cancer     Glaucoma No family hx of      Macular Degeneration No family hx of      Melanoma No family hx of        Social history:  Social History     Tobacco Use     Smoking status: Never Smoker     Smokeless tobacco: Never Used   Substance Use Topics     Alcohol use: Not on file     Marital status: .    Nursing Notes:   Pamela Pavon, EMT  10/20/2021  1:36 PM  Signed  Chief Complaint   Patient presents with     RECHECK     Follow up after hemorrhoid banding       Vitals:    10/20/21 1333   BP: 131/80   BP Location: Left arm   Patient Position: Sitting   Cuff Size: Adult Regular   Pulse: 87   Temp: 98.5  F (36.9  C)   TempSrc: Oral   SpO2: 95%   Weight: 176 lb 12.8 oz   Height: 5' 9\"       Body mass index is 26.11 kg/m .                          Pamela Pavon EMT         10 minutes spent on the date of the encounter doing chart review, history and exam, documentation and further activities as noted above.     Mayela Esquivel, NP-C  Colon and Rectal Surgery  [facility]      Again, thank you for allowing me to participate in the care of your patient.      Sincerely,    Mayela Fajardo" LILY Sandra CNP

## 2021-12-09 ENCOUNTER — MYC MEDICAL ADVICE (OUTPATIENT)
Dept: SURGERY | Facility: CLINIC | Age: 76
End: 2021-12-09
Payer: COMMERCIAL

## 2021-12-13 ENCOUNTER — OFFICE VISIT (OUTPATIENT)
Dept: SURGERY | Facility: CLINIC | Age: 76
End: 2021-12-13
Payer: COMMERCIAL

## 2021-12-13 VITALS
HEART RATE: 81 BPM | DIASTOLIC BLOOD PRESSURE: 73 MMHG | OXYGEN SATURATION: 96 % | TEMPERATURE: 98.7 F | HEIGHT: 69 IN | BODY MASS INDEX: 26.13 KG/M2 | SYSTOLIC BLOOD PRESSURE: 146 MMHG | WEIGHT: 176.4 LBS

## 2021-12-13 DIAGNOSIS — K64.8 HEMORRHOID PROLAPSE: ICD-10-CM

## 2021-12-13 PROCEDURE — 46221 LIGATION OF HEMORRHOID(S): CPT | Performed by: NURSE PRACTITIONER

## 2021-12-13 ASSESSMENT — PAIN SCALES - GENERAL: PAINLEVEL: NO PAIN (0)

## 2021-12-13 ASSESSMENT — MIFFLIN-ST. JEOR: SCORE: 1510.53

## 2021-12-13 NOTE — PROGRESS NOTES
"Colon and Rectal Surgery Follow-Up Clinic Note    RE: Luan Mitchell  : 1945  DIMITRY: 2021    Luan Mitchell is a very pleasant 76 year old male here for follow-up of hemorrhoid prolapse.    Interval history: I have seen Luan in the past for hemorrhoid prolapse with banding last about two months ago. He as been doing well and prolapse has improved but not completely resolved. No further bleeding. He is on a daily fiber supplement and has increased his water intake which has improved his stool consistency and he only rarely has hard stools. He is not on any blood thinners. He would like further banding today, if possible.    Physical Examination: Exam was chaperoned by Bety Quigley MA   BP (!) 146/73 (BP Location: Left arm, Patient Position: Sitting, Cuff Size: Adult Regular)   Pulse 81   Temp 98.7  F (37.1  C) (Oral)   Ht 5' 9\"   Wt 176 lb 6.4 oz   SpO2 96%   BMI 26.05 kg/m    General: alert, oriented, in no acute distress, sitting comfortably  HEENT: mucous membranes moist  Perianal external examination:  Perianal skin: Intact with no excoriation or lichenification.  Lesions: No evidence of an external lesion, nodularity, or induration in the perianal region.  Eversion of buttocks: There was not evidence of an anal fissure. Details: N/A.  Skin tags or external hemorrhoids: None.    Digital rectal examination: Was performed.   Sphincter tone: Good.  Palpable lesions: No.  Prostate: Normal.  Other: None..    Anoscopy: Was performed.   Hemorrhoids: Yes. Moderate sized internal hemorrhoids without active bleeding  Lesions: No.    Procedure: After discussing the risks and benefits, the patient agreed to proceed with internal hemorrhoidal banding.    Prior to the start of the procedure and with procedural staff participation, I verbally confirmed the patient s identity using two indicators, relevant allergies, that the procedure was appropriate and matched the consent or emergent situation, and that " the correct equipment/implants were available. Immediately prior to starting the procedure I conducted the Time Out with the procedural staff and re-confirmed the patient s name, procedure, and site/side. (The Joint Commission universal protocol was followed.)  Yes    Sedation (Moderate or Deep): None    A suction hemorrhoidal  was used to place a total of 2 band(s) in the left lateral and right lateral position(s).    There was no significant bleeding. The patient tolerated the procedure well.    This procedure was performed under a collaborative privileging agreement with Dr. Kaur, Chief of Colon and Rectal Surgery.      Assessment/Plan: 76 year old male with hemorrhoid prolapse. Minimal bleeding. Symptoms have improved with banding and he wanted to have this done again today. Not on any blood thinners. Two bands were placed today. He tolerated this well. Will have him return anytime after one month for repeat banding if he has persistent symptoms. Patient's questions were answered to his stated satisfaction and he is in agreement with this plan. Normal colonoscopy in 2014 with recall 2024.      Medical history:  Past Medical History:   Diagnosis Date     BPH (benign prostatic hyperplasia)      Essential hypertension 02/2018     Hyperlipidemia LDL goal <130      Pemphigus     pemphigus foliaceus     Retinal detachment 2009    left eye- corrected with laser     Vitreous detachment 2009    LE       Surgical history:  Past Surgical History:   Procedure Laterality Date     NO HISTORY OF SURGERY  1/28/14    derm     RETINOPEXY LASER PROPHYLAXIS BREAK(S) OS (LEFT EYE)      2009 - Left eye       Problem list:    Patient Active Problem List    Diagnosis Date Noted     SOB (shortness of breath) 06/29/2020     Priority: Medium     Age-related nuclear cataract of both eyes 08/21/2017     Priority: Medium     Lightheadedness 07/27/2015     Priority: Medium     Dermatitis, seborrheic 11/05/2013     Priority: Medium      Encounter for long-term (current) use of high-risk medication 07/24/2013     Priority: Medium     Hyperlipidemia LDL goal <130      Priority: Medium     BPH (benign prostatic hyperplasia)      Priority: Medium     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       atorvastatin (LIPITOR) 20 MG tablet Take 1 tablet (20 mg) by mouth daily 90 tablet 4     Cholecalciferol (VITAMIN D PO) Take 1 tablet by mouth daily 1000 mg       clobetasol (TEMOVATE) 0.05 % external ointment Apply topically 2 times daily For more bothersome lesions 120 g 1     clobetasol (TEMOVATE) 0.05 % external solution Apply  topically 2 times daily as needed to the scalp. 200 mL 11     clobetasol propionate 0.05 % SHAM Apply sparingly to dry scalp 3 x weekly as needed.  Leave in place for 15 m then add water, lather and rinse 1 Bottle 5     desonide (DESOWEN) 0.05 % ointment Apply topically to affected areas twice daily as instructed. 180 g 3     finasteride (PROSCAR) 5 MG tablet Take 1 tablet (5 mg) by mouth daily 90 tablet 4     hydrochlorothiazide (HYDRODIURIL) 25 MG tablet Take 1 tablet (25 mg) by mouth daily 90 tablet 4     hydrocortisone butyrate (LOCOID) 0.1 % SOLN Apply sparingly to affected area(s) of beard twice daily 180 mL 3     ketoconazole (NIZORAL) 2 % cream Apply twice daily to the nose as needed for white/scaling. 60 g 2     ketoconazole (NIZORAL) 2 % shampoo Three times per week in the shower: Lather into scalp, leave in place for 3-5 minutes, then rinse. 360 mL 12     Multiple Vitamin (MULTIVITAMIN) per tablet Take 1 tablet by mouth daily. 1 tab daily       mycophenolate (GENERIC EQUIVALENT) 500 MG tablet Take 1 tablet (500 mg) by mouth daily 360 tablet 0     tacrolimus (PROTOPIC) 0.1 % external ointment Apply topically to affected areas as instructed. 300 g 3     triamcinolone (KENALOG) 0.1 %  "external cream Apply topically to affected areas as instructed. 454 g 11     triamcinolone (KENALOG) 0.1 % external ointment Apply topically 2 times daily For lesions that are more mild 453.6 g 1       Allergies:  No Known Allergies    Family history:  Family History   Problem Relation Age of Onset     Heart Disease Mother         CHF d 80     Diabetes Mother         DM2,  age 80     Hypertension Mother      Heart Disease Father         CHF d 78     Heart Disease Paternal Uncle         CHF d 50s     Cancer No family hx of         no skin cancer     Skin Cancer No family hx of         no skin cancer     Glaucoma No family hx of      Macular Degeneration No family hx of      Melanoma No family hx of        Social history:  Social History     Tobacco Use     Smoking status: Never Smoker     Smokeless tobacco: Never Used   Substance Use Topics     Alcohol use: Not on file     Marital status: .    Nursing Notes:   Bety Dutta CMA  2021 12:15 PM  Signed  Chief Complaint   Patient presents with     Follow Up     Follow up for hemorrhoid banding       Vitals:    21 1212   BP: (!) 146/73   BP Location: Left arm   Patient Position: Sitting   Cuff Size: Adult Regular   Pulse: 81   Temp: 98.7  F (37.1  C)   TempSrc: Oral   SpO2: 96%   Weight: 176 lb 6.4 oz   Height: 5' 9\"       Body mass index is 26.05 kg/m .          Bety Cordero CMA         10 minutes spent on the date of the encounter doing chart review, history and exam, documentation and further activities as noted above.     Mayela Esquivel NP-C  Colon and Rectal Surgery  [facility]  "

## 2021-12-13 NOTE — NURSING NOTE
"Chief Complaint   Patient presents with     Follow Up     Follow up for hemorrhoid banding       Vitals:    12/13/21 1212   BP: (!) 146/73   BP Location: Left arm   Patient Position: Sitting   Cuff Size: Adult Regular   Pulse: 81   Temp: 98.7  F (37.1  C)   TempSrc: Oral   SpO2: 96%   Weight: 176 lb 6.4 oz   Height: 5' 9\"       Body mass index is 26.05 kg/m .          Bety Cordero CMA    "

## 2021-12-13 NOTE — LETTER
"2021       RE: Luan Mitchell  [address] [place] [id number]     Dear Colleague,    Thank you for referring your patient, Luan Mitchell, to the [facility] COLON AND RECTAL SURGERY CLINIC [place] at [facility]. Please see a copy of my visit note below.    Colon and Rectal Surgery Follow-Up Clinic Note    RE: Luan Mitchell  : 1945  DIMITRY: 2021    Luan Mitchell is a very pleasant 76 year old male here for follow-up of hemorrhoid prolapse.    Interval history: I have seen Luan in the past for hemorrhoid prolapse with banding last about two months ago. He as been doing well and prolapse has improved but not completely resolved. No further bleeding. He is on a daily fiber supplement and has increased his water intake which has improved his stool consistency and he only rarely has hard stools. He is not on any blood thinners. He would like further banding today, if possible.    Physical Examination: Exam was chaperoned by Bety Quigley MA   BP (!) 146/73 (BP Location: Left arm, Patient Position: Sitting, Cuff Size: Adult Regular)   Pulse 81   Temp 98.7  F (37.1  C) (Oral)   Ht 5' 9\"   Wt 176 lb 6.4 oz   SpO2 96%   BMI 26.05 kg/m    General: alert, oriented, in no acute distress, sitting comfortably  HEENT: mucous membranes moist  Perianal external examination:  Perianal skin: Intact with no excoriation or lichenification.  Lesions: No evidence of an external lesion, nodularity, or induration in the perianal region.  Eversion of buttocks: There was not evidence of an anal fissure. Details: N/A.  Skin tags or external hemorrhoids: None.    Digital rectal examination: Was performed.   Sphincter tone: Good.  Palpable lesions: No.  Prostate: Normal.  Other: None..    Anoscopy: Was performed.   Hemorrhoids: Yes. Moderate sized internal hemorrhoids without active bleeding  Lesions: No.    Procedure: After discussing the risks and " benefits, the patient agreed to proceed with internal hemorrhoidal banding.    Prior to the start of the procedure and with procedural staff participation, I verbally confirmed the patient s identity using two indicators, relevant allergies, that the procedure was appropriate and matched the consent or emergent situation, and that the correct equipment/implants were available. Immediately prior to starting the procedure I conducted the Time Out with the procedural staff and re-confirmed the patient s name, procedure, and site/side. (The Joint Commission universal protocol was followed.)  Yes    Sedation (Moderate or Deep): None    A suction hemorrhoidal  was used to place a total of 2 band(s) in the left lateral and right lateral position(s).    There was no significant bleeding. The patient tolerated the procedure well.    This procedure was performed under a collaborative privileging agreement with Dr. Kaur, Chief of Colon and Rectal Surgery.      Assessment/Plan: 76 year old male with hemorrhoid prolapse. Minimal bleeding. Symptoms have improved with banding and he wanted to have this done again today. Not on any blood thinners. Two bands were placed today. He tolerated this well. Will have him return anytime after one month for repeat banding if he has persistent symptoms. Patient's questions were answered to his stated satisfaction and he is in agreement with this plan. Normal colonoscopy in 2014 with recall 2024.      Medical history:  Past Medical History:   Diagnosis Date     BPH (benign prostatic hyperplasia)      Essential hypertension 02/2018     Hyperlipidemia LDL goal <130      Pemphigus     pemphigus foliaceus     Retinal detachment 2009    left eye- corrected with laser     Vitreous detachment 2009    LE       Surgical history:  Past Surgical History:   Procedure Laterality Date     NO HISTORY OF SURGERY  1/28/14    derm     RETINOPEXY LASER PROPHYLAXIS BREAK(S) OS (LEFT EYE)      2009 - Left  eye       Problem list:    Patient Active Problem List    Diagnosis Date Noted     SOB (shortness of breath) 06/29/2020     Priority: Medium     Age-related nuclear cataract of both eyes 08/21/2017     Priority: Medium     Lightheadedness 07/27/2015     Priority: Medium     Dermatitis, seborrheic 11/05/2013     Priority: Medium     Encounter for long-term (current) use of high-risk medication 07/24/2013     Priority: Medium     Hyperlipidemia LDL goal <130      Priority: Medium     BPH (benign prostatic hyperplasia)      Priority: Medium     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       atorvastatin (LIPITOR) 20 MG tablet Take 1 tablet (20 mg) by mouth daily 90 tablet 4     Cholecalciferol (VITAMIN D PO) Take 1 tablet by mouth daily 1000 mg       clobetasol (TEMOVATE) 0.05 % external ointment Apply topically 2 times daily For more bothersome lesions 120 g 1     clobetasol (TEMOVATE) 0.05 % external solution Apply  topically 2 times daily as needed to the scalp. 200 mL 11     clobetasol propionate 0.05 % SHAM Apply sparingly to dry scalp 3 x weekly as needed.  Leave in place for 15 m then add water, lather and rinse 1 Bottle 5     desonide (DESOWEN) 0.05 % ointment Apply topically to affected areas twice daily as instructed. 180 g 3     finasteride (PROSCAR) 5 MG tablet Take 1 tablet (5 mg) by mouth daily 90 tablet 4     hydrochlorothiazide (HYDRODIURIL) 25 MG tablet Take 1 tablet (25 mg) by mouth daily 90 tablet 4     hydrocortisone butyrate (LOCOID) 0.1 % SOLN Apply sparingly to affected area(s) of beard twice daily 180 mL 3     ketoconazole (NIZORAL) 2 % cream Apply twice daily to the nose as needed for white/scaling. 60 g 2     ketoconazole (NIZORAL) 2 % shampoo Three times per week in the shower: Lather into scalp, leave in place for 3-5 minutes, then rinse. 360 mL 12      "Multiple Vitamin (MULTIVITAMIN) per tablet Take 1 tablet by mouth daily. 1 tab daily       mycophenolate (GENERIC EQUIVALENT) 500 MG tablet Take 1 tablet (500 mg) by mouth daily 360 tablet 0     tacrolimus (PROTOPIC) 0.1 % external ointment Apply topically to affected areas as instructed. 300 g 3     triamcinolone (KENALOG) 0.1 % external cream Apply topically to affected areas as instructed. 454 g 11     triamcinolone (KENALOG) 0.1 % external ointment Apply topically 2 times daily For lesions that are more mild 453.6 g 1       Allergies:  No Known Allergies    Family history:  Family History   Problem Relation Age of Onset     Heart Disease Mother         CHF d 80     Diabetes Mother         DM2,  age 80     Hypertension Mother      Heart Disease Father         CHF d 78     Heart Disease Paternal Uncle         CHF d 50s     Cancer No family hx of         no skin cancer     Skin Cancer No family hx of         no skin cancer     Glaucoma No family hx of      Macular Degeneration No family hx of      Melanoma No family hx of        Social history:  Social History     Tobacco Use     Smoking status: Never Smoker     Smokeless tobacco: Never Used   Substance Use Topics     Alcohol use: Not on file     Marital status: .    Nursing Notes:   Bety Dutta CMA  2021 12:15 PM  Signed  Chief Complaint   Patient presents with     Follow Up     Follow up for hemorrhoid banding       Vitals:    21 1212   BP: (!) 146/73   BP Location: Left arm   Patient Position: Sitting   Cuff Size: Adult Regular   Pulse: 81   Temp: 98.7  F (37.1  C)   TempSrc: Oral   SpO2: 96%   Weight: 176 lb 6.4 oz   Height: 5' 9\"       Body mass index is 26.05 kg/m .          Bety Cordero CMA         10 minutes spent on the date of the encounter doing chart review, history and exam, documentation and further activities as noted above.     Mayela Esquivel, NP-C  Colon and Rectal Surgery  [facility]" [facility]

## 2021-12-16 ENCOUNTER — LAB (OUTPATIENT)
Dept: LAB | Facility: CLINIC | Age: 76
End: 2021-12-16
Payer: COMMERCIAL

## 2021-12-16 ENCOUNTER — OFFICE VISIT (OUTPATIENT)
Dept: DERMATOLOGY | Facility: CLINIC | Age: 76
End: 2021-12-16
Payer: COMMERCIAL

## 2021-12-16 DIAGNOSIS — L10.2 PEMPHIGUS FOLIACEUS (H): Primary | ICD-10-CM

## 2021-12-16 DIAGNOSIS — B35.1 ONYCHOMYCOSIS: ICD-10-CM

## 2021-12-16 DIAGNOSIS — Z79.899 ENCOUNTER FOR LONG-TERM (CURRENT) USE OF HIGH-RISK MEDICATION: ICD-10-CM

## 2021-12-16 DIAGNOSIS — L10.2 PEMPHIGUS FOLIACEUS (H): ICD-10-CM

## 2021-12-16 LAB
ALBUMIN SERPL-MCNC: 3.9 G/DL (ref 3.4–5)
ALP SERPL-CCNC: 41 U/L (ref 40–150)
ALT SERPL W P-5'-P-CCNC: 37 U/L (ref 0–70)
ANION GAP SERPL CALCULATED.3IONS-SCNC: 7 MMOL/L (ref 3–14)
AST SERPL W P-5'-P-CCNC: 28 U/L (ref 0–45)
BASOPHILS # BLD AUTO: 0 10E3/UL (ref 0–0.2)
BASOPHILS NFR BLD AUTO: 0 %
BILIRUB SERPL-MCNC: 0.7 MG/DL (ref 0.2–1.3)
BUN SERPL-MCNC: 17 MG/DL (ref 7–30)
CALCIUM SERPL-MCNC: 9.6 MG/DL (ref 8.5–10.1)
CHLORIDE BLD-SCNC: 105 MMOL/L (ref 94–109)
CO2 SERPL-SCNC: 30 MMOL/L (ref 20–32)
CREAT SERPL-MCNC: 0.92 MG/DL (ref 0.66–1.25)
EOSINOPHIL # BLD AUTO: 0.1 10E3/UL (ref 0–0.7)
EOSINOPHIL NFR BLD AUTO: 2 %
ERYTHROCYTE [DISTWIDTH] IN BLOOD BY AUTOMATED COUNT: 12.5 % (ref 10–15)
GFR SERPL CREATININE-BSD FRML MDRD: 79 ML/MIN/1.73M2
GLUCOSE BLD-MCNC: 103 MG/DL (ref 70–99)
HCT VFR BLD AUTO: 42.3 % (ref 40–53)
HGB BLD-MCNC: 14.6 G/DL (ref 13.3–17.7)
IMM GRANULOCYTES # BLD: 0 10E3/UL
IMM GRANULOCYTES NFR BLD: 0 %
LYMPHOCYTES # BLD AUTO: 1.3 10E3/UL (ref 0.8–5.3)
LYMPHOCYTES NFR BLD AUTO: 23 %
MCH RBC QN AUTO: 31.8 PG (ref 26.5–33)
MCHC RBC AUTO-ENTMCNC: 34.5 G/DL (ref 31.5–36.5)
MCV RBC AUTO: 92 FL (ref 78–100)
MONOCYTES # BLD AUTO: 0.6 10E3/UL (ref 0–1.3)
MONOCYTES NFR BLD AUTO: 10 %
NEUTROPHILS # BLD AUTO: 3.6 10E3/UL (ref 1.6–8.3)
NEUTROPHILS NFR BLD AUTO: 65 %
NRBC # BLD AUTO: 0 10E3/UL
NRBC BLD AUTO-RTO: 0 /100
PLATELET # BLD AUTO: 157 10E3/UL (ref 150–450)
POTASSIUM BLD-SCNC: 3.9 MMOL/L (ref 3.4–5.3)
PROT SERPL-MCNC: 7.1 G/DL (ref 6.8–8.8)
RBC # BLD AUTO: 4.59 10E6/UL (ref 4.4–5.9)
SODIUM SERPL-SCNC: 142 MMOL/L (ref 133–144)
WBC # BLD AUTO: 5.6 10E3/UL (ref 4–11)

## 2021-12-16 PROCEDURE — 80053 COMPREHEN METABOLIC PANEL: CPT | Performed by: PATHOLOGY

## 2021-12-16 PROCEDURE — 85025 COMPLETE CBC W/AUTO DIFF WBC: CPT | Performed by: PATHOLOGY

## 2021-12-16 PROCEDURE — 36415 COLL VENOUS BLD VENIPUNCTURE: CPT | Performed by: PATHOLOGY

## 2021-12-16 PROCEDURE — 99214 OFFICE O/P EST MOD 30 MIN: CPT | Mod: GC | Performed by: STUDENT IN AN ORGANIZED HEALTH CARE EDUCATION/TRAINING PROGRAM

## 2021-12-16 RX ORDER — FLUOCINOLONE ACETONIDE 0.11 MG/ML
OIL TOPICAL 2 TIMES DAILY
Qty: 118.28 ML | Refills: 3 | Status: SHIPPED | OUTPATIENT
Start: 2021-12-16 | End: 2022-09-22

## 2021-12-16 NOTE — PROGRESS NOTES
"[facility] Dermatology Note  Encounter Date: Dec 16, 2021  Office Visit     Dermatology Problem List:  Aroldo/Gaston CC     #  Pemphigus foliaceus  - s/p shave bx 6/17/21 (L superior shoulder) and 7/26/10 (L arm)  - Last pemphigus panel (3/25/21): Dsg1 IgG 136 and Dsg2 IgG 2  - Goals of treatment per Mr. Mitchell:  \"Prevent itching and secondary infections. Not interested in complete clearance\"  - Current Tx: MMF 500mg daily              - stopped MMF 3/2019, re-started  mg PO BID on 8/13/20 but reduced to 500 mg every day on 10/21/20              - Last safety labs (9/2021): wnl  - Adjunct Tx: clobetasol and tacrolimus ointment  # Tinea pedis  - Current Tx:  Terbinafine 1% cream  # Benign Bx  - VK, R calf, s/p shave bx 3/25/21  - mild DN, R neck, s/p shave bx 3/22/18     ____________________________________________    Assessment & Plan:     # Pemphigus foliaceus  Patient still active but roughly stable from prior. He remains medication averse and not interested in escalating for remission (has really only been interested in minimizing meds). Safety labs today. Will continue  mg daily. Will add dermasmooth for scalp  - Continue  mg daily   -- CBC, CMP today  - Continue clobetasol  - Start dermasmoothe oil for scalp    Procedures Performed:   None    Follow-up: 3 month(s) in-person, or earlier for new or changing lesions    Staff and Resident:     This patient was staffed with Dr. Collier.    Tahir Kendrick MD  Internal Medicine - Dermatology PGY 4    I have personally examined this patient and agree with the resident doctor's documentation and plan of care. I have reviewed and amended the resident's note above. The documentation accurately reflects my clinical observations, diagnoses, treatment and follow-up plans.     Isabelle Collier MD  Dermatology Staff    ____________________________________________    CC: Derm Problem (Luan is here today for pemphigus follow up. Reports " "symptoms as \"same\" as last visit.)    HPI:  Mr. Luan Mitchell is a(n) 76 year old male who presents today as a return patient for PF.  - Still active in typical places  - Scalp most bothersome 2/2 itch  - Tolerating MMF without GI upset, infections  - No other lesions of concern today  - Patient is otherwise feeling well, without additional skin concerns.    Labs Reviewed:  N/A    Physical Exam:  GEN: Pleasant male, in NAD  SKIN: Focused examination of head and neck, trunk and extremities was performed.  - Scattered superficially eroded scaly plaques on scalp, trunk and extremities - admixed PIH type changes  - No other lesions of concern on areas examined.     Medications:  Current Outpatient Medications   Medication     ARTIFICIAL TEAR SOLUTION OP     atorvastatin (LIPITOR) 20 MG tablet     Cholecalciferol (VITAMIN D PO)     clobetasol (TEMOVATE) 0.05 % external ointment     clobetasol (TEMOVATE) 0.05 % external solution     clobetasol propionate 0.05 % SHAM     desonide (DESOWEN) 0.05 % ointment     finasteride (PROSCAR) 5 MG tablet     fluocinolone acetonide (DERMA SMOOTHE/FS BODY) 0.01 % external oil     hydrochlorothiazide (HYDRODIURIL) 25 MG tablet     hydrocortisone butyrate (LOCOID) 0.1 % SOLN     ketoconazole (NIZORAL) 2 % cream     ketoconazole (NIZORAL) 2 % shampoo     Multiple Vitamin (MULTIVITAMIN) per tablet     mycophenolate (GENERIC EQUIVALENT) 500 MG tablet     tacrolimus (PROTOPIC) 0.1 % external ointment     triamcinolone (KENALOG) 0.1 % external cream     triamcinolone (KENALOG) 0.1 % external ointment     No current facility-administered medications for this visit.      Past Medical History:   Patient Active Problem List   Diagnosis     Pemphigus foliaceus     Hyperlipidemia LDL goal <130     BPH (benign prostatic hyperplasia)     Encounter for long-term (current) use of high-risk medication     Dermatitis, seborrheic     Lightheadedness     Age-related nuclear cataract of both eyes     SOB " (shortness of breath)     Past Medical History:   Diagnosis Date     BPH (benign prostatic hyperplasia)      Essential hypertension 02/2018     Hyperlipidemia LDL goal <130      Pemphigus     pemphigus foliaceus     Retinal detachment 2009    left eye- corrected with laser     Vitreous detachment 2009    STEPHANIE       CC Referred Self, MD  No address on file on close of this encounter.

## 2021-12-16 NOTE — LETTER
"12/16/2021       RE: Luan Mitchell  [address] [address]     Dear Colleague,    Thank you for referring your patient, Luan Mitchell, to the [facility] DERMATOLOGY CLINIC [place] at [facility]. Please see a copy of my visit note below.    [facility] Dermatology Note  Encounter Date: Dec 16, 2021  Office Visit     Dermatology Problem List:  Aroldo/Gaston CC     #  Pemphigus foliaceus  - s/p shave bx 6/17/21 (L superior shoulder) and 7/26/10 (L arm)  - Last pemphigus panel (3/25/21): Dsg1 IgG 136 and Dsg2 IgG 2  - Goals of treatment per Mr. Mitchell:  \"Prevent itching and secondary infections. Not interested in complete clearance\"  - Current Tx: MMF 500mg daily              - stopped MMF 3/2019, re-started  mg PO BID on 8/13/20 but reduced to 500 mg every day on 10/21/20              - Last safety labs (9/2021): wnl  - Adjunct Tx: clobetasol and tacrolimus ointment  # Tinea pedis  - Current Tx:  Terbinafine 1% cream  # Benign Bx  - VK, R calf, s/p shave bx 3/25/21  - mild DN, R neck, s/p shave bx 3/22/18     ____________________________________________    Assessment & Plan:     # Pemphigus foliaceus  Patient still active but roughly stable from prior. He remains medication averse and not interested in escalating for remission (has really only been interested in minimizing meds). Safety labs today. Will continue  mg daily. Will add dermasmooth for scalp  - Continue  mg daily   -- CBC, CMP today  - Continue clobetasol  - Start dermasmoothe oil for scalp    Procedures Performed:   None    Follow-up: 3 month(s) in-person, or earlier for new or changing lesions    Staff and Resident:     This patient was staffed with Dr. Collier.    Tahir Kendrick MD  Internal Medicine - Dermatology PGY 4    I have personally examined this patient and agree with the resident doctor's documentation and plan of care. I have " "reviewed and amended the resident's note above. The documentation accurately reflects my clinical observations, diagnoses, treatment and follow-up plans.     Isabelle Collier MD  Dermatology Staff    ____________________________________________    CC: Derm Problem (Luan is here today for pemphigus follow up. Reports symptoms as \"same\" as last visit.)    HPI:  Mr. Luan Mitchell is a(n) 76 year old male who presents today as a return patient for PF.  - Still active in typical places  - Scalp most bothersome 2/2 itch  - Tolerating MMF without GI upset, infections  - No other lesions of concern today  - Patient is otherwise feeling well, without additional skin concerns.    Labs Reviewed:  N/A    Physical Exam:  GEN: Pleasant male, in NAD  SKIN: Focused examination of head and neck, trunk and extremities was performed.  - Scattered superficially eroded scaly plaques on scalp, trunk and extremities - admixed PIH type changes  - No other lesions of concern on areas examined.     Medications:  Current Outpatient Medications   Medication     ARTIFICIAL TEAR SOLUTION OP     atorvastatin (LIPITOR) 20 MG tablet     Cholecalciferol (VITAMIN D PO)     clobetasol (TEMOVATE) 0.05 % external ointment     clobetasol (TEMOVATE) 0.05 % external solution     clobetasol propionate 0.05 % SHAM     desonide (DESOWEN) 0.05 % ointment     finasteride (PROSCAR) 5 MG tablet     fluocinolone acetonide (DERMA SMOOTHE/FS BODY) 0.01 % external oil     hydrochlorothiazide (HYDRODIURIL) 25 MG tablet     hydrocortisone butyrate (LOCOID) 0.1 % SOLN     ketoconazole (NIZORAL) 2 % cream     ketoconazole (NIZORAL) 2 % shampoo     Multiple Vitamin (MULTIVITAMIN) per tablet     mycophenolate (GENERIC EQUIVALENT) 500 MG tablet     tacrolimus (PROTOPIC) 0.1 % external ointment     triamcinolone (KENALOG) 0.1 % external cream     triamcinolone (KENALOG) 0.1 % external ointment     No current facility-administered medications for this visit.      Past " Medical History:   Patient Active Problem List   Diagnosis     Pemphigus foliaceus     Hyperlipidemia LDL goal <130     BPH (benign prostatic hyperplasia)     Encounter for long-term (current) use of high-risk medication     Dermatitis, seborrheic     Lightheadedness     Age-related nuclear cataract of both eyes     SOB (shortness of breath)     Past Medical History:   Diagnosis Date     BPH (benign prostatic hyperplasia)      Essential hypertension 02/2018     Hyperlipidemia LDL goal <130      Pemphigus     pemphigus foliaceus     Retinal detachment 2009    left eye- corrected with laser     Vitreous detachment 2009    LE       CC Referred Self, MD  No address on file on close of this encounter.

## 2021-12-16 NOTE — PATIENT INSTRUCTIONS
Urea 40% cream - over the counter and will thin the nails.  We will start the fluocinolone oil to the scalp.

## 2022-02-21 ENCOUNTER — TELEPHONE (OUTPATIENT)
Dept: UROLOGY | Facility: CLINIC | Age: 77
End: 2022-02-21
Payer: COMMERCIAL

## 2022-02-21 NOTE — TELEPHONE ENCOUNTER
Spoke to pt. Confirmed for him that labs in March will work for June appointment. Confirmed PSA is actively ordered. No other questions noted.     Zakia Alan RN MSN

## 2022-02-21 NOTE — TELEPHONE ENCOUNTER
[facility] Call Center    Phone Message    May a detailed message be left on voicemail: yes     Reason for Call: Other: Pt called and is having labs done in March - pt is wondering if those labs will work for his f/u appointment in June - pt scheduled a lab appointment in case and would like a call back regarding if the March ones will be okay or if he should keep the June lab appointment. Thanks!     Action Taken: Message routed to:  [facility] ([facility]    Travel Screening: Not Applicable

## 2022-03-07 ASSESSMENT — ENCOUNTER SYMPTOMS
WHEEZING: 0
SNORES LOUDLY: 0
COUGH DISTURBING SLEEP: 0
PALPITATIONS: 0
SLEEP DISTURBANCES DUE TO BREATHING: 0
HEMOPTYSIS: 0
LEG PAIN: 0
ORTHOPNEA: 0
COUGH: 0
HYPERTENSION: 1
POSTURAL DYSPNEA: 0
LIGHT-HEADEDNESS: 0
SYNCOPE: 0
EXERCISE INTOLERANCE: 0
SPUTUM PRODUCTION: 0
SHORTNESS OF BREATH: 1
DYSPNEA ON EXERTION: 0
HYPOTENSION: 0

## 2022-03-14 ENCOUNTER — OFFICE VISIT (OUTPATIENT)
Dept: INTERNAL MEDICINE | Facility: CLINIC | Age: 77
End: 2022-03-14
Payer: COMMERCIAL

## 2022-03-14 ENCOUNTER — LAB (OUTPATIENT)
Dept: LAB | Facility: CLINIC | Age: 77
End: 2022-03-14
Payer: COMMERCIAL

## 2022-03-14 VITALS
DIASTOLIC BLOOD PRESSURE: 83 MMHG | SYSTOLIC BLOOD PRESSURE: 146 MMHG | WEIGHT: 175 LBS | HEART RATE: 82 BPM | HEIGHT: 69 IN | OXYGEN SATURATION: 98 % | BODY MASS INDEX: 25.92 KG/M2 | RESPIRATION RATE: 16 BRPM

## 2022-03-14 DIAGNOSIS — I10 ESSENTIAL HYPERTENSION: ICD-10-CM

## 2022-03-14 DIAGNOSIS — E78.5 HYPERLIPIDEMIA LDL GOAL <130: ICD-10-CM

## 2022-03-14 DIAGNOSIS — E03.8 SUBCLINICAL HYPOTHYROIDISM: ICD-10-CM

## 2022-03-14 DIAGNOSIS — L10.2 PEMPHIGUS FOLIACEUS (H): ICD-10-CM

## 2022-03-14 DIAGNOSIS — R73.02 IGT (IMPAIRED GLUCOSE TOLERANCE): ICD-10-CM

## 2022-03-14 DIAGNOSIS — Z79.899 ENCOUNTER FOR LONG-TERM (CURRENT) USE OF HIGH-RISK MEDICATION: ICD-10-CM

## 2022-03-14 DIAGNOSIS — N40.1 BENIGN PROSTATIC HYPERPLASIA WITH LOWER URINARY TRACT SYMPTOMS, SYMPTOM DETAILS UNSPECIFIED: ICD-10-CM

## 2022-03-14 DIAGNOSIS — R06.00 DYSPNEA, UNSPECIFIED TYPE: ICD-10-CM

## 2022-03-14 DIAGNOSIS — R03.0 WHITE COAT SYNDROME WITHOUT DIAGNOSIS OF HYPERTENSION: Primary | ICD-10-CM

## 2022-03-14 LAB
ALBUMIN SERPL-MCNC: 4.1 G/DL (ref 3.4–5)
ALP SERPL-CCNC: 43 U/L (ref 40–150)
ALT SERPL W P-5'-P-CCNC: 36 U/L (ref 0–70)
ANION GAP SERPL CALCULATED.3IONS-SCNC: 7 MMOL/L (ref 3–14)
AST SERPL W P-5'-P-CCNC: 24 U/L (ref 0–45)
BASOPHILS # BLD AUTO: 0 10E3/UL (ref 0–0.2)
BASOPHILS NFR BLD AUTO: 0 %
BILIRUB SERPL-MCNC: 0.9 MG/DL (ref 0.2–1.3)
BUN SERPL-MCNC: 19 MG/DL (ref 7–30)
CALCIUM SERPL-MCNC: 9.4 MG/DL (ref 8.5–10.1)
CHLORIDE BLD-SCNC: 105 MMOL/L (ref 94–109)
CHOLEST SERPL-MCNC: 160 MG/DL
CO2 SERPL-SCNC: 28 MMOL/L (ref 20–32)
CREAT SERPL-MCNC: 1.09 MG/DL (ref 0.66–1.25)
EOSINOPHIL # BLD AUTO: 0.1 10E3/UL (ref 0–0.7)
EOSINOPHIL NFR BLD AUTO: 2 %
ERYTHROCYTE [DISTWIDTH] IN BLOOD BY AUTOMATED COUNT: 12.3 % (ref 10–15)
FASTING STATUS PATIENT QL REPORTED: NORMAL
GFR SERPL CREATININE-BSD FRML MDRD: 69 ML/MIN/1.73M2
GLUCOSE BLD-MCNC: 113 MG/DL (ref 70–99)
HBA1C MFR BLD: 5.7 % (ref 0–5.6)
HCT VFR BLD AUTO: 44.4 % (ref 40–53)
HDLC SERPL-MCNC: 44 MG/DL
HGB BLD-MCNC: 15.6 G/DL (ref 13.3–17.7)
IMM GRANULOCYTES # BLD: 0 10E3/UL
IMM GRANULOCYTES NFR BLD: 0 %
LDLC SERPL CALC-MCNC: 88 MG/DL
LYMPHOCYTES # BLD AUTO: 1.4 10E3/UL (ref 0.8–5.3)
LYMPHOCYTES NFR BLD AUTO: 20 %
MCH RBC QN AUTO: 31.6 PG (ref 26.5–33)
MCHC RBC AUTO-ENTMCNC: 35.1 G/DL (ref 31.5–36.5)
MCV RBC AUTO: 90 FL (ref 78–100)
MONOCYTES # BLD AUTO: 0.7 10E3/UL (ref 0–1.3)
MONOCYTES NFR BLD AUTO: 9 %
NEUTROPHILS # BLD AUTO: 4.7 10E3/UL (ref 1.6–8.3)
NEUTROPHILS NFR BLD AUTO: 69 %
NONHDLC SERPL-MCNC: 116 MG/DL
NRBC # BLD AUTO: 0 10E3/UL
NRBC BLD AUTO-RTO: 0 /100
PLATELET # BLD AUTO: 175 10E3/UL (ref 150–450)
POTASSIUM BLD-SCNC: 3.8 MMOL/L (ref 3.4–5.3)
PROT SERPL-MCNC: 7.3 G/DL (ref 6.8–8.8)
PSA SERPL-MCNC: 2.29 UG/L (ref 0–4)
RBC # BLD AUTO: 4.94 10E6/UL (ref 4.4–5.9)
SODIUM SERPL-SCNC: 140 MMOL/L (ref 133–144)
T4 FREE SERPL-MCNC: 0.87 NG/DL (ref 0.76–1.46)
TRIGL SERPL-MCNC: 141 MG/DL
TSH SERPL DL<=0.005 MIU/L-ACNC: 7.36 MU/L (ref 0.4–4)
WBC # BLD AUTO: 6.9 10E3/UL (ref 4–11)

## 2022-03-14 PROCEDURE — 84153 ASSAY OF PSA TOTAL: CPT | Performed by: PATHOLOGY

## 2022-03-14 PROCEDURE — 83036 HEMOGLOBIN GLYCOSYLATED A1C: CPT | Performed by: PATHOLOGY

## 2022-03-14 PROCEDURE — 36415 COLL VENOUS BLD VENIPUNCTURE: CPT | Performed by: PATHOLOGY

## 2022-03-14 PROCEDURE — 84439 ASSAY OF FREE THYROXINE: CPT | Performed by: PATHOLOGY

## 2022-03-14 PROCEDURE — 85025 COMPLETE CBC W/AUTO DIFF WBC: CPT | Performed by: PATHOLOGY

## 2022-03-14 PROCEDURE — 84443 ASSAY THYROID STIM HORMONE: CPT | Performed by: PATHOLOGY

## 2022-03-14 PROCEDURE — 80061 LIPID PANEL: CPT | Performed by: PATHOLOGY

## 2022-03-14 PROCEDURE — 90715 TDAP VACCINE 7 YRS/> IM: CPT | Performed by: INTERNAL MEDICINE

## 2022-03-14 PROCEDURE — 90471 IMMUNIZATION ADMIN: CPT | Performed by: INTERNAL MEDICINE

## 2022-03-14 PROCEDURE — 80053 COMPREHEN METABOLIC PANEL: CPT | Performed by: PATHOLOGY

## 2022-03-14 PROCEDURE — G0439 PPPS, SUBSEQ VISIT: HCPCS | Performed by: INTERNAL MEDICINE

## 2022-03-14 RX ORDER — ATORVASTATIN CALCIUM 20 MG/1
20 TABLET, FILM COATED ORAL DAILY
Qty: 90 TABLET | Refills: 4 | Status: SHIPPED | OUTPATIENT
Start: 2022-03-14 | End: 2023-03-21

## 2022-03-14 RX ORDER — HYDROCHLOROTHIAZIDE 25 MG/1
25 TABLET ORAL DAILY
Qty: 90 TABLET | Refills: 4 | Status: SHIPPED | OUTPATIENT
Start: 2022-03-14 | End: 2023-03-21

## 2022-03-14 ASSESSMENT — PAIN SCALES - GENERAL: PAINLEVEL: NO PAIN (0)

## 2022-03-14 NOTE — PROGRESS NOTES
HPI  76-year-old here today for Medicare wellness visit.  He continues to have intermittent dyspnea.  We have evaluated in the past with normal pulmonary function studies normal imaging and a normal cardiopulmonary exercise test.  His dyspnea is not related to exertion or activity.  He is interested in getting a cardiology opinion so we will refer for cardiology and their assessment of this.  Also reassess his labs today including his lipids blood sugar and electrolytes.  He has been monitoring his blood pressure at home its been running borderline elevated at home generally in the 130s to 140s systolic.  Is taking the hydrochlorothiazide and tolerating that well.  Otherwise he has been doing some regular exercise no specific complaints or concerns otherwise.  His prior radiculopathy symptoms resolved with physical therapy  Past Medical History:   Diagnosis Date     BPH (benign prostatic hyperplasia)      Essential hypertension 2018     Hyperlipidemia LDL goal <130      Pemphigus     pemphigus foliaceus     Retinal detachment     left eye- corrected with laser     Vitreous detachment     LE     Past Surgical History:   Procedure Laterality Date     NO HISTORY OF SURGERY  14    derm     RETINOPEXY LASER PROPHYLAXIS BREAK(S) OS (LEFT EYE)       - Left eye     Family History   Problem Relation Age of Onset     Heart Disease Mother         CHF d 80     Diabetes Mother         DM2,  age 80     Hypertension Mother      Heart Disease Father         CHF d 78     Heart Disease Paternal Uncle         CHF d 50s     Cancer No family hx of         no skin cancer     Skin Cancer No family hx of         no skin cancer     Glaucoma No family hx of      Macular Degeneration No family hx of      Melanoma No family hx of      Answers for HPI/ROS submitted by the patient on 3/7/2022  General Symptoms: No  Skin Symptoms: No  HENT Symptoms: No  EYE SYMPTOMS: No  HEART SYMPTOMS: Yes  LUNG SYMPTOMS: Yes  INTESTINAL  "SYMPTOMS: No  URINARY SYMPTOMS: No  REPRODUCTIVE SYMPTOMS: No  SKELETAL SYMPTOMS: No  BLOOD SYMPTOMS: No  NERVOUS SYSTEM SYMPTOMS: No  MENTAL HEALTH SYMPTOMS: No  Chest pain or pressure: No  Fast or irregular heartbeat: No  Pain in legs with walking: No  Trouble breathing while lying down: No  Fingers or toes appear blue: No  High blood pressure: Yes  Low blood pressure: No  Fainting: No  Murmurs: No  Pacemaker: No  Varicose veins: No  Edema or swelling: No  Wake up at night with shortness of breath: No  Light-headedness: No  Exercise intolerance: No  Cough: No  Sputum or phlegm: No  Coughing up blood: No  Difficulty breating or shortness of breath: Yes  Snoring: No  Wheezing: No  Difficulty breathing on exertion: No  Nighttime Cough: No  Difficulty breathing when lying flat: No        Exam:  BP (!) 161/85 (BP Location: Right arm, Patient Position: Sitting, Cuff Size: Adult Regular)   Pulse 82   Resp 16   Ht 1.74 m (5' 8.5\")   Wt 79.4 kg (175 lb)   SpO2 98%   BMI 26.22 kg/m    175 lbs 0 oz  Physical Exam   The patient is alert, oriented with a clear sensorium.   Skin shows numerous pigmented crusted lesions on the trunk  Head is normocephalic and atraumatic.   Eyes show PERRLA  Ears show normal TMs bilaterally.   Mouth shows clear oral mucosa.   Neck shows no nodes, thyromegaly or bruits.   Back is non tender.  Lungs are clear to percussion and auscultation.   Heart shows normal S1 and S2 without murmur or gallop.   Abdomen is soft and nontender without masses or organomegaly.   Extremities show extensive superficial venous varicosities right greater than left in the calves and ankles no edema and no evidence of active synovitis.  Degenerative changes of both the MCP PIP and DIP joints of the hands  Neurologic examination shows cranial nerves II-XII intact. Motor shows 5/5 strength. Reflexes are symmetric. Cerebellar testing shows normal tandem gait.  Romberg negative.  Labs reviewed:  Results for orders " placed or performed in visit on 03/14/22   Hemoglobin A1c     Status: Abnormal   Result Value Ref Range    Hemoglobin A1C 5.7 (H) 0.0 - 5.6 %   Lipid Profile     Status: None   Result Value Ref Range    Cholesterol 160 <200 mg/dL    Triglycerides 141 <150 mg/dL    Direct Measure HDL 44 >=40 mg/dL    LDL Cholesterol Calculated 88 <=100 mg/dL    Non HDL Cholesterol 116 <130 mg/dL    Patient Fasting > 8hrs? Unknown     Narrative    Cholesterol  Desirable:  <200 mg/dL    Triglycerides  Normal:  Less than 150 mg/dL  Borderline High:  150-199 mg/dL  High:  200-499 mg/dL  Very High:  Greater than or equal to 500 mg/dL    Direct Measure HDL  Female:  Greater than or equal to 50 mg/dL   Male:  Greater than or equal to 40 mg/dL    LDL Cholesterol  Desirable:  <100mg/dL  Above Desirable:  100-129 mg/dL   Borderline High:  130-159 mg/dL   High:  160-189 mg/dL   Very High:  >= 190 mg/dL    Non HDL Cholesterol  Desirable:  130 mg/dL  Above Desirable:  130-159 mg/dL  Borderline High:  160-189 mg/dL  High:  190-219 mg/dL  Very High:  Greater than or equal to 220 mg/dL   TSH with free T4 reflex     Status: Abnormal   Result Value Ref Range    TSH 7.36 (H) 0.40 - 4.00 mU/L   PSA tumor marker     Status: Normal   Result Value Ref Range    PSA Tumor Marker 2.29 0.00 - 4.00 ug/L    Narrative    Assay Method:  Chemiluminescence using Siemens   Vista analyzer.   Comprehensive metabolic panel (CMP)     Status: Abnormal   Result Value Ref Range    Sodium 140 133 - 144 mmol/L    Potassium 3.8 3.4 - 5.3 mmol/L    Chloride 105 94 - 109 mmol/L    Carbon Dioxide (CO2) 28 20 - 32 mmol/L    Anion Gap 7 3 - 14 mmol/L    Urea Nitrogen 19 7 - 30 mg/dL    Creatinine 1.09 0.66 - 1.25 mg/dL    Calcium 9.4 8.5 - 10.1 mg/dL    Glucose 113 (H) 70 - 99 mg/dL    Alkaline Phosphatase 43 40 - 150 U/L    AST 24 0 - 45 U/L    ALT 36 0 - 70 U/L    Protein Total 7.3 6.8 - 8.8 g/dL    Albumin 4.1 3.4 - 5.0 g/dL    Bilirubin Total 0.9 0.2 - 1.3 mg/dL    GFR Estimate  69 >60 mL/min/1.73m2   CBC with platelets and differential     Status: None   Result Value Ref Range    WBC Count 6.9 4.0 - 11.0 10e3/uL    RBC Count 4.94 4.40 - 5.90 10e6/uL    Hemoglobin 15.6 13.3 - 17.7 g/dL    Hematocrit 44.4 40.0 - 53.0 %    MCV 90 78 - 100 fL    MCH 31.6 26.5 - 33.0 pg    MCHC 35.1 31.5 - 36.5 g/dL    RDW 12.3 10.0 - 15.0 %    Platelet Count 175 150 - 450 10e3/uL    % Neutrophils 69 %    % Lymphocytes 20 %    % Monocytes 9 %    % Eosinophils 2 %    % Basophils 0 %    % Immature Granulocytes 0 %    NRBCs per 100 WBC 0 <1 /100    Absolute Neutrophils 4.7 1.6 - 8.3 10e3/uL    Absolute Lymphocytes 1.4 0.8 - 5.3 10e3/uL    Absolute Monocytes 0.7 0.0 - 1.3 10e3/uL    Absolute Eosinophils 0.1 0.0 - 0.7 10e3/uL    Absolute Basophils 0.0 0.0 - 0.2 10e3/uL    Absolute Immature Granulocytes 0.0 <=0.4 10e3/uL    Absolute NRBCs 0.0 10e3/uL   T4 free     Status: Normal   Result Value Ref Range    Free T4 0.87 0.76 - 1.46 ng/dL   CBC with Platelets & Differential     Status: None    Narrative    The following orders were created for panel order CBC with Platelets & Differential.  Procedure                               Abnormality         Status                     ---------                               -----------         ------                     CBC with platelets and d...[[id number]]                      Final result                 Please view results for these tests on the individual orders.         ASSESSMENT  1. Dyspnea uncertain significance  2. Pemphigus in remission on mycophenolate  3. Hyperlipidemia well controlled on atorvastatin  4. hypertension on hctz  5. subclinical hypothyroidism  6. IGT    Plan  We will further evaluate his blood pressure with 24 blood pressure monitor.  Regards to the dyspnea I am and have him see cardiology for their opinion regarding this.  We will reassess his labs today.  We will update his immunizations with a Tdap.  This note was completed using Dragon voice  recognition software.      Devendra Telles MD  General Internal Medicine  [facility]  [phone]

## 2022-03-14 NOTE — PROGRESS NOTES
Medicare Wellness Health Risk Assessment Questionnaire    What is your marital status?      Who lives in your household?  Wife    Does your home have loose rugs in the hallway:     No    Does your home have grab bars in the bathroom:    Yes     Does your home have handrails on the stairs?  Yes     Does your home have poorly lit areas?    No    How many times have you fallen in the last year?  0    How many times have you been to the Emergency Room in the last year?  0    How many times have you been hospitalized in the last year?  0    Do you feel threatened or controlled by a partner, ex-partner or anyone in your life?   No    Has anyone hurt you physically, for example by pushing, hitting, slapping or kicking you   or forcing you to have sex?   No    Are you sexually active?    Yes     If yes, with men, women, or both?     Women    If yes, do you more than one current partner?   No    If yes, are you using condoms?    No    Have you had any sexually transmitted infections in the last year?   No    Do you have any sexual concerns?    No    Do you need help with the phone, transportation, shopping, preparing meals, housework, laundry, medications or managing money?   No    Have you noticed any hearing difficulties?   No    Do you wear hearing aids?   No    Have you seen a hearing professional such as an audiologist in the last 1 year?   No    Do you have vision difficulty?    No    Do you wear glasses or contacts?   Yes     Have you seen an eye doctor in the last 1 year?   Yes     How many servings of fruits and vegetables do you eat a day?  2    How often do you exercise in a week?  4-5 days    What kind of exercise do you do?  30 min stationary bike, walking    Do you wear a seatbelt when driving or riding in a vehicle?     Yes     Do you use tobacco?    No    Do you use e-cigarettes?    No    Do you use any other drugs?   No         Do you drink alcohol?   No    If you drink alcohol, how many drinks per  week?  N/A    Over the last 2 weeks, how often have you been bothered by feeling down, depressed, or hopeless?  Not at all (0)     Over the last 2 weeks, how often have you had little interest or pleasure in doing things?  Not at all (0)    Have you completed an Advance Directives document?  Yes     If yes, have you given a copy to the clinic?   No    Do you need information on Advance Directives?   Yes       RITU Chisholm, at 11:10 AM on 3/14/2022.

## 2022-03-14 NOTE — NURSING NOTE
Luan Mitchell is a 76 year old male patient that presents today in clinic for the following:    Chief Complaint   Patient presents with     Physical     Discuss hypertension, glucose, and lingering shortness of breath     The patient's allergies and medications were reviewed as noted. A set of vitals were recorded as noted without incident. The patient does not have any other questions for the provider.    RITU Chisholm at 10:04 AM on 3/14/2022

## 2022-03-16 ENCOUNTER — HOSPITAL ENCOUNTER (OUTPATIENT)
Dept: CARDIOLOGY | Facility: CLINIC | Age: 77
Discharge: HOME | End: 2022-03-16
Attending: INTERNAL MEDICINE
Payer: COMMERCIAL

## 2022-03-16 ENCOUNTER — OFFICE VISIT (OUTPATIENT)
Dept: CARDIOLOGY | Facility: CLINIC | Age: 77
End: 2022-03-16
Attending: INTERNAL MEDICINE
Payer: COMMERCIAL

## 2022-03-16 VITALS
SYSTOLIC BLOOD PRESSURE: 144 MMHG | OXYGEN SATURATION: 98 % | BODY MASS INDEX: 26.14 KG/M2 | HEART RATE: 78 BPM | HEIGHT: 69 IN | WEIGHT: 176.5 LBS | DIASTOLIC BLOOD PRESSURE: 80 MMHG

## 2022-03-16 DIAGNOSIS — R03.0 WHITE COAT SYNDROME WITHOUT DIAGNOSIS OF HYPERTENSION: ICD-10-CM

## 2022-03-16 DIAGNOSIS — R06.00 DYSPNEA, UNSPECIFIED TYPE: ICD-10-CM

## 2022-03-16 PROCEDURE — 93786 AMBL BP MNTR W/SW REC ONLY: CPT

## 2022-03-16 PROCEDURE — 93788 AMBL BP MNTR W/SW A/R: CPT

## 2022-03-16 PROCEDURE — 99203 OFFICE O/P NEW LOW 30 MIN: CPT | Mod: 25 | Performed by: INTERNAL MEDICINE

## 2022-03-16 PROCEDURE — 93790 AMBL BP MNTR W/SW I&R: CPT | Performed by: INTERNAL MEDICINE

## 2022-03-16 PROCEDURE — G0463 HOSPITAL OUTPT CLINIC VISIT: HCPCS

## 2022-03-16 ASSESSMENT — PAIN SCALES - GENERAL: PAINLEVEL: NO PAIN (0)

## 2022-03-16 NOTE — NURSING NOTE
Chief Complaint   Patient presents with     New Patient     new pt dyspnea     Vitals were taken and medications reconciled.    Jon Tirado, EMT  1:54 PM

## 2022-03-16 NOTE — PROGRESS NOTES
[facility] First Time consultation Notes     HPI:     Dear Colleagues,     I had the pleasure of seeing Luan Mitchell in the  [facility] on March 15, 2022.     As you know the patient is a very pleasant 76 year old male who is had some intermittent shortness of breath that does not occur with exertion, hypertension, and a strong family history of coronary artery disease that he would like recommendations on prevention.     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   He exercises many times a week and does not get chest heaviness or pressure.  He has intermittent shortness of breath that does not occur with activity but he is wondering whether he should worry about this.  He has had a past echocardiogram with normal heart function.  He has had relatively reassuring pulmonary function tests as well.     He denies any history of palpitations or syncope.     He is wearing a blood pressure monitor-and wonders what to be done about his blood pressure.  He does not get headaches again or chest pain.       PAST MEDICAL HISTORY:  Past Medical History:   Diagnosis Date     BPH (benign prostatic hyperplasia)      Essential hypertension 2018     Hyperlipidemia LDL goal <130      Pemphigus     pemphigus foliaceus     Retinal detachment     left eye- corrected with laser     Vitreous detachment     LE      FAMILY HISTORY:              SOCIAL HISTORY:  Father- CHF  in 70s   Mother:  of CV disease 80       SmokingL none   ETOH: none       CURRENT MEDICATIONS:    Current Outpatient Medications   Medication Sig Dispense Refill     ARTIFICIAL TEAR SOLUTION OP Apply  to eye.       atorvastatin (LIPITOR) 20 MG tablet Take 1 tablet (20 mg) by mouth daily 90 tablet 4     Cholecalciferol (VITAMIN D PO) Take 1 tablet by mouth daily 1000 mg       clobetasol (TEMOVATE)  0.05 % external ointment Apply topically 2 times daily For more bothersome lesions 120 g 1     clobetasol (TEMOVATE) 0.05 % external solution Apply  topically 2 times daily as needed to the scalp. 200 mL 11     clobetasol propionate 0.05 % SHAM Apply sparingly to dry scalp 3 x weekly as needed.  Leave in place for 15 m then add water, lather and rinse 1 Bottle 5     desonide (DESOWEN) 0.05 % ointment Apply topically to affected areas twice daily as instructed. 180 g 3     finasteride (PROSCAR) 5 MG tablet Take 1 tablet (5 mg) by mouth daily 90 tablet 4     fluocinolone acetonide (DERMA SMOOTHE/FS BODY) 0.01 % external oil Apply topically 2 times daily 118.28 mL 3     hydrochlorothiazide (HYDRODIURIL) 25 MG tablet Take 1 tablet (25 mg) by mouth daily 90 tablet 4     hydrocortisone butyrate (LOCOID) 0.1 % SOLN Apply sparingly to affected area(s) of beard twice daily 180 mL 3     ketoconazole (NIZORAL) 2 % cream Apply twice daily to the nose as needed for white/scaling. 60 g 2     ketoconazole (NIZORAL) 2 % shampoo Three times per week in the shower: Lather into scalp, leave in place for 3-5 minutes, then rinse. 360 mL 12     Multiple Vitamin (MULTIVITAMIN) per tablet Take 1 tablet by mouth daily. 1 tab daily       mycophenolate (GENERIC EQUIVALENT) 500 MG tablet Take 1 tablet (500 mg) by mouth daily 360 tablet 0     tacrolimus (PROTOPIC) 0.1 % external ointment Apply topically to affected areas as instructed. 300 g 3     triamcinolone (KENALOG) 0.1 % external cream Apply topically to affected areas as instructed. 454 g 11     triamcinolone (KENALOG) 0.1 % external ointment Apply topically 2 times daily For lesions that are more mild 453.6 g 1       ROS:   Constitutional: No fever, chills, or sweats. Weight changes negative  ENT: No visual disturbance, ear ache, epistaxis, sore throat.   Allergies/Immunologic: Negative.   Respiratory: No cough, hemoptysis.   Cardiovascular: As per HPI.   GI: No nausea, vomiting,  "hematemesis, melena, or hematochezia.   : No urinary frequency, dysuria, or hematuria.   Integument: Negative.   Psychiatric: Some anxiety about his health  Neuro: Negative.   Endocrinology: Negative.   Musculoskeletal: Negative.    EXAM:    BP (!) 144/80 (BP Location: Right arm, Patient Position: Chair, Cuff Size: Adult Regular)   Pulse 78   Ht 1.762 m (5' 9.37\")   Wt 80.1 kg (176 lb 8 oz)   SpO2 98%   BMI 25.79 kg/m      General: appears comfortable, alert and articulate  Head: normocephalic, atraumatic  Eyes: anicteric sclera, EOMI  Neck: no adenopathy  Orophyarynx: moist mucosa, no lesions, dentition intact  Heart: PMI diffuse,trace systolic murmur at LLSB, regular, S1/S2, rub, estimated JVP 9 cm   Lungs: clear, no rales or wheezing  Abdomen: soft, non-tender, bowel sounds present, no hepatosplenomegaly  Extremities: no clubbing, cyanosis or edema  Neurological: normal speech and affect, no gross motor deficits    Labs:    COVID + 2021         Last echo personally reviewed procedure  Echocardiogram with two-dimensional, color and spectral Doppler performed.       Interpretation Summary  Global and regional left ventricular function is normal with an EF of 60-65%.  Right ventricular function, chamber size, wall motion, and thickness are  normal.  No significant valvular abnormalities.  The inferior vena cava was normal in size with preserved respiratory  variability.  No pericardial effusion is present.  This study was compared to a prior TTE from 2/3/2017. There has been no  significant change.  _____________________________________________________________________________  __        Left Ventricle  Left ventricular size is normal. Left ventricular wall thickness is normal.  Global and regional left ventricular function is normal with an EF of 60-65%.  Left ventricular diastolic function is indeterminate.     Right Ventricle  Right ventricular function, chamber size, wall motion, and thickness " are  normal.     Atria  Both atria appear normal. The atrial septum is intact as assessed by color  Doppler .     Mitral Valve  The mitral valve is normal. Trace mitral insufficiency is present.        Aortic Valve  The aortic valve is tricuspid. Trace aortic insufficiency is present.     Tricuspid Valve  Trace tricuspid insufficiency is present. The peak velocity of the tricuspid  regurgitant jet is not obtainable.     Pulmonic Valve  The pulmonic valve is normal. Trace pulmonic insufficiency is present.     Vessels  The aorta root is normal. The thoracic aorta is normal. The inferior vena cava  was normal in size with preserved respiratory variability. Estimated mean  right atrial pressure is 3 mmHg (normal).     Pericardium  No pericardial effusion is present.        Compared to Previous Study  This study was compared to a prior TTE from 2/3/2017. There has been no  significant change.     Attestation  I have personally viewed the imaging and agree with the interpretation and  report as documented by the fellow, Miki Malhotra, and/or edited by me.  _____________________________________________________________________________  __     MMode/2D Measurements & Calculations  IVSd: 0.90 cm  LVIDd: 4.7 cm  LVIDs: 2.7 cm  LVPWd: 1.2 cm  FS: 43.4 %  LV mass(C)d: 174.0 grams  LV mass(C)dI: 88.7 grams/m2  Ao root diam: 3.2 cm  asc Aorta Diam: 3.1 cm  LVOT diam: 2.2 cm  LVOT area: 3.9 cm2  LA Volume (BP): 48.8 ml     LA Volume Index (BP): 24.9 ml/m2  RWT: 0.50        Doppler Measurements & Calculations  MV E max greg: 55.5 cm/sec  MV A max greg: 78.8 cm/sec  MV E/A: 0.70  MV dec time: 0.28 sec  Ao V2 max: 147.3 cm/sec  Ao max P.0 mmHg  AI P1/2t: 824.8 msec  E/E' av.7  Lateral E/e': 5.9  Medial E/e': 7.6    zio 2017     10/2019     PFTs:     % predcite   FEV1: 75 %     DLCO 110 percent predcited      Assessment and Plan:   In summary this is a very pleasant 76-year-old man with relatively recently diagnosed  hypertension as well as intermittent shortness of breath and to also want to be seen for primary prevention of coronary disease given his strong family history.       With respect to the intermittent shortness of breath, this does not occur with exertion-he has had a reassuring echo as well as pulmonary function tests.  This does not sound cardiac in etiology and he has no functional limitation or clinical heart failure.  I do not believe this needs any further evaluation.     Regard to his blood pressure he is wearing a 24-hour monitor-should his blood pressures continue to be elevated would favor stopping hydrochlorothiazide and using a single agent such as lisinopril that could be uptitrated for monotherapy.       With regard to primary prevention-he is already on a statin and we are working on blood pressure control.  I have recommended that he see the [facility] for an individualized prevention plan-he is going to make that appointment.     We spent a long time talking about primary prevention strategies and individualized risk reduction.  He expressed understanding.     No need to return to cardiology clinic in the future.  After his [facility] visit he can be managed by primary care.     Violette Bennett MD            CC  No care team member to display

## 2022-03-16 NOTE — PATIENT INSTRUCTIONS
No need to come back to the cardiology clinic.   Would recommend going to the [facility].   This will help set up  an individualized risk reduction strategy.     Violette Bennett MD

## 2022-03-16 NOTE — LETTER
3/16/2022      RE: Luan Mitchell  [address] [place] [id number]       Dear Colleague,    Thank you for the opportunity to participate in the care of your patient, Luan Mitchell, at the [facility] [place] [facility]. Please see a copy of my visit note below.      [facility] First Time consultation Notes     HPI:     Dear Colleagues,     I had the pleasure of seeing Luan Mitchell in the  [facility] Advanced [facility] Failure Clinic on March 15, 2022.     As you know the patient is a very pleasant 76 year old male with a history of long standing systolic heart failure who presents today for consideration of advanced therapies.     [unfilled] cardiac history is as follows.     Recently, the patient reports slowing down, being less active, endorses PND/orthopnea     [unfilled] has had x  lsb of unintentional weight loss.     SOB not with exertion   BP - worried about this     PAST MEDICAL HISTORY:  Past Medical History:   Diagnosis Date     BPH (benign prostatic hyperplasia)      Essential hypertension 2018     Hyperlipidemia LDL goal <130      Pemphigus     pemphigus foliaceus     Retinal detachment     left eye- corrected with laser     Vitreous detachment     LE      FAMILY HISTORY:            SOCIAL HISTORY:    Father- CHF  in 70s   Mother:  of CV disease 80       SmokingL none   ETOH: none       CURRENT MEDICATIONS:    Current Outpatient Medications   Medication Sig Dispense Refill     ARTIFICIAL TEAR SOLUTION OP Apply  to eye.       atorvastatin (LIPITOR) 20 MG tablet Take 1 tablet (20 mg) by mouth daily 90 tablet 4     Cholecalciferol (VITAMIN D PO) Take 1 tablet by mouth daily 1000 mg       clobetasol (TEMOVATE) 0.05 % external ointment Apply topically 2 times daily For more bothersome lesions 120 g 1     clobetasol (TEMOVATE) 0.05 %  external solution Apply  topically 2 times daily as needed to the scalp. 200 mL 11     clobetasol propionate 0.05 % SHAM Apply sparingly to dry scalp 3 x weekly as needed.  Leave in place for 15 m then add water, lather and rinse 1 Bottle 5     desonide (DESOWEN) 0.05 % ointment Apply topically to affected areas twice daily as instructed. 180 g 3     finasteride (PROSCAR) 5 MG tablet Take 1 tablet (5 mg) by mouth daily 90 tablet 4     fluocinolone acetonide (DERMA SMOOTHE/FS BODY) 0.01 % external oil Apply topically 2 times daily 118.28 mL 3     hydrochlorothiazide (HYDRODIURIL) 25 MG tablet Take 1 tablet (25 mg) by mouth daily 90 tablet 4     hydrocortisone butyrate (LOCOID) 0.1 % SOLN Apply sparingly to affected area(s) of beard twice daily 180 mL 3     ketoconazole (NIZORAL) 2 % cream Apply twice daily to the nose as needed for white/scaling. 60 g 2     ketoconazole (NIZORAL) 2 % shampoo Three times per week in the shower: Lather into scalp, leave in place for 3-5 minutes, then rinse. 360 mL 12     Multiple Vitamin (MULTIVITAMIN) per tablet Take 1 tablet by mouth daily. 1 tab daily       mycophenolate (GENERIC EQUIVALENT) 500 MG tablet Take 1 tablet (500 mg) by mouth daily 360 tablet 0     tacrolimus (PROTOPIC) 0.1 % external ointment Apply topically to affected areas as instructed. 300 g 3     triamcinolone (KENALOG) 0.1 % external cream Apply topically to affected areas as instructed. 454 g 11     triamcinolone (KENALOG) 0.1 % external ointment Apply topically 2 times daily For lesions that are more mild 453.6 g 1       ROS:   Constitutional: No fever, chills, or sweats. Weight . + fatigue   ENT: No visual disturbance, ear ache, epistaxis, sore throat.   Allergies/Immunologic: Negative.   Respiratory: No cough, hemoptysis.   Cardiovascular: As per HPI.   GI: No nausea, vomiting, hematemesis, melena, or hematochezia.   : No urinary frequency, dysuria, or hematuria.   Integument: Negative.   Psychiatric:  "Negative.   Neuro: Negative.   Endocrinology: Negative.   Musculoskeletal: Negative.    EXAM:    BP (!) 144/80 (BP Location: Right arm, Patient Position: Chair, Cuff Size: Adult Regular)   Pulse 78   Ht 1.762 m (5' 9.37\")   Wt 80.1 kg (176 lb 8 oz)   SpO2 98%   BMI 25.79 kg/m      General: appears comfortable, alert and articulate  Head: normocephalic, atraumatic  Eyes: anicteric sclera, EOMI  Neck: no adenopathy  Orophyarynx: moist mucosa, no lesions, dentition intact  Heart: PMI diffuse,trace systolic murmur at LLSB, regular, S1/S2, rub, estimated JVP 9 cm   Lungs: clear, no rales or wheezing  Abdomen: soft, non-tender, bowel sounds present, no hepatosplenomegaly  Extremities: no clubbing, cyanosis or edema  Neurological: normal speech and affect, no gross motor deficits    Labs:    COVID + 2021       Procedure  Echocardiogram with two-dimensional, color and spectral Doppler performed.         Interpretation Summary  Global and regional left ventricular function is normal with an EF of 60-65%.  Right ventricular function, chamber size, wall motion, and thickness are  normal.  No significant valvular abnormalities.  The inferior vena cava was normal in size with preserved respiratory  variability.  No pericardial effusion is present.  This study was compared to a prior TTE from 2/3/2017. There has been no  significant change.  _____________________________________________________________________________  __        Left Ventricle  Left ventricular size is normal. Left ventricular wall thickness is normal.  Global and regional left ventricular function is normal with an EF of 60-65%.  Left ventricular diastolic function is indeterminate.     Right Ventricle  Right ventricular function, chamber size, wall motion, and thickness are  normal.     Atria  Both atria appear normal. The atrial septum is intact as assessed by color  Doppler .     Mitral Valve  The mitral valve is normal. Trace mitral insufficiency is " present.        Aortic Valve  The aortic valve is tricuspid. Trace aortic insufficiency is present.     Tricuspid Valve  Trace tricuspid insufficiency is present. The peak velocity of the tricuspid  regurgitant jet is not obtainable.     Pulmonic Valve  The pulmonic valve is normal. Trace pulmonic insufficiency is present.     Vessels  The aorta root is normal. The thoracic aorta is normal. The inferior vena cava  was normal in size with preserved respiratory variability. Estimated mean  right atrial pressure is 3 mmHg (normal).     Pericardium  No pericardial effusion is present.        Compared to Previous Study  This study was compared to a prior TTE from 2/3/2017. There has been no  significant change.     Attestation  I have personally viewed the imaging and agree with the interpretation and  report as documented by the fellow, Miki Malhotra, and/or edited by me.  _____________________________________________________________________________  __     MMode/2D Measurements & Calculations  IVSd: 0.90 cm  LVIDd: 4.7 cm  LVIDs: 2.7 cm  LVPWd: 1.2 cm  FS: 43.4 %  LV mass(C)d: 174.0 grams  LV mass(C)dI: 88.7 grams/m2  Ao root diam: 3.2 cm  asc Aorta Diam: 3.1 cm  LVOT diam: 2.2 cm  LVOT area: 3.9 cm2  LA Volume (BP): 48.8 ml     LA Volume Index (BP): 24.9 ml/m2  RWT: 0.50        Doppler Measurements & Calculations  MV E max greg: 55.5 cm/sec  MV A max greg: 78.8 cm/sec  MV E/A: 0.70  MV dec time: 0.28 sec  Ao V2 max: 147.3 cm/sec  Ao max P.0 mmHg  AI P1/2t: 824.8 msec  E/E' av.7  Lateral E/e': 5.9  Medial E/e': 7.6    zio 2017     10/2019     PFTs:     % predcite   FEV1: 75 %     DLCO 110 percent predcited \    Assessment and Plan:       Please do not hesitate to contact me if you have any questions/concerns.     Sincerely,     Violette Bennett MD

## 2022-03-17 ENCOUNTER — OFFICE VISIT (OUTPATIENT)
Dept: DERMATOLOGY | Facility: CLINIC | Age: 77
End: 2022-03-17
Payer: COMMERCIAL

## 2022-03-17 DIAGNOSIS — L10.2 PEMPHIGUS FOLIACEUS (H): Primary | ICD-10-CM

## 2022-03-17 DIAGNOSIS — L82.1 SEBORRHEIC KERATOSES: ICD-10-CM

## 2022-03-17 PROCEDURE — 99214 OFFICE O/P EST MOD 30 MIN: CPT | Mod: GC

## 2022-03-17 ASSESSMENT — PAIN SCALES - GENERAL: PAINLEVEL: NO PAIN (0)

## 2022-03-17 NOTE — NURSING NOTE
Chief Complaint   Patient presents with     RECHECK     Luan is here today on a recheck and said for the most part everything has been going well except there is one thing now he would like to have looked at. He has a growth on the left side of his jaw and said it wasn't there 3 months ago. It is not painful or itchy.     Pb Campbell, Paramedic

## 2022-03-17 NOTE — PROGRESS NOTES
"[facility] Dermatology Note  Encounter Date: Mar 17, 2022  Office Visit     Dermatology Problem List:  [facility]  #  Pemphigus foliaceus  - s/p shave bx 6/17/21 (L superior shoulder) and 7/26/10 (L arm)  - Last pemphigus panel (3/25/21): Dsg1 IgG 136 and Dsg2 IgG 2  - Goals of treatment per Mr. Mitchell:  \"Prevent itching and secondary infections. Not interested in complete clearance\"  - Current Tx: MMF 500mg daily              - stopped MMF 3/2019, re-started  mg PO BID on 8/13/20 but reduced to 500 mg every day on 10/21/20              - Last safety labs (3/14/22): wnl  - Adjunct Tx: clobetasol and tacrolimus ointment  # Tinea pedis  - Current Tx:  Terbinafine 1% cream  # Benign Bx  - VK, R calf, s/p shave bx 3/25/21  - mild DN, R neck, s/p shave bx 3/22/18     ____________________________________________    Assessment & Plan:   # Seborrheic keratosis, left jaw  Reassurance was provided on the benign nature of this condition.  He will monitor for changes.  We did offer liquid nitrogen therapy to this lesion however he politely declined.  Signs and symptoms of skin cancer discussed such as bleeding, tenderness.     # Pemphigus foliaceus  Still with activity noted on the scalp today but overall stable. He may be interested in rituximab to have sustained remission and will continue to consider this treatment option in the setting of current pandemic.  He otherwise has been stable on mycophenolate 500 mg daily with recent safety labs unremarkable on 3/14/2022.  Did not use Derma-Smoothe oil, recommended clobetasol ointment as needed to scalp lesions.  - Continue  mg daily   - CBC, CMP in 3 months  - Continue clobetasol    Procedures Performed:   None    Follow-up: 3 month(s) in-person, or earlier for new or changing lesions    Staff and Resident:     This patient was staffed with Dr. Nando Rooney MD  PGY-2 Dermatology  Pager: [phone]    I have personally examined this " patient and agree with the resident doctor's documentation and plan of care. I have reviewed and amended the resident's note above. The documentation accurately reflects my clinical observations, diagnoses, treatment and follow-up plans.     Isabelle Collier MD  Dermatology Staff    ____________________________________________    CC: RECHECK (Luan is here today on a recheck and said for the most part everything has been going well except there is one thing now he would like to have looked at. He has a growth on the left side of his jaw and said it wasn't there 3 months ago. It is not painful or itchy.)    HPI:  Mr. Luan Mitchell is a(n) 76 year old male who presents today as a return patient for PF.    Patient presents to clinic today for lesion of concern on his left jaw that has been present for past several months.  It does not bleed, it is nontender.  He has no personal history of skin cancer.  Regarding his pemphigus foliaceous he states that this is stable.  He does have some erosions on his vertex scalp.  He never use the Derma-Smoothe oil.  He does have clobetasol ointment and solution on hand.  He brings a discussion of rituximab for treatment of his pemphigus.  He otherwise has been feeling well without additional skin concerns.  He states he does not need refills of his medications.    Labs Reviewed:  N/A    Physical Exam:  GEN: Pleasant male, in NAD  SKIN: Focused examination of head and neck, trunk and extremities was performed.  - Scattered superficially eroded scaly plaques on scalp  - Stuck on flesh colored papule on the left jaw   - No other lesions of concern on areas examined.     Medications:  Current Outpatient Medications   Medication     ARTIFICIAL TEAR SOLUTION OP     atorvastatin (LIPITOR) 20 MG tablet     Cholecalciferol (VITAMIN D PO)     clobetasol (TEMOVATE) 0.05 % external ointment     clobetasol (TEMOVATE) 0.05 % external solution     clobetasol propionate 0.05 % SHAM     desonide  (DESOWEN) 0.05 % ointment     finasteride (PROSCAR) 5 MG tablet     hydrochlorothiazide (HYDRODIURIL) 25 MG tablet     hydrocortisone butyrate (LOCOID) 0.1 % SOLN     ketoconazole (NIZORAL) 2 % cream     ketoconazole (NIZORAL) 2 % shampoo     Multiple Vitamin (MULTIVITAMIN) per tablet     mycophenolate (GENERIC EQUIVALENT) 500 MG tablet     tacrolimus (PROTOPIC) 0.1 % external ointment     triamcinolone (KENALOG) 0.1 % external cream     triamcinolone (KENALOG) 0.1 % external ointment     fluocinolone acetonide (DERMA SMOOTHE/FS BODY) 0.01 % external oil     No current facility-administered medications for this visit.      Past Medical History:   Patient Active Problem List   Diagnosis     Pemphigus foliaceus     Hyperlipidemia LDL goal <130     BPH (benign prostatic hyperplasia)     Encounter for long-term (current) use of high-risk medication     Dermatitis, seborrheic     Lightheadedness     Age-related nuclear cataract of both eyes     SOB (shortness of breath)     Past Medical History:   Diagnosis Date     BPH (benign prostatic hyperplasia)      Essential hypertension 02/2018     Hyperlipidemia LDL goal <130      Pemphigus     pemphigus foliaceus     Retinal detachment 2009    left eye- corrected with laser     Vitreous detachment 2009    STEPHANIE       CC Referred Self, MD  No address on file on close of this encounter.

## 2022-03-18 ENCOUNTER — MYC MEDICAL ADVICE (OUTPATIENT)
Dept: SURGERY | Facility: CLINIC | Age: 77
End: 2022-03-18
Payer: COMMERCIAL

## 2022-03-21 ENCOUNTER — OFFICE VISIT (OUTPATIENT)
Dept: SURGERY | Facility: CLINIC | Age: 77
End: 2022-03-21
Payer: COMMERCIAL

## 2022-03-21 VITALS
HEIGHT: 70 IN | DIASTOLIC BLOOD PRESSURE: 80 MMHG | OXYGEN SATURATION: 99 % | WEIGHT: 179.3 LBS | BODY MASS INDEX: 25.67 KG/M2 | SYSTOLIC BLOOD PRESSURE: 152 MMHG | HEART RATE: 85 BPM

## 2022-03-21 DIAGNOSIS — K64.8 HEMORRHOID PROLAPSE: Primary | ICD-10-CM

## 2022-03-21 PROCEDURE — 46221 LIGATION OF HEMORRHOID(S): CPT | Performed by: NURSE PRACTITIONER

## 2022-03-21 PROCEDURE — 99212 OFFICE O/P EST SF 10 MIN: CPT | Mod: 25 | Performed by: NURSE PRACTITIONER

## 2022-03-21 ASSESSMENT — PAIN SCALES - GENERAL: PAINLEVEL: NO PAIN (0)

## 2022-03-21 NOTE — LETTER
"3/21/2022       RE: Luan Mitchell  [address] [place] [id number]     Dear Colleague,    Thank you for referring your patient, Luan Mitchell, to the [facility] COLON AND RECTAL SURGERY CLINIC [place] [facility]. Please see a copy of my visit note below.    Colon and Rectal Surgery Follow-Up Clinic Note    RE: Luan Mitchell  : 1945  DIMITRY: 3/21/22    Luan Mitchell is a very pleasant 76 year old male here for follow-up of hemorrhoid prolapse.    Interval history: I have seen Luan in the past for hemorrhoid prolapse with banding last about three months ago. He has been doing well and prolapse has improved but not completely resolved. No further bleeding. He is on a daily fiber supplement and has increased his water intake which has improved his stool consistency and he only rarely has hard stools. He is not on any blood thinners. He would like further banding today, if possible.    Physical Examination: Exam was chaperoned by Bety Quigley MA   BP (!) 152/80 (BP Location: Left arm, Patient Position: Sitting, Cuff Size: Adult Regular)   Pulse 85   Ht 5' 9.5\"   Wt 179 lb 4.8 oz   SpO2 99%   BMI 26.10 kg/m    General: alert, oriented, in no acute distress, sitting comfortably  HEENT: mucous membranes moist  Perianal external examination:  Perianal skin: Intact with no excoriation or lichenification.  Lesions: No evidence of an external lesion, nodularity, or induration in the perianal region.  Eversion of buttocks: There was not evidence of an anal fissure. Details: N/A.  Skin tags or external hemorrhoids: None.    Digital rectal examination: Was performed.   Sphincter tone: Good.  Palpable lesions: No.  Prostate: Normal.  Other: None..    Anoscopy: Was performed.   Hemorrhoids: Yes. Moderate sized internal hemorrhoids without active bleeding  Lesions: No.    Procedure: After discussing the risks and benefits, the patient agreed to " proceed with internal hemorrhoidal banding.    Prior to the start of the procedure and with procedural staff participation, I verbally confirmed the patient s identity using two indicators, relevant allergies, that the procedure was appropriate and matched the consent or emergent situation, and that the correct equipment/implants were available. Immediately prior to starting the procedure I conducted the Time Out with the procedural staff and re-confirmed the patient s name, procedure, and site/side. (The Joint Commission universal protocol was followed.)  Yes    Sedation (Moderate or Deep): None    A suction hemorrhoidal  was used to place a total of 2 band(s) in the left lateral and right lateral position(s).    There was no significant bleeding. The patient tolerated the procedure well.    This procedure was performed under a collaborative privileging agreement with Dr. Kaur, Chief of Colon and Rectal Surgery.      Assessment/Plan: 76 year old male with hemorrhoid prolapse. No bleeding. Symptoms have improved with banding and he wanted to have this done again today. Not on any blood thinners. Two bands were placed today. He tolerated this well. Will have him return anytime after one month for repeat banding if he has persistent symptoms. Patient's questions were answered to his stated satisfaction and he is in agreement with this plan. Normal colonoscopy in 2014 with recall 2024.      Medical history:  Past Medical History:   Diagnosis Date     BPH (benign prostatic hyperplasia)      Essential hypertension 02/2018     Hyperlipidemia LDL goal <130      Pemphigus     pemphigus foliaceus     Retinal detachment 2009    left eye- corrected with laser     Vitreous detachment 2009    LE       Surgical history:  Past Surgical History:   Procedure Laterality Date     NO HISTORY OF SURGERY  1/28/14    derm     RETINOPEXY LASER PROPHYLAXIS BREAK(S) OS (LEFT EYE)      2009 - Left eye       Problem list:    Patient  Active Problem List    Diagnosis Date Noted     SOB (shortness of breath) 06/29/2020     Priority: Medium     Age-related nuclear cataract of both eyes 08/21/2017     Priority: Medium     Lightheadedness 07/27/2015     Priority: Medium     Dermatitis, seborrheic 11/05/2013     Priority: Medium     Encounter for long-term (current) use of high-risk medication 07/24/2013     Priority: Medium     Hyperlipidemia LDL goal <130      Priority: Medium     BPH (benign prostatic hyperplasia)      Priority: Medium     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 200 mL 11     clobetasol propionate 0.05 % SHAM Apply sparingly to dry scalp 3 x weekly as needed.  Leave in place for 15 m then add water, lather and rinse 1 Bottle 5     desonide (DESOWEN) 0.05 % ointment Apply topically to affected areas twice daily as instructed. 180 g 3     finasteride (PROSCAR) 5 MG tablet Take 1 tablet (5 mg) by mouth daily 90 tablet 4     fluocinolone acetonide (DERMA SMOOTHE/FS BODY) 0.01 % external oil Apply topically 2 times daily 118.28 mL 3     hydrochlorothiazide (HYDRODIURIL) 25 MG tablet Take 1 tablet (25 mg) by mouth daily 90 tablet 4     hydrocortisone butyrate (LOCOID) 0.1 % SOLN Apply sparingly to affected area(s) of beard twice daily 180 mL 3     ketoconazole (NIZORAL) 2 % cream Apply twice daily to the nose as needed for white/scaling. 60 g 2     ketoconazole (NIZORAL) 2 % shampoo Three times per week in the  "shower: Lather into scalp, leave in place for 3-5 minutes, then rinse. 360 mL 12     Multiple Vitamin (MULTIVITAMIN) per tablet Take 1 tablet by mouth daily. 1 tab daily       mycophenolate (GENERIC EQUIVALENT) 500 MG tablet Take 1 tablet (500 mg) by mouth daily 360 tablet 0     tacrolimus (PROTOPIC) 0.1 % external ointment Apply topically to affected areas as instructed. 300 g 3     triamcinolone (KENALOG) 0.1 % external cream Apply topically to affected areas as instructed. 454 g 11     triamcinolone (KENALOG) 0.1 % external ointment Apply topically 2 times daily For lesions that are more mild 453.6 g 1       Allergies:  No Known Allergies    Family history:  Family History   Problem Relation Age of Onset     Heart Disease Mother         CHF d 80     Diabetes Mother         DM2,  age 80     Hypertension Mother      Heart Disease Father         CHF d 78     Heart Disease Paternal Uncle         CHF d 50s     Cancer No family hx of         no skin cancer     Skin Cancer No family hx of         no skin cancer     Glaucoma No family hx of      Macular Degeneration No family hx of      Melanoma No family hx of        Social history:  Social History     Tobacco Use     Smoking status: Never Smoker     Smokeless tobacco: Never Used   Substance Use Topics     Alcohol use: Not on file     Marital status: .    Nursing Notes:   Pamela Pavon, EMT  3/21/2022 10:43 AM  Signed  Chief Complaint   Patient presents with     RECHECK     Follow up hemorrhoid banding        Vitals:    22 1040   BP: (!) 152/80   BP Location: Left arm   Patient Position: Sitting   Cuff Size: Adult Regular   Pulse: 85   SpO2: 99%   Weight: 81.3 kg (179 lb 4.8 oz)   Height: 1.765 m (5' 9.5\")       Body mass index is 26.1 kg/m .      Pamela Pavon, EMT      5 minutes spent on the date of the encounter doing chart review, history and exam, documentation and further activities as noted above with an additional 5 minutes for the " procedure.      BHARTI Li  Colon and Rectal Surgery  [facility]

## 2022-03-21 NOTE — PROGRESS NOTES
"Colon and Rectal Surgery Follow-Up Clinic Note    RE: Luan Mitchell  : 1945  DIMITRY: 3/21/22    Luan Mitchell is a very pleasant 76 year old male here for follow-up of hemorrhoid prolapse.    Interval history: I have seen Luan in the past for hemorrhoid prolapse with banding last about three months ago. He has been doing well and prolapse has improved but not completely resolved. No further bleeding. He is on a daily fiber supplement and has increased his water intake which has improved his stool consistency and he only rarely has hard stools. He is not on any blood thinners. He would like further banding today, if possible.    Physical Examination: Exam was chaperoned by Bety Quigley MA   BP (!) 152/80 (BP Location: Left arm, Patient Position: Sitting, Cuff Size: Adult Regular)   Pulse 85   Ht 5' 9.5\"   Wt 179 lb 4.8 oz   SpO2 99%   BMI 26.10 kg/m    General: alert, oriented, in no acute distress, sitting comfortably  HEENT: mucous membranes moist  Perianal external examination:  Perianal skin: Intact with no excoriation or lichenification.  Lesions: No evidence of an external lesion, nodularity, or induration in the perianal region.  Eversion of buttocks: There was not evidence of an anal fissure. Details: N/A.  Skin tags or external hemorrhoids: None.    Digital rectal examination: Was performed.   Sphincter tone: Good.  Palpable lesions: No.  Prostate: Normal.  Other: None..    Anoscopy: Was performed.   Hemorrhoids: Yes. Moderate sized internal hemorrhoids without active bleeding  Lesions: No.    Procedure: After discussing the risks and benefits, the patient agreed to proceed with internal hemorrhoidal banding.    Prior to the start of the procedure and with procedural staff participation, I verbally confirmed the patient s identity using two indicators, relevant allergies, that the procedure was appropriate and matched the consent or emergent situation, and that the correct equipment/implants " were available. Immediately prior to starting the procedure I conducted the Time Out with the procedural staff and re-confirmed the patient s name, procedure, and site/side. (The Joint Commission universal protocol was followed.)  Yes    Sedation (Moderate or Deep): None    A suction hemorrhoidal  was used to place a total of 2 band(s) in the left lateral and right lateral position(s).    There was no significant bleeding. The patient tolerated the procedure well.    This procedure was performed under a collaborative privileging agreement with Dr. Kaur, Chief of Colon and Rectal Surgery.      Assessment/Plan: 76 year old male with hemorrhoid prolapse. No bleeding. Symptoms have improved with banding and he wanted to have this done again today. Not on any blood thinners. Two bands were placed today. He tolerated this well. Will have him return anytime after one month for repeat banding if he has persistent symptoms. Patient's questions were answered to his stated satisfaction and he is in agreement with this plan. Normal colonoscopy in 2014 with recall 2024.      Medical history:  Past Medical History:   Diagnosis Date     BPH (benign prostatic hyperplasia)      Essential hypertension 02/2018     Hyperlipidemia LDL goal <130      Pemphigus     pemphigus foliaceus     Retinal detachment 2009    left eye- corrected with laser     Vitreous detachment 2009    LE       Surgical history:  Past Surgical History:   Procedure Laterality Date     NO HISTORY OF SURGERY  1/28/14    derm     RETINOPEXY LASER PROPHYLAXIS BREAK(S) OS (LEFT EYE)      2009 - Left eye       Problem list:    Patient Active Problem List    Diagnosis Date Noted     SOB (shortness of breath) 06/29/2020     Priority: Medium     Age-related nuclear cataract of both eyes 08/21/2017     Priority: Medium     Lightheadedness 07/27/2015     Priority: Medium     Dermatitis, seborrheic 11/05/2013     Priority: Medium     Encounter for long-term (current)  use of high-risk medication 07/24/2013     Priority: Medium     Hyperlipidemia LDL goal <130      Priority: Medium     BPH (benign prostatic hyperplasia)      Priority: Medium     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 200 mL 11     clobetasol propionate 0.05 % SHAM Apply sparingly to dry scalp 3 x weekly as needed.  Leave in place for 15 m then add water, lather and rinse 1 Bottle 5     desonide (DESOWEN) 0.05 % ointment Apply topically to affected areas twice daily as instructed. 180 g 3     finasteride (PROSCAR) 5 MG tablet Take 1 tablet (5 mg) by mouth daily 90 tablet 4     fluocinolone acetonide (DERMA SMOOTHE/FS BODY) 0.01 % external oil Apply topically 2 times daily 118.28 mL 3     hydrochlorothiazide (HYDRODIURIL) 25 MG tablet Take 1 tablet (25 mg) by mouth daily 90 tablet 4     hydrocortisone butyrate (LOCOID) 0.1 % SOLN Apply sparingly to affected area(s) of beard twice daily 180 mL 3     ketoconazole (NIZORAL) 2 % cream Apply twice daily to the nose as needed for white/scaling. 60 g 2     ketoconazole (NIZORAL) 2 % shampoo Three times per week in the shower: Lather into scalp, leave in place for 3-5 minutes, then rinse. 360 mL 12     Multiple Vitamin (MULTIVITAMIN) per tablet Take 1 tablet by mouth daily. 1 tab daily       mycophenolate (GENERIC EQUIVALENT) 500 MG tablet Take 1 tablet (500 mg) by mouth daily 360 tablet 0     tacrolimus (PROTOPIC) 0.1 % external ointment Apply  "topically to affected areas as instructed. 300 g 3     triamcinolone (KENALOG) 0.1 % external cream Apply topically to affected areas as instructed. 454 g 11     triamcinolone (KENALOG) 0.1 % external ointment Apply topically 2 times daily For lesions that are more mild 453.6 g 1       Allergies:  No Known Allergies    Family history:  Family History   Problem Relation Age of Onset     Heart Disease Mother         CHF d 80     Diabetes Mother         DM2,  age 80     Hypertension Mother      Heart Disease Father         CHF d 78     Heart Disease Paternal Uncle         CHF d 50s     Cancer No family hx of         no skin cancer     Skin Cancer No family hx of         no skin cancer     Glaucoma No family hx of      Macular Degeneration No family hx of      Melanoma No family hx of        Social history:  Social History     Tobacco Use     Smoking status: Never Smoker     Smokeless tobacco: Never Used   Substance Use Topics     Alcohol use: Not on file     Marital status: .    Nursing Notes:   Pamela Pavon, EMT  3/21/2022 10:43 AM  Signed  Chief Complaint   Patient presents with     RECHECK     Follow up hemorrhoid banding        Vitals:    22 1040   BP: (!) 152/80   BP Location: Left arm   Patient Position: Sitting   Cuff Size: Adult Regular   Pulse: 85   SpO2: 99%   Weight: 81.3 kg (179 lb 4.8 oz)   Height: 1.765 m (5' 9.5\")       Body mass index is 26.1 kg/m .                          Pamela Pavon, EMT         5 minutes spent on the date of the encounter doing chart review, history and exam, documentation and further activities as noted above with an additional 5 minutes for the procedure.    Mayela Esquivel, NP-C  Colon and Rectal Surgery  [facility]  "

## 2022-03-21 NOTE — NURSING NOTE
"Chief Complaint   Patient presents with     RECHECK     Follow up hemorrhoid banding        Vitals:    03/21/22 1040   BP: (!) 152/80   BP Location: Left arm   Patient Position: Sitting   Cuff Size: Adult Regular   Pulse: 85   SpO2: 99%   Weight: 81.3 kg (179 lb 4.8 oz)   Height: 1.765 m (5' 9.5\")       Body mass index is 26.1 kg/m .                          Pamela Pavon, EMT    "

## 2022-05-13 ENCOUNTER — PRE VISIT (OUTPATIENT)
Dept: UROLOGY | Facility: CLINIC | Age: 77
End: 2022-05-13
Payer: COMMERCIAL

## 2022-05-13 NOTE — TELEPHONE ENCOUNTER
Reason for visit: follow up     Relevant information: BPH    Records/imaging/labs/orders: in epic    Pt called: no    At Rooming: normal

## 2022-05-26 ENCOUNTER — OFFICE VISIT (OUTPATIENT)
Dept: UROLOGY | Facility: CLINIC | Age: 77
End: 2022-05-26
Payer: COMMERCIAL

## 2022-05-26 VITALS — DIASTOLIC BLOOD PRESSURE: 76 MMHG | SYSTOLIC BLOOD PRESSURE: 121 MMHG | HEART RATE: 82 BPM

## 2022-05-26 DIAGNOSIS — N40.1 BENIGN PROSTATIC HYPERPLASIA WITH LOWER URINARY TRACT SYMPTOMS, SYMPTOM DETAILS UNSPECIFIED: Primary | ICD-10-CM

## 2022-05-26 PROCEDURE — 99212 OFFICE O/P EST SF 10 MIN: CPT | Performed by: PHYSICIAN ASSISTANT

## 2022-05-26 ASSESSMENT — PAIN SCALES - GENERAL: PAINLEVEL: NO PAIN (0)

## 2022-05-26 NOTE — NURSING NOTE
Chief Complaint   Patient presents with     Follow Up     PSA       Blood pressure 121/76, pulse 82. There is no height or weight on file to calculate BMI.    Patient Active Problem List   Diagnosis     Pemphigus foliaceus     Hyperlipidemia LDL goal <130     BPH (benign prostatic hyperplasia)     Encounter for long-term (current) use of high-risk medication     Dermatitis, seborrheic     Lightheadedness     Age-related nuclear cataract of both eyes     SOB (shortness of breath)       No Known Allergies    Current Outpatient Medications   Medication Sig Dispense Refill     ARTIFICIAL TEAR SOLUTION OP Apply to eye daily        atorvastatin (LIPITOR) 20 MG tablet Take 1 tablet (20 mg) by mouth daily 90 tablet 4     Cholecalciferol (VITAMIN D PO) Take 1 tablet by mouth daily 1000 mg       clobetasol (TEMOVATE) 0.05 % external ointment Apply topically 2 times daily For more bothersome lesions 120 g 1     clobetasol (TEMOVATE) 0.05 % external solution Apply  topically 2 times daily as needed to the scalp. 200 mL 11     clobetasol propionate 0.05 % SHAM Apply sparingly to dry scalp 3 x weekly as needed.  Leave in place for 15 m then add water, lather and rinse 1 Bottle 5     desonide (DESOWEN) 0.05 % ointment Apply topically to affected areas twice daily as instructed. 180 g 3     finasteride (PROSCAR) 5 MG tablet Take 1 tablet (5 mg) by mouth daily 90 tablet 4     fluocinolone acetonide (DERMA SMOOTHE/FS BODY) 0.01 % external oil Apply topically 2 times daily 118.28 mL 3     hydrochlorothiazide (HYDRODIURIL) 25 MG tablet Take 1 tablet (25 mg) by mouth daily 90 tablet 4     hydrocortisone butyrate (LOCOID) 0.1 % SOLN Apply sparingly to affected area(s) of beard twice daily 180 mL 3     ketoconazole (NIZORAL) 2 % cream Apply twice daily to the nose as needed for white/scaling. 60 g 2     ketoconazole (NIZORAL) 2 % shampoo Three times per week in the shower: Lather into scalp, leave in place for 3-5 minutes, then rinse. 360  mL 12     Multiple Vitamin (MULTIVITAMIN) per tablet Take 1 tablet by mouth daily. 1 tab daily       mycophenolate (GENERIC EQUIVALENT) 500 MG tablet Take 1 tablet (500 mg) by mouth daily 360 tablet 0     tacrolimus (PROTOPIC) 0.1 % external ointment Apply topically to affected areas as instructed. 300 g 3     triamcinolone (KENALOG) 0.1 % external cream Apply topically to affected areas as instructed. 454 g 11     triamcinolone (KENALOG) 0.1 % external ointment Apply topically 2 times daily For lesions that are more mild 453.6 g 1       Social History     Tobacco Use     Smoking status: Never Smoker     Smokeless tobacco: Never Used       Yunior Dan  5/26/2022  11:24 AM

## 2022-05-26 NOTE — LETTER
5/26/2022       RE: Luan Mitchell  [address] [id number]     Dear Colleague,    Thank you for referring your patient, Luan Mitchell, to the [facility] [place] [facility]. Please see a copy of my visit note below.    HPI: Mr. Luan Mitchell is a 77 year old year old male presenting today for evaluation of chief complaint(s): Follow Up (PSA)    Today, he is unaccompanied.      He has PMH significant for HTN, HLD, BPH, pemphigus (on mycophenolate), abnormal LEAH s/p prostate MRI on 7/6/18 showing a PIRADS 3 lesion (intermediate probability).  For this he was subsequently seen by Dr. Gentile, last on 6/29/20 with a PSA of 1.91ug/L (3.82ug/L adjusted for finasteride).  At that time it was recommended that he continue on finasteride with continued observation of PSAs. Has had 4 prostate biopsies in the distant past, all negative.      6/1/21 - last seen by me.  PSA was stable at 1.95ug/L (3.90 adjusted for finasteride).  He had no urinary concerns and wanted a finasteride refill.  His LEAH revealed a small prostate with firmness left apex and asymmetry R>L, no nodules.     Today returns with a PSA of 2.29ug/L (4.6 adjusted for finasteride).  Very concerned about the increase  No urinary concerns.    Health has otherwise been stable.     Current Outpatient Medications   Medication Sig Dispense Refill     ARTIFICIAL TEAR SOLUTION OP Apply to eye daily        atorvastatin (LIPITOR) 20 MG tablet Take 1 tablet (20 mg) by mouth daily 90 tablet 4     Cholecalciferol (VITAMIN D PO) Take 1 tablet by mouth daily 1000 mg       clobetasol (TEMOVATE) 0.05 % external ointment Apply topically 2 times daily For more bothersome lesions 120 g 1     clobetasol (TEMOVATE) 0.05 % external solution Apply  topically 2 times daily as needed to the scalp. 200 mL 11     clobetasol propionate 0.05 % SHAM Apply sparingly to dry scalp 3 x weekly as needed.   Leave in place for 15 m then add water, lather and rinse 1 Bottle 5     desonide (DESOWEN) 0.05 % ointment Apply topically to affected areas twice daily as instructed. 180 g 3     finasteride (PROSCAR) 5 MG tablet Take 1 tablet (5 mg) by mouth daily 90 tablet 4     fluocinolone acetonide (DERMA SMOOTHE/FS BODY) 0.01 % external oil Apply topically 2 times daily 118.28 mL 3     hydrochlorothiazide (HYDRODIURIL) 25 MG tablet Take 1 tablet (25 mg) by mouth daily 90 tablet 4     hydrocortisone butyrate (LOCOID) 0.1 % SOLN Apply sparingly to affected area(s) of beard twice daily 180 mL 3     ketoconazole (NIZORAL) 2 % cream Apply twice daily to the nose as needed for white/scaling. 60 g 2     ketoconazole (NIZORAL) 2 % shampoo Three times per week in the shower: Lather into scalp, leave in place for 3-5 minutes, then rinse. 360 mL 12     Multiple Vitamin (MULTIVITAMIN) per tablet Take 1 tablet by mouth daily. 1 tab daily       mycophenolate (GENERIC EQUIVALENT) 500 MG tablet Take 1 tablet (500 mg) by mouth daily 360 tablet 0     tacrolimus (PROTOPIC) 0.1 % external ointment Apply topically to affected areas as instructed. 300 g 3     triamcinolone (KENALOG) 0.1 % external cream Apply topically to affected areas as instructed. 454 g 11     triamcinolone (KENALOG) 0.1 % external ointment Apply topically 2 times daily For lesions that are more mild 453.6 g 1       ALLERGIES: Patient has no known allergies.      REVIEW OF SYSTEMS:  As above in HPI    GENERAL PHYSICAL EXAM:   Vitals: There were no vitals taken for this visit.  There is no height or weight on file to calculate BMI.    GENERAL: Well groomed, well developed, well nourished male in NAD.  NEURO: Alert and oriented x 3.  PSYCH: Normal mood and affect, pleasant and agreeable during interview and exam.     LEAH:      normal rectal tone, large firm amount of stool in the rectal vault.      Medium sized prostate, without tenderness, nodules or asymmetry.    RADIOLOGY:  The following tests were reviewed:   7/6/18 - prostate mri:   IMPRESSION:   1. Based on the most suspicious abnormality, this exam is  characterized as PIRADS 3 - Intermediate probability.  The presence of  clinically significant cancer is equivocal.  The most suspicious  abnormality is a ill-defined 1.3 cm region in the left mid gland  transitional zone from 2-3 o'clock, and there is no evidence of  extracapsular extension.  2. No evidence of extraprostatic malignancy. No suspicious adenopathy  or evidence of pelvic metastases.    LABS: The last test results for Mr. Luan Mitchell were reviewed:  PSA -   Lab Results   Component Value Date    PSA 2.29 03/14/2022    PSA 1.95 03/16/2021    PSA 1.91 06/12/2020    PSA 1.86 06/24/2019    PSA 1.82 03/14/2019    PSA 2.05 09/25/2018    PSA 2.10 06/21/2018    PSA 2.42 02/05/2018    PSA 2.09 01/10/2017    PSA 1.96 02/14/2011    PSA 2.47 02/25/2010     BMP -   Recent Labs   Lab Test 03/14/22  1103 12/16/21  1003 09/16/21  0837    142 142   POTASSIUM 3.8 3.9 3.9   CHLORIDE 105 105 104   CO2 28 30 31   BUN 19 17 16   CR 1.09 0.92 1.11   * 103* 108*   JOSHUA 9.4 9.6 9.8       CBC -   Recent Labs   Lab Test 03/14/22  1103 12/16/21  1003 09/16/21  0837   WBC 6.9 5.6 7.6   HGB 15.6 14.6 15.6    157 152       ASSESSMENT:   1) BPH with abnormal LEAH, stable PSA    PLAN:   - Reviewed PCPT nomogram - 75% risk of no malignancy on biopsy, 15% low grade, 10% high grade.  Pt continues to be very concerned that he could be in the 10% with prostate cancer requiring treatment. He would absolutely want to pursue additional imaging, biopsy and treatment if cancer were suspected/diagnosed.     - Finasteride refilled #90 with 4 refills.   - Recheck PSA in 3 months - return with a video visit.      VISIT DURATION: 19 minutes (11:49 - 12:03 + 5 minutes documentation on DOS)    Carolynn Carroll PA-C  Department of Urologic Surgery

## 2022-05-26 NOTE — PROGRESS NOTES
HPI: Mr. Luan Mitchell is a 77 year old year old male presenting today for evaluation of chief complaint(s): Follow Up (PSA)    Today, he is unaccompanied.      He has PMH significant for HTN, HLD, BPH, pemphigus (on mycophenolate), abnormal LEAH s/p prostate MRI on 7/6/18 showing a PIRADS 3 lesion (intermediate probability).  For this he was subsequently seen by Dr. Gentile, last on 6/29/20 with a PSA of 1.91ug/L (3.82ug/L adjusted for finasteride).  At that time it was recommended that he continue on finasteride with continued observation of PSAs. Has had 4 prostate biopsies in the distant past, all negative.      6/1/21 - last seen by me.  PSA was stable at 1.95ug/L (3.90 adjusted for finasteride).  He had no urinary concerns and wanted a finasteride refill.  His LEAH revealed a small prostate with firmness left apex and asymmetry R>L, no nodules.     Today returns with a PSA of 2.29ug/L (4.6 adjusted for finasteride).  Very concerned about the increase  No urinary concerns.    Health has otherwise been stable.     Current Outpatient Medications   Medication Sig Dispense Refill     ARTIFICIAL TEAR SOLUTION OP Apply to eye daily        atorvastatin (LIPITOR) 20 MG tablet Take 1 tablet (20 mg) by mouth daily 90 tablet 4     Cholecalciferol (VITAMIN D PO) Take 1 tablet by mouth daily 1000 mg       clobetasol (TEMOVATE) 0.05 % external ointment Apply topically 2 times daily For more bothersome lesions 120 g 1     clobetasol (TEMOVATE) 0.05 % external solution Apply  topically 2 times daily as needed to the scalp. 200 mL 11     clobetasol propionate 0.05 % SHAM Apply sparingly to dry scalp 3 x weekly as needed.  Leave in place for 15 m then add water, lather and rinse 1 Bottle 5     desonide (DESOWEN) 0.05 % ointment Apply topically to affected areas twice daily as instructed. 180 g 3     finasteride (PROSCAR) 5 MG tablet Take 1 tablet (5 mg) by mouth daily 90 tablet 4     fluocinolone acetonide (DERMA SMOOTHE/FS BODY)  0.01 % external oil Apply topically 2 times daily 118.28 mL 3     hydrochlorothiazide (HYDRODIURIL) 25 MG tablet Take 1 tablet (25 mg) by mouth daily 90 tablet 4     hydrocortisone butyrate (LOCOID) 0.1 % SOLN Apply sparingly to affected area(s) of beard twice daily 180 mL 3     ketoconazole (NIZORAL) 2 % cream Apply twice daily to the nose as needed for white/scaling. 60 g 2     ketoconazole (NIZORAL) 2 % shampoo Three times per week in the shower: Lather into scalp, leave in place for 3-5 minutes, then rinse. 360 mL 12     Multiple Vitamin (MULTIVITAMIN) per tablet Take 1 tablet by mouth daily. 1 tab daily       mycophenolate (GENERIC EQUIVALENT) 500 MG tablet Take 1 tablet (500 mg) by mouth daily 360 tablet 0     tacrolimus (PROTOPIC) 0.1 % external ointment Apply topically to affected areas as instructed. 300 g 3     triamcinolone (KENALOG) 0.1 % external cream Apply topically to affected areas as instructed. 454 g 11     triamcinolone (KENALOG) 0.1 % external ointment Apply topically 2 times daily For lesions that are more mild 453.6 g 1       ALLERGIES: Patient has no known allergies.      REVIEW OF SYSTEMS:  As above in HPI    GENERAL PHYSICAL EXAM:   Vitals: There were no vitals taken for this visit.  There is no height or weight on file to calculate BMI.    GENERAL: Well groomed, well developed, well nourished male in NAD.  NEURO: Alert and oriented x 3.  PSYCH: Normal mood and affect, pleasant and agreeable during interview and exam.     LEAH:      normal rectal tone, large firm amount of stool in the rectal vault.      Medium sized prostate, without tenderness, nodules or asymmetry.    RADIOLOGY: The following tests were reviewed:   7/6/18 - prostate mri:   IMPRESSION:   1. Based on the most suspicious abnormality, this exam is  characterized as PIRADS 3 - Intermediate probability.  The presence of  clinically significant cancer is equivocal.  The most suspicious  abnormality is a ill-defined 1.3 cm region  in the left mid gland  transitional zone from 2-3 o'clock, and there is no evidence of  extracapsular extension.  2. No evidence of extraprostatic malignancy. No suspicious adenopathy  or evidence of pelvic metastases.    LABS: The last test results for Mr. Luan Mitchell were reviewed:  PSA -   Lab Results   Component Value Date    PSA 2.29 03/14/2022    PSA 1.95 03/16/2021    PSA 1.91 06/12/2020    PSA 1.86 06/24/2019    PSA 1.82 03/14/2019    PSA 2.05 09/25/2018    PSA 2.10 06/21/2018    PSA 2.42 02/05/2018    PSA 2.09 01/10/2017    PSA 1.96 02/14/2011    PSA 2.47 02/25/2010     BMP -   Recent Labs   Lab Test 03/14/22  1103 12/16/21  1003 09/16/21  0837    142 142   POTASSIUM 3.8 3.9 3.9   CHLORIDE 105 105 104   CO2 28 30 31   BUN 19 17 16   CR 1.09 0.92 1.11   * 103* 108*   JOSHUA 9.4 9.6 9.8       CBC -   Recent Labs   Lab Test 03/14/22  1103 12/16/21  1003 09/16/21  0837   WBC 6.9 5.6 7.6   HGB 15.6 14.6 15.6    157 152       ASSESSMENT:   1) BPH with abnormal LEAH, stable PSA    PLAN:   - Reviewed PCPT nomogram - 75% risk of no malignancy on biopsy, 15% low grade, 10% high grade.  Pt continues to be very concerned that he could be in the 10% with prostate cancer requiring treatment. He would absolutely want to pursue additional imaging, biopsy and treatment if cancer were suspected/diagnosed.     - Finasteride refilled #90 with 4 refills.   - Recheck PSA in 3 months - return with a video visit.      VISIT DURATION: 19 minutes (11:49 - 12:03 + 5 minutes documentation on DOS)    Carolynn Carroll PA-C  Department of Urologic Surgery

## 2022-05-26 NOTE — PATIENT INSTRUCTIONS
PLAN:   - Reviewed PCPT nomogram - 75% risk of no malignancy on biopsy, 15% low grade, 10% high grade  - Finasteride refilled #90 with 4 refills.   - Recheck PSA in 3 months - return with a video visit.      [facility]

## 2022-06-16 ENCOUNTER — OFFICE VISIT (OUTPATIENT)
Dept: DERMATOLOGY | Facility: CLINIC | Age: 77
End: 2022-06-16
Payer: COMMERCIAL

## 2022-06-16 DIAGNOSIS — Z79.899 ENCOUNTER FOR LONG-TERM (CURRENT) USE OF HIGH-RISK MEDICATION: ICD-10-CM

## 2022-06-16 DIAGNOSIS — D22.9 MULTIPLE NEVI: ICD-10-CM

## 2022-06-16 DIAGNOSIS — L10.2 PEMPHIGUS FOLIACEUS (H): Primary | ICD-10-CM

## 2022-06-16 PROCEDURE — 99214 OFFICE O/P EST MOD 30 MIN: CPT | Mod: GC | Performed by: STUDENT IN AN ORGANIZED HEALTH CARE EDUCATION/TRAINING PROGRAM

## 2022-06-16 RX ORDER — MYCOPHENOLATE MOFETIL 500 MG/1
500 TABLET ORAL
Qty: 360 TABLET | Refills: 0 | Status: SHIPPED | OUTPATIENT
Start: 2022-06-16 | End: 2023-06-08

## 2022-06-16 ASSESSMENT — PAIN SCALES - GENERAL: PAINLEVEL: NO PAIN (0)

## 2022-06-16 NOTE — PROGRESS NOTES
"[facility] Dermatology Note  Encounter Date: Jun 16, 2022  Office Visit     Dermatology Problem List:  Aroldo/Gaston CC  #  Pemphigus foliaceus  - s/p shave bx 6/17/21 (L superior shoulder) and 7/26/10 (L arm)  - Last pemphigus panel (3/25/21): Dsg1 IgG 136 and Dsg2 IgG 2  - Goals of treatment per Mr. Mitchell:  \"Prevent itching and secondary infections. Not interested in complete clearance\"  - Current Tx: MMF 500mg daily              - stopped MMF 3/2019, re-started  mg PO BID on 8/13/20 but reduced to 500 mg every day on 10/21/20              - Last safety labs (3/14/22): wnl; due 6/30/22  - Adjunct Tx: clobetasol and tacrolimus ointment  # Tinea pedis  - Current Tx:  Terbinafine 1% cream  # Benign Bx  - VK, R calf, s/p shave bx 3/25/21  - mild DN, R neck, s/p shave bx 3/22/18    ____________________________________________    Assessment & Plan:     # Pemphigus foliaceus  Remains active. Again discussed rituxan. Not interested with pandemic. Will continue low dose mycophenolate. Due for labs; will collect at next scheduled draw.  - Continue  mg qday  - Continue clobetasol oint prn  - Due for CBC, LFTs    #Clinically banal nevi - posterior neck  - Photodocumentation taken to assure stability at next visit       Procedures Performed:   None    Follow-up: 3 month(s) in-person, or earlier for new or changing lesions    Staff and Resident:     This patient was staffed with Dr. Collier.    Tahir Kendrick MD  Internal Medicine - Dermatology PGY 4    I have personally examined this patient and agree with the resident doctor's documentation and plan of care. I have reviewed and amended the resident's note above. The documentation accurately reflects my clinical observations, diagnoses, treatment and follow-up plans.     Isabelle Collier MD  Dermatology Staff  ____________________________________________    CC: Derm Problem (Pemphigus foliaceus )    HPI:   Luan MAJOR Stephen is a(n) 77 year old " male who presents today as a return patient for pemphigus foliaceous  - Remains active but itch is overall much improved  - Still COVID averse and would like to hold off on rituxan  - No adverse effects with MMF and continues low dose  - Patient is otherwise feeling well, without additional skin concerns.    Labs Reviewed:  03/2022 CBC, LFT wnl    Physical Exam:  GEN: Pleasant male, in NAD  SKIN: Focused examination of trunk, extremities and scalp was performed.  - Erythematous plaques w/ cornflake scale on trunk  - Erythematous coalescing scaly papules on scalp  - No other lesions of concern on areas examined.  - R posterior neck with approx 5 mm dark brown macule with globular network with similar approx 4 mm macule on the R posterior lateral neck.      Medications:  Current Outpatient Medications   Medication     ARTIFICIAL TEAR SOLUTION OP     atorvastatin (LIPITOR) 20 MG tablet     Cholecalciferol (VITAMIN D PO)     clobetasol (TEMOVATE) 0.05 % external ointment     clobetasol (TEMOVATE) 0.05 % external solution     clobetasol propionate 0.05 % SHAM     desonide (DESOWEN) 0.05 % ointment     finasteride (PROSCAR) 5 MG tablet     hydrochlorothiazide (HYDRODIURIL) 25 MG tablet     hydrocortisone butyrate (LOCOID) 0.1 % SOLN     ketoconazole (NIZORAL) 2 % cream     ketoconazole (NIZORAL) 2 % shampoo     Multiple Vitamin (MULTIVITAMIN) per tablet     tacrolimus (PROTOPIC) 0.1 % external ointment     triamcinolone (KENALOG) 0.1 % external cream     triamcinolone (KENALOG) 0.1 % external ointment     fluocinolone acetonide (DERMA SMOOTHE/FS BODY) 0.01 % external oil     mycophenolate (GENERIC EQUIVALENT) 500 MG tablet     No current facility-administered medications for this visit.      Past Medical History:   Patient Active Problem List   Diagnosis     Pemphigus foliaceus     Hyperlipidemia LDL goal <130     BPH (benign prostatic hyperplasia)     Encounter for long-term (current) use of high-risk medication      Dermatitis, seborrheic     Lightheadedness     Age-related nuclear cataract of both eyes     SOB (shortness of breath)     Past Medical History:   Diagnosis Date     BPH (benign prostatic hyperplasia)      Essential hypertension 02/2018     Hyperlipidemia LDL goal <130      Pemphigus     pemphigus foliaceus     Retinal detachment 2009    left eye- corrected with laser     Vitreous detachment 2009    STEPHANIE       CC Referred Self, MD  No address on file on close of this encounter.

## 2022-06-16 NOTE — LETTER
"6/16/2022       RE: Luan Mitchell  [address] [address]     Dear Colleague,    Thank you for referring your patient, Luan Mitchell, to the [facility] [place] [facility]. Please see a copy of my visit note below.    [facility] Dermatology Note  Encounter Date: Jun 16, 2022  Office Visit     Dermatology Problem List:  Aroldo/Gaston CC  #  Pemphigus foliaceus  - s/p shave bx 6/17/21 (L superior shoulder) and 7/26/10 (L arm)  - Last pemphigus panel (3/25/21): Dsg1 IgG 136 and Dsg2 IgG 2  - Goals of treatment per Mr. Mitchell:  \"Prevent itching and secondary infections. Not interested in complete clearance\"  - Current Tx: MMF 500mg daily              - stopped MMF 3/2019, re-started  mg PO BID on 8/13/20 but reduced to 500 mg every day on 10/21/20              - Last safety labs (3/14/22): wnl; due 6/30/22  - Adjunct Tx: clobetasol and tacrolimus ointment  # Tinea pedis  - Current Tx:  Terbinafine 1% cream  # Benign Bx  - VK, R calf, s/p shave bx 3/25/21  - mild DN, R neck, s/p shave bx 3/22/18    ____________________________________________    Assessment & Plan:     # Pemphigus foliaceus  Remains active. Again discussed rituxan. Not interested with pandemic. Will continue low dose mycophenolate. Due for labs; will collect at next scheduled draw.  - Continue  mg qday  - Continue clobetasol oint prn  - Due for CBC, LFTs    #Clinically banal nevi - posterior neck  - Photodocumentation taken to assure stability at next visit       Procedures Performed:   None    Follow-up: 3 month(s) in-person, or earlier for new or changing lesions    Staff and Resident:     This patient was staffed with Dr. Collier.    Tahir Kendrick MD  Internal Medicine - Dermatology PGY 4    I have personally examined this patient and agree with the resident doctor's documentation and plan of care. I have reviewed and " amended the resident's note above. The documentation accurately reflects my clinical observations, diagnoses, treatment and follow-up plans.     Isabelle Collier MD  Dermatology Staff  ____________________________________________    CC: Derm Problem (Pemphigus foliaceus )    HPI:  Mr. Luan Mitchell is a(n) 77 year old male who presents today as a return patient for pemphigus foliaceous  - Remains active but itch is overall much improved  - Still COVID averse and would like to hold off on rituxan  - No adverse effects with MMF and continues low dose  - Patient is otherwise feeling well, without additional skin concerns.    Labs Reviewed:  03/2022 CBC, LFT wnl    Physical Exam:  GEN: Pleasant male, in NAD  SKIN: Focused examination of trunk, extremities and scalp was performed.  - Erythematous plaques w/ cornflake scale on trunk  - Erythematous coalescing scaly papules on scalp  - No other lesions of concern on areas examined.  - R posterior neck with approx 5 mm dark brown macule with globular network with similar approx 4 mm macule on the R posterior lateral neck.      Medications:  Current Outpatient Medications   Medication     ARTIFICIAL TEAR SOLUTION OP     atorvastatin (LIPITOR) 20 MG tablet     Cholecalciferol (VITAMIN D PO)     clobetasol (TEMOVATE) 0.05 % external ointment     clobetasol (TEMOVATE) 0.05 % external solution     clobetasol propionate 0.05 % SHAM     desonide (DESOWEN) 0.05 % ointment     finasteride (PROSCAR) 5 MG tablet     hydrochlorothiazide (HYDRODIURIL) 25 MG tablet     hydrocortisone butyrate (LOCOID) 0.1 % SOLN     ketoconazole (NIZORAL) 2 % cream     ketoconazole (NIZORAL) 2 % shampoo     Multiple Vitamin (MULTIVITAMIN) per tablet     tacrolimus (PROTOPIC) 0.1 % external ointment     triamcinolone (KENALOG) 0.1 % external cream     triamcinolone (KENALOG) 0.1 % external ointment     fluocinolone acetonide (DERMA SMOOTHE/FS BODY) 0.01 % external oil     mycophenolate (GENERIC  EQUIVALENT) 500 MG tablet     No current facility-administered medications for this visit.      Past Medical History:   Patient Active Problem List   Diagnosis     Pemphigus foliaceus     Hyperlipidemia LDL goal <130     BPH (benign prostatic hyperplasia)     Encounter for long-term (current) use of high-risk medication     Dermatitis, seborrheic     Lightheadedness     Age-related nuclear cataract of both eyes     SOB (shortness of breath)     Past Medical History:   Diagnosis Date     BPH (benign prostatic hyperplasia)      Essential hypertension 02/2018     Hyperlipidemia LDL goal <130      Pemphigus     pemphigus foliaceus     Retinal detachment 2009    left eye- corrected with laser     Vitreous detachment 2009    LE       CC Referred Self, MD  No address on file on close of this encounter.

## 2022-06-19 ENCOUNTER — HEALTH MAINTENANCE LETTER (OUTPATIENT)
Age: 77
End: 2022-06-19

## 2022-06-23 DIAGNOSIS — E78.5 HYPERLIPIDEMIA LDL GOAL <130: Primary | ICD-10-CM

## 2022-06-23 ASSESSMENT — ENCOUNTER SYMPTOMS
DYSPNEA ON EXERTION: 0
DOUBLE VISION: 0
NAIL CHANGES: 0
POSTURAL DYSPNEA: 0
COUGH DISTURBING SLEEP: 0
EYE WATERING: 0
WHEEZING: 0
POOR WOUND HEALING: 0
SNORES LOUDLY: 0
EYE REDNESS: 0
HEMOPTYSIS: 0
COUGH: 0
SHORTNESS OF BREATH: 1
EYE PAIN: 0
EYE IRRITATION: 1
SKIN CHANGES: 0
SPUTUM PRODUCTION: 0

## 2022-06-30 ENCOUNTER — LAB (OUTPATIENT)
Dept: LAB | Facility: CLINIC | Age: 77
End: 2022-06-30

## 2022-06-30 ENCOUNTER — OFFICE VISIT (OUTPATIENT)
Dept: CARDIOLOGY | Facility: CLINIC | Age: 77
End: 2022-06-30
Payer: COMMERCIAL

## 2022-06-30 VITALS
DIASTOLIC BLOOD PRESSURE: 70 MMHG | HEIGHT: 69 IN | WEIGHT: 173.1 LBS | HEART RATE: 72 BPM | SYSTOLIC BLOOD PRESSURE: 126 MMHG | BODY MASS INDEX: 25.64 KG/M2

## 2022-06-30 DIAGNOSIS — E78.5 HYPERLIPIDEMIA LDL GOAL <130: Primary | ICD-10-CM

## 2022-06-30 DIAGNOSIS — E78.5 HYPERLIPIDEMIA LDL GOAL <130: ICD-10-CM

## 2022-06-30 DIAGNOSIS — I10 BENIGN ESSENTIAL HYPERTENSION: ICD-10-CM

## 2022-06-30 DIAGNOSIS — L10.2 PEMPHIGUS FOLIACEUS (H): ICD-10-CM

## 2022-06-30 DIAGNOSIS — R09.89 OTHER SPECIFIED SYMPTOMS AND SIGNS INVOLVING THE CIRCULATORY AND RESPIRATORY SYSTEMS: ICD-10-CM

## 2022-06-30 LAB
ALBUMIN SERPL-MCNC: 4.1 G/DL (ref 3.4–5)
ALP SERPL-CCNC: 40 U/L (ref 40–150)
ALT SERPL W P-5'-P-CCNC: 35 U/L (ref 0–70)
ANION GAP SERPL CALCULATED.3IONS-SCNC: 1 MMOL/L (ref 3–14)
AST SERPL W P-5'-P-CCNC: 26 U/L (ref 0–45)
BASOPHILS # BLD AUTO: 0 10E3/UL (ref 0–0.2)
BASOPHILS NFR BLD AUTO: 0 %
BILIRUB SERPL-MCNC: 1.1 MG/DL (ref 0.2–1.3)
BUN SERPL-MCNC: 16 MG/DL (ref 7–30)
CALCIUM SERPL-MCNC: 9.3 MG/DL (ref 8.5–10.1)
CHLORIDE BLD-SCNC: 104 MMOL/L (ref 94–109)
CHOLEST SERPL-MCNC: 148 MG/DL
CO2 SERPL-SCNC: 29 MMOL/L (ref 20–32)
CREAT SERPL-MCNC: 1.18 MG/DL (ref 0.66–1.25)
CREAT UR-MCNC: 306 MG/DL
CRP SERPL HS-MCNC: 0.39 MG/L
EOSINOPHIL # BLD AUTO: 0.1 10E3/UL (ref 0–0.7)
EOSINOPHIL NFR BLD AUTO: 2 %
ERYTHROCYTE [DISTWIDTH] IN BLOOD BY AUTOMATED COUNT: 12.4 % (ref 10–15)
FASTING STATUS PATIENT QL REPORTED: YES
FASTING STATUS PATIENT QL REPORTED: YES
GFR SERPL CREATININE-BSD FRML MDRD: 63 ML/MIN/1.73M2
GLUCOSE BLD-MCNC: 117 MG/DL (ref 70–99)
GLUCOSE BLD-MCNC: 117 MG/DL (ref 70–99)
HCT VFR BLD AUTO: 43.7 % (ref 40–53)
HDLC SERPL-MCNC: 46 MG/DL
HGB BLD-MCNC: 15.5 G/DL (ref 13.3–17.7)
IMM GRANULOCYTES # BLD: 0 10E3/UL
IMM GRANULOCYTES NFR BLD: 0 %
LDLC SERPL CALC-MCNC: 74 MG/DL
LYMPHOCYTES # BLD AUTO: 1.8 10E3/UL (ref 0.8–5.3)
LYMPHOCYTES NFR BLD AUTO: 24 %
MCH RBC QN AUTO: 32 PG (ref 26.5–33)
MCHC RBC AUTO-ENTMCNC: 35.5 G/DL (ref 31.5–36.5)
MCV RBC AUTO: 90 FL (ref 78–100)
MICROALBUMIN UR-MCNC: 64 MG/L
MICROALBUMIN/CREAT UR: 20.92 MG/G CR (ref 0–17)
MONOCYTES # BLD AUTO: 0.7 10E3/UL (ref 0–1.3)
MONOCYTES NFR BLD AUTO: 9 %
NEUTROPHILS # BLD AUTO: 4.7 10E3/UL (ref 1.6–8.3)
NEUTROPHILS NFR BLD AUTO: 65 %
NONHDLC SERPL-MCNC: 102 MG/DL
NRBC # BLD AUTO: 0 10E3/UL
NRBC BLD AUTO-RTO: 0 /100
PLATELET # BLD AUTO: 159 10E3/UL (ref 150–450)
POTASSIUM BLD-SCNC: 3.6 MMOL/L (ref 3.4–5.3)
PROT SERPL-MCNC: 7.2 G/DL (ref 6.8–8.8)
RBC # BLD AUTO: 4.85 10E6/UL (ref 4.4–5.9)
SODIUM SERPL-SCNC: 134 MMOL/L (ref 133–144)
TRIGL SERPL-MCNC: 138 MG/DL
WBC # BLD AUTO: 7.4 10E3/UL (ref 4–11)

## 2022-06-30 PROCEDURE — 99215 OFFICE O/P EST HI 40 MIN: CPT | Mod: 25 | Performed by: NURSE PRACTITIONER

## 2022-06-30 PROCEDURE — 80061 LIPID PANEL: CPT | Performed by: PATHOLOGY

## 2022-06-30 PROCEDURE — 93922 UPR/L XTREMITY ART 2 LEVELS: CPT | Performed by: NURSE PRACTITIONER

## 2022-06-30 PROCEDURE — 80053 COMPREHEN METABOLIC PANEL: CPT | Performed by: PATHOLOGY

## 2022-06-30 PROCEDURE — 36415 COLL VENOUS BLD VENIPUNCTURE: CPT | Performed by: PATHOLOGY

## 2022-06-30 PROCEDURE — 86141 C-REACTIVE PROTEIN HS: CPT | Mod: 90 | Performed by: PATHOLOGY

## 2022-06-30 PROCEDURE — 99000 SPECIMEN HANDLING OFFICE-LAB: CPT | Performed by: PATHOLOGY

## 2022-06-30 PROCEDURE — 82043 UR ALBUMIN QUANTITATIVE: CPT | Performed by: PATHOLOGY

## 2022-06-30 PROCEDURE — 85025 COMPLETE CBC W/AUTO DIFF WBC: CPT | Performed by: PATHOLOGY

## 2022-06-30 NOTE — PROGRESS NOTES
"  [facility] - Exam Note    Active Problems   Patient Active Problem List    Diagnosis Date Noted     SOB (shortness of breath) 06/29/2020     Priority: Medium     Age-related nuclear cataract of both eyes 08/21/2017     Priority: Medium     Lightheadedness 07/27/2015     Priority: Medium     Dermatitis, seborrheic 11/05/2013     Priority: Medium     Encounter for long-term (current) use of high-risk medication 07/24/2013     Priority: Medium     Hyperlipidemia LDL goal <130      Priority: Medium     BPH (benign prostatic hyperplasia)      Priority: Medium     Pemphigus foliaceus 08/15/2011     Priority: Medium       Reason For Visit   Patient here for [facility] early detection of atherosclerosis and CVD exam.    Pain Evaluation  Current history of pain associated with this visit is: denied    Cardiac risk factors:  + age     - smoking    - elevated BMI   + Family history CVD  - Diet   - Hypertension    HPI   Luan Mitchell is a 77 year old year old male with a history of hyperlipidemia, hypertension, BPH  and retinal detachment.  He has a family history of heart disease but he is not sure if they had CAD.  He takes 20 mg of atorvastatin. HA1C is 5.7. He checks his BP at home 1x/week.  Stress echo in 2019 revealed \"exercise response was appropriate\".  3/2022 24 hour monitor-please see results.  Echo from 2018 EF 60-65%, normal valve function.  His primary care provider is Dr. Devendra Telles at [facility].  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  He has had issues with SOB and underwent a stress test in 2019. He also had an echo and a 24 hour BP monitor.  He describes becoming more SOB when he stands, gets up to urinate 2x/noc. He denies any recent falls. Today in clinic he denies chest pain at rest, with activity, while sleeping, SOB at rest, with activity or while sleeping, palpitations, lightheadedness, lower " leg edema, calf cramps, indigestion, headaches or issues with his memory.     Nutrition assessment per patient report:   Foods with fat/cholesterol (fried foods, fatty meats, junk food):  0 servings   Fruits and vegetables (  cup cooked, 1 cup raw):  1-3 servings of vegetables/day, 1-2 servings of fruit/day  Caffeine (1 cup coffee, soda, etc):  tea 1-2 cups of tea/day  Alcohol servings (12 oz. beer, 4 oz. wine, 1  oz. in mixed drink):  0 servings  Special dietary habits:  monitors fiber intake  Typical breakfast:  Yogurt, blueberries, peanuts, cereal                 Lunch: salad with chicken                 Dinner: pasta, vegetables                 Snacks: yes, toast with margarine                 Drinks:  water  Activity  Patient is active riding a stationary bike3-4x/week, 8 miles as fast as he can, goal under 28 1/2 minutes    Sleep pattern: good    Laboratory Results Review  We discussed laboratory results today including lipids targets and how foods influence cholesterol.    Weight  His perceived healthy weight is 170-180 pounds.  A normal BMI of 25 is equal to 164 pounds.  The current BMI of 25.94 is normal weight range.      PMH   Past Medical History:   Diagnosis Date     BPH (benign prostatic hyperplasia)      Essential hypertension 02/2018     Hyperlipidemia LDL goal <130      Pemphigus     pemphigus foliaceus     Retinal detachment 2009    left eye- corrected with laser     Vitreous detachment 2009    [facility]  Past Surgical History:   Procedure Laterality Date     NO HISTORY OF SURGERY  1/28/14    derm     RETINOPEXY LASER PROPHYLAXIS BREAK(S) OS (LEFT EYE)      2009 - Left eye       Current Meds   Current Outpatient Medications   Medication Sig Dispense Refill     ARTIFICIAL TEAR SOLUTION OP Apply to eye daily        atorvastatin (LIPITOR) 20 MG tablet Take 1 tablet (20 mg) by mouth daily 90 tablet 4     clobetasol (TEMOVATE) 0.05 % external ointment Apply topically 2 times daily For more bothersome  lesions 120 g 1     clobetasol (TEMOVATE) 0.05 % external solution Apply  topically 2 times daily as needed to the scalp. 200 mL 11     clobetasol propionate 0.05 % SHAM Apply sparingly to dry scalp 3 x weekly as needed.  Leave in place for 15 m then add water, lather and rinse 1 Bottle 5     desonide (DESOWEN) 0.05 % ointment Apply topically to affected areas twice daily as instructed. 180 g 3     finasteride (PROSCAR) 5 MG tablet Take 1 tablet (5 mg) by mouth daily 90 tablet 4     fluocinolone acetonide (DERMA SMOOTHE/FS BODY) 0.01 % external oil Apply topically 2 times daily (Patient not taking: Reported on 2022) 118.28 mL 3     hydrochlorothiazide (HYDRODIURIL) 25 MG tablet Take 1 tablet (25 mg) by mouth daily 90 tablet 4     hydrocortisone butyrate (LOCOID) 0.1 % SOLN Apply sparingly to affected area(s) of beard twice daily 180 mL 3     ketoconazole (NIZORAL) 2 % cream Apply twice daily to the nose as needed for white/scaling. 60 g 2     ketoconazole (NIZORAL) 2 % shampoo Three times per week in the shower: Lather into scalp, leave in place for 3-5 minutes, then rinse. 360 mL 12     Multiple Vitamin (MULTIVITAMIN) per tablet Take 1 tablet by mouth daily 1 tab daily       mycophenolate (GENERIC EQUIVALENT) 500 MG tablet Take 1 tablet (500 mg) by mouth every morning (before breakfast) 360 tablet 0     tacrolimus (PROTOPIC) 0.1 % external ointment Apply topically to affected areas as instructed. 300 g 3     triamcinolone (KENALOG) 0.1 % external cream Apply topically to affected areas as instructed. 454 g 11     triamcinolone (KENALOG) 0.1 % external ointment Apply topically 2 times daily For lesions that are more mild 453.6 g 1       Allergies    No Known Allergies    Family Hx   Family History   Problem Relation Age of Onset     Heart Disease Mother         CHF d 80     Diabetes Mother         DM2,  age 80     Hypertension Mother      Heart Disease Father         CHF d 78     Heart Disease Paternal  "Uncle         CHF d 50s     Cancer No family hx of         no skin cancer     Skin Cancer No family hx of         no skin cancer     Glaucoma No family hx of      Macular Degeneration No family hx of      Melanoma No family hx of        Social History  He is retired.   He is  2 children.     Tobacco History  History   Smoking Status     Never Smoker   Smokeless Tobacco     Never Used       ROS  CONSTITUTIONAL:  No fever, chills, or sweats. No weight gain/loss.   EENT:  No visual disturbance, ear ache, epistaxis, sore throat  ALLERGIES/IMMUNOLOGIC:  Negative  RESPIRATORY:  No cough, hemoptysis  CARDIOVASCULAR:  As per HPI  GI:  No nausea, vomiting, hematemesis, melena  :  No urinary frequency, dysuria, or hematuria  INTEGUMENT:  Negative  PSYCHIATRIC:  Negative  NEURO:  Negative  ENDOCRINE:  Negative  MUSCULOSKELETAL:  Negative     Vital Signs   /70 (BP Location: Right arm, Patient Position: Sitting, Cuff Size: Adult Regular)   Pulse 72   Ht 1.74 m (5' 8.5\")   Wt 78.5 kg (173 lb 1.6 oz)   BMI 25.94 kg/m        Waist: 39.5 inches  Hip: 41.5 inches    Physical Exam   In general, the patient is a pleasant male in no apparent distress   HEENT: NC/AT, PERRLA, EOMI, sclerae white, not injected. Nares clear, pharynx without erythema or exudate, dentition intact    Neck: No adenopathy, no thyromegaly, carotids +4/4 bilaterally without bruits,  no jugular venous distension   Lungs: Breath sounds clear bilaterally, without crackles, ronchi, or wheezes  Cor: RRR, S1S2 without murmur, rub, click, or gallop, the PMI is in the 5th ICS in the midclavicular line  Abdomen: Soft, nontender, nondistended, BS+ in all 4 quadrants, without hepatomegaly, no aorta or renal artery bruits  Extremities: No clubbing, cyanosis, or edema. DP and PT pulses +2/4 bilaterally    The 10-year ASCVD risk score ([place] HORACIO Jr., et al., 2013) is: 29.6%  Values used to calculate the score:   Age: 77 year old   Sex: male   Is Non- " : No   Diabetic: No   Tobacco smoker: No   Systolic Blood Press: 127 mmHg   Is BP treated: Yes   HDL Cholesterol: 46 mg/dL   Total Cholesterol: 148 mg/dL    Recent Labs  Lab Results   Component Value Date     (H) 06/30/2022     (H) 06/30/2022     (H) 06/17/2021      Lab Results   Component Value Date    NTBNP 79 10/29/2018     No results found for: NTBNPI   Lab Results   Component Value Date    UCRR 306 06/30/2022      Lab Results   Component Value Date    MICROL 64 06/30/2022      No results found for: MICROALBUMIN   Lab Results   Component Value Date    CRP 0.39 06/30/2022    CRP 3.0 08/13/2019      Lab Results   Component Value Date    CHOL 148 06/30/2022    CHOL 178 03/16/2021      Lab Results   Component Value Date    TRIG 138 06/30/2022    TRIG 114 03/16/2021      Lab Results   Component Value Date    HDL 46 06/30/2022    HDL 54 03/16/2021      Lab Results   Component Value Date    LDL 74 06/30/2022     (H) 03/16/2021      Lab Results   Component Value Date    VLDL 29 11/07/2011      Lab Results   Component Value Date    CHOLHDLRATIO 3.8 11/07/2011     Lab Results   Component Value Date    NHDL 102 06/30/2022    NHDL 124 03/16/2021        2019 stress test reviewed.  2018 echo reviewed  2022 24 hour BP monitor results reviewed    Assessment:    Cardiovascular:  Asymptomatic, he is not complaining of chest pain, he has SOB upon standing, negative stress test in 2019    Blood Pressure:  He takes 25 mg of hydrochlorothiazide, -127/70-76 mmHg    Lipids:  He takes 20 mg of atorvastatin for the past 2-3 years, he took lovastatin for many years prior to that   !LIPID/HEPATIC Latest Ref Rng & Units 12/15/2020 1/7/2021 3/16/2021   CHOL <200 mg/dL   178   TRIGLYCERIDES <150 mg/dL   114   HDL >=40 mg/dL   54   LDL <=100 mg/dL   101 (H)   VLDL 0 - 30 mg/dL      NHDL <130 mg/dL   124   CHOLHDLRATIO 0.0 - 5.0      AST 0 - 45 U/L 37 22    ALT 0 - 70 U/L 74 (H) 44    CRP 0.0 -  8.0 mg/L        !LIPID/HEPATIC Latest Ref Rng & Units 3/25/2021 6/17/2021 9/16/2021   CHOL <200 mg/dL      TRIGLYCERIDES <150 mg/dL      HDL >=40 mg/dL      LDL <=100 mg/dL      VLDL 0 - 30 mg/dL      NHDL <130 mg/dL      CHOLHDLRATIO 0.0 - 5.0      AST 0 - 45 U/L 29 25 25   ALT 0 - 70 U/L 42 37 37   CRP 0.0 - 8.0 mg/L        !LIPID/HEPATIC Latest Ref Rng & Units 12/16/2021 3/14/2022 6/30/2022   CHOL <200 mg/dL  160 148   TRIGLYCERIDES <150 mg/dL  141 138   HDL >=40 mg/dL  44 46   LDL <=100 mg/dL  88 74   VLDL 0 - 30 mg/dL      NHDL <130 mg/dL  116 102   CHOLHDLRATIO 0.0 - 5.0      AST 0 - 45 U/L 28 24 26   ALT 0 - 70 U/L 37 36 35   CRP 0.0 - 8.0 mg/L          Glucose: 117, continue to monitor with PCP, HA1C 5.7 3/22    Sleep pattern:  Sleep hygiene reviewed during clinic visit, handout given to patient    Weight Management: BMI 25.94    Return to Clinic: 5 years    Health Habit Summary:  Nutrition: Heart Healthy Eating:  most of the time   Exercise:  regularly active  Weight:  overweight range  Tobacco Use:  never used    Full report to follow prevention team review of test results with scanned final report.    Time spent for patient visit was 60 minutes with more than half the time spent on counseling and coordination of care.    LILY Delaney CNP       CC  Patient Care Team:  Devendra Telles MD as PCP - General (Internal Medicine)  Loretta Villeda MD as MD (Internal Medicine)  Serina Odell MD as MD (Dermatology)  Nav Rust MD as MD (Otolaryngology)  Sydney Erazo AuD as Audiologist (Audiology)  Chuckie Lechuga MD as Mukesh Chung MD as MD (Clinical Cardiac Electrophysiology)  Reina Latif MD as Resident (Student in organized health care education/training program)  Caden Cristina MD as MD (Allergy & Immunology)  Flory Fish, NELIA as Nurse Practitioner (Family Practice)  Pillo Lizama MD as Resident (Student in [facility] health care  education/training program)  Devendra Telles MD as Assigned PCP  Tahir Kendrick as Resident  Mayela Collins APRN CNP as Assigned Surgical Provider  Jackson Rooney MD as Resident (Student in [facility] health care education/training program)  Violette Bennett MD as MD (Cardiovascular Disease)  Zaina Ferrari APRN CNP as Nurse Practitioner (Cardiovascular Disease)  Violette Bennett MD as Assigned Heart and Vascular Provider  SELF, REFERRED    Answers for HPI/ROS submitted by the patient on 6/23/2022  General Symptoms: No  Skin Symptoms: Yes  HENT Symptoms: No  EYE SYMPTOMS: Yes  HEART SYMPTOMS: No  LUNG SYMPTOMS: Yes  INTESTINAL SYMPTOMS: No  URINARY SYMPTOMS: No  REPRODUCTIVE SYMPTOMS: No  SKELETAL SYMPTOMS: No  BLOOD SYMPTOMS: No  NERVOUS SYSTEM SYMPTOMS: No  MENTAL HEALTH SYMPTOMS: No  Changes in hair: No  Changes in moles/birth marks: No  Itching: Yes  Rashes: No  Changes in nails: No  Acne: No  Change in facial hair: No  Warts: No  Non-healing sores: No  Scarring: No  Flaking of skin: Yes  Color changes of hands/feet in cold : No  Sun sensitivity: No  Skin thickening: No  Eye pain: No  Vision loss: No  Dry eyes: Yes  Watery eyes: No  Eye bulging: No  Double vision: No  Flashing of lights: No  Spots: No  Floaters: Yes  Redness: No  Crossed eyes: No  Tunnel Vision: No  Yellowing of eyes: No  Eye irritation: Yes  Cough: No  Sputum or phlegm: No  Coughing up blood: No  Difficulty breating or shortness of breath: Yes  Snoring: No  Wheezing: No  Difficulty breathing on exertion: No  Nighttime Cough: No  Difficulty breathing when lying flat: No

## 2022-06-30 NOTE — LETTER
"6/30/2022      RE: Luan Mitchell  [address] [address] [address]       Dear Colleague,    Thank you for the opportunity to participate in the care of your patient, Luan Mitchell, at the [facility]ARDIOVASC[facility] DISE[facility] PREVENT[facility]. Please see a copy of my visit note below.      [facility] Cardiovascular Disease Prevention - Exam Note    Active Problems   Patient Active Problem List    Diagnosis Date Noted     SOB (shortness of breath) 06/29/2020     Priority: Medium     Age-related nuclear cataract of both eyes 08/21/2017     Priority: Medium     Lightheadedness 07/27/2015     Priority: Medium     Dermatitis, seborrheic 11/05/2013     Priority: Medium     Encounter for long-term (current) use of high-risk medication 07/24/2013     Priority: Medium     Hyperlipidemia LDL goal <130      Priority: Medium     BPH (benign prostatic hyperplasia)      Priority: Medium     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    Pain Evaluation  Current history of pain associated with this visit is: denied    Cardiac risk factors:  + age     - smoking    - elevated BMI   + Family history CVD  - Diet   - Hypertension    HPI   Luan Mitchell is a 77 year old year old male with a history of hyperlipidemia, hypertension, BPH  and retinal detachment.  He has a family history of heart disease but he is not sure if they had CAD.  He takes 20 mg of atorvastatin. HA1C is 5.7. He checks his BP at home 1x/week.  Stress echo in 2019 revealed \"exercise response was appropriate\".  3/2022 24 hour monitor-please see results.  Echo from 2018 EF 60-65%, normal valve function.  His primary care provider is Dr. Devendra Telles at [facility].  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  He has had issues with SOB and underwent a stress test in 2019. He also had an echo and a 24 hour BP monitor.  He describes becoming more SOB when he stands, gets up to urinate 2x/noc. He denies any recent falls. Today in clinic he denies chest pain at rest, with activity, while sleeping, SOB at rest, with activity or while sleeping, palpitations, lightheadedness, lower leg edema, calf cramps, indigestion, headaches or issues with his memory.     Nutrition assessment per patient report:   Foods with fat/cholesterol (fried foods, fatty meats, junk food):  0 servings   Fruits and vegetables (  cup cooked, 1 cup raw):  1-3 servings of vegetables/day, 1-2 servings of fruit/day  Caffeine (1 cup coffee, soda, etc):  tea 1-2 cups of tea/day  Alcohol servings (12 oz. beer, 4 oz. wine, 1  oz. in mixed drink):  0 servings  Special dietary habits:  monitors fiber intake  Typical breakfast:  Yogurt, blueberries, peanuts, cereal                 Lunch: salad with chicken                 Dinner: pasta, vegetables                 Snacks: yes, toast with margarine                 Drinks:  water  Activity  Patient is active riding a stationary bike3-4x/week, 8 miles as fast as he can, goal under 28 1/2 minutes    Sleep pattern: good    Laboratory Results Review  We discussed laboratory results today including lipids targets and how foods influence cholesterol.    Weight  His perceived healthy weight is 170-180 pounds.  A normal BMI of 25 is equal to 164 pounds.  The current BMI of 25.94 is normal weight range.      PMH   Past Medical History:   Diagnosis Date     BPH (benign prostatic hyperplasia)      Essential hypertension 02/2018     Hyperlipidemia LDL goal <130      Pemphigus     pemphigus foliaceus     Retinal detachment 2009    left eye- corrected with laser     Vitreous detachment 2009    [facility]  Past Surgical History:   Procedure Laterality Date     NO HISTORY OF SURGERY  1/28/14    derm     RETINOPEXY  LASER PROPHYLAXIS BREAK(S) OS (LEFT EYE)      2009 - Left eye       Current Meds   Current Outpatient Medications   Medication Sig Dispense Refill     ARTIFICIAL TEAR SOLUTION OP Apply to eye daily        atorvastatin (LIPITOR) 20 MG tablet Take 1 tablet (20 mg) by mouth daily 90 tablet 4     clobetasol (TEMOVATE) 0.05 % external ointment Apply topically 2 times daily For more bothersome lesions 120 g 1     clobetasol (TEMOVATE) 0.05 % external solution Apply  topically 2 times daily as needed to the scalp. 200 mL 11     clobetasol propionate 0.05 % SHAM Apply sparingly to dry scalp 3 x weekly as needed.  Leave in place for 15 m then add water, lather and rinse 1 Bottle 5     desonide (DESOWEN) 0.05 % ointment Apply topically to affected areas twice daily as instructed. 180 g 3     finasteride (PROSCAR) 5 MG tablet Take 1 tablet (5 mg) by mouth daily 90 tablet 4     fluocinolone acetonide (DERMA SMOOTHE/FS BODY) 0.01 % external oil Apply topically 2 times daily (Patient not taking: Reported on 6/16/2022) 118.28 mL 3     hydrochlorothiazide (HYDRODIURIL) 25 MG tablet Take 1 tablet (25 mg) by mouth daily 90 tablet 4     hydrocortisone butyrate (LOCOID) 0.1 % SOLN Apply sparingly to affected area(s) of beard twice daily 180 mL 3     ketoconazole (NIZORAL) 2 % cream Apply twice daily to the nose as needed for white/scaling. 60 g 2     ketoconazole (NIZORAL) 2 % shampoo Three times per week in the shower: Lather into scalp, leave in place for 3-5 minutes, then rinse. 360 mL 12     Multiple Vitamin (MULTIVITAMIN) per tablet Take 1 tablet by mouth daily 1 tab daily       mycophenolate (GENERIC EQUIVALENT) 500 MG tablet Take 1 tablet (500 mg) by mouth every morning (before breakfast) 360 tablet 0     tacrolimus (PROTOPIC) 0.1 % external ointment Apply topically to affected areas as instructed. 300 g 3     triamcinolone (KENALOG) 0.1 % external cream Apply topically to affected areas as instructed. 454 g 11     triamcinolone  "(KENALOG) 0.1 % external ointment Apply topically 2 times daily For lesions that are more mild 453.6 g 1       Allergies    No Known Allergies    Family Hx   Family History   Problem Relation Age of Onset     Heart Disease Mother         CHF d 80     Diabetes Mother         DM2,  age 80     Hypertension Mother      Heart Disease Father         CHF d 78     Heart Disease Paternal Uncle         CHF d 50s     Cancer No family hx of         no skin cancer     Skin Cancer No family hx of         no skin cancer     Glaucoma No family hx of      Macular Degeneration No family hx of      Melanoma No family hx of        Social History  He is retired.   He is  2 children.     Tobacco History  History   Smoking Status     Never Smoker   Smokeless Tobacco     Never Used       ROS  CONSTITUTIONAL:  No fever, chills, or sweats. No weight gain/loss.   EENT:  No visual disturbance, ear ache, epistaxis, sore throat  ALLERGIES/IMMUNOLOGIC:  Negative  RESPIRATORY:  No cough, hemoptysis  CARDIOVASCULAR:  As per HPI  GI:  No nausea, vomiting, hematemesis, melena  :  No urinary frequency, dysuria, or hematuria  INTEGUMENT:  Negative  PSYCHIATRIC:  Negative  NEURO:  Negative  ENDOCRINE:  Negative  MUSCULOSKELETAL:  Negative     Vital Signs   /70 (BP Location: Right arm, Patient Position: Sitting, Cuff Size: Adult Regular)   Pulse 72   Ht 1.74 m (5' 8.5\")   Wt 78.5 kg (173 lb 1.6 oz)   BMI 25.94 kg/m        Waist: 39.5 inches  Hip: 41.5 inches    Physical Exam   In general, the patient is a pleasant male in no apparent distress   HEENT: NC/AT, PERRLA, EOMI, sclerae white, not injected. Nares clear, pharynx without erythema or exudate, dentition intact    Neck: No adenopathy, no thyromegaly, carotids +4/4 bilaterally without bruits,  no jugular venous distension   Lungs: Breath sounds clear bilaterally, without crackles, ronchi, or wheezes  Cor: RRR, S1S2 without murmur, rub, click, or gallop, the PMI is in the 5th " ICS in the midclavicular line  Abdomen: Soft, nontender, nondistended, BS+ in all 4 quadrants, without hepatomegaly, no aorta or renal artery bruits  Extremities: No clubbing, cyanosis, or edema. DP and PT pulses +2/4 bilaterally    The 10-year ASCVD risk score (Renita LEONARD Jr., et al., 2013) is: 29.6%  Values used to calculate the score:   Age: 77 year old   Sex: male   Is Non- : No   Diabetic: No   Tobacco smoker: No   Systolic Blood Press: 127 mmHg   Is BP treated: Yes   HDL Cholesterol: 46 mg/dL   Total Cholesterol: 148 mg/dL    Recent Labs  Lab Results   Component Value Date     (H) 06/30/2022     (H) 06/30/2022     (H) 06/17/2021      Lab Results   Component Value Date    NTBNP 79 10/29/2018     No results found for: NTBNPI   Lab Results   Component Value Date    UCRR 306 06/30/2022      Lab Results   Component Value Date    MICROL 64 06/30/2022      No results found for: MICROALBUMIN   Lab Results   Component Value Date    CRP 0.39 06/30/2022    CRP 3.0 08/13/2019      Lab Results   Component Value Date    CHOL 148 06/30/2022    CHOL 178 03/16/2021      Lab Results   Component Value Date    TRIG 138 06/30/2022    TRIG 114 03/16/2021      Lab Results   Component Value Date    HDL 46 06/30/2022    HDL 54 03/16/2021      Lab Results   Component Value Date    LDL 74 06/30/2022     (H) 03/16/2021      Lab Results   Component Value Date    VLDL 29 11/07/2011      Lab Results   Component Value Date    CHOLHDLRATIO 3.8 11/07/2011     Lab Results   Component Value Date    NHDL 102 06/30/2022    NHDL 124 03/16/2021        2019 stress test reviewed.  2018 echo reviewed  2022 24 hour BP monitor results reviewed    Assessment:    Cardiovascular:  Asymptomatic, he is not complaining of chest pain, he has SOB upon standing, negative stress test in 2019    Blood Pressure:  He takes 25 mg of hydrochlorothiazide, -127/70-76 mmHg    Lipids:  He takes 20 mg of atorvastatin  Quality 431: Preventive Care And Screening: Unhealthy Alcohol Use - Screening: Patient screened for unhealthy alcohol use using a single question and scores less than 2 times per year for the past 2-3 years, he took lovastatin for many years prior to that   !LIPID/HEPATIC Latest Ref Rng & Units 12/15/2020 1/7/2021 3/16/2021   CHOL <200 mg/dL   178   TRIGLYCERIDES <150 mg/dL   114   HDL >=40 mg/dL   54   LDL <=100 mg/dL   101 (H)   VLDL 0 - 30 mg/dL      NHDL <130 mg/dL   124   CHOLHDLRATIO 0.0 - 5.0      AST 0 - 45 U/L 37 22    ALT 0 - 70 U/L 74 (H) 44    CRP 0.0 - 8.0 mg/L        !LIPID/HEPATIC Latest Ref Rng & Units 3/25/2021 6/17/2021 9/16/2021   CHOL <200 mg/dL      TRIGLYCERIDES <150 mg/dL      HDL >=40 mg/dL      LDL <=100 mg/dL      VLDL 0 - 30 mg/dL      NHDL <130 mg/dL      CHOLHDLRATIO 0.0 - 5.0      AST 0 - 45 U/L 29 25 25   ALT 0 - 70 U/L 42 37 37   CRP 0.0 - 8.0 mg/L        !LIPID/HEPATIC Latest Ref Rng & Units 12/16/2021 3/14/2022 6/30/2022   CHOL <200 mg/dL  160 148   TRIGLYCERIDES <150 mg/dL  141 138   HDL >=40 mg/dL  44 46   LDL <=100 mg/dL  88 74   VLDL 0 - 30 mg/dL      NHDL <130 mg/dL  116 102   CHOLHDLRATIO 0.0 - 5.0      AST 0 - 45 U/L 28 24 26   ALT 0 - 70 U/L 37 36 35   CRP 0.0 - 8.0 mg/L          Glucose: 117, continue to monitor with PCP, HA1C 5.7 3/22    Sleep pattern:  Sleep hygiene reviewed during clinic visit, handout given to patient    Weight Management: BMI 25.94    Return to Clinic: 5 years    Health Habit Summary:  Nutrition: Heart Healthy Eating:  most of the time   Exercise:  regularly active  Weight:  overweight range  Tobacco Use:  never used    Full report to follow prevention team review of test results with scanned final report.    Time spent for patient visit was 60 minutes with more than half the time spent on counseling and coordination of care.    LILY Delaney CNP       CC  Patient Care Team:  Devendra Telles MD as PCP - General (Internal Medicine)  Loretta Villeda MD as MD (Internal Medicine)  Serina Odell MD as MD (Dermatology)  Nav Rust MD as MD (Otolaryngology)  Sydney Erazo AuD as Audiologist  (Audiology)  Self, MD Chuckie as Mukesh Chung MD as MD (Clinical Cardiac Electrophysiology)  Reina Latif MD as Resident (Student in organized health care education/training program)  Caden Cristina MD as MD (Allergy & Immunology)  Flory Fish NP as Nurse Practitioner (Family Practice)  Pillo Lizama MD as Resident (Student in organized health care education/training program)  Devendra Telles MD as Assigned PCP  Tahir Kendrick as Resident  Mayela Collins APRN CNP as Assigned Surgical Provider  Jackson Rooney MD as Resident (Student in organized health care education/training program)  Violette Bennett MD as MD (Cardiovascular Disease)  Zaina Ferrari APRN CNP as Nurse Practitioner (Cardiovascular Disease)  Violette Bennett MD as Assigned Heart and Vascular Provider  SELF, REFERRED    Answers for HPI/ROS submitted by the patient on 6/23/2022  General Symptoms: No  Skin Symptoms: Yes  HENT Symptoms: No  EYE SYMPTOMS: Yes  HEART SYMPTOMS: No  LUNG SYMPTOMS: Yes  INTESTINAL SYMPTOMS: No  URINARY SYMPTOMS: No  REPRODUCTIVE SYMPTOMS: No  SKELETAL SYMPTOMS: No  BLOOD SYMPTOMS: No  NERVOUS SYSTEM SYMPTOMS: No  MENTAL HEALTH SYMPTOMS: No  Changes in hair: No  Changes in moles/birth marks: No  Itching: Yes  Rashes: No  Changes in nails: No  Acne: No  Change in facial hair: No  Warts: No  Non-healing sores: No  Scarring: No  Flaking of skin: Yes  Color changes of hands/feet in cold : No  Sun sensitivity: No  Skin thickening: No  Eye pain: No  Vision loss: No  Dry eyes: Yes  Watery eyes: No  Eye bulging: No  Double vision: No  Flashing of lights: No  Spots: No  Floaters: Yes  Redness: No  Crossed eyes: No  Tunnel Vision: No  Yellowing of eyes: No  Eye irritation: Yes  Cough: No  Sputum or phlegm: No  Coughing up blood: No  Difficulty breating or shortness of breath: Yes  Snoring: No  Wheezing: No  Difficulty breathing on exertion:  Quality 130: Documentation Of Current Medications In The Medical Record: Current Medications Documented Detail Level: Detailed No  Nighttime Cough: No  Difficulty breathing when lying flat: No        Cano Test Results    WALKING BLOOD PRESSURE RESPONSE (3 minute, 5 MET level walk)   Pre BP: 106/70 mmHg  3 min BP: 150/60 mmHg  1 min post BP: 110/66 mmHg    Pre HR: 88 bpm  3 min HR: 102 bpm  1 min post HR: 80 bpm     Test results: walking blood pressure response to 3 minutes activity is in abnormal range.     ABDOMINAL AORTA ULTRASOUND (< 2.5 normal, borderline 2.5-2.9, abnormal > 3)   SupraIliac 1.8 cm    SupraRenal 1.8 cm    InfraRenal Proximal 2.1 cm    InfraRenal Distal 1.9 cm      Abdominal Aorta Assessment:  normal    LEFT VENTRICULAR ULTRASOUND MEASUREMENTS (adjusted for BSA)  LVIDD 46.70 mm   Septa 9.0 mm   Posterior 9.0 mm     Left Ventricular US Assessment:  normal    Carotid Artery IMT measurements report and plaques in the small area examined:   Left IMT 0.824 mm  Plaques none    Right IMT 0.855 mm  Plaques none     Test results: carotid artery wall thickening is in normal range.     ECG (see tracing):  normal sinus rhythm;  rate: 72 bpm    Arterial Elasticity per age and gender (see printout):   C1 8.8 mL/mmHg x 10  abnormal   C2 1.7 mL/mmHg x 100 abnormal   Supine blood pressure: 126/70 mmHg     Test results: Arterial elasticity of large and small size arteries is in abnormal range after adjusting for age and gender.     Jeimy Romero      Please do not hesitate to contact me if you have any questions/concerns.     Sincerely,     LILY Delaney CNP     Quality 226: Preventive Care And Screening: Tobacco Use: Screening And Cessation Intervention: Patient screened for tobacco use and is an ex/non-smoker

## 2022-07-01 LAB
ATRIAL RATE - MUSE: 72 BPM
DIASTOLIC BLOOD PRESSURE - MUSE: NORMAL MMHG
INTERPRETATION ECG - MUSE: NORMAL
P AXIS - MUSE: 64 DEGREES
PR INTERVAL - MUSE: 144 MS
QRS DURATION - MUSE: 90 MS
QT - MUSE: 400 MS
QTC - MUSE: 438 MS
R AXIS - MUSE: -47 DEGREES
SYSTOLIC BLOOD PRESSURE - MUSE: NORMAL MMHG
T AXIS - MUSE: 63 DEGREES
VENTRICULAR RATE- MUSE: 72 BPM

## 2022-07-05 NOTE — PROGRESS NOTES
Cano Test Results    WALKING BLOOD PRESSURE RESPONSE (3 minute, 5 MET level walk)   Pre BP: 106/70 mmHg  3 min BP: 150/60 mmHg  1 min post BP: 110/66 mmHg    Pre HR: 88 bpm  3 min HR: 102 bpm  1 min post HR: 80 bpm     Test results: walking blood pressure response to 3 minutes activity is in abnormal range.     ABDOMINAL AORTA ULTRASOUND (< 2.5 normal, borderline 2.5-2.9, abnormal > 3)   SupraIliac 1.8 cm    SupraRenal 1.8 cm    InfraRenal Proximal 2.1 cm    InfraRenal Distal 1.9 cm      Abdominal Aorta Assessment:  normal    LEFT VENTRICULAR ULTRASOUND MEASUREMENTS (adjusted for BSA)  LVIDD 46.70 mm   Septa 9.0 mm   Posterior 9.0 mm     Left Ventricular US Assessment:  normal    Carotid Artery IMT measurements report and plaques in the small area examined:   Left IMT 0.824 mm  Plaques none    Right IMT 0.855 mm  Plaques none     Test results: carotid artery wall thickening is in normal range.     ECG (see tracing):  normal sinus rhythm;  rate: 72 bpm    Arterial Elasticity per age and gender (see printout):   C1 8.8 mL/mmHg x 10  abnormal   C2 1.7 mL/mmHg x 100 abnormal   Supine blood pressure: 126/70 mmHg     Test results: Arterial elasticity of large and small size arteries is in abnormal range after adjusting for age and gender.     Jeimy Romero

## 2022-07-07 NOTE — PATIENT INSTRUCTIONS
Screening Results Summary Report     [facility] for Cardiovascular Disease Prevention    Thank you for choosing to participate in the prevention screening offered at the [facility]. Prevention screening is important part of health care.  Atherosclerosis may result in heart attacks, strokes, heart failure, peripheral artery disease and shortened life expectancy. The risk for premature development of this disease is both genetic (family history) and environmental (diet, exercise, lifestyle, etc.).  Goals of your cardiovascular prevention screening include detecting the earliest signs of blood vessel or heart abnormalities, and identifying markers for risk that can be treated if identified early. Recommendations are included to improve health habits. In some cases medication may be recommended to help slow progression of disease. Our goal is to assist you in prevention of a heart attack, stroke and other cardiovascular diseases that are the major cause of illness and mortality in our society.    Your total cholesterol and LDL (bad cholesterol) results are in the  optimal  range. Your other cholesterol numbers are also at goal.  We recommend continuation of ongoing health habit modification (heart healthy nutrition, maintaining your weight within a normal weight range and an exercise routine) to maintain these levels.  An ideal weight range is a body mass index of 25 or less. Continue to take 20 mg of atorvastatin.     2. Your blood glucose (blood sugar) is higher than normal and may indicate insulin resistance (consistent glucose levels of 100-109 mg/dL).  Your body makes enough insulin, but the cells do not utilize it well.  Glucose levels vary from hour to hour depending on your weight and eating habits.  Maintaining a healthy weight is often effective in maintaining a normal blood glucose level and delaying or preventing the development of diabetes over time.  Recent HA1C  5.7.  We recommend re-checking  your blood glucose level and a baseline HA1C with your primary care provider within 6-12 months.    3. Your arterial elasticity (artery stiffness) is low and is consistent with abnormalities associated with the development of vascular (blood vessel) disease and high blood pressure. Statin cholesterol medications, some blood pressure medications and healthy living habits particularly exercise are helpful to preserve artery elasticity. Monitor your blood pressure once/week, record the results and bring those results with you to your next primary care clinic visit.  Notify your primary care provider if you notice that your blood pressure readings are increasing. Omron is good home blood pressure monitor brand to purchase.  This blood pressure cuff machine can be purchased at [employer], [employer], [employer] or [employer].     4. Your blood pressure with 3 minutes of moderate (5 met level) treadmill walking increased 44 mmHg (--> systolic-top number) indicating an abnormal rise. This rise in blood pressure can be related to elevated weight, lack of physical exercise/fitness or reduced blood vessel function, which is linked to the presence of or the risk of development of high blood pressure over time. No treatment is needed at this time.      5. The level of microalbumin (small protein) in your urine is abnormal for our Cano protocol but normal for kidney function.  This can be an abnormality associated with reduced small blood vessel (microvascular) function.  Other factors that can influence albumin are infection, menstruation, heavy weight lifting, diabetes, and kidney problems. Your kidney function blood test, creatinine was normal at insert creatinine mg/dl (normal range 0.66 to 1.25 mg/dl).  No follow up is required at this time.    6. Your diet is heart healthy and well balanced.  We recommend increasing your intake of vegetables to 4-5 servings/day and increasing your fruit intake to 3-4 servings/day and incorporating  healthy fats of the the Mediterranean diet into your diet. Eating salmon and using extra virgin olive oil are good examples. Nutrients found in fruits, vegetables and whole grains have been shown to be beneficial for the long-term health of your heart and blood vessels.     7. Great job with your regular exercise regimen!  Regular exercise can help lower cholesterol, blood pressure, blood glucose and improve the health of your heart and blood vessels. The American Heart Association recommends 150 minutes of exercise per week, including strength (resistance training).  Always exercise within your comfort zone (no chest pain, able to talk comfortably).    8. We suggest that you consider incorporating 4-7-8 relaxation breathing, mindfulness stress reduction, meditation, yoga,and/or aromatherapy into your healthy lifestyle routine. All of these integrative therapies have been shown to be useful in reducing stress and promoting health. The website for the [facility] at the [facility] is [email or web address]. Taking Charge of Your Health and Wellbeing is a wonderful assessment tool to learn more about your wellbeing.    9. Return to clinic in 3-5 years.    Thank you for choosing to participate in the prevention screening at [facility] CV Prevention clinic.  Cardiovascular prevention screening is important. Atherosclerosis may result in heart attacks, strokes, heart failure, peripheral artery disease.    Zaina Ferrari, DNP, APRN, FNP-C

## 2022-07-19 DIAGNOSIS — N40.1 BENIGN PROSTATIC HYPERPLASIA WITH LOWER URINARY TRACT SYMPTOMS, SYMPTOM DETAILS UNSPECIFIED: ICD-10-CM

## 2022-07-21 RX ORDER — FINASTERIDE 5 MG/1
TABLET, FILM COATED ORAL
Qty: 90 TABLET | Refills: 3 | Status: SHIPPED | OUTPATIENT
Start: 2022-07-21 | End: 2023-03-02

## 2022-08-11 ENCOUNTER — PRE VISIT (OUTPATIENT)
Dept: UROLOGY | Facility: CLINIC | Age: 77
End: 2022-08-11

## 2022-08-11 NOTE — TELEPHONE ENCOUNTER
Reason for visit: follow up     Relevant information: bph w/luts    Records/imaging/labs/orders: PSA in epic    Pt called: no    At Rooming: virtual

## 2022-08-24 ENCOUNTER — LAB (OUTPATIENT)
Dept: LAB | Facility: CLINIC | Age: 77
End: 2022-08-24
Payer: COMMERCIAL

## 2022-08-24 DIAGNOSIS — N40.1 BENIGN PROSTATIC HYPERPLASIA WITH LOWER URINARY TRACT SYMPTOMS, SYMPTOM DETAILS UNSPECIFIED: ICD-10-CM

## 2022-08-24 LAB — PSA SERPL-MCNC: 2.09 UG/L (ref 0–4)

## 2022-08-24 PROCEDURE — 36415 COLL VENOUS BLD VENIPUNCTURE: CPT | Performed by: PATHOLOGY

## 2022-08-24 PROCEDURE — 84153 ASSAY OF PSA TOTAL: CPT | Performed by: PATHOLOGY

## 2022-08-25 ENCOUNTER — VIRTUAL VISIT (OUTPATIENT)
Dept: UROLOGY | Facility: CLINIC | Age: 77
End: 2022-08-25
Payer: COMMERCIAL

## 2022-08-25 DIAGNOSIS — R97.20 ELEVATED PROSTATE SPECIFIC ANTIGEN (PSA): Primary | ICD-10-CM

## 2022-08-25 PROCEDURE — 99213 OFFICE O/P EST LOW 20 MIN: CPT | Mod: 95 | Performed by: PHYSICIAN ASSISTANT

## 2022-08-25 NOTE — PROGRESS NOTES
Luan is a 77 year old who is being evaluated via a billable video visit.      How would you like to obtain your AVS? [email or web address]  If the video visit is dropped, the invitation should be resent by: Text to cell phone: [phone]  Will anyone else be joining your video visit? No    Video-Visit Details    Video Start Time: 3:36    Type of service:  Video Visit    Video End Time:3:44  Originating Location (pt. Location): Home    Distant Location (provider location):  [facility]     Platform used for Video Visit: [employer]encial   VIDEO DURATION: 13 minutes (3:36 - 3:44 + 5 minutes documentation on DOS)

## 2022-08-25 NOTE — LETTER
"8/25/2022       RE: Luan Mitchell  [address] [facility] [address]     Dear Colleague,    Thank you for referring your patient, Luan Mitchell, to the [facility] UROLOGY CLINIC [place] [facility]. Please see a copy of my visit note below.    HPI: Mr. 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  For this he was subsequently seen by Dr. Gentile, last on 6/29/20 with a PSA of 1.91ug/L (3.82ug/L adjusted for finasteride).  At that time it was recommended that he continue on finasteride with continued observation of PSAs. Has had 4 prostate biopsies in the distant past, all negative.      6/1/21 - LEAH showed asymmetry R>L but no nodules.  PSA was stable at 1.95ug/L (3.90 adjusted for finasteride).    5/26/22 - Patient very concerned about PSA increase to 2.29ug/L (4.6 adjusted for finasteride).  We reviewed PCPT nomogram and continued finasteride.     Today he returns with PSA of 2.09ug/L, which is a little down from previous.   - Continues on finasteride x many years.  The only side effect he has noticed is that he \"is not going bald\" unlike many men in his family.    - No new medications, diagnoses, surgeries.      REVIEW OF DIAGNOSTICS:  8/24/22 - PSA 2.09ug/L  3/14/22 - PSA 2.29ug/L  3/16/21 - PSA 1.95ug/L  6/12/20 - PSA 1.91  6/24/19 - PSA 1.86  9/25/18 - PSA 2.05    Current Outpatient Medications   Medication Sig Dispense Refill     ARTIFICIAL TEAR SOLUTION OP Apply to eye daily        atorvastatin (LIPITOR) 20 MG tablet Take 1 tablet (20 mg) by mouth daily 90 tablet 4     clobetasol (TEMOVATE) 0.05 % external ointment Apply topically 2 times daily For more bothersome lesions " 120 g 1     clobetasol (TEMOVATE) 0.05 % external solution Apply  topically 2 times daily as needed to the scalp. 200 mL 11     clobetasol propionate 0.05 % SHAM Apply sparingly to dry scalp 3 x weekly as needed.  Leave in place for 15 m then add water, lather and rinse 1 Bottle 5     desonide (DESOWEN) 0.05 % ointment Apply topically to affected areas twice daily as instructed. 180 g 3     finasteride (PROSCAR) 5 MG tablet TAKE 1 TABLET DAILY 90 tablet 3     fluocinolone acetonide (DERMA SMOOTHE/FS BODY) 0.01 % external oil Apply topically 2 times daily (Patient not taking: Reported on 6/16/2022) 118.28 mL 3     hydrochlorothiazide (HYDRODIURIL) 25 MG tablet Take 1 tablet (25 mg) by mouth daily 90 tablet 4     hydrocortisone butyrate (LOCOID) 0.1 % SOLN Apply sparingly to affected area(s) of beard twice daily 180 mL 3     ketoconazole (NIZORAL) 2 % cream Apply twice daily to the nose as needed for white/scaling. 60 g 2     ketoconazole (NIZORAL) 2 % shampoo Three times per week in the shower: Lather into scalp, leave in place for 3-5 minutes, then rinse. 360 mL 12     Multiple Vitamin (MULTIVITAMIN) per tablet Take 1 tablet by mouth daily 1 tab daily       mycophenolate (GENERIC EQUIVALENT) 500 MG tablet Take 1 tablet (500 mg) by mouth every morning (before breakfast) 360 tablet 0     tacrolimus (PROTOPIC) 0.1 % external ointment Apply topically to affected areas as instructed. 300 g 3     triamcinolone (KENALOG) 0.1 % external cream Apply topically to affected areas as instructed. 454 g 11     triamcinolone (KENALOG) 0.1 % external ointment Apply topically 2 times daily For lesions that are more mild 453.6 g 1       ALLERGIES: Patient has no known allergies.      REVIEW OF SYSTEMS:  As above in HPI  GENERAL PHYSICAL EXAM: VIDEO VISIT - no vitals  Vitals: There were no vitals taken for this visit.  There is no height or weight on file to calculate BMI.    GENERAL: Well groomed, well developed, well nourished male  in NAD.  NEURO: Alert and oriented x 3.  PSYCH: Normal mood and affect, pleasant and agreeable during interview and exam.    RADIOLOGY: The following tests were reviewed:   MRI PROSTATE:  7/6/2018 2:48 PM     CLINICAL HISTORY: nodule left apex; Prostate nodule.  PSA of 2.1  06/21/2018.      Comparison: MR pelvis 1/27/2006.     TECHNIQUE:      The following sequences were obtained: High-resolution axial  T2-weighted, coronal T2-weighted, 3D volumetric T2-weighted, axial  pre-contrast T1, axial diffusion-weighted, axial apparent diffusion  coefficient and axial dynamic contrast-enhanced T1. Postcontrast  images were evaluated on a separate workstation to evaluate dynamic  contrast enhancement.  Contrast dose: 7.5mL Gadavist     The images are interpreted according to PI-RADS v.2     FINDINGS:  Prostate gland size: 4.2 x 5.4 x 5.4 cm  Volume: 64 g     Peripheral zone: Linear hypointensity within distinct margins. Focal  abnormality on ADC or diffusion weighted imaging.     PI-RADS:  Peripheral zone T2: 2  Diffusion-weighted image: 1    Contrast-enhanced images: Negative     Overall assessment: 1     Transitional zone: In the left anterior mid gland is an ill-defined  mildly heterogeneous 1.3 x 1.3 cm area of T2 intermediate signal from  2-3 o'clock (series 8, image 20/series 10, image 17/series 11, image  17) with indistinct hypointensity on ADC. Regional enhancement which  is isointense into adjacent BPH nodules. There is diffuse hypertrophy  of the central gland with BPH type changes.      PI-RADS:  Transition zone T2: 3  Diffusion-weighted image: 3  Contrast-enhanced images: Negative     Overall assessment: 3     Remainder of the pelvis:  No significant pelvic lymphadenopathy. No  concerning osseous lesion. Colonic diverticulosis without convincing  evidence of active inflammation. Bladder is partially decompressed. No  focal lesion.                                                                       IMPRESSION:    1. Based on the most suspicious abnormality, this exam is  characterized as PIRADS 3 - Intermediate probability.  The presence of  clinically significant cancer is equivocal.  The most suspicious  abnormality is a ill-defined 1.3 cm region in the left mid gland  transitional zone from 2-3 o'clock, and there is no evidence of  extracapsular extension.  2. No evidence of extraprostatic malignancy. No suspicious adenopathy  or evidence of pelvic metastases.    LABS: The last test results for Mr. Luan Mitchell were reviewed:  PSA -   Lab Results   Component Value Date    PSA 2.09 08/24/2022    PSA 2.29 03/14/2022    PSA 1.95 03/16/2021    PSA 1.91 06/12/2020    PSA 1.86 06/24/2019    PSA 1.82 03/14/2019    PSA 2.05 09/25/2018    PSA 2.10 06/21/2018    PSA 2.42 02/05/2018    PSA 2.09 01/10/2017    PSA 1.96 02/14/2011    PSA 2.47 02/25/2010     BMP -   Recent Labs   Lab Test 06/30/22  1148 03/14/22  1103 12/16/21  1003    140 142   POTASSIUM 3.6 3.8 3.9   CHLORIDE 104 105 105   CO2 29 28 30   BUN 16 19 17   CR 1.18 1.09 0.92   *  117* 113* 103*   JOSHUA 9.3 9.4 9.6       CBC -   Recent Labs   Lab Test 06/30/22  1148 03/14/22  1103 12/16/21  1003   WBC 7.4 6.9 5.6   HGB 15.5 15.6 14.6    175 157       ASSESSMENT:   1) BPH on finasteride with stable PSA    PLAN:   - Finasteride 5mg daily - refilled   - Return in 6 months for an in-person visit with another PSA first.  We will also do a prostate exam on that date    VIDEO DURATION: 13 minutes (3:36 - 3:44 + 5 minutes documentation on DOS)    Carolynn Carroll PA-C  Department of Urologic Surgery      Luan is a 77 year old who is being evaluated via a billable video visit.      How would you like to obtain your AVS? MyChart  If the video visit is dropped, the invitation should be resent by: Text to cell phone: [phone]  Will anyone else be joining your video visit? No    Video-Visit Details    Video Start Time: 3:36    Type of service:  Video  Visit    Video End Time:3:44  Originating Location (pt. Location): Home    Distant Location (provider location):  [facility] UROLOGY [facility]     Platform used for Video Visit: Raquel   VIDEO DURATION: 13 minutes (3:36 - 3:44 + 5 minutes documentation on DOS)

## 2022-08-25 NOTE — PATIENT INSTRUCTIONS
PLAN:   - Finasteride 5mg daily - refilled   - Return in 6 months for an in-person visit with another PSA first.      [facility]

## 2022-08-25 NOTE — PROGRESS NOTES
"HPI: Mr. 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  For this he was subsequently seen by Dr. Gentile, last on 6/29/20 with a PSA of 1.91ug/L (3.82ug/L adjusted for finasteride).  At that time it was recommended that he continue on finasteride with continued observation of PSAs. Has had 4 prostate biopsies in the distant past, all negative.      6/1/21 - LEAH showed asymmetry R>L but no nodules.  PSA was stable at 1.95ug/L (3.90 adjusted for finasteride).    5/26/22 - Patient very concerned about PSA increase to 2.29ug/L (4.6 adjusted for finasteride).  We reviewed PCPT nomogram and continued finasteride.     Today he returns with PSA of 2.09ug/L, which is a little down from previous.   - Continues on finasteride x many years.  The only side effect he has noticed is that he \"is not going bald\" unlike many men in his family.    - No new medications, diagnoses, surgeries.      REVIEW OF DIAGNOSTICS:  8/24/22 - PSA 2.09ug/L  3/14/22 - PSA 2.29ug/L  3/16/21 - PSA 1.95ug/L  6/12/20 - PSA 1.91  6/24/19 - PSA 1.86  9/25/18 - PSA 2.05    Current Outpatient Medications   Medication Sig Dispense Refill     ARTIFICIAL TEAR SOLUTION OP Apply to eye daily        atorvastatin (LIPITOR) 20 MG tablet Take 1 tablet (20 mg) by mouth daily 90 tablet 4     clobetasol (TEMOVATE) 0.05 % external ointment Apply topically 2 times daily For more bothersome lesions 120 g 1     clobetasol (TEMOVATE) 0.05 % external solution Apply  topically 2 times daily as needed to the scalp. 200 mL 11     clobetasol propionate 0.05 % SHAM Apply sparingly to dry scalp 3 x weekly as needed.  Leave in place for 15 m then add water, lather and rinse 1 Bottle 5     desonide (DESOWEN) 0.05 % " ointment Apply topically to affected areas twice daily as instructed. 180 g 3     finasteride (PROSCAR) 5 MG tablet TAKE 1 TABLET DAILY 90 tablet 3     fluocinolone acetonide (DERMA SMOOTHE/FS BODY) 0.01 % external oil Apply topically 2 times daily (Patient not taking: Reported on 6/16/2022) 118.28 mL 3     hydrochlorothiazide (HYDRODIURIL) 25 MG tablet Take 1 tablet (25 mg) by mouth daily 90 tablet 4     hydrocortisone butyrate (LOCOID) 0.1 % SOLN Apply sparingly to affected area(s) of beard twice daily 180 mL 3     ketoconazole (NIZORAL) 2 % cream Apply twice daily to the nose as needed for white/scaling. 60 g 2     ketoconazole (NIZORAL) 2 % shampoo Three times per week in the shower: Lather into scalp, leave in place for 3-5 minutes, then rinse. 360 mL 12     Multiple Vitamin (MULTIVITAMIN) per tablet Take 1 tablet by mouth daily 1 tab daily       mycophenolate (GENERIC EQUIVALENT) 500 MG tablet Take 1 tablet (500 mg) by mouth every morning (before breakfast) 360 tablet 0     tacrolimus (PROTOPIC) 0.1 % external ointment Apply topically to affected areas as instructed. 300 g 3     triamcinolone (KENALOG) 0.1 % external cream Apply topically to affected areas as instructed. 454 g 11     triamcinolone (KENALOG) 0.1 % external ointment Apply topically 2 times daily For lesions that are more mild 453.6 g 1       ALLERGIES: Patient has no known allergies.      REVIEW OF SYSTEMS:  As above in HPI  GENERAL PHYSICAL EXAM: VIDEO VISIT - no vitals  Vitals: There were no vitals taken for this visit.  There is no height or weight on file to calculate BMI.    GENERAL: Well groomed, well developed, well nourished male in NAD.  NEURO: Alert and oriented x 3.  PSYCH: Normal mood and affect, pleasant and agreeable during interview and exam.    RADIOLOGY: The following tests were reviewed:   MRI PROSTATE:  7/6/2018 2:48 PM     CLINICAL HISTORY: nodule left apex; Prostate nodule.  PSA of 2.1  06/21/2018.      Comparison: MR pelvis  1/27/2006.     TECHNIQUE:      The following sequences were obtained: High-resolution axial  T2-weighted, coronal T2-weighted, 3D volumetric T2-weighted, axial  pre-contrast T1, axial diffusion-weighted, axial apparent diffusion  coefficient and axial dynamic contrast-enhanced T1. Postcontrast  images were evaluated on a separate workstation to evaluate dynamic  contrast enhancement.  Contrast dose: 7.5mL Gadavist     The images are interpreted according to PI-RADS v.2     FINDINGS:  Prostate gland size: 4.2 x 5.4 x 5.4 cm  Volume: 64 g     Peripheral zone: Linear hypointensity within distinct margins. Focal  abnormality on ADC or diffusion weighted imaging.     PI-RADS:  Peripheral zone T2: 2  Diffusion-weighted image: 1    Contrast-enhanced images: Negative     Overall assessment: 1     Transitional zone: In the left anterior mid gland is an ill-defined  mildly heterogeneous 1.3 x 1.3 cm area of T2 intermediate signal from  2-3 o'clock (series 8, image 20/series 10, image 17/series 11, image  17) with indistinct hypointensity on ADC. Regional enhancement which  is isointense into adjacent BPH nodules. There is diffuse hypertrophy  of the central gland with BPH type changes.      PI-RADS:  Transition zone T2: 3  Diffusion-weighted image: 3  Contrast-enhanced images: Negative     Overall assessment: 3     Remainder of the pelvis:  No significant pelvic lymphadenopathy. No  concerning osseous lesion. Colonic diverticulosis without convincing  evidence of active inflammation. Bladder is partially decompressed. No  focal lesion.                                                                       IMPRESSION:   1. Based on the most suspicious abnormality, this exam is  characterized as PIRADS 3 - Intermediate probability.  The presence of  clinically significant cancer is equivocal.  The most suspicious  abnormality is a ill-defined 1.3 cm region in the left mid gland  transitional zone from 2-3 o'clock, and there is  no evidence of  extracapsular extension.  2. No evidence of extraprostatic malignancy. No suspicious adenopathy  or evidence of pelvic metastases.    LABS: The last test results for Mr. Luan Mitchell were reviewed:  PSA -   Lab Results   Component Value Date    PSA 2.09 08/24/2022    PSA 2.29 03/14/2022    PSA 1.95 03/16/2021    PSA 1.91 06/12/2020    PSA 1.86 06/24/2019    PSA 1.82 03/14/2019    PSA 2.05 09/25/2018    PSA 2.10 06/21/2018    PSA 2.42 02/05/2018    PSA 2.09 01/10/2017    PSA 1.96 02/14/2011    PSA 2.47 02/25/2010     BMP -   Recent Labs   Lab Test 06/30/22  1148 03/14/22  1103 12/16/21  1003    140 142   POTASSIUM 3.6 3.8 3.9   CHLORIDE 104 105 105   CO2 29 28 30   BUN 16 19 17   CR 1.18 1.09 0.92   *  117* 113* 103*   JOSHUA 9.3 9.4 9.6       CBC -   Recent Labs   Lab Test 06/30/22  1148 03/14/22  1103 12/16/21  1003   WBC 7.4 6.9 5.6   HGB 15.5 15.6 14.6    175 157       ASSESSMENT:   1) BPH on finasteride with stable PSA    PLAN:   - Finasteride 5mg daily - refilled   - Return in 6 months for an in-person visit with another PSA first.  We will also do a prostate exam on that date    VIDEO DURATION: 13 minutes (3:36 - 3:44 + 5 minutes documentation on DOS)    Carolynn Carroll PA-C  Department of Urologic Surgery

## 2022-09-22 ENCOUNTER — OFFICE VISIT (OUTPATIENT)
Dept: DERMATOLOGY | Facility: CLINIC | Age: 77
End: 2022-09-22
Payer: COMMERCIAL

## 2022-09-22 DIAGNOSIS — L10.2 PEMPHIGUS FOLIACEUS (H): Primary | ICD-10-CM

## 2022-09-22 DIAGNOSIS — D22.9 MULTIPLE NEVI: ICD-10-CM

## 2022-09-22 DIAGNOSIS — L82.1 SEBORRHEIC KERATOSES: ICD-10-CM

## 2022-09-22 DIAGNOSIS — L57.0 ACTINIC KERATOSIS: ICD-10-CM

## 2022-09-22 PROCEDURE — 99214 OFFICE O/P EST MOD 30 MIN: CPT | Mod: 25 | Performed by: STUDENT IN AN ORGANIZED HEALTH CARE EDUCATION/TRAINING PROGRAM

## 2022-09-22 PROCEDURE — 17000 DESTRUCT PREMALG LESION: CPT | Mod: GC | Performed by: STUDENT IN AN ORGANIZED HEALTH CARE EDUCATION/TRAINING PROGRAM

## 2022-09-22 ASSESSMENT — PAIN SCALES - GENERAL: PAINLEVEL: NO PAIN (0)

## 2022-09-22 NOTE — PATIENT INSTRUCTIONS
Cryotherapy    What is it?  Use of a very cold liquid, such as liquid nitrogen, to freeze and destroy abnormal skin cells that need to be removed    What should I expect?  Tenderness and redness  A small blister that might grow and fill with dark purple blood. There may be crusting.  More than one treatment may be needed if the lesions do not go away.    How do I care for the treated area?  Gently wash the area with your hands when bathing.  Use a thin layer of Vaseline to help with healing. You may use a Band-Aid.   The area should heal within 7-10 days and may leave behind a pink or lighter color.   Do not use an antibiotic or Neosporin ointment.   You may take acetaminophen (Tylenol) for pain.     Call your doctor if you have:  Severe pain  Signs of infection (warmth, redness, cloudy yellow drainage, and or a bad smell)  Questions or concerns    Who should I call with questions?      [facility]: [phone]      [facility]: [phone]      For urgent needs outside of business hours call the [facility] at [phone] and ask for the dermatology resident on call

## 2022-09-22 NOTE — PROGRESS NOTES
"I have personally examined this patient and agree with the resident doctor's documentation and plan of care. I have reviewed and amended the resident's note as necessary. I was present for key portions of any procedures performed.The documentation accurately reflects my clinical observations, diagnoses, treatment and follow-up plans.     Gabo Cespedes MD  Dermatology Staff      [facility] Dermatology Note   Encounter Date: Sep 22, 2022  Office visit    Dermatology Problem List:    [facility]  #  Pemphigus foliaceus  - s/p shave bx 6/17/21 (L superior shoulder) and 7/26/10 (L arm)  - Last pemphigus panel (3/25/21): Dsg1 IgG 136 and Dsg2 IgG 2  - Goals of treatment per Mr. Mitchell:  \"Prevent itching and secondary infections. Not interested in complete clearance\"  - Current Tx: MMF 500mg daily              - stopped MMF 3/2019, re-started  mg PO BID on 8/13/20 but reduced to 500 mg every day on 10/21/20              - Last safety labs (6/2022): wnl  - Adjunct Tx: clobetasol and tacrolimus ointment  # Tinea pedis  - Current Tx:  Terbinafine 1% cream  # Benign Bx  - VK, R calf, s/p shave bx 3/25/21  - mild DN, R neck, s/p shave bx 3/22/18    ___________________________________________    Assessment & Plan:    # Pemphigus foliaceus  Condition is stable on current regimen. Will continue with MMF 500mg daily. Given stability of labs, will space lab checks to q6m  - Continue with MMF 500mg daily   > No refills today  - Continue with topicals   > No refills today  - Will plan to recheck labs in 12/2022 f/u    # AK - L angle of jaw (x1)  Discussed the possible etiologies, clinical course, and management options of this condition with the patient. Will perform cryotherapy on the areas above  - See procedure note below    # Multiple clinically banal appearing nevi  Discussed the possible etiologies, clinical course, and management options of this benign condition with the patient.  - Reviewed the " ABCDEs of melanoma  - Recommend daily sunscreen use with SPF at least 30    # Seborrheic keratoses  Discussed the possible etiologies, clinical course, and management options of this benign condition with the patient.  - Reassurance provided    Procedures Performed:  Cryotherapy procedure note  - location: L angle of jaw  - number of lesions treated: 1  After verbal consent and discussion of risks and benefits including but no limited to dyspigmentation/scar, blister, and pain, lesions treated with 1-2mm freeze border for 2 cycles with liquid nitrogen, post cryotherapy instructions provided    Follow-up: 3m     Staff Involved:  Patient was seen and staffed with attending physician Dr. Rubina Feldman MD  Med/Derm Resident PGY-5  P:[phone]      ___________________________________________      CC: Derm Problem (Luan is here today for a skin check and a recheck of pemphigus )      HPI:  Mr. Luan Mitchell is a(n) 77 year old male who presents to clinic today for PF follow up. Reports:  - condition is doing well on MMF 500mg daily  - has a couple new areas but they respond to topical steroids  - no extreme pruritus  - would also like a recheck of the spots on the back of his neck  - denies any pain, pruritus, or bleeding  - can't see if they're changing due to location  - otherwise feeling well in usual state of health    Physical exam:  General: in no acute distress, well-developed, well-nourished  Skin:  - skin type: medium fair  - Erythematous plaques w/ cornflake scale on trunk and some surrounding hyperpigmentation  - erythematous macule with rough, gritty scale on L angle of jaw (x1)  - multiple light brown to tan papules with reassuring pigment network on dermoscopy on neck, trunk, and extremities  - tan to light brown waxy stuck on papules and plaques on trunk  - No other lesions of concern on areas examined.     Medications:  Current Outpatient Medications   Medication     ARTIFICIAL TEAR  SOLUTION OP     atorvastatin (LIPITOR) 20 MG tablet     clobetasol (TEMOVATE) 0.05 % external ointment     clobetasol (TEMOVATE) 0.05 % external solution     clobetasol propionate 0.05 % SHAM     desonide (DESOWEN) 0.05 % ointment     finasteride (PROSCAR) 5 MG tablet     hydrochlorothiazide (HYDRODIURIL) 25 MG tablet     hydrocortisone butyrate (LOCOID) 0.1 % SOLN     ketoconazole (NIZORAL) 2 % cream     ketoconazole (NIZORAL) 2 % shampoo     Multiple Vitamin (MULTIVITAMIN) per tablet     mycophenolate (GENERIC EQUIVALENT) 500 MG tablet     tacrolimus (PROTOPIC) 0.1 % external ointment     triamcinolone (KENALOG) 0.1 % external cream     triamcinolone (KENALOG) 0.1 % external ointment     fluocinolone acetonide (DERMA SMOOTHE/FS BODY) 0.01 % external oil     No current facility-administered medications for this visit.      Past Medical History:   Patient Active Problem List   Diagnosis     Pemphigus foliaceus     Hyperlipidemia LDL goal <130     BPH (benign prostatic hyperplasia)     Encounter for long-term (current) use of high-risk medication     Dermatitis, seborrheic     Lightheadedness     Age-related nuclear cataract of both eyes     SOB (shortness of breath)     Past Medical History:   Diagnosis Date     BPH (benign prostatic hyperplasia)      Essential hypertension 02/2018     Hyperlipidemia LDL goal <130      Pemphigus     pemphigus foliaceus     Retinal detachment 2009    left eye- corrected with laser     Vitreous detachment 2009    STEPHANIE QUIROGA Referred Self, MD  No address on file on close of this encounter.

## 2022-09-22 NOTE — LETTER
"9/22/2022       RE: Luan Mitchell  [address] [facility] [address]     Dear Colleague,    Thank you for referring your patient, Luan Mitchell, to the [facility] DERMATOLOGY CLINIC [facility]. Please see a copy of my visit note below.    I have personally examined this patient and agree with the resident doctor's documentation and plan of care. I have reviewed and amended the resident's note as necessary. I was present for key portions of any procedures performed.The documentation accurately reflects my clinical observations, diagnoses, treatment and follow-up plans.     Gabo Cespedes MD  Dermatology Staff      [facility] Dermatology Note   Encounter Date: Sep 22, 2022  Office visit    Dermatology Problem List:    Aroldo/Gaston CC  #  Pemphigus foliaceus  - s/p shave bx 6/17/21 (L superior shoulder) and 7/26/10 (L arm)  - Last pemphigus panel (3/25/21): Dsg1 IgG 136 and Dsg2 IgG 2  - Goals of treatment per Mr. Mitchell:  \"Prevent itching and secondary infections. Not interested in complete clearance\"  - Current Tx: MMF 500mg daily              - stopped MMF 3/2019, re-started  mg PO BID on 8/13/20 but reduced to 500 mg every day on 10/21/20              - Last safety labs (6/2022): wnl  - Adjunct Tx: clobetasol and tacrolimus ointment  # Tinea pedis  - Current Tx:  Terbinafine 1% cream  # Benign Bx  - VK, R calf, s/p shave bx 3/25/21  - mild DN, R neck, s/p shave bx 3/22/18    ___________________________________________    Assessment & Plan:    # Pemphigus foliaceus  Condition is stable on current regimen. Will continue with MMF 500mg daily. Given stability of labs, will space lab checks to q6m  - Continue with MMF 500mg daily   > No refills today  - Continue with topicals   > No refills today  - Will plan to recheck labs in 12/2022 f/u    # AK - L angle of jaw (x1)  Discussed the possible etiologies, clinical " course, and management options of this condition with the patient. Will perform cryotherapy on the areas above  - See procedure note below    # Multiple clinically banal appearing nevi  Discussed the possible etiologies, clinical course, and management options of this benign condition with the patient.  - Reviewed the ABCDEs of melanoma  - Recommend daily sunscreen use with SPF at least 30    # Seborrheic keratoses  Discussed the possible etiologies, clinical course, and management options of this benign condition with the patient.  - Reassurance provided    Procedures Performed:  Cryotherapy procedure note  - location: L angle of jaw  - number of lesions treated: 1  After verbal consent and discussion of risks and benefits including but no limited to dyspigmentation/scar, blister, and pain, lesions treated with 1-2mm freeze border for 2 cycles with liquid nitrogen, post cryotherapy instructions provided    Follow-up: 3m     Staff Involved:  Patient was seen and staffed with attending physician Dr. Rubina Feldman MD  Med/Derm Resident PGY-5  P:[phone]      ___________________________________________      CC: Derm Problem (Luan is here today for a skin check and a recheck of pemphigus )      HPI:  Mr. Luan Mitchell is a(n) 77 year old male who presents to clinic today for PF follow up. Reports:  - condition is doing well on MMF 500mg daily  - has a couple new areas but they respond to topical steroids  - no extreme pruritus  - would also like a recheck of the spots on the back of his neck  - denies any pain, pruritus, or bleeding  - can't see if they're changing due to location  - otherwise feeling well in usual state of health    Physical exam:  General: in no acute distress, well-developed, well-nourished  Skin:  - skin type: medium fair  - Erythematous plaques w/ cornflake scale on trunk and some surrounding hyperpigmentation  - erythematous macule with rough, gritty scale on L angle of jaw  (x1)  - multiple light brown to tan papules with reassuring pigment network on dermoscopy on neck, trunk, and extremities  - tan to light brown waxy stuck on papules and plaques on trunk  - No other lesions of concern on areas examined.     Medications:  Current Outpatient Medications   Medication     ARTIFICIAL TEAR SOLUTION OP     atorvastatin (LIPITOR) 20 MG tablet     clobetasol (TEMOVATE) 0.05 % external ointment     clobetasol (TEMOVATE) 0.05 % external solution     clobetasol propionate 0.05 % SHAM     desonide (DESOWEN) 0.05 % ointment     finasteride (PROSCAR) 5 MG tablet     hydrochlorothiazide (HYDRODIURIL) 25 MG tablet     hydrocortisone butyrate (LOCOID) 0.1 % SOLN     ketoconazole (NIZORAL) 2 % cream     ketoconazole (NIZORAL) 2 % shampoo     Multiple Vitamin (MULTIVITAMIN) per tablet     mycophenolate (GENERIC EQUIVALENT) 500 MG tablet     tacrolimus (PROTOPIC) 0.1 % external ointment     triamcinolone (KENALOG) 0.1 % external cream     triamcinolone (KENALOG) 0.1 % external ointment     fluocinolone acetonide (DERMA SMOOTHE/FS BODY) 0.01 % external oil     No current facility-administered medications for this visit.      Past Medical History:   Patient Active Problem List   Diagnosis     Pemphigus foliaceus     Hyperlipidemia LDL goal <130     BPH (benign prostatic hyperplasia)     Encounter for long-term (current) use of high-risk medication     Dermatitis, seborrheic     Lightheadedness     Age-related nuclear cataract of both eyes     SOB (shortness of breath)     Past Medical History:   Diagnosis Date     BPH (benign prostatic hyperplasia)      Essential hypertension 02/2018     Hyperlipidemia LDL goal <130      Pemphigus     pemphigus foliaceus     Retinal detachment 2009    left eye- corrected with laser     Vitreous detachment 2009    LE       CC Referred Self, MD  No address on file on close of this encounter.

## 2022-09-22 NOTE — NURSING NOTE
Dermatology Rooming Note    Luan Mitchell's goals for this visit include:   Chief Complaint   Patient presents with     Derm Problem     Luan is here today for a skin check and a recheck of pemphigus      Evelyn Luis, SHAY

## 2022-10-10 ENCOUNTER — MYC MEDICAL ADVICE (OUTPATIENT)
Dept: INTERNAL MEDICINE | Facility: CLINIC | Age: 77
End: 2022-10-10

## 2022-10-20 NOTE — PATIENT INSTRUCTIONS
To schedule an appointment to  your 24-hour blood pressure monitor, please call [phone]     Anesthesia Type: 1% lidocaine with 1:100,000 epinephrine

## 2022-10-27 ENCOUNTER — OFFICE VISIT (OUTPATIENT)
Dept: OPHTHALMOLOGY | Facility: CLINIC | Age: 77
End: 2022-10-27
Attending: OPHTHALMOLOGY
Payer: COMMERCIAL

## 2022-10-27 DIAGNOSIS — H33.22 OLD RETINAL DETACHMENT OF LEFT EYE: Primary | ICD-10-CM

## 2022-10-27 PROCEDURE — 92015 DETERMINE REFRACTIVE STATE: CPT

## 2022-10-27 PROCEDURE — 99213 OFFICE O/P EST LOW 20 MIN: CPT | Performed by: OPHTHALMOLOGY

## 2022-10-27 PROCEDURE — G0463 HOSPITAL OUTPT CLINIC VISIT: HCPCS | Mod: 25

## 2022-10-27 ASSESSMENT — VISUAL ACUITY
OD_PH_CC: 20/20
OD_CC: 20/30
CORRECTION_TYPE: GLASSES
OS_CC+: -1
OD_PH_CC+: -3
OS_CC: 20/20
METHOD: SNELLEN - LINEAR

## 2022-10-27 ASSESSMENT — CONF VISUAL FIELD
METHOD: COUNTING FINGERS
OS_INFERIOR_TEMPORAL_RESTRICTION: 0
OD_SUPERIOR_NASAL_RESTRICTION: 0
OD_SUPERIOR_TEMPORAL_RESTRICTION: 0
OD_INFERIOR_NASAL_RESTRICTION: 0
OS_SUPERIOR_NASAL_RESTRICTION: 0
OS_INFERIOR_NASAL_RESTRICTION: 0
OD_INFERIOR_TEMPORAL_RESTRICTION: 0
OS_SUPERIOR_TEMPORAL_RESTRICTION: 0
OD_NORMAL: 1
OS_NORMAL: 1

## 2022-10-27 ASSESSMENT — REFRACTION_MANIFEST
OS_CYLINDER: +1.00
OS_ADD: +2.50
OS_SPHERE: -1.25
OD_ADD: +2.50
OS_AXIS: 010
OD_CYLINDER: +0.75
OD_SPHERE: -1.50
OD_AXIS: 165

## 2022-10-27 ASSESSMENT — TONOMETRY
OD_IOP_MMHG: 12
IOP_METHOD: TONOPEN
OS_IOP_MMHG: 10

## 2022-10-27 ASSESSMENT — REFRACTION_WEARINGRX
OS_SPHERE: -1.75
OD_AXIS: 163
OS_CYLINDER: +0.75
OS_AXIS: 021
SPECS_TYPE: BIFOCAL
OS_ADD: +2.50
OD_SPHERE: -1.00
OD_CYLINDER: +0.50
OD_ADD: +2.50

## 2022-10-27 ASSESSMENT — SLIT LAMP EXAM - LIDS
COMMENTS: 1+ BLEPHARITIS
COMMENTS: 1+ BLEPHARITIS

## 2022-10-27 ASSESSMENT — CUP TO DISC RATIO
OD_RATIO: 0.3
OS_RATIO: 0.3

## 2022-10-27 NOTE — NURSING NOTE
Chief Complaints and History of Present Illnesses   Patient presents with     Follow Up     1 year follow up Operculated hole Left Eye s/p retinopexy      Chief Complaint(s) and History of Present Illness(es)     Follow Up            Comments: 1 year follow up Operculated hole Left Eye s/p retinopexy           Comments    Pt states vision is about the same as last visit, no worse. No eye pain today.  No flashes or floaters. No redness. Dryness in each eye, relief with Blink drops.    JEANETTE Trevizo October 27, 2022 9:31 AM

## 2022-10-27 NOTE — PROGRESS NOTES
I have confirmed the patient's and reviewed Past Medical History, Past Surgical History, Social History, Family History, Problem List, Medication List and agree with Tech note.     CC: s/p Retinal detachment Repair left eye       Interval Hx 10/27/22:     HPI: History of myopia and cataract, last seen in 2021, retinopexy left eye       Assessment/plan:   1.  Nuclear sclerotic cataract  Both eyes                         - Not visually significant                        - refract as needed, new prescription glasses issued today                         - monitor yearly       2.  Blepharitis both eyes:                         -wet compress twice a day                         - artificial tears over the counter                         - monitor      3.  Operculated hole Left Eye s/p retinopexy                         - Retinal Detachment precautions reviewed    4. History of Retinal Detachment Repair, left eye - 2009    -     Return to clinic 1 year with refraction, Dilated Fundus Exam        Mona Maria MD PhD.  Professor & Chair

## 2022-12-22 ENCOUNTER — OFFICE VISIT (OUTPATIENT)
Dept: DERMATOLOGY | Facility: CLINIC | Age: 77
End: 2022-12-22
Payer: COMMERCIAL

## 2022-12-22 ENCOUNTER — LAB (OUTPATIENT)
Dept: LAB | Facility: CLINIC | Age: 77
End: 2022-12-22
Payer: COMMERCIAL

## 2022-12-22 DIAGNOSIS — L82.1 SEBORRHEIC KERATOSES: ICD-10-CM

## 2022-12-22 DIAGNOSIS — D22.9 MULTIPLE NEVI: ICD-10-CM

## 2022-12-22 DIAGNOSIS — Z51.81 THERAPEUTIC DRUG MONITORING: ICD-10-CM

## 2022-12-22 DIAGNOSIS — Z12.83 SKIN EXAM FOR MALIGNANT NEOPLASM: Primary | ICD-10-CM

## 2022-12-22 DIAGNOSIS — L10.2 PEMPHIGUS FOLIACEUS (H): ICD-10-CM

## 2022-12-22 LAB
ALBUMIN SERPL BCG-MCNC: 4.6 G/DL (ref 3.5–5.2)
ALP SERPL-CCNC: 41 U/L (ref 40–129)
ALT SERPL W P-5'-P-CCNC: 31 U/L (ref 10–50)
ANION GAP SERPL CALCULATED.3IONS-SCNC: 11 MMOL/L (ref 7–15)
AST SERPL W P-5'-P-CCNC: 27 U/L (ref 10–50)
BASOPHILS # BLD AUTO: 0 10E3/UL (ref 0–0.2)
BASOPHILS NFR BLD AUTO: 0 %
BILIRUB SERPL-MCNC: 0.5 MG/DL
BUN SERPL-MCNC: 27.7 MG/DL (ref 8–23)
CALCIUM SERPL-MCNC: 9.9 MG/DL (ref 8.8–10.2)
CHLORIDE SERPL-SCNC: 103 MMOL/L (ref 98–107)
CREAT SERPL-MCNC: 1.04 MG/DL (ref 0.67–1.17)
DEPRECATED HCO3 PLAS-SCNC: 27 MMOL/L (ref 22–29)
EOSINOPHIL # BLD AUTO: 0.1 10E3/UL (ref 0–0.7)
EOSINOPHIL NFR BLD AUTO: 1 %
ERYTHROCYTE [DISTWIDTH] IN BLOOD BY AUTOMATED COUNT: 12.5 % (ref 10–15)
GFR SERPL CREATININE-BSD FRML MDRD: 73 ML/MIN/1.73M2
GLUCOSE SERPL-MCNC: 110 MG/DL (ref 70–99)
HCT VFR BLD AUTO: 42.8 % (ref 40–53)
HGB BLD-MCNC: 15 G/DL (ref 13.3–17.7)
IMM GRANULOCYTES # BLD: 0 10E3/UL
IMM GRANULOCYTES NFR BLD: 0 %
LYMPHOCYTES # BLD AUTO: 1.5 10E3/UL (ref 0.8–5.3)
LYMPHOCYTES NFR BLD AUTO: 19 %
MCH RBC QN AUTO: 32 PG (ref 26.5–33)
MCHC RBC AUTO-ENTMCNC: 35 G/DL (ref 31.5–36.5)
MCV RBC AUTO: 91 FL (ref 78–100)
MONOCYTES # BLD AUTO: 0.7 10E3/UL (ref 0–1.3)
MONOCYTES NFR BLD AUTO: 10 %
NEUTROPHILS # BLD AUTO: 5.3 10E3/UL (ref 1.6–8.3)
NEUTROPHILS NFR BLD AUTO: 70 %
NRBC # BLD AUTO: 0 10E3/UL
NRBC BLD AUTO-RTO: 0 /100
PLATELET # BLD AUTO: 161 10E3/UL (ref 150–450)
POTASSIUM SERPL-SCNC: 3.9 MMOL/L (ref 3.4–5.3)
PROT SERPL-MCNC: 7.1 G/DL (ref 6.4–8.3)
RBC # BLD AUTO: 4.69 10E6/UL (ref 4.4–5.9)
SODIUM SERPL-SCNC: 141 MMOL/L (ref 136–145)
WBC # BLD AUTO: 7.6 10E3/UL (ref 4–11)

## 2022-12-22 PROCEDURE — 99214 OFFICE O/P EST MOD 30 MIN: CPT | Mod: GC | Performed by: STUDENT IN AN ORGANIZED HEALTH CARE EDUCATION/TRAINING PROGRAM

## 2022-12-22 PROCEDURE — 85025 COMPLETE CBC W/AUTO DIFF WBC: CPT | Performed by: PATHOLOGY

## 2022-12-22 PROCEDURE — 36415 COLL VENOUS BLD VENIPUNCTURE: CPT | Performed by: PATHOLOGY

## 2022-12-22 PROCEDURE — 80053 COMPREHEN METABOLIC PANEL: CPT | Performed by: PATHOLOGY

## 2022-12-22 RX ORDER — KETOCONAZOLE 20 MG/ML
SHAMPOO TOPICAL
Qty: 360 ML | Refills: 12 | Status: SHIPPED | OUTPATIENT
Start: 2022-12-22 | End: 2023-09-21

## 2022-12-22 ASSESSMENT — PAIN SCALES - GENERAL: PAINLEVEL: NO PAIN (0)

## 2022-12-22 NOTE — NURSING NOTE
Dermatology Rooming Note    Luan Mitchell's goals for this visit include:   Chief Complaint   Patient presents with     Derm Problem     Pemphigus follow-up and mole check     Unique Bashir, Visit Facilitator

## 2022-12-22 NOTE — PROGRESS NOTES
"[facility] Dermatology Note   Encounter Date: Dec 22, 2022  Office visit    Dermatology Problem List:    Aroldo/Gaston QUIROGA    Last FBSE: 12/22/22    #  Pemphigus foliaceus  - s/p shave bx 6/17/21 (L superior shoulder) and 7/26/10 (L arm)  - Last pemphigus panel (3/25/21): Dsg1 IgG 136 and Dsg2 IgG 2  - Goals of treatment per Mr. Mitchell:  \"Prevent itching and secondary infections. Not interested in complete clearance\"  - Current Tx: MMF 500mg daily              - stopped MMF 3/2019, re-started  mg PO BID on 8/13/20 but reduced to 500 mg every day on 10/21/20              - Last safety labs (12/2022): pending  - Adjunct Tx: clobetasol, triamcinolone 0.1%, and tacrolimus ointment  # Tinea pedis  - Current Tx:  Terbinafine 1% cream  # Benign Bx  - VK, R calf, s/p shave bx 3/25/21  - mild DN, R neck, s/p shave bx 3/22/18    ___________________________________________    Assessment & Plan:    # Pemphigus foliaceus  Condition is well controlled on current regimen. Will obtain safety monitoring labs and continue with current regimen  - Ordered CBC w/diff and CMP  - Continue with MMF 500mg daily  - Continue with clobetasol , triamcinolone, and tacrolimus ointments BID PRN  - Renewed keto 2% shampoo    # Multiple clinically banal appearing nevi  Discussed the possible etiologies, clinical course, and management options of this benign condition with the patient.  - Reviewed the ABCDEs of melanoma  - Recommend daily sunscreen use with SPF at least 30    # Seborrheic keratoses  Discussed the possible etiologies, clinical course, and management options of this benign condition with the patient.  - Reassurance provided    Procedures Performed:  None    Follow-up: 3m (PF check)    Staff Involved:  Patient was seen and staffed with attending physician Dr. Rubina Feldman MD  Med/Derm Resident PGY-5  P:[phone]  ___________________________________________      CC: Derm Problem (Ella " follow-up and mole check)      HPI:  Mr. Luan Mitchell is a(n) 77 year old male who presents to clinic today for skin exam and PF check. Reports:  - had a minor flare about a month ago  - has since calmed down  - notes some itchy spots on his scalp  - otherwise feeling well in usual state of health    Physical exam:  General: in no acute distress, well-developed, well-nourished  Skin:  - skin type: medium fair  - Erythematous plaques w/ cornflake scale on trunk, scalp, and face with some surrounding hyperpigmentation  - multiple light brown to tan papules with reassuring pigment network on dermoscopy on posterior neck, thighs, and trunk (similar pigment network as prior)  - tan to light brown waxy stuck on papules and plaques on trunk  - No other lesions of concern on areas examined.     Medications:  Current Outpatient Medications   Medication     ARTIFICIAL TEAR SOLUTION OP     atorvastatin (LIPITOR) 20 MG tablet     clobetasol (TEMOVATE) 0.05 % external ointment     finasteride (PROSCAR) 5 MG tablet     hydrochlorothiazide (HYDRODIURIL) 25 MG tablet     hydrocortisone butyrate (LOCOID) 0.1 % SOLN     ketoconazole (NIZORAL) 2 % cream     ketoconazole (NIZORAL) 2 % shampoo     Multiple Vitamin (MULTIVITAMIN) per tablet     mycophenolate (GENERIC EQUIVALENT) 500 MG tablet     tacrolimus (PROTOPIC) 0.1 % external ointment     triamcinolone (KENALOG) 0.1 % external ointment     No current facility-administered medications for this visit.      Past Medical History:   Patient Active Problem List   Diagnosis     Pemphigus foliaceus     Hyperlipidemia LDL goal <130     BPH (benign prostatic hyperplasia)     Encounter for long-term (current) use of high-risk medication     Dermatitis, seborrheic     Lightheadedness     Age-related nuclear cataract of both eyes     SOB (shortness of breath)     Past Medical History:   Diagnosis Date     BPH (benign prostatic hyperplasia)      Essential hypertension 02/2018      Hyperlipidemia LDL goal <130      Pemphigus     pemphigus foliaceus     Retinal detachment 2009    left eye- corrected with laser     Vitreous detachment 2009    LE       CC Referred Self, MD  No address on file on close of this encounter.

## 2022-12-22 NOTE — RESULT ENCOUNTER NOTE
Reviewed results showing stable CBC/CMP. Discussed these results with patient over the MyChart on 12/22/22    CC'ing Dr. Cespedes as ZEN

## 2023-01-31 ENCOUNTER — TELEPHONE (OUTPATIENT)
Dept: DERMATOLOGY | Facility: CLINIC | Age: 78
End: 2023-01-31
Payer: COMMERCIAL

## 2023-01-31 NOTE — TELEPHONE ENCOUNTER
Lvm, informing patient that his appointment 3/16 with Dr. Kendrick has been cancelled.Dr. Kendrick will not be in clinic that day. Please call [phone] to reschedule.

## 2023-01-31 NOTE — TELEPHONE ENCOUNTER
Pt called regarding below. I was unable to connect to the care team. Please call the pt back to schedule. Due to the protocol, the clinic will need to review. Thanks

## 2023-02-23 ENCOUNTER — PRE VISIT (OUTPATIENT)
Dept: UROLOGY | Facility: CLINIC | Age: 78
End: 2023-02-23
Payer: COMMERCIAL

## 2023-02-23 NOTE — TELEPHONE ENCOUNTER
Reason for visit: Follow up     Relevant information: elevated psa    Records/imaging/labs/orders: lab appt in epic    Pt called: no    At Rooming: normal

## 2023-02-27 ENCOUNTER — LAB (OUTPATIENT)
Dept: LAB | Facility: CLINIC | Age: 78
End: 2023-02-27
Payer: COMMERCIAL

## 2023-02-27 DIAGNOSIS — R97.20 ELEVATED PROSTATE SPECIFIC ANTIGEN (PSA): ICD-10-CM

## 2023-02-27 LAB — PSA SERPL-MCNC: 2.28 NG/ML (ref 0–6.5)

## 2023-02-27 PROCEDURE — 84153 ASSAY OF PSA TOTAL: CPT | Performed by: PATHOLOGY

## 2023-02-27 PROCEDURE — 36415 COLL VENOUS BLD VENIPUNCTURE: CPT | Performed by: PATHOLOGY

## 2023-02-28 ENCOUNTER — TELEPHONE (OUTPATIENT)
Dept: UROLOGY | Facility: CLINIC | Age: 78
End: 2023-02-28
Payer: COMMERCIAL

## 2023-02-28 NOTE — TELEPHONE ENCOUNTER
Left VM for patient to call clinic back to convert appointment to video as Amparo Carroll is unable to see patients in person this coming Thursday.   Stephanie Carr LPN  Urology Clinic Service   [facility]

## 2023-03-02 ENCOUNTER — VIRTUAL VISIT (OUTPATIENT)
Dept: UROLOGY | Facility: CLINIC | Age: 78
End: 2023-03-02
Payer: COMMERCIAL

## 2023-03-02 DIAGNOSIS — N40.1 BENIGN PROSTATIC HYPERPLASIA WITH LOWER URINARY TRACT SYMPTOMS, SYMPTOM DETAILS UNSPECIFIED: ICD-10-CM

## 2023-03-02 PROCEDURE — 99212 OFFICE O/P EST SF 10 MIN: CPT | Mod: VID | Performed by: PHYSICIAN ASSISTANT

## 2023-03-02 RX ORDER — FINASTERIDE 5 MG/1
1 TABLET, FILM COATED ORAL DAILY
Qty: 90 TABLET | Refills: 3 | Status: SHIPPED | OUTPATIENT
Start: 2023-03-02 | End: 2023-09-07

## 2023-03-02 NOTE — PROGRESS NOTES
Video-Visit Details    Type of service:  Video Visit    Video Start Time (time video started): 1:32    Video End Time (time video stopped): 1:40    Originating Location (pt. Location): Home    Distant Location (provider location):  Off-site    Mode of Communication:  Video Conference via [employer]  VIDEO DURATION: 13 minutes (1:32 - 1:40 + 5 minutes documentation on DOS)

## 2023-03-02 NOTE — PATIENT INSTRUCTIONS
PLAN:   - Finasteride refilled today  - Return in 6 months for an in-person visit with another PSA first.  We will also do a prostate exam on that date  - Happy (almost) birthday!    MONA [facility]

## 2023-03-02 NOTE — LETTER
3/2/2023       RE: Luan Mitchell  [address] [facility] [address]     Dear Colleague,    Thank you for referring your patient, Luan Mitchell, to the [facility] UROLOGY CLINIC [place] [facility]. Please see a copy of my visit note below.    Video-Visit Details    Type of service:  Video Visit    Video Start Time (time video started): 1:32    Video End Time (time video stopped): 1:40    Originating Location (pt. Location): Home    Distant Location (provider location):  Off-site    Mode of Communication:  Video Conference via [employer]  VIDEO DURATION: 13 minutes (1:32 - 1:40 + 5 minutes documentation on DOS)          HPI: Mr. Luan Mitchell is a 77 year old year old male presenting today for evaluation of chief complaint(s): Video Visit (6 month follow-up, PSA prior)    Today, he is unaccompanied on video.     He has PMH significant for HTN, HLD, BPH, pemphigus (on mycophenolate), abnormal LEAH s/p prostate MRI on 7/6/18 showing a PIRADS 3 lesion (intermediate probability).  For this he was subsequently seen by Dr. Gentile, last on 6/29/20 with a PSA of 1.91ug/L (3.82ug/L adjusted for finasteride).  At that time it was recommended that he continue on finasteride with continued observation of PSAs. Has had 4 prostate biopsies in the distant past, all negative.       6/1/21 - LEAH showed asymmetry R>L but no nodules.  PSA was stable at 1.95ug/L (3.90 adjusted for finasteride).    5/26/22 - Patient very concerned about PSA increase to 2.29ug/L (4.6 adjusted for finasteride).  We reviewed PCPT nomogram and continued finasteride.   8/25/22 - PSA of 2.09ug/L. Finasteride continued    Today he returns with a PSA of 2.28, stable from previous  URINARY SYMPTOMS:  Feels he is emptying fully.  May have more frequency - but is also trying to drink more water.   Has annual physical upcoming in 1-2 weeks but feels he is doing well.   Pemphigus is completely  stable.  Continues to be immunosuppressed.    No new surgeries.  Had biopsies of suspicious skin lesions - benign   Will turn 78 this year.  May defer his party until the summer, so he can have it outside.    REVIEW OF DIAGNOSTICS:  2/27/23 - PSA 2.28ug/L  8/24/22 - PSA 2.09ug/L  3/14/22 - PSA 2.29ug/L  3/16/21 - PSA 1.95ug/L  6/12/20 - PSA 1.91  6/24/19 - PSA 1.86  9/25/18 - PSA 2.05    Current Outpatient Medications   Medication Sig Dispense Refill     ARTIFICIAL TEAR SOLUTION OP Apply to eye daily        atorvastatin (LIPITOR) 20 MG tablet Take 1 tablet (20 mg) by mouth daily 90 tablet 4     clobetasol (TEMOVATE) 0.05 % external ointment Apply topically 2 times daily For more bothersome lesions 120 g 1     finasteride (PROSCAR) 5 MG tablet TAKE 1 TABLET DAILY 90 tablet 3     hydrochlorothiazide (HYDRODIURIL) 25 MG tablet Take 1 tablet (25 mg) by mouth daily 90 tablet 4     hydrocortisone butyrate (LOCOID) 0.1 % SOLN Apply sparingly to affected area(s) of beard twice daily 180 mL 3     ketoconazole (NIZORAL) 2 % cream Apply twice daily to the nose as needed for white/scaling. 60 g 2     ketoconazole (NIZORAL) 2 % external shampoo Three times per week in the shower: Lather into scalp, leave in place for 3-5 minutes, then rinse. 360 mL 12     Multiple Vitamin (MULTIVITAMIN) per tablet Take 1 tablet by mouth daily 1 tab daily       mycophenolate (GENERIC EQUIVALENT) 500 MG tablet Take 1 tablet (500 mg) by mouth every morning (before breakfast) 360 tablet 0     tacrolimus (PROTOPIC) 0.1 % external ointment Apply topically to affected areas as instructed. 300 g 3     triamcinolone (KENALOG) 0.1 % external ointment Apply topically 2 times daily For lesions that are more mild 453.6 g 1       ALLERGIES: Patient has no known allergies.      REVIEW OF SYSTEMS:  As above in HPI    GENERAL PHYSICAL EXAM:   No vitals for virtual  GENERAL: Well groomed, well developed, well nourished male in NAD.  NEURO: Alert and oriented x  3.  PSYCH: Normal mood and affect, pleasant and agreeable during interview and exam.    LABS: The last test results for Mr. Luan Mitchell were reviewed:  PSA -   Lab Results   Component Value Date    PSA 2.28 02/27/2023    PSA 2.09 08/24/2022    PSA 2.29 03/14/2022    PSA 1.95 03/16/2021    PSA 1.91 06/12/2020    PSA 1.86 06/24/2019    PSA 1.82 03/14/2019    PSA 2.05 09/25/2018    PSA 2.10 06/21/2018    PSA 2.42 02/05/2018    PSA 2.09 01/10/2017    PSA 1.96 02/14/2011    PSA 2.47 02/25/2010     BMP -   Recent Labs   Lab Test 12/22/22  1050 06/30/22  1148 03/14/22  1103    134 140   POTASSIUM 3.9 3.6 3.8   CHLORIDE 103 104 105   CO2 27 29 28   BUN 27.7* 16 19   CR 1.04 1.18 1.09   * 117*  117* 113*   JOSHUA 9.9 9.3 9.4       CBC -   Recent Labs   Lab Test 12/22/22  1050 06/30/22  1148 03/14/22  1103   WBC 7.6 7.4 6.9   HGB 15.0 15.5 15.6    159 175       ASSESSMENT:   1) BPH on finasteride with stable PSA    PLAN:   - Finasteride refilled today  - Return in 6 months for an in-person visit with another PSA first.  We will also do a prostate exam on that date    VIDEO DURATION: 13 minutes (1:32 - 1:40 + 5 minutes documentation on DOS)    Carolynn Carroll PA-C  Department of Urologic Surgery

## 2023-03-02 NOTE — NURSING NOTE
Is the patient currently in the state of [place]? YES    Visit mode:VIDEO    If the visit is dropped, the patient can be reconnected by: VIDEO VISIT: Text to cell phone: [phone]    Will anyone else be joining the visit? NO      How would you like to obtain your AVS? MyChart    Are changes needed to the allergy or medication list? NO    Reason for visit:   Chief Complaint   Patient presents with     Video Visit     6 month follow-up, PSA prior

## 2023-03-02 NOTE — PROGRESS NOTES
HPI: Mr. Luan Mitchell is a 77 year old year old male presenting today for evaluation of chief complaint(s): Video Visit (6 month follow-up, PSA prior)    Today, he is unaccompanied on video.     He has PMH significant for HTN, HLD, BPH, pemphigus (on mycophenolate), abnormal LEAH s/p prostate MRI on 7/6/18 showing a PIRADS 3 lesion (intermediate probability).  For this he was subsequently seen by Dr. Gentile, last on 6/29/20 with a PSA of 1.91ug/L (3.82ug/L adjusted for finasteride).  At that time it was recommended that he continue on finasteride with continued observation of PSAs. Has had 4 prostate biopsies in the distant past, all negative.       6/1/21 - LEAH showed asymmetry R>L but no nodules.  PSA was stable at 1.95ug/L (3.90 adjusted for finasteride).    5/26/22 - Patient very concerned about PSA increase to 2.29ug/L (4.6 adjusted for finasteride).  We reviewed PCPT nomogram and continued finasteride.   8/25/22 - PSA of 2.09ug/L. Finasteride continued    Today he returns with a PSA of 2.28, stable from previous  URINARY SYMPTOMS:  Feels he is emptying fully.  May have more frequency - but is also trying to drink more water.   Has annual physical upcoming in 1-2 weeks but feels he is doing well.   Pemphigus is completely stable.  Continues to be immunosuppressed.    No new surgeries.  Had biopsies of suspicious skin lesions - benign   Will turn 78 this year.  May defer his party until the summer, so he can have it outside.    REVIEW OF DIAGNOSTICS:  2/27/23 - PSA 2.28ug/L  8/24/22 - PSA 2.09ug/L  3/14/22 - PSA 2.29ug/L  3/16/21 - PSA 1.95ug/L  6/12/20 - PSA 1.91  6/24/19 - PSA 1.86  9/25/18 - PSA 2.05    Current Outpatient Medications   Medication Sig Dispense Refill     ARTIFICIAL TEAR SOLUTION OP Apply to eye daily        atorvastatin (LIPITOR) 20 MG tablet Take 1 tablet (20 mg) by mouth daily 90 tablet 4     clobetasol (TEMOVATE) 0.05 % external ointment Apply topically 2 times daily For more bothersome  lesions 120 g 1     finasteride (PROSCAR) 5 MG tablet TAKE 1 TABLET DAILY 90 tablet 3     hydrochlorothiazide (HYDRODIURIL) 25 MG tablet Take 1 tablet (25 mg) by mouth daily 90 tablet 4     hydrocortisone butyrate (LOCOID) 0.1 % SOLN Apply sparingly to affected area(s) of beard twice daily 180 mL 3     ketoconazole (NIZORAL) 2 % cream Apply twice daily to the nose as needed for white/scaling. 60 g 2     ketoconazole (NIZORAL) 2 % external shampoo Three times per week in the shower: Lather into scalp, leave in place for 3-5 minutes, then rinse. 360 mL 12     Multiple Vitamin (MULTIVITAMIN) per tablet Take 1 tablet by mouth daily 1 tab daily       mycophenolate (GENERIC EQUIVALENT) 500 MG tablet Take 1 tablet (500 mg) by mouth every morning (before breakfast) 360 tablet 0     tacrolimus (PROTOPIC) 0.1 % external ointment Apply topically to affected areas as instructed. 300 g 3     triamcinolone (KENALOG) 0.1 % external ointment Apply topically 2 times daily For lesions that are more mild 453.6 g 1       ALLERGIES: Patient has no known allergies.      REVIEW OF SYSTEMS:  As above in HPI    GENERAL PHYSICAL EXAM:   No vitals for virtual  GENERAL: Well groomed, well developed, well nourished male in NAD.  NEURO: Alert and oriented x 3.  PSYCH: Normal mood and affect, pleasant and agreeable during interview and exam.    LABS: The last test results for Mr. Luan Mitchell were reviewed:  PSA -   Lab Results   Component Value Date    PSA 2.28 02/27/2023    PSA 2.09 08/24/2022    PSA 2.29 03/14/2022    PSA 1.95 03/16/2021    PSA 1.91 06/12/2020    PSA 1.86 06/24/2019    PSA 1.82 03/14/2019    PSA 2.05 09/25/2018    PSA 2.10 06/21/2018    PSA 2.42 02/05/2018    PSA 2.09 01/10/2017    PSA 1.96 02/14/2011    PSA 2.47 02/25/2010     BMP -   Recent Labs   Lab Test 12/22/22  1050 06/30/22  1148 03/14/22  1103    134 140   POTASSIUM 3.9 3.6 3.8   CHLORIDE 103 104 105   CO2 27 29 28   BUN 27.7* 16 19   CR 1.04 1.18 1.09   GLC  110* 117*  117* 113*   JOSHUA 9.9 9.3 9.4       CBC -   Recent Labs   Lab Test 12/22/22  1050 06/30/22  1148 03/14/22  1103   WBC 7.6 7.4 6.9   HGB 15.0 15.5 15.6    159 175       ASSESSMENT:   1) BPH on finasteride with stable PSA    PLAN:   - Finasteride refilled today  - Return in 6 months for an in-person visit with another PSA first.  We will also do a prostate exam on that date    VIDEO DURATION: 13 minutes (1:32 - 1:40 + 5 minutes documentation on DOS)    Carolynn Carroll PA-C  Department of Urologic Surgery

## 2023-03-20 ASSESSMENT — ENCOUNTER SYMPTOMS
HYPOTENSION: 0
SLEEP DISTURBANCES DUE TO BREATHING: 0
LIGHT-HEADEDNESS: 0
PALPITATIONS: 0
ORTHOPNEA: 0
EXERCISE INTOLERANCE: 0
SYNCOPE: 0
HYPERTENSION: 1
LEG PAIN: 0

## 2023-03-21 ENCOUNTER — LAB (OUTPATIENT)
Dept: LAB | Facility: CLINIC | Age: 78
End: 2023-03-21
Payer: COMMERCIAL

## 2023-03-21 ENCOUNTER — OFFICE VISIT (OUTPATIENT)
Dept: INTERNAL MEDICINE | Facility: CLINIC | Age: 78
End: 2023-03-21
Payer: COMMERCIAL

## 2023-03-21 VITALS
OXYGEN SATURATION: 96 % | SYSTOLIC BLOOD PRESSURE: 124 MMHG | BODY MASS INDEX: 26.64 KG/M2 | WEIGHT: 175.8 LBS | HEART RATE: 80 BPM | HEIGHT: 68 IN | DIASTOLIC BLOOD PRESSURE: 80 MMHG | TEMPERATURE: 98.4 F

## 2023-03-21 DIAGNOSIS — E78.5 HYPERLIPIDEMIA LDL GOAL <130: ICD-10-CM

## 2023-03-21 DIAGNOSIS — R73.02 IGT (IMPAIRED GLUCOSE TOLERANCE): ICD-10-CM

## 2023-03-21 DIAGNOSIS — E03.8 SUBCLINICAL HYPOTHYROIDISM: ICD-10-CM

## 2023-03-21 DIAGNOSIS — I10 ESSENTIAL HYPERTENSION: ICD-10-CM

## 2023-03-21 DIAGNOSIS — R73.02 IGT (IMPAIRED GLUCOSE TOLERANCE): Primary | ICD-10-CM

## 2023-03-21 LAB
ANION GAP SERPL CALCULATED.3IONS-SCNC: 9 MMOL/L (ref 7–15)
BUN SERPL-MCNC: 15.8 MG/DL (ref 8–23)
CALCIUM SERPL-MCNC: 10 MG/DL (ref 8.8–10.2)
CHLORIDE SERPL-SCNC: 101 MMOL/L (ref 98–107)
CHOLEST SERPL-MCNC: 162 MG/DL
CREAT SERPL-MCNC: 1 MG/DL (ref 0.67–1.17)
DEPRECATED HCO3 PLAS-SCNC: 30 MMOL/L (ref 22–29)
GFR SERPL CREATININE-BSD FRML MDRD: 77 ML/MIN/1.73M2
GLUCOSE SERPL-MCNC: 113 MG/DL (ref 70–99)
HBA1C MFR BLD: 5.7 %
HDLC SERPL-MCNC: 47 MG/DL
LDLC SERPL CALC-MCNC: 89 MG/DL
NONHDLC SERPL-MCNC: 115 MG/DL
POTASSIUM SERPL-SCNC: 4.4 MMOL/L (ref 3.4–5.3)
SODIUM SERPL-SCNC: 140 MMOL/L (ref 136–145)
T4 FREE SERPL-MCNC: 0.95 NG/DL (ref 0.9–1.7)
TRIGL SERPL-MCNC: 131 MG/DL
TSH SERPL DL<=0.005 MIU/L-ACNC: 5.79 UIU/ML (ref 0.3–4.2)

## 2023-03-21 PROCEDURE — 80048 BASIC METABOLIC PNL TOTAL CA: CPT | Performed by: PATHOLOGY

## 2023-03-21 PROCEDURE — 84443 ASSAY THYROID STIM HORMONE: CPT | Performed by: PATHOLOGY

## 2023-03-21 PROCEDURE — 80061 LIPID PANEL: CPT | Performed by: PATHOLOGY

## 2023-03-21 PROCEDURE — 36415 COLL VENOUS BLD VENIPUNCTURE: CPT | Performed by: PATHOLOGY

## 2023-03-21 PROCEDURE — 99397 PER PM REEVAL EST PAT 65+ YR: CPT | Performed by: INTERNAL MEDICINE

## 2023-03-21 PROCEDURE — 84439 ASSAY OF FREE THYROXINE: CPT | Performed by: PATHOLOGY

## 2023-03-21 PROCEDURE — 83036 HEMOGLOBIN GLYCOSYLATED A1C: CPT | Performed by: INTERNAL MEDICINE

## 2023-03-21 RX ORDER — ATORVASTATIN CALCIUM 20 MG/1
20 TABLET, FILM COATED ORAL DAILY
Qty: 90 TABLET | Refills: 4 | Status: SHIPPED | OUTPATIENT
Start: 2023-03-21 | End: 2024-03-13

## 2023-03-21 RX ORDER — HYDROCHLOROTHIAZIDE 25 MG/1
25 TABLET ORAL DAILY
Qty: 90 TABLET | Refills: 4 | Status: SHIPPED | OUTPATIENT
Start: 2023-03-21 | End: 2024-03-13

## 2023-03-21 NOTE — PROGRESS NOTES
Medicare Annual Wellness Questionnaire  This 77 year old year old male presents for a Medicare Wellness Exam.    The following is Luan Mitchell's care team:  Patient Care Team       Relationship Specialty Notifications Start End    Devendra Telles MD PCP - General Internal Medicine  8/7/19     Phone: [phone] Fax: [phone]         [address] [facility] [address]    Loretta Villeda MD MD Internal Medicine  10/30/13      XXX RETIRED XXX 09/30/2019    Serina Odell MD MD Dermatology  10/22/15     Phone: [phone] Fax: [phone]         [address] [place] [address] [address] [facility] [address]    Nav Rust MD MD Otolaryngology  11/25/15     referring to audiology RAFI Erazo    Phone: [phone] Fax: [phone]         [facility] [address] [place] [address]    Sydney Erazo AuD Audiologist Audiology  11/25/15     Phone: [phone] Fax: [phone]         [address] [facility] [address]    Chuckie Lechuga MD MD   1/21/16     Fax: [phone]         Mukesh Lewis MD MD Clinical Cardiac Electrophysiology  3/13/17     Reina Latif MD Resident Student in organized health care education/training program  11/30/18     Phone: [phone] Fax: [phone]         [address] [place] [address] [address] [facility] [address]    Caden Cristina MD MD Allergy & Immunology  5/30/19     Phone: [phone] Fax: [phone]         [facility] [address] [facility] [address] [facility] [address]    Flory Fish NP Nurse Practitioner Family Practice  12/2/19     Phone: [phone] Fax: [phone]         [facility] [address] [address]    Pillo Lizama MD Resident Student in organized health care education/training program  7/29/20     Phone: [phone] Fax: [phone]         [address] [address]    Devendra Telles MD Assigned PCP   3/28/21     Phone: [phone]  Fax: [phone]         [address] [facility] [address]    Tahir Kendrick Resident   9/16/21      [address] [facility] [address]    Mayela Collins APRN CNP Assigned Surgical Provider   10/31/21     Phone: [phone] Fax: [phone]         [address] [place] [address]    Jackson Rooney MD Resident Student in organized health care education/training program  12/16/21     Phone: [phone] Fax: [phone]         [address] [place] [address]    Violette Bennett MD MD Cardiovascular Disease  3/14/22     Phone: [phone] Fax: [phone]         [address] [place] [address]    Zaina Ferrari APRN CNP Nurse Practitioner Cardiovascular Disease  3/16/22     Phone: [phone] Fax: [phone]         [address] [place] [address]    Annabelle Emery PA-C Physician Assistant Physician Assistant  6/30/22     Phone: [phone] Fax: [phone]         [facility] [address] [facility] [address] [place] [address]    Zaina Ferrari APRN CNP Assigned Heart and Vascular Provider   7/9/22     Phone: [phone] Fax: [phone]         [address] [place] [address]          Fall Risk Assessment:    Have you fallen 2 or more times in the last year? No    How many times were you injured due to a fall in the last year? 0    PHQ-2:    Over the last 2 weeks, how often have you been bothered by feeling down, depressed, or hopeless? Not at all (0)     Over the last 2 weeks, how often have you had little interest or pleasure in doing things? Not at all (0)    Social History:    What is your marital status?     Who lives in your household? wife    Does your home have loose rugs in the hallway: No    Does your home have grab bars in the bathroom: Yes     Does your home have handrails on the stairs? Yes     Does your home have poorly lit areas? No    Do you feel threatened  or controlled by a partner, ex-partner or anyone in your life? No    Has anyone hurt you physically, for example by pushing, hitting, slapping or kicking you or forcing you to have sex? No    Do you need help with the phone, transportation, shopping, preparing meals, housework, laundry, medications or managing money? No    Sexual Health:    Are you sexually active? Yes   o If yes, with men, women, or both? Female  o If yes, how many partners? 1  o If yes, are you using condoms? No    Have you had any sexually transmitted infections in the last year? No    Do you have any sexual concerns? No    General Health Assessment:    Have you noticed any hearing difficulties? Yes     Do you wear hearing aids? No    Have you seen a hearing professional such as an audiologist in the last 1 year? No    Do you have vision difficulty? Yes     Do you wear glasses or contacts? Yes     Have you seen an eye doctor in the last 1 year? Yes     How many servings of fruits and vegetables do you eat a day? 4    How often do you exercise in a week? 4+    How long and what kind of exercise do you do? Stationary bike 28 min/day    Tobacco and Alcohol History:    Do you use tobacco/nicotine products? No    Do you use any other drugs? No    Do you drink alcohol? No    Advance Directive:    Have you completed an Advance Directives document? Yes   o If yes, have you given a copy to the clinic? No    Do you need information on Advance Directives? Yes     Denise Person, EMT at 12:53 PM on 3/21/2023.  Primary care clinic: [phone]  Answers for HPI/ROS submitted by the patient on 3/20/2023  General Symptoms: No  Skin Symptoms: No  HENT Symptoms: No  EYE SYMPTOMS: No  HEART SYMPTOMS: Yes  LUNG SYMPTOMS: No  INTESTINAL SYMPTOMS: No  URINARY SYMPTOMS: No  REPRODUCTIVE SYMPTOMS: No  SKELETAL SYMPTOMS: No  BLOOD SYMPTOMS: No  NERVOUS SYSTEM SYMPTOMS: No  MENTAL HEALTH SYMPTOMS: No  Chest pain or pressure: No  Fast or irregular heartbeat:  No  Pain in legs with walking: No  Trouble breathing while lying down: No  Fingers or toes appear blue: No  High blood pressure: Yes  Low blood pressure: No  Fainting: No  Murmurs: No  Pacemaker: No  Varicose veins: No  Edema or swelling: No  Wake up at night with shortness of breath: No  Light-headedness: No  Exercise intolerance: No

## 2023-03-21 NOTE — PROGRESS NOTES
HPI  77-year-old presents today for Medicare wellness visit.  He has been doing quite well.  He is exercising regularly.  He is trying to eat healthy he is using no alcohol he is sleeping well.  He had number of questions regarding his recent Cano evaluation and his borderline blood sugar which we discussed.  Otherwise no problems on the atorvastatin and hydrochlorothiazide he is tolerating this well and requesting refill  Past Medical History:   Diagnosis Date     BPH (benign prostatic hyperplasia)      Essential hypertension 2018     Hyperlipidemia LDL goal <130      Pemphigus     pemphigus foliaceus     Retinal detachment     left eye- corrected with laser     Vitreous detachment     LE     Past Surgical History:   Procedure Laterality Date     NO HISTORY OF SURGERY  14    derm     RETINOPEXY LASER PROPHYLAXIS BREAK(S) OS (LEFT EYE)       - Left eye     Family History   Problem Relation Age of Onset     Heart Disease Mother         CHF d 80     Diabetes Mother         DM2,  age 80     Hypertension Mother      Heart Disease Father         CHF d 78     Heart Disease Paternal Uncle         CHF d 50s     Cancer No family hx of         no skin cancer     Skin Cancer No family hx of         no skin cancer     Glaucoma No family hx of      Macular Degeneration No family hx of      Melanoma No family hx of      Answers for HPI/ROS submitted by the patient on 3/20/2023  General Symptoms: No  Skin Symptoms: No  HENT Symptoms: No  EYE SYMPTOMS: No  HEART SYMPTOMS: Yes  LUNG SYMPTOMS: No  INTESTINAL SYMPTOMS: No  URINARY SYMPTOMS: No  REPRODUCTIVE SYMPTOMS: No  SKELETAL SYMPTOMS: No  BLOOD SYMPTOMS: No  NERVOUS SYSTEM SYMPTOMS: No  MENTAL HEALTH SYMPTOMS: No  Chest pain or pressure: No  Fast or irregular heartbeat: No  Pain in legs with walking: No  Trouble breathing while lying down: No  Fingers or toes appear blue: No  High blood pressure: Yes  Low blood pressure: No  Fainting: No  Murmurs:  "No  Pacemaker: No  Varicose veins: No  Edema or swelling: No  Wake up at night with shortness of breath: No  Light-headedness: No  Exercise intolerance: No        Exam:  /80 (BP Location: Right arm, Patient Position: Sitting, Cuff Size: Adult Regular)   Pulse 80   Temp 98.4  F (36.9  C) (Oral)   Ht 1.735 m (5' 8.31\")   Wt 79.7 kg (175 lb 12.8 oz)   SpO2 96%   BMI 26.49 kg/m    175 lbs 12.8 oz  Physical Exam   The patient is alert, oriented with a clear sensorium.   Skin shows multiple slightly raised erythematous scaled lesions on the trunk at the site of previous pemphigus.   Head is normocephalic and atraumatic.   Eyes show PERRLA  Ears show normal TMs bilaterally.   Mouth shows clear oral mucosa.   Neck shows no nodes, thyromegaly or bruits.   Back is non tender.  Lungs are clear to percussion and auscultation.   Heart shows normal S1 and S2 without murmur or gallop.   Abdomen is soft and nontender without masses or organomegaly.   Extremities show no edema and no evidence of active synovitis.   Neurologic examination shows cranial nerves II-XII intact. Motor shows 5/5 strength. Reflexes are symmetric. Cerebellar testing shows normal tandem gait.  Romberg negative.    Labs reviewed:  Results for orders placed or performed in visit on 03/21/23   Lipid Profile     Status: Normal   Result Value Ref Range    Cholesterol 162 <200 mg/dL    Triglycerides 131 <150 mg/dL    Direct Measure HDL 47 >=40 mg/dL    LDL Cholesterol Calculated 89 <=100 mg/dL    Non HDL Cholesterol 115 <130 mg/dL    Narrative    Cholesterol  Desirable:  <200 mg/dL    Triglycerides  Normal:  Less than 150 mg/dL  Borderline High:  150-199 mg/dL  High:  200-499 mg/dL  Very High:  Greater than or equal to 500 mg/dL    Direct Measure HDL  Female:  Greater than or equal to 50 mg/dL   Male:  Greater than or equal to 40 mg/dL    LDL Cholesterol  Desirable:  <100mg/dL  Above Desirable:  100-129 mg/dL   Borderline High:  130-159 mg/dL   High:  " 160-189 mg/dL   Very High:  >= 190 mg/dL    Non HDL Cholesterol  Desirable:  130 mg/dL  Above Desirable:  130-159 mg/dL  Borderline High:  160-189 mg/dL  High:  190-219 mg/dL  Very High:  Greater than or equal to 220 mg/dL   Hemoglobin A1c     Status: Abnormal   Result Value Ref Range    Hemoglobin A1C 5.7 (H) <5.7 %   Basic metabolic panel  (Ca, Cl, CO2, Creat, Gluc, K, Na, BUN)     Status: Abnormal   Result Value Ref Range    Sodium 140 136 - 145 mmol/L    Potassium 4.4 3.4 - 5.3 mmol/L    Chloride 101 98 - 107 mmol/L    Carbon Dioxide (CO2) 30 (H) 22 - 29 mmol/L    Anion Gap 9 7 - 15 mmol/L    Urea Nitrogen 15.8 8.0 - 23.0 mg/dL    Creatinine 1.00 0.67 - 1.17 mg/dL    Calcium 10.0 8.8 - 10.2 mg/dL    Glucose 113 (H) 70 - 99 mg/dL    GFR Estimate 77 >60 mL/min/1.73m2   TSH with free T4 reflex     Status: Abnormal   Result Value Ref Range    TSH 5.79 (H) 0.30 - 4.20 uIU/mL   T4 free     Status: Normal   Result Value Ref Range    Free T4 0.95 0.90 - 1.70 ng/dL         ASSESSMENT  1. Prediabetes  2. Pemphigus in remission on mycophenolate  3. Hyperlipidemia well controlled on atorvastatin  4. Hypertension controlled on hctz  5. subclinical hypothyroidism needs F/U    Plan  We will reassess his labs today and continue him on the atorvastatin and hydrochlorothiazide have him follow-up for reassessment in a year  This note was completed using Dragon voice recognition software.      Devendra Telles MD  General Internal Medicine  [facility]  [phone]

## 2023-03-21 NOTE — NURSING NOTE
"Luan Mitchell is a 77 year old male patient that presents today in clinic for the following:    Chief Complaint   Patient presents with     Physical     Discuss glucose.  Discuss conti results  Discuss BP     The patient's allergies and medications were reviewed as noted. A set of vitals were recorded as noted without incident: /80 (BP Location: Right arm, Patient Position: Sitting, Cuff Size: Adult Regular)   Pulse 80   Temp 98.4  F (36.9  C) (Oral)   Ht 1.735 m (5' 8.31\")   Wt 79.7 kg (175 lb 12.8 oz)   SpO2 96%   BMI 26.49 kg/m  . The patient does not have any other questions for the provider.    Denise Person, EMT at 12:16 PM on 3/21/2023.  Primary care clinic: [phone]  "

## 2023-04-06 ENCOUNTER — OFFICE VISIT (OUTPATIENT)
Dept: DERMATOLOGY | Facility: CLINIC | Age: 78
End: 2023-04-06
Payer: COMMERCIAL

## 2023-04-06 DIAGNOSIS — Z51.81 THERAPEUTIC DRUG MONITORING: ICD-10-CM

## 2023-04-06 DIAGNOSIS — D22.9 MULTIPLE NEVI: ICD-10-CM

## 2023-04-06 DIAGNOSIS — L10.2 PEMPHIGUS FOLIACEUS (H): Primary | ICD-10-CM

## 2023-04-06 DIAGNOSIS — R23.4 CRACKED SKIN ON FEET: ICD-10-CM

## 2023-04-06 PROCEDURE — 99214 OFFICE O/P EST MOD 30 MIN: CPT | Mod: GC | Performed by: DERMATOLOGY

## 2023-04-06 NOTE — NURSING NOTE
Dermatology Rooming Note    Luan Mitchell's goals for this visit include:   Chief Complaint   Patient presents with     Derm Problem     FBSE with a spot of concern on his right heel and pemphigus follow up     Garcia Ambrosio, EMT-B

## 2023-04-06 NOTE — LETTER
"4/6/2023       RE: Luan Mitchell  [address] [facility] [address]     Dear Colleague,    Thank you for referring your patient, Luan Mitchell, to the [facility] DERMATOLOGY [facility] [place] at [facility]. Please see a copy of my visit note below.    [facility] Dermatology Note   Encounter Date: Apr 6, 2023  Office visit    Dermatology Problem List:    Aroldo/Gaston QUIROGA     Last FBSE: 12/22/22     #  Pemphigus foliaceus  - s/p shave bx 6/17/21 (L superior shoulder) and 7/26/10 (L arm)  - Last pemphigus panel (3/25/21): Dsg1 IgG 136 and Dsg2 IgG 2  - Goals of treatment per Mr. Mitchell:  \"Prevent itching and secondary infections. Not interested in complete clearance\"  - Current Tx: MMF 500mg daily              - stopped MMF 3/2019, re-started  mg PO BID on 8/13/20 but reduced to 500 mg every day on 10/21/20              - Last safety labs (12/2022): wnl  - Adjunct Tx: clobetasol, triamcinolone 0.1%, and tacrolimus ointment  # Tinea pedis  - Current Tx:  Terbinafine 1% cream  # Benign Bx  - VK, R calf, s/p shave bx 3/25/21  - mild DN, R neck, s/p shave bx 3/22/18    ___________________________________________    Assessment & Plan:    # Pemphigus foliaceus  Condition is well controlled on current regimen. Has some active pruritus on scalp that is moderately well controlled. Will continue with current regimen and recommend addition of sal acid shampoo to help reduce inflammation  - Continue with MMF 500mg daily - no refills today  - Continue with clobetasol , triamcinolone, and tacrolimus ointments BID PRN  - Continue with keto 2% shampoo  - Recommended OTC sal acid 2% shampoo    # Cracked heels  Discussed the possible etiologies, clinical course, and management options of this benign condition with the patient.  - Recommended urea 20% cream daily after bathing     # Multiple clinically banal appearing nevi  Discussed the possible " etiologies, clinical course, and management options of this benign condition with the patient.  - Reviewed the ABCDEs of melanoma  - Recommend daily sunscreen use with SPF at least 30    Procedures Performed:  None    Follow-up: 2m    Staff Involved:  Patient was seen and staffed with attending physician Dr. Nando Feldman MD  Med/Derm Resident PGY-5  P:[phone]    Staff Addendum:  I have personally examined this patient and agree with the resident doctor's documentation and plan of care. I have reviewed and amended the resident's note above. The documentation accurately reflects my clinical observations, diagnoses, treatment and follow-up plans.     Isabelle Collier MD  Dermatology Staff    ___________________________________________      CC: Derm Problem (FBSE with a spot of concern on his right heel and pemphigus follow up)      HPI:  Mr. Luan Mitchell is a(n) 78 year old male who presents to clinic today for PF follow up and evaluation of a spot on his heel. Reports:  - overall PF is doing well  - states his pruritus is manageable  - has a couple areas on his scalp that are still pruritic despite clobetasol  - also notes a dark spot on his R heel   - wants to make sure it's nothing bad  - denies any associated pain, pruritus, or bleeding  - otherwise feeling well in usual state of health    Physical exam:  General: in no acute distress, well-developed, well-nourished  Skin:  - skin type: medium fair  - Erythematous plaques w/ cornflake scale on trunk, scalp, and face with some surrounding hyperpigmentation  - multiple light brown to tan papules with reassuring pigment network on dermoscopy on posterior neck, thighs, and trunk (similar pigment network as prior)  - cracked heels with dried blood on both feet  - No other lesions of concern on areas examined.     Medications:  Current Outpatient Medications   Medication    ARTIFICIAL TEAR SOLUTION OP    atorvastatin (LIPITOR) 20 MG tablet     clobetasol (TEMOVATE) 0.05 % external ointment    finasteride (PROSCAR) 5 MG tablet    hydrochlorothiazide (HYDRODIURIL) 25 MG tablet    hydrocortisone butyrate (LOCOID) 0.1 % SOLN    ketoconazole (NIZORAL) 2 % cream    ketoconazole (NIZORAL) 2 % external shampoo    Multiple Vitamin (MULTIVITAMIN) per tablet    mycophenolate (GENERIC EQUIVALENT) 500 MG tablet    tacrolimus (PROTOPIC) 0.1 % external ointment    triamcinolone (KENALOG) 0.1 % external ointment     No current facility-administered medications for this visit.      Past Medical History:   Patient Active Problem List   Diagnosis    Pemphigus foliaceus    Hyperlipidemia LDL goal <130    BPH (benign prostatic hyperplasia)    Encounter for long-term (current) use of high-risk medication    Dermatitis, seborrheic    Lightheadedness    Age-related nuclear cataract of both eyes    SOB (shortness of breath)     Past Medical History:   Diagnosis Date    BPH (benign prostatic hyperplasia)     Essential hypertension 02/2018    Hyperlipidemia LDL goal <130     Pemphigus     pemphigus foliaceus    Retinal detachment 2009    left eye- corrected with laser    Vitreous detachment 2009    LE        No referring provider defined for this encounter. on close of this encounter.

## 2023-04-06 NOTE — PATIENT INSTRUCTIONS
We will see you on June 8th for your follow up    For the scalp, we'll do some shampoo with salicylic acid to help remove some of the scale (eg. T sal)    For the heels, continue with moisturizing cream using urea daily after bathing

## 2023-04-06 NOTE — PROGRESS NOTES
"[facility] Dermatology Note   Encounter Date: Apr 6, 2023  Office visit    Dermatology Problem List:    Aroldo/Gaston QUIROGA     Last FBSE: 12/22/22     #  Pemphigus foliaceus  - s/p shave bx 6/17/21 (L superior shoulder) and 7/26/10 (L arm)  - Last pemphigus panel (3/25/21): Dsg1 IgG 136 and Dsg2 IgG 2  - Goals of treatment per Mr. Mitchell:  \"Prevent itching and secondary infections. Not interested in complete clearance\"  - Current Tx: MMF 500mg daily              - stopped MMF 3/2019, re-started  mg PO BID on 8/13/20 but reduced to 500 mg every day on 10/21/20              - Last safety labs (12/2022): wnl  - Adjunct Tx: clobetasol, triamcinolone 0.1%, and tacrolimus ointment  # Tinea pedis  - Current Tx:  Terbinafine 1% cream  # Benign Bx  - VK, R calf, s/p shave bx 3/25/21  - mild DN, R neck, s/p shave bx 3/22/18    ___________________________________________    Assessment & Plan:    # Pemphigus foliaceus  Condition is well controlled on current regimen. Has some active pruritus on scalp that is moderately well controlled. Will continue with current regimen and recommend addition of sal acid shampoo to help reduce inflammation  - Continue with MMF 500mg daily - no refills today  - Continue with clobetasol , triamcinolone, and tacrolimus ointments BID PRN  - Continue with keto 2% shampoo  - Recommended OTC sal acid 2% shampoo    # Cracked heels  Discussed the possible etiologies, clinical course, and management options of this benign condition with the patient.  - Recommended urea 20% cream daily after bathing     # Multiple clinically banal appearing nevi  Discussed the possible etiologies, clinical course, and management options of this benign condition with the patient.  - Reviewed the ABCDEs of melanoma  - Recommend daily sunscreen use with SPF at least 30    Procedures Performed:  None    Follow-up: 2m    Staff Involved:  Patient was seen and staffed with attending physician Dr. Collier "     Robert Feldman MD  Med/Derm Resident PGY-5  P:[phone]    Staff Addendum:  I have personally examined this patient and agree with the resident doctor's documentation and plan of care. I have reviewed and amended the resident's note above. The documentation accurately reflects my clinical observations, diagnoses, treatment and follow-up plans.     Isabelle Collier MD  Dermatology Staff    ___________________________________________      CC: Derm Problem (FBSE with a spot of concern on his right heel and pemphigus follow up)      HPI:  Mr. Laun Mitchell is a(n) 78 year old male who presents to clinic today for PF follow up and evaluation of a spot on his heel. Reports:  - overall PF is doing well  - states his pruritus is manageable  - has a couple areas on his scalp that are still pruritic despite clobetasol  - also notes a dark spot on his R heel   - wants to make sure it's nothing bad  - denies any associated pain, pruritus, or bleeding  - otherwise feeling well in usual state of health    Physical exam:  General: in no acute distress, well-developed, well-nourished  Skin:  - skin type: medium fair  - Erythematous plaques w/ cornflake scale on trunk, scalp, and face with some surrounding hyperpigmentation  - multiple light brown to tan papules with reassuring pigment network on dermoscopy on posterior neck, thighs, and trunk (similar pigment network as prior)  - cracked heels with dried blood on both feet  - No other lesions of concern on areas examined.     Medications:  Current Outpatient Medications   Medication     ARTIFICIAL TEAR SOLUTION OP     atorvastatin (LIPITOR) 20 MG tablet     clobetasol (TEMOVATE) 0.05 % external ointment     finasteride (PROSCAR) 5 MG tablet     hydrochlorothiazide (HYDRODIURIL) 25 MG tablet     hydrocortisone butyrate (LOCOID) 0.1 % SOLN     ketoconazole (NIZORAL) 2 % cream     ketoconazole (NIZORAL) 2 % external shampoo     Multiple Vitamin (MULTIVITAMIN) per tablet      mycophenolate (GENERIC EQUIVALENT) 500 MG tablet     tacrolimus (PROTOPIC) 0.1 % external ointment     triamcinolone (KENALOG) 0.1 % external ointment     No current facility-administered medications for this visit.      Past Medical History:   Patient Active Problem List   Diagnosis     Pemphigus foliaceus     Hyperlipidemia LDL goal <130     BPH (benign prostatic hyperplasia)     Encounter for long-term (current) use of high-risk medication     Dermatitis, seborrheic     Lightheadedness     Age-related nuclear cataract of both eyes     SOB (shortness of breath)     Past Medical History:   Diagnosis Date     BPH (benign prostatic hyperplasia)      Essential hypertension 02/2018     Hyperlipidemia LDL goal <130      Pemphigus     pemphigus foliaceus     Retinal detachment 2009    left eye- corrected with laser     Vitreous detachment 2009           CC No referring provider defined for this encounter. on close of this encounter.

## 2023-04-21 ENCOUNTER — MYC MEDICAL ADVICE (OUTPATIENT)
Dept: INTERNAL MEDICINE | Facility: CLINIC | Age: 78
End: 2023-04-21
Payer: COMMERCIAL

## 2023-06-08 ENCOUNTER — OFFICE VISIT (OUTPATIENT)
Dept: DERMATOLOGY | Facility: CLINIC | Age: 78
End: 2023-06-08
Payer: COMMERCIAL

## 2023-06-08 ENCOUNTER — LAB (OUTPATIENT)
Dept: LAB | Facility: CLINIC | Age: 78
End: 2023-06-08
Payer: COMMERCIAL

## 2023-06-08 DIAGNOSIS — Z79.899 ENCOUNTER FOR LONG-TERM (CURRENT) USE OF HIGH-RISK MEDICATION: ICD-10-CM

## 2023-06-08 DIAGNOSIS — Z51.81 THERAPEUTIC DRUG MONITORING: ICD-10-CM

## 2023-06-08 DIAGNOSIS — L10.2 PEMPHIGUS FOLIACEUS (H): ICD-10-CM

## 2023-06-08 DIAGNOSIS — L82.1 SEBORRHEIC KERATOSES: ICD-10-CM

## 2023-06-08 DIAGNOSIS — L10.2 PEMPHIGUS FOLIACEUS (H): Primary | ICD-10-CM

## 2023-06-08 DIAGNOSIS — D69.2 SOLAR PURPURA (H): ICD-10-CM

## 2023-06-08 LAB
ALBUMIN SERPL BCG-MCNC: 4.5 G/DL (ref 3.5–5.2)
ALP SERPL-CCNC: 40 U/L (ref 40–129)
ALT SERPL W P-5'-P-CCNC: 34 U/L (ref 10–50)
ANION GAP SERPL CALCULATED.3IONS-SCNC: 9 MMOL/L (ref 7–15)
AST SERPL W P-5'-P-CCNC: 29 U/L (ref 10–50)
BASOPHILS # BLD AUTO: 0 10E3/UL (ref 0–0.2)
BASOPHILS NFR BLD AUTO: 0 %
BILIRUB DIRECT SERPL-MCNC: <0.2 MG/DL (ref 0–0.3)
BILIRUB SERPL-MCNC: 0.6 MG/DL
BUN SERPL-MCNC: 23 MG/DL (ref 8–23)
CALCIUM SERPL-MCNC: 10 MG/DL (ref 8.8–10.2)
CHLORIDE SERPL-SCNC: 104 MMOL/L (ref 98–107)
CREAT SERPL-MCNC: 1.09 MG/DL (ref 0.67–1.17)
DEPRECATED HCO3 PLAS-SCNC: 29 MMOL/L (ref 22–29)
EOSINOPHIL # BLD AUTO: 0.1 10E3/UL (ref 0–0.7)
EOSINOPHIL NFR BLD AUTO: 2 %
ERYTHROCYTE [DISTWIDTH] IN BLOOD BY AUTOMATED COUNT: 12.7 % (ref 10–15)
GFR SERPL CREATININE-BSD FRML MDRD: 69 ML/MIN/1.73M2
GLUCOSE SERPL-MCNC: 107 MG/DL (ref 70–99)
HCT VFR BLD AUTO: 41.8 % (ref 40–53)
HGB BLD-MCNC: 15 G/DL (ref 13.3–17.7)
IMM GRANULOCYTES # BLD: 0 10E3/UL
IMM GRANULOCYTES NFR BLD: 0 %
LYMPHOCYTES # BLD AUTO: 1.5 10E3/UL (ref 0.8–5.3)
LYMPHOCYTES NFR BLD AUTO: 21 %
MCH RBC QN AUTO: 32.3 PG (ref 26.5–33)
MCHC RBC AUTO-ENTMCNC: 35.9 G/DL (ref 31.5–36.5)
MCV RBC AUTO: 90 FL (ref 78–100)
MONOCYTES # BLD AUTO: 0.6 10E3/UL (ref 0–1.3)
MONOCYTES NFR BLD AUTO: 8 %
NEUTROPHILS # BLD AUTO: 4.9 10E3/UL (ref 1.6–8.3)
NEUTROPHILS NFR BLD AUTO: 69 %
NRBC # BLD AUTO: 0 10E3/UL
NRBC BLD AUTO-RTO: 0 /100
PLATELET # BLD AUTO: 160 10E3/UL (ref 150–450)
POTASSIUM SERPL-SCNC: 4.1 MMOL/L (ref 3.4–5.3)
PROT SERPL-MCNC: 7 G/DL (ref 6.4–8.3)
RBC # BLD AUTO: 4.64 10E6/UL (ref 4.4–5.9)
SODIUM SERPL-SCNC: 142 MMOL/L (ref 136–145)
WBC # BLD AUTO: 7.1 10E3/UL (ref 4–11)

## 2023-06-08 PROCEDURE — 99214 OFFICE O/P EST MOD 30 MIN: CPT | Mod: GC | Performed by: DERMATOLOGY

## 2023-06-08 PROCEDURE — 82248 BILIRUBIN DIRECT: CPT | Performed by: PATHOLOGY

## 2023-06-08 PROCEDURE — 80053 COMPREHEN METABOLIC PANEL: CPT | Performed by: PATHOLOGY

## 2023-06-08 PROCEDURE — 85025 COMPLETE CBC W/AUTO DIFF WBC: CPT | Performed by: PATHOLOGY

## 2023-06-08 PROCEDURE — 36415 COLL VENOUS BLD VENIPUNCTURE: CPT | Performed by: PATHOLOGY

## 2023-06-08 RX ORDER — MYCOPHENOLATE MOFETIL 500 MG/1
500 TABLET ORAL
Qty: 90 TABLET | Refills: 4 | Status: SHIPPED | OUTPATIENT
Start: 2023-06-08 | End: 2023-09-21

## 2023-06-08 ASSESSMENT — PAIN SCALES - GENERAL: PAINLEVEL: NO PAIN (0)

## 2023-06-08 NOTE — RESULT ENCOUNTER NOTE
Reviewed results showing stable CBC/CMP. [facility] Will plan continue with medication    Follow up in 3m    CC'ing Dr. Odell as FYI only

## 2023-06-08 NOTE — PROGRESS NOTES
"[facility] Dermatology Note   Encounter Date: Jun 8, 2023  Office visit    Dermatology Problem List:    Last FBSE: 12/22/22     #  Pemphigus foliaceus  - s/p shave bx 6/17/21 (L superior shoulder) and 7/26/10 (L arm)  - Last pemphigus panel (3/25/21): Dsg1 IgG 136 and Dsg2 IgG 2  - Goals of treatment per Mr. Mitchell:  \"Prevent itching and secondary infections. Not interested in complete clearance\"  - Current Tx: MMF 500mg daily              - stopped MMF 3/2019, re-started  mg PO BID on 8/13/20 but reduced to 500 mg every day on 10/21/20              - Last safety labs (6/2023): wnl  - Adjunct Tx: clobetasol, triamcinolone 0.1%, and tacrolimus ointment  # Tinea pedis  - Current Tx:  Terbinafine 1% cream  # Benign Bx  - VK, R calf, s/p shave bx 3/25/21  - mild DN, R neck, s/p shave bx 3/22/18    ___________________________________________    Assessment & Plan:    # Pemphigus foliaceus  Condition is well controlled on current regimen. Will obtain safety labs today and continue with current regimen. Will also send prescription for sal acid 2% shampoo to help reduce pruritus on scalp.  - Ordered CBC w/ diff and CMP   - Continue with MMF 500mg daily   - Refilled today  - Continue with clobetasol , triamcinolone, and tacrolimus ointments BID PRN  - Continue with keto 2% shampoo  - Prescribed sal acid 2% shampoo    # Seborrheic keratoses  # Solar purpura  Discussed the possible etiologies, clinical course, and management options of this benign condition with the patient.  - Reassurance provided     Procedures Performed:  None    Follow-up: 3m    Staff Involved:  Patient was seen and staffed with attending physician Dr. Jose R Feldman MD  Med/Derm Resident PGY-5  P:[phone]    Staff Addendum:  I, Serina Odell MD, saw this patient with the resident and agree with the resident s findings and plan of care as documented in the resident s " note.    ___________________________________________      CC: Derm Problem (Pemphigus foliaceus - Luan states he is stable )      HPI:  Mr. Luan Mitchell is a(n) 78 year old male who presents to clinic today for PF follow up. Reports:  - doing well overall  - no new spots  - itchiness is moderately controlled  - has some pruritus affecting his scalp  - spot on his R armpit and one on his R hand that he would like checked  - no pain, pruritus, or bleeding for either spot  - otherwise feeling well in usual state of health    Physical exam:  General: in no acute distress, well-developed, well-nourished  Skin:  - skin type: medium fair  - Erythematous plaques w/ cornflake scale on trunk, scalp, and face with some surrounding hyperpigmentation  - non-blanching purple patch on R dorsal hand  - waxy stuck on brown papule with evidence of excoriations on the R axilla  - No other lesions of concern on areas examined.     Medications:  Current Outpatient Medications   Medication     ARTIFICIAL TEAR SOLUTION OP     atorvastatin (LIPITOR) 20 MG tablet     clobetasol (TEMOVATE) 0.05 % external ointment     finasteride (PROSCAR) 5 MG tablet     hydrochlorothiazide (HYDRODIURIL) 25 MG tablet     hydrocortisone butyrate (LOCOID) 0.1 % SOLN     ketoconazole (NIZORAL) 2 % cream     ketoconazole (NIZORAL) 2 % external shampoo     Multiple Vitamin (MULTIVITAMIN) per tablet     mycophenolate (GENERIC EQUIVALENT) 500 MG tablet     tacrolimus (PROTOPIC) 0.1 % external ointment     triamcinolone (KENALOG) 0.1 % external ointment     No current facility-administered medications for this visit.      Past Medical History:   Patient Active Problem List   Diagnosis     Pemphigus foliaceus     Hyperlipidemia LDL goal <130     BPH (benign prostatic hyperplasia)     Encounter for long-term (current) use of high-risk medication     Dermatitis, seborrheic     Lightheadedness     Age-related nuclear cataract of both eyes     SOB (shortness of  breath)     Past Medical History:   Diagnosis Date     BPH (benign prostatic hyperplasia)      Essential hypertension 02/2018     Hyperlipidemia LDL goal <130      Pemphigus     pemphigus foliaceus     Retinal detachment 2009    left eye- corrected with laser     Vitreous detachment 2009    LE       CC No referring provider defined for this encounter. on close of this encounter.

## 2023-06-08 NOTE — LETTER
"6/8/2023       RE: Luan Mitchell  [address] [address]     Dear Colleague,    Thank you for referring your patient, Luan Mitchell, to the [facility] DERMATOLOGY CLINIC [place] at [facility]. Please see a copy of my visit note below.    [facility] Dermatology Note   Encounter Date: Jun 8, 2023  Office visit    Dermatology Problem List:    Last FBSE: 12/22/22     #  Pemphigus foliaceus  - s/p shave bx 6/17/21 (L superior shoulder) and 7/26/10 (L arm)  - Last pemphigus panel (3/25/21): Dsg1 IgG 136 and Dsg2 IgG 2  - Goals of treatment per Mr. Mitchell:  \"Prevent itching and secondary infections. Not interested in complete clearance\"  - Current Tx: MMF 500mg daily              - stopped MMF 3/2019, re-started  mg PO BID on 8/13/20 but reduced to 500 mg every day on 10/21/20              - Last safety labs (6/2023): wnl  - Adjunct Tx: clobetasol, triamcinolone 0.1%, and tacrolimus ointment  # Tinea pedis  - Current Tx:  Terbinafine 1% cream  # Benign Bx  - VK, R calf, s/p shave bx 3/25/21  - mild DN, R neck, s/p shave bx 3/22/18    ___________________________________________    Assessment & Plan:    # Pemphigus foliaceus  Condition is well controlled on current regimen. Will obtain safety labs today and continue with current regimen. Will also send prescription for sal acid 2% shampoo to help reduce pruritus on scalp.  - Ordered CBC w/ diff and CMP   - Continue with MMF 500mg daily   - Refilled today  - Continue with clobetasol , triamcinolone, and tacrolimus ointments BID PRN  - Continue with keto 2% shampoo  - Prescribed sal acid 2% shampoo    # Seborrheic keratoses  # Solar purpura  Discussed the possible etiologies, clinical course, and management options of this benign condition with the patient.  - Reassurance provided     Procedures Performed:  None    Follow-up: 3m    Staff Involved:  Patient was seen and " staffed with attending physician Dr. Jose R Feldman MD  Med/Derm Resident PGY-5  P:[phone]    Staff Addendum:  I, Serina Odell MD, saw this patient with the resident and agree with the resident s findings and plan of care as documented in the resident s note.  ___________________________________________    CC: Derm Problem (Pemphigus foliaceus - Luan states he is stable )      HPI:  Mr. Luan Mitchell is a(n) 78 year old male who presents to clinic today for PF follow up. Reports:  - doing well overall  - no new spots  - itchiness is moderately controlled  - has some pruritus affecting his scalp  - spot on his R armpit and one on his R hand that he would like checked  - no pain, pruritus, or bleeding for either spot  - otherwise feeling well in usual state of health    Physical exam:  General: in no acute distress, well-developed, well-nourished  Skin:  - skin type: medium fair  - Erythematous plaques w/ cornflake scale on trunk, scalp, and face with some surrounding hyperpigmentation  - non-blanching purple patch on R dorsal hand  - waxy stuck on brown papule with evidence of excoriations on the R axilla  - No other lesions of concern on areas examined.     Medications:  Current Outpatient Medications   Medication    ARTIFICIAL TEAR SOLUTION OP    atorvastatin (LIPITOR) 20 MG tablet    clobetasol (TEMOVATE) 0.05 % external ointment    finasteride (PROSCAR) 5 MG tablet    hydrochlorothiazide (HYDRODIURIL) 25 MG tablet    hydrocortisone butyrate (LOCOID) 0.1 % SOLN    ketoconazole (NIZORAL) 2 % cream    ketoconazole (NIZORAL) 2 % external shampoo    Multiple Vitamin (MULTIVITAMIN) per tablet    mycophenolate (GENERIC EQUIVALENT) 500 MG tablet    tacrolimus (PROTOPIC) 0.1 % external ointment    triamcinolone (KENALOG) 0.1 % external ointment     No current facility-administered medications for this visit.      Past Medical History:   Patient Active Problem List   Diagnosis    Pemphigus  foliaceus    Hyperlipidemia LDL goal <130    BPH (benign prostatic hyperplasia)    Encounter for long-term (current) use of high-risk medication    Dermatitis, seborrheic    Lightheadedness    Age-related nuclear cataract of both eyes    SOB (shortness of breath)     Past Medical History:   Diagnosis Date    BPH (benign prostatic hyperplasia)     Essential hypertension 02/2018    Hyperlipidemia LDL goal <130     Pemphigus     pemphigus foliaceus    Retinal detachment 2009    left eye- corrected with laser    Vitreous detachment 2009           CC No referring provider defined for this encounter. on close of this encounter.

## 2023-08-24 ENCOUNTER — PRE VISIT (OUTPATIENT)
Dept: UROLOGY | Facility: CLINIC | Age: 78
End: 2023-08-24

## 2023-08-24 NOTE — TELEPHONE ENCOUNTER
Reason for Visit: Follow-up to discuss Benign prostatic hyperplasia with lower urinary tract symptoms    Diagnosis: Benign prostatic hyperplasia with lower urinary tract symptoms    Rooming Requirements: Cristian Vazquez  08/24/23  3:45 PM

## 2023-09-05 ENCOUNTER — LAB (OUTPATIENT)
Dept: LAB | Facility: CLINIC | Age: 78
End: 2023-09-05
Payer: COMMERCIAL

## 2023-09-05 DIAGNOSIS — N40.1 BENIGN PROSTATIC HYPERPLASIA WITH LOWER URINARY TRACT SYMPTOMS, SYMPTOM DETAILS UNSPECIFIED: ICD-10-CM

## 2023-09-05 LAB — PSA SERPL DL<=0.01 NG/ML-MCNC: 1.9 NG/ML

## 2023-09-05 PROCEDURE — 84153 ASSAY OF PSA TOTAL: CPT | Performed by: PATHOLOGY

## 2023-09-05 PROCEDURE — 36415 COLL VENOUS BLD VENIPUNCTURE: CPT | Performed by: PATHOLOGY

## 2023-09-07 ENCOUNTER — OFFICE VISIT (OUTPATIENT)
Dept: UROLOGY | Facility: CLINIC | Age: 78
End: 2023-09-07
Payer: COMMERCIAL

## 2023-09-07 VITALS
RESPIRATION RATE: 17 BRPM | HEIGHT: 69 IN | WEIGHT: 175 LBS | OXYGEN SATURATION: 96 % | HEART RATE: 51 BPM | SYSTOLIC BLOOD PRESSURE: 144 MMHG | DIASTOLIC BLOOD PRESSURE: 78 MMHG | BODY MASS INDEX: 25.92 KG/M2

## 2023-09-07 DIAGNOSIS — N40.1 BENIGN PROSTATIC HYPERPLASIA WITH LOWER URINARY TRACT SYMPTOMS, SYMPTOM DETAILS UNSPECIFIED: ICD-10-CM

## 2023-09-07 DIAGNOSIS — R97.20 ELEVATED PROSTATE SPECIFIC ANTIGEN (PSA): Primary | ICD-10-CM

## 2023-09-07 PROCEDURE — 99213 OFFICE O/P EST LOW 20 MIN: CPT | Performed by: PHYSICIAN ASSISTANT

## 2023-09-07 RX ORDER — FINASTERIDE 5 MG/1
1 TABLET, FILM COATED ORAL DAILY
Qty: 90 TABLET | Refills: 3 | Status: SHIPPED | OUTPATIENT
Start: 2023-09-07 | End: 2024-03-12

## 2023-09-07 ASSESSMENT — PAIN SCALES - GENERAL: PAINLEVEL: NO PAIN (0)

## 2023-09-07 NOTE — LETTER
9/7/2023       RE: Luan Mitchell  [address] [facility] [address]     Dear Colleague,    Thank you for referring your patient, Luan iMtchell, to the [facility] UROLOGY CLINIC [place] at [facility]. Please see a copy of my visit note below.    HPI: Mr. Luan Mitchell is a 78 year old year old male presenting today for evaluation of chief complaint(s): Follow Up (Routine prostate check up. )    Today, he is unaccompanied     He has PMH significant for HTN, HLD, BPH, pemphigus (on mycophenolate), Hx elevated PSA since 1996 (underwent 24 core biopsy with Dr. Murphy in 1998),  more recent abnormal LEAH s/p prostate MRI on 7/6/18 showing a PIRADS 3 lesion (intermediate probability).  For this he was subsequently seen by Dr. Gentile, last on 6/29/20 with a PSA of 1.91ug/L (3.82ug/L adjusted for finasteride).  At that time it was recommended that he continue on finasteride with continued observation of PSAs. Has had 4 prostate biopsies in the distant past, all negative.       6/1/21 - LEAH showed asymmetry R>L but no nodules.  PSA was stable at 1.95ug/L (3.90 adjusted for finasteride).    5/26/22 - Patient very concerned about PSA increase to 2.29ug/L (4.6 adjusted for finasteride).  We reviewed PCPT nomogram and continued finasteride.   8/25/22 - PSA of 2.09ug/L. Finasteride continued  3/2/23 - PSA 2.28, stable from previous.  Having some urinary frequency.  We continued finasteride    Today he returns for a PSA update. See below - PSA is normal.    May be having a little more frequency over the past 6-12 months, but he does drink a lot of water.  Notes there is a positional effect (when he moves after staying still for a long while).  Also an emotional effect - sometimes can hold his urine for a long time without difficulty.    Also feels he is emptying his bladder fine.    Health is good.  Nothing new.    Pemphigus is stable.  Has torso lesions that are  mild, transient and well-controlled.  Continues to have an itchy scalp.    Has annual physical next spring and plans to discuss routine 10 year colonoscopy (last one was normal).      REVIEW OF DIAGNOSTICS:  9/5/23 - PSA 1.90  2/27/23 - PSA 2.28ug/L  8/24/22 - PSA 2.09ug/L  3/14/22 - PSA 2.29ug/L  3/16/21 - PSA 1.95ug/L  6/12/20 - PSA 1.91  6/24/19 - PSA 1.86  9/25/18 - PSA 2.05    Current Outpatient Medications   Medication Sig Dispense Refill    ARTIFICIAL TEAR SOLUTION OP Apply to eye daily       atorvastatin (LIPITOR) 20 MG tablet Take 1 tablet (20 mg) by mouth daily 90 tablet 4    clobetasol (TEMOVATE) 0.05 % external ointment Apply topically 2 times daily For more bothersome lesions 120 g 1    finasteride (PROSCAR) 5 MG tablet Take 1 tablet (5 mg) by mouth daily 90 tablet 3    hydrochlorothiazide (HYDRODIURIL) 25 MG tablet Take 1 tablet (25 mg) by mouth daily 90 tablet 4    hydrocortisone butyrate (LOCOID) 0.1 % SOLN Apply sparingly to affected area(s) of beard twice daily 180 mL 3    ketoconazole (NIZORAL) 2 % cream Apply twice daily to the nose as needed for white/scaling. 60 g 2    ketoconazole (NIZORAL) 2 % external shampoo Three times per week in the shower: Lather into scalp, leave in place for 3-5 minutes, then rinse. 360 mL 12    Multiple Vitamin (MULTIVITAMIN) per tablet Take 1 tablet by mouth daily 1 tab daily      mycophenolate (GENERIC EQUIVALENT) 500 MG tablet Take 1 tablet (500 mg) by mouth every morning (before breakfast) 90 tablet 4    salicyclic acid-sulfur (SEBULEX) 2-2 % external shampoo Apply topically 3 times daily as needed for itching In the shower. 200 g 11    tacrolimus (PROTOPIC) 0.1 % external ointment Apply topically to affected areas as instructed. 300 g 3    triamcinolone (KENALOG) 0.1 % external ointment Apply topically 2 times daily For lesions that are more mild 453.6 g 1       ALLERGIES: Patient has no known allergies.      REVIEW OF SYSTEMS:  As above in HPI     GENERAL  "PHYSICAL EXAM:   Vitals: BP (!) 144/85 (BP Location: Right arm, Patient Position: Sitting, Cuff Size: Adult Small)   Pulse 51   Resp 17   Ht 1.753 m (5' 9\")   Wt 79.4 kg (175 lb)   SpO2 96%   BMI 25.84 kg/m    There is no height or weight on file to calculate BMI.    GENERAL: Well groomed, well developed, well nourished male in NAD.  NEURO: Alert and oriented x 3.  PSYCH: Normal mood and affect, pleasant and agreeable during interview and exam.    LEAH:      normal rectal tone, small soft amount of stool in the rectal vault.      medium sized prostate, without tenderness, nodules.  + prominent and slightly more firm on right side but no distinct nodules     RADIOLOGY: The following tests were reviewed:   MRI PROSTATE:  7/6/2018 2:48 PM  IMPRESSION:   1. Based on the most suspicious abnormality, this exam is  characterized as PIRADS 3 - Intermediate probability.  The presence of  clinically significant cancer is equivocal.  The most suspicious  abnormality is a ill-defined 1.3 cm region in the left mid gland  transitional zone from 2-3 o'clock, and there is no evidence of  extracapsular extension.  2. No evidence of extraprostatic malignancy. No suspicious adenopathy  or evidence of pelvic metastases.    LABS: The last test results for Mr. Luan Mitchell were reviewed:  PSA -   Lab Results   Component Value Date    PSA 1.90 09/05/2023    PSA 2.28 02/27/2023    PSA 2.09 08/24/2022    PSA 2.29 03/14/2022    PSA 1.95 03/16/2021    PSA 1.91 06/12/2020    PSA 1.86 06/24/2019    PSA 1.82 03/14/2019    PSA 2.05 09/25/2018    PSA 2.10 06/21/2018    PSA 2.42 02/05/2018    PSA 2.09 01/10/2017    PSA 1.96 02/14/2011    PSA 2.47 02/25/2010     BMP -   Recent Labs   Lab Test 06/08/23  0839 03/21/23  1303 12/22/22  1050    140 141   POTASSIUM 4.1 4.4 3.9   CHLORIDE 104 101 103   CO2 29 30* 27   BUN 23.0 15.8 27.7*   CR 1.09 1.00 1.04   * 113* 110*   JOSHUA 10.0 10.0 9.9       CBC -   Recent Labs   Lab Test " 06/08/23  0839 12/22/22  1050 06/30/22  1148   WBC 7.1 7.6 7.4   HGB 15.0 15.0 15.5    161 159       ASSESSMENT:   1) BPH on finasteride with stable PSA    PLAN:   - Finasteride refilled today  - Return in 6 months for visit with another PSA first.  Patient would like to continue rechecks at the interval of every 6 months.      VISIT DURATION: 17 minutes (4:00 - 4:12 + 5 minutes documentation on DOS)    Carolynn Carroll PA-C  Department of Urologic Surgery

## 2023-09-07 NOTE — NURSING NOTE
"Chief Complaint   Patient presents with    Follow Up     Routine prostate check up.        Blood pressure (!) 144/85, pulse 51, resp. rate 17, height 1.753 m (5' 9\"), weight 79.4 kg (175 lb), SpO2 96 %. Body mass index is 25.84 kg/m .    Patient Active Problem List   Diagnosis    Pemphigus foliaceus    Hyperlipidemia LDL goal <130    BPH (benign prostatic hyperplasia)    Encounter for long-term (current) use of high-risk medication    Dermatitis, seborrheic    Lightheadedness    Age-related nuclear cataract of both eyes    SOB (shortness of breath)       No Known Allergies    Current Outpatient Medications   Medication Sig Dispense Refill    ARTIFICIAL TEAR SOLUTION OP Apply to eye daily       atorvastatin (LIPITOR) 20 MG tablet Take 1 tablet (20 mg) by mouth daily 90 tablet 4    clobetasol (TEMOVATE) 0.05 % external ointment Apply topically 2 times daily For more bothersome lesions 120 g 1    finasteride (PROSCAR) 5 MG tablet Take 1 tablet (5 mg) by mouth daily 90 tablet 3    hydrochlorothiazide (HYDRODIURIL) 25 MG tablet Take 1 tablet (25 mg) by mouth daily 90 tablet 4    hydrocortisone butyrate (LOCOID) 0.1 % SOLN Apply sparingly to affected area(s) of beard twice daily 180 mL 3    ketoconazole (NIZORAL) 2 % cream Apply twice daily to the nose as needed for white/scaling. 60 g 2    ketoconazole (NIZORAL) 2 % external shampoo Three times per week in the shower: Lather into scalp, leave in place for 3-5 minutes, then rinse. 360 mL 12    Multiple Vitamin (MULTIVITAMIN) per tablet Take 1 tablet by mouth daily 1 tab daily      mycophenolate (GENERIC EQUIVALENT) 500 MG tablet Take 1 tablet (500 mg) by mouth every morning (before breakfast) 90 tablet 4    salicyclic acid-sulfur (SEBULEX) 2-2 % external shampoo Apply topically 3 times daily as needed for itching In the shower. 200 g 11    tacrolimus (PROTOPIC) 0.1 % external ointment Apply topically to affected areas as instructed. 300 g 3    triamcinolone (KENALOG) 0.1 " % external ointment Apply topically 2 times daily For lesions that are more mild 453.6 g 1       Social History     Tobacco Use    Smoking status: Never    Smokeless tobacco: Never       Steph Serrano  9/7/2023  3:38 PM

## 2023-09-20 NOTE — PROGRESS NOTES
"[facility] Dermatology Note  Encounter Date: Sep 21, 2023  Office Visit     Dermatology Problem List:  Last FBSE: 12/22/22     #  Pemphigus foliaceus  - s/p shave bx 6/17/21 (L superior shoulder) and 7/26/10 (L arm)  - Last pemphigus panel (3/25/21): Dsg1 IgG 136 and Dsg2 IgG 2  - Goals of treatment per Mr. Mitchell:  \"Prevent itching and secondary infections. Not interested in complete clearance\"  - Current Tx: MMF 500mg daily              - stopped MMF 3/2019, re-started  mg PO BID on 8/13/20 but reduced to 500 mg every day on 10/21/20              - Last safety labs (6/2023): wnl   - safety labs: CBC, CMP ordered 09/21/23   - Adjunct Tx: clobetasol oint, triamcinolone 0.1%, tacrolimus ointment, clobetasol solution to scalp, salicylic acid shampoo to scalp, ketoconazole shampoo to scalp, fluocinolone oil to scalp, Head and Shoulders Shampoo  # Tinea pedis  - Current Tx:  Terbinafine 1% cream  # Benign Bx  - VK, R calf, s/p shave bx 3/25/21  - mild DN, R neck, s/p shave bx 3/22/18    ____________________________________________    Assessment & Plan:   #Pemphigus foliaceous.  Chronic, 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  Goal of care remains focused on controlling symptoms and not clearing disease.  The most symptomatic region remains the scalp, which has been inadequately controlled with clobetasol solution and several shampoos.  We discussed adding fluocinolone oil to penetrate the thick hyperkeratotic scale, which can be left in overnight and washed out in the morning.  We also discussed alternating shampoos, including head and shoulder zinc pyrithione shampoo, which could be obtained over-the-counter.  Patient agrees.  - Continue  mg daily  - Obtain safety labs today: CBC, CMP  - Continue topicals: Clobetasol solution, clobetasol oint, triamcinolone ointment, tacrolimus ointment, ketoconazole shampoo, salicylic acid shampoo  - Start fluocinolone 0.01% oil nightly " under night.  Wash out in the morning  - add in  zinc pyrithione shampoo a few days per week    #Seborrheic keratoses.   Reassured patient regarding the benign nature of these findings.  No further treatment necessary.  - Monitor    Procedures Performed:   - none    Follow-up: 3 month(s) in-person, or earlier for new or changing lesions    Staff: Dr. Nando Zaman MD PGY-3  Dermatology Resident    I have personally examined this patient and agree with the resident doctor's documentation and plan of care. I have reviewed and amended the resident's note above. The documentation accurately reflects my clinical observations, diagnoses, treatment and follow-up plans.     Isabelle Collier MD  Dermatology Staff    ____________________________________________    CC: Derm Problem (Patient reports symptoms are well managed with current treatment. The patient denies current flare. )    HPI:  Mr. Luan Mitchell is a(n) 78 year old male who presents today as a return patient for follow-up appointment this foliaceous.  Patient was last seen on 6/8/2023 by Dr. Feldman, where  mg daily was continued, topicals were continued and salicylic acid 2% shampoo was started to reduce pruritus on the scalp.    Today, patient notes that his disease is overall well controlled.  He still has some active plaques on the back and chest, but these are not terribly bothersome and well controlled on the mycophenolate and topicals.  He does note some itching and hyperkeratosis around primarily the vertex scalp, which is most symptomatic region on his body.  He applies clobetasol solution daily, keto shampoo a few days per week and the new salicylic acid shampoo that was prescribed at last visit about once per week.    Patient is otherwise feeling well, without additional skin concerns.    Labs Reviewed:  CBC, CMP 6/8/2023 WNL     Physical Exam:  Vitals: There were no vitals taken for this visit.  SKIN: Focused examination of the  scalp, face, back, chest, abdomen, upper and lower extremities and groin and buttocks was performed.  -On the back and to a lesser extent the chest there are pink, fairly well demarcated hyperkeratotic plaques  - On the vertex scalp there are pink, quite hyper keratotic plaques with some scale in the hair  - On the chest and back there are several waxy, stuck on appearing tan to brown papules  - No other lesions of concern on areas examined.         Medications:  Current Outpatient Medications   Medication    ARTIFICIAL TEAR SOLUTION OP    atorvastatin (LIPITOR) 20 MG tablet    clobetasol (TEMOVATE) 0.05 % external ointment    clobetasol (TEMOVATE) 0.05 % external solution    finasteride (PROSCAR) 5 MG tablet    fluocinolone acetonide (DERMA SMOOTHE/FS BODY) 0.01 % external oil    hydrochlorothiazide (HYDRODIURIL) 25 MG tablet    hydrocortisone butyrate (LOCOID) 0.1 % SOLN    ketoconazole (NIZORAL) 2 % cream    ketoconazole (NIZORAL) 2 % external shampoo    Multiple Vitamin (MULTIVITAMIN) per tablet    mycophenolate (GENERIC EQUIVALENT) 500 MG tablet    salicyclic acid-sulfur (SEBULEX) 2-2 % external shampoo    tacrolimus (PROTOPIC) 0.1 % external ointment    triamcinolone (KENALOG) 0.1 % external ointment     No current facility-administered medications for this visit.      Past Medical History:   Patient Active Problem List   Diagnosis    Pemphigus foliaceus    Hyperlipidemia LDL goal <130    BPH (benign prostatic hyperplasia)    Encounter for long-term (current) use of high-risk medication    Dermatitis, seborrheic    Lightheadedness    Age-related nuclear cataract of both eyes    SOB (shortness of breath)     Past Medical History:   Diagnosis Date    BPH (benign prostatic hyperplasia)     Essential hypertension 02/2018    Hyperlipidemia LDL goal <130     Pemphigus     pemphigus foliaceus    Retinal detachment 2009    left eye- corrected with laser    Vitreous detachment 2009    LE       CC No referring provider  defined for this encounter. on close of this encounter.

## 2023-09-21 ENCOUNTER — TELEPHONE (OUTPATIENT)
Dept: DERMATOLOGY | Facility: CLINIC | Age: 78
End: 2023-09-21

## 2023-09-21 ENCOUNTER — OFFICE VISIT (OUTPATIENT)
Dept: DERMATOLOGY | Facility: CLINIC | Age: 78
End: 2023-09-21
Payer: COMMERCIAL

## 2023-09-21 ENCOUNTER — LAB (OUTPATIENT)
Dept: LAB | Facility: CLINIC | Age: 78
End: 2023-09-21
Payer: COMMERCIAL

## 2023-09-21 DIAGNOSIS — L10.2 PEMPHIGUS FOLIACEUS (H): ICD-10-CM

## 2023-09-21 DIAGNOSIS — L10.2 PEMPHIGUS FOLIACEUS (H): Primary | ICD-10-CM

## 2023-09-21 DIAGNOSIS — L21.9 DERMATITIS, SEBORRHEIC: ICD-10-CM

## 2023-09-21 DIAGNOSIS — L82.1 SEBORRHEIC KERATOSES: ICD-10-CM

## 2023-09-21 LAB
ALBUMIN SERPL BCG-MCNC: 4.7 G/DL (ref 3.5–5.2)
ALP SERPL-CCNC: 40 U/L (ref 40–129)
ALT SERPL W P-5'-P-CCNC: 42 U/L (ref 0–70)
ANION GAP SERPL CALCULATED.3IONS-SCNC: 11 MMOL/L (ref 7–15)
AST SERPL W P-5'-P-CCNC: 41 U/L (ref 0–45)
BASOPHILS # BLD AUTO: 0 10E3/UL (ref 0–0.2)
BASOPHILS NFR BLD AUTO: 0 %
BILIRUB SERPL-MCNC: 0.8 MG/DL
BUN SERPL-MCNC: 23.1 MG/DL (ref 8–23)
CALCIUM SERPL-MCNC: 10 MG/DL (ref 8.8–10.2)
CHLORIDE SERPL-SCNC: 102 MMOL/L (ref 98–107)
CREAT SERPL-MCNC: 1.15 MG/DL (ref 0.67–1.17)
DEPRECATED HCO3 PLAS-SCNC: 28 MMOL/L (ref 22–29)
EGFRCR SERPLBLD CKD-EPI 2021: 64 ML/MIN/1.73M2
EOSINOPHIL # BLD AUTO: 0.1 10E3/UL (ref 0–0.7)
EOSINOPHIL NFR BLD AUTO: 2 %
ERYTHROCYTE [DISTWIDTH] IN BLOOD BY AUTOMATED COUNT: 12.4 % (ref 10–15)
GLUCOSE SERPL-MCNC: 114 MG/DL (ref 70–99)
HCT VFR BLD AUTO: 43.1 % (ref 40–53)
HGB BLD-MCNC: 15.1 G/DL (ref 13.3–17.7)
IMM GRANULOCYTES # BLD: 0 10E3/UL
IMM GRANULOCYTES NFR BLD: 0 %
LYMPHOCYTES # BLD AUTO: 1.3 10E3/UL (ref 0.8–5.3)
LYMPHOCYTES NFR BLD AUTO: 21 %
MCH RBC QN AUTO: 32 PG (ref 26.5–33)
MCHC RBC AUTO-ENTMCNC: 35 G/DL (ref 31.5–36.5)
MCV RBC AUTO: 91 FL (ref 78–100)
MONOCYTES # BLD AUTO: 0.6 10E3/UL (ref 0–1.3)
MONOCYTES NFR BLD AUTO: 10 %
NEUTROPHILS # BLD AUTO: 4.2 10E3/UL (ref 1.6–8.3)
NEUTROPHILS NFR BLD AUTO: 67 %
NRBC # BLD AUTO: 0 10E3/UL
NRBC BLD AUTO-RTO: 0 /100
PLATELET # BLD AUTO: 152 10E3/UL (ref 150–450)
POTASSIUM SERPL-SCNC: 4.2 MMOL/L (ref 3.4–5.3)
PROT SERPL-MCNC: 7.3 G/DL (ref 6.4–8.3)
RBC # BLD AUTO: 4.72 10E6/UL (ref 4.4–5.9)
SODIUM SERPL-SCNC: 141 MMOL/L (ref 136–145)
WBC # BLD AUTO: 6.3 10E3/UL (ref 4–11)

## 2023-09-21 PROCEDURE — 36415 COLL VENOUS BLD VENIPUNCTURE: CPT | Performed by: PATHOLOGY

## 2023-09-21 PROCEDURE — 85025 COMPLETE CBC W/AUTO DIFF WBC: CPT | Performed by: PATHOLOGY

## 2023-09-21 PROCEDURE — 80053 COMPREHEN METABOLIC PANEL: CPT | Performed by: PATHOLOGY

## 2023-09-21 PROCEDURE — 99214 OFFICE O/P EST MOD 30 MIN: CPT | Mod: GC | Performed by: DERMATOLOGY

## 2023-09-21 RX ORDER — FLUOCINOLONE ACETONIDE 0.11 MG/ML
OIL TOPICAL
Qty: 118 ML | Refills: 4 | Status: SHIPPED | OUTPATIENT
Start: 2023-09-21

## 2023-09-21 RX ORDER — KETOCONAZOLE 20 MG/G
CREAM TOPICAL
Qty: 60 G | Refills: 2 | Status: CANCELLED | OUTPATIENT
Start: 2023-09-21

## 2023-09-21 RX ORDER — CLOBETASOL PROPIONATE 0.5 MG/ML
SOLUTION TOPICAL
Qty: 50 ML | Refills: 3 | Status: SHIPPED | OUTPATIENT
Start: 2023-09-21

## 2023-09-21 RX ORDER — KETOCONAZOLE 20 MG/ML
SHAMPOO TOPICAL
Qty: 360 ML | Refills: 12 | Status: SHIPPED | OUTPATIENT
Start: 2023-09-21

## 2023-09-21 RX ORDER — TACROLIMUS 1 MG/G
OINTMENT TOPICAL
Qty: 300 G | Refills: 3 | Status: SHIPPED | OUTPATIENT
Start: 2023-09-21

## 2023-09-21 RX ORDER — CLOBETASOL PROPIONATE 0.5 MG/G
OINTMENT TOPICAL 2 TIMES DAILY
Qty: 120 G | Refills: 1 | Status: SHIPPED | OUTPATIENT
Start: 2023-09-21 | End: 2024-07-22

## 2023-09-21 RX ORDER — MYCOPHENOLATE MOFETIL 500 MG/1
500 TABLET ORAL
Qty: 90 TABLET | Refills: 4 | Status: SHIPPED | OUTPATIENT
Start: 2023-09-21 | End: 2024-02-05

## 2023-09-21 RX ORDER — TRIAMCINOLONE ACETONIDE 1 MG/G
OINTMENT TOPICAL 2 TIMES DAILY
Qty: 453.6 G | Refills: 1 | Status: SHIPPED | OUTPATIENT
Start: 2023-09-21

## 2023-09-21 ASSESSMENT — PAIN SCALES - GENERAL: PAINLEVEL: NO PAIN (0)

## 2023-09-21 NOTE — TELEPHONE ENCOUNTER
PRIOR AUTHORIZATION DENIED    Medication: TACROLIMUS 0.1 % EX OINT  Insurance Company: [employer] - Phone [phone] Fax [phone]  Denial Date: 9/21/2023  Denial Rational:     Appeal Information:       Patient Notified: No

## 2023-09-21 NOTE — NURSING NOTE
Chief Complaint   Patient presents with    Derm Problem     Patient reports symptoms are well managed with current treatment. The patient denies current flare.      Millicent Thurman LPN

## 2023-09-21 NOTE — LETTER
"9/21/2023       RE: Luan Mitchell  [address] [address]     Dear Colleague,    Thank you for referring your patient, Luan Mitchell, to the [facility] DERMATOLOGY [facility] [place] at [facility]. Please see a copy of my visit note below.    [facility] Dermatology Note  Encounter Date: Sep 21, 2023  Office Visit     Dermatology Problem List:  Last FBSE: 12/22/22     #  Pemphigus foliaceus  - s/p shave bx 6/17/21 (L superior shoulder) and 7/26/10 (L arm)  - Last pemphigus panel (3/25/21): Dsg1 IgG 136 and Dsg2 IgG 2  - Goals of treatment per Mr. Mitchell:  \"Prevent itching and secondary infections. Not interested in complete clearance\"  - Current Tx: MMF 500mg daily              - stopped MMF 3/2019, re-started  mg PO BID on 8/13/20 but reduced to 500 mg every day on 10/21/20              - Last safety labs (6/2023): wnl   - safety labs: CBC, CMP ordered 09/21/23   - Adjunct Tx: clobetasol oint, triamcinolone 0.1%, tacrolimus ointment, clobetasol solution to scalp, salicylic acid shampoo to scalp, ketoconazole shampoo to scalp, fluocinolone oil to scalp, Head and Shoulders Shampoo  # Tinea pedis  - Current Tx:  Terbinafine 1% cream  # Benign Bx  - VK, R calf, s/p shave bx 3/25/21  - mild DN, R neck, s/p shave bx 3/22/18    ____________________________________________    Assessment & Plan:   #Pemphigus foliaceous.  Chronic, 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  Goal of care remains focused on controlling symptoms and not clearing disease.  The most symptomatic region remains the scalp, which has been inadequately controlled with clobetasol solution and several shampoos.  We discussed adding fluocinolone oil to penetrate the thick hyperkeratotic scale, which can be left in overnight and washed out in the morning.  We also discussed alternating shampoos, including head and shoulder zinc pyrithione shampoo, " which could be obtained over-the-counter.  Patient agrees.  - Continue  mg daily  - Obtain safety labs today: CBC, CMP  - Continue topicals: Clobetasol solution, clobetasol oint, triamcinolone ointment, tacrolimus ointment, ketoconazole shampoo, salicylic acid shampoo  - Start fluocinolone 0.01% oil nightly under night.  Wash out in the morning  - add in  zinc pyrithione shampoo a few days per week    #Seborrheic keratoses.   Reassured patient regarding the benign nature of these findings.  No further treatment necessary.  - Monitor    Procedures Performed:   - none    Follow-up: 3 month(s) in-person, or earlier for new or changing lesions    Staff: Dr. Nando Zaman MD PGY-3  Dermatology Resident    I have personally examined this patient and agree with the resident doctor's documentation and plan of care. I have reviewed and amended the resident's note above. The documentation accurately reflects my clinical observations, diagnoses, treatment and follow-up plans.     Isabelle Collier MD  Dermatology Staff    ____________________________________________    CC: Derm Problem (Patient reports symptoms are well managed with current treatment. The patient denies current flare. )    HPI:  Mr. Luan Mitchell is a(n) 78 year old male who presents today as a return patient for follow-up appointment this foliaceous.  Patient was last seen on 6/8/2023 by Dr. Feldman, where  mg daily was continued, topicals were continued and salicylic acid 2% shampoo was started to reduce pruritus on the scalp.    Today, patient notes that his disease is overall well controlled.  He still has some active plaques on the back and chest, but these are not terribly bothersome and well controlled on the mycophenolate and topicals.  He does note some itching and hyperkeratosis around primarily the vertex scalp, which is most symptomatic region on his body.  He applies clobetasol solution daily, keto shampoo a few days per  week and the new salicylic acid shampoo that was prescribed at last visit about once per week.    Patient is otherwise feeling well, without additional skin concerns.    Labs Reviewed:  CBC, CMP 6/8/2023 WNL     Physical Exam:  Vitals: There were no vitals taken for this visit.  SKIN: Focused examination of the scalp, face, back, chest, abdomen, upper and lower extremities and groin and buttocks was performed.  -On the back and to a lesser extent the chest there are pink, fairly well demarcated hyperkeratotic plaques  - On the vertex scalp there are pink, quite hyper keratotic plaques with some scale in the hair  - On the chest and back there are several waxy, stuck on appearing tan to brown papules  - No other lesions of concern on areas examined.         Medications:  Current Outpatient Medications   Medication    ARTIFICIAL TEAR SOLUTION OP    atorvastatin (LIPITOR) 20 MG tablet    clobetasol (TEMOVATE) 0.05 % external ointment    clobetasol (TEMOVATE) 0.05 % external solution    finasteride (PROSCAR) 5 MG tablet    fluocinolone acetonide (DERMA SMOOTHE/FS BODY) 0.01 % external oil    hydrochlorothiazide (HYDRODIURIL) 25 MG tablet    hydrocortisone butyrate (LOCOID) 0.1 % SOLN    ketoconazole (NIZORAL) 2 % cream    ketoconazole (NIZORAL) 2 % external shampoo    Multiple Vitamin (MULTIVITAMIN) per tablet    mycophenolate (GENERIC EQUIVALENT) 500 MG tablet    salicyclic acid-sulfur (SEBULEX) 2-2 % external shampoo    tacrolimus (PROTOPIC) 0.1 % external ointment    triamcinolone (KENALOG) 0.1 % external ointment     No current facility-administered medications for this visit.      Past Medical History:   Patient Active Problem List   Diagnosis    Pemphigus foliaceus    Hyperlipidemia LDL goal <130    BPH (benign prostatic hyperplasia)    Encounter for long-term (current) use of high-risk medication    Dermatitis, seborrheic    Lightheadedness    Age-related nuclear cataract of both eyes    SOB (shortness of breath)      Past Medical History:   Diagnosis Date    BPH (benign prostatic hyperplasia)     Essential hypertension 02/2018    Hyperlipidemia LDL goal <130     Pemphigus     pemphigus foliaceus    Retinal detachment 2009    left eye- corrected with laser    Vitreous detachment 2009           CC No referring provider defined for this encounter. on close of this encounter.

## 2023-09-24 NOTE — PROGRESS NOTES
Rooming Note  Health Maintenance   Health Maintenance Due   Topic Date Due     ADVANCE DIRECTIVE PLANNING Q5 YRS  04/04/1998     LIPID MONITORING Q1 YEAR  07/28/2017    All health maintenance items discussed and pended.  Ramona Castaneda LPN at 1:36 PM on 2/5/2018.       0 3

## 2023-09-28 ENCOUNTER — MYC MEDICAL ADVICE (OUTPATIENT)
Dept: INTERNAL MEDICINE | Facility: CLINIC | Age: 78
End: 2023-09-28

## 2023-10-30 ENCOUNTER — OFFICE VISIT (OUTPATIENT)
Dept: OPHTHALMOLOGY | Facility: CLINIC | Age: 78
End: 2023-10-30
Attending: OPHTHALMOLOGY
Payer: COMMERCIAL

## 2023-10-30 DIAGNOSIS — H33.22 OLD RETINAL DETACHMENT OF LEFT EYE: Primary | ICD-10-CM

## 2023-10-30 DIAGNOSIS — H25.13 AGE-RELATED NUCLEAR CATARACT OF BOTH EYES: ICD-10-CM

## 2023-10-30 PROCEDURE — G0463 HOSPITAL OUTPT CLINIC VISIT: HCPCS | Performed by: OPHTHALMOLOGY

## 2023-10-30 PROCEDURE — 92015 DETERMINE REFRACTIVE STATE: CPT

## 2023-10-30 PROCEDURE — 99214 OFFICE O/P EST MOD 30 MIN: CPT | Mod: GC | Performed by: OPHTHALMOLOGY

## 2023-10-30 ASSESSMENT — CUP TO DISC RATIO
OS_RATIO: 0.3
OD_RATIO: 0.3

## 2023-10-30 ASSESSMENT — REFRACTION_WEARINGRX
OS_SPHERE: -1.75
OD_ADD: +2.50
OD_AXIS: 163
OD_CYLINDER: +0.50
OS_CYLINDER: +0.75
OD_SPHERE: -1.00
SPECS_TYPE: BIFOCAL
OS_ADD: +2.50
OS_AXIS: 021

## 2023-10-30 ASSESSMENT — REFRACTION_MANIFEST
OD_ADD: +2.75
OS_CYLINDER: +1.00
OS_ADD: +2.75
OS_AXIS: 007
OD_AXIS: 177
OS_SPHERE: -1.00
OD_SPHERE: -2.25
OD_CYLINDER: +0.75

## 2023-10-30 ASSESSMENT — SLIT LAMP EXAM - LIDS
COMMENTS: 1+ BLEPHARITIS
COMMENTS: 1+ BLEPHARITIS

## 2023-10-30 ASSESSMENT — CONF VISUAL FIELD
OD_NORMAL: 1
OS_INFERIOR_TEMPORAL_RESTRICTION: 0
OD_INFERIOR_NASAL_RESTRICTION: 0
OS_INFERIOR_NASAL_RESTRICTION: 0
OD_SUPERIOR_TEMPORAL_RESTRICTION: 0
OD_SUPERIOR_NASAL_RESTRICTION: 0
OS_SUPERIOR_NASAL_RESTRICTION: 0
OS_NORMAL: 1
OS_SUPERIOR_TEMPORAL_RESTRICTION: 0
OD_INFERIOR_TEMPORAL_RESTRICTION: 0

## 2023-10-30 ASSESSMENT — TONOMETRY
OD_IOP_MMHG: 12
IOP_METHOD: TONOPEN
OS_IOP_MMHG: 10

## 2023-10-30 ASSESSMENT — VISUAL ACUITY
METHOD: SNELLEN - LINEAR
OD_CC: 20/30
CORRECTION_TYPE: GLASSES
OS_CC: 20/30

## 2023-10-30 NOTE — PROGRESS NOTES
I have confirmed the patient's and reviewed Past Medical History, Past Surgical History, Social History, Family History, Problem List, Medication List and agree with Tech note.      CC -   follow up retinopexy left eye     INTERVAL HISTORY - 1 year follow-up for cataracts, and monitoring of retina with history of retinal detachment in the left eye. No new concerns, No new flashes or floaters of light, no eye pain. No changes in health.   He is not bothered by glare or halo's. He feels that he can still drive safely.     PMH -   Luan Mitchell is a  78 year old year-old patient with history of left eye retinal detachment s/p repair, and hx of left eye retinopexy.      PAST OCULAR SURGERY  RRD s/p repair, left eye (2009)  Retinopexy, left eye for operuclated hole    RETINAL IMAGING:  none      ASSESSMENT & PLAN   1.  Nuclear sclerotic cataract  Both eyes                         - Not visually significant                        - refract as needed, new prescription glasses issued today                         - monitor yearly       2.  Blepharitis both eyes:                         - wet compress twice a day     - Discussed baby shampoo washes/eyelid hygiene                        - artificial tears over the counter                         - monitor      3.  Operculated hole Left Eye s/p retinopexy                         - Retinal Detachment precautions reviewed     4. History of Retinal Detachment Repair, left eye - 2009    5. Refractive error, and presbyopia    - Updated Mrx given    return to clinic: 1 year, VTD, MRx    ATTESTATION     Attestation:  I have seen and examined the patient with Dr. Wan Eduardo and agree with the findings in this note, as well as the interpretations of the diagnostic tests.     Mona Maria MD PhD.  Professor & Chair

## 2023-10-30 NOTE — PROGRESS NOTES
"[facility] Dermatology Record:  Video: ( Invitation sent by:  [place] waiting room )      Dermatology Problem List:  1.  Pemphigus foliaceus.     - Goals of treatment per Mr. Mitchell:  Prevent itching and secondary infections.   - He is not interested in complete clearance at this time.     - Stopped MMF 03/2019, re-started  mg PO BID on 8/13/2020, reduced to 500 mg every day on 10/21/2020    Cell Surface Antibodies   Serum    Date of    IgG CS        IgA CS   Number   Specimen   Titers        Titers     DSG 1   DSG 3   -------  --------   ----------    --------   -----   -----     03/14/19   ME: 1:1280    ME: NA     125*      1                      NS: 1:160     NS: NA     128**     06/12/20   ME: 1:2560    ME: NA     181*      1                      NS: 1:2560    NS: NA     320**      2.  History of seborrheic keratoses.      3.  Seborrheic dermatitis, on ketaconazole shampoo and cream.      4. DN, Mild. Right neck. Bx 3/22/18.      5. Verruous SKs right upper forehead; will monitor, if changes consider biopsy    Encounter Date: Dec 17, 2020    CC:   Chief Complaint   Patient presents with     Derm Problem     Luan is having a virtual visit today for pemphigus. He states \" my skin is in very good condition\"       History of Present Illness:  Luan Mitchell is a 75 year old male who presents for follow up of his pemphigus foliaceus that is now well controlled on  mg daily. At his last virtual visit on 10/21/2020, he was reduced from  mg BID to every day. Since then he has had no new flares of his pemphigus. He says he is tolerating the medication well. He would like to continue at the current dosage.   He had labs recently on 12/15 and his ALT was elevated to 74. He denies alcohol use. He is concerned about this elevation as to his knowledge it has never been elevated before.   Aside from this, he says he is feeling well and has no additional concerns at the moment.       ROS: Patient " Detail Level: Simple is generally feeling well today     Physical Examination:  General: Well-appearing, appropriately-developed individual.  Skin:  Exam was performed by photo review and is significant for the following: via video call, no concerning findings.       Labs:  Results for AJ PACE (MRN [id number]) as of 12/24/2020 12:03   Ref. Range 12/15/2020 06:41   Sodium Latest Ref Range: 133 - 144 mmol/L 140   Potassium Latest Ref Range: 3.4 - 5.3 mmol/L 4.0   Chloride Latest Ref Range: 94 - 109 mmol/L 106   Carbon Dioxide Latest Ref Range: 20 - 32 mmol/L 31   Urea Nitrogen Latest Ref Range: 7 - 30 mg/dL 20   Creatinine Latest Ref Range: 0.66 - 1.25 mg/dL 0.97   GFR Estimate Latest Ref Range: >60 mL/min/1.73_m2 75   GFR Estimate If Black Latest Ref Range: >60 mL/min/1.73_m2 86   Calcium Latest Ref Range: 8.5 - 10.1 mg/dL 8.8   Anion Gap Latest Ref Range: 3 - 14 mmol/L 4   Albumin Latest Ref Range: 3.4 - 5.0 g/dL 3.7   Protein Total Latest Ref Range: 6.8 - 8.8 g/dL 7.0   Bilirubin Total Latest Ref Range: 0.2 - 1.3 mg/dL 0.4   Alkaline Phosphatase Latest Ref Range: 40 - 150 U/L 56   ALT Latest Ref Range: 0 - 70 U/L 74 (H)   AST Latest Ref Range: 0 - 45 U/L 37   Glucose Latest Ref Range: 70 - 99 mg/dL 97   WBC Latest Ref Range: 4.0 - 11.0 10e9/L 5.3   Hemoglobin Latest Ref Range: 13.3 - 17.7 g/dL 14.8   Hematocrit Latest Ref Range: 40.0 - 53.0 % 43.7   Platelet Count Latest Ref Range: 150 - 450 10e9/L 142 (L)   RBC Count Latest Ref Range: 4.4 - 5.9 10e12/L 4.68   MCV Latest Ref Range: 78 - 100 fl 93   MCH Latest Ref Range: 26.5 - 33.0 pg 31.6   MCHC Latest Ref Range: 31.5 - 36.5 g/dL 33.9   RDW Latest Ref Range: 10.0 - 15.0 % 12.2   Diff Method Unknown Automated Method   % Neutrophils Latest Units: % 51.0   % Lymphocytes Latest Units: % 34.5   % Monocytes Latest Units: % 10.5   % Eosinophils Latest Units: % 3.6   % Basophils Latest Units: % 0.2   % Immature Granulocytes Latest Units: % 0.2   Nucleated RBCs Latest Ref Range: 0 /100  0   Absolute Neutrophil Latest Ref Range: 1.6 - 8.3 10e9/L 2.7   Absolute Lymphocytes Latest Ref Range: 0.8 - 5.3 10e9/L 1.8   Absolute Monocytes Latest Ref Range: 0.0 - 1.3 10e9/L 0.6   Absolute Eosinophils Latest Ref Range: 0.0 - 0.7 10e9/L 0.2   Absolute Basophils Latest Ref Range: 0.0 - 0.2 10e9/L 0.0   Abs Immature Granulocytes Latest Ref Range: 0 - 0.4 10e9/L 0.0   Absolute Nucleated RBC Unknown 0.0       Past Medical History:   Patient Active Problem List   Diagnosis     Pemphigus foliaceus     Hyperlipidemia LDL goal <130     BPH (benign prostatic hyperplasia)     Encounter for long-term (current) use of high-risk medication     Dermatitis, seborrheic     Lightheadedness     Age-related nuclear cataract of both eyes     SOB (shortness of breath)     Past Medical History:   Diagnosis Date     BPH (benign prostatic hyperplasia)      Essential hypertension 2018     Hyperlipidemia LDL goal <130      Pemphigus     pemphigus foliaceus     Retinal detachment     left eye- corrected with laser     Vitreous detachment     LE     Past Surgical History:   Procedure Laterality Date     NO HISTORY OF SURGERY  12    derm     RETINOPEXY LASER PROPHYLAXIS BREAK(S) OS (LEFT EYE)       - Left eye       Social History:  Patient reports that he has never smoked. He has never used smokeless tobacco.    Family History:  Family History   Problem Relation Age of Onset     Heart Disease Mother         CHF d 80     Diabetes Mother         DM2,  age 80     Hypertension Mother      Heart Disease Father         CHF d 78     Heart Disease Paternal Uncle         CHF d 50s     Cancer No family hx of         no skin cancer     Skin Cancer No family hx of         no skin cancer     Glaucoma No family hx of      Macular Degeneration No family hx of      Melanoma No family hx of        Medications:  Current Outpatient Medications   Medication     ARTIFICIAL TEAR SOLUTION OP     atorvastatin (LIPITOR) 20 MG tablet      Cholecalciferol (VITAMIN D PO)     clobetasol (TEMOVATE) 0.05 % external ointment     clobetasol (TEMOVATE) 0.05 % external solution     clobetasol propionate 0.05 % SHAM     desonide (DESOWEN) 0.05 % ointment     finasteride (PROSCAR) 5 MG tablet     hydrochlorothiazide (HYDRODIURIL) 12.5 MG tablet     hydrocortisone butyrate (LOCOID) 0.1 % SOLN     ketoconazole (NIZORAL) 2 % cream     ketoconazole (NIZORAL) 2 % shampoo     Multiple Vitamin (MULTIVITAMIN) per tablet     mycophenolate (GENERIC EQUIVALENT) 500 MG tablet     tacrolimus (PROTOPIC) 0.1 % external ointment     triamcinolone (KENALOG) 0.1 % external cream     triamcinolone (KENALOG) 0.1 % external ointment     triamcinolone (KENALOG) 0.1 % ointment     clobetasol (TEMOVATE) 0.05 % ointment     finasteride (PROSCAR) 5 MG tablet     No current facility-administered medications for this visit.           No Known Allergies        Impression and Recommendations (Patient Counseled on the Following):  1. Pemphigus foliaceus: well controlled on  mg PO every day. Will continue at this dose for now.     2. Elevated ALT:  Unlikely due to MMF at this time given recent decrease in dose and previously not elevated at higher doses. Will plan on re-evaluating again in 3 weeks. If continues to be elevated, will alert PCP for further investigation.       Follow-up:   Follow-up with dermatology in approximately 3 months. Earlier for new or changing lesions or rash.      Staff and resident    Seen with Dr. Sotelo.    Joey Ramírez MD, PhD  Med-Derm PGY-5    Patient was seen and examined with the medicine/dermatology resident. I agree with the history, review of systems, physical examination, assessments and plan.    Halley Sotelo MD  Professor and  Chair  Department of Dermatology  [facility]    _____________________________________________________________________________    Teledermatology information:  - Patient presented as: return  - Location  of teledermatologist:  ([facility] [place] )  - Image quality and interpretability: N/A  - Physician has received verbal consent for a Video/Photos Visit from the patient? YES  - In-person dermatology visit recommendation: no  - Date of images: N/A  - Service start time:9:25 am  - Service end time:9:36 am  - Date of report: 12/17/2020

## 2023-10-30 NOTE — NURSING NOTE
Chief Complaints and History of Present Illnesses   Patient presents with    Retinal Detachment Follow Up     Chief Complaint(s) and History of Present Illness(es)       Retinal Detachment Follow Up              Laterality: left eye    Duration: 1 year              Comments    Pt. States that he is doing well. No change in VA BE. No pain BE. No new flashes or floaters BE. No pain BE.  Parvin Hager COT 3:02 PM October 30, 2023

## 2023-11-18 ENCOUNTER — HEALTH MAINTENANCE LETTER (OUTPATIENT)
Age: 78
End: 2023-11-18

## 2023-11-28 ENCOUNTER — LAB (OUTPATIENT)
Dept: LAB | Facility: CLINIC | Age: 78
End: 2023-11-28
Payer: COMMERCIAL

## 2023-11-28 ENCOUNTER — OFFICE VISIT (OUTPATIENT)
Dept: INTERNAL MEDICINE | Facility: CLINIC | Age: 78
End: 2023-11-28
Payer: COMMERCIAL

## 2023-11-28 VITALS
TEMPERATURE: 98.9 F | BODY MASS INDEX: 26.43 KG/M2 | DIASTOLIC BLOOD PRESSURE: 81 MMHG | WEIGHT: 179 LBS | HEART RATE: 100 BPM | OXYGEN SATURATION: 95 % | SYSTOLIC BLOOD PRESSURE: 133 MMHG

## 2023-11-28 DIAGNOSIS — R53.81 MALAISE: Primary | ICD-10-CM

## 2023-11-28 DIAGNOSIS — M62.81 GENERALIZED MUSCLE WEAKNESS: Primary | ICD-10-CM

## 2023-11-28 DIAGNOSIS — M62.81 GENERALIZED MUSCLE WEAKNESS: ICD-10-CM

## 2023-11-28 DIAGNOSIS — R53.81 MALAISE: ICD-10-CM

## 2023-11-28 LAB
ALBUMIN SERPL BCG-MCNC: 4.2 G/DL (ref 3.5–5.2)
ALP SERPL-CCNC: 59 U/L (ref 40–150)
ALT SERPL W P-5'-P-CCNC: 15 U/L (ref 0–70)
ANION GAP SERPL CALCULATED.3IONS-SCNC: 12 MMOL/L (ref 7–15)
AST SERPL W P-5'-P-CCNC: 25 U/L (ref 0–45)
BASOPHILS # BLD AUTO: 0 10E3/UL (ref 0–0.2)
BASOPHILS NFR BLD AUTO: 0 %
BILIRUB SERPL-MCNC: 0.6 MG/DL
BUN SERPL-MCNC: 23.6 MG/DL (ref 8–23)
CALCIUM SERPL-MCNC: 9.8 MG/DL (ref 8.8–10.2)
CHLORIDE SERPL-SCNC: 98 MMOL/L (ref 98–107)
CREAT SERPL-MCNC: 1.24 MG/DL (ref 0.67–1.17)
DEPRECATED HCO3 PLAS-SCNC: 29 MMOL/L (ref 22–29)
EGFRCR SERPLBLD CKD-EPI 2021: 59 ML/MIN/1.73M2
EOSINOPHIL # BLD AUTO: 0.2 10E3/UL (ref 0–0.7)
EOSINOPHIL NFR BLD AUTO: 3 %
ERYTHROCYTE [DISTWIDTH] IN BLOOD BY AUTOMATED COUNT: 12.4 % (ref 10–15)
FLUAV RNA SPEC QL NAA+PROBE: NEGATIVE
FLUBV RNA RESP QL NAA+PROBE: NEGATIVE
GLUCOSE SERPL-MCNC: 130 MG/DL (ref 70–99)
HCT VFR BLD AUTO: 43 % (ref 40–53)
HGB BLD-MCNC: 15.2 G/DL (ref 13.3–17.7)
IMM GRANULOCYTES # BLD: 0 10E3/UL
IMM GRANULOCYTES NFR BLD: 1 %
LYMPHOCYTES # BLD AUTO: 1.1 10E3/UL (ref 0.8–5.3)
LYMPHOCYTES NFR BLD AUTO: 13 %
MCH RBC QN AUTO: 31.7 PG (ref 26.5–33)
MCHC RBC AUTO-ENTMCNC: 35.3 G/DL (ref 31.5–36.5)
MCV RBC AUTO: 90 FL (ref 78–100)
MONOCYTES # BLD AUTO: 1 10E3/UL (ref 0–1.3)
MONOCYTES NFR BLD AUTO: 13 %
NEUTROPHILS # BLD AUTO: 5.7 10E3/UL (ref 1.6–8.3)
NEUTROPHILS NFR BLD AUTO: 70 %
PLATELET # BLD AUTO: 210 10E3/UL (ref 150–450)
POTASSIUM SERPL-SCNC: 3.9 MMOL/L (ref 3.4–5.3)
PROT SERPL-MCNC: 7.4 G/DL (ref 6.4–8.3)
RBC # BLD AUTO: 4.79 10E6/UL (ref 4.4–5.9)
RSV RNA SPEC NAA+PROBE: NEGATIVE
SARS-COV-2 RNA RESP QL NAA+PROBE: NEGATIVE
SODIUM SERPL-SCNC: 139 MMOL/L (ref 135–145)
WBC # BLD AUTO: 8.3 10E3/UL (ref 4–11)
WBC # BLD AUTO: 8.3 10E3/UL (ref 4–11)

## 2023-11-28 PROCEDURE — 36415 COLL VENOUS BLD VENIPUNCTURE: CPT | Performed by: PATHOLOGY

## 2023-11-28 PROCEDURE — 85025 COMPLETE CBC W/AUTO DIFF WBC: CPT | Performed by: PATHOLOGY

## 2023-11-28 PROCEDURE — 80053 COMPREHEN METABOLIC PANEL: CPT | Performed by: PATHOLOGY

## 2023-11-28 PROCEDURE — 99214 OFFICE O/P EST MOD 30 MIN: CPT | Performed by: NURSE PRACTITIONER

## 2023-11-28 PROCEDURE — 99000 SPECIMEN HANDLING OFFICE-LAB: CPT | Performed by: PATHOLOGY

## 2023-11-28 PROCEDURE — 87637 SARSCOV2&INF A&B&RSV AMP PRB: CPT | Performed by: NURSE PRACTITIONER

## 2023-11-28 RX ORDER — RESPIRATORY SYNCYTIAL VIRUS VACCINE 120MCG/0.5
0.5 KIT INTRAMUSCULAR ONCE
Qty: 1 EACH | Refills: 0 | Status: CANCELLED | OUTPATIENT
Start: 2023-11-28 | End: 2023-11-28

## 2023-11-28 ASSESSMENT — ANXIETY QUESTIONNAIRES
5. BEING SO RESTLESS THAT IT IS HARD TO SIT STILL: NOT AT ALL
IF YOU CHECKED OFF ANY PROBLEMS ON THIS QUESTIONNAIRE, HOW DIFFICULT HAVE THESE PROBLEMS MADE IT FOR YOU TO DO YOUR WORK, TAKE CARE OF THINGS AT HOME, OR GET ALONG WITH OTHER PEOPLE: NOT DIFFICULT AT ALL
2. NOT BEING ABLE TO STOP OR CONTROL WORRYING: NOT AT ALL
GAD7 TOTAL SCORE: 0
3. WORRYING TOO MUCH ABOUT DIFFERENT THINGS: NOT AT ALL
7. FEELING AFRAID AS IF SOMETHING AWFUL MIGHT HAPPEN: NOT AT ALL
6. BECOMING EASILY ANNOYED OR IRRITABLE: NOT AT ALL
1. FEELING NERVOUS, ANXIOUS, OR ON EDGE: NOT AT ALL
GAD7 TOTAL SCORE: 0

## 2023-11-28 ASSESSMENT — PATIENT HEALTH QUESTIONNAIRE - PHQ9
SUM OF ALL RESPONSES TO PHQ QUESTIONS 1-9: 4
5. POOR APPETITE OR OVEREATING: NOT AT ALL

## 2023-11-28 NOTE — PROGRESS NOTES
Luan is a 78 year old that presents in clinic today for the following:     Chief Complaint   Patient presents with    RECHECK     Congestion.  Cough.  Feeling weak when standing.  Symptoms started 2 weeks ago.    Neg home Covid test.           11/28/2023     5:23 PM   Additional Questions   Roomed by KTR       Screenings from encounters over the past 10 days    No data recorded       Denise Person, EMT at 5:29 PM on 11/28/2023

## 2023-11-28 NOTE — PROGRESS NOTES
Mr. Mitchell is a 78 year old male with history of HTN, HLD, prediabetes, BPH, remphigus in remission and retinal detachment who presents for new weakness and persistent cough.    History of Present Illness:    Weakness & Persistent cough:  Cough and weakness started 2 weeks ago. Reports intermittent, dry cough as well as profound weakness and lightheadedness with standing and activity. Denies fever, chills, sore throat, or runny nose. Negative COVID test at home. No reports of falls, syncope, heart palpitations, or chest pain. No history of COPD or CHF, and denies swelling to lower extremities. No brittle hair or cold/heat intolerance. No recent unintentional weight loss. Wife was recently ill with an upper respiratory infection earlier this month. Has been vaccinated for influenza, COVID, RSV earlier this autumn.    Past Medical History:  Past Medical History:   Diagnosis Date    BPH (benign prostatic hyperplasia)     Essential hypertension 02/2018    Hyperlipidemia LDL goal <130     Pemphigus (H28)     pemphigus foliaceus    Retinal detachment 2009    left eye- corrected with laser    Vitreous detachment 2009    LE       Active Meds:  Current Outpatient Medications   Medication    ARTIFICIAL TEAR SOLUTION OP    atorvastatin (LIPITOR) 20 MG tablet    clobetasol (TEMOVATE) 0.05 % external ointment    clobetasol (TEMOVATE) 0.05 % external solution    finasteride (PROSCAR) 5 MG tablet    fluocinolone acetonide (DERMA SMOOTHE/FS BODY) 0.01 % external oil    hydrochlorothiazide (HYDRODIURIL) 25 MG tablet    hydrocortisone butyrate (LOCOID) 0.1 % SOLN    ketoconazole (NIZORAL) 2 % cream    ketoconazole (NIZORAL) 2 % external shampoo    Multiple Vitamin (MULTIVITAMIN) per tablet    mycophenolate (GENERIC EQUIVALENT) 500 MG tablet    salicyclic acid-sulfur (SEBULEX) 2-2 % external shampoo    tacrolimus (PROTOPIC) 0.1 % external ointment    triamcinolone (KENALOG) 0.1 % external ointment     No current facility-administered  medications for this visit.        Allergies:  Reviewed, refer to EMR    Relevant Social History:  Relationship: lives with wife, she is immunocompromised from cancer; she was recently ill with URI.  ADLs: independent with ADLs; does not use cane or walker; drives self    Review Of Systems  Skin: dry skin throughout  Eyes: glasses, tearing (baseline)_  Ears/Nose/Throat: post-nasal drip  Respiratory: Cough- dry, intermittent  Cardiovascular: exercise intolerance  Gastrointestinal: negative  Genitourinary: negative  Musculoskeletal: muscular weakness  Neurologic: new brain fog  Psychiatric: negative  Hematologic/Lymphatic/Immunologic: negative  Endocrine: negative      Physical Exam:  Vitals:   /81 (BP Location: Right arm, Patient Position: Sitting, Cuff Size: Adult Regular)   Pulse 100   Temp 98.9  F (37.2  C) (Oral)   Wt 81.2 kg (179 lb)   SpO2 95%   BMI 26.43 kg/m     Constitutional: Alert, oriented, pleasant, no acute distress, tired appearing  Head: Normocephalic, atraumatic, no facial tenderness  Eyes: Extra-ocular movements intact, no scleral icterus  ENT: Oropharynx clear, moist mucus membranes, no tonsils  Neck: Supple, no lymphadenopathy, no thyromegaly  Cardiovascular: Regular rate and rhythm, no murmurs, rubs or gallops, peripheral pulses full/symmetric  Respiratory: Good air movement bilaterally, lungs clear, no wheezes/rales/rhonchi  GI: Abdomen soft, bowel sounds present, nondistended, nontender, no organomegaly or masses, no rebound/guarding  Musculoskeletal: No edema, normal muscle tone, normal gait  Neurologic: Alert and oriented, cranial nerves 2-12 grossly intact, strength 5/5 throughout, sensation to light touch intact  Skin: No rashes/lesions, skin dry throughout  Psychiatric: normal mentation, affect and mood    Assessment and Plan:    78 year old male with a history of  HTN, HLD, prediabetes, BPH, remphigus in remission and retinal detachment who presents for new weakness and  persistent cough.     (R53.81) Malaise  (primary encounter diagnosis)  (M62.81) Generalized muscle weakness  Comment: Cough and weakness started 2 weeks ago. Considered cardiac, infectious, hematologic, cerebrovascular, endocrine etiologies. Viral URI is most likely.    Plan: Symptomatic Influenza A/B, RSV, & SARS-CoV2 PCR (COVID-19)  CBC with platelets   Comprehensive metabolic panel (BMP + Alb, Alk Phos, ALT, AST, Total. Bili, TP)  Home self care of fluids, rest, tylenol for discomforts    #Routine Health Maintenence:  Immunizations (zoster, pneumovax, flu, Tdap, Hep A/B):   Most Recent Immunizations   Administered Date(s) Administered    COVID-19 12+ (2023-24) (Pfizer) 09/28/2023    COVID-19 Bivalent 18+ (Moderna) 04/21/2023    COVID-19 MONOVALENT 12+ (Pfizer) 08/19/2021    COVID-19 Monovalent Booster 18+ (Moderna) 02/21/2022    Historic Hib Hib-titer 09/12/2006    Influenza (H1N1) 12/18/2009    Influenza (High Dose) 3 valent vaccine 10/14/2019    Influenza (IIV3) PF 09/10/2014    Influenza Vaccine 65+ (Fluzone HD) 10/20/2021    Influenza, seasonal, injectable, PF 09/16/2009    Pneumo Conj 13-V (2010&after) 07/27/2015    Pneumococcal 23 valent 09/26/2014    TD,PF 7+ (Tenivac) 09/07/2000    TDAP (Adacel,Boostrix) 03/14/2022    TDAP Vaccine (Boostrix) 11/07/2011    Zoster recombinant adjuvanted (SHINGRIX) 11/15/2019     Lipids:   Recent Labs   Lab Test 03/21/23  1303 06/30/22  1148   CHOL 162 148   HDL 47 46   LDL 89 74   TRIG 131 138     PSA (50-75 yrs):   PSA   Date Value Ref Range Status   03/16/2021 1.95 0 - 4 ug/L Final     Comment:     Assay Method:  Chemiluminescence using Siemens Vista analyzer     PSA Tumor Marker   Date Value Ref Range Status   09/05/2023 1.90 ng/mL Final     Comment:     No reference ranges have been established for patients over 80 years.   08/24/2022 2.09 0.00 - 4.00 ug/L Final       Return to clinic:  As needed and with no improvement, worsening, or new symptom development.      Options for treatment and follow-up care were reviewed with the patient. He engaged in the decision making process and verbalized understanding of the options discussed and agreed with the final plan.    I spent a total of 30 minutes in the care of this pt today, including time prior to, during, and following today's office visit. This time includes reviewing the patient's chart and prior history, external notes, obtaining a history, performing an examination, interpreting test results, and evaluation and counseling the patient. This time also includes ordering medications or tests necessary in addition to communication to other member's of the patient's health care team. Time spent in documentation and care coordination is included.    Kailey Zuniga, RN  Student Nurse Practitioner      I was present with the NPP student who participated in the service and in the documentation of the services provided.  I have verified the history and personally performed the physical exam and medical decision making, as documented by the student and edited by me.      Delphine Antonio ANP, [facility]  Nurse Practitioner      Addendum 11/29/23  Viral screens for covid, influenza, rsv are negative  CMP shows BUN elevated at 23.6; Creat 1.24 up from 1.15 two mos ago.  GFR slightly decreased at 59. Concern for dehydration and kidney injury.  Contacted by phone with results.  No new symptoms and states he may be feeling some better.  Still favor viral infection of some kind. Discussed lab results and due to concern for hydration and renal status, advised to proceed to ED with any worsening or new symptom development. Home care of rest, push fluids, acetaminophen not to exceed 3000 mg/day for general discomforts.    Will recheck CMP in one week.    Options for treatment and follow-up care were reviewed with the patient. He engaged in the decision making process and verbalized understanding of the options discussed and agreed with the final  plan.    Delphine Antonio, ANP, [facility]  Nurse Practitioner

## 2023-12-06 ENCOUNTER — LAB (OUTPATIENT)
Dept: LAB | Facility: CLINIC | Age: 78
End: 2023-12-06
Payer: COMMERCIAL

## 2023-12-06 DIAGNOSIS — R53.81 MALAISE: ICD-10-CM

## 2023-12-06 LAB
ALBUMIN SERPL BCG-MCNC: 3.9 G/DL (ref 3.5–5.2)
ALP SERPL-CCNC: 53 U/L (ref 40–150)
ALT SERPL W P-5'-P-CCNC: 18 U/L (ref 0–70)
ANION GAP SERPL CALCULATED.3IONS-SCNC: 11 MMOL/L (ref 7–15)
AST SERPL W P-5'-P-CCNC: 24 U/L (ref 0–45)
BILIRUB SERPL-MCNC: 0.5 MG/DL
BUN SERPL-MCNC: 15.3 MG/DL (ref 8–23)
CALCIUM SERPL-MCNC: 9.7 MG/DL (ref 8.8–10.2)
CHLORIDE SERPL-SCNC: 98 MMOL/L (ref 98–107)
CREAT SERPL-MCNC: 1.12 MG/DL (ref 0.67–1.17)
DEPRECATED HCO3 PLAS-SCNC: 29 MMOL/L (ref 22–29)
EGFRCR SERPLBLD CKD-EPI 2021: 66 ML/MIN/1.73M2
GLUCOSE SERPL-MCNC: 121 MG/DL (ref 70–99)
POTASSIUM SERPL-SCNC: 3.9 MMOL/L (ref 3.4–5.3)
PROT SERPL-MCNC: 7.1 G/DL (ref 6.4–8.3)
SODIUM SERPL-SCNC: 138 MMOL/L (ref 135–145)

## 2023-12-06 PROCEDURE — 36415 COLL VENOUS BLD VENIPUNCTURE: CPT | Performed by: PATHOLOGY

## 2023-12-06 PROCEDURE — 80053 COMPREHEN METABOLIC PANEL: CPT | Performed by: PATHOLOGY

## 2023-12-12 ENCOUNTER — ANCILLARY PROCEDURE (OUTPATIENT)
Dept: GENERAL RADIOLOGY | Facility: CLINIC | Age: 78
End: 2023-12-12
Attending: INTERNAL MEDICINE
Payer: COMMERCIAL

## 2023-12-12 ENCOUNTER — LAB (OUTPATIENT)
Dept: LAB | Facility: CLINIC | Age: 78
End: 2023-12-12
Payer: COMMERCIAL

## 2023-12-12 ENCOUNTER — OFFICE VISIT (OUTPATIENT)
Dept: INTERNAL MEDICINE | Facility: CLINIC | Age: 78
End: 2023-12-12
Payer: COMMERCIAL

## 2023-12-12 VITALS
WEIGHT: 175.4 LBS | OXYGEN SATURATION: 96 % | HEART RATE: 92 BPM | DIASTOLIC BLOOD PRESSURE: 79 MMHG | BODY MASS INDEX: 25.9 KG/M2 | SYSTOLIC BLOOD PRESSURE: 136 MMHG

## 2023-12-12 DIAGNOSIS — M62.81 MUSCLE WEAKNESS (GENERALIZED): ICD-10-CM

## 2023-12-12 DIAGNOSIS — E03.9 HYPOTHYROIDISM, UNSPECIFIED TYPE: ICD-10-CM

## 2023-12-12 DIAGNOSIS — E03.8 SUBCLINICAL HYPOTHYROIDISM: ICD-10-CM

## 2023-12-12 DIAGNOSIS — R05.2 SUBACUTE COUGH: ICD-10-CM

## 2023-12-12 DIAGNOSIS — M62.81 MUSCLE WEAKNESS (GENERALIZED): Primary | ICD-10-CM

## 2023-12-12 DIAGNOSIS — R53.81 MALAISE: ICD-10-CM

## 2023-12-12 DIAGNOSIS — Z29.11 NEED FOR VACCINATION AGAINST RESPIRATORY SYNCYTIAL VIRUS: ICD-10-CM

## 2023-12-12 LAB
ALBUMIN SERPL BCG-MCNC: 3.9 G/DL (ref 3.5–5.2)
ALP SERPL-CCNC: 54 U/L (ref 40–150)
ALT SERPL W P-5'-P-CCNC: 21 U/L (ref 0–70)
ANION GAP SERPL CALCULATED.3IONS-SCNC: 11 MMOL/L (ref 7–15)
AST SERPL W P-5'-P-CCNC: 26 U/L (ref 0–45)
BASOPHILS # BLD AUTO: 0 10E3/UL (ref 0–0.2)
BASOPHILS NFR BLD AUTO: 0 %
BILIRUB SERPL-MCNC: 0.5 MG/DL
BUN SERPL-MCNC: 18.5 MG/DL (ref 8–23)
CALCIUM SERPL-MCNC: 10 MG/DL (ref 8.8–10.2)
CHLORIDE SERPL-SCNC: 100 MMOL/L (ref 98–107)
CK SERPL-CCNC: 52 U/L (ref 39–308)
CORTIS SERPL-MCNC: 16.8 UG/DL
CREAT SERPL-MCNC: 0.93 MG/DL (ref 0.67–1.17)
DEPRECATED HCO3 PLAS-SCNC: 28 MMOL/L (ref 22–29)
EGFRCR SERPLBLD CKD-EPI 2021: 83 ML/MIN/1.73M2
EOSINOPHIL # BLD AUTO: 0.1 10E3/UL (ref 0–0.7)
EOSINOPHIL NFR BLD AUTO: 1 %
ERYTHROCYTE [DISTWIDTH] IN BLOOD BY AUTOMATED COUNT: 12 % (ref 10–15)
GLUCOSE SERPL-MCNC: 130 MG/DL (ref 70–99)
HCT VFR BLD AUTO: 41.6 % (ref 40–53)
HGB BLD-MCNC: 14.6 G/DL (ref 13.3–17.7)
IMM GRANULOCYTES # BLD: 0 10E3/UL
IMM GRANULOCYTES NFR BLD: 1 %
LYMPHOCYTES # BLD AUTO: 1 10E3/UL (ref 0.8–5.3)
LYMPHOCYTES NFR BLD AUTO: 14 %
MAGNESIUM SERPL-MCNC: 1.9 MG/DL (ref 1.7–2.3)
MCH RBC QN AUTO: 31.1 PG (ref 26.5–33)
MCHC RBC AUTO-ENTMCNC: 35.1 G/DL (ref 31.5–36.5)
MCV RBC AUTO: 89 FL (ref 78–100)
MONOCYTES # BLD AUTO: 0.9 10E3/UL (ref 0–1.3)
MONOCYTES NFR BLD AUTO: 13 %
NEUTROPHILS # BLD AUTO: 5 10E3/UL (ref 1.6–8.3)
NEUTROPHILS NFR BLD AUTO: 71 %
NRBC # BLD AUTO: 0 10E3/UL
NRBC BLD AUTO-RTO: 0 /100
PHOSPHATE SERPL-MCNC: 3.6 MG/DL (ref 2.5–4.5)
PLATELET # BLD AUTO: 260 10E3/UL (ref 150–450)
POTASSIUM SERPL-SCNC: 4 MMOL/L (ref 3.4–5.3)
PROT SERPL-MCNC: 7.1 G/DL (ref 6.4–8.3)
RBC # BLD AUTO: 4.7 10E6/UL (ref 4.4–5.9)
SODIUM SERPL-SCNC: 139 MMOL/L (ref 135–145)
T4 FREE SERPL-MCNC: 1.13 NG/DL (ref 0.9–1.7)
TSH SERPL DL<=0.005 MIU/L-ACNC: 4.93 UIU/ML (ref 0.3–4.2)
WBC # BLD AUTO: 7 10E3/UL (ref 4–11)

## 2023-12-12 PROCEDURE — 82533 TOTAL CORTISOL: CPT | Performed by: INTERNAL MEDICINE

## 2023-12-12 PROCEDURE — 99213 OFFICE O/P EST LOW 20 MIN: CPT | Mod: GE

## 2023-12-12 PROCEDURE — 85025 COMPLETE CBC W/AUTO DIFF WBC: CPT | Performed by: PATHOLOGY

## 2023-12-12 PROCEDURE — 84100 ASSAY OF PHOSPHORUS: CPT | Performed by: PATHOLOGY

## 2023-12-12 PROCEDURE — 99000 SPECIMEN HANDLING OFFICE-LAB: CPT | Performed by: PATHOLOGY

## 2023-12-12 PROCEDURE — 80053 COMPREHEN METABOLIC PANEL: CPT | Performed by: PATHOLOGY

## 2023-12-12 PROCEDURE — 83735 ASSAY OF MAGNESIUM: CPT | Performed by: PATHOLOGY

## 2023-12-12 PROCEDURE — 82550 ASSAY OF CK (CPK): CPT | Performed by: PATHOLOGY

## 2023-12-12 PROCEDURE — 84443 ASSAY THYROID STIM HORMONE: CPT | Performed by: PATHOLOGY

## 2023-12-12 PROCEDURE — 71046 X-RAY EXAM CHEST 2 VIEWS: CPT | Performed by: RADIOLOGY

## 2023-12-12 PROCEDURE — 36415 COLL VENOUS BLD VENIPUNCTURE: CPT | Performed by: PATHOLOGY

## 2023-12-12 PROCEDURE — 84439 ASSAY OF FREE THYROXINE: CPT | Performed by: PATHOLOGY

## 2023-12-12 RX ORDER — LEVOTHYROXINE SODIUM 50 UG/1
50 TABLET ORAL DAILY
Qty: 90 TABLET | Refills: 11 | Status: SHIPPED | OUTPATIENT
Start: 2023-12-12 | End: 2024-03-13

## 2023-12-12 RX ORDER — RESPIRATORY SYNCYTIAL VIRUS VACCINE 120MCG/0.5
0.5 KIT INTRAMUSCULAR ONCE
Qty: 1 EACH | Refills: 0 | Status: CANCELLED | OUTPATIENT
Start: 2023-12-12 | End: 2023-12-12

## 2023-12-12 NOTE — PROGRESS NOTES
Internal Medicine Primary Care   SUBJECTIVE  CC: Cough, weakness for 1 month    HPI: Luan Mitchell is a 78 year old male with a PMHx of HLD, BPH, and Htn presenting for cough and malaise for 1 month. Symptoms started before thanksgiving, he visited urgent care 2 weeks ago and labs and covid/flu/rsv were negative. He started feeling better 2 weeks ago but now feeling much worse again.     He denies fever, chills, and 1 episode of night sweats. He continues to have cough, non productive. He denies chest pain and shortness of breath. He denies LE edema, orthopnea. He has no new medications. He denies LUTS, abdominal pain, n/v/d.       PAST MEDICAL HISTORY  Patient Active Problem List   Diagnosis    Pemphigus foliaceus (H28)    Hyperlipidemia LDL goal <130    BPH (benign prostatic hyperplasia)    Encounter for long-term (current) use of high-risk medication    Dermatitis, seborrheic    Lightheadedness    Age-related nuclear cataract of both eyes    SOB (shortness of breath)         MEDICATIONS  Current Outpatient Medications   Medication Sig Dispense Refill    ARTIFICIAL TEAR SOLUTION OP Apply to eye daily       atorvastatin (LIPITOR) 20 MG tablet Take 1 tablet (20 mg) by mouth daily 90 tablet 4    clobetasol (TEMOVATE) 0.05 % external ointment Apply topically 2 times daily For more bothersome lesions 120 g 1    clobetasol (TEMOVATE) 0.05 % external solution Apply to scalp daily for itch 50 mL 3    finasteride (PROSCAR) 5 MG tablet Take 1 tablet (5 mg) by mouth daily 90 tablet 3    fluocinolone acetonide (DERMA SMOOTHE/FS BODY) 0.01 % external oil Apply to scalp at night, then sleep overnight with nightcap on 118 mL 4    hydrochlorothiazide (HYDRODIURIL) 25 MG tablet Take 1 tablet (25 mg) by mouth daily 90 tablet 4    hydrocortisone butyrate (LOCOID) 0.1 % SOLN Apply sparingly to affected area(s) of beard twice daily 180 mL 3    ketoconazole (NIZORAL) 2 % cream Apply twice daily to the nose as needed  for white/scaling. 60 g 2    ketoconazole (NIZORAL) 2 % external shampoo Three times per week in the shower: Lather into scalp, leave in place for 3-5 minutes, then rinse. 360 mL 12    Multiple Vitamin (MULTIVITAMIN) per tablet Take 1 tablet by mouth daily 1 tab daily      mycophenolate (GENERIC EQUIVALENT) 500 MG tablet Take 1 tablet (500 mg) by mouth every morning (before breakfast) 90 tablet 4    salicyclic acid-sulfur (SEBULEX) 2-2 % external shampoo Apply topically 3 times daily as needed for itching In the shower. 200 g 11    tacrolimus (PROTOPIC) 0.1 % external ointment Apply topically to affected areas as instructed. 300 g 3    triamcinolone (KENALOG) 0.1 % external ointment Apply topically 2 times daily For lesions that are more mild 453.6 g 1         PAST SURGICAL HISTORY  Past Surgical History:   Procedure Laterality Date    BIOPSY      Prostate    COLONOSCOPY      EYE SURGERY      NO HISTORY OF SURGERY  2014    derm    RETINOPEXY LASER PROPHYLAXIS BREAK(S) OS (LEFT EYE)      2009 - Left eye       FAMILY HISTORY  Family History   Problem Relation Age of Onset    Heart Disease Mother         CHF d 80    Diabetes Mother         DM2,  age 80    Hypertension Mother     Coronary Artery Disease Mother     Heart Disease Father         CHF d 78    Coronary Artery Disease Father     Heart Disease Paternal Uncle         CHF d 50s    Coronary Artery Disease Other     Cancer No family hx of         no skin cancer    Skin Cancer No family hx of         no skin cancer    Glaucoma No family hx of     Macular Degeneration No family hx of     Melanoma No family hx of          ALLERGIES   No Known Allergies      OBJECTIVE    Vitals  /79 (BP Location: Right arm, Patient Position: Sitting, Cuff Size: Adult Regular)   Pulse 92   Wt 79.6 kg (175 lb 6.4 oz)   SpO2 96%   BMI 25.90 kg/m      Last 6 BPs  BP Readings from Last 6 Encounters:   23 136/79   23 133/81   23 (!) 144/78    03/21/23 124/80   06/30/22 126/70   05/26/22 121/76       Physical Exam  Constitutional:       Appearance: Normal appearance.   HENT:      Head: Normocephalic and atraumatic.   Eyes:      Extraocular Movements: Extraocular movements intact.   Cardiovascular:      Rate and Rhythm: Normal rate and regular rhythm.      Pulses: Normal pulses.      Heart sounds: Normal heart sounds.   Pulmonary:      Effort: Pulmonary effort is normal. No respiratory distress.      Breath sounds: Normal breath sounds. No wheezing, rhonchi or rales.   Neurological:      General: No focal deficit present.      Mental Status: He is alert and oriented to person, place, and time.         ASSESSMENT AND PLAN    Probable viral URI with post viral bronchitis  Weakness  It is very likely that this is a sequale of a viral URI however, his weakness is significantly affecting him. Prudent to rule out other causes. Labs were normal 1 week ago. No recent CXR on file  - CXR  - TSH, CK (? Statin myotpathy), cortisol, CMP, CBC  - PT referral      Patient understands and agrees with the above assessment and plan. Patient was staffed and seen with Dr. Telles    Follow up  RTC in 2 weeks      Nitza Almazan,   Internal Medicine PGY-3    While the patient was in clinic, I reviewed the pertinent medical history and results.  I discussed the current findings on physical examination, as well as the patient s diagnosis and treatment plan with the resident and agree with the information as documented with the following exceptions: none.  Devendra Telles MD

## 2023-12-29 ENCOUNTER — THERAPY VISIT (OUTPATIENT)
Dept: PHYSICAL THERAPY | Facility: CLINIC | Age: 78
End: 2023-12-29
Attending: INTERNAL MEDICINE
Payer: COMMERCIAL

## 2023-12-29 DIAGNOSIS — M62.81 MUSCLE WEAKNESS (GENERALIZED): ICD-10-CM

## 2023-12-29 PROCEDURE — 97161 PT EVAL LOW COMPLEX 20 MIN: CPT | Mod: GP | Performed by: PHYSICAL THERAPIST

## 2023-12-29 PROCEDURE — 97110 THERAPEUTIC EXERCISES: CPT | Mod: GP | Performed by: PHYSICAL THERAPIST

## 2023-12-29 NOTE — PROGRESS NOTES
PHYSICAL THERAPY EVALUATION  Type of Visit: Evaluation    See electronic medical record for Abuse and Falls Screening details.    Subjective       Presenting condition or subjective complaint: Was ill for several weeks which restricted activity  Has was in bed quite a bit.  Was in bed off and on for 5 weeks.   Date of onset: 12/12/23    Relevant medical history: High blood pressure   Dates & types of surgery:      Prior diagnostic imaging/testing results:       Prior therapy history for the same diagnosis, illness or injury: No      Prior Level of Function  Transfers: Independent  Ambulation: Independent  ADL: Independent  IADL:     Living Environment  Social support: With a significant other or spouse   Type of home: House; Multi-level   Stairs to enter the home: Yes 7 Is there a railing: Yes   Ramp: No   Stairs inside the home: Yes 16 Is there a railing: Yes   Help at home: None  Equipment owned: Raised toilet seat     Employment: Not Applicable    Hobbies/Interests:  Stationary bike 3-4 times a week, then walking 10k steps a day if not biking    Is back to walking 7-8k steps a day.  Wanting to get back on bike.     Patient goals for therapy: Increase muscle strength    Pain assessment: Pain denied     Objective   Vitals Signs  Heart Rate: 88  SpO2: 93  Cognitive Status Examination  Orientation: Oriented to person, place and time   Level of Consciousness: Alert  Follows Commands and Answers Questions: 100% of the time  Personal Safety and Judgement: Intact  Memory: Intact    OBSERVATION:   INTEGUMENTARY: Intact  POSTURE: WNL  PALPATION:   RANGE OF MOTION: LE ROM WNL  UE ROM WNL  STRENGTH: LE Strength WNL  UE Strength WNL    4+/5 throughout   BED MOBILITY: WNL    TRANSFERS: WNL    WHEELCHAIR MOBILITY:     GAIT:   Level of [place]: WNL  Assistive Device(s): None  Gait Deviations: WNL  Gait Distance:   Stairs:     BALANCE:     SPECIAL TESTS  Functional Gait Assessment (FGA)      10 Meter Walk Test (Comfortable)      10 Meter Walk Test (Fast)     6 Minute Walk Test (6MWT)   1900 feet = 579m   SPO2 95%      [place] Balance Scale (BBS)     5 Times Sit-to-Stand (5TSTS)  9     Dynamic Gait Index (DGI)     Timed Up and Go (TUG) - sec    Single Leg Stance Right (sec)    Single Leg Stance Left (sec)    Modified CTSIB Conditions (sec) Cond 1:   Cond 2:   Cond 4:   Cond 5 :    Romberg  (sec)    Sharpened Romberg (sec)    30 Second Sit to Stand (reps/height) 16 -  Sp02 - 95              Assessment & Plan   CLINICAL IMPRESSIONS  Medical Diagnosis: Muscle Weakness    Treatment Diagnosis: Forced Production Deficit   Impression/Assessment: Patient is a 78 year old male with weakness and deconditioning complaints a recent illness.  The following significant findings have been identified: Decreased activity tolerance. These impairments interfere with their ability to perform recreational activities as compared to previous level of function. Overall is near PLOF and a short course of therapy recommended.     Clinical Decision Making (Complexity):  Clinical Presentation: Stable/Uncomplicated  Clinical Presentation Rationale: based on medical and personal factors listed in PT evaluation  Clinical Decision Making (Complexity): Low complexity    PLAN OF CARE  Treatment Interventions:  Interventions: Gait Training, Neuromuscular Re-education, Therapeutic Activity, Therapeutic Exercise, Self-Care/Home Management    Long Term Goals     PT Goal 1  Goal Identifier: HEP  Goal Description: Patient will be independent with HEP to allow for continued improvements in health and wellness upon DC  Rationale: to maximize safety and independence within the community  Target Date: 02/23/24      Frequency of Treatment: 4 sessions  Duration of Treatment: 8 weeks    Recommended Referrals to Other Professionals:   Education Assessment:        Risks and benefits of evaluation/treatment have been explained.   Patient/Family/caregiver agrees with Plan of  Care.     Evaluation Time:     PT Eval, Low Complexity Minutes (99432): 15       Signing Clinician: Mattie Acosta, PT      [facility]                                                                                   OUTPATIENT PHYSICAL THERAPY      PLAN OF TREATMENT FOR OUTPATIENT REHABILITATION   Patient's Last Name, First Name, HENNYMaryTERRIMary  StephenLuan MAJOR YOB: 1945   Provider's Name   [facility]   Medical Record No.  [id number]     Onset Date: 12/12/23  Start of Care Date:       Medical Diagnosis:  Muscle Weakness      PT Treatment Diagnosis:  Forced Production Deficit Plan of Treatment  Frequency/Duration: 4 sessions/ 8 weeks    Certification date from 12/29/23 to 02/23/24         See note for plan of treatment details and functional goals     Mattie Acosta, PT                         I CERTIFY THE NEED FOR THESE SERVICES FURNISHED UNDER        THIS PLAN OF TREATMENT AND WHILE UNDER MY CARE     (Physician attestation of this document indicates review and certification of the therapy plan).              Referring Provider:  Devendra Telles    Initial Assessment  See Epic Evaluation-

## 2024-01-02 ENCOUNTER — OFFICE VISIT (OUTPATIENT)
Dept: INTERNAL MEDICINE | Facility: CLINIC | Age: 79
End: 2024-01-02
Payer: COMMERCIAL

## 2024-01-02 VITALS
OXYGEN SATURATION: 95 % | HEART RATE: 103 BPM | SYSTOLIC BLOOD PRESSURE: 127 MMHG | BODY MASS INDEX: 25.56 KG/M2 | WEIGHT: 173.1 LBS | DIASTOLIC BLOOD PRESSURE: 84 MMHG

## 2024-01-02 DIAGNOSIS — E03.8 SUBCLINICAL HYPOTHYROIDISM: ICD-10-CM

## 2024-01-02 DIAGNOSIS — B94.8 POST VIRAL DEBILITY: ICD-10-CM

## 2024-01-02 DIAGNOSIS — R53.81 POST VIRAL DEBILITY: ICD-10-CM

## 2024-01-02 DIAGNOSIS — M85.88 OSTEOPENIA OF OTHER SITE: Primary | ICD-10-CM

## 2024-01-02 PROCEDURE — 99213 OFFICE O/P EST LOW 20 MIN: CPT | Mod: GE

## 2024-01-02 RX ORDER — RESPIRATORY SYNCYTIAL VIRUS VACCINE 120MCG/0.5
0.5 KIT INTRAMUSCULAR ONCE
Qty: 1 EACH | Refills: 0 | Status: CANCELLED | OUTPATIENT
Start: 2024-01-02 | End: 2024-01-02

## 2024-01-02 NOTE — PROGRESS NOTES
Luan is a 78 year old that presents in clinic today for the following:     Chief Complaint   Patient presents with    Follow Up     Pt here for follow up            Screenings from encounters over the past 10 days    No data recorded       Ángel Roldan at 8:44 AM on 1/2/2024

## 2024-01-02 NOTE — PROGRESS NOTES
Internal Medicine Primary Care   SUBJECTIVE  CC: follow up for weakness/cough    HPI: Luan Mitchell is a 78 year old male with a PMHx of HLD, BPH, and Htn presenting for a follow up. I saw him for cough and malaise of a month on 12/12/23.     Labs and imaging at that time were mostly unremarkable. TSH was elevated but free t4 was normal, however, due to his predominant symptoms of weakness/malaise/fatigue, I did elect to start synthroid. Today he is feeling much better, he reports that his activity level has significantly increased. But he does report that his sleep is disturbed. He can fall asleep initially but when he wakes to urinate at night, he has trouble reinitiating sleep.       PAST MEDICAL HISTORY  Patient Active Problem List   Diagnosis    Pemphigus foliaceus (H28)    Hyperlipidemia LDL goal <130    BPH (benign prostatic hyperplasia)    Encounter for long-term (current) use of high-risk medication    Dermatitis, seborrheic    Lightheadedness    Age-related nuclear cataract of both eyes    SOB (shortness of breath)    Muscle weakness (generalized)         MEDICATIONS  Current Outpatient Medications   Medication Sig Dispense Refill    ARTIFICIAL TEAR SOLUTION OP Apply to eye daily       atorvastatin (LIPITOR) 20 MG tablet Take 1 tablet (20 mg) by mouth daily 90 tablet 4    clobetasol (TEMOVATE) 0.05 % external ointment Apply topically 2 times daily For more bothersome lesions 120 g 1    clobetasol (TEMOVATE) 0.05 % external solution Apply to scalp daily for itch 50 mL 3    finasteride (PROSCAR) 5 MG tablet Take 1 tablet (5 mg) by mouth daily 90 tablet 3    fluocinolone acetonide (DERMA SMOOTHE/FS BODY) 0.01 % external oil Apply to scalp at night, then sleep overnight with nightcap on 118 mL 4    hydrochlorothiazide (HYDRODIURIL) 25 MG tablet Take 1 tablet (25 mg) by mouth daily 90 tablet 4    hydrocortisone butyrate (LOCOID) 0.1 % SOLN Apply sparingly to affected area(s) of beard twice  daily 180 mL 3    ketoconazole (NIZORAL) 2 % cream Apply twice daily to the nose as needed for white/scaling. 60 g 2    ketoconazole (NIZORAL) 2 % external shampoo Three times per week in the shower: Lather into scalp, leave in place for 3-5 minutes, then rinse. 360 mL 12    levothyroxine (SYNTHROID/LEVOTHROID) 50 MCG tablet Take 1 tablet (50 mcg) by mouth daily 90 tablet 11    Multiple Vitamin (MULTIVITAMIN) per tablet Take 1 tablet by mouth daily 1 tab daily      mycophenolate (GENERIC EQUIVALENT) 500 MG tablet Take 1 tablet (500 mg) by mouth every morning (before breakfast) 90 tablet 4    salicyclic acid-sulfur (SEBULEX) 2-2 % external shampoo Apply topically 3 times daily as needed for itching In the shower. 200 g 11    tacrolimus (PROTOPIC) 0.1 % external ointment Apply topically to affected areas as instructed. 300 g 3    triamcinolone (KENALOG) 0.1 % external ointment Apply topically 2 times daily For lesions that are more mild 453.6 g 1         PAST SURGICAL HISTORY  Past Surgical History:   Procedure Laterality Date    BIOPSY      Prostate    COLONOSCOPY      EYE SURGERY      NO HISTORY OF SURGERY  2014    derm    RETINOPEXY LASER PROPHYLAXIS BREAK(S) OS (LEFT EYE)      2009 - Left eye       FAMILY HISTORY  Family History   Problem Relation Age of Onset    Heart Disease Mother         CHF d 80    Diabetes Mother         DM2,  age 80    Hypertension Mother     Coronary Artery Disease Mother     Heart Disease Father         CHF d 78    Coronary Artery Disease Father     Heart Disease Paternal Uncle         CHF d 50s    Coronary Artery Disease Other     Cancer No family hx of         no skin cancer    Skin Cancer No family hx of         no skin cancer    Glaucoma No family hx of     Macular Degeneration No family hx of     Melanoma No family hx of          ALLERGIES   No Known Allergies      OBJECTIVE    Vitals  /84 (BP Location: Right arm, Patient Position: Sitting, Cuff Size: Adult  Regular)   Pulse 103   Wt 78.5 kg (173 lb 1.6 oz)   SpO2 95%   BMI 25.56 kg/m      Last 6 BPs  BP Readings from Last 6 Encounters:   01/02/24 127/84   12/12/23 136/79   11/28/23 133/81   09/07/23 (!) 144/78   03/21/23 124/80   06/30/22 126/70       Physical Exam  Constitutional:       Appearance: Normal appearance.   HENT:      Head: Normocephalic and atraumatic.   Eyes:      Extraocular Movements: Extraocular movements intact.   Cardiovascular:      Rate and Rhythm: Normal rate and regular rhythm.      Pulses: Normal pulses.      Heart sounds: Normal heart sounds.   Pulmonary:      Effort: Pulmonary effort is normal. No respiratory distress.      Breath sounds: Normal breath sounds. No wheezing, rhonchi or rales.   Neurological:      General: No focal deficit present.      Mental Status: He is alert and oriented to person, place, and time.         ASSESSMENT AND PLAN    Post viral bronchitis and debility  Malaise/fatigue  Subclinical Hypothyroidism  It is very likely that this is a sequale of a viral URI however, his weakness was significantly affecting him. Labs are imaging are relatively unremarkable. I did elect to begin synthroid. Patient actually reports feeling better before he began synthroid and now after starting synthroid he is having some sleep disturbance. Additionally there appears to be osteopenia on CXR. Discussed stopping synthroid at this time, and monitoring of malaise and fatigue.   - Stop synthroid      Osteopenia  - DEXA scan  - Educated patient on osteopenia and osteoporosis, discussed diet/calcium supplements and encouraged exercise.        Patient understands and agrees with the above assessment and plan. Patient was staffed and seen with Dr. Telles    Follow up  RTC as scheduled      Nitza Almazan DO  Internal Medicine PGY-3  While the patient was in clinic, I reviewed the pertinent medical history and results.  I discussed the current findings on physical examination, as  well as the patient s diagnosis and treatment plan with the resident and agree with the information as documented with the following exceptions: none.  Devendra Telles MD

## 2024-01-11 ENCOUNTER — TELEPHONE (OUTPATIENT)
Dept: INTERNAL MEDICINE | Facility: CLINIC | Age: 79
End: 2024-01-11
Payer: COMMERCIAL

## 2024-01-12 ENCOUNTER — VIRTUAL VISIT (OUTPATIENT)
Dept: PHYSICAL THERAPY | Facility: CLINIC | Age: 79
End: 2024-01-12
Attending: INTERNAL MEDICINE
Payer: COMMERCIAL

## 2024-01-12 DIAGNOSIS — M62.81 MUSCLE WEAKNESS (GENERALIZED): Primary | ICD-10-CM

## 2024-01-12 PROCEDURE — 97110 THERAPEUTIC EXERCISES: CPT | Mod: GP | Performed by: PHYSICAL THERAPIST

## 2024-01-15 ENCOUNTER — TELEPHONE (OUTPATIENT)
Dept: UROLOGY | Facility: CLINIC | Age: 79
End: 2024-01-15
Payer: COMMERCIAL

## 2024-01-17 NOTE — PROGRESS NOTES
"[facility] Dermatology Note  Encounter Date: Jan 18, 2024  Office Visit     Dermatology Problem List:  Last FBSE: 12/22/22     #  Pemphigus foliaceus  - s/p shave bx 6/17/21 (L superior shoulder) and 7/26/10 (L arm)  - Last pemphigus panel (3/25/21): Dsg1 IgG 136 and Dsg2 IgG 2  - Goals of treatment per Mr. Mitchell:  \"Prevent itching and secondary infections. Not interested in complete clearance\"  - Current Tx: MMF 500mg daily              - stopped MMF 3/2019, re-started  mg PO BID on 8/13/20 but reduced to 500 mg every day on 10/21/20              - Last safety labs (12/2023): wnl              - safety labs: CBC, CMP ordered 09/21/23   - Adjunct Tx: clobetasol oint, triamcinolone 0.1%, tacrolimus ointment, clobetasol solution to scalp, salicylic acid shampoo to scalp, ketoconazole shampoo to scalp, fluocinolone oil to scalp, Head and Shoulders Shampoo  # Tinea pedis  - Current Tx:  Terbinafine 1% cream  # Benign Bx  - VK, R calf, s/p shave bx 3/25/21  - mild DN, R neck, s/p shave bx 3/22/18    ____________________________________________    Assessment & Plan:   #Pemphigus foliaceous.  Chronic, 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  Goal of care remains focused on controlling symptoms and not clearing disease.  Although flared with a recent suspected viral illness, now back to baseline; patient is happy with the degree of control today and does not want to make any changes to his regimen.  Last CBC and CMP obtained in December 2023 reassuring.  Plan as follows:  - Continue  mg daily  - Obtain safety labs (CBC, CMP) in 3 months; follow-up with dermatology clinic in 6 months for medication monitoring.   - Continue topicals: Clobetasol solution, clobetasol oint, triamcinolone ointment, tacrolimus ointment, ketoconazole shampoo, salicylic acid shampoo  - Continue fluocinolone 0.01% oil nightly under cap.  Wash out in the morning. OK to use BID PRN.   - continue zinc " pyrithione shampoo a few days per week      Procedures Performed:   - none    Follow-up: 3 months for labs, 6 months in dermatology clinic    Staff: Dr. Nando Zaman MD PGY-3  Dermatology Resident    I have personally examined this patient and agree with the resident doctor's documentation and plan of care. I have reviewed and amended the resident's note above. The documentation accurately reflects my clinical observations, diagnoses, treatment and follow-up plans.     Isabelle Collier MD  Dermatology Staff      ____________________________________________    CC: Derm Problem (Luan is here for a 4 month follow up. States things are stable, reports no flaring. )    HPI:  Mr. Luan Mitchell is a(n) 78 year old male who presents today as a return patient for follow up of pemphigus foliaceous.     Patient states that he is currently doing well and stable.  He had a flare of his pemphigus foliaceous back in December when he was ill with a suspected respiratory illness.  Since recovering from his illness, his pemphigus foliaceous has returned to baseline.  He still has some itchy, scaly plaques present on the vertex scalp, chest and back, but they are not terribly itchy and are well-controlled with topicals and the mycophenolate.  Not interested in making any changes to his medication regimen at this point.  Tolerating the mycophenolate quite well.    Patient is otherwise feeling well, without additional skin concerns.    Labs Reviewed:  CBC, CMP from Dec 2023 wnl    Physical Exam:  Vitals: There were no vitals taken for this visit.  SKIN: Focused examination of the scalp, chest, back, arms, legs was performed.  -On the chest, abdomen and back there are several pink, scaly papules and plaques  -On the vertex scalp there are some scaly, crusted papules and plaques  - No other lesions of concern on areas examined.     Medications:  Current Outpatient Medications   Medication    ARTIFICIAL TEAR SOLUTION OP     atorvastatin (LIPITOR) 20 MG tablet    clobetasol (TEMOVATE) 0.05 % external ointment    clobetasol (TEMOVATE) 0.05 % external solution    finasteride (PROSCAR) 5 MG tablet    fluocinolone acetonide (DERMA SMOOTHE/FS BODY) 0.01 % external oil    hydrochlorothiazide (HYDRODIURIL) 25 MG tablet    hydrocortisone butyrate (LOCOID) 0.1 % SOLN    ketoconazole (NIZORAL) 2 % cream    ketoconazole (NIZORAL) 2 % external shampoo    levothyroxine (SYNTHROID/LEVOTHROID) 50 MCG tablet    Multiple Vitamin (MULTIVITAMIN) per tablet    mycophenolate (GENERIC EQUIVALENT) 500 MG tablet    salicyclic acid-sulfur (SEBULEX) 2-2 % external shampoo    tacrolimus (PROTOPIC) 0.1 % external ointment    triamcinolone (KENALOG) 0.1 % external ointment     No current facility-administered medications for this visit.      Past Medical History:   Patient Active Problem List   Diagnosis    Pemphigus foliaceus (H28)    Hyperlipidemia LDL goal <130    BPH (benign prostatic hyperplasia)    Encounter for long-term (current) use of high-risk medication    Dermatitis, seborrheic    Lightheadedness    Age-related nuclear cataract of both eyes    SOB (shortness of breath)    Muscle weakness (generalized)     Past Medical History:   Diagnosis Date    BPH (benign prostatic hyperplasia)     Essential hypertension 02/2018    Hyperlipidemia LDL goal <130     Pemphigus (H28)     pemphigus foliaceus    Retinal detachment 2009    left eye- corrected with laser    Vitreous detachment 2009    [facility] No referring provider defined for this encounter. on close of this encounter.

## 2024-01-18 ENCOUNTER — OFFICE VISIT (OUTPATIENT)
Dept: DERMATOLOGY | Facility: CLINIC | Age: 79
End: 2024-01-18
Payer: COMMERCIAL

## 2024-01-18 DIAGNOSIS — L10.2 PEMPHIGUS FOLIACEOUS (H): ICD-10-CM

## 2024-01-18 DIAGNOSIS — L21.9 DERMATITIS, SEBORRHEIC: ICD-10-CM

## 2024-01-18 DIAGNOSIS — Z79.899 ENCOUNTER FOR LONG-TERM (CURRENT) USE OF HIGH-RISK MEDICATION: ICD-10-CM

## 2024-01-18 DIAGNOSIS — L10.2 PEMPHIGUS FOLIACEUS (H): ICD-10-CM

## 2024-01-18 PROCEDURE — 99214 OFFICE O/P EST MOD 30 MIN: CPT | Mod: GC | Performed by: DERMATOLOGY

## 2024-01-18 ASSESSMENT — PAIN SCALES - GENERAL: PAINLEVEL: NO PAIN (0)

## 2024-01-18 NOTE — NURSING NOTE
Dermatology Rooming Note    Luan Mitchell's goals for this visit include:   Chief Complaint   Patient presents with    Derm Problem     Luan is here for a 4 month follow up. States things are stable, reports no flaring.      aNllely Garcia, visit facilitator

## 2024-01-18 NOTE — PATIENT INSTRUCTIONS
Please get mycophenolate monitoring labs drawn in approximately April 2024. We'll plan to follow up in the dermatology clinic during the summer of 2024.

## 2024-01-18 NOTE — LETTER
"1/18/2024       RE: Luan Mitchell  [address] [address]     Dear Colleague,    Thank you for referring your patient, Luan Mitchell, to the [facility] DERMATOLOGY CL[facility]IC [place] at [facility]. Please see a copy of my visit note below.    [facility] Dermatology Note  Encounter Date: Jan 18, 2024  Office Visit     Dermatology Problem List:  Last FBSE: 12/22/22     #  Pemphigus foliaceus  - s/p shave bx 6/17/21 (L superior shoulder) and 7/26/10 (L arm)  - Last pemphigus panel (3/25/21): Dsg1 IgG 136 and Dsg2 IgG 2  - Goals of treatment per Mr. Mitchell:  \"Prevent itching and secondary infections. Not interested in complete clearance\"  - Current Tx: MMF 500mg daily              - stopped MMF 3/2019, re-started  mg PO BID on 8/13/20 but reduced to 500 mg every day on 10/21/20              - Last safety labs (12/2023): wnl              - safety labs: CBC, CMP ordered 09/21/23   - Adjunct Tx: clobetasol oint, triamcinolone 0.1%, tacrolimus ointment, clobetasol solution to scalp, salicylic acid shampoo to scalp, ketoconazole shampoo to scalp, fluocinolone oil to scalp, Head and Shoulders Shampoo  # Tinea pedis  - Current Tx:  Terbinafine 1% cream  # Benign Bx  - VK, R calf, s/p shave bx 3/25/21  - mild DN, R neck, s/p shave bx 3/22/18    ____________________________________________    Assessment & Plan:   #Pemphigus foliaceous.  Chronic, 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  Goal of care remains focused on controlling symptoms and not clearing disease.  Although flared with a recent suspected viral illness, now back to baseline; patient is happy with the degree of control today and does not want to make any changes to his regimen.  Last CBC and CMP obtained in December 2023 reassuring.  Plan as follows:  - Continue  mg daily  - Obtain safety labs (CBC, CMP) in 3 months; follow-up with dermatology " clinic in 6 months for medication monitoring.   - Continue topicals: Clobetasol solution, clobetasol oint, triamcinolone ointment, tacrolimus ointment, ketoconazole shampoo, salicylic acid shampoo  - Continue fluocinolone 0.01% oil nightly under cap.  Wash out in the morning. OK to use BID PRN.   - continue zinc pyrithione shampoo a few days per week      Procedures Performed:   - none    Follow-up: 3 months for labs, 6 months in dermatology clinic    Staff: Dr. Nando Zaman MD PGY-3  Dermatology Resident    I have personally examined this patient and agree with the resident doctor's documentation and plan of care. I have reviewed and amended the resident's note above. The documentation accurately reflects my clinical observations, diagnoses, treatment and follow-up plans.     Isabelle Collier MD  Dermatology Staff      ____________________________________________    CC: Derm Problem (Luan is here for a 4 month follow up. States things are stable, reports no flaring. )    HPI:  Mr. Luan Mitchell is a(n) 78 year old male who presents today as a return patient for follow up of pemphigus foliaceous.     Patient states that he is currently doing well and stable.  He had a flare of his pemphigus foliaceous back in December when he was ill with a suspected respiratory illness.  Since recovering from his illness, his pemphigus foliaceous has returned to baseline.  He still has some itchy, scaly plaques present on the vertex scalp, chest and back, but they are not terribly itchy and are well-controlled with topicals and the mycophenolate.  Not interested in making any changes to his medication regimen at this point.  Tolerating the mycophenolate quite well.    Patient is otherwise feeling well, without additional skin concerns.    Labs Reviewed:  CBC, CMP from Dec 2023 wnl    Physical Exam:  Vitals: There were no vitals taken for this visit.  SKIN: Focused examination of the scalp, chest, back, arms, legs  was performed.  -On the chest, abdomen and back there are several pink, scaly papules and plaques  -On the vertex scalp there are some scaly, crusted papules and plaques  - No other lesions of concern on areas examined.     Medications:  Current Outpatient Medications   Medication    ARTIFICIAL TEAR SOLUTION OP    atorvastatin (LIPITOR) 20 MG tablet    clobetasol (TEMOVATE) 0.05 % external ointment    clobetasol (TEMOVATE) 0.05 % external solution    finasteride (PROSCAR) 5 MG tablet    fluocinolone acetonide (DERMA SMOOTHE/FS BODY) 0.01 % external oil    hydrochlorothiazide (HYDRODIURIL) 25 MG tablet    hydrocortisone butyrate (LOCOID) 0.1 % SOLN    ketoconazole (NIZORAL) 2 % cream    ketoconazole (NIZORAL) 2 % external shampoo    levothyroxine (SYNTHROID/LEVOTHROID) 50 MCG tablet    Multiple Vitamin (MULTIVITAMIN) per tablet    mycophenolate (GENERIC EQUIVALENT) 500 MG tablet    salicyclic acid-sulfur (SEBULEX) 2-2 % external shampoo    tacrolimus (PROTOPIC) 0.1 % external ointment    triamcinolone (KENALOG) 0.1 % external ointment     No current facility-administered medications for this visit.      Past Medical History:   Patient Active Problem List   Diagnosis    Pemphigus foliaceus (H28)    Hyperlipidemia LDL goal <130    BPH (benign prostatic hyperplasia)    Encounter for long-term (current) use of high-risk medication    Dermatitis, seborrheic    Lightheadedness    Age-related nuclear cataract of both eyes    SOB (shortness of breath)    Muscle weakness (generalized)     Past Medical History:   Diagnosis Date    BPH (benign prostatic hyperplasia)     Essential hypertension 02/2018    Hyperlipidemia LDL goal <130     Pemphigus (H28)     pemphigus foliaceus    Retinal detachment 2009    left eye- corrected with laser    Vitreous detachment 2009    [facility] No referring provider defined for this encounter. on close of this encounter.

## 2024-02-05 DIAGNOSIS — L10.2 PEMPHIGUS FOLIACEUS (H): ICD-10-CM

## 2024-02-09 RX ORDER — MYCOPHENOLATE MOFETIL 500 MG/1
500 TABLET ORAL
Qty: 90 TABLET | Refills: 2 | Status: SHIPPED | OUTPATIENT
Start: 2024-02-09

## 2024-02-09 NOTE — TELEPHONE ENCOUNTER
mycophenolate (GENERIC EQUIVALENT) 500 MG tablet   90 tablet 4 9/21/2023     Remaining refills sent to requested pharmacy.

## 2024-02-12 ENCOUNTER — THERAPY VISIT (OUTPATIENT)
Dept: PHYSICAL THERAPY | Facility: CLINIC | Age: 79
End: 2024-02-12
Payer: COMMERCIAL

## 2024-02-12 DIAGNOSIS — M62.81 MUSCLE WEAKNESS (GENERALIZED): Primary | ICD-10-CM

## 2024-02-12 PROCEDURE — 97110 THERAPEUTIC EXERCISES: CPT | Mod: GP | Performed by: PHYSICAL THERAPIST

## 2024-02-12 NOTE — PROGRESS NOTES
DISCHARGE  Reason for Discharge: Patient has met all goals.    Equipment Issued:     Discharge Plan: Patient to continue home program.    Referring Provider:  Devendra Telles

## 2024-03-04 ENCOUNTER — LAB (OUTPATIENT)
Dept: LAB | Facility: CLINIC | Age: 79
End: 2024-03-04
Payer: COMMERCIAL

## 2024-03-04 DIAGNOSIS — R97.20 ELEVATED PROSTATE SPECIFIC ANTIGEN (PSA): ICD-10-CM

## 2024-03-04 LAB — PSA SERPL DL<=0.01 NG/ML-MCNC: 1.98 NG/ML

## 2024-03-04 PROCEDURE — 36415 COLL VENOUS BLD VENIPUNCTURE: CPT | Performed by: PATHOLOGY

## 2024-03-04 PROCEDURE — 84153 ASSAY OF PSA TOTAL: CPT | Performed by: PATHOLOGY

## 2024-03-08 SDOH — HEALTH STABILITY: PHYSICAL HEALTH: ON AVERAGE, HOW MANY DAYS PER WEEK DO YOU ENGAGE IN MODERATE TO STRENUOUS EXERCISE (LIKE A BRISK WALK)?: 4 DAYS

## 2024-03-08 SDOH — HEALTH STABILITY: PHYSICAL HEALTH: ON AVERAGE, HOW MANY MINUTES DO YOU ENGAGE IN EXERCISE AT THIS LEVEL?: 30 MIN

## 2024-03-08 ASSESSMENT — SOCIAL DETERMINANTS OF HEALTH (SDOH): HOW OFTEN DO YOU GET TOGETHER WITH FRIENDS OR RELATIVES?: ONCE A WEEK

## 2024-03-08 NOTE — PROGRESS NOTES
Subjective     REASON FOR VISIT  PSA and lower urinary tract symptoms follow-up    HISTORY OF PRESENT ILLNESS  Mr. Mitchell is a pleasant 78 year old male who I am speaking with today in follow-up for his longstanding history of PSA checks and bothersome irritative voiding symptoms.  He was last seen in our department on 9/7/2023.  His urologic history significant for elevated PSA dating back to 1996, and has undergone 4 prostate biopsies in the distant past all of which have been negative.  He is currently on finasteride so his PSAs are corrected for this, but have been stable at the very least since 2010.  Last prostate MRI was from July 2018 showing a PI-RADS 3 lesion, but shared decision-making was used to avoid a biopsy at this time given his incredibly reassuring PSAs.  From a urinary standpoint, his main bothersome symptoms have been urinary frequency.  At his last visit on 9/7/2023, recommended for refill of finasteride and follow-up in 6 months.    Today:  May be slight increase in urinary frequency over the last 6 months but nothing that is impact his quality of life    Objective     PHYSICAL EXAMINATION  General: Alert, oriented, no acute distress    LABS  Lab Results   Component Value Date    PSA 1.98 03/04/2024    PSA 1.90 09/05/2023    PSA 2.28 02/27/2023    PSA 2.09 08/24/2022    PSA 2.29 03/14/2022    PSA 1.95 03/16/2021    PSA 1.91 06/12/2020    PSA 1.86 06/24/2019    PSA 1.82 03/14/2019    PSA 2.05 09/25/2018    PSA 2.10 06/21/2018    PSA 2.42 02/05/2018    PSA 2.09 01/10/2017    PSA 1.96 02/14/2011    PSA 2.47 02/25/2010      IMAGING  Prostate MRI from 7/6/2018    Narrative & Impression   MRI PROSTATE:  7/6/2018 2:48 PM     CLINICAL HISTORY: nodule left apex; Prostate nodule.  PSA of 2.1  06/21/2018.      Comparison: MR pelvis 1/27/2006.     TECHNIQUE:      The following sequences were obtained: High-resolution axial  T2-weighted, coronal T2-weighted, 3D volumetric T2-weighted, axial  pre-contrast  T1, axial diffusion-weighted, axial apparent diffusion  coefficient and axial dynamic contrast-enhanced T1. Postcontrast  images were evaluated on a separate workstation to evaluate dynamic  contrast enhancement.  Contrast dose: 7.5mL Gadavist     The images are interpreted according to PI-RADS v.2     FINDINGS:  Prostate gland size: 4.2 x 5.4 x 5.4 cm  Volume: 64 g     Peripheral zone: Linear hypointensity within distinct margins. Focal  abnormality on ADC or diffusion weighted imaging.     PI-RADS:  Peripheral zone T2: 2  Diffusion-weighted image: 1    Contrast-enhanced images: Negative     Overall assessment: 1     Transitional zone: In the left anterior mid gland is an ill-defined  mildly heterogeneous 1.3 x 1.3 cm area of T2 intermediate signal from  2-3 o'clock (series 8, image 20/series 10, image 17/series 11, image  17) with indistinct hypointensity on ADC. Regional enhancement which  is isointense into adjacent BPH nodules. There is diffuse hypertrophy  of the central gland with BPH type changes.      PI-RADS:  Transition zone T2: 3  Diffusion-weighted image: 3  Contrast-enhanced images: Negative     Overall assessment: 3     Remainder of the pelvis:  No significant pelvic lymphadenopathy. No  concerning osseous lesion. Colonic diverticulosis without convincing  evidence of active inflammation. Bladder is partially decompressed. No  focal lesion.                                                                       IMPRESSION:   1. Based on the most suspicious abnormality, this exam is  characterized as PIRADS 3 - Intermediate probability.  The presence of  clinically significant cancer is equivocal.  The most suspicious  abnormality is a ill-defined 1.3 cm region in the left mid gland  transitional zone from 2-3 o'clock, and there is no evidence of  extracapsular extension.  2. No evidence of extraprostatic malignancy. No suspicious adenopathy  or evidence of pelvic metastases.     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  After reviewing his clinical history, I am first happy to hear that his urinary symptoms are so stable and I am happy to renew his finasteride again for 1 full year.  I have also made sure to give him 4 refills to be sure he has enough to get through even if there was a complication with scheduling his follow-up.    In regards to his prostate cancer screening, we spent some time reviewing the standard recommendations for prostate cancer screening as well as why we do the screening in the first place.  Specifically, we discussed that the goal of prostate cancer screening is determine if there is a clinically significant prostate cancer that we believe would impact his life that we would recommend treatment for.  We then reviewed the epidemiology of prostate cancer, specifically that most common form of prostate cancer would be a low-grade low risk indolent A[place] 6 prostate cancer and that we would not recommend treatment for this and most cases but rather active surveillance.  As he ages, he gets less and less likely to be ever affected by any form of prostate cancer including intermediate or high risk cancers, and it would be reasonable to consider discontinuing prostate cancer screening.  This is also driven by his extensive history of stable PSAs even after correcting for finasteride.    After this conversation, Luan would prefer to continue with a yearly follow-up for his urinary symptoms and with a repeat PSA.  I have placed these orders and we will plan to do a digital rectal exam at our next visit.    Mr. Mitchell expressed understanding and agreement to the above discussion and plan and all of his questions were answered to his satisfaction.      PLAN  Renewal of finasteride  for one year  Repeat PSA in one year with follow-up office visit with Jimenez Pearson PA-C just afterward     SIGNED:    Jimenez Pearson PA-C

## 2024-03-12 ENCOUNTER — PRE VISIT (OUTPATIENT)
Dept: UROLOGY | Facility: CLINIC | Age: 79
End: 2024-03-12

## 2024-03-12 ENCOUNTER — OFFICE VISIT (OUTPATIENT)
Dept: UROLOGY | Facility: CLINIC | Age: 79
End: 2024-03-12
Payer: COMMERCIAL

## 2024-03-12 VITALS
DIASTOLIC BLOOD PRESSURE: 76 MMHG | WEIGHT: 174 LBS | HEART RATE: 79 BPM | OXYGEN SATURATION: 96 % | BODY MASS INDEX: 25.77 KG/M2 | SYSTOLIC BLOOD PRESSURE: 134 MMHG | HEIGHT: 69 IN

## 2024-03-12 DIAGNOSIS — N40.1 BENIGN PROSTATIC HYPERPLASIA WITH LOWER URINARY TRACT SYMPTOMS, SYMPTOM DETAILS UNSPECIFIED: Primary | ICD-10-CM

## 2024-03-12 DIAGNOSIS — Z12.5 SCREENING FOR PROSTATE CANCER: ICD-10-CM

## 2024-03-12 DIAGNOSIS — R97.20 ELEVATED PROSTATE SPECIFIC ANTIGEN (PSA): ICD-10-CM

## 2024-03-12 PROCEDURE — 99214 OFFICE O/P EST MOD 30 MIN: CPT | Performed by: STUDENT IN AN ORGANIZED HEALTH CARE EDUCATION/TRAINING PROGRAM

## 2024-03-12 RX ORDER — FINASTERIDE 5 MG/1
1 TABLET, FILM COATED ORAL DAILY
Qty: 90 TABLET | Refills: 4 | Status: SHIPPED | OUTPATIENT
Start: 2024-03-12

## 2024-03-12 RX ORDER — FINASTERIDE 5 MG/1
1 TABLET, FILM COATED ORAL DAILY
Qty: 90 TABLET | Refills: 3 | Status: SHIPPED | OUTPATIENT
Start: 2024-03-12 | End: 2024-03-12

## 2024-03-12 ASSESSMENT — PAIN SCALES - GENERAL: PAINLEVEL: NO PAIN (0)

## 2024-03-12 NOTE — LETTER
3/12/2024       RE: Luan Mitchell  [address] [address]     Dear Colleague,    Thank you for referring your patient, Luan Mitchell, to the [facility] [place] at [facility]. Please see a copy of my visit note below.    Subjective    REASON FOR VISIT  PSA and lower urinary tract symptoms follow-up    HISTORY OF PRESENT ILLNESS  Mr. Mitchell is a pleasant 78 year old male who I am speaking with today in follow-up for his longstanding history of PSA checks and bothersome irritative voiding symptoms.  He was last seen in our department on 9/7/2023.  His urologic history significant for elevated PSA dating back to 1996, and has undergone 4 prostate biopsies in the distant past all of which have been negative.  He is currently on finasteride so his PSAs are corrected for this, but have been stable at the very least since 2010.  Last prostate MRI was from July 2018 showing a PI-RADS 3 lesion, but shared decision-making was used to avoid a biopsy at this time given his incredibly reassuring PSAs.  From a urinary standpoint, his main bothersome symptoms have been urinary frequency.  At his last visit on 9/7/2023, recommended for refill of finasteride and follow-up in 6 months.    Today:  May be slight increase in urinary frequency over the last 6 months but nothing that is impact his quality of life    Objective    PHYSICAL EXAMINATION  General: Alert, oriented, no acute distress    LABS  Lab Results   Component Value Date    PSA 1.98 03/04/2024    PSA 1.90 09/05/2023    PSA 2.28 02/27/2023    PSA 2.09 08/24/2022    PSA 2.29 03/14/2022    PSA 1.95 03/16/2021    PSA 1.91 06/12/2020    PSA 1.86 06/24/2019    PSA 1.82 03/14/2019    PSA 2.05 09/25/2018    PSA 2.10 06/21/2018    PSA 2.42 02/05/2018    PSA 2.09 01/10/2017    PSA 1.96 02/14/2011    PSA 2.47 02/25/2010      IMAGING  Prostate MRI from 7/6/2018    Narrative & Impression   MRI  PROSTATE:  7/6/2018 2:48 PM     CLINICAL HISTORY: nodule left apex; Prostate nodule.  PSA of 2.1  06/21/2018.      Comparison: MR pelvis 1/27/2006.     TECHNIQUE:      The following sequences were obtained: High-resolution axial  T2-weighted, coronal T2-weighted, 3D volumetric T2-weighted, axial  pre-contrast T1, axial diffusion-weighted, axial apparent diffusion  coefficient and axial dynamic contrast-enhanced T1. Postcontrast  images were evaluated on a separate workstation to evaluate dynamic  contrast enhancement.  Contrast dose: 7.5mL Gadavist     The images are interpreted according to PI-RADS v.2     FINDINGS:  Prostate gland size: 4.2 x 5.4 x 5.4 cm  Volume: 64 g     Peripheral zone: Linear hypointensity within distinct margins. Focal  abnormality on ADC or diffusion weighted imaging.     PI-RADS:  Peripheral zone T2: 2  Diffusion-weighted image: 1    Contrast-enhanced images: Negative     Overall assessment: 1     Transitional zone: In the left anterior mid gland is an ill-defined  mildly heterogeneous 1.3 x 1.3 cm area of T2 intermediate signal from  2-3 o'clock (series 8, image 20/series 10, image 17/series 11, image  17) with indistinct hypointensity on ADC. Regional enhancement which  is isointense into adjacent BPH nodules. There is diffuse hypertrophy  of the central gland with BPH type changes.      PI-RADS:  Transition zone T2: 3  Diffusion-weighted image: 3  Contrast-enhanced images: Negative     Overall assessment: 3     Remainder of the pelvis:  No significant pelvic lymphadenopathy. No  concerning osseous lesion. Colonic diverticulosis without convincing  evidence of active inflammation. Bladder is partially decompressed. No  focal lesion.                                                                       IMPRESSION:   1. Based on the most suspicious abnormality, this exam is  characterized as PIRADS 3 - Intermediate probability.  The presence of  clinically significant cancer is equivocal.   The most suspicious  abnormality is a ill-defined 1.3 cm region in the left mid gland  transitional zone from 2-3 o'clock, and there is no evidence of  extracapsular extension.  2. No evidence of extraprostatic malignancy. No suspicious adenopathy  or evidence of pelvic metastases.     Assessment  Prostate cancer screening  Urinary frequency  BPE on finasteride    It was my pleasure to meet with Luan in follow-up for his history of urinary frequency and BPE currently controlled with finasteride as well as his prostate cancer screening.  After reviewing his clinical history, I am first happy to hear that his urinary symptoms are so stable and I am happy to renew his finasteride again for 1 full year.  I have also made sure to give him 4 refills to be sure he has enough to get through even if there was a complication with scheduling his follow-up.    In regards to his prostate cancer screening, we spent some time reviewing the standard recommendations for prostate cancer screening as well as why we do the screening in the first place.  Specifically, we discussed that the goal of prostate cancer screening is determine if there is a clinically significant prostate cancer that we believe would impact his life that we would recommend treatment for.  We then reviewed the epidemiology of prostate cancer, specifically that most common form of prostate cancer would be a low-grade low risk indolent Dane 6 prostate cancer and that we would not recommend treatment for this and most cases but rather active surveillance.  As he ages, he gets less and less likely to be ever affected by any form of prostate cancer including intermediate or high risk cancers, and it would be reasonable to consider discontinuing prostate cancer screening.  This is also driven by his extensive history of stable PSAs even after correcting for finasteride.    After this conversation, Luan would prefer to continue with a yearly follow-up for his  urinary symptoms and with a repeat PSA.  I have placed these orders and we will plan to do a digital rectal exam at our next visit.    Mr. Mitchell expressed understanding and agreement to the above discussion and plan and all of his questions were answered to his satisfaction.      PLAN  Renewal of finasteride for one year  Repeat PSA in one year with follow-up office visit with Jimenez Pearson PA-C just afterward     SIGNED:    Jimenez Pearson PA-C

## 2024-03-12 NOTE — NURSING NOTE
"Chief Complaint   Patient presents with    Follow Up     6 months with another PSA       Blood pressure 134/76, pulse 79, height 1.74 m (5' 8.5\"), weight 78.9 kg (174 lb), SpO2 96%. Body mass index is 26.07 kg/m .    Patient Active Problem List   Diagnosis    Pemphigus foliaceus (H28)    Hyperlipidemia LDL goal <130    BPH (benign prostatic hyperplasia)    Encounter for long-term (current) use of high-risk medication    Dermatitis, seborrheic    Lightheadedness    Age-related nuclear cataract of both eyes    SOB (shortness of breath)    Muscle weakness (generalized)       No Known Allergies    Current Outpatient Medications   Medication Sig Dispense Refill    ARTIFICIAL TEAR SOLUTION OP Apply to eye daily       atorvastatin (LIPITOR) 20 MG tablet Take 1 tablet (20 mg) by mouth daily 90 tablet 4    clobetasol (TEMOVATE) 0.05 % external ointment Apply topically 2 times daily For more bothersome lesions 120 g 1    clobetasol (TEMOVATE) 0.05 % external solution Apply to scalp daily for itch 50 mL 3    finasteride (PROSCAR) 5 MG tablet Take 1 tablet (5 mg) by mouth daily 90 tablet 3    fluocinolone acetonide (DERMA SMOOTHE/FS BODY) 0.01 % external oil Apply to scalp at night, then sleep overnight with nightcap on 118 mL 4    hydrochlorothiazide (HYDRODIURIL) 25 MG tablet Take 1 tablet (25 mg) by mouth daily 90 tablet 4    hydrocortisone butyrate (LOCOID) 0.1 % SOLN Apply sparingly to affected area(s) of beard twice daily 180 mL 3    ketoconazole (NIZORAL) 2 % cream Apply twice daily to the nose as needed for white/scaling. 60 g 2    ketoconazole (NIZORAL) 2 % external shampoo Three times per week in the shower: Lather into scalp, leave in place for 3-5 minutes, then rinse. 360 mL 12    levothyroxine (SYNTHROID/LEVOTHROID) 50 MCG tablet Take 1 tablet (50 mcg) by mouth daily 90 tablet 11    Multiple Vitamin (MULTIVITAMIN) per tablet Take 1 tablet by mouth daily 1 tab daily      mycophenolate (GENERIC EQUIVALENT) 500 MG " tablet Take 1 tablet (500 mg) by mouth every morning (before breakfast) 90 tablet 2    salicyclic acid-sulfur (SEBULEX) 2-2 % external shampoo Apply topically 3 times daily as needed for itching In the shower. 200 g 11    tacrolimus (PROTOPIC) 0.1 % external ointment Apply topically to affected areas as instructed. 300 g 3    triamcinolone (KENALOG) 0.1 % external ointment Apply topically 2 times daily For lesions that are more mild 453.6 g 1       Social History     Tobacco Use    Smoking status: Never    Smokeless tobacco: Never   Substance Use Topics    Alcohol use: Never    Drug use: Never       Winston Hector MA  3/12/2024  11:04 AM

## 2024-03-12 NOTE — TELEPHONE ENCOUNTER
Reason for Visit:  6 months Follow-up with PSA    Diagnosis: Elevated prostate specific antigen (PSA), Benign prostatic hyperplasia with lower urinary tract symptoms, symptom details unspecified    Relevant information: Last seen Carolynn on 09/07/2023    Records/imaging/labs/orders: 03/04/2024 PSA    Pt called: No need for a call    Rooming Requirements: Standard    Winston Hector MA  3/12/2024  10:29 AM

## 2024-03-13 ENCOUNTER — OFFICE VISIT (OUTPATIENT)
Dept: INTERNAL MEDICINE | Facility: CLINIC | Age: 79
End: 2024-03-13
Payer: COMMERCIAL

## 2024-03-13 ENCOUNTER — LAB (OUTPATIENT)
Dept: LAB | Facility: CLINIC | Age: 79
End: 2024-03-13
Payer: COMMERCIAL

## 2024-03-13 VITALS
HEIGHT: 68 IN | HEART RATE: 81 BPM | SYSTOLIC BLOOD PRESSURE: 128 MMHG | BODY MASS INDEX: 26.69 KG/M2 | WEIGHT: 176.1 LBS | DIASTOLIC BLOOD PRESSURE: 75 MMHG | OXYGEN SATURATION: 95 %

## 2024-03-13 DIAGNOSIS — Z12.11 SPECIAL SCREENING FOR MALIGNANT NEOPLASMS, COLON: Primary | ICD-10-CM

## 2024-03-13 DIAGNOSIS — R73.09 ABNORMAL GLUCOSE TOLERANCE TEST: ICD-10-CM

## 2024-03-13 DIAGNOSIS — I10 ESSENTIAL HYPERTENSION: ICD-10-CM

## 2024-03-13 DIAGNOSIS — E03.9 HYPOTHYROIDISM, UNSPECIFIED TYPE: ICD-10-CM

## 2024-03-13 DIAGNOSIS — N40.1 BENIGN PROSTATIC HYPERPLASIA WITH LOWER URINARY TRACT SYMPTOMS, SYMPTOM DETAILS UNSPECIFIED: ICD-10-CM

## 2024-03-13 DIAGNOSIS — E78.5 HYPERLIPIDEMIA LDL GOAL <130: ICD-10-CM

## 2024-03-13 DIAGNOSIS — Z12.11 SPECIAL SCREENING FOR MALIGNANT NEOPLASMS, COLON: ICD-10-CM

## 2024-03-13 DIAGNOSIS — R07.0 THROAT PAIN: ICD-10-CM

## 2024-03-13 DIAGNOSIS — E28.39 ESTROGEN DEFICIENCY: ICD-10-CM

## 2024-03-13 PROCEDURE — 99397 PER PM REEVAL EST PAT 65+ YR: CPT | Performed by: INTERNAL MEDICINE

## 2024-03-13 RX ORDER — LEVOTHYROXINE SODIUM 50 UG/1
50 TABLET ORAL DAILY
Qty: 100 TABLET | Refills: 11 | Status: SHIPPED | OUTPATIENT
Start: 2024-03-13

## 2024-03-13 RX ORDER — HYDROCHLOROTHIAZIDE 25 MG/1
25 TABLET ORAL DAILY
Qty: 100 TABLET | Refills: 4 | Status: SHIPPED | OUTPATIENT
Start: 2024-03-13

## 2024-03-13 RX ORDER — FINASTERIDE 5 MG/1
1 TABLET, FILM COATED ORAL DAILY
Qty: 90 TABLET | Refills: 4 | Status: CANCELLED | OUTPATIENT
Start: 2024-03-13

## 2024-03-13 RX ORDER — ATORVASTATIN CALCIUM 20 MG/1
20 TABLET, FILM COATED ORAL DAILY
Qty: 100 TABLET | Refills: 4 | Status: SHIPPED | OUTPATIENT
Start: 2024-03-13

## 2024-03-13 NOTE — PATIENT INSTRUCTIONS
Please call [facility] to schedule your appointment as prescribed by the provider at [phone].

## 2024-03-13 NOTE — PROGRESS NOTES
Luan is a 78 year old that presents in clinic today for the following:     Chief Complaint   Patient presents with    Medicare Visit           3/13/2024     2:42 PM   Additional Questions   Roomed by YW     Screenings as of today     Fallen 2 or more times in the past year?  No        Gerald Aguirre, EMT at 2:46 PM on 3/13/2024

## 2024-03-13 NOTE — PROGRESS NOTES
HPI  78-year-old presents today for Medicare wellness visit with a list of several concerns.  Predominant concern is that of some throat discomfort.  This was rather sudden onset about 3 weeks ago after he swallowed some spicy hot food.  He noted in the left lower throat area a discomfort here.  This was intermittently present over the past 3 weeks.  He has not noted any definitive aggravating or alleviating factors.  Is not associated with eating or swallowing although he has been avoiding hot beverages as he thinks it might make it worse.  He has had no dysphagia he has had no food hanging up he said no fever chills sweats lymphadenopathy swollen glands or other complaints.  It has been relatively stable over the past 3 weeks with no trend better or worse.  Otherwise he has had concerns regarding his chest x-ray last fall which showed evidence of osteopenia.  Will get a DEXA scan.  He is also continuing to take the thyroid hormone replacement and we will reassess this in light of the osteopenia.  He follows with urology regarding his prostate and has been maintained on finasteride for that.  He is exercising regularly doing a combination of strength training and cardiovascular training he is eating healthy.  Past Medical History:   Diagnosis Date    BPH (benign prostatic hyperplasia)     Essential hypertension 2018    Hyperlipidemia LDL goal <130     Pemphigus (H28)     pemphigus foliaceus    Retinal detachment     left eye- corrected with laser    Vitreous detachment     LE     Past Surgical History:   Procedure Laterality Date    BIOPSY      Prostate    COLONOSCOPY      EYE SURGERY  2009    NO HISTORY OF SURGERY  2014    derm    RETINOPEXY LASER PROPHYLAXIS BREAK(S) OS (LEFT EYE)      2009 - Left eye     Family History   Problem Relation Age of Onset    Heart Disease Mother         CHF d 80    Diabetes Mother         DM2,  age 80    Hypertension Mother     Coronary Artery Disease Mother   "   Heart Disease Father         CHF d 78    Coronary Artery Disease Father     Heart Disease Paternal Uncle         CHF d 50s    Coronary Artery Disease Other     Cancer No family hx of         no skin cancer    Skin Cancer No family hx of         no skin cancer    Glaucoma No family hx of     Macular Degeneration No family hx of     Melanoma No family hx of          Exam:  /75 (BP Location: Right arm, Patient Position: Sitting, Cuff Size: Adult Regular)   Pulse 81   Ht 1.733 m (5' 8.23\")   Wt 79.9 kg (176 lb 1.6 oz)   SpO2 95%   BMI 26.60 kg/m    176 lbs 1.6 oz  Physical Exam   The patient is alert, oriented with a clear sensorium.   Skin shows numerous pigmented healed pemphigoid type lesions   Head is normocephalic and atraumatic.   Eyes show PERRLA  Ears show normal TMs bilaterally.   Mouth shows clear oral mucosa.   Neck shows no nodes, thyromegaly or bruits.   Back is non tender.  Lungs are clear to percussion and auscultation.   Heart shows normal S1 and S2 without murmur or gallop.   Abdomen is soft and nontender without masses or organomegaly.   Extremities show no edema and no evidence of active synovitis.   Neurologic examination shows cranial nerves II-XII intact. Motor shows 5/5 strength. Reflexes are symmetric.       ASSESSMENT  1. Throat discomfort uncertain significant  2. Pemphigus on mycophenolate  3. Hyperlipidemia on atorvastatin  4. Hypertension controlled on hctz  5. Hypothyroidism on replacement  6. BPH on finasteride  7. Osteopenia on CXR    Plan  Will get the DEXA scan in light of the osteopenia on x-ray.  Will follow-up with fasting labs to check his lipids A1c thyroid and BMP.  He is current on his immunizations I refilled his medications.  Will plan to have him follow-up for reassessment in a year.  I will place an ENT referral if the symptoms do not resolve over the next several weeks he will follow through with that.      This note was completed using Dragon voice recognition " software.      Devendra Telles MD  General Internal Medicine  [facility]  [phone]

## 2024-03-14 ENCOUNTER — LAB (OUTPATIENT)
Dept: LAB | Facility: CLINIC | Age: 79
End: 2024-03-14
Payer: COMMERCIAL

## 2024-03-14 DIAGNOSIS — R73.09 ABNORMAL GLUCOSE TOLERANCE TEST: ICD-10-CM

## 2024-03-14 DIAGNOSIS — E78.5 HYPERLIPIDEMIA LDL GOAL <130: ICD-10-CM

## 2024-03-14 DIAGNOSIS — E03.9 HYPOTHYROIDISM, UNSPECIFIED TYPE: ICD-10-CM

## 2024-03-14 DIAGNOSIS — Z12.11 SPECIAL SCREENING FOR MALIGNANT NEOPLASMS, COLON: Primary | ICD-10-CM

## 2024-03-14 LAB
ANION GAP SERPL CALCULATED.3IONS-SCNC: 12 MMOL/L (ref 7–15)
BUN SERPL-MCNC: 20.2 MG/DL (ref 8–23)
CALCIUM SERPL-MCNC: 9.8 MG/DL (ref 8.8–10.2)
CHLORIDE SERPL-SCNC: 102 MMOL/L (ref 98–107)
CHOLEST SERPL-MCNC: 155 MG/DL
CREAT SERPL-MCNC: 0.96 MG/DL (ref 0.67–1.17)
DEPRECATED HCO3 PLAS-SCNC: 28 MMOL/L (ref 22–29)
EGFRCR SERPLBLD CKD-EPI 2021: 80 ML/MIN/1.73M2
FASTING STATUS PATIENT QL REPORTED: YES
GLUCOSE SERPL-MCNC: 110 MG/DL (ref 70–99)
HBA1C MFR BLD: 5.8 %
HDLC SERPL-MCNC: 43 MG/DL
LDLC SERPL CALC-MCNC: 84 MG/DL
NONHDLC SERPL-MCNC: 112 MG/DL
POTASSIUM SERPL-SCNC: 3.8 MMOL/L (ref 3.4–5.3)
SODIUM SERPL-SCNC: 142 MMOL/L (ref 135–145)
TRIGL SERPL-MCNC: 139 MG/DL
TSH SERPL DL<=0.005 MIU/L-ACNC: 1.7 UIU/ML (ref 0.3–4.2)

## 2024-03-14 PROCEDURE — 83036 HEMOGLOBIN GLYCOSYLATED A1C: CPT | Performed by: INTERNAL MEDICINE

## 2024-03-14 PROCEDURE — 80048 BASIC METABOLIC PNL TOTAL CA: CPT | Performed by: PATHOLOGY

## 2024-03-14 PROCEDURE — 36415 COLL VENOUS BLD VENIPUNCTURE: CPT | Performed by: PATHOLOGY

## 2024-03-14 PROCEDURE — 80061 LIPID PANEL: CPT | Performed by: PATHOLOGY

## 2024-03-14 PROCEDURE — 84443 ASSAY THYROID STIM HORMONE: CPT | Performed by: PATHOLOGY

## 2024-03-14 PROCEDURE — 99000 SPECIMEN HANDLING OFFICE-LAB: CPT | Performed by: PATHOLOGY

## 2024-03-20 ENCOUNTER — TELEPHONE (OUTPATIENT)
Dept: DERMATOLOGY | Facility: CLINIC | Age: 79
End: 2024-03-20
Payer: COMMERCIAL

## 2024-03-20 NOTE — TELEPHONE ENCOUNTER
Patient Contacted and schedule the following:    Appointment type: Return   Provider: Stephanie Maldonado  Return date: 7/12/24  Specialty phone number: [phone]

## 2024-04-08 PROCEDURE — 82274 ASSAY TEST FOR BLOOD FECAL: CPT | Performed by: INTERNAL MEDICINE

## 2024-04-08 PROCEDURE — 99000 SPECIMEN HANDLING OFFICE-LAB: CPT | Performed by: PATHOLOGY

## 2024-04-09 LAB — HEMOCCULT STL QL IA: NEGATIVE

## 2024-05-07 ENCOUNTER — LAB (OUTPATIENT)
Dept: LAB | Facility: CLINIC | Age: 79
End: 2024-05-07
Payer: COMMERCIAL

## 2024-05-07 DIAGNOSIS — L10.2 PEMPHIGUS FOLIACEUS (H): ICD-10-CM

## 2024-05-07 LAB
ALBUMIN SERPL BCG-MCNC: 4.4 G/DL (ref 3.5–5.2)
ALP SERPL-CCNC: 39 U/L (ref 40–150)
ALT SERPL W P-5'-P-CCNC: 39 U/L (ref 0–70)
ANION GAP SERPL CALCULATED.3IONS-SCNC: 9 MMOL/L (ref 7–15)
AST SERPL W P-5'-P-CCNC: 32 U/L (ref 0–45)
BASOPHILS # BLD AUTO: 0 10E3/UL (ref 0–0.2)
BASOPHILS NFR BLD AUTO: 0 %
BILIRUB SERPL-MCNC: 0.5 MG/DL
BUN SERPL-MCNC: 22 MG/DL (ref 8–23)
CALCIUM SERPL-MCNC: 9.7 MG/DL (ref 8.8–10.2)
CHLORIDE SERPL-SCNC: 102 MMOL/L (ref 98–107)
CREAT SERPL-MCNC: 1.09 MG/DL (ref 0.67–1.17)
DEPRECATED HCO3 PLAS-SCNC: 30 MMOL/L (ref 22–29)
EGFRCR SERPLBLD CKD-EPI 2021: 68 ML/MIN/1.73M2
EOSINOPHIL # BLD AUTO: 0.1 10E3/UL (ref 0–0.7)
EOSINOPHIL NFR BLD AUTO: 2 %
ERYTHROCYTE [DISTWIDTH] IN BLOOD BY AUTOMATED COUNT: 12.8 % (ref 10–15)
GLUCOSE SERPL-MCNC: 71 MG/DL (ref 70–99)
HCT VFR BLD AUTO: 41.2 % (ref 40–53)
HGB BLD-MCNC: 14.6 G/DL (ref 13.3–17.7)
IMM GRANULOCYTES # BLD: 0 10E3/UL
IMM GRANULOCYTES NFR BLD: 0 %
LYMPHOCYTES # BLD AUTO: 2.1 10E3/UL (ref 0.8–5.3)
LYMPHOCYTES NFR BLD AUTO: 31 %
MCH RBC QN AUTO: 31.4 PG (ref 26.5–33)
MCHC RBC AUTO-ENTMCNC: 35.4 G/DL (ref 31.5–36.5)
MCV RBC AUTO: 89 FL (ref 78–100)
MONOCYTES # BLD AUTO: 0.8 10E3/UL (ref 0–1.3)
MONOCYTES NFR BLD AUTO: 12 %
NEUTROPHILS # BLD AUTO: 3.6 10E3/UL (ref 1.6–8.3)
NEUTROPHILS NFR BLD AUTO: 55 %
NRBC # BLD AUTO: 0 10E3/UL
NRBC BLD AUTO-RTO: 0 /100
PLATELET # BLD AUTO: 171 10E3/UL (ref 150–450)
POTASSIUM SERPL-SCNC: 4.3 MMOL/L (ref 3.4–5.3)
PROT SERPL-MCNC: 6.9 G/DL (ref 6.4–8.3)
RBC # BLD AUTO: 4.65 10E6/UL (ref 4.4–5.9)
SODIUM SERPL-SCNC: 141 MMOL/L (ref 135–145)
WBC # BLD AUTO: 6.7 10E3/UL (ref 4–11)

## 2024-05-07 PROCEDURE — 80053 COMPREHEN METABOLIC PANEL: CPT | Performed by: PATHOLOGY

## 2024-05-07 PROCEDURE — 36415 COLL VENOUS BLD VENIPUNCTURE: CPT | Performed by: PATHOLOGY

## 2024-05-07 PROCEDURE — 85025 COMPLETE CBC W/AUTO DIFF WBC: CPT | Performed by: PATHOLOGY

## 2024-07-12 ENCOUNTER — OFFICE VISIT (OUTPATIENT)
Dept: DERMATOLOGY | Facility: CLINIC | Age: 79
End: 2024-07-12
Attending: DERMATOLOGY
Payer: COMMERCIAL

## 2024-07-12 DIAGNOSIS — D18.01 CHERRY ANGIOMA: ICD-10-CM

## 2024-07-12 DIAGNOSIS — L10.2 PEMPHIGUS FOLIACEUS (H): ICD-10-CM

## 2024-07-12 DIAGNOSIS — D22.9 MULTIPLE NEVI: ICD-10-CM

## 2024-07-12 DIAGNOSIS — Z12.83 ENCOUNTER FOR SCREENING FOR MALIGNANT NEOPLASM OF SKIN: Primary | ICD-10-CM

## 2024-07-12 DIAGNOSIS — L81.4 LENTIGINES: ICD-10-CM

## 2024-07-12 DIAGNOSIS — L82.1 SEBORRHEIC KERATOSES: ICD-10-CM

## 2024-07-12 PROCEDURE — 99214 OFFICE O/P EST MOD 30 MIN: CPT | Performed by: PHYSICIAN ASSISTANT

## 2024-07-12 ASSESSMENT — PAIN SCALES - GENERAL: PAINLEVEL: NO PAIN (0)

## 2024-07-12 NOTE — PROGRESS NOTES
"[facility] Note  Encounter Date: Jul 12, 2024  Office Visit     Reviewed patients past medical history and pertinent chart review prior to patients visit today.     Dermatology Problem List:  #  Pemphigus foliaceus  - s/p shave bx 6/17/21 (L superior shoulder) and 7/26/10 (L arm)  - Last pemphigus panel (3/25/21): Dsg1 IgG 136 and Dsg2 IgG 2  - Goals of treatment per Mr. Mitchell:  \"Prevent itching and secondary infections. Not interested in complete clearance\"  - Current Tx: MMF 500mg daily              - stopped MMF 3/2019, re-started  mg PO BID on 8/13/20 but reduced to 500 mg every day on 10/21/20              - Last safety labs (12/2023): wnl              - safety labs: CBC, CMP ordered 09/21/23   - Adjunct Tx: clobetasol oint, triamcinolone 0.1%, tacrolimus ointment, clobetasol solution to scalp, salicylic acid shampoo to scalp, ketoconazole shampoo to scalp, fluocinolone oil to scalp, Head and Shoulders Shampoo  # Tinea pedis  - Current Tx:  Terbinafine 1% cream  # Benign Bx  - VK, R calf, s/p shave bx 3/25/21  - mild DN, R neck, s/p shave bx 3/22/18  ____________________________________________    Assessment & Plan:     # Pemphigus foliaceous  Chronic, 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  Goal of care remains focused on controlling symptoms and not clearing disease. CBC and CMP 05/07/2024 reassuring. Will redraw labs q3 months. Continue tacrolimus for flares. Discussed using hydrocortisone on the face and triamcinolone for trunk/ extremities. Plan for follow up in 6 months.     # Multiple nevi, trunk and extremities  # Solar lentigines  - No concerning features on dermoscopy. We discussed the importance of self exams at home. ABCDE criteria and importance of photoprotection reviewed.     # Cherry angiomas  # Seborrheic keratoses  - We discussed the benign nature of the skin lesions. No treatment required. Continued observation recommended. Follow up with " any concerns.      Follow-up:  6 months pemphigus foliaceous, Annual for follow up full body skin exam, as needed for new or changing lesions or new concerns    All risks, benefits and alternatives were discussed with patient.  Patient is in agreement and understands the assessment and plan.  All questions were answered.  Stephanie Maldonado PA-C  [facility]    ____________________________________________    CC: Skin Check (Luan is here today for a skin check )    HPI:  Mr. Luan Mitchell is a(n) 79 year old male who presents today as a return patient for a full body skin cancer screening. Longstanding history of pemphigus foliaceus. He currently take mycophenolate 500 mg daily and treats active lesions with triamcinolone and tacrolimus. He is not interested in changes to his treatment regimen. No other specific cutaneous concerns today. The patient reports trying to be diligent with photoprotection.      Labs:  CBC and CMP 05/07/2024 reassuring    Physical Exam:  Vitals: There were no vitals taken for this visit.  SKIN: Total skin excluding the genitalia areas was performed. The exam included the scalp, face, neck, bilateral arms, chest, back, abdomen, bilateral legs, digits, mons pubis, buttocks, and nails.   - [place] II.  - [place] plaques with scale scattered on the back, scalp, left cheek, and abdomen.   - Multiple tan/brown macules and papules scattered throughout exam, consistent with benign nevi. No concerning features on dermoscopy.   - Scattered tan, homogenous macules scattered on sun exposed skin, consistent with solar lentigines.   - Scattered waxy, stuck on appearing papules and patches, consistent with seborrheic keratoses.    - Several 1-2 mm red dome shaped symmetric papules, consistent with cherry angiomas.     Medications:  Current Outpatient Medications   Medication Sig Dispense Refill    ARTIFICIAL TEAR SOLUTION OP Apply to eye daily       atorvastatin (LIPITOR) 20 MG tablet  Take 1 tablet (20 mg) by mouth daily 100 tablet 4    clobetasol (TEMOVATE) 0.05 % external ointment Apply topically 2 times daily For more bothersome lesions 120 g 1    clobetasol (TEMOVATE) 0.05 % external solution Apply to scalp daily for itch 50 mL 3    finasteride (PROSCAR) 5 MG tablet Take 1 tablet (5 mg) by mouth daily 90 tablet 4    fluocinolone acetonide (DERMA SMOOTHE/FS BODY) 0.01 % external oil Apply to scalp at night, then sleep overnight with nightcap on 118 mL 4    hydrochlorothiazide (HYDRODIURIL) 25 MG tablet Take 1 tablet (25 mg) by mouth daily 100 tablet 4    hydrocortisone butyrate (LOCOID) 0.1 % SOLN Apply sparingly to affected area(s) of beard twice daily 180 mL 3    ketoconazole (NIZORAL) 2 % cream Apply twice daily to the nose as needed for white/scaling. 60 g 2    ketoconazole (NIZORAL) 2 % external shampoo Three times per week in the shower: Lather into scalp, leave in place for 3-5 minutes, then rinse. 360 mL 12    levothyroxine (SYNTHROID/LEVOTHROID) 50 MCG tablet Take 1 tablet (50 mcg) by mouth daily 100 tablet 11    Multiple Vitamin (MULTIVITAMIN) per tablet Take 1 tablet by mouth daily 1 tab daily      mycophenolate (GENERIC EQUIVALENT) 500 MG tablet Take 1 tablet (500 mg) by mouth every morning (before breakfast) 90 tablet 2    salicyclic acid-sulfur (SEBULEX) 2-2 % external shampoo Apply topically 3 times daily as needed for itching In the shower. 200 g 11    tacrolimus (PROTOPIC) 0.1 % external ointment Apply topically to affected areas as instructed. 300 g 3    triamcinolone (KENALOG) 0.1 % external ointment Apply topically 2 times daily For lesions that are more mild 453.6 g 1     No current facility-administered medications for this visit.      Past Medical History:   Patient Active Problem List   Diagnosis    Pemphigus foliaceus (H28)    Hyperlipidemia LDL goal <130    BPH (benign prostatic hyperplasia)    Encounter for long-term (current) use of high-risk medication     Dermatitis, seborrheic    Lightheadedness    Age-related nuclear cataract of both eyes    SOB (shortness of breath)    Muscle weakness (generalized)     Past Medical History:   Diagnosis Date    BPH (benign prostatic hyperplasia)     Essential hypertension 02/2018    Hyperlipidemia LDL goal <130     Pemphigus (H28)     pemphigus foliaceus    Retinal detachment 2009    left eye- corrected with laser    Vitreous detachment 2009    STEPHANIE       CC Referred Self, MD  No address on file on close of this encounter.

## 2024-07-12 NOTE — PATIENT INSTRUCTIONS
Patient Education        Proper skin care from [facility]:     -Eliminate harsh soaps as they strip the natural oils from the skin, often resulting in dry itchy skin ( i.e. Dial, Zest, [place])  -Use mild soaps such as Cetaphil or Dove Sensitive Skin in the shower. You do not need to use soap on arms, legs, and trunk every time you shower unless visibly soiled.   -Avoid hot or cold showers.  -After showering, lightly dry off and apply moisturizing within 2-3 minutes. This will help trap moisture in the skin.   -Aggressive use of a moisturizer at least 1-2 times a day to the entire body (including -Vanicream, Cetaphil, Aquaphor or Cerave) and moisturize hands after every washing.  -We recommend using moisturizers that come in a tub that needs to be scooped out, not a pump. This has more of an oil base. It will hold moisture in your skin much better than a water base moisturizer. The above recommended are non-pore clogging.        Wear a sunscreen with at least SPF 30 on your face, ears, neck and V of the chest daily. Wear sunscreen on other areas of the body if those areas are exposed to the sun throughout the day. Sunscreens can contain physical and/or chemical blockers. Physical blockers are less likely to clog pores, these include zinc oxide and titanium dioxide. Reapply every two hour and after swimming.      Sunscreen examples: https://www.ewg.org/sunscreen/     UV radiation  UVA radiation remains constant throughout the day and throughout the year. It is a longer wavelength than UVB and therefore penetrates deeper into the skin leading to immediate and delayed tanning, photoaging, and skin cancer. 70-80% of UVA and UVB radiation occurs between the hours of 10am-2pm.  UVB radiation  UVB radiation causes the most harmful effects and is more significant during the summer months. However, snow and ice can reflect UVB radiation leading to skin damage during the winter months as well. UVB radiation is  responsible for tanning, burning, inflammation, delayed erythema (pinkness), pigmentation (brown spots), and skin cancer.      I recommend self monthly full body exams and yearly full body exams with a dermatology provider. If you develop a new or changing lesion please follow up for examination. Most skin cancers are pink and scaly or pink and pearly. However, we do see blue/brown/black skin cancers.  Consider the ABCDEs of melanoma when giving yourself your monthly full body exam ( don't forget the groin, buttocks, feet, toes, etc). A-asymmetry, B-borders, C-color, D-diameter, E-elevation or evolving. If you see any of these changes please follow up in clinic. If you cannot see your back I recommend purchasing a hand held mirror to use with a larger wall mirror.       Checking for Skin Cancer  You can find cancer early by checking your skin each month. There are 3 kinds of skin cancer. They are melanoma, basal cell carcinoma, and squamous cell carcinoma. Doing monthly skin checks is the best way to find new marks or skin changes. Follow the instructions below for checking your skin.   The ABCDEs of checking moles for melanoma   Check your moles or growths for signs of melanoma using ABCDE:   Asymmetry: the sides of the mole or growth don t match  Border: the edges are ragged, notched, or blurred  Color: the color within the mole or growth varies  Diameter: the mole or growth is larger than 6 mm (size of a pencil eraser)  Evolving: the size, shape, or color of the mole or growth is changing (evolving is not shown in the images below)    Checking for other types of skin cancer  Basal cell carcinoma or squamous cell carcinoma have symptoms such as:      A spot or mole that looks different from all other marks on your skin  Changes in how an area feels, such as itching, tenderness, or pain  Changes in the skin's surface, such as oozing, bleeding, or scaliness  A sore that does not heal  New swelling or redness beyond  the border of a mole     Who s at risk?  Anyone can get skin cancer. But you are at greater risk if you have:   Fair skin, light-colored hair, or light-colored eyes  Many moles or abnormal moles on your skin  A history of sunburns from sunlight or tanning beds  A family history of skin cancer  A history of exposure to radiation or chemicals  A weakened immune system  If you have had skin cancer in the past, you are at risk for recurring skin cancer.   How to check your skin  Do your monthly skin checkups in front of a full-length mirror. Check all parts of your body, including your:   Head (ears, face, neck, and scalp)  Torso (front, back, and sides)  Arms (tops, undersides, upper, and lower armpits)  Hands (palms, backs, and fingers, including under the nails)  Buttocks and genitals  Legs (front, back, and sides)  Feet (tops, soles, toes, including under the nails, and between toes)  If you have a lot of moles, take digital photos of them each month. Make sure to take photos both up close and from a distance. These can help you see if any moles change over time.   Most skin changes are not cancer. But if you see any changes in your skin, call your doctor right away. Only he or she can diagnose a problem. If you have skin cancer, seeing your doctor can be the first step toward getting the treatment that could save your life.   [address] last reviewed this educational content on 4/1/2019 2000-2020 [employer]. [address] [place] [address], [place] [address]. All rights reserved. This information is not intended as a substitute for professional medical care. Always follow your healthcare professional's instructions.        When should I call my doctor?  If you are worsening or not improving, please, contact us or seek urgent care as noted below.      Who should I call with questions (adults)?  [facility] (adult and pediatric): [phone]  [facility]  [place] (adult): [phone]  [facility] ([place], [place], [place] and [place]) [phone]  For urgent needs outside of business hours call the [facility] at [phone] and ask for the dermatology resident on call to be paged  If this is a medical emergency and you are unable to reach an ER, Call 911        If you need a prescription refill, please contact your pharmacy. Refills are approved or denied by our Physicians during normal business hours, Monday through Fridays  Per office policy, refills will not be granted if you have not been seen within the past year (or sooner depending on your child's condition)

## 2024-07-12 NOTE — LETTER
"7/12/2024       RE: Luan Mitchell  [address] [address]     Dear Colleague,    Thank you for referring your patient, Luan Mitchell, to the [facility] [place] [facility]. Please see a copy of my visit note below.    [facility] Note  Encounter Date: Jul 12, 2024  Office Visit     Reviewed patients past medical history and pertinent chart review prior to patients visit today.     Dermatology Problem List:  #  Pemphigus foliaceus  - s/p shave bx 6/17/21 (L superior shoulder) and 7/26/10 (L arm)  - Last pemphigus panel (3/25/21): Dsg1 IgG 136 and Dsg2 IgG 2  - Goals of treatment per Mr. Mitchell:  \"Prevent itching and secondary infections. Not interested in complete clearance\"  - Current Tx: MMF 500mg daily              - stopped MMF 3/2019, re-started  mg PO BID on 8/13/20 but reduced to 500 mg every day on 10/21/20              - Last safety labs (12/2023): wnl              - safety labs: CBC, CMP ordered 09/21/23   - Adjunct Tx: clobetasol oint, triamcinolone 0.1%, tacrolimus ointment, clobetasol solution to scalp, salicylic acid shampoo to scalp, ketoconazole shampoo to scalp, fluocinolone oil to scalp, Head and Shoulders Shampoo  # Tinea pedis  - Current Tx:  Terbinafine 1% cream  # Benign Bx  - VK, R calf, s/p shave bx 3/25/21  - mild DN, R neck, s/p shave bx 3/22/18  ____________________________________________    Assessment & Plan:     # Pemphigus foliaceous  Chronic, 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  Goal of care remains focused on controlling symptoms and not clearing disease. CBC and CMP 05/07/2024 reassuring. Will redraw labs q3 months. Continue tacrolimus for flares. Discussed using hydrocortisone on the face and triamcinolone for trunk/ extremities. Plan for follow up in 6 months.     # Multiple nevi, trunk and extremities  # Solar lentigines  - No " concerning features on dermoscopy. We discussed the importance of self exams at home. ABCDE criteria and importance of photoprotection reviewed.     # Cherry angiomas  # Seborrheic keratoses  - We discussed the benign nature of the skin lesions. No treatment required. Continued observation recommended. Follow up with any concerns.      Follow-up:  6 months pemphigus foliaceous, Annual for follow up full body skin exam, as needed for new or changing lesions or new concerns    All risks, benefits and alternatives were discussed with patient.  Patient is in agreement and understands the assessment and plan.  All questions were answered.  Stephanie Maldonado PA-C  [facility]    ____________________________________________    CC: Skin Check (Luan is here today for a skin check )    HPI:  Mr. Luan Mitchell is a(n) 79 year old male who presents today as a return patient for a full body skin cancer screening. Longstanding history of pemphigus foliaceus. He currently take mycophenolate 500 mg daily and treats active lesions with triamcinolone and tacrolimus. He is not interested in changes to his treatment regimen. No other specific cutaneous concerns today. The patient reports trying to be diligent with photoprotection.      Labs:  CBC and CMP 05/07/2024 reassuring    Physical Exam:  Vitals: There were no vitals taken for this visit.  SKIN: Total skin excluding the genitalia areas was performed. The exam included the scalp, face, neck, bilateral arms, chest, back, abdomen, bilateral legs, digits, mons pubis, buttocks, and nails.   - [place] II.  - [place] plaques with scale scattered on the back, scalp, left cheek, and abdomen.   - Multiple tan/brown macules and papules scattered throughout exam, consistent with benign nevi. No concerning features on dermoscopy.   - Scattered tan, homogenous macules scattered on sun exposed skin, consistent with solar lentigines.   - Scattered waxy, stuck on appearing  papules and patches, consistent with seborrheic keratoses.    - Several 1-2 mm red dome shaped symmetric papules, consistent with cherry angiomas.     Medications:  Current Outpatient Medications   Medication Sig Dispense Refill     ARTIFICIAL TEAR SOLUTION OP Apply to eye daily        atorvastatin (LIPITOR) 20 MG tablet Take 1 tablet (20 mg) by mouth daily 100 tablet 4     clobetasol (TEMOVATE) 0.05 % external ointment Apply topically 2 times daily For more bothersome lesions 120 g 1     clobetasol (TEMOVATE) 0.05 % external solution Apply to scalp daily for itch 50 mL 3     finasteride (PROSCAR) 5 MG tablet Take 1 tablet (5 mg) by mouth daily 90 tablet 4     fluocinolone acetonide (DERMA SMOOTHE/FS BODY) 0.01 % external oil Apply to scalp at night, then sleep overnight with nightcap on 118 mL 4     hydrochlorothiazide (HYDRODIURIL) 25 MG tablet Take 1 tablet (25 mg) by mouth daily 100 tablet 4     hydrocortisone butyrate (LOCOID) 0.1 % SOLN Apply sparingly to affected area(s) of beard twice daily 180 mL 3     ketoconazole (NIZORAL) 2 % cream Apply twice daily to the nose as needed for white/scaling. 60 g 2     ketoconazole (NIZORAL) 2 % external shampoo Three times per week in the shower: Lather into scalp, leave in place for 3-5 minutes, then rinse. 360 mL 12     levothyroxine (SYNTHROID/LEVOTHROID) 50 MCG tablet Take 1 tablet (50 mcg) by mouth daily 100 tablet 11     Multiple Vitamin (MULTIVITAMIN) per tablet Take 1 tablet by mouth daily 1 tab daily       mycophenolate (GENERIC EQUIVALENT) 500 MG tablet Take 1 tablet (500 mg) by mouth every morning (before breakfast) 90 tablet 2     salicyclic acid-sulfur (SEBULEX) 2-2 % external shampoo Apply topically 3 times daily as needed for itching In the shower. 200 g 11     tacrolimus (PROTOPIC) 0.1 % external ointment Apply topically to affected areas as instructed. 300 g 3     triamcinolone (KENALOG) 0.1 % external ointment Apply topically 2 times daily For lesions  that are more mild 453.6 g 1     No current facility-administered medications for this visit.      Past Medical History:   Patient Active Problem List   Diagnosis     Pemphigus foliaceus (H28)     Hyperlipidemia LDL goal <130     BPH (benign prostatic hyperplasia)     Encounter for long-term (current) use of high-risk medication     Dermatitis, seborrheic     Lightheadedness     Age-related nuclear cataract of both eyes     SOB (shortness of breath)     Muscle weakness (generalized)     Past Medical History:   Diagnosis Date     BPH (benign prostatic hyperplasia)      Essential hypertension 02/2018     Hyperlipidemia LDL goal <130      Pemphigus (H28)     pemphigus foliaceus     Retinal detachment 2009    left eye- corrected with laser     Vitreous detachment 2009    LE       CC Referred Self, MD  No address on file on close of this encounter.      Again, thank you for allowing me to participate in the care of your patient.      Sincerely,    Stephanie Maldonado PA-C

## 2024-07-12 NOTE — NURSING NOTE
Dermatology Rooming Note    Luan Mitchell's goals for this visit include:   Chief Complaint   Patient presents with    Skin Check     Luan is here today for a skin check      [facility] - Dermatology

## 2024-07-22 ENCOUNTER — OFFICE VISIT (OUTPATIENT)
Dept: OTOLARYNGOLOGY | Facility: CLINIC | Age: 79
End: 2024-07-22
Payer: COMMERCIAL

## 2024-07-22 DIAGNOSIS — R09.81 NASAL CONGESTION: ICD-10-CM

## 2024-07-22 DIAGNOSIS — R09.A2 FOREIGN BODY SENSATION IN THROAT: ICD-10-CM

## 2024-07-22 DIAGNOSIS — H91.93 HEARING DIFFICULTY OF BOTH EARS: ICD-10-CM

## 2024-07-22 DIAGNOSIS — H93.8X2 EAR FULLNESS, LEFT: Primary | ICD-10-CM

## 2024-07-22 PROBLEM — D69.2 SOLAR PURPURA (H): Status: ACTIVE | Noted: 2024-07-22

## 2024-07-22 PROBLEM — I11.0 HYPERTENSIVE HEART DISEASE WITH HEART FAILURE (H): Status: ACTIVE | Noted: 2024-07-22

## 2024-07-22 PROCEDURE — 31575 DIAGNOSTIC LARYNGOSCOPY: CPT | Performed by: OTOLARYNGOLOGY

## 2024-07-22 PROCEDURE — 99203 OFFICE O/P NEW LOW 30 MIN: CPT | Mod: 25 | Performed by: OTOLARYNGOLOGY

## 2024-07-22 RX ORDER — AZELASTINE 1 MG/ML
SPRAY, METERED NASAL
Qty: 30 ML | Refills: 11 | Status: SHIPPED | OUTPATIENT
Start: 2024-07-22

## 2024-07-22 NOTE — PROGRESS NOTES
"CHIEF COMPLAINT: Patient presents with:  Consult: Left side pressure in throat that will occasionally radiate up to ear, sensation of something being stuck in throat, This sensation started around Feb, No difficulty swallowing , RSI Total 6         HISTORY OF PRESENT ILLNESS    Luan was seen at the behest of [facility], Devendra VILLALBA.  He has been experiencing \"pressure\" in his left throat that started in February.  His symptoms resolved and then returned on July 7th (left ear pressure that radiates to his throat).   Today he has a slight pressure in his left ea and throat.  No pain.   No swallowing issues.  He states he something similar 25 years ago (Dr. Dickerson?) and had imaging done.  He was told he had  a \"normal abnormality\".  He drinks \"sparkling water\" [place] 1x weekly.  Tomato based foods = regular ; Spicy = seldom.   Never smoker.  Retired .  History of hearing aid use in past.       Reflux Symptom Index    Within the last MONTH, how did the following problems affect you?  (0 = no problem, 5 = severe problem)    Hoarseness or a problem with your voice: 0  Clearing your throat: 0  Excess throat mucous or postnasal drip: 1  Difficulty swallowing food, liquids, or pills: 0  Coughing after you ate or after lying down: 0  Breathing difficulties or choking episodes: 0  Troublesome or annoying cough: 0  Sensations of something sticking in your throat or a lump in your throat: 5  Heartburn, chest pain, indigestion, or stomach acid coming up: 0    Total: 6        REVIEW OF SYSTEMS    Review of Systems as per HPI and PMHx, otherwise 10 system review system are negative.       ALLERGIES    Patient has no known allergies.    CURRENT MEDICATIONS      Current Outpatient Medications:     ARTIFICIAL TEAR SOLUTION OP, Apply to eye daily , Disp: , Rfl:     atorvastatin (LIPITOR) 20 MG tablet, Take 1 tablet (20 mg) by mouth daily, Disp: 100 tablet, Rfl: 4    clobetasol (TEMOVATE) 0.05 % external solution, Apply to " scalp daily for itch, Disp: 50 mL, Rfl: 3    finasteride (PROSCAR) 5 MG tablet, Take 1 tablet (5 mg) by mouth daily, Disp: 90 tablet, Rfl: 4    fluocinolone acetonide (DERMA SMOOTHE/FS BODY) 0.01 % external oil, Apply to scalp at night, then sleep overnight with nightcap on, Disp: 118 mL, Rfl: 4    hydrochlorothiazide (HYDRODIURIL) 25 MG tablet, Take 1 tablet (25 mg) by mouth daily, Disp: 100 tablet, Rfl: 4    hydrocortisone butyrate (LOCOID) 0.1 % SOLN, Apply sparingly to affected area(s) of beard twice daily, Disp: 180 mL, Rfl: 3    ketoconazole (NIZORAL) 2 % external shampoo, Three times per week in the shower: Lather into scalp, leave in place for 3-5 minutes, then rinse., Disp: 360 mL, Rfl: 12    levothyroxine (SYNTHROID/LEVOTHROID) 50 MCG tablet, Take 1 tablet (50 mcg) by mouth daily, Disp: 100 tablet, Rfl: 11    Multiple Vitamin (MULTIVITAMIN) per tablet, Take 1 tablet by mouth daily 1 tab daily, Disp: , Rfl:     mycophenolate (GENERIC EQUIVALENT) 500 MG tablet, Take 1 tablet (500 mg) by mouth every morning (before breakfast), Disp: 90 tablet, Rfl: 2    salicyclic acid-sulfur (SEBULEX) 2-2 % external shampoo, Apply topically 3 times daily as needed for itching In the shower., Disp: 200 g, Rfl: 11    tacrolimus (PROTOPIC) 0.1 % external ointment, Apply topically to affected areas as instructed., Disp: 300 g, Rfl: 3    triamcinolone (KENALOG) 0.1 % external ointment, Apply topically 2 times daily For lesions that are more mild, Disp: 453.6 g, Rfl: 1     PAST MEDICAL HISTORY    PAST MEDICAL HISTORY:   Past Medical History:   Diagnosis Date    BPH (benign prostatic hyperplasia)     Essential hypertension 02/2018    Hyperlipidemia LDL goal <130     Pemphigus (H28)     pemphigus foliaceus    Retinal detachment 2009    left eye- corrected with laser    Vitreous detachment 2009    LE       PAST SURGICAL HISTORY    PAST SURGICAL HISTORY:   Past Surgical History:   Procedure Laterality Date    BIOPSY      Prostate     COLONOSCOPY      EYE SURGERY  2009    NO HISTORY OF SURGERY  2014    derm    RETINOPEXY LASER PROPHYLAXIS BREAK(S) OS (LEFT EYE)      2009 - Left eye       FAMILY  HISTORY    FAMILY HISTORY:   Family History   Problem Relation Age of Onset    Heart Disease Mother         CHF d 80    Diabetes Mother         DM2,  age 80    Hypertension Mother     Coronary Artery Disease Mother     Heart Disease Father         CHF d 78    Coronary Artery Disease Father     Heart Disease Paternal Uncle         CHF d 50s    Coronary Artery Disease Other     Cancer No family hx of         no skin cancer    Skin Cancer No family hx of         no skin cancer    Glaucoma No family hx of     Macular Degeneration No family hx of     Melanoma No family hx of        SOCIAL HISTORY    SOCIAL HISTORY:   Social History     Tobacco Use    Smoking status: Never    Smokeless tobacco: Never   Substance Use Topics    Alcohol use: Never        PHYSICAL EXAM    HEAD: Normal appearance and symmetry:  No cutaneous lesions.      NECK:  supple     EARS:    Right:   TM intact nl    LEFT:   TM intact nl     EYES:  EOMI    CN VII/XII:  intact     NOSE:     Dorsum:   straight  Septum:  midline  Mucosa:  moist        ORAL CAVITY/OROPHARYNX:     Lips:  Normal.  Tongue: normal, midline  Mucosa:   no lesions     NECK:  Trachea:  midline.              Thyroid:  normal              Adenopathy:  none        NEURO:   Alert and Oriented     GAIT AND STATION:  normal     RESPIRATORY:   Symmetry and Respiratory effort     PSYCH:  Normal mood and affect     SKIN:   warm and dry         FLEX LARYNGOSCOPY:    After obtaining consent, a flexible laryngoscope is used to examine both nasal cavities, the nasopharynx, pharnx, and larynx.      Nasal cavity: wnl  NP: wnl  Pharnx: wnl  Larynx: wnl      IMPRESSION:    Encounter Diagnosis   Name Primary?    Throat pain Yes          RECOMMENDATIONS:      No orders of the defined types were placed in this encounter.      [unfilled]

## 2024-07-22 NOTE — PATIENT INSTRUCTIONS
Lifestyle changes:    Avoid eating 2-3 hours before bedtime.   You may find it helpful to elevate the head of your bed.     Avoid following foods that are likely to trigger acid reflux:    Coffee or tea (try LOW ACID coffee or herbal tea)  Anything that s fizzy or has caffeine in it  Alcohol   Citrus fruits, such as oranges and latricia  Tomato based foods (salsa, pizza, lasanga)  Chocolate   Mint or peppermint  Fatty foods (ice cream)  Spicy foods  Onions and garlic      Try alkaline water (ph 9.5) instead of regular water      Return visit 3-6 months with hearing test     Health Maintenance Summary     Topic Due On Due Status Completed On Postpone Until Reason    MAMMOGRAM - BREAST CANCER SCREENING Apr 6, 2019 Not Due Apr 6, 2017      Colorectal Cancer Screening - Colonoscopy Jan 1, 2021 Not Due Jan 1, 2011      Osteoporosis Screening  Completed Aug 7, 2017      Immunization-Zoster Dec 26, 2011 Postponed  Aug 17, 2017 Patient Refused    Immunization - Pneumococcal Dec 26, 2016 Postponed  Aug 17, 2017 Patient Refused    Immunization - TDAP Pregnancy  Hidden       Medicare Wellness Visit Dec 26, 2016 Postponed  Aug 17, 2017 Patient Refused    IMMUNIZATION - DTaP/Tdap/Td Apr 29, 2024 Not Due Apr 29, 2014      Immunization-Influenza Sep 1, 2017 Not Due Oct 11, 2016      Hepatitis C Screening Dec 26, 2002 Overdue             Patient is due for topics as listed above, she wishes to decline at this time .

## 2024-07-22 NOTE — NURSING NOTE
Reflux Symptom Index    Within the last MONTH, how did the following problems affect you?  (0 = no problem, 5 = severe problem)    Hoarseness or a problem with your voice: 0  Clearing your throat: 0  Excess throat mucous or postnasal drip: 1  Difficulty swallowing food, liquids, or pills: 0  Coughing after you ate or after lying down: 0  Breathing difficulties or choking episodes: 0  Troublesome or annoying cough: 0  Sensations of something sticking in your throat or a lump in your throat: 5  Heartburn, chest pain, indigestion, or stomach acid coming up: 0    Total: 6

## 2024-07-22 NOTE — LETTER
"7/22/2024      Luan Mitchell  [address] [place] [id number]      Dear Colleague,    Thank you for referring your patient, Luan Mitchell, to the [facility]. Please see a copy of my visit note below.    CHIEF COMPLAINT: Patient presents with:  Consult: Left side pressure in throat that will occasionally radiate up to ear, sensation of something being stuck in throat, This sensation started around Feb, No difficulty swallowing , RSI Total 6         HISTORY OF PRESENT ILLNESS    [facility]  He has been experiencing \"pressure\" in his left throat that started in February.  His symptoms resolved and then returned on July 7th (left ear pressure that radiates to his throat).   Today he has a slight pressure in his left ea and throat.  No pain.   No swallowing issues.  He states he something similar 25 years ago (Dr. Dickerson?) and had imaging done.  He was told he had  a \"normal abnormality\".  He drinks \"sparkling water\" [place] 1x weekly.  Tomato based foods = regular ; Spicy = seldom.   Never smoker.  Retired .  History of hearing aid use in past.       Reflux Symptom Index    Within the last MONTH, how did the following problems affect you?  (0 = no problem, 5 = severe problem)    Hoarseness or a problem with your voice: 0  Clearing your throat: 0  Excess throat mucous or postnasal drip: 1  Difficulty swallowing food, liquids, or pills: 0  Coughing after you ate or after lying down: 0  Breathing difficulties or choking episodes: 0  Troublesome or annoying cough: 0  Sensations of something sticking in your throat or a lump in your throat: 5  Heartburn, chest pain, indigestion, or stomach acid coming up: 0    Total: 6        REVIEW OF SYSTEMS    Review of Systems as per HPI and PMHx, otherwise 10 system review system are negative.       ALLERGIES    Patient has no known allergies.    CURRENT MEDICATIONS      Current Outpatient " Medications:      ARTIFICIAL TEAR SOLUTION OP, Apply to eye daily , Disp: , Rfl:      atorvastatin (LIPITOR) 20 MG tablet, Take 1 tablet (20 mg) by mouth daily, Disp: 100 tablet, Rfl: 4     clobetasol (TEMOVATE) 0.05 % external solution, Apply to scalp daily for itch, Disp: 50 mL, Rfl: 3     finasteride (PROSCAR) 5 MG tablet, Take 1 tablet (5 mg) by mouth daily, Disp: 90 tablet, Rfl: 4     fluocinolone acetonide (DERMA SMOOTHE/FS BODY) 0.01 % external oil, Apply to scalp at night, then sleep overnight with nightcap on, Disp: 118 mL, Rfl: 4     hydrochlorothiazide (HYDRODIURIL) 25 MG tablet, Take 1 tablet (25 mg) by mouth daily, Disp: 100 tablet, Rfl: 4     hydrocortisone butyrate (LOCOID) 0.1 % SOLN, Apply sparingly to affected area(s) of beard twice daily, Disp: 180 mL, Rfl: 3     ketoconazole (NIZORAL) 2 % external shampoo, Three times per week in the shower: Lather into scalp, leave in place for 3-5 minutes, then rinse., Disp: 360 mL, Rfl: 12     levothyroxine (SYNTHROID/LEVOTHROID) 50 MCG tablet, Take 1 tablet (50 mcg) by mouth daily, Disp: 100 tablet, Rfl: 11     Multiple Vitamin (MULTIVITAMIN) per tablet, Take 1 tablet by mouth daily 1 tab daily, Disp: , Rfl:      mycophenolate (GENERIC EQUIVALENT) 500 MG tablet, Take 1 tablet (500 mg) by mouth every morning (before breakfast), Disp: 90 tablet, Rfl: 2     salicyclic acid-sulfur (SEBULEX) 2-2 % external shampoo, Apply topically 3 times daily as needed for itching In the shower., Disp: 200 g, Rfl: 11     tacrolimus (PROTOPIC) 0.1 % external ointment, Apply topically to affected areas as instructed., Disp: 300 g, Rfl: 3     triamcinolone (KENALOG) 0.1 % external ointment, Apply topically 2 times daily For lesions that are more mild, Disp: 453.6 g, Rfl: 1     PAST MEDICAL HISTORY    PAST MEDICAL HISTORY:   Past Medical History:   Diagnosis Date     BPH (benign prostatic hyperplasia)      Essential hypertension 02/2018     Hyperlipidemia LDL goal <130       Pemphigus (H28)     pemphigus foliaceus     Retinal detachment 2009    left eye- corrected with laser     Vitreous detachment 2009    LE       PAST SURGICAL HISTORY    PAST SURGICAL HISTORY:   Past Surgical History:   Procedure Laterality Date     BIOPSY      Prostate     COLONOSCOPY  2014     EYE SURGERY  2009     NO HISTORY OF SURGERY  2014    derm     RETINOPEXY LASER PROPHYLAXIS BREAK(S) OS (LEFT EYE)      2009 - Left eye       FAMILY  HISTORY    FAMILY HISTORY:   Family History   Problem Relation Age of Onset     Heart Disease Mother         CHF d 80     Diabetes Mother         DM2,  age 80     Hypertension Mother      Coronary Artery Disease Mother      Heart Disease Father         CHF d 78     Coronary Artery Disease Father      Heart Disease Paternal Uncle         CHF d 50s     Coronary Artery Disease Other      Cancer No family hx of         no skin cancer     Skin Cancer No family hx of         no skin cancer     Glaucoma No family hx of      Macular Degeneration No family hx of      Melanoma No family hx of        SOCIAL HISTORY    SOCIAL HISTORY:   Social History     Tobacco Use     Smoking status: Never     Smokeless tobacco: Never   Substance Use Topics     Alcohol use: Never        PHYSICAL EXAM    HEAD: Normal appearance and symmetry:  No cutaneous lesions.      NECK:  supple     EARS:    Right:   TM intact nl    LEFT:   TM intact nl     EYES:  EOMI    CN VII/XII:  intact     NOSE:     Dorsum:   straight  Septum:  midline  Mucosa:  moist        ORAL CAVITY/OROPHARYNX:     Lips:  Normal.  Tongue: normal, midline  Mucosa:   no lesions     NECK:  Trachea:  midline.              Thyroid:  normal              Adenopathy:  none        NEURO:   Alert and Oriented     GAIT AND STATION:  normal     RESPIRATORY:   Symmetry and Respiratory effort     PSYCH:  Normal mood and affect     SKIN:   warm and dry         FLEX LARYNGOSCOPY:    After obtaining consent, a flexible laryngoscope is used to examine  both nasal cavities, the nasopharynx, pharnx, and larynx.      Nasal cavity: wnl  NP: wnl  Pharnx: wnl  Larynx: wnl      IMPRESSION:    Encounter Diagnosis   Name Primary?     Throat pain Yes          RECOMMENDATIONS:      No orders of the defined types were placed in this encounter.     [unfilled]       Again, thank you for allowing me to participate in the care of your patient.        Sincerely,        Mook Cabrales MD

## 2024-07-24 ENCOUNTER — ANCILLARY PROCEDURE (OUTPATIENT)
Dept: ULTRASOUND IMAGING | Facility: CLINIC | Age: 79
End: 2024-07-24
Attending: OTOLARYNGOLOGY
Payer: COMMERCIAL

## 2024-07-24 ENCOUNTER — ANCILLARY PROCEDURE (OUTPATIENT)
Dept: BONE DENSITY | Facility: CLINIC | Age: 79
End: 2024-07-24
Attending: INTERNAL MEDICINE
Payer: COMMERCIAL

## 2024-07-24 DIAGNOSIS — R09.A2 FOREIGN BODY SENSATION IN THROAT: ICD-10-CM

## 2024-07-24 DIAGNOSIS — E28.39 ESTROGEN DEFICIENCY: ICD-10-CM

## 2024-07-24 PROCEDURE — 76536 US EXAM OF HEAD AND NECK: CPT | Performed by: RADIOLOGY

## 2024-07-24 PROCEDURE — 77080 DXA BONE DENSITY AXIAL: CPT | Performed by: INTERNAL MEDICINE

## 2024-08-12 ENCOUNTER — LAB (OUTPATIENT)
Dept: LAB | Facility: CLINIC | Age: 79
End: 2024-08-12
Payer: COMMERCIAL

## 2024-08-12 DIAGNOSIS — L10.2 PEMPHIGUS FOLIACEUS (H): ICD-10-CM

## 2024-08-12 LAB
ALBUMIN SERPL BCG-MCNC: 4.4 G/DL (ref 3.5–5.2)
ALP SERPL-CCNC: 40 U/L (ref 40–150)
ALT SERPL W P-5'-P-CCNC: 24 U/L (ref 0–70)
ANION GAP SERPL CALCULATED.3IONS-SCNC: 9 MMOL/L (ref 7–15)
AST SERPL W P-5'-P-CCNC: 26 U/L (ref 0–45)
BILIRUB SERPL-MCNC: 0.6 MG/DL
BUN SERPL-MCNC: 22.1 MG/DL (ref 8–23)
CALCIUM SERPL-MCNC: 9.7 MG/DL (ref 8.8–10.4)
CHLORIDE SERPL-SCNC: 103 MMOL/L (ref 98–107)
CREAT SERPL-MCNC: 1.12 MG/DL (ref 0.67–1.17)
EGFRCR SERPLBLD CKD-EPI 2021: 66 ML/MIN/1.73M2
ERYTHROCYTE [DISTWIDTH] IN BLOOD BY AUTOMATED COUNT: 12.5 % (ref 10–15)
GLUCOSE SERPL-MCNC: 115 MG/DL (ref 70–99)
HCO3 SERPL-SCNC: 30 MMOL/L (ref 22–29)
HCT VFR BLD AUTO: 44 % (ref 40–53)
HGB BLD-MCNC: 15.2 G/DL (ref 13.3–17.7)
MCH RBC QN AUTO: 31.6 PG (ref 26.5–33)
MCHC RBC AUTO-ENTMCNC: 34.5 G/DL (ref 31.5–36.5)
MCV RBC AUTO: 92 FL (ref 78–100)
PLATELET # BLD AUTO: 158 10E3/UL (ref 150–450)
POTASSIUM SERPL-SCNC: 3.8 MMOL/L (ref 3.4–5.3)
PROT SERPL-MCNC: 7 G/DL (ref 6.4–8.3)
RBC # BLD AUTO: 4.81 10E6/UL (ref 4.4–5.9)
SODIUM SERPL-SCNC: 142 MMOL/L (ref 135–145)
WBC # BLD AUTO: 5.6 10E3/UL (ref 4–11)

## 2024-08-12 PROCEDURE — 36415 COLL VENOUS BLD VENIPUNCTURE: CPT | Performed by: PATHOLOGY

## 2024-08-12 PROCEDURE — 85027 COMPLETE CBC AUTOMATED: CPT | Performed by: PATHOLOGY

## 2024-08-12 PROCEDURE — 80053 COMPREHEN METABOLIC PANEL: CPT | Performed by: PATHOLOGY

## 2024-10-07 DIAGNOSIS — L10.2 PEMPHIGUS FOLIACEUS (H): ICD-10-CM

## 2024-10-11 NOTE — TELEPHONE ENCOUNTER
MYCOPHENOLATE 500 MG TABLET       Last Written Prescription Date:  2/9/24  Last Fill Quantity: 90,   # refills: 2  Last Office Visit: 7/12/24 [facility]  Future Office visit:  1/14/25 [facility]    CBC RESULTS:   Recent Labs   Lab Test 08/12/24  1038   WBC 5.6   RBC 4.81   HGB 15.2   HCT 44.0   MCV 92   MCH 31.6   MCHC 34.5   RDW 12.5          Creatinine   Date Value Ref Range Status   08/12/2024 1.12 0.67 - 1.17 mg/dL Final   06/17/2021 0.88 0.66 - 1.25 mg/dL Final   ]    Liver Function Studies -   Recent Labs   Lab Test 08/12/24  1038   PROTTOTAL 7.0   ALBUMIN 4.4   BILITOTAL 0.6   ALKPHOS 40   AST 26   ALT 24       Routing refill request to provider for review/approval because:   MYCOPHENOLATE 500 MG TABLET  not on DERM protocol. Last appt [facility] 7/12/24, next 1/14/25. Labs done 8/12/24

## 2024-10-14 RX ORDER — MYCOPHENOLATE MOFETIL 500 MG/1
500 TABLET ORAL
Qty: 90 TABLET | Refills: 2 | Status: SHIPPED | OUTPATIENT
Start: 2024-10-14

## 2024-10-15 NOTE — PROGRESS NOTES
"Assessment & Plan       Patient medically cleared for hearing aids. He will consider where he wants to get them from.      Problem List Items Addressed This Visit    None  Visit Diagnoses       Sensorineural hearing loss, bilateral    -  Primary                8 minutes spent on the date of the encounter doing chart review, history and exam, documentation and further activities per the note  {     MONA Leonard [facility]    Subjective     HPI-     3 mo f/u left sided throat pressure radiating to ear per NB. Also doing audio @ 2pm for difficulty hearing.     HISTORY OF PRESENT ILLNESS   Luan was seen at the behest of [facility], Devendra VILLALBA.  He has been experiencing \"pressure\" in his left throat that started in February.  His symptoms resolved and then returned on July 7th (left ear pressure that radiates to his throat).   Today he has a slight pressure in his left ea and throat.  No pain.   No swallowing issues.  He states he something similar 25 years ago (Dr. Dickerson?) and had imaging done.  He was told he had  a \"normal abnormality\".  He drinks \"sparkling water\" [place] 1x weekly.  Tomato based foods = regular ; Spicy = seldom.   Never smoker.  Retired .  History of hearing aid use in past. RSI 6    Flex scope was normal. He was started on astelin.  Throat symptoms and ear pressure have resolved though patient reports they have come and gone over the years.   He has never had bouts of sudden HL, vertigo etc.      Audio   8/ 17/ 2015 audio showed moderate SNHL rising to normal hearing sloping to mild SNHL in the right, and moderate rising to normal, sloping to moderate SNHL in the left. Today pt reports his hearing has likely declined. He tried hearing aids but did not really like them or find them helpful. States his father had hearing loss. Denied otalgia, otorrhea, tinnitus, dizziness, aural fullness, hx of ear surgery, and loud noise exposure. RESULTS: " Otoscopy: Clear canals bilat. Tymps: Right: Shallow mobility; Left: Slightly shallow mobility. Reflexes: Present in ipsi conditions, unable to seal for contra. Audio: Moderate, rising to normal, sloping to moderate SNHL bilat. Compared to 8/ 17/ 2015 audio slight decline ( 10- 30 dB) from 3- 8kHz in the right, and slight decline ( 10- 15dB) from 3- 8kHz in the left. Word rec stable bilat.    Review of Systems   ENT as above      Objective    There were no vitals taken for this visit.    Physical Exam   Gen:  Appears well

## 2024-10-24 ENCOUNTER — OFFICE VISIT (OUTPATIENT)
Dept: AUDIOLOGY | Facility: CLINIC | Age: 79
End: 2024-10-24
Payer: COMMERCIAL

## 2024-10-24 ENCOUNTER — OFFICE VISIT (OUTPATIENT)
Dept: OTOLARYNGOLOGY | Facility: CLINIC | Age: 79
End: 2024-10-24
Payer: COMMERCIAL

## 2024-10-24 DIAGNOSIS — H90.3 SENSORINEURAL HEARING LOSS, BILATERAL: Primary | ICD-10-CM

## 2024-10-24 DIAGNOSIS — H91.93 HEARING DIFFICULTY OF BOTH EARS: ICD-10-CM

## 2024-10-24 DIAGNOSIS — H90.3 SENSORINEURAL HEARING LOSS (SNHL) OF BOTH EARS: Primary | ICD-10-CM

## 2024-10-24 PROCEDURE — 92550 TYMPANOMETRY & REFLEX THRESH: CPT | Mod: 52 | Performed by: AUDIOLOGIST

## 2024-10-24 PROCEDURE — 99212 OFFICE O/P EST SF 10 MIN: CPT | Performed by: PHYSICIAN ASSISTANT

## 2024-10-24 PROCEDURE — 92557 COMPREHENSIVE HEARING TEST: CPT | Performed by: AUDIOLOGIST

## 2024-10-24 NOTE — LETTER
"10/24/2024      Luan Mitchell  [address] [address]      Dear Colleague,    Thank you for referring your patient, Luan Mitchell, to the [facility]. Please see a copy of my visit note below.    Assessment & Plan      Patient medically cleared for hearing aids. He will consider where he wants to get them from.      Problem List Items Addressed This Visit    None  Visit Diagnoses       Sensorineural hearing loss, bilateral    -  Primary                8 minutes spent on the date of the encounter doing chart review, history and exam, documentation and further activities per the note  {     MONA Leonard  [facility]    Subjective     HPI-     3 mo f/u left sided throat pressure radiating to ear per NB. Also doing audio @ 2pm for difficulty hearing.     HISTORY OF PRESENT ILLNESS   [facility]  He has been experiencing \"pressure\" in his left throat that started in February.  His symptoms resolved and then returned on July 7th (left ear pressure that radiates to his throat).   Today he has a slight pressure in his left ea and throat.  No pain.   No swallowing issues.  He states he something similar 25 years ago (Dr. Dickerson?) and had imaging done.  He was told he had  a \"normal abnormality\".  He drinks \"sparkling water\" [place] 1x weekly.  Tomato based foods = regular ; Spicy = seldom.   Never smoker.  Retired .  History of hearing aid use in past. RSI 6    Flex scope was normal. He was started on astelin.  Throat symptoms and ear pressure have resolved though patient reports they have come and gone over the years.   He has never had bouts of sudden HL, vertigo etc.      Audio   8/ 17/ 2015 audio showed moderate SNHL rising to normal hearing sloping to mild SNHL in the right, and moderate rising to normal, sloping to moderate SNHL in the left. Today pt reports his hearing has likely " declined. He tried hearing aids but did not really like them or find them helpful. States his father had hearing loss. Denied otalgia, otorrhea, tinnitus, dizziness, aural fullness, hx of ear surgery, and loud noise exposure. RESULTS: Otoscopy: Clear canals bilat. Tymps: Right: Shallow mobility; Left: Slightly shallow mobility. Reflexes: Present in ipsi conditions, unable to seal for contra. Audio: Moderate, rising to normal, sloping to moderate SNHL bilat. Compared to 8/ 17/ 2015 audio slight decline ( 10- 30 dB) from 3- 8kHz in the right, and slight decline ( 10- 15dB) from 3- 8kHz in the left. Word rec stable bilat.    Review of Systems   ENT as above      Objective    There were no vitals taken for this visit.    Physical Exam   Gen:  Appears well             Again, thank you for allowing me to participate in the care of your patient.        Sincerely,        MONA Leonard

## 2024-10-24 NOTE — PROGRESS NOTES
AUDIOLOGY REPORT    SUMMARY: Audiology visit completed. See audiogram for results.      RECOMMENDATIONS: Follow-up with ENT.    Anshul Shrestha, CCC-A  [place] Licensed Audiologist #[id number]

## 2024-10-31 ENCOUNTER — OFFICE VISIT (OUTPATIENT)
Dept: OPHTHALMOLOGY | Facility: CLINIC | Age: 79
End: 2024-10-31
Attending: OPHTHALMOLOGY
Payer: COMMERCIAL

## 2024-10-31 DIAGNOSIS — H52.13 MYOPIA OF BOTH EYES: ICD-10-CM

## 2024-10-31 DIAGNOSIS — H33.22 OLD RETINAL DETACHMENT OF LEFT EYE: Primary | ICD-10-CM

## 2024-10-31 DIAGNOSIS — H25.13 AGE-RELATED NUCLEAR CATARACT OF BOTH EYES: ICD-10-CM

## 2024-10-31 PROCEDURE — 99213 OFFICE O/P EST LOW 20 MIN: CPT

## 2024-10-31 PROCEDURE — 92015 DETERMINE REFRACTIVE STATE: CPT

## 2024-10-31 PROCEDURE — G0463 HOSPITAL OUTPT CLINIC VISIT: HCPCS

## 2024-10-31 ASSESSMENT — ANXIETY QUESTIONNAIRES: GAD7 TOTAL SCORE: 3

## 2024-10-31 ASSESSMENT — CUP TO DISC RATIO
OD_RATIO: 0.3
OS_RATIO: 0.3

## 2024-10-31 ASSESSMENT — REFRACTION_WEARINGRX
OD_SPHERE: -1.00
SPECS_TYPE: BIFOCAL
OD_AXIS: 163
OS_SPHERE: -1.75
OS_ADD: +2.50
OD_CYLINDER: +0.50
OD_ADD: +2.50
OS_AXIS: 021
OS_CYLINDER: +0.75

## 2024-10-31 ASSESSMENT — SLIT LAMP EXAM - LIDS
COMMENTS: 1+ BLEPHARITIS
COMMENTS: 1+ BLEPHARITIS

## 2024-10-31 ASSESSMENT — VISUAL ACUITY
METHOD: SNELLEN - LINEAR
OD_CC: 20/50
OS_CC: 20/20
OD_CC+: +2
OD_PH_CC: 20/25

## 2024-10-31 ASSESSMENT — TONOMETRY
OD_IOP_MMHG: 16
IOP_METHOD: TONOPEN
OS_IOP_MMHG: 14

## 2024-10-31 ASSESSMENT — REFRACTION_MANIFEST
OD_AXIS: 170
OS_ADD: +2.50
OS_CYLINDER: +0.75
OD_CYLINDER: +0.50
OS_SPHERE: -1.25
OS_AXIS: 020
OD_SPHERE: -1.00
OD_ADD: +2.50

## 2024-10-31 ASSESSMENT — CONF VISUAL FIELD
OS_NORMAL: 1
OD_INFERIOR_NASAL_RESTRICTION: 0
OS_INFERIOR_TEMPORAL_RESTRICTION: 0
OD_INFERIOR_TEMPORAL_RESTRICTION: 0
OS_INFERIOR_NASAL_RESTRICTION: 0
OS_SUPERIOR_NASAL_RESTRICTION: 0
OD_SUPERIOR_TEMPORAL_RESTRICTION: 0
OD_SUPERIOR_NASAL_RESTRICTION: 0
OS_SUPERIOR_TEMPORAL_RESTRICTION: 0
OD_NORMAL: 1

## 2024-10-31 NOTE — NURSING NOTE
Chief Complaints and History of Present Illnesses   Patient presents with    Cataract Follow Up     Annual visit for Nuclear sclerotic cataract  Both eyes     Chief Complaint(s) and History of Present Illness(es)       Cataract Follow Up              Laterality: both eyes    Associated symptoms: glare and starburst.  Negative for double vision and a need for brighter lights    Comments: Annual visit for Nuclear sclerotic cataract  Both eyes              Comments    Patient reports no change to vision since last visit.   Patient denies flashes/new floaters and pain in eyes.  Patient reports noticing bruise on left upper lid/brow when he presented for visit.    Ocular Meds:  Refresh AT's PRN   No DM.  +Pre-DM.  Last BS was 115.    Lab Results       Component                Value               Date                       A1C                      5.8                 03/14/2024                 A1C                      5.7                 03/21/2023                 A1C                      5.7                 03/14/2022                 A1C                      5.5                 09/09/2020                 A1C                      5.4                 02/16/2017              Eileen PRUETT 12:08 PM October 31, 2024

## 2024-10-31 NOTE — PROGRESS NOTES
CC: blurred     HPI: Patient reports no change to vision since last visit. Patient denies flashes/new floaters and pain in eyes. Patient reports noticing bruise on left upper lid/brow when he presented for visit.     PMHx:   HTN  DL      Imaging:    OCT: 10/31/2024  Right eye:   Left eye:     Retina Laser procedures:  Laser retinopexy OS    Intravitreal injections:  none    Assessment/ Plan: 10/31/2024       # Nuclear sclerotic cataract  Both eyes   - Not visually significant  - refract as needed, new prescription glasses issued today   - monitor yearly        # Hx of  Operculated hole Left Eye s/p retinopexy    - Retinal Detachment precautions reviewed  - Observe      # History of Retinal Detachment Repair, left eye - 2009     # Refractive error, and presbyopia   - Updated Mrx given today   - follow-up yearly       Enrike Lyn MD     [facility]  [facility]     Attending Physician Attestation:  Complete documentation of historical and exam elements from today's encounter can be found in the full encounter summary report (not reduplicated in this progress note).  I personally obtained the chief complaint(s) and history of present illness.  I confirmed and edited as necessary the review of systems, past medical/surgical history, family history, social history, and examination findings as documented by others; and I examined the patient myself.  I personally reviewed the relevant tests, images, and reports as documented above.  I formulated and edited as necessary the assessment and plan and discussed the findings and management plan with the patient and family. Enrike Lyn MD

## 2024-11-11 ENCOUNTER — LAB (OUTPATIENT)
Dept: LAB | Facility: CLINIC | Age: 79
End: 2024-11-11
Payer: COMMERCIAL

## 2024-11-11 DIAGNOSIS — L10.2 PEMPHIGUS FOLIACEUS (H): ICD-10-CM

## 2024-11-11 LAB
ALBUMIN SERPL BCG-MCNC: 4.6 G/DL (ref 3.5–5.2)
ALP SERPL-CCNC: 44 U/L (ref 40–150)
ALT SERPL W P-5'-P-CCNC: 42 U/L (ref 0–70)
ANION GAP SERPL CALCULATED.3IONS-SCNC: 10 MMOL/L (ref 7–15)
AST SERPL W P-5'-P-CCNC: 39 U/L (ref 0–45)
BILIRUB SERPL-MCNC: 0.9 MG/DL
BUN SERPL-MCNC: 24 MG/DL (ref 8–23)
CALCIUM SERPL-MCNC: 10 MG/DL (ref 8.8–10.4)
CHLORIDE SERPL-SCNC: 101 MMOL/L (ref 98–107)
CREAT SERPL-MCNC: 1.06 MG/DL (ref 0.67–1.17)
EGFRCR SERPLBLD CKD-EPI 2021: 71 ML/MIN/1.73M2
ERYTHROCYTE [DISTWIDTH] IN BLOOD BY AUTOMATED COUNT: 12.5 % (ref 10–15)
GLUCOSE SERPL-MCNC: 117 MG/DL (ref 70–99)
HCO3 SERPL-SCNC: 30 MMOL/L (ref 22–29)
HCT VFR BLD AUTO: 47 % (ref 40–53)
HGB BLD-MCNC: 16.1 G/DL (ref 13.3–17.7)
MCH RBC QN AUTO: 31.9 PG (ref 26.5–33)
MCHC RBC AUTO-ENTMCNC: 34.3 G/DL (ref 31.5–36.5)
MCV RBC AUTO: 93 FL (ref 78–100)
PLATELET # BLD AUTO: 177 10E3/UL (ref 150–450)
POTASSIUM SERPL-SCNC: 4 MMOL/L (ref 3.4–5.3)
PROT SERPL-MCNC: 7.2 G/DL (ref 6.4–8.3)
RBC # BLD AUTO: 5.04 10E6/UL (ref 4.4–5.9)
SODIUM SERPL-SCNC: 141 MMOL/L (ref 135–145)
WBC # BLD AUTO: 6.7 10E3/UL (ref 4–11)

## 2024-11-11 PROCEDURE — 36415 COLL VENOUS BLD VENIPUNCTURE: CPT

## 2024-11-11 PROCEDURE — 80053 COMPREHEN METABOLIC PANEL: CPT

## 2024-11-11 PROCEDURE — 85027 COMPLETE CBC AUTOMATED: CPT

## 2025-01-13 NOTE — PROGRESS NOTES
"[facility] Dermatology Note  Encounter Date: Jan 14, 2025  Office Visit     Reviewed patients past medical history and pertinent chart review prior to patients visit today.     Dermatology Problem List:  #  Pemphigus foliaceus  - s/p shave bx 6/17/21 (L superior shoulder) and 7/26/10 (L arm)  - Last pemphigus panel (3/25/21): Dsg1 IgG 136 and Dsg2 IgG 2  - Goals of treatment per Mr. Mitchell:  \"Prevent itching and secondary infections. Not interested in complete clearance\"  - Current Tx: MMF 500mg daily              - stopped MMF 3/2019, re-started  mg PO BID on 8/13/20 but reduced to 500 mg every day on 10/21/20              - safety labs: CBC, CMP 11/11/2024  - Adjunct Tx: clobetasol oint, triamcinolone 0.1%, tacrolimus ointment, clobetasol solution to scalp, salicylic acid shampoo to scalp, ketoconazole shampoo to scalp, fluocinolone oil to scalp, Head and Shoulders Shampoo  # Tinea pedis  - Current Tx:  Terbinafine 1% cream  # Benign Bx  - VK, R calf, s/p shave bx 3/25/21  - mild DN, R neck, s/p shave bx 3/22/18    ____________________________________________    Assessment & Plan:     # Pemphigus foliaceous   - Chronic, stable on mycophenolate 500 mg daily.   - Safety labs: CBC, CMP 11/11/2024, next draw 2/25 orders placed. Will plan for q3 month labs.   -  Ketoconazole refilled.     # Seborrheic keratoses, left posterior shoulder  - We discussed the benign nature of the skin lesions. No treatment is required. I recommend continued observation with follow up should any concerning changes arise.     Follow up in 6 months.     All risks, benefits and alternatives were discussed with patient.  Patient is in agreement and understands the assessment and plan.  All questions were answered.  Stephanie Maldonado PA-C  [facility] Dermatology  _______________________________________    CC: Skin Check (PT reports primary dx has been stable with no significant changes. PT has a skin tag on left " neck/shoulder area that causes discomfort during changing. )    HPI:  Mr. Luan Mitchell is a(n) 79 year old male who presents today as a return patient for recheck of pemphigus foliaceous. The patient reports he has toney stable. Earlier this week he noticed an itchy rough spot on the left shoulder. Patient is otherwise feeling well, without additional skin concerns.      Physical Exam:  SKIN: Focused examination of scalp, face, back, chest, abdomen, arms and legs was performed.  - Pink plaques with scale scattered on the back, scalp, left cheek, and abdomen.   - Waxy, stuck on appearing papule(s) throughout exam, consistent with seborrheic keratoses.     - No other lesions of concern on areas examined.     Medications:  Current Outpatient Medications   Medication Sig Dispense Refill    ARTIFICIAL TEAR SOLUTION OP Apply to eye daily       atorvastatin (LIPITOR) 20 MG tablet Take 1 tablet (20 mg) by mouth daily 100 tablet 4    azelastine (ASTELIN) 0.1 % nasal spray 2 sprays each nostril 1-2x daily as needed for nasal congestion (use nightly for first 2 week) 30 mL 11    clobetasol (TEMOVATE) 0.05 % external solution Apply to scalp daily for itch 50 mL 3    finasteride (PROSCAR) 5 MG tablet Take 1 tablet (5 mg) by mouth daily 90 tablet 4    fluocinolone acetonide (DERMA SMOOTHE/FS BODY) 0.01 % external oil Apply to scalp at night, then sleep overnight with nightcap on 118 mL 4    hydrochlorothiazide (HYDRODIURIL) 25 MG tablet Take 1 tablet (25 mg) by mouth daily 100 tablet 4    hydrocortisone butyrate (LOCOID) 0.1 % SOLN Apply sparingly to affected area(s) of beard twice daily 180 mL 3    ketoconazole (NIZORAL) 2 % external shampoo Three times per week in the shower: Lather into scalp, leave in place for 3-5 minutes, then rinse. 360 mL 12    levothyroxine (SYNTHROID/LEVOTHROID) 50 MCG tablet Take 1 tablet (50 mcg) by mouth daily 100 tablet 11    Multiple Vitamin (MULTIVITAMIN) per tablet Take 1 tablet by mouth daily 1  tab daily      mycophenolate (GENERIC EQUIVALENT) 500 MG tablet TAKE 1 TABLET EVERY MORNING BEFORE BREAKFAST 90 tablet 2    salicyclic acid-sulfur (SEBULEX) 2-2 % external shampoo Apply topically 3 times daily as needed for itching In the shower. 200 g 11    tacrolimus (PROTOPIC) 0.1 % external ointment Apply topically to affected areas as instructed. 300 g 3    triamcinolone (KENALOG) 0.1 % external ointment Apply topically 2 times daily For lesions that are more mild 453.6 g 1     No current facility-administered medications for this visit.      Past Medical History:   Patient Active Problem List   Diagnosis    Pemphigus foliaceus (H)    Hyperlipidemia LDL goal <130    BPH (benign prostatic hyperplasia)    Encounter for long-term (current) use of high-risk medication    Dermatitis, seborrheic    Lightheadedness    Age-related nuclear cataract of both eyes    SOB (shortness of breath)    Muscle weakness (generalized)    Hypertensive heart disease with heart failure (H)    Solar purpura    Myopia of both eyes    Old retinal detachment of left eye     Past Medical History:   Diagnosis Date    BPH (benign prostatic hyperplasia)     Essential hypertension 02/2018    Hyperlipidemia LDL goal <130     Pemphigus (H)     pemphigus foliaceus    Retinal detachment 2009    left eye- corrected with laser    Vitreous detachment 2009    STEPHANIE       CC Stephanie Maldonado PA-C  [facility]  [address] [place] [phone] on close of this encounter.

## 2025-01-14 ENCOUNTER — OFFICE VISIT (OUTPATIENT)
Dept: DERMATOLOGY | Facility: CLINIC | Age: 80
End: 2025-01-14
Attending: PHYSICIAN ASSISTANT
Payer: COMMERCIAL

## 2025-01-14 DIAGNOSIS — L10.2 PEMPHIGUS FOLIACEUS (H): ICD-10-CM

## 2025-01-14 DIAGNOSIS — L82.1 SEBORRHEIC KERATOSES: Primary | ICD-10-CM

## 2025-01-14 RX ORDER — KETOCONAZOLE 20 MG/ML
SHAMPOO, SUSPENSION TOPICAL
Qty: 720 ML | Refills: 11 | Status: SHIPPED | OUTPATIENT
Start: 2025-01-15

## 2025-01-14 ASSESSMENT — PAIN SCALES - GENERAL: PAINLEVEL_OUTOF10: NO PAIN (0)

## 2025-01-14 NOTE — LETTER
"1/14/2025       RE: Luan Mitchell  [address] [id number]     Dear Colleague,    Thank you for referring your patient, Luan Mitchell, to the [facility] [place] at [facility]. Please see a copy of my visit note below.    [facility] Dermatology Note  Encounter Date: Jan 14, 2025  Office Visit     Reviewed patients past medical history and pertinent chart review prior to patients visit today.     Dermatology Problem List:  #  Pemphigus foliaceus  - s/p shave bx 6/17/21 (L superior shoulder) and 7/26/10 (L arm)  - Last pemphigus panel (3/25/21): Dsg1 IgG 136 and Dsg2 IgG 2  - Goals of treatment per Mr. Mitchell:  \"Prevent itching and secondary infections. Not interested in complete clearance\"  - Current Tx: MMF 500mg daily              - stopped MMF 3/2019, re-started  mg PO BID on 8/13/20 but reduced to 500 mg every day on 10/21/20              - safety labs: CBC, CMP 11/11/2024  - Adjunct Tx: clobetasol oint, triamcinolone 0.1%, tacrolimus ointment, clobetasol solution to scalp, salicylic acid shampoo to scalp, ketoconazole shampoo to scalp, fluocinolone oil to scalp, Head and Shoulders Shampoo  # Tinea pedis  - Current Tx:  Terbinafine 1% cream  # Benign Bx  - VK, R calf, s/p shave bx 3/25/21  - mild DN, R neck, s/p shave bx 3/22/18    ____________________________________________    Assessment & Plan:     # Pemphigus foliaceous   - Chronic, stable on mycophenolate 500 mg daily.   - Safety labs: CBC, CMP 11/11/2024, next draw 2/25 orders placed. Will plan for q3 month labs.   -  Ketoconazole refilled.     # Seborrheic keratoses, left posterior shoulder  - We discussed the benign nature of the skin lesions. No treatment is required. I recommend continued observation with follow up should any concerning changes arise.     Follow up in 6 months.     All risks, benefits and alternatives were discussed " with patient.  Patient is in agreement and understands the assessment and plan.  All questions were answered.  Stephanie Maldonado PA-C  [facility] Dermatology  _______________________________________    CC: Skin Check (PT reports primary dx has been stable with no significant changes. PT has a skin tag on left neck/shoulder area that causes discomfort during changing. )    HPI:  Mr. Luan Mitchell is a(n) 79 year old male who presents today as a return patient for recheck of pemphigus foliaceous. The patient reports he has toney stable. Earlier this week he noticed an itchy rough spot on the left shoulder. Patient is otherwise feeling well, without additional skin concerns.      Physical Exam:  SKIN: Focused examination of scalp, face, back, chest, abdomen, arms and legs was performed.  - Pink plaques with scale scattered on the back, scalp, left cheek, and abdomen.   - Waxy, stuck on appearing papule(s) throughout exam, consistent with seborrheic keratoses.     - No other lesions of concern on areas examined.     Medications:  Current Outpatient Medications   Medication Sig Dispense Refill     ARTIFICIAL TEAR SOLUTION OP Apply to eye daily        atorvastatin (LIPITOR) 20 MG tablet Take 1 tablet (20 mg) by mouth daily 100 tablet 4     azelastine (ASTELIN) 0.1 % nasal spray 2 sprays each nostril 1-2x daily as needed for nasal congestion (use nightly for first 2 week) 30 mL 11     clobetasol (TEMOVATE) 0.05 % external solution Apply to scalp daily for itch 50 mL 3     finasteride (PROSCAR) 5 MG tablet Take 1 tablet (5 mg) by mouth daily 90 tablet 4     fluocinolone acetonide (DERMA SMOOTHE/FS BODY) 0.01 % external oil Apply to scalp at night, then sleep overnight with nightcap on 118 mL 4     hydrochlorothiazide (HYDRODIURIL) 25 MG tablet Take 1 tablet (25 mg) by mouth daily 100 tablet 4     hydrocortisone butyrate (LOCOID) 0.1 % SOLN Apply sparingly to affected area(s) of beard twice daily 180 mL 3     ketoconazole  (NIZORAL) 2 % external shampoo Three times per week in the shower: Lather into scalp, leave in place for 3-5 minutes, then rinse. 360 mL 12     levothyroxine (SYNTHROID/LEVOTHROID) 50 MCG tablet Take 1 tablet (50 mcg) by mouth daily 100 tablet 11     Multiple Vitamin (MULTIVITAMIN) per tablet Take 1 tablet by mouth daily 1 tab daily       mycophenolate (GENERIC EQUIVALENT) 500 MG tablet TAKE 1 TABLET EVERY MORNING BEFORE BREAKFAST 90 tablet 2     salicyclic acid-sulfur (SEBULEX) 2-2 % external shampoo Apply topically 3 times daily as needed for itching In the shower. 200 g 11     tacrolimus (PROTOPIC) 0.1 % external ointment Apply topically to affected areas as instructed. 300 g 3     triamcinolone (KENALOG) 0.1 % external ointment Apply topically 2 times daily For lesions that are more mild 453.6 g 1     No current facility-administered medications for this visit.      Past Medical History:   Patient Active Problem List   Diagnosis     Pemphigus foliaceus (H)     Hyperlipidemia LDL goal <130     BPH (benign prostatic hyperplasia)     Encounter for long-term (current) use of high-risk medication     Dermatitis, seborrheic     Lightheadedness     Age-related nuclear cataract of both eyes     SOB (shortness of breath)     Muscle weakness (generalized)     Hypertensive heart disease with heart failure (H)     Solar purpura     Myopia of both eyes     Old retinal detachment of left eye     Past Medical History:   Diagnosis Date     BPH (benign prostatic hyperplasia)      Essential hypertension 02/2018     Hyperlipidemia LDL goal <130      Pemphigus (H)     pemphigus foliaceus     Retinal detachment 2009    left eye- corrected with laser     Vitreous detachment 2009    LE       CC Stephanie Maldonado PA-C  [facility]  [address] [place] [phone] on close of this encounter.       Again, thank you for allowing me to participate in the care of your patient.      Sincerely,    Stephanie Maldonado,  JOSE RAFAEL

## 2025-01-14 NOTE — NURSING NOTE
Dermatology Rooming Note    Luan Mitchell's goals for this visit include:   Chief Complaint   Patient presents with    Skin Check     PT reports primary dx has been stable with no significant changes. PT has a skin tag on left neck/shoulder area that causes discomfort during changing.      Nila Wheat - EMT

## 2025-01-14 NOTE — PATIENT INSTRUCTIONS
Patient Education        Proper skin care from [facility]:     -Eliminate harsh soaps as they strip the natural oils from the skin, often resulting in dry itchy skin ( i.e. Dial, Zest, Mongolian Spring)  -Use mild soaps such as Cetaphil or Dove Sensitive Skin in the shower. You do not need to use soap on arms, legs, and trunk every time you shower unless visibly soiled.   -Avoid hot or cold showers.  -After showering, lightly dry off and apply moisturizing within 2-3 minutes. This will help trap moisture in the skin.   -Aggressive use of a moisturizer at least 1-2 times a day to the entire body (including -Vanicream, Cetaphil, Aquaphor or Cerave) and moisturize hands after every washing.  -We recommend using moisturizers that come in a tub that needs to be scooped out, not a pump. This has more of an oil base. It will hold moisture in your skin much better than a water base moisturizer. The above recommended are non-pore clogging.        Wear a sunscreen with at least SPF 30 on your face, ears, neck and V of the chest daily. Wear sunscreen on other areas of the body if those areas are exposed to the sun throughout the day. Sunscreens can contain physical and/or chemical blockers. Physical blockers are less likely to clog pores, these include zinc oxide and titanium dioxide. Reapply every two hour and after swimming.      Sunscreen examples: https://www.ewg.org/sunscreen/     UV radiation  UVA radiation remains constant throughout the day and throughout the year. It is a longer wavelength than UVB and therefore penetrates deeper into the skin leading to immediate and delayed tanning, photoaging, and skin cancer. 70-80% of UVA and UVB radiation occurs between the hours of 10am-2pm.  UVB radiation  UVB radiation causes the most harmful effects and is more significant during the summer months. However, snow and ice can reflect UVB radiation leading to skin damage during the winter months as well. UVB radiation is  responsible for tanning, burning, inflammation, delayed erythema (pinkness), pigmentation (brown spots), and skin cancer.      I recommend self monthly full body exams and yearly full body exams with a dermatology provider. If you develop a new or changing lesion please follow up for examination. Most skin cancers are pink and scaly or pink and pearly. However, we do see blue/brown/black skin cancers.  Consider the ABCDEs of melanoma when giving yourself your monthly full body exam ( don't forget the groin, buttocks, feet, toes, etc). A-asymmetry, B-borders, C-color, D-diameter, E-elevation or evolving. If you see any of these changes please follow up in clinic. If you cannot see your back I recommend purchasing a hand held mirror to use with a larger wall mirror.       Checking for Skin Cancer  You can find cancer early by checking your skin each month. There are 3 kinds of skin cancer. They are melanoma, basal cell carcinoma, and squamous cell carcinoma. Doing monthly skin checks is the best way to find new marks or skin changes. Follow the instructions below for checking your skin.   The ABCDEs of checking moles for melanoma   Check your moles or growths for signs of melanoma using ABCDE:   Asymmetry: the sides of the mole or growth don t match  Border: the edges are ragged, notched, or blurred  Color: the color within the mole or growth varies  Diameter: the mole or growth is larger than 6 mm (size of a pencil eraser)  Evolving: the size, shape, or color of the mole or growth is changing (evolving is not shown in the images below)    Checking for other types of skin cancer  Basal cell carcinoma or squamous cell carcinoma have symptoms such as:      A spot or mole that looks different from all other marks on your skin  Changes in how an area feels, such as itching, tenderness, or pain  Changes in the skin's surface, such as oozing, bleeding, or scaliness  A sore that does not heal  New swelling or redness beyond  the border of a mole     Who s at risk?  Anyone can get skin cancer. But you are at greater risk if you have:   Fair skin, light-colored hair, or light-colored eyes  Many moles or abnormal moles on your skin  A history of sunburns from sunlight or tanning beds  A family history of skin cancer  A history of exposure to radiation or chemicals  A weakened immune system  If you have had skin cancer in the past, you are at risk for recurring skin cancer.   How to check your skin  Do your monthly skin checkups in front of a full-length mirror. Check all parts of your body, including your:   Head (ears, face, neck, and scalp)  Torso (front, back, and sides)  Arms (tops, undersides, upper, and lower armpits)  Hands (palms, backs, and fingers, including under the nails)  Buttocks and genitals  Legs (front, back, and sides)  Feet (tops, soles, toes, including under the nails, and between toes)  If you have a lot of moles, take digital photos of them each month. Make sure to take photos both up close and from a distance. These can help you see if any moles change over time.   Most skin changes are not cancer. But if you see any changes in your skin, call your doctor right away. Only he or she can diagnose a problem. If you have skin cancer, seeing your doctor can be the first step toward getting the treatment that could save your life.   [employer] last reviewed this educational content on 4/1/2019 2000-2020 [employer]. [address] [place] [address], [place] [address]. All rights reserved. This information is not intended as a substitute for professional medical care. Always follow your healthcare professional's instructions.        When should I call my doctor?  If you are worsening or not improving, please, contact us or seek urgent care as noted below.      Who should I call with questions (adults)?  [facility] (adult and pediatric): [phone]  [facility]  [place] (adult): [phone]  [facility] ([place], [place], [place] and [place]) [phone]  For urgent needs outside of business hours call the [facility] at [phone] and ask for the dermatology resident on call to be paged  If this is a medical emergency and you are unable to reach an ER, Call 911        If you need a prescription refill, please contact your pharmacy. Refills are approved or denied by our Physicians during normal business hours, Monday through Fridays  Per office policy, refills will not be granted if you have not been seen within the past year (or sooner depending on your child's condition)

## 2025-02-14 ENCOUNTER — LAB (OUTPATIENT)
Dept: LAB | Facility: CLINIC | Age: 80
End: 2025-02-14
Payer: COMMERCIAL

## 2025-02-14 DIAGNOSIS — L10.2 PEMPHIGUS FOLIACEUS (H): ICD-10-CM

## 2025-02-14 LAB
ALBUMIN SERPL BCG-MCNC: 4.4 G/DL (ref 3.5–5.2)
ALP SERPL-CCNC: 40 U/L (ref 40–150)
ALT SERPL W P-5'-P-CCNC: 42 U/L (ref 0–70)
ANION GAP SERPL CALCULATED.3IONS-SCNC: 9 MMOL/L (ref 7–15)
AST SERPL W P-5'-P-CCNC: 39 U/L (ref 0–45)
BILIRUB SERPL-MCNC: 0.5 MG/DL
BUN SERPL-MCNC: 20 MG/DL (ref 8–23)
CALCIUM SERPL-MCNC: 9.5 MG/DL (ref 8.8–10.4)
CHLORIDE SERPL-SCNC: 104 MMOL/L (ref 98–107)
CREAT SERPL-MCNC: 1.06 MG/DL (ref 0.67–1.17)
EGFRCR SERPLBLD CKD-EPI 2021: 71 ML/MIN/1.73M2
ERYTHROCYTE [DISTWIDTH] IN BLOOD BY AUTOMATED COUNT: 12.3 % (ref 10–15)
GLUCOSE SERPL-MCNC: 95 MG/DL (ref 70–99)
HCO3 SERPL-SCNC: 30 MMOL/L (ref 22–29)
HCT VFR BLD AUTO: 43 % (ref 40–53)
HGB BLD-MCNC: 15.2 G/DL (ref 13.3–17.7)
MCH RBC QN AUTO: 32.3 PG (ref 26.5–33)
MCHC RBC AUTO-ENTMCNC: 35.3 G/DL (ref 31.5–36.5)
MCV RBC AUTO: 91 FL (ref 78–100)
PLATELET # BLD AUTO: 153 10E3/UL (ref 150–450)
POTASSIUM SERPL-SCNC: 4.1 MMOL/L (ref 3.4–5.3)
PROT SERPL-MCNC: 7 G/DL (ref 6.4–8.3)
RBC # BLD AUTO: 4.71 10E6/UL (ref 4.4–5.9)
SODIUM SERPL-SCNC: 143 MMOL/L (ref 135–145)
WBC # BLD AUTO: 5.5 10E3/UL (ref 4–11)

## 2025-02-14 PROCEDURE — 36415 COLL VENOUS BLD VENIPUNCTURE: CPT | Performed by: PATHOLOGY

## 2025-02-14 PROCEDURE — 80053 COMPREHEN METABOLIC PANEL: CPT | Performed by: PATHOLOGY

## 2025-02-14 PROCEDURE — 85027 COMPLETE CBC AUTOMATED: CPT | Performed by: PATHOLOGY

## 2025-02-25 ENCOUNTER — PRE VISIT (OUTPATIENT)
Dept: UROLOGY | Facility: CLINIC | Age: 80
End: 2025-02-25
Payer: COMMERCIAL

## 2025-02-25 NOTE — TELEPHONE ENCOUNTER
Reason for visit: PSA result     Relevant information: BPH with LUTS,     Records/imaging/labs/orders: PSA requested for 2/26/25, PSA done on 3/4/24    At Rooming:  Standard rooming      Leoncio Coleman  2/25/2025  11:57 AM

## 2025-02-26 ENCOUNTER — LAB (OUTPATIENT)
Dept: LAB | Facility: CLINIC | Age: 80
End: 2025-02-26
Payer: COMMERCIAL

## 2025-02-26 DIAGNOSIS — N40.1 BENIGN PROSTATIC HYPERPLASIA WITH LOWER URINARY TRACT SYMPTOMS, SYMPTOM DETAILS UNSPECIFIED: ICD-10-CM

## 2025-02-26 LAB — PSA SERPL DL<=0.01 NG/ML-MCNC: 2.05 NG/ML

## 2025-02-26 PROCEDURE — 36415 COLL VENOUS BLD VENIPUNCTURE: CPT | Performed by: PATHOLOGY

## 2025-02-26 PROCEDURE — 84153 ASSAY OF PSA TOTAL: CPT | Performed by: PATHOLOGY

## 2025-02-27 NOTE — PROGRESS NOTES
Subjective     REASON FOR VISIT  PSA and lower urinary tract symptoms follow-up     HISTORY OF PRESENT ILLNESS  Mr. Mitchell is a pleasant 79 year old male when speaking with today in follow-up for his longstanding history of bothersome irritative voiding symptoms and his prostate cancer screening.  His urologic history is significant for elevated PSA dating back to 1996 with 4 previous prostate biopsies in the distant past all of which being negative.  Currently is on finasteride, and has had stable PSAs for many years.  Most recent intervention was a prostate MRI in July 2018 showing PI-RADS 3 lesion, but shared decision making was used to defer a biopsy given the incredibly reassuring and stable PSAs.    From a urinary standpoint, his main bothersome symptoms have been some urinary frequency, however given the stability and lack of quality of life impact, he is preferred to just remain on finasteride with no other interventions.    Today:  Overall feels his situation is once again stable from last year  Still would like to continue with PSA checked every year, and would like a digital rectal exam today    Objective     PHYSICAL EXAMINATION  General: Alert, oriented, no acute distress  : Firm, smooth, and symmetric prostate without any evidence of nodularity, induration, or pain to palpation.  Edges of the prostate easily palpable with a palpable median sulcus    LABS  Lab Results   Component Value Date    PSA 2.05 02/26/2025    PSA 1.98 03/04/2024    PSA 1.90 09/05/2023    PSA 2.28 02/27/2023    PSA 2.09 08/24/2022    PSA 2.29 03/14/2022    PSA 1.95 03/16/2021    PSA 1.91 06/12/2020    PSA 1.86 06/24/2019    PSA 1.82 03/14/2019    PSA 2.05 09/25/2018    PSA 2.10 06/21/2018    PSA 2.42 02/05/2018    PSA 2.09 01/10/2017    PSA 1.96 02/14/2011    PSA 2.47 02/25/2010    PSA 2.11 02/27/2009    PSA 1.77 03/28/2008    PSA 2.43 01/23/2007    PSA 1.86 03/02/2006      IMAGING  Prostate MRI from 7/6/2018     Narrative &  Impression   MRI PROSTATE:  7/6/2018 2:48 PM     CLINICAL HISTORY: nodule left apex; Prostate nodule.  PSA of 2.1  06/21/2018.      Comparison: MR pelvis 1/27/2006.     TECHNIQUE:      The following sequences were obtained: High-resolution axial  T2-weighted, coronal T2-weighted, 3D volumetric T2-weighted, axial  pre-contrast T1, axial diffusion-weighted, axial apparent diffusion  coefficient and axial dynamic contrast-enhanced T1. Postcontrast  images were evaluated on a separate workstation to evaluate dynamic  contrast enhancement.  Contrast dose: 7.5mL Gadavist     The images are interpreted according to PI-RADS v.2     FINDINGS:  Prostate gland size: 4.2 x 5.4 x 5.4 cm  Volume: 64 g     Peripheral zone: Linear hypointensity within distinct margins. Focal  abnormality on ADC or diffusion weighted imaging.     PI-RADS:  Peripheral zone T2: 2  Diffusion-weighted image: 1    Contrast-enhanced images: Negative     Overall assessment: 1     Transitional zone: In the left anterior mid gland is an ill-defined  mildly heterogeneous 1.3 x 1.3 cm area of T2 intermediate signal from  2-3 o'clock (series 8, image 20/series 10, image 17/series 11, image  17) with indistinct hypointensity on ADC. Regional enhancement which  is isointense into adjacent BPH nodules. There is diffuse hypertrophy  of the central gland with BPH type changes.      PI-RADS:  Transition zone T2: 3  Diffusion-weighted image: 3  Contrast-enhanced images: Negative     Overall assessment: 3     Remainder of the pelvis:  No significant pelvic lymphadenopathy. No  concerning osseous lesion. Colonic diverticulosis without convincing  evidence of active inflammation. Bladder is partially decompressed. No  focal lesion.                                                                       IMPRESSION:   1. Based on the most suspicious abnormality, this exam is  characterized as PIRADS 3 - Intermediate probability.  The presence of  clinically significant  cancer is equivocal.  The most suspicious  abnormality is a ill-defined 1.3 cm region in the left mid gland  transitional zone from 2-3 o'clock, and there is no evidence of  extracapsular extension.  2. No evidence of extraprostatic malignancy. No suspicious adenopathy  or evidence of pelvic metastases.     Assessment & Plan    Prostate cancer screening  Urinary frequency   BPH on finasteride    It was my pleasure to meet with Luan in follow-up for his prostate cancer screening as well as his bothersome urinary symptoms currently utilizing finasteride.  Overall, I am very glad to hear that he is doing so well over the last year and feels stable.  With this in mind, I have renewed his finasteride medication.  I am also happy to let him know that his PSA is very stable as compared to historical values and that he has a very benign digital rectal exam.  With all of this in mind, we will plan to see him back in 1 year with a repeat PSA.    Mr. Mitchell expressed understanding and agreement to the above discussion and plan and all of his questions were answered to his satisfaction.     PLAN  Repeat PSA in one year with follow-up office visit with Jimenez Pearson PA-C shortly afterward  Renewed finasteride     SIGNED:    Jimenez Pearson PA-C

## 2025-03-03 ENCOUNTER — TELEPHONE (OUTPATIENT)
Dept: INTERNAL MEDICINE | Facility: CLINIC | Age: 80
End: 2025-03-03

## 2025-03-03 ENCOUNTER — OFFICE VISIT (OUTPATIENT)
Dept: UROLOGY | Facility: CLINIC | Age: 80
End: 2025-03-03
Payer: COMMERCIAL

## 2025-03-03 VITALS
OXYGEN SATURATION: 99 % | SYSTOLIC BLOOD PRESSURE: 134 MMHG | DIASTOLIC BLOOD PRESSURE: 80 MMHG | RESPIRATION RATE: 17 BRPM | HEIGHT: 69 IN | HEART RATE: 76 BPM | WEIGHT: 183.6 LBS | BODY MASS INDEX: 27.19 KG/M2

## 2025-03-03 DIAGNOSIS — R97.20 ELEVATED PROSTATE SPECIFIC ANTIGEN (PSA): ICD-10-CM

## 2025-03-03 DIAGNOSIS — N40.1 BENIGN PROSTATIC HYPERPLASIA WITH LOWER URINARY TRACT SYMPTOMS, SYMPTOM DETAILS UNSPECIFIED: Primary | ICD-10-CM

## 2025-03-03 DIAGNOSIS — Z12.5 SCREENING FOR PROSTATE CANCER: ICD-10-CM

## 2025-03-03 PROCEDURE — 3079F DIAST BP 80-89 MM HG: CPT | Performed by: STUDENT IN AN ORGANIZED HEALTH CARE EDUCATION/TRAINING PROGRAM

## 2025-03-03 PROCEDURE — 99214 OFFICE O/P EST MOD 30 MIN: CPT | Performed by: STUDENT IN AN ORGANIZED HEALTH CARE EDUCATION/TRAINING PROGRAM

## 2025-03-03 PROCEDURE — 1126F AMNT PAIN NOTED NONE PRSNT: CPT | Performed by: STUDENT IN AN ORGANIZED HEALTH CARE EDUCATION/TRAINING PROGRAM

## 2025-03-03 PROCEDURE — 3077F SYST BP >= 140 MM HG: CPT | Performed by: STUDENT IN AN ORGANIZED HEALTH CARE EDUCATION/TRAINING PROGRAM

## 2025-03-03 RX ORDER — FINASTERIDE 5 MG/1
1 TABLET, FILM COATED ORAL DAILY
Qty: 100 TABLET | Refills: 3 | Status: SHIPPED | OUTPATIENT
Start: 2025-03-03

## 2025-03-03 ASSESSMENT — PAIN SCALES - GENERAL
PAINLEVEL_OUTOF10: NO PAIN (0)
PAINLEVEL_OUTOF10: NO PAIN (0)

## 2025-03-03 NOTE — NURSING NOTE
"Chief Complaint   Patient presents with    Follow up - PSA  review     He denies pain, recent fevers, and recent diagnosed infections.        Blood pressure today is elevated  Patient states baseline blood pressure is usually within normal limits  He notes some shortness of breath today, but this is baseline for the past few weeks. He also has a some nasal congestion today. Patient denies chest pain, headache, shortness of breath, and vision changes.  Patient is concerned for elevated reading today.  Patient  requested repeat blood pressure check today. He is following up with his PCP regarding blood      Chief Complaint   Patient presents with    Follow up - PSA  review     He denies pain, recent fevers, and recent diagnosed infections.        Blood pressure (!) 146/82, pulse 78, resp. rate 17, height 1.753 m (5' 9\"), weight 83.3 kg (183 lb 9.6 oz), SpO2 96%. Body mass index is 27.11 kg/m .    This writer retook the patients vitals at the end of his vist per patient request. Blood pressure improved.     B/P: 134/80, P: 76, R: 17     Patient Active Problem List   Diagnosis    Pemphigus foliaceus (H)    Hyperlipidemia LDL goal <130    BPH (benign prostatic hyperplasia)    Encounter for long-term (current) use of high-risk medication    Dermatitis, seborrheic    Lightheadedness    Age-related nuclear cataract of both eyes    SOB (shortness of breath)    Muscle weakness (generalized)    Hypertensive heart disease with heart failure (H)    Solar purpura    Myopia of both eyes    Old retinal detachment of left eye       No Known Allergies    Current Outpatient Medications   Medication Sig Dispense Refill    ARTIFICIAL TEAR SOLUTION OP Apply to eye daily       atorvastatin (LIPITOR) 20 MG tablet Take 1 tablet (20 mg) by mouth daily 100 tablet 4    azelastine (ASTELIN) 0.1 % nasal spray 2 sprays each nostril 1-2x daily as needed for nasal congestion (use nightly for first 2 week) 30 mL 11    clobetasol (TEMOVATE) 0.05 % " "external solution Apply to scalp daily for itch 50 mL 3    finasteride (PROSCAR) 5 MG tablet Take 1 tablet (5 mg) by mouth daily 90 tablet 4    fluocinolone acetonide (DERMA SMOOTHE/FS BODY) 0.01 % external oil Apply to scalp at night, then sleep overnight with nightcap on 118 mL 4    hydrochlorothiazide (HYDRODIURIL) 25 MG tablet Take 1 tablet (25 mg) by mouth daily 100 tablet 4    hydrocortisone butyrate (LOCOID) 0.1 % SOLN Apply sparingly to affected area(s) of beard twice daily 180 mL 3    ketoconazole (NIZORAL) 2 % external shampoo Apply topically three times a week. Lather in the shower, wait 3-5 minutes before rinsing. 720 mL 11    levothyroxine (SYNTHROID/LEVOTHROID) 50 MCG tablet Take 1 tablet (50 mcg) by mouth daily 100 tablet 11    Multiple Vitamin (MULTIVITAMIN) per tablet Take 1 tablet by mouth daily 1 tab daily      mycophenolate (GENERIC EQUIVALENT) 500 MG tablet TAKE 1 TABLET EVERY MORNING BEFORE BREAKFAST 90 tablet 2    salicyclic acid-sulfur (SEBULEX) 2-2 % external shampoo Apply topically 3 times daily as needed for itching In the shower. 200 g 11    tacrolimus (PROTOPIC) 0.1 % external ointment Apply topically to affected areas as instructed. 300 g 3    triamcinolone (KENALOG) 0.1 % external ointment Apply topically 2 times daily For lesions that are more mild 453.6 g 1       Social History     Tobacco Use    Smoking status: Never    Smokeless tobacco: Never   Substance Use Topics    Alcohol use: Never    Drug use: Never       Radha Bhandari  3/3/2025  2:17 PM    pressure and SOB concerns.         Blood pressure (!) 146/82, pulse 78, height 1.753 m (5' 9\"), weight 83.3 kg (183 lb 9.6 oz), SpO2 96%. Body mass index is 27.11 kg/m .    Patient Active Problem List   Diagnosis    Pemphigus foliaceus (H)    Hyperlipidemia LDL goal <130    BPH (benign prostatic hyperplasia)    Encounter for long-term (current) use of high-risk medication    Dermatitis, seborrheic    Lightheadedness    " Age-related nuclear cataract of both eyes    SOB (shortness of breath)    Muscle weakness (generalized)    Hypertensive heart disease with heart failure (H)    Solar purpura    Myopia of both eyes    Old retinal detachment of left eye       No Known Allergies    Current Outpatient Medications   Medication Sig Dispense Refill    ARTIFICIAL TEAR SOLUTION OP Apply to eye daily       atorvastatin (LIPITOR) 20 MG tablet Take 1 tablet (20 mg) by mouth daily 100 tablet 4    azelastine (ASTELIN) 0.1 % nasal spray 2 sprays each nostril 1-2x daily as needed for nasal congestion (use nightly for first 2 week) 30 mL 11    clobetasol (TEMOVATE) 0.05 % external solution Apply to scalp daily for itch 50 mL 3    finasteride (PROSCAR) 5 MG tablet Take 1 tablet (5 mg) by mouth daily 90 tablet 4    fluocinolone acetonide (DERMA SMOOTHE/FS BODY) 0.01 % external oil Apply to scalp at night, then sleep overnight with nightcap on 118 mL 4    hydrochlorothiazide (HYDRODIURIL) 25 MG tablet Take 1 tablet (25 mg) by mouth daily 100 tablet 4    hydrocortisone butyrate (LOCOID) 0.1 % SOLN Apply sparingly to affected area(s) of beard twice daily 180 mL 3    ketoconazole (NIZORAL) 2 % external shampoo Apply topically three times a week. Lather in the shower, wait 3-5 minutes before rinsing. 720 mL 11    levothyroxine (SYNTHROID/LEVOTHROID) 50 MCG tablet Take 1 tablet (50 mcg) by mouth daily 100 tablet 11    Multiple Vitamin (MULTIVITAMIN) per tablet Take 1 tablet by mouth daily 1 tab daily      mycophenolate (GENERIC EQUIVALENT) 500 MG tablet TAKE 1 TABLET EVERY MORNING BEFORE BREAKFAST 90 tablet 2    salicyclic acid-sulfur (SEBULEX) 2-2 % external shampoo Apply topically 3 times daily as needed for itching In the shower. 200 g 11    tacrolimus (PROTOPIC) 0.1 % external ointment Apply topically to affected areas as instructed. 300 g 3    triamcinolone (KENALOG) 0.1 % external ointment Apply topically 2 times daily For lesions that are more mild  453.6 g 1       Social History     Tobacco Use    Smoking status: Never    Smokeless tobacco: Never   Substance Use Topics    Alcohol use: Never    Drug use: Never       Radha Bhandari  3/3/2025  2:14 PM

## 2025-03-03 NOTE — TELEPHONE ENCOUNTER
"Patient confirmed REscheduled appointment:  Date: 3/5/2025  Time: 10:30am  Visit type: UMP RETURN  Provider: PCP  Location: [facility]  Testing/imaging: -  Additional notes: \"FATIGUE\" PER PT    Rescheduled (provider cancelled clinic 3/5/25 PM)     "

## 2025-03-03 NOTE — LETTER
3/3/2025       RE: Luan Mitchell  [address] [place] [id number]     Dear Colleague,    Thank you for referring your patient, Luan Mitchell, to the [facility] [place] at [facility]. Please see a copy of my visit note below.    Subjective    REASON FOR VISIT  PSA and lower urinary tract symptoms follow-up     HISTORY OF PRESENT ILLNESS  Mr. Mitchell is a pleasant 79 year old male when speaking with today in follow-up for his longstanding history of bothersome irritative voiding symptoms and his prostate cancer screening.  His urologic history is significant for elevated PSA dating back to 1996 with 4 previous prostate biopsies in the distant past all of which being negative.  Currently is on finasteride, and has had stable PSAs for many years.  Most recent intervention was a prostate MRI in July 2018 showing PI-RADS 3 lesion, but shared decision making was used to defer a biopsy given the incredibly reassuring and stable PSAs.    From a urinary standpoint, his main bothersome symptoms have been some urinary frequency, however given the stability and lack of quality of life impact, he is preferred to just remain on finasteride with no other interventions.    Today:  Overall feels his situation is once again stable from last year  Still would like to continue with PSA checked every year, and would like a digital rectal exam today    Objective    PHYSICAL EXAMINATION  General: Alert, oriented, no acute distress  : Firm, smooth, and symmetric prostate without any evidence of nodularity, induration, or pain to palpation.  Edges of the prostate easily palpable with a palpable median sulcus    LABS  Lab Results   Component Value Date    PSA 2.05 02/26/2025    PSA 1.98 03/04/2024    PSA 1.90 09/05/2023    PSA 2.28 02/27/2023    PSA 2.09 08/24/2022    PSA 2.29 03/14/2022    PSA 1.95 03/16/2021    PSA 1.91 06/12/2020    PSA 1.86 06/24/2019     PSA 1.82 03/14/2019    PSA 2.05 09/25/2018    PSA 2.10 06/21/2018    PSA 2.42 02/05/2018    PSA 2.09 01/10/2017    PSA 1.96 02/14/2011    PSA 2.47 02/25/2010    PSA 2.11 02/27/2009    PSA 1.77 03/28/2008    PSA 2.43 01/23/2007    PSA 1.86 03/02/2006      IMAGING  Prostate MRI from 7/6/2018     Narrative & Impression   MRI PROSTATE:  7/6/2018 2:48 PM     CLINICAL HISTORY: nodule left apex; Prostate nodule.  PSA of 2.1  06/21/2018.      Comparison: MR pelvis 1/27/2006.     TECHNIQUE:      The following sequences were obtained: High-resolution axial  T2-weighted, coronal T2-weighted, 3D volumetric T2-weighted, axial  pre-contrast T1, axial diffusion-weighted, axial apparent diffusion  coefficient and axial dynamic contrast-enhanced T1. Postcontrast  images were evaluated on a separate workstation to evaluate dynamic  contrast enhancement.  Contrast dose: 7.5mL Gadavist     The images are interpreted according to PI-RADS v.2     FINDINGS:  Prostate gland size: 4.2 x 5.4 x 5.4 cm  Volume: 64 g     Peripheral zone: Linear hypointensity within distinct margins. Focal  abnormality on ADC or diffusion weighted imaging.     PI-RADS:  Peripheral zone T2: 2  Diffusion-weighted image: 1    Contrast-enhanced images: Negative     Overall assessment: 1     Transitional zone: In the left anterior mid gland is an ill-defined  mildly heterogeneous 1.3 x 1.3 cm area of T2 intermediate signal from  2-3 o'clock (series 8, image 20/series 10, image 17/series 11, image  17) with indistinct hypointensity on ADC. Regional enhancement which  is isointense into adjacent BPH nodules. There is diffuse hypertrophy  of the central gland with BPH type changes.      PI-RADS:  Transition zone T2: 3  Diffusion-weighted image: 3  Contrast-enhanced images: Negative     Overall assessment: 3     Remainder of the pelvis:  No significant pelvic lymphadenopathy. No  concerning osseous lesion. Colonic diverticulosis without convincing  evidence of active  inflammation. Bladder is partially decompressed. No  focal lesion.                                                                       IMPRESSION:   1. Based on the most suspicious abnormality, this exam is  characterized as PIRADS 3 - Intermediate probability.  The presence of  clinically significant cancer is equivocal.  The most suspicious  abnormality is a ill-defined 1.3 cm region in the left mid gland  transitional zone from 2-3 o'clock, and there is no evidence of  extracapsular extension.  2. No evidence of extraprostatic malignancy. No suspicious adenopathy  or evidence of pelvic metastases.     Assessment & Plan   Prostate cancer screening  Urinary frequency   BPH on finasteride    It was my pleasure to meet with Luan in follow-up for his prostate cancer screening as well as his bothersome urinary symptoms currently utilizing finasteride.  Overall, I am very glad to hear that he is doing so well over the last year and feels stable.  With this in mind, I have renewed his finasteride medication.  I am also happy to let him know that his PSA is very stable as compared to historical values and that he has a very benign digital rectal exam.  With all of this in mind, we will plan to see him back in 1 year with a repeat PSA.    Mr. Mitchell expressed understanding and agreement to the above discussion and plan and all of his questions were answered to his satisfaction.     PLAN  Repeat PSA in one year with follow-up office visit with Jimenez Pearson PA-C shortly afterward  Renewed finasteride     SIGNED:    Jimenez Pearson PA-C      Again, thank you for allowing me to participate in the care of your patient.      Sincerely,    Jimenez Pearson PA-C

## 2025-03-05 ENCOUNTER — ANCILLARY PROCEDURE (OUTPATIENT)
Dept: GENERAL RADIOLOGY | Facility: CLINIC | Age: 80
End: 2025-03-05
Attending: INTERNAL MEDICINE
Payer: COMMERCIAL

## 2025-03-05 ENCOUNTER — LAB (OUTPATIENT)
Dept: LAB | Facility: CLINIC | Age: 80
End: 2025-03-05
Payer: COMMERCIAL

## 2025-03-05 ENCOUNTER — OFFICE VISIT (OUTPATIENT)
Dept: INTERNAL MEDICINE | Facility: CLINIC | Age: 80
End: 2025-03-05
Payer: COMMERCIAL

## 2025-03-05 VITALS
DIASTOLIC BLOOD PRESSURE: 73 MMHG | OXYGEN SATURATION: 97 % | HEIGHT: 69 IN | SYSTOLIC BLOOD PRESSURE: 105 MMHG | BODY MASS INDEX: 26.88 KG/M2 | WEIGHT: 181.5 LBS | HEART RATE: 80 BPM | RESPIRATION RATE: 16 BRPM | TEMPERATURE: 97.7 F

## 2025-03-05 DIAGNOSIS — R42 LIGHTHEADEDNESS: ICD-10-CM

## 2025-03-05 DIAGNOSIS — R06.02 SOB (SHORTNESS OF BREATH): ICD-10-CM

## 2025-03-05 DIAGNOSIS — M47.812 OSTEOARTHRITIS OF CERVICAL SPINE, UNSPECIFIED SPINAL OSTEOARTHRITIS COMPLICATION STATUS: ICD-10-CM

## 2025-03-05 DIAGNOSIS — R42 DIZZINESS: ICD-10-CM

## 2025-03-05 DIAGNOSIS — M62.81 MUSCLE WEAKNESS (GENERALIZED): ICD-10-CM

## 2025-03-05 DIAGNOSIS — I10 ESSENTIAL HYPERTENSION: ICD-10-CM

## 2025-03-05 DIAGNOSIS — E03.9 HYPOTHYROIDISM, UNSPECIFIED TYPE: ICD-10-CM

## 2025-03-05 DIAGNOSIS — R06.02 SOB (SHORTNESS OF BREATH): Primary | ICD-10-CM

## 2025-03-05 LAB
CRP SERPL-MCNC: <3 MG/L
ERYTHROCYTE [SEDIMENTATION RATE] IN BLOOD BY WESTERGREN METHOD: 7 MM/HR (ref 0–20)
NT-PROBNP SERPL-MCNC: 163 PG/ML (ref 0–1800)
TOTAL PROTEIN SERUM FOR ELP: 6.9 G/DL (ref 6.4–8.3)
TSH SERPL DL<=0.005 MIU/L-ACNC: 3.12 UIU/ML (ref 0.3–4.2)
VIT B12 SERPL-MCNC: 859 PG/ML (ref 232–1245)

## 2025-03-05 PROCEDURE — 84165 PROTEIN E-PHORESIS SERUM: CPT | Mod: TC | Performed by: PATHOLOGY

## 2025-03-05 PROCEDURE — 84165 PROTEIN E-PHORESIS SERUM: CPT | Mod: 26 | Performed by: PATHOLOGY

## 2025-03-05 PROCEDURE — 82607 VITAMIN B-12: CPT | Performed by: INTERNAL MEDICINE

## 2025-03-05 PROCEDURE — 36415 COLL VENOUS BLD VENIPUNCTURE: CPT | Performed by: PATHOLOGY

## 2025-03-05 PROCEDURE — 84155 ASSAY OF PROTEIN SERUM: CPT | Performed by: PATHOLOGY

## 2025-03-05 PROCEDURE — 83521 IG LIGHT CHAINS FREE EACH: CPT | Performed by: INTERNAL MEDICINE

## 2025-03-05 PROCEDURE — 71046 X-RAY EXAM CHEST 2 VIEWS: CPT | Mod: GC | Performed by: RADIOLOGY

## 2025-03-05 PROCEDURE — 83880 ASSAY OF NATRIURETIC PEPTIDE: CPT | Performed by: PATHOLOGY

## 2025-03-05 PROCEDURE — 99000 SPECIMEN HANDLING OFFICE-LAB: CPT | Performed by: PATHOLOGY

## 2025-03-05 PROCEDURE — 85652 RBC SED RATE AUTOMATED: CPT | Performed by: PATHOLOGY

## 2025-03-05 PROCEDURE — 86140 C-REACTIVE PROTEIN: CPT | Performed by: PATHOLOGY

## 2025-03-05 PROCEDURE — 84443 ASSAY THYROID STIM HORMONE: CPT | Performed by: PATHOLOGY

## 2025-03-05 PROCEDURE — 72050 X-RAY EXAM NECK SPINE 4/5VWS: CPT | Mod: GC | Performed by: RADIOLOGY

## 2025-03-05 RX ORDER — HYDROCHLOROTHIAZIDE 25 MG/1
12.5 TABLET ORAL DAILY
COMMUNITY
Start: 2025-03-05

## 2025-03-05 NOTE — PROGRESS NOTES
"Luan is a 79 year old that presents in clinic today for the following:     Chief Complaint   Patient presents with    Consult     Fatigue, weak when standing and bad balance, \"disorientation and perhaps shortness of breath on standing\", hot clammy feeling on skin specifically on legs but legs are dry, pain in neck when turn to the left happened after shoveling snow, persistent runny nose, tingling in left buttock            3/5/2025    10:03 AM   Additional Questions   Roomed by [facility] EMT     Screenings as of 3/3/25     PHQ-2 Total Score (Adult) - Positive if 3 or more points; Administer   PHQ-9 if positive 0        Carleen Pavon MA at 10:10 AM on 3/5/2025    Answers submitted by the patient for this visit:  General Questionnaire (Submitted on 3/3/2025)  Chief Complaint: Chronic problems general questions HPI Form  How many days per week do you miss taking your medication?: 0  General Concern (Submitted on 3/3/2025)  Chief Complaint: Chronic problems general questions HPI Form  What is the reason for your visit today?: Fatigue, difficulty standing  When did your symptoms begin?: 3-4 weeks ago  What are your symptoms?: Fatigue, difficulty standing  How would you describe these symptoms?: Moderate  Are your symptoms:: Staying the same  Have you had these symptoms before?: No  Is there anything that makes you feel worse?: No  Is there anything that makes you feel better?: No  Questionnaire about: Chronic problems general questions HPI Form (Submitted on 3/3/2025)  Chief Complaint: Chronic problems general questions HPI Form    "

## 2025-03-05 NOTE — PROGRESS NOTES
Orthostatic Blood Pressure:  Supine: 129/82 Pulse 79  Sittin/73 Pulse 28-83  Standin/71 Pulse 94    Carleen Pavon, EMT at 11:21 AM on 3/5/2025

## 2025-03-05 NOTE — PROGRESS NOTES
HPI  79-year-old presents today because of onset over the last couple of weeks of a compendium of different symptoms.  His main concern is that of fatigue.  He has had decreased Energy he has also had some dizziness and unsteadiness worse when he changes position stands up and he feels as if he could fall or pass out although he has not had any falling or passing out.  He is also had significant weakness as well as pain in the neck.  This is worse when he turns to the left he has not had any radicular symptoms in association with this.  He has had a variety of other symptoms which includes:  Hot clammy legs  Dizziness ? Unsteadiness  Persistent rhinorrhea  Neck pain turn to left worse in am  Tingling left buttock  He denies any new or different medications he has not been taking any other supplements he is not taking any different ingestions.  There is no preceding injury or trauma.  He has had no similar issues in the past other than the neck has been a problem particularly when he lays down and extends his neck he has had significant issues here.  This however does not appear to be related to either the tingling he feels in the left buttock or the clamminess he experiences in both legs which is predominantly in the thighs.  Is not having any symptoms radiating into the feet or the toes.  He has not been doing any exercises for the neck or any physical therapy and has had no imaging for this.  He did have recent labs done for the mycophenolate and his CMP and CBC were normal.  Past Medical History:   Diagnosis Date    BPH (benign prostatic hyperplasia)     Essential hypertension 02/2018    Hyperlipidemia LDL goal <130     Pemphigus (H)     pemphigus foliaceus    Retinal detachment 2009    left eye- corrected with laser    Vitreous detachment 2009    LE     Past Surgical History:   Procedure Laterality Date    BIOPSY      Prostate    COLONOSCOPY  2014    EYE SURGERY  2009    NO HISTORY OF SURGERY  01/28/2014    derm  "   RETINOPEXY LASER PROPHYLAXIS BREAK(S) OS (LEFT EYE)      2009 - Left eye     Family History   Problem Relation Age of Onset    Heart Disease Mother         CHF d 80    Diabetes Mother         DM2,  age 80    Hypertension Mother     Coronary Artery Disease Mother     Heart Disease Father         CHF d 78    Coronary Artery Disease Father     Heart Disease Paternal Uncle         CHF d 50s    Coronary Artery Disease Other     Cancer No family hx of         no skin cancer    Skin Cancer No family hx of         no skin cancer    Glaucoma No family hx of     Macular Degeneration No family hx of     Melanoma No family hx of          Exam:  /77 (BP Location: Right arm, Patient Position: Sitting, Cuff Size: Adult Regular)   Pulse 91   Temp 97.7  F (36.5  C) (Oral)   Resp 16   Ht 1.753 m (5' 9\")   Wt 82.3 kg (181 lb 8 oz)   SpO2 97%   BMI 26.80 kg/m    181 lbs 8 oz  Physical Exam   The patient is alert, oriented with a clear sensorium.   Skin shows no lesions or rashes and good turgor.   Head is normocephalic and atraumatic.   Nasal mucosa shows clear bilaterally  Mouth shows clear oral mucosa.   Neck shows no nodes, thyromegaly or bruits.  He does have very restricted range of motion pain with rotation to the left after only about 10 degrees he can rotate 15 to 20 degrees on the right any extension produces significant pain in the left side of his neck in an attempt at the Spurling's test also reproduced significant neck pain predominantly in the left side of his neck but not down the arm.  Back is non tender.  Straight leg raising is negative bilaterally  Lungs are clear to percussion and auscultation.   Heart shows normal S1 and S2 without murmur or gallop.   Abdomen is soft and nontender without masses or organomegaly.   Extremities show no edema and no evidence of active synovitis.   Neurologic examination shows cranial nerves II-XII intact. Motor shows 5/5 strength. Reflexes are symmetric. .  Labs " reviewed:  Results for orders placed or performed in visit on 03/05/25   TSH with free T4 reflex     Status: Normal   Result Value Ref Range    TSH 3.12 0.30 - 4.20 uIU/mL   Vitamin B12     Status: Normal   Result Value Ref Range    Vitamin B12 859 232 - 1,245 pg/mL   Kappa and lambda light chain     Status: Normal   Result Value Ref Range    Kappa Free Light Chains 1.46 0.33 - 1.94 mg/dL    Lambda Free Light Chains 1.35 0.57 - 2.63 mg/dL    Kappa /Lambda Ratio 1.08 0.26 - 1.65   Erythrocyte sedimentation rate auto     Status: Normal   Result Value Ref Range    Erythrocyte Sedimentation Rate 7 0 - 20 mm/hr   CRP inflammation     Status: Normal   Result Value Ref Range    CRP Inflammation <3.00 <5.00 mg/L   BNP-N terminal pro     Status: Normal   Result Value Ref Range    N Terminal Pro BNP Outpatient 163 0 - 1,800 pg/mL   Total Protein, Serum for ELP     Status: Normal   Result Value Ref Range    Total Protein Serum for ELP 6.9 6.4 - 8.3 g/dL   Protein Electrophoresis, Serum     Status: None   Result Value Ref Range    Albumin 4.5 3.7 - 5.1 g/dL    Alpha 1 0.3 0.2 - 0.4 g/dL    Alpha 2 0.7 0.5 - 0.9 g/dL    Beta Globulin 0.8 0.6 - 1.0 g/dL    Gamma Globulin 0.8 0.7 - 1.6 g/dL    Monoclonal Peak 0.0 <=0.0 g/dL    ELP Interpretation       Essentially normal electrophoretic pattern. No obvious monoclonal proteins seen. Pathologic significance requires clinical correlation. LOLLY Sifuentes M.D., Ph.D., Pathologist ().    Signout Location if [facility]    Protein electrophoresis     Status: None    Narrative    The following orders were created for panel order Protein electrophoresis.  Procedure                               Abnormality         Status                     ---------                               -----------         ------                     Total Protein, Serum for...[[id number]]  Normal              Final result               Protein Electrophoresis,...[[id number]]                      Final  result                 Please view results for these tests on the individual orders.   Results for orders placed or performed in visit on 03/05/25   XR Chest 2 Views     Status: None    Narrative    XR CHEST 2 VIEWS  3/5/2025 11:45 AM     HISTORY:  SOB (shortness of breath); Dizziness       COMPARISON:  Chest radiograph 2/12/2023    FINDINGS:   Lungs are clear. No pleural effusions. No pneumothorax. Cardiac  silhouette is normal. Multilevel degenerative changes throughout the  thoracic spine. Bones are osteoporotic.      Impression    IMPRESSION:  No acute cardiopulmonary findings.    I have personally reviewed the examination and initial interpretation  and I agree with the findings.    BROOKE JUAREZ MD         SYSTEM ID:  [id number]   XR Cervical Spine w Flex/Ext G/E 4 Views     Status: None    Narrative    EXAM: XR CERVICAL SPINE W FLEX/EXT G/E 4 VIEWS 3/5/2025 11:45 AM    HISTORY: Osteoarthritis of cervical spine.    COMPARISON: None.    FINDINGS: AP, lateral, flexion, extension, lateral swimmer's, and  odontoid views of the cervical spine were obtained. No acute osseous  abnormality. Straightening of the normal cervical lordosis. Trace  retrolisthesis of C4 on C5 and C5 on C6 in extension which corrects in  flexion. Moderate disc space narrowing C4-5 and mild disc space  narrowing at C3-4 and C5-6. Moderate multilevel facet arthropathy.  Small anterior endplate osteophytes in the mid to lower cervical  spine. No evidence of dynamic instability on flexion and extension  images. No prevertebral edema. No mass in the visualized lung apices.      Impression    IMPRESSION:  1. There is minimal retrolisthesis of C4 on C5 and C5 on C6 in  extension, which corrects on flexion.  2. Moderate cervical spondylosis, most prominent from C3-6.    I have personally reviewed the examination and initial interpretation  and I agree with the findings.    ULI ROB MD         SYSTEM ID:  [id number]         ASSESSMENT  1.   Severe cervical degenerative disc disease question myelopathy  2. Orthostasis will reduce hydrochlorothiazide dose  3. Pemphigus on mycophenolate  4. Hypertension fair control  5. Hypothyroidism on replacement  6. BPH on finasteride  7. Hyperlipidemia on atorvastatin       Plan  We discussed treatment options for the neck he elected to go with physical therapy we will get imaging initially with an x-ray.  Will assess his labs looking for evidence of neuropathy with B12 and light chains protein electrophoresis along with inflammatory markers and his thyroid.  Will plan to have him reassessed in a month sooner if any increase symptoms or problems before that time    Over 40 minutes on the day of service spent in chart review, patient contact, record completion and review and notification of lab reports    This note was completed using Dragon voice recognition software.      Devendra Telles MD  General Internal Medicine  [facility]  [phone]

## 2025-03-06 LAB
ALBUMIN SERPL ELPH-MCNC: 4.5 G/DL (ref 3.7–5.1)
ALPHA1 GLOB SERPL ELPH-MCNC: 0.3 G/DL (ref 0.2–0.4)
ALPHA2 GLOB SERPL ELPH-MCNC: 0.7 G/DL (ref 0.5–0.9)
B-GLOBULIN SERPL ELPH-MCNC: 0.8 G/DL (ref 0.6–1)
GAMMA GLOB SERPL ELPH-MCNC: 0.8 G/DL (ref 0.7–1.6)
KAPPA LC FREE SER-MCNC: 1.46 MG/DL (ref 0.33–1.94)
KAPPA LC FREE/LAMBDA FREE SER NEPH: 1.08 {RATIO} (ref 0.26–1.65)
LAMBDA LC FREE SERPL-MCNC: 1.35 MG/DL (ref 0.57–2.63)
LOCATION OF TASK: NORMAL
M PROTEIN SERPL ELPH-MCNC: 0 G/DL
PROT PATTERN SERPL ELPH-IMP: NORMAL

## 2025-03-10 NOTE — PROGRESS NOTES
PHYSICAL THERAPY EVALUATION  Type of Visit: Evaluation             Subjective         Presenting condition or subjective complaint: Neck pain  Pt presents with complaints of neck pain on the L side. The pain is worse with turning had to the left which feels sharp. He first noticed it after shoveling snow and it has continued for the past few weeks. The neck pain restricts ability to sleep in certain positions and it is worse when first getting out of bed. As the day goes on the pain gets better.   Date of onset: 03/05/25    Relevant medical history: Cold or hot arm or leg; High blood pressure   Dates & types of surgery: None    Prior diagnostic imaging/testing results: X-ray     X-ray:  IMPRESSION:  1. There is minimal retrolisthesis of C4 on C5 and C5 on C6 in  extension, which corrects on flexion.  2. Moderate cervical spondylosis, most prominent from C3-6.  Prior therapy history for the same diagnosis, illness or injury: No        Living Environment  Social support: With a significant other or spouse   Type of home: House; 2-story   Stairs to enter the home: Yes 2 Is there a railing: Yes     Ramp: No   Stairs inside the home: Yes 2 Is there a railing: Yes     Help at home:    Equipment owned: Raised toilet seat     Employment: No    Hobbies/Interests: Travel, reading, neighborhood organization    Patient goals for therapy: Be pain free    Pain assessment: Pain present     Objective   CERVICAL SPINE EVALUATION  PAIN: Pain Level at Rest: 0/10  Pain Level with Use: 3/10  Pain Quality: Sharp and Shooting  Pain is Exacerbated By: rotating head to L, laying down, getting up in the morning  Pain is Relieved By: none  POSTURE: Sitting Posture: Rounded shoulders, Forward head, Lordosis decreased  ROM:   (Degrees) Left AROM Right AROM    Cervical Flexion WNL    Cervical Extension Mod loss- stress on L side of neck     Cervical Side bend 30- pain 30- pain    Cervical Rotation 58- looks down at same time. Dull ache 75- pain  on L side     Cervical Protrusion     Cervical Retraction     Thoracic Flexion     Thoracic Extension     Thoracic Rotation       Left AROM Left PROM Right AROM Right PROM   Shoulder Flexion 170  170    Shoulder Extension       Shoulder Abduction 170  170    Shoulder Adduction       Shoulder IR T6  T6    Shoulder ER 70  70    Shoulder Horiz Abduction       Shoulder Horiz Adduction       Repeated cervical flexion x10: no change in symptoms with rot L or R and no increase in ROM   MYOTOMES:    Left Right   C1-2 (Neck Flexion)     C3 (Neck Side Bend)      C4 (Shrug) 5 5   C5 (Deltoid) 5 5   C6 (Biceps) 5 5   C7 (Triceps) 5 5   C8 (Thumb Ext) 5 5   T1 (Intrinsics) 5 5     DERMATOMES:    Left Right   C1 Normal (light touch) Normal (light touch)   C2 Normal (light touch) Normal (light touch)   C3 Normal (light touch) Normal (light touch)   C4 Normal (light touch) Normal (light touch)   C5 Normal (light touch) Normal (light touch)   C6 Normal (light touch) Normal (light touch)   C7 Normal (light touch) Normal (light touch)   C8 Normal (light touch) Normal (light touch)   T1 Normal (light touch) Normal (light touch)     NEURAL TENSION: Cervical WNL  ULTT median, ulnar, and radial WNL B  PALPATION: WNL      Assessment & Plan   CLINICAL IMPRESSIONS  Medical Diagnosis: Osteoarthritis of cervical spine, unspecified spinal osteoarthritis complication status    Treatment Diagnosis: Neck pain   Impression/Assessment: Patient is a 79 year old male with neck pain complaints.  The following significant findings have been identified: Pain, Decreased ROM/flexibility, Decreased joint mobility, Decreased strength, and Impaired posture. These impairments interfere with their ability to perform self care tasks, work tasks, recreational activities, household chores, household mobility, and community mobility as compared to previous level of function.     Clinical Decision Making (Complexity):  Clinical Presentation:  Stable/Uncomplicated  Clinical Presentation Rationale: based on medical and personal factors listed in PT evaluation  Clinical Decision Making (Complexity): Low complexity    PLAN OF CARE  Treatment Interventions:  Interventions: Gait Training, Manual Therapy, Neuromuscular Re-education, Therapeutic Activity, Therapeutic Exercise, Self-Care/Home Management    Long Term Goals     PT Goal 1  Goal Identifier: neck ROM  Goal Description: pt will have full painfree neck ROM globally  Rationale: to maximize safety and independence with performance of ADLs and functional tasks  Target Date: 05/20/25      Frequency of Treatment: 1x/week for 4 weeks, decreasing to every other week  Duration of Treatment: 10 weeks    Education Assessment:   Learner/Method: Patient;Listening;Reading;Demonstration;Pictures/Video;No Barriers to Learning  Education Comments: Educated pt on presenting problem, plan of care, and HEP    Risks and benefits of evaluation/treatment have been explained.   Patient/Family/caregiver agrees with Plan of Care.     Evaluation Time:     PT Eval, Low Complexity Minutes (78488): 20  Evaluation Only     Signing Clinician: Cira Abebe, PT        [facility]                                                                                   OUTPATIENT PHYSICAL THERAPY      PLAN OF TREATMENT FOR OUTPATIENT REHABILITATION   Patient's Last Name, First Name, Luan Wood YOB: 1945   Provider's Name   [facility]   Medical Record No.  [id number]     Onset Date: 03/05/25  Start of Care Date: 03/11/25     Medical Diagnosis:  Osteoarthritis of cervical spine, unspecified spinal osteoarthritis complication status      PT Treatment Diagnosis:  Neck pain Plan of Treatment  Frequency/Duration: 1x/week for 4 weeks, decreasing to every other week/ 10 weeks    Certification date from 03/11/25 to 05/20/25         See note for plan of treatment  details and functional goals     Cira Abebe, PT                         I CERTIFY THE NEED FOR THESE SERVICES FURNISHED UNDER        THIS PLAN OF TREATMENT AND WHILE UNDER MY CARE     (Physician attestation of this document indicates review and certification of the therapy plan).              Referring Provider:  Devendra Telles    Initial Assessment  See Epic Evaluation- Start of Care Date: 03/11/25

## 2025-03-11 ENCOUNTER — THERAPY VISIT (OUTPATIENT)
Dept: PHYSICAL THERAPY | Facility: CLINIC | Age: 80
End: 2025-03-11
Attending: INTERNAL MEDICINE
Payer: COMMERCIAL

## 2025-03-11 DIAGNOSIS — M47.812 OSTEOARTHRITIS OF CERVICAL SPINE, UNSPECIFIED SPINAL OSTEOARTHRITIS COMPLICATION STATUS: ICD-10-CM

## 2025-03-11 PROCEDURE — 97110 THERAPEUTIC EXERCISES: CPT | Mod: GP

## 2025-03-11 PROCEDURE — 97161 PT EVAL LOW COMPLEX 20 MIN: CPT | Mod: GP

## 2025-03-19 SDOH — HEALTH STABILITY: PHYSICAL HEALTH: ON AVERAGE, HOW MANY MINUTES DO YOU ENGAGE IN EXERCISE AT THIS LEVEL?: PATIENT DECLINED

## 2025-03-19 SDOH — HEALTH STABILITY: PHYSICAL HEALTH: ON AVERAGE, HOW MANY DAYS PER WEEK DO YOU ENGAGE IN MODERATE TO STRENUOUS EXERCISE (LIKE A BRISK WALK)?: 4 DAYS

## 2025-03-19 ASSESSMENT — SOCIAL DETERMINANTS OF HEALTH (SDOH): HOW OFTEN DO YOU GET TOGETHER WITH FRIENDS OR RELATIVES?: ONCE A WEEK

## 2025-03-22 ENCOUNTER — OFFICE VISIT (OUTPATIENT)
Dept: INTERNAL MEDICINE | Facility: CLINIC | Age: 80
End: 2025-03-22
Payer: COMMERCIAL

## 2025-03-22 ENCOUNTER — LAB (OUTPATIENT)
Dept: LAB | Facility: CLINIC | Age: 80
End: 2025-03-22
Payer: COMMERCIAL

## 2025-03-22 VITALS
HEART RATE: 84 BPM | HEIGHT: 68 IN | WEIGHT: 178.8 LBS | DIASTOLIC BLOOD PRESSURE: 83 MMHG | BODY MASS INDEX: 27.1 KG/M2 | OXYGEN SATURATION: 98 % | SYSTOLIC BLOOD PRESSURE: 130 MMHG | RESPIRATION RATE: 18 BRPM | TEMPERATURE: 98 F

## 2025-03-22 DIAGNOSIS — E78.5 HYPERLIPIDEMIA LDL GOAL <130: ICD-10-CM

## 2025-03-22 DIAGNOSIS — Z11.59 MEASLES SCREENING: ICD-10-CM

## 2025-03-22 DIAGNOSIS — R97.20 ELEVATED PROSTATE SPECIFIC ANTIGEN (PSA): ICD-10-CM

## 2025-03-22 DIAGNOSIS — I50.20 SYSTOLIC CONGESTIVE HEART FAILURE, UNSPECIFIED HF CHRONICITY (H): ICD-10-CM

## 2025-03-22 DIAGNOSIS — N40.1 BENIGN PROSTATIC HYPERPLASIA WITH LOWER URINARY TRACT SYMPTOMS, SYMPTOM DETAILS UNSPECIFIED: ICD-10-CM

## 2025-03-22 DIAGNOSIS — R20.9 ALTERATION IN SENSORY PERCEPTION: Primary | ICD-10-CM

## 2025-03-22 DIAGNOSIS — R73.09 ABNORMAL GLUCOSE TOLERANCE TEST: ICD-10-CM

## 2025-03-22 DIAGNOSIS — Z12.5 SCREENING FOR PROSTATE CANCER: ICD-10-CM

## 2025-03-22 DIAGNOSIS — Z12.11 SPECIAL SCREENING FOR MALIGNANT NEOPLASMS, COLON: ICD-10-CM

## 2025-03-22 DIAGNOSIS — I10 ESSENTIAL HYPERTENSION: ICD-10-CM

## 2025-03-22 DIAGNOSIS — E03.9 HYPOTHYROIDISM, UNSPECIFIED TYPE: ICD-10-CM

## 2025-03-22 LAB
CHOLEST SERPL-MCNC: 147 MG/DL
EST. AVERAGE GLUCOSE BLD GHB EST-MCNC: 114 MG/DL
FASTING STATUS PATIENT QL REPORTED: YES
HBA1C MFR BLD: 5.6 %
HDLC SERPL-MCNC: 42 MG/DL
LDLC SERPL CALC-MCNC: 75 MG/DL
NONHDLC SERPL-MCNC: 105 MG/DL
PSA SERPL DL<=0.01 NG/ML-MCNC: 2.51 NG/ML
TRIGL SERPL-MCNC: 151 MG/DL

## 2025-03-22 PROCEDURE — 84153 ASSAY OF PSA TOTAL: CPT | Performed by: PATHOLOGY

## 2025-03-22 PROCEDURE — 80061 LIPID PANEL: CPT | Performed by: PATHOLOGY

## 2025-03-22 PROCEDURE — 86765 RUBEOLA ANTIBODY: CPT | Performed by: INTERNAL MEDICINE

## 2025-03-22 PROCEDURE — 99000 SPECIMEN HANDLING OFFICE-LAB: CPT | Performed by: PATHOLOGY

## 2025-03-22 PROCEDURE — 99397 PER PM REEVAL EST PAT 65+ YR: CPT | Performed by: INTERNAL MEDICINE

## 2025-03-22 PROCEDURE — 36415 COLL VENOUS BLD VENIPUNCTURE: CPT | Performed by: PATHOLOGY

## 2025-03-22 PROCEDURE — 83036 HEMOGLOBIN GLYCOSYLATED A1C: CPT | Performed by: INTERNAL MEDICINE

## 2025-03-22 PROCEDURE — 3079F DIAST BP 80-89 MM HG: CPT | Performed by: INTERNAL MEDICINE

## 2025-03-22 PROCEDURE — 3075F SYST BP GE 130 - 139MM HG: CPT | Performed by: INTERNAL MEDICINE

## 2025-03-22 RX ORDER — ATORVASTATIN CALCIUM 20 MG/1
20 TABLET, FILM COATED ORAL DAILY
Qty: 100 TABLET | Refills: 4 | Status: SHIPPED | OUTPATIENT
Start: 2025-03-22

## 2025-03-22 RX ORDER — LEVOTHYROXINE SODIUM 50 UG/1
50 TABLET ORAL DAILY
Qty: 100 TABLET | Refills: 11 | Status: SHIPPED | OUTPATIENT
Start: 2025-03-22

## 2025-03-22 RX ORDER — HYDROCHLOROTHIAZIDE 25 MG/1
12.5 TABLET ORAL DAILY
Status: CANCELLED | OUTPATIENT
Start: 2025-03-22

## 2025-03-22 NOTE — PROGRESS NOTES
HPI  79-year-old returns today for physical examination.  He reports that with therapy and with the exercises neck symptoms have completely subsided he is improved his range of motion and those symptoms have resolved.  He is continuing to have other issues however associated with some dizziness and unsteadiness worse when he is upright.  He has been monitoring his blood pressures at home his blood pressures are running lower while upright and while laying down.  He also notices that when laying down the blood pressure and the elevated blood pressures will often correct themselves within a minute or 2.  We discussed that blood pressure control does not mean that he needs to have his blood pressure less then 130/80 at all times.  He did have a 24-hour blood pressure monitor study done in 2022 which showed an average blood pressure of just 120s over 70s.  He also has the abnormal sensations predominantly in the inner thighs where he feels a sensation of cold here.  He finds this annoying.  It has not been progressive.  We reviewed his recent labs and there is no apparent reversible cause of the neuropathy.  We discussed EMG studies and he is open to getting an EMG to have this further evaluated.  We discussed measles vaccination and he agreed for the measles titer to see if this is needed.  Past Medical History:   Diagnosis Date    BPH (benign prostatic hyperplasia)     Essential hypertension 02/2018    Hyperlipidemia LDL goal <130     Pemphigus (H)     pemphigus foliaceus    Retinal detachment 2009    left eye- corrected with laser    Vitreous detachment 2009    LE     Past Surgical History:   Procedure Laterality Date    BIOPSY      Prostate    COLONOSCOPY  2014    EYE SURGERY  2009    NO HISTORY OF SURGERY  01/28/2014    derm    RETINOPEXY LASER PROPHYLAXIS BREAK(S) OS (LEFT EYE)      2009 - Left eye     Family History   Problem Relation Age of Onset    Heart Disease Mother         CHF d 80    Diabetes Mother         " DM2,  age 80    Hypertension Mother     Coronary Artery Disease Mother     Heart Disease Father         CHF d 78    Coronary Artery Disease Father     Heart Disease Paternal Uncle         CHF d 50s    Coronary Artery Disease Other     Cancer No family hx of         no skin cancer    Skin Cancer No family hx of         no skin cancer    Glaucoma No family hx of     Macular Degeneration No family hx of     Melanoma No family hx of          Exam:  /83 (BP Location: Right arm, Patient Position: Sitting, Cuff Size: Adult Regular)   Pulse 84   Temp 98  F (36.7  C)   Resp 18   Ht 1.733 m (5' 8.23\")   Wt 81.1 kg (178 lb 12.8 oz)   SpO2 98%   BMI 27.01 kg/m    178 lbs 12.8 oz  Physical Exam   The patient is alert, oriented with a clear sensorium.   Skin shows numerous truncal lesions related to his pemphigus no other rashes and good turgor.   Head is normocephalic and atraumatic.   Eyes show PERRLA with benign optic fundi.   Ears show normal TMs bilaterally.   Mouth shows clear oral mucosa.   Neck shows no nodes, thyromegaly or bruits.   Back is non tender.  Lungs are clear to percussion and auscultation.   Heart shows normal S1 and S2 without murmur or gallop.   Abdomen is soft and nontender without masses or organomegaly.   Genitalia show normal testes. No evidence of inguinal hernia.  Extremities show no edema and no evidence of active synovitis.   Neurologic examination shows cranial nerves II-XII intact. Motor shows 5/5 strength. Reflexes are symmetric. Cerebellar testing shows unsteady tandem gait but a normal modified tandem gait.  Romberg negative.      ASSESSMENT  1.  Severe cervical degenerative disc disease question myelopathy  2. Orthostasis will stop hydrochlorothiazide  3. Pemphigus controlled on mycophenolate  4. Hypertension fair control  5. Hypothyroidism on replacement  6. BPH on finasteride  7. Hyperlipidemia on atorvastatin   8. IGT  9.  Abnormal lower extremity sensation rule out " neuropathy    Plan  Will assess his lipids and A1c today.  Will do FIT testing for colon cancer screening.  Will stop his hydrochlorothiazide and have him continue to monitor his blood pressure at home.  Will check his measles titer.  Will plan to follow-up in 2 months and will get an EMG to evaluate his abnormal lower extremity sensations    This note was completed using Dragon voice recognition software.      Devendra Telles MD  General Internal Medicine  [facility]  [phone]

## 2025-03-24 LAB
MEV IGG SER IA-ACNC: >300 AU/ML
MEV IGG SER IA-ACNC: POSITIVE

## 2025-03-26 ENCOUNTER — THERAPY VISIT (OUTPATIENT)
Dept: PHYSICAL THERAPY | Facility: CLINIC | Age: 80
End: 2025-03-26
Attending: INTERNAL MEDICINE
Payer: COMMERCIAL

## 2025-03-26 DIAGNOSIS — M47.812 OSTEOARTHRITIS OF CERVICAL SPINE, UNSPECIFIED SPINAL OSTEOARTHRITIS COMPLICATION STATUS: Primary | ICD-10-CM

## 2025-03-26 PROCEDURE — 97110 THERAPEUTIC EXERCISES: CPT | Mod: GP

## 2025-04-02 ENCOUNTER — THERAPY VISIT (OUTPATIENT)
Dept: PHYSICAL THERAPY | Facility: CLINIC | Age: 80
End: 2025-04-02
Attending: INTERNAL MEDICINE
Payer: COMMERCIAL

## 2025-04-02 DIAGNOSIS — M47.812 OSTEOARTHRITIS OF CERVICAL SPINE, UNSPECIFIED SPINAL OSTEOARTHRITIS COMPLICATION STATUS: Primary | ICD-10-CM

## 2025-04-02 PROCEDURE — 97110 THERAPEUTIC EXERCISES: CPT | Mod: GP

## 2025-04-15 ENCOUNTER — MYC MEDICAL ADVICE (OUTPATIENT)
Dept: UROLOGY | Facility: CLINIC | Age: 80
End: 2025-04-15
Payer: COMMERCIAL

## 2025-04-16 ENCOUNTER — TELEPHONE (OUTPATIENT)
Dept: DERMATOLOGY | Facility: CLINIC | Age: 80
End: 2025-04-16
Payer: COMMERCIAL

## 2025-04-16 DIAGNOSIS — L10.2 PEMPHIGUS FOLIACEUS (H): ICD-10-CM

## 2025-04-16 NOTE — TELEPHONE ENCOUNTER
[facility] Call Center    Phone Message    May a detailed message be left on voicemail: yes     Reason for Call: Medication Question or concern regarding medication   Prescription Clarification  Name of Medication: ketoconazole (NIZORAL) 2 % external shampoo  Prescribing Provider: Stephanie Maldonado   Pharmacy: [facility] [address] [place] [address] [address]     What on the order needs clarification? Pharmacy is calling to clarify day supply for ketoconazole (NIZORAL) 2 % external shampoo  Please call back thanks!      Action Taken: Message routed to:  [facility] DERM    Travel Screening: Not Applicable     Date of Service:

## 2025-04-17 RX ORDER — KETOCONAZOLE 20 MG/ML
SHAMPOO, SUSPENSION TOPICAL
Qty: 120 ML | Refills: 11 | Status: SHIPPED | OUTPATIENT
Start: 2025-04-18

## 2025-04-22 ENCOUNTER — TRANSFERRED RECORDS (OUTPATIENT)
Dept: HEALTH INFORMATION MANAGEMENT | Facility: CLINIC | Age: 80
End: 2025-04-22
Payer: COMMERCIAL

## 2025-05-22 ENCOUNTER — OFFICE VISIT (OUTPATIENT)
Dept: NEUROLOGY | Facility: CLINIC | Age: 80
End: 2025-05-22
Attending: INTERNAL MEDICINE
Payer: COMMERCIAL

## 2025-05-22 DIAGNOSIS — R20.9 ALTERATION IN SENSORY PERCEPTION: ICD-10-CM

## 2025-05-22 PROCEDURE — 95886 MUSC TEST DONE W/N TEST COMP: CPT | Performed by: PSYCHIATRY & NEUROLOGY

## 2025-05-22 PROCEDURE — 95909 NRV CNDJ TST 5-6 STUDIES: CPT | Performed by: PSYCHIATRY & NEUROLOGY

## 2025-05-22 PROCEDURE — 95885 MUSC TST DONE W/NERV TST LIM: CPT | Mod: 59 | Performed by: PSYCHIATRY & NEUROLOGY

## 2025-05-22 NOTE — LETTER
2025       RE: Luan Mitchell  [address] [place] [id number]     Dear Colleague,    Thank you for referring your patient, Luan Mitchell, to the [facility] EMG CLIN[facility] [place] [facility]. Please see a copy of my visit note below.                        [facility]  Electrodiagnostic Laboratory                 Department of Neurology                                                                                                         Test Date:  2025    Patient: Luan Mitchell : 1945 Physician: Saqib Reyes MD   Sex: Male AGE: 80 year Ref Phys: Devendra Telles MD   ID#: [id number]   Technician: Julissa Felix     History and Examination:    80-year-old man with chief complaint of an uncomfortable sensation at both inner thighs medially, right more than left.  It is a dysesthesia or burning sensation. He denies significant low back pain.  This discomfort actually does not radiate to his lower legs or feet.  EMG was requested to evaluate for bilateral upper lumbar (L2-3) radiculopathies or plexopathy.    Techniques:    Motor and sensory nerve conduction studies were done with surface recording electrodes. Temperature was monitored and recorded throughout the study. Upper extremities were maintained at a temperature of 32 degrees Centigrade or higher and Lower extremities were maintained at a temperature of 31 degrees Centigrade or higher.  EMG was done with a concentric needle electrode.     Results    Bilateral fibular (EDB), and tibial (AH) motor nerve conduction studies were normal.  Right fibular and tibial F-wave latencies were normal.  Bilateral sural antidromic sensory nerve conduction studies were normal.    Needle EMG showed no abnormal spontaneous activity in any muscle tested, including bilateral L3 paraspinals.  Mildly reduced recruitment of large, long-duration MUPs was noted at the following  muscles: Right adductor longus, left TA, and left vastus medialis.  The right vastus lateralis showed a few large and long-duration MUPs with generally normal recruitment patterns and the right TA showed very mildly reduced recruitment of occasionally polyphasic, but not large or long-duration MUPs.  Needle EMG of the following muscles was normal: Right iliopsoas, left adductor longus, and bilateral L3 paraspinals.    Interpretation:    Abnormalities of equivocal diagnostic significance.  Mild chronic neurogenic changes were noted, which may be interpreted as mild chronic inactive bilateral L3-L4 radiculopathies.  However, this diagnosis is not considered definitive because: a) no abnormal spontaneous activity was detected in any muscle innervated by the upper lumbar nerve roots on either side, and b) mild motor unit remodeling can occur occasionally as part of the normal aging process in patients over the age of 70-75.  Correlation with lumbar spine MRI findings is recommended before making a final diagnosis.  There is no evidence of large fiber polyneuropathy from this study.      ___________________________  Saqib Reyes MD        Nerve Conduction Studies  Motor Sites      Latency Neg. Amp Neg. Amp Diff Segment Distance Velocity Neg. Dur Neg Area Diff Temperature Comment   Site (ms) Norm (mV) Norm (%)  cm m/s Norm (ms) (%) ( C)    Left Fibular (EDB) Motor   Ankle 4.3  < 6.0 6.0 -  Ankle-EDB 8   4.8  32.8    Right Fibular (EDB) Motor   Ankle 4.1  < 6.0 3.9 -  Ankle-EDB 8   4.8  33.1    Bel Fibular Head 10.8 - 3.4 - -13 Bel Fibular Head-Ankle 31.8 47  > 38 5.5 -7 33.6    Pop Fossa 12.5 - 3.1 - -9 Pop Fossa-Bel Fibular Head 8 47  > 38 5.5 -9 33.5    Left Tibial (AHB) Motor   Ankle 3.9  < 6.5 6.8  > 5.0  Ankle-AH 7   5.9  32.3    Right Tibial (AHB) Motor   Ankle 3.7  < 6.5 5.3  > 5.0  Ankle-AH 6.8   6.6  34    Knee 13.0 - 3.8 - -28 Knee-Ankle 36.8 40  > 38 6.9 -17 33.9      F-Wave Sites      Min F-Lat  Max-Min F-Lat Mean F-Lat   Site (ms) (ms) (ms)   Right Fibular F-Wave   Ankle 51.7 2.7 -   Right Tibial F-Wave   Ankle 56.5 3.6 -     Sensory Sites      Onset Lat Peak Lat Amp (O-P) Amp (P-P) Segment Distance Velocity Temperature Comment   Site ms (ms)  V Norm ( V)  cm m/s Norm ( C)    Left Sural Sensory   Calf-Lat Malleolus 2.8 3.4 6  > 5 8 Calf-Lat Malleolus 14 50  > 38 32.7    Right Sural Sensory   Calf-Lat Malleolus 2.7 3.4 7  > 5 6 Calf-Lat Malleolus 14 52  > 38 33.4        Electromyography     Side Muscle Ins Act Fibs/PSW Fasc HF Amp Dur Poly Recrt Int Pat   Right Tib ant Nml None Nml 0 Nml Nml 1+ Joni Nml   Right Vastus lat Nml None Nml 0 1+ 1+ 0 Nml Nml   Right Add longus Nml None Nml 0 1+ 1+ 0 Joni Nml   Right Iliopsoas Nml None Nml 0 Nml Nml 0 Nml Nml   Right L3 PSP Nml None Nml 0        Left L3 PSP Nml None Nml 0        Left Tib ant Nml None 1+ 0 1+ 1+ 0 Joni Nml   Left Vastus med Nml None Nml 0 1+ 1+ 0 Joni Nml   Left Add longus Nml None Nml 0 Nml Nml 0 Nml Nml         Waveforms / Images:    Motor                Sensory         F-Wave                 Again, thank you for allowing me to participate in the care of your patient.      Sincerely,    Saqib Reyes MD

## 2025-05-22 NOTE — PROGRESS NOTES
[facility]  Electrodiagnostic Laboratory                 Department of Neurology                                                                                                         Test Date:  2025    Patient: Luan Mitchell : 1945 Physician: Saqib Reyes MD   Sex: Male AGE: 80 year Ref Phys: Devendra Telles MD   ID#: [id number]   Technician: Julissa Felix     History and Examination:    80-year-old man with chief complaint of an uncomfortable sensation at both inner thighs medially, right more than left.  It is a dysesthesia or burning sensation. He denies significant low back pain.  This discomfort actually does not radiate to his lower legs or feet.  EMG was requested to evaluate for bilateral upper lumbar (L2-3) radiculopathies or plexopathy.    Techniques:    Motor and sensory nerve conduction studies were done with surface recording electrodes. Temperature was monitored and recorded throughout the study. Upper extremities were maintained at a temperature of 32 degrees Centigrade or higher and Lower extremities were maintained at a temperature of 31 degrees Centigrade or higher.  EMG was done with a concentric needle electrode.     Results    Bilateral fibular (EDB), and tibial (AH) motor nerve conduction studies were normal.  Right fibular and tibial F-wave latencies were normal.  Bilateral sural antidromic sensory nerve conduction studies were normal.    Needle EMG showed no abnormal spontaneous activity in any muscle tested, including bilateral L3 paraspinals.  Mildly reduced recruitment of large, long-duration MUPs was noted at the following muscles: Right adductor longus, left TA, and left vastus medialis.  The right vastus lateralis showed a few large and long-duration MUPs with generally normal recruitment patterns and the right TA showed very mildly reduced recruitment of occasionally polyphasic, but not large or long-duration MUPs.  Needle EMG  of the following muscles was normal: Right iliopsoas, left adductor longus, and bilateral L3 paraspinals.    Interpretation:    Abnormalities of equivocal diagnostic significance.  Mild chronic neurogenic changes were noted, which may be interpreted as mild chronic inactive bilateral L3-L4 radiculopathies.  However, this diagnosis is not considered definitive because: a) no abnormal spontaneous activity was detected in any muscle innervated by the upper lumbar nerve roots on either side, and b) mild motor unit remodeling can occur occasionally as part of the normal aging process in patients over the age of 70-75.  Correlation with lumbar spine MRI findings is recommended before making a final diagnosis.  There is no evidence of large fiber polyneuropathy from this study.      ___________________________  Saqib Reyes MD        Nerve Conduction Studies  Motor Sites      Latency Neg. Amp Neg. Amp Diff Segment Distance Velocity Neg. Dur Neg Area Diff Temperature Comment   Site (ms) Norm (mV) Norm (%)  cm m/s Norm (ms) (%) ( C)    Left Fibular (EDB) Motor   Ankle 4.3  < 6.0 6.0 -  Ankle-EDB 8   4.8  32.8    Right Fibular (EDB) Motor   Ankle 4.1  < 6.0 3.9 -  Ankle-EDB 8   4.8  33.1    Bel Fibular Head 10.8 - 3.4 - -13 Bel Fibular Head-Ankle 31.8 47  > 38 5.5 -7 33.6    Pop Fossa 12.5 - 3.1 - -9 Pop Fossa-Bel Fibular Head 8 47  > 38 5.5 -9 33.5    Left Tibial (AHB) Motor   Ankle 3.9  < 6.5 6.8  > 5.0  Ankle-AH 7   5.9  32.3    Right Tibial (AHB) Motor   Ankle 3.7  < 6.5 5.3  > 5.0  Ankle-AH 6.8   6.6  34    Knee 13.0 - 3.8 - -28 Knee-Ankle 36.8 40  > 38 6.9 -17 33.9      F-Wave Sites      Min F-Lat Max-Min F-Lat Mean F-Lat   Site (ms) (ms) (ms)   Right Fibular F-Wave   Ankle 51.7 2.7 -   Right Tibial F-Wave   Ankle 56.5 3.6 -     Sensory Sites      Onset Lat Peak Lat Amp (O-P) Amp (P-P) Segment Distance Velocity Temperature Comment   Site ms (ms)  V Norm ( V)  cm m/s Norm ( C)    Left Sural Sensory   Calf-Lat  Malleolus 2.8 3.4 6  > 5 8 Calf-Lat Malleolus 14 50  > 38 32.7    Right Sural Sensory   Calf-Lat Malleolus 2.7 3.4 7  > 5 6 Calf-Lat Malleolus 14 52  > 38 33.4        Electromyography     Side Muscle Ins Act Fibs/PSW Fasc HF Amp Dur Poly Recrt Int Pat   Right Tib ant Nml None Nml 0 Nml Nml 1+ Joni Nml   Right Vastus lat Nml None Nml 0 1+ 1+ 0 Nml Nml   Right Add longus Nml None Nml 0 1+ 1+ 0 Joni Nml   Right Iliopsoas Nml None Nml 0 Nml Nml 0 Nml Nml   Right L3 PSP Nml None Nml 0        Left L3 PSP Nml None Nml 0        Left Tib ant Nml None 1+ 0 1+ 1+ 0 Joni Nml   Left Vastus med Nml None Nml 0 1+ 1+ 0 Joni Nml   Left Add longus Nml None Nml 0 Nml Nml 0 Nml Nml         Waveforms / Images:    Motor                Sensory         F-Wave

## 2025-05-24 ENCOUNTER — LAB (OUTPATIENT)
Dept: LAB | Facility: CLINIC | Age: 80
End: 2025-05-24
Payer: COMMERCIAL

## 2025-05-24 DIAGNOSIS — L10.2 PEMPHIGUS FOLIACEUS (H): ICD-10-CM

## 2025-05-24 LAB
ALBUMIN SERPL BCG-MCNC: 4.4 G/DL (ref 3.5–5.2)
ALP SERPL-CCNC: 43 U/L (ref 40–150)
ALT SERPL W P-5'-P-CCNC: 46 U/L (ref 0–70)
ANION GAP SERPL CALCULATED.3IONS-SCNC: 11 MMOL/L (ref 7–15)
AST SERPL W P-5'-P-CCNC: 48 U/L (ref 0–45)
BILIRUB SERPL-MCNC: 0.4 MG/DL
BUN SERPL-MCNC: 19.6 MG/DL (ref 8–23)
CALCIUM SERPL-MCNC: 9.7 MG/DL (ref 8.8–10.4)
CHLORIDE SERPL-SCNC: 105 MMOL/L (ref 98–107)
CREAT SERPL-MCNC: 1.01 MG/DL (ref 0.67–1.17)
EGFRCR SERPLBLD CKD-EPI 2021: 74 ML/MIN/1.73M2
ERYTHROCYTE [DISTWIDTH] IN BLOOD BY AUTOMATED COUNT: 12.3 % (ref 10–15)
GLUCOSE SERPL-MCNC: 114 MG/DL (ref 70–99)
HCO3 SERPL-SCNC: 26 MMOL/L (ref 22–29)
HCT VFR BLD AUTO: 42.8 % (ref 40–53)
HGB BLD-MCNC: 15.1 G/DL (ref 13.3–17.7)
MCH RBC QN AUTO: 32.4 PG (ref 26.5–33)
MCHC RBC AUTO-ENTMCNC: 35.3 G/DL (ref 31.5–36.5)
MCV RBC AUTO: 92 FL (ref 78–100)
PLATELET # BLD AUTO: 154 10E3/UL (ref 150–450)
POTASSIUM SERPL-SCNC: 4.5 MMOL/L (ref 3.4–5.3)
PROT SERPL-MCNC: 6.8 G/DL (ref 6.4–8.3)
RBC # BLD AUTO: 4.66 10E6/UL (ref 4.4–5.9)
SODIUM SERPL-SCNC: 142 MMOL/L (ref 135–145)
WBC # BLD AUTO: 5.9 10E3/UL (ref 4–11)

## 2025-05-24 PROCEDURE — 80053 COMPREHEN METABOLIC PANEL: CPT | Performed by: PATHOLOGY

## 2025-05-24 PROCEDURE — 36415 COLL VENOUS BLD VENIPUNCTURE: CPT | Performed by: PATHOLOGY

## 2025-05-24 PROCEDURE — 85027 COMPLETE CBC AUTOMATED: CPT | Performed by: PATHOLOGY

## 2025-05-27 ENCOUNTER — RESULTS FOLLOW-UP (OUTPATIENT)
Dept: INTERNAL MEDICINE | Facility: CLINIC | Age: 80
End: 2025-05-27
Payer: COMMERCIAL

## 2025-05-29 PROCEDURE — 99000 SPECIMEN HANDLING OFFICE-LAB: CPT | Performed by: PATHOLOGY

## 2025-05-29 PROCEDURE — 82274 ASSAY TEST FOR BLOOD FECAL: CPT | Performed by: INTERNAL MEDICINE

## 2025-06-02 LAB — HEMOCCULT STL QL IA: NEGATIVE

## 2025-06-06 ENCOUNTER — OFFICE VISIT (OUTPATIENT)
Dept: INTERNAL MEDICINE | Facility: CLINIC | Age: 80
End: 2025-06-06
Payer: COMMERCIAL

## 2025-06-06 VITALS
TEMPERATURE: 97.4 F | SYSTOLIC BLOOD PRESSURE: 128 MMHG | OXYGEN SATURATION: 94 % | BODY MASS INDEX: 27.4 KG/M2 | HEIGHT: 68 IN | HEART RATE: 57 BPM | RESPIRATION RATE: 15 BRPM | WEIGHT: 180.8 LBS | DIASTOLIC BLOOD PRESSURE: 73 MMHG

## 2025-06-06 DIAGNOSIS — M54.16 LUMBAR RADICULOPATHY: Primary | ICD-10-CM

## 2025-06-06 PROCEDURE — 3074F SYST BP LT 130 MM HG: CPT | Performed by: INTERNAL MEDICINE

## 2025-06-06 PROCEDURE — 99214 OFFICE O/P EST MOD 30 MIN: CPT | Performed by: INTERNAL MEDICINE

## 2025-06-06 PROCEDURE — 3078F DIAST BP <80 MM HG: CPT | Performed by: INTERNAL MEDICINE

## 2025-06-06 NOTE — PROGRESS NOTES
HPI  80-year-old male with a history of hypertension presents for a follow-up. Starting last week, he has been experiencing right hip pain that radiates down to the leg. Walking has helped with the pain, but he has more pain when he wakes up in the morning. He denies any trauma to the hip or leg, and his gait is unchanged from the pain. Following his last appointment, he had an EMG completed for abnormal temperature sensations of the inner thighs. Results of the EMG were equivocal and an MRI of the lumbar spine was recommended. He discontinued hydrochlorothiazide following his last appointment in March due to experiencing dizziness and unsteadiness when upright. These symptoms have since resolved when he discontinued the medication and he reports not feeling dizzy or unsteady since. His AST was mildly elevated at 48 and PSA grew from 2.05 to 2.51 during his last visit and he is wondering if he should be concerned about these elevations.  He does not use any alcohol he is otherwise doing well.   Past Medical History:   Diagnosis Date    BPH (benign prostatic hyperplasia)     Essential hypertension 2018    Hyperlipidemia LDL goal <130     Pemphigus (H)     pemphigus foliaceus    Retinal detachment     left eye- corrected with laser    Vitreous detachment     LE       Past Surgical History:   Procedure Laterality Date    BIOPSY      Prostate    COLONOSCOPY      EYE SURGERY      NO HISTORY OF SURGERY  2014    derm    RETINOPEXY LASER PROPHYLAXIS BREAK(S) OS (LEFT EYE)       - Left eye       Family History   Problem Relation Age of Onset    Heart Disease Mother         CHF d 80    Diabetes Mother         DM2,  age 80    Hypertension Mother     Coronary Artery Disease Mother     Heart Disease Father         CHF d 78    Coronary Artery Disease Father     Heart Disease Paternal Uncle         CHF d 50s    Coronary Artery Disease Other     Cancer No family hx of         no skin cancer    Skin  Cancer No family hx of         no skin cancer    Glaucoma No family hx of     Macular Degeneration No family hx of     Melanoma No family hx of        Current Outpatient Medications   Medication Sig Dispense Refill    ARTIFICIAL TEAR SOLUTION OP Apply to eye daily       atorvastatin (LIPITOR) 20 MG tablet Take 1 tablet (20 mg) by mouth daily. 100 tablet 4    azelastine (ASTELIN) 0.1 % nasal spray 2 sprays each nostril 1-2x daily as needed for nasal congestion (use nightly for first 2 week) 30 mL 11    clobetasol (TEMOVATE) 0.05 % external solution Apply to scalp daily for itch 50 mL 3    finasteride (PROSCAR) 5 MG tablet Take 1 tablet (5 mg) by mouth daily. 100 tablet 3    fluocinolone acetonide (DERMA SMOOTHE/FS BODY) 0.01 % external oil Apply to scalp at night, then sleep overnight with nightcap on 118 mL 4    hydrocortisone butyrate (LOCOID) 0.1 % SOLN Apply sparingly to affected area(s) of beard twice daily 180 mL 3    ketoconazole (NIZORAL) 2 % external shampoo Apply topically three times a week. Lather in the shower, wait 3-5 minutes before rinsing. 120 mL 11    levothyroxine (SYNTHROID/LEVOTHROID) 50 MCG tablet Take 1 tablet (50 mcg) by mouth daily. 100 tablet 11    Multiple Vitamin (MULTIVITAMIN) per tablet Take 1 tablet by mouth daily 1 tab daily      mycophenolate (GENERIC EQUIVALENT) 500 MG tablet TAKE 1 TABLET EVERY MORNING BEFORE BREAKFAST 90 tablet 2    salicyclic acid-sulfur (SEBULEX) 2-2 % external shampoo Apply topically 3 times daily as needed for itching In the shower. 200 g 11    tacrolimus (PROTOPIC) 0.1 % external ointment Apply topically to affected areas as instructed. 300 g 3    triamcinolone (KENALOG) 0.1 % external ointment Apply topically 2 times daily For lesions that are more mild 453.6 g 1     No current facility-administered medications for this visit.         No Known Allergies     /73 (BP Location: Right arm, Patient Position: Sitting, Cuff Size: Adult Regular)   Pulse 57    "Temp 97.4  F (36.3  C) (Temporal)   Resp 15   Ht 1.727 m (5' 8\")   Wt 82 kg (180 lb 12.8 oz)   SpO2 94%   BMI 27.49 kg/m       Physical Exam    General: Alert and oriented. In no apparent distress.    Skin: Warm and dry without lesions.    Cardiovascular: Regular rate and rhythm. Normal S1 and S2. No murmurs, rubs, or gallops. Peripheral pulses +2 bilaterally.    Respiratory: Clear lung sounds bilaterally. Breathing unlabored.    Musculoskeletal: Positive straight leg raise bilaterally. More painful on right leg than left. Full range of motion in all extremities. No joint swelling or deformities. Strength 5/5 in all extremities.    Neurological: Oriented to person, place, and time. Cranial nerves ll-[id number] intact.     Psychiatric: Mood and affect appropriate. Thought process coherent.     Assessment  Dizziness - Resolved after hydrochlorothiazide discontinuation.  Lumbar Radiculopathy  BPH - Sees urology  Pemphigus Foliaceus - Under control, sees dermatology  Hypothyroidism - On levothyroxine   Hypertension controlled off hydrochlorothiazide    Plan  Hip and leg symptoms are concerning for sciatica. MRI of the lumbar spine and a Physical Therapy referral have been placed. We explained to the patient that the best initial treatments for these symptoms are physical therapy exercises. He will be called to schedule an appointment with physical therapy and for the MRI. Elevated AST could be related to mycophenolate used for pemphigus. His dermatologist checks his liver enzymes every 3 months. We suggested having it checked in two months to see if there continues to be an elevation. We reassured him that the slight elevation of PSA is nothing to be concerned about. He will see Urology next February. He has no additional questions at this time. We will contact him with the results of his MRI once it is completed.    Ruben Montano, MS1, Scribe for Dr. Telles  [facility]  [phone]    "

## 2025-06-06 NOTE — PROGRESS NOTES
Luan is a 80 year old that presents in clinic today for the following:     Chief Complaint   Patient presents with    Follow Up     Right hip pain            6/6/2025     2:18 PM   Additional Questions   Roomed by RH     Screenings from encounters over the past 10 days    No data recorded       Bart Escobar at 2:26 PM on 6/6/2025

## 2025-06-15 ENCOUNTER — HEALTH MAINTENANCE LETTER (OUTPATIENT)
Age: 80
End: 2025-06-15

## 2025-06-20 ENCOUNTER — ANCILLARY PROCEDURE (OUTPATIENT)
Dept: MRI IMAGING | Facility: CLINIC | Age: 80
End: 2025-06-20
Attending: INTERNAL MEDICINE
Payer: COMMERCIAL

## 2025-06-20 DIAGNOSIS — M54.16 LUMBAR RADICULOPATHY: ICD-10-CM

## 2025-06-20 PROCEDURE — 72148 MRI LUMBAR SPINE W/O DYE: CPT | Performed by: RADIOLOGY

## 2025-06-23 ENCOUNTER — RESULTS FOLLOW-UP (OUTPATIENT)
Dept: INTERNAL MEDICINE | Facility: CLINIC | Age: 80
End: 2025-06-23

## 2025-07-07 ENCOUNTER — THERAPY VISIT (OUTPATIENT)
Dept: PHYSICAL THERAPY | Facility: CLINIC | Age: 80
End: 2025-07-07
Attending: INTERNAL MEDICINE
Payer: COMMERCIAL

## 2025-07-07 DIAGNOSIS — M62.81 MUSCLE WEAKNESS (GENERALIZED): Primary | ICD-10-CM

## 2025-07-07 DIAGNOSIS — M54.16 LUMBAR RADICULOPATHY: ICD-10-CM

## 2025-07-07 PROCEDURE — 97530 THERAPEUTIC ACTIVITIES: CPT | Mod: GP

## 2025-07-07 PROCEDURE — 97161 PT EVAL LOW COMPLEX 20 MIN: CPT | Mod: GP

## 2025-07-07 PROCEDURE — 97110 THERAPEUTIC EXERCISES: CPT | Mod: GP

## 2025-07-07 NOTE — PROGRESS NOTES
"PHYSICAL THERAPY EVALUATION  Type of Visit: Evaluation       Fall Risk Screen:  Have you fallen 2 or more times in the past year?: No  Have you fallen and had an injury in the past year?: No    Subjective   Had an MRI in May per MD due to \"hot\" sensation in upper leg/thigh. Additionally had an EMG. Has had some complaints of R hip, buttock, and leg. Radiating down the leg. States that his biggest complaint is the \"hot\" sensation that he would like to get rid of vs. The R hip, buttock, and leg pain.        Presenting condition or subjective complaint: Pain in right buttock and leg; numbness in leg  Date of onset: 05/07/25    Relevant medical history: Arthritis; Cold or hot arm or leg; Hearing problems; High blood pressure   Dates & types of surgery:      Prior diagnostic imaging/testing results: MRI; X-ray; EMG     Prior therapy history for the same diagnosis, illness or injury: No      Living Environment  Social support: With a significant other or spouse   Type of home: House; 2-story; Basement   Stairs to enter the home: Yes 4 Is there a railing: Yes     Ramp: No   Stairs inside the home: Yes 16 Is there a railing: Yes     Help at home: None  Equipment owned: Grab bars; Raised toilet seat     Employment: No    Hobbies/Interests: Travel, reading, mathematics    Patient goals for therapy: Bend over without pain    Pain assessment: Pain present  Pain/Discomfort at Worst: 3/10  Pain/Discomfort at Best: 0.5-1/10  Pain/Discomfort Currently: 1/10    Objective   Lumbar ROM:  -Flexion: Limited; hands to middle of shins  -Extension: Limited; flat lumbar  -Side-Bending: WFL  -Rotation: WFL    Poor Hamstring Flexibility     Hip ABD MMT:  -L: 4-/5  -R: 4-/5    SLR:  -L: (-)  -R: (+)    Slump:  -L: (-)  -R: (+)    Dermatomes: WNL  Myotomes: WNL    Assessment & Plan   CLINICAL IMPRESSIONS  Medical Diagnosis: Lumbar Radiculopathy    Treatment Diagnosis: Lumbar Radiculopathy; Muscle Weakness   Impression/Assessment: Patient is a " "80 year old male with low back and right upper thigh complaints.  The following significant findings have been identified: Pain, Decreased ROM/flexibility, Decreased joint mobility, and Decreased strength. These impairments interfere with their ability to perform self care tasks, work tasks, recreational activities, household mobility, and community mobility as compared to previous level of function.     Clinical Decision Making (Complexity):  Clinical Presentation: Stable/Uncomplicated  Clinical Presentation Rationale: based on medical and personal factors listed in PT evaluation  Clinical Decision Making (Complexity): Low complexity    PLAN OF CARE  Treatment Interventions:  Modalities: Cryotherapy, Cupping, Hot Pack  Interventions: Manual Therapy, Neuromuscular Re-education, Therapeutic Activity, Therapeutic Exercise    Long Term Goals     PT Goal 1  Goal Identifier: Pain/Discomfort  Goal Description: Pt will improve c/o R upper thigh \"burning\" pain/discomfort to restore plof  Rationale: to maximize safety and independence with performance of ADLs and functional tasks;to maximize safety and independence with self cares;to maximize safety and independence within the community;to maximize safety and independence within the home  Goal Progress: Ongoing  Target Date: 09/30/25  PT Goal 2  Goal Identifier: Walking  Goal Description: Pt will increase ability to walk and do functional activities w/ no restrictions  Rationale: to maximize safety and independence with performance of ADLs and functional tasks;to maximize safety and independence with self cares;to maximize safety and independence within the community;to maximize safety and independence within the home  Goal Progress: Ongoing  Target Date: 09/30/25      Frequency of Treatment: 1x/week  Duration of Treatment: 12 weeks    Recommended Referrals to Other Professionals: none  Education Assessment:   Learner/Method: Patient;Pictures/Video  Education Comments: " Questions asked and answered    Risks and benefits of evaluation/treatment have been explained.   Patient/Family/caregiver agrees with Plan of Care.     Evaluation Time:     PT Eval, Low Complexity Minutes (74584): 15  Signing Clinician: ROSANA Dial [facility]                                                                                   OUTPATIENT PHYSICAL THERAPY      PLAN OF TREATMENT FOR OUTPATIENT REHABILITATION   Patient's Last Name, First Name, Luan Wood YOB: 1945   Provider's Name   [facility]   Medical Record No.  [id number]     Onset Date: 05/07/25  Start of Care Date: 07/07/25     Medical Diagnosis:  Lumbar Radiculopathy      PT Treatment Diagnosis:  Lumbar Radiculopathy; Muscle Weakness Plan of Treatment  Frequency/Duration: 1x/week/ 12 weeks    Certification date from 07/07/25 to 09/30/25         See note for plan of treatment details and functional goals     Jackson Berry, PT                         I CERTIFY THE NEED FOR THESE SERVICES FURNISHED UNDER        THIS PLAN OF TREATMENT AND WHILE UNDER MY CARE     (Physician attestation of this document indicates review and certification of the therapy plan).              Referring Provider:  Devendra Telles    Initial Assessment  See Epic Evaluation- Start of Care Date: 07/07/25

## 2025-07-10 PROBLEM — M54.16 LUMBAR RADICULOPATHY: Status: ACTIVE | Noted: 2025-07-10

## 2025-07-14 ENCOUNTER — THERAPY VISIT (OUTPATIENT)
Dept: PHYSICAL THERAPY | Facility: CLINIC | Age: 80
End: 2025-07-14
Payer: COMMERCIAL

## 2025-07-14 DIAGNOSIS — M54.16 LUMBAR RADICULOPATHY: ICD-10-CM

## 2025-07-14 DIAGNOSIS — M62.81 MUSCLE WEAKNESS (GENERALIZED): Primary | ICD-10-CM

## 2025-07-14 PROCEDURE — 97530 THERAPEUTIC ACTIVITIES: CPT | Mod: GP

## 2025-07-14 PROCEDURE — 97110 THERAPEUTIC EXERCISES: CPT | Mod: GP

## 2025-07-15 DIAGNOSIS — L10.2 PEMPHIGUS FOLIACEUS (H): ICD-10-CM

## 2025-07-15 NOTE — TELEPHONE ENCOUNTER
Last Visit Date: 1/14/2025 [facility] [place]  Future Visit Date: 7/18/2025  ----------------------  Medication Requested: mycophenolate (GENERIC EQUIVALENT) 500 MG tablet  Last Written Prescription: 10/14/2024 Disp:90 R:2  ---------------------  CBC RESULTS:   Recent Labs   Lab Test 05/24/25  0957   WBC 5.9   RBC 4.66   HGB 15.1   HCT 42.8   MCV 92   MCH 32.4   MCHC 35.3   RDW 12.3          Creatinine   Date Value Ref Range Status   05/24/2025 1.01 0.67 - 1.17 mg/dL Final   06/17/2021 0.88 0.66 - 1.25 mg/dL Final   ]    Liver Function Studies -   Recent Labs   Lab Test 05/24/25  0957   PROTTOTAL 6.8   ALBUMIN 4.4   BILITOTAL 0.4   ALKPHOS 43   AST 48*   ALT 46     [x]  Refill decision: Medication unable to be refilled by RN due to:     []    Pt not seen within past 12 months;  No FOV;  or FOV exceeds timeframe per protocol requirements  []    Compliance - lapse in therapy/gap in refills; No Shows; Cancellations  []    Verification - order discrepancy, clarification needed, Sig modification needed  []    Controlled medication  [x]    Medication not included in refill protocol policy  []    Abnormal labs/test:  []    Overdue labs/test:    []    Medication not active on Pt's med list  []    Drug interaction Warning  []    Medication - Last script is Reported/Historical/Transitional  []    Advanced refill request  []    Review Needed: New med; Med adjusted within <= 30 days; Safety Alert; Lab monitoring required  []    Other:     Request from pharmacy:  Requested Prescriptions   Pending Prescriptions Disp Refills    mycophenolate (GENERIC EQUIVALENT) 500 MG tablet 90 tablet 2     Sig: Take 1 tablet (500 mg) by mouth every morning (before breakfast).       There is no refill protocol information for this order

## 2025-07-17 RX ORDER — MYCOPHENOLATE MOFETIL 500 MG/1
500 TABLET ORAL
Qty: 90 TABLET | Refills: 1 | Status: SHIPPED | OUTPATIENT
Start: 2025-07-17

## 2025-07-21 ENCOUNTER — THERAPY VISIT (OUTPATIENT)
Dept: PHYSICAL THERAPY | Facility: CLINIC | Age: 80
End: 2025-07-21
Payer: COMMERCIAL

## 2025-07-21 DIAGNOSIS — M62.81 MUSCLE WEAKNESS (GENERALIZED): Primary | ICD-10-CM

## 2025-07-21 DIAGNOSIS — M54.16 LUMBAR RADICULOPATHY: ICD-10-CM

## 2025-07-21 PROCEDURE — 97110 THERAPEUTIC EXERCISES: CPT | Mod: GP

## 2025-07-21 PROCEDURE — 97530 THERAPEUTIC ACTIVITIES: CPT | Mod: GP

## 2025-07-28 ENCOUNTER — THERAPY VISIT (OUTPATIENT)
Dept: PHYSICAL THERAPY | Facility: CLINIC | Age: 80
End: 2025-07-28
Payer: COMMERCIAL

## 2025-07-28 DIAGNOSIS — M62.81 MUSCLE WEAKNESS (GENERALIZED): Primary | ICD-10-CM

## 2025-07-28 DIAGNOSIS — M54.16 LUMBAR RADICULOPATHY: ICD-10-CM

## 2025-07-28 PROCEDURE — 97110 THERAPEUTIC EXERCISES: CPT | Mod: GP

## 2025-07-28 NOTE — PROGRESS NOTES
"Subjective: Patient reports only very rarely having leg pain and having no regular back pain at this time.     Objective:   Pain: 0/10    Treatment:  Supine Bridge  - 3x12 with green therband at knees. Last 2 sets with 3s hold, cues for pushing through hips rather than back   Sidelying Hip Abduction - x10 on R, d/c due to discomfort in adductor group despite cueing   Sidelying Clamshells  - 2x12 ea side with green theraband at knees   Squats to Table - 2x12, lowered table height for 2nd set   Patient education: continue with all strengthening activities but may gradually reduce utilization of pretzel stretch and quadruped stretch. Patient encouraged to continue staying generally active.     Home Exercise Program:   PTRX Code:   Updated today and patient educated on update.       Assessment: Luan returns for 4th visit today, first with this provider. Subjectively he has made significant improvement including rare pain occurrence. He continues to demonstrate relative strength deficit however he was able to tolerate progression of activities today with fatigue but no pain provocation. Will plan for 1 more PT visit to progress management strategies and home program. Will reassess objective measures at next visit.       Goals:   Per Jackson Berry PT DTaP on 7/7/25:  PT Goal 1  Goal Identifier: Pain/Discomfort  Goal Description: Pt will improve c/o R upper thigh \"burning\" pain/discomfort to restore plof  Rationale: to maximize safety and independence with performance of ADLs and functional tasks;to maximize safety and independence with self cares;to maximize safety and independence within the community;to maximize safety and independence within the home  Goal Progress: Ongoing  Target Date: 09/30/25  PT Goal 2  Goal Identifier: Walking  Goal Description: Pt will increase ability to walk and do functional activities w/ no restrictions  Rationale: to maximize safety and independence with performance of ADLs and " functional tasks;to maximize safety and independence with self cares;to maximize safety and independence within the community;to maximize safety and independence within the home  Goal Progress: Ongoing  Target Date: 09/30/25      Plan: check form on exercises, progress, address additional needs.   Reassess objective measures.   Will plan to discharge at next visit pending continued progress. Continue therapy per current plan of care.       Beginning/End Dates of Progress Note Reporting Period:    to 07/28/2025    Referring Provider:  Devendra Telles

## 2025-08-11 ENCOUNTER — THERAPY VISIT (OUTPATIENT)
Dept: PHYSICAL THERAPY | Facility: CLINIC | Age: 80
End: 2025-08-11
Payer: COMMERCIAL

## 2025-08-11 DIAGNOSIS — M62.81 MUSCLE WEAKNESS (GENERALIZED): Primary | ICD-10-CM

## 2025-08-11 DIAGNOSIS — M54.16 LUMBAR RADICULOPATHY: ICD-10-CM

## 2025-08-11 PROCEDURE — 97110 THERAPEUTIC EXERCISES: CPT | Mod: GP

## (undated) RX ORDER — DOBUTAMINE HYDROCHLORIDE 200 MG/100ML
INJECTION INTRAVENOUS
Status: DISPENSED
Start: 2017-02-03